# Patient Record
Sex: MALE | Race: BLACK OR AFRICAN AMERICAN | NOT HISPANIC OR LATINO | Employment: OTHER | URBAN - METROPOLITAN AREA
[De-identification: names, ages, dates, MRNs, and addresses within clinical notes are randomized per-mention and may not be internally consistent; named-entity substitution may affect disease eponyms.]

---

## 2017-06-14 ENCOUNTER — HOSPITAL ENCOUNTER (INPATIENT)
Facility: CLINIC | Age: 57
LOS: 4 days | Discharge: HOME | DRG: 192 | End: 2017-06-19
Attending: EMERGENCY MEDICINE | Admitting: INTERNAL MEDICINE
Payer: MEDICARE

## 2017-06-14 ENCOUNTER — APPOINTMENT (OUTPATIENT)
Dept: GENERAL RADIOLOGY | Facility: CLINIC | Age: 57
DRG: 192 | End: 2017-06-14
Attending: EMERGENCY MEDICINE
Payer: MEDICARE

## 2017-06-14 DIAGNOSIS — J44.1 COPD EXACERBATION (H): ICD-10-CM

## 2017-06-14 LAB
ALBUMIN SERPL-MCNC: 3.9 G/DL (ref 3.4–5)
ALP SERPL-CCNC: 122 U/L (ref 40–150)
ALT SERPL W P-5'-P-CCNC: 15 U/L (ref 0–70)
ANION GAP SERPL CALCULATED.3IONS-SCNC: 9 MMOL/L (ref 3–14)
AST SERPL W P-5'-P-CCNC: 15 U/L (ref 0–45)
BASOPHILS # BLD AUTO: 0 10E9/L (ref 0–0.2)
BASOPHILS NFR BLD AUTO: 0.2 %
BILIRUB SERPL-MCNC: 0.9 MG/DL (ref 0.2–1.3)
BUN SERPL-MCNC: 9 MG/DL (ref 7–30)
CALCIUM SERPL-MCNC: 9.1 MG/DL (ref 8.5–10.1)
CHLORIDE SERPL-SCNC: 105 MMOL/L (ref 94–109)
CO2 SERPL-SCNC: 25 MMOL/L (ref 20–32)
CREAT SERPL-MCNC: 0.81 MG/DL (ref 0.66–1.25)
DIFFERENTIAL METHOD BLD: ABNORMAL
EOSINOPHIL # BLD AUTO: 0.1 10E9/L (ref 0–0.7)
EOSINOPHIL NFR BLD AUTO: 0.5 %
ERYTHROCYTE [DISTWIDTH] IN BLOOD BY AUTOMATED COUNT: 12.9 % (ref 10–15)
GFR SERPL CREATININE-BSD FRML MDRD: NORMAL ML/MIN/1.7M2
GLUCOSE SERPL-MCNC: 93 MG/DL (ref 70–99)
HCT VFR BLD AUTO: 42.6 % (ref 40–53)
HGB BLD-MCNC: 14.7 G/DL (ref 13.3–17.7)
IMM GRANULOCYTES # BLD: 0 10E9/L (ref 0–0.4)
IMM GRANULOCYTES NFR BLD: 0.2 %
LYMPHOCYTES # BLD AUTO: 0.5 10E9/L (ref 0.8–5.3)
LYMPHOCYTES NFR BLD AUTO: 4 %
MCH RBC QN AUTO: 31.4 PG (ref 26.5–33)
MCHC RBC AUTO-ENTMCNC: 34.5 G/DL (ref 31.5–36.5)
MCV RBC AUTO: 91 FL (ref 78–100)
MONOCYTES # BLD AUTO: 0.7 10E9/L (ref 0–1.3)
MONOCYTES NFR BLD AUTO: 5.5 %
NEUTROPHILS # BLD AUTO: 11.5 10E9/L (ref 1.6–8.3)
NEUTROPHILS NFR BLD AUTO: 89.6 %
NRBC # BLD AUTO: 0 10*3/UL
NRBC BLD AUTO-RTO: 0 /100
NT-PROBNP SERPL-MCNC: 176 PG/ML (ref 0–900)
PLATELET # BLD AUTO: 177 10E9/L (ref 150–450)
POTASSIUM SERPL-SCNC: 3.5 MMOL/L (ref 3.4–5.3)
PROT SERPL-MCNC: 7.6 G/DL (ref 6.8–8.8)
RBC # BLD AUTO: 4.68 10E12/L (ref 4.4–5.9)
SODIUM SERPL-SCNC: 139 MMOL/L (ref 133–144)
TROPONIN I SERPL-MCNC: NORMAL UG/L (ref 0–0.04)
WBC # BLD AUTO: 12.8 10E9/L (ref 4–11)

## 2017-06-14 PROCEDURE — 96374 THER/PROPH/DIAG INJ IV PUSH: CPT

## 2017-06-14 PROCEDURE — 71020 XR CHEST 2 VW: CPT

## 2017-06-14 PROCEDURE — 85025 COMPLETE CBC W/AUTO DIFF WBC: CPT | Performed by: EMERGENCY MEDICINE

## 2017-06-14 PROCEDURE — 93005 ELECTROCARDIOGRAM TRACING: CPT

## 2017-06-14 PROCEDURE — 25000128 H RX IP 250 OP 636: Performed by: EMERGENCY MEDICINE

## 2017-06-14 PROCEDURE — 80053 COMPREHEN METABOLIC PANEL: CPT | Performed by: EMERGENCY MEDICINE

## 2017-06-14 PROCEDURE — 99285 EMERGENCY DEPT VISIT HI MDM: CPT | Mod: 25

## 2017-06-14 PROCEDURE — 25000125 ZZHC RX 250: Performed by: EMERGENCY MEDICINE

## 2017-06-14 PROCEDURE — 83880 ASSAY OF NATRIURETIC PEPTIDE: CPT | Performed by: EMERGENCY MEDICINE

## 2017-06-14 PROCEDURE — 94640 AIRWAY INHALATION TREATMENT: CPT

## 2017-06-14 PROCEDURE — 84484 ASSAY OF TROPONIN QUANT: CPT | Performed by: EMERGENCY MEDICINE

## 2017-06-14 RX ORDER — METHYLPREDNISOLONE SODIUM SUCCINATE 125 MG/2ML
125 INJECTION, POWDER, LYOPHILIZED, FOR SOLUTION INTRAMUSCULAR; INTRAVENOUS ONCE
Status: COMPLETED | OUTPATIENT
Start: 2017-06-14 | End: 2017-06-14

## 2017-06-14 RX ORDER — IPRATROPIUM BROMIDE AND ALBUTEROL SULFATE 2.5; .5 MG/3ML; MG/3ML
3 SOLUTION RESPIRATORY (INHALATION) ONCE
Status: COMPLETED | OUTPATIENT
Start: 2017-06-14 | End: 2017-06-14

## 2017-06-14 RX ORDER — IPRATROPIUM BROMIDE AND ALBUTEROL SULFATE 2.5; .5 MG/3ML; MG/3ML
3 SOLUTION RESPIRATORY (INHALATION) ONCE
Status: COMPLETED | OUTPATIENT
Start: 2017-06-14 | End: 2017-06-15

## 2017-06-14 RX ADMIN — IPRATROPIUM BROMIDE AND ALBUTEROL SULFATE 3 ML: .5; 3 SOLUTION RESPIRATORY (INHALATION) at 22:49

## 2017-06-14 RX ADMIN — IPRATROPIUM BROMIDE AND ALBUTEROL SULFATE 3 ML: .5; 3 SOLUTION RESPIRATORY (INHALATION) at 23:47

## 2017-06-14 RX ADMIN — METHYLPREDNISOLONE SODIUM SUCCINATE 125 MG: 125 INJECTION, POWDER, FOR SOLUTION INTRAMUSCULAR; INTRAVENOUS at 22:49

## 2017-06-14 ASSESSMENT — ENCOUNTER SYMPTOMS
CHILLS: 1
FEVER: 1
SHORTNESS OF BREATH: 1
HEMATURIA: 0
ABDOMINAL PAIN: 0
DIFFICULTY URINATING: 0
COUGH: 1

## 2017-06-14 NOTE — IP AVS SNAPSHOT
[facility] [address] [facility]    [address] [place] [address]    Phone:  [phone]                                       After Visit Summary   6/14/2017    Gerson Pearson    MRN: [id number]           After Visit Summary Signature Page     I have received my discharge instructions, and my questions have been answered. I have discussed any challenges I see with this plan with the nurse or doctor.    ..........................................................................................................................................  Patient/Patient Representative Signature      ..........................................................................................................................................  Patient Representative Print Name and Relationship to Patient    ..................................................               ................................................  Date                                            Time    ..........................................................................................................................................  Reviewed by Signature/Title    ...................................................              ..............................................  Date                                                            Time

## 2017-06-14 NOTE — IP AVS SNAPSHOT
MRN:[id number]                      After Visit Summary   6/14/2017    Gerson Pearson    MRN: [id number]           Thank you!     Thank you for choosing [facility] for your care. Our goal is always to provide you with excellent care. Hearing back from our patients is one way we can continue to improve our services. Please take a few minutes to complete the written survey that you may receive in the mail after you visit with us. Thank you!        Patient Information     Date Of Birth          1960        Designated Caregiver       Most Recent Value    Caregiver    Will someone help with your care after discharge? no      About your hospital stay     You were admitted on:  Iris 15, 2017 You last received care in the:  [facility] [address] [facility]    You were discharged on:  June 19, 2017        Reason for your hospital stay       COPD exacerbation, gastroenteritis                  Who to Call     For medical emergencies, please call 911.  For non-urgent questions about your medical care, please call your primary care provider or clinic, [phone]          Attending Provider     Provider Specialty    Hans Mckinney MD Emergency Medicine    [facility] [place] GOMES MD Internal Medicine    [facility]       Primary Care Provider Office Phone # Fax #    Alex Wynn PA-C [phone]      After Care Instructions     Activity       Your activity upon discharge: activity as tolerated            Diet       Follow this diet upon discharge: Orders Placed This Encounter      Regular Diet Adult                    Follow-up Appointments     Follow Up and recommended labs and tests       Follow up with primary care provider, Alex Wynn, within 7 days for hospital follow- up.            Follow Up and recommended labs and tests       Follow up with Alex Wynn PA-C  June 21 2pm  [address] [place] [address]  Bring your insurance card and  "arrive 15 minutes early                  Pending Results     No orders found from 2017 to 6/15/2017.            Statement of Approval     Ordered          17 0926  I have reviewed and agree with all the recommendations and orders detailed in this document.  EFFECTIVE NOW     Approved and electronically signed by:  Kali Garner MD             Admission Information     Date & Time Provider Department Dept. Phone    2017 Kali Garner MD [facility] [address] [facility] [phone]      Your Vitals Were     Blood Pressure Pulse Temperature Respirations Height Weight    128/81 (BP Location: Right arm) 77 97.9  F (36.6  C) (Oral) 16 1.753 m (5' 9\") 55.1 kg (121 lb 6.4 oz)    Pulse Oximetry BMI (Body Mass Index)                94% 17.93 kg/m2          [employer] Information     [employer] lets you send messages to your doctor, view your test results, renew your prescriptions, schedule appointments and more. To sign up, go to [email or web address] . Click on \"Log in\" on the left side of the screen, which will take you to the Welcome page. Then click on \"Sign up Now\" on the right side of the page.     You will be asked to enter the access code listed below, as well as some personal information. Please follow the directions to create your username and password.     Your access code is: [email or web address]  Expires: 2017 10:21 AM     Your access code will  in 90 days. If you need help or a new code, please call your [facility] clinic or [phone].        Care EveryWhere ID     This is your Care EveryWhere ID. This could be used by other organizations to access your [facility] medical records  [id number]           Review of your medicines      CONTINUE these medicines which have NOT CHANGED        Dose / Directions    albuterol 108 (90 BASE) MCG/ACT Inhaler   Commonly known as:  PROAIR HFA/PROVENTIL HFA/VENTOLIN HFA        Dose:  2 puff   Inhale 2 puffs into the lungs every 4 hours as needed for " shortness of breath / dyspnea or wheezing   Quantity:  1 Inhaler   Refills:  1       CITALOPRAM HYDROBROMIDE PO        Dose:  20 mg   Take 20 mg by mouth daily   Refills:  0       ipratropium - albuterol 0.5 mg/2.5 mg/3 mL 0.5-2.5 (3) MG/3ML neb solution   Commonly known as:  DUONEB        Dose:  1 vial   Take 1 vial (3 mLs) by nebulization every 6 hours as needed for shortness of breath / dyspnea or wheezing   Quantity:  1 Box   Refills:  1       mometasone-formoterol 200-5 MCG/ACT oral inhaler   Commonly known as:  DULERA        Dose:  2 puff   Inhale 2 puffs into the lungs 2 times daily   Quantity:  1 Inhaler   Refills:  1       multivitamin, therapeutic Tabs tablet        Dose:  1 tablet   Take 1 tablet by mouth daily   Refills:  0       tiotropium 18 MCG capsule   Commonly known as:  SPIRIVA        Dose:  18 mcg   Inhale 1 capsule (18 mcg) into the lungs daily   Quantity:  30 capsule   Refills:  1       traMADol 50 MG tablet   Commonly known as:  ULTRAM        Dose:  50 mg   Take 1 tablet (50 mg) by mouth every 6 hours as needed for moderate pain   Quantity:  12 tablet   Refills:  0            Where to get your medicines      These medications were sent to [facility] [address] [place] [address] & [address], [place] [address]     Phone:  [phone]     albuterol 108 (90 BASE) MCG/ACT Inhaler    ipratropium - albuterol 0.5 mg/2.5 mg/3 mL 0.5-2.5 (3) MG/3ML neb solution    mometasone-formoterol 200-5 MCG/ACT oral inhaler    tiotropium 18 MCG capsule                Protect others around you: Learn how to safely use, store and throw away your medicines at www.disposemymeds.org.             Medication List: This is a list of all your medications and when to take them. Check marks below indicate your daily home schedule. Keep this list as a reference.      Medications           Morning Afternoon Evening Bedtime As Needed    albuterol 108 (90 BASE) MCG/ACT  Inhaler   Commonly known as:  PROAIR HFA/PROVENTIL HFA/VENTOLIN HFA   Inhale 2 puffs into the lungs every 4 hours as needed for shortness of breath / dyspnea or wheezing                                   CITALOPRAM HYDROBROMIDE PO   Take 20 mg by mouth daily   Last time this was given:  20 mg on 6/16/2017  8:17 AM                                   ipratropium - albuterol 0.5 mg/2.5 mg/3 mL 0.5-2.5 (3) MG/3ML neb solution   Commonly known as:  DUONEB   Take 1 vial (3 mLs) by nebulization every 6 hours as needed for shortness of breath / dyspnea or wheezing   Last time this was given:  3 mLs on 6/15/2017 12:28 AM                                   mometasone-formoterol 200-5 MCG/ACT oral inhaler   Commonly known as:  DULERA   Inhale 2 puffs into the lungs 2 times daily                                      multivitamin, therapeutic Tabs tablet   Take 1 tablet by mouth daily                                   tiotropium 18 MCG capsule   Commonly known as:  SPIRIVA   Inhale 1 capsule (18 mcg) into the lungs daily   Last time this was given:  18 mcg on 6/19/2017  9:28 AM                                   traMADol 50 MG tablet   Commonly known as:  ULTRAM   Take 1 tablet (50 mg) by mouth every 6 hours as needed for moderate pain   Last time this was given:  50 mg on 6/19/2017  3:50 PM

## 2017-06-15 PROBLEM — J44.1 COPD EXACERBATION (H): Status: ACTIVE | Noted: 2017-06-15

## 2017-06-15 LAB
INTERPRETATION ECG - MUSE: NORMAL
LACTATE BLD-SCNC: 2.6 MMOL/L (ref 0.7–2.1)
LACTATE BLD-SCNC: 3.6 MMOL/L (ref 0.7–2.1)
PROCALCITONIN SERPL-MCNC: NORMAL NG/ML
TROPONIN I SERPL-MCNC: NORMAL UG/L (ref 0–0.04)

## 2017-06-15 PROCEDURE — 94640 AIRWAY INHALATION TREATMENT: CPT

## 2017-06-15 PROCEDURE — 84484 ASSAY OF TROPONIN QUANT: CPT | Performed by: PHYSICIAN ASSISTANT

## 2017-06-15 PROCEDURE — 25000307 HC RX OP HPI UCR WEL IP 250: Mod: GY | Performed by: PHYSICIAN ASSISTANT

## 2017-06-15 PROCEDURE — G0378 HOSPITAL OBSERVATION PER HR: HCPCS

## 2017-06-15 PROCEDURE — 99223 1ST HOSP IP/OBS HIGH 75: CPT | Mod: AI | Performed by: INTERNAL MEDICINE

## 2017-06-15 PROCEDURE — A9270 NON-COVERED ITEM OR SERVICE: HCPCS | Mod: GY | Performed by: PHYSICIAN ASSISTANT

## 2017-06-15 PROCEDURE — 36415 COLL VENOUS BLD VENIPUNCTURE: CPT | Performed by: PHYSICIAN ASSISTANT

## 2017-06-15 PROCEDURE — 25000128 H RX IP 250 OP 636: Performed by: INTERNAL MEDICINE

## 2017-06-15 PROCEDURE — 25000132 ZZH RX MED GY IP 250 OP 250 PS 637: Mod: GY | Performed by: PHYSICIAN ASSISTANT

## 2017-06-15 PROCEDURE — A9270 NON-COVERED ITEM OR SERVICE: HCPCS | Mod: GY | Performed by: INTERNAL MEDICINE

## 2017-06-15 PROCEDURE — 36415 COLL VENOUS BLD VENIPUNCTURE: CPT | Performed by: INTERNAL MEDICINE

## 2017-06-15 PROCEDURE — 94640 AIRWAY INHALATION TREATMENT: CPT | Mod: 76

## 2017-06-15 PROCEDURE — 25000125 ZZHC RX 250: Performed by: INTERNAL MEDICINE

## 2017-06-15 PROCEDURE — 25000132 ZZH RX MED GY IP 250 OP 250 PS 637: Mod: GY | Performed by: INTERNAL MEDICINE

## 2017-06-15 PROCEDURE — 83605 ASSAY OF LACTIC ACID: CPT | Performed by: PHYSICIAN ASSISTANT

## 2017-06-15 PROCEDURE — 12000000 ZZH R&B MED SURG/OB

## 2017-06-15 PROCEDURE — 99207 ZZC CDG-CODE CATEGORY CHANGED: CPT | Performed by: INTERNAL MEDICINE

## 2017-06-15 PROCEDURE — 40000275 ZZH STATISTIC RCP TIME EA 10 MIN

## 2017-06-15 PROCEDURE — 96361 HYDRATE IV INFUSION ADD-ON: CPT

## 2017-06-15 PROCEDURE — 25000125 ZZHC RX 250: Performed by: PHYSICIAN ASSISTANT

## 2017-06-15 PROCEDURE — 84145 PROCALCITONIN (PCT): CPT | Performed by: INTERNAL MEDICINE

## 2017-06-15 PROCEDURE — 25000128 H RX IP 250 OP 636: Performed by: PHYSICIAN ASSISTANT

## 2017-06-15 PROCEDURE — 25000125 ZZHC RX 250: Performed by: EMERGENCY MEDICINE

## 2017-06-15 PROCEDURE — 83605 ASSAY OF LACTIC ACID: CPT | Performed by: INTERNAL MEDICINE

## 2017-06-15 RX ORDER — IPRATROPIUM BROMIDE AND ALBUTEROL SULFATE 2.5; .5 MG/3ML; MG/3ML
3 SOLUTION RESPIRATORY (INHALATION)
Status: DISCONTINUED | OUTPATIENT
Start: 2017-06-15 | End: 2017-06-15

## 2017-06-15 RX ORDER — LEVALBUTEROL 1.25 MG/.5ML
1.25 SOLUTION, CONCENTRATE RESPIRATORY (INHALATION) EVERY 4 HOURS PRN
Status: DISCONTINUED | OUTPATIENT
Start: 2017-06-15 | End: 2017-06-19 | Stop reason: HOSPADM

## 2017-06-15 RX ORDER — IPRATROPIUM BROMIDE AND ALBUTEROL SULFATE 2.5; .5 MG/3ML; MG/3ML
1 SOLUTION RESPIRATORY (INHALATION) EVERY 6 HOURS PRN
Status: ON HOLD | COMMUNITY
End: 2017-06-19

## 2017-06-15 RX ORDER — NICOTINE 21 MG/24HR
1 PATCH, TRANSDERMAL 24 HOURS TRANSDERMAL DAILY PRN
Status: DISCONTINUED | OUTPATIENT
Start: 2017-06-15 | End: 2017-06-19 | Stop reason: HOSPADM

## 2017-06-15 RX ORDER — SODIUM CHLORIDE 9 MG/ML
INJECTION, SOLUTION INTRAVENOUS CONTINUOUS
Status: DISCONTINUED | OUTPATIENT
Start: 2017-06-15 | End: 2017-06-15

## 2017-06-15 RX ORDER — NALOXONE HYDROCHLORIDE 0.4 MG/ML
.1-.4 INJECTION, SOLUTION INTRAMUSCULAR; INTRAVENOUS; SUBCUTANEOUS
Status: DISCONTINUED | OUTPATIENT
Start: 2017-06-15 | End: 2017-06-19 | Stop reason: HOSPADM

## 2017-06-15 RX ORDER — ACETAMINOPHEN 650 MG/1
650 SUPPOSITORY RECTAL EVERY 4 HOURS PRN
Status: DISCONTINUED | OUTPATIENT
Start: 2017-06-15 | End: 2017-06-19 | Stop reason: HOSPADM

## 2017-06-15 RX ORDER — CITALOPRAM HYDROBROMIDE 20 MG/1
20 TABLET ORAL DAILY
Status: DISCONTINUED | OUTPATIENT
Start: 2017-06-15 | End: 2017-06-16

## 2017-06-15 RX ORDER — ACETAMINOPHEN 325 MG/1
650 TABLET ORAL EVERY 4 HOURS PRN
Status: DISCONTINUED | OUTPATIENT
Start: 2017-06-15 | End: 2017-06-19 | Stop reason: HOSPADM

## 2017-06-15 RX ORDER — TIOTROPIUM BROMIDE 18 UG/1
18 CAPSULE ORAL; RESPIRATORY (INHALATION) DAILY
Status: DISCONTINUED | OUTPATIENT
Start: 2017-06-15 | End: 2017-06-19 | Stop reason: HOSPADM

## 2017-06-15 RX ORDER — ALBUTEROL SULFATE 90 UG/1
2 AEROSOL, METERED RESPIRATORY (INHALATION) EVERY 4 HOURS PRN
Status: ON HOLD | COMMUNITY
End: 2017-06-19

## 2017-06-15 RX ORDER — GUAIFENESIN 600 MG/1
1200 TABLET, EXTENDED RELEASE ORAL 2 TIMES DAILY
Status: DISCONTINUED | OUTPATIENT
Start: 2017-06-15 | End: 2017-06-17

## 2017-06-15 RX ORDER — SODIUM CHLORIDE 9 MG/ML
INJECTION, SOLUTION INTRAVENOUS CONTINUOUS
Status: DISCONTINUED | OUTPATIENT
Start: 2017-06-15 | End: 2017-06-16

## 2017-06-15 RX ORDER — ALBUTEROL SULFATE 0.83 MG/ML
2.5 SOLUTION RESPIRATORY (INHALATION)
Status: DISCONTINUED | OUTPATIENT
Start: 2017-06-15 | End: 2017-06-15

## 2017-06-15 RX ORDER — MULTIVITAMIN,THERAPEUTIC
1 TABLET ORAL DAILY
Status: ON HOLD | COMMUNITY
End: 2021-10-11

## 2017-06-15 RX ORDER — TRAMADOL HYDROCHLORIDE 50 MG/1
50 TABLET ORAL EVERY 6 HOURS PRN
Status: DISCONTINUED | OUTPATIENT
Start: 2017-06-15 | End: 2017-06-19 | Stop reason: HOSPADM

## 2017-06-15 RX ORDER — METHYLPREDNISOLONE SODIUM SUCCINATE 125 MG/2ML
60 INJECTION, POWDER, LYOPHILIZED, FOR SOLUTION INTRAMUSCULAR; INTRAVENOUS EVERY 8 HOURS
Status: DISCONTINUED | OUTPATIENT
Start: 2017-06-15 | End: 2017-06-18

## 2017-06-15 RX ORDER — LEVALBUTEROL 1.25 MG/.5ML
1.25 SOLUTION, CONCENTRATE RESPIRATORY (INHALATION) 4 TIMES DAILY
Status: DISCONTINUED | OUTPATIENT
Start: 2017-06-15 | End: 2017-06-19 | Stop reason: HOSPADM

## 2017-06-15 RX ADMIN — IPRATROPIUM BROMIDE 0.5 MG: 0.5 SOLUTION RESPIRATORY (INHALATION) at 16:16

## 2017-06-15 RX ADMIN — LEVALBUTEROL HYDROCHLORIDE 1.25 MG: 1.25 SOLUTION, CONCENTRATE RESPIRATORY (INHALATION) at 10:55

## 2017-06-15 RX ADMIN — METHYLPREDNISOLONE SODIUM SUCCINATE 62.5 MG: 125 INJECTION, POWDER, FOR SOLUTION INTRAMUSCULAR; INTRAVENOUS at 13:41

## 2017-06-15 RX ADMIN — TRAMADOL HYDROCHLORIDE 50 MG: 50 TABLET, COATED ORAL at 15:55

## 2017-06-15 RX ADMIN — SODIUM CHLORIDE: 9 INJECTION, SOLUTION INTRAVENOUS at 12:49

## 2017-06-15 RX ADMIN — IPRATROPIUM BROMIDE 0.5 MG: 0.5 SOLUTION RESPIRATORY (INHALATION) at 10:55

## 2017-06-15 RX ADMIN — LEVALBUTEROL HYDROCHLORIDE 1.25 MG: 1.25 SOLUTION, CONCENTRATE RESPIRATORY (INHALATION) at 19:27

## 2017-06-15 RX ADMIN — ACETAMINOPHEN 650 MG: 325 TABLET, FILM COATED ORAL at 18:40

## 2017-06-15 RX ADMIN — CITALOPRAM HYDROBROMIDE 20 MG: 20 TABLET ORAL at 08:14

## 2017-06-15 RX ADMIN — SODIUM CHLORIDE: 9 INJECTION, SOLUTION INTRAVENOUS at 07:02

## 2017-06-15 RX ADMIN — LEVALBUTEROL HYDROCHLORIDE 1.25 MG: 1.25 SOLUTION, CONCENTRATE RESPIRATORY (INHALATION) at 16:15

## 2017-06-15 RX ADMIN — GUAIFENESIN 1200 MG: 600 TABLET, EXTENDED RELEASE ORAL at 18:41

## 2017-06-15 RX ADMIN — TRAMADOL HYDROCHLORIDE 50 MG: 50 TABLET, COATED ORAL at 01:46

## 2017-06-15 RX ADMIN — ALBUTEROL SULFATE 2.5 MG: 2.5 SOLUTION RESPIRATORY (INHALATION) at 02:08

## 2017-06-15 RX ADMIN — IPRATROPIUM BROMIDE AND ALBUTEROL SULFATE 3 ML: .5; 3 SOLUTION RESPIRATORY (INHALATION) at 00:28

## 2017-06-15 RX ADMIN — FLUTICASONE FUROATE AND VILANTEROL TRIFENATATE 1 PUFF: 100; 25 POWDER RESPIRATORY (INHALATION) at 08:14

## 2017-06-15 RX ADMIN — IPRATROPIUM BROMIDE 0.5 MG: 0.5 SOLUTION RESPIRATORY (INHALATION) at 19:27

## 2017-06-15 RX ADMIN — TRAMADOL HYDROCHLORIDE 50 MG: 50 TABLET, COATED ORAL at 08:37

## 2017-06-15 RX ADMIN — TRAMADOL HYDROCHLORIDE 50 MG: 50 TABLET, COATED ORAL at 22:04

## 2017-06-15 RX ADMIN — TIOTROPIUM BROMIDE 18 MCG: 18 CAPSULE ORAL; RESPIRATORY (INHALATION) at 08:14

## 2017-06-15 RX ADMIN — METHYLPREDNISOLONE SODIUM SUCCINATE 62.5 MG: 125 INJECTION, POWDER, FOR SOLUTION INTRAMUSCULAR; INTRAVENOUS at 22:03

## 2017-06-15 RX ADMIN — METHYLPREDNISOLONE SODIUM SUCCINATE 62.5 MG: 125 INJECTION, POWDER, FOR SOLUTION INTRAMUSCULAR; INTRAVENOUS at 06:56

## 2017-06-15 NOTE — PROGRESS NOTES
[facility]    Sepsis Evaluation Progress Note    Date of Service: 06/15/2017    I was called due to an elevated lactic acid. He is not known to have an infection.     Physical Exam    Vital Signs:  Temp: 99.6  F (37.6  C) Temp src: Oral BP: 146/82 Pulse: 91 Heart Rate: 117 Resp: 30 SpO2: 95 % O2 Device: Nasal cannula Oxygen Delivery: 2 LPM    Lab:  Lactic Acid   Date Value Ref Range Status   06/15/2017 3.6 (H) 0.7 - 2.1 mmol/L Final       The patient is at baseline mental status.      Assessment and Plan    The SIRS and exam findings are likely due to nebulizers    Disposition: The patient will remain on the current unit. We will continue to monitor this patient closely     CXR does not suggest pneumonia.     We will add a procalcitonin help risk stratify him.     Tristan Zavala MD, MD

## 2017-06-15 NOTE — PLAN OF CARE
Problem: Goal Outcome Summary  Goal: Goal Outcome Summary  Outcome: Improving  Observation goals to be met prior to DC:     -diagnostic tests and consults completed and resulted -not met  -vital signs normal or at patient baseline -not met  Nurse to notify provider when observation goals have been met and patient is ready for discharge.     VSS, 2lpm o2 sinus rhythm, pt slept after ultram given, lactic acid 3.6 MD aware, wife at bedside.Good appitite, iv infusing.

## 2017-06-15 NOTE — PROGRESS NOTES
The following criteria to be met before discharge:    1.  -diagnostic tests and consults completed and resulted - met  2.   -vital signs normal or at patient baseline -not met    Iris 15, 2017

## 2017-06-15 NOTE — ED NOTES
Bed: ED11  Expected date:   Expected time:   Means of arrival:   Comments:  418 57 male SOB COPD yellow in 10  min

## 2017-06-15 NOTE — ED NOTES
[facility]  ED Nurse Handoff Report    ED Chief complaint: Shortness of Breath (Patient reports hard to breathe off/on today.  Coughing up white phelgm.  Feeling hot then cold.  Hx of COPD since 2014.  Usually has pneumonia when he gets like this.  Used inhalers & nebulizers today at home.  )      ED Diagnosis:   Final diagnoses:   COPD exacerbation (H)       Code Status: Full Code    Allergies:   Allergies   Allergen Reactions     Ibuprofen Nausea and Vomiting       Activity level - Baseline/Home:  Independent    Activity Level - Current:   Stand with Assist     Needed?: No    Isolation: No  Infection: Not Applicable    Bariatric?: No    Vital Signs:   Vitals:    06/14/17 2230 06/14/17 2245 06/14/17 2300 06/14/17 2315   BP: 151/82 147/82 135/85 135/86   Pulse:       Resp: 24 22 17 26   Temp:       TempSrc:       SpO2: 97% 96% 100% 99%   Height:           Cardiac Rhythm: ,        Pain level: 0-10 Pain Scale: 8 (Tightness)    Is this patient confused?: No    Patient Report: Initial Complaint: SOB, started this morning.    Focused Assessment:  Patient complaints of SOB, started this morning.  Off/on all day.  Used inhalers & nebs at home.  Hx of COPD.  Labored breathing upon arrival to ED.  Received 2 duonebs from EMS.  Reports cough with white phelgm today.  Hot then chills today also.  Reports hx of pneumonia when he gets like this.   Tests Performed:  Labs, Chest XR  Abnormal Results: Refer to results.   Treatments provided: Solumedrol, Duoneb    Family Comments: Spouse with patient.     OBS brochure/video discussed/provided to patient: Yes    ED Medications:   Medications   ipratropium - albuterol 0.5 mg/2.5 mg/3 mL (DUONEB) neb solution 3 mL (not administered)   methylPREDNISolone sodium succinate (solu-MEDROL) injection 125 mg (125 mg Intravenous Given 6/14/17 5089)   ipratropium - albuterol 0.5 mg/2.5 mg/3 mL (DUONEB) neb solution 3 mL (3 mLs Nebulization Given 6/14/17 3056)    ipratropium - albuterol 0.5 mg/2.5 mg/3 mL (DUONEB) neb solution 3 mL (3 mLs Nebulization Given 6/14/17 6410)       Drips infusing?:  No      ED NURSE PHONE NUMBER: [phone]

## 2017-06-15 NOTE — PLAN OF CARE
Problem: Goal Outcome Summary  Goal: Goal Outcome Summary  Outcome: No Change  Care Plan Summary Note:PT is alert and oriented X4.  Pt is on 2 liters O2 by nasal cannula: saturation is in the 90's. Tele=Stach with PAC.  Ultram for pain.   Activity Level:up independent  Fall Risk: no  Mobility Tier (A or B):B  Anticipated DC date: TBD

## 2017-06-15 NOTE — PHARMACY-ADMISSION MEDICATION HISTORY
Admission medication history interview status for the 6/14/2017  admission is complete. See EPIC admission navigator for prior to admission medications     Medication history source reliability:Moderate    Actions taken by pharmacist (provider contacted, etc):interviewed patient and called Edie     Additional medication history information not noted on PTA med list :pt has not refilled his meds in months. I included some of them on his list that he currently isn't taking, but please reevaluate (citalopram, dulera, duoneb, ventolin, tramadol). The only meds he has taken recently is the multivitamin and a neb yesterday.    Medication reconciliation/reorder completed by provider prior to medication history? No    Time spent in this activity: 25 minutes    Prior to Admission medications    Medication Sig Last Dose Taking? Auth Provider   multivitamin, therapeutic (THERA-VIT) TABS tablet Take 1 tablet by mouth daily Past Month at Unknown time Yes Unknown, Entered By History   ipratropium - albuterol 0.5 mg/2.5 mg/3 mL (DUONEB) 0.5-2.5 (3) MG/3ML neb solution Take 1 vial by nebulization every 6 hours as needed for shortness of breath / dyspnea or wheezing 6/14/2017 at Unknown time Yes Unknown, Entered By History   traMADol (ULTRAM) 50 MG tablet Take 1 tablet (50 mg) by mouth every 6 hours as needed for moderate pain Past Month at Unknown time Yes Herberth Ngo MD   mometasone-formoterol (DULERA) 200-5 MCG/ACT oral inhaler Inhale 2 puffs into the lungs 2 times daily More than a month at Unknown time  Unknown, Entered By History   albuterol (PROAIR HFA/PROVENTIL HFA/VENTOLIN HFA) 108 (90 BASE) MCG/ACT Inhaler Inhale 2 puffs into the lungs every 4 hours as needed for shortness of breath / dyspnea or wheezing More than a month at Unknown time  Unknown, Entered By History   CITALOPRAM HYDROBROMIDE PO Take 20 mg by mouth daily  More than a month at Unknown time  Reported, Patient

## 2017-06-15 NOTE — PROGRESS NOTES
Patient 93% on RA.  Patient wheezy and SOB after bathroom use.  RT paged to give patient a neb treatment.  SHARIF Alvarez paged and provided with updated patient information.

## 2017-06-15 NOTE — PROGRESS NOTES
The following criteria to be met before discharge:     1.  -diagnostic tests and consults completed and resulted - met  2.   -vital signs normal or at patient baseline -not met

## 2017-06-15 NOTE — PROGRESS NOTES
[facility]    Hospitalist Progress Note    Assessment & Plan   Gerson Pearson is a 57 year old male who was admitted on 6/14/2017.     57-year-old man with medical history of COPD, MDD, tobacco dependence and chronic back pain and registered to observation due to a COPD exacerbation and inspirational chest pain.     Acute COPD exacerbation:  Not improving, previously O2 sats in the low 90's on RA but requesting supplemental O2.    Continued wheeze on physical examination, now with more frequent and productive cough.  Has been non-compliant with outpatient management due to cost.  Started on Solu-Medrol and nebulizers in the ED.  Chest Xray negative and blood cultures taken in the ED.   -Continue IV Solu-Medrol.  -Scheduled neb therapies QID; stop DuoNeb due to tachycardia and start Xopenex and ipratropium.  -Hold empiric antibiotics.    -Follow blood cultures.    -Resume PTA Spiriva and Dulera.    -RCAT requested.    -Mucinex 1200 mg BID.      Tachycardia with elevated lactic acid:  Improved with change in neb therapies.    Secondary to Nebulizing therapies.  Procalcitonin negative.    -Neb therapies adjusted as above.    -Repeat lactic acid.      Diarrhea:  Patient is complaining of diarrhea which is new since arrival.    -Monitor.      Tobacco Abuse/Dependence:  Noted pack per day habit.    -Nicoderm patch PRN.    -Counseling regarding cessation.      Chest pain, inspirational:    Likely due to COPD.  Cardiac risk factors include tobacco dependence.  Troponin negative x2.    -Monitor on telemetry.      MDD:  -Resume PTA citalopram.      Chronic low back pain:  -Resume PTA PRN Tramadol.      DVT Prophylaxis: Low Risk/Ambulatory with no VTE prophylaxis indicated  Code Status: Full Code    Disposition: Expected discharge unclear, patient feels as though he is not improving and if fact worsening with development of productive cough and diarrhea.  He has persistent wheeze (expiratory and inspiratory)  despite ongoing neb therapies and requiring IV solumedrol.      The patient has been discussed with Dr. Garner, who agrees with the assessment and plan at this time.  Dr. Garner will evaluate the patient independently.      Andrew Moulton PA-C   [phone]    Interval History   Patient lying in bed coughing frequently (productive) with sputum spit out in a basin.  He indicated he felt chilled and has CP, SOB, abdominal pain and new diarrhea.  His chest pain was described as tightness throughout his chest.  His cough has worsened by becoming more frequent and now productive with thick yellow sputum (was clear/white previously).  Abdominal pain in non-specific but he stated he is frequently having to run to the bathroom due to diarrhea.      He stated he felt as though he is not improving.      -Data reviewed today: I reviewed all new labs and imaging results over the last 24 hours. I personally reviewed no images or EKG's today.    Physical Exam   Temp: 99.6  F (37.6  C) Temp src: Oral BP: 146/82 Pulse: 91 Heart Rate: 117 Resp: 30 SpO2: 95 % O2 Device: Nasal cannula Oxygen Delivery: 2 LPM  Vitals:    06/15/17 0110   Weight: 55.1 kg (121 lb 6.4 oz)     Vital Signs with Ranges  Temp:  [99.1  F (37.3  C)-99.6  F (37.6  C)] 99.6  F (37.6  C)  Pulse:  [91] 91  Heart Rate:  [] 117  Resp:  [17-30] 30  BP: (135-152)/(82-88) 146/82  SpO2:  [95 %-100 %] 95 %         Constitutional: Awake, alert, cooperative, appears uncomfortable with frequent coughing fits with resulting gasping for breath.    ENT: Normocephalic, without obvious abnormality, atraumatic, oral pharynx with moist mucus membranes, tonsils without erythema or exudates.  Neck: Supple, symmetrical, trachea midline, no adenopathy.  Pulmonary: Increased work of breathing following coughing fit, poor air exchange, inspiratory and expiratory wheeze appreciated throughout the lung fields with out crackles.    Cardiovascular: Regular rate and rhythm, normal S1 and S2,  no S3 or S4, and no murmur noted.  GI: Normal bowel sounds, soft, non-distended, non-tender.  Skin/Integumen: Clear.  Neuro: CN II-XII grossly intact.  Psych:  Alert and oriented x 3. Normal affect.  Extremities: No lower extremity edema noted, and non-TTP bilaterally.       Medications     NaCl 100 mL/hr at 06/15/17 0702       citalopram (celeXA) tablet 20 mg  20 mg Oral Daily     fluticasone-vilanterol  1 puff Inhalation Daily     tiotropium  18 mcg Inhalation Daily     methylPREDNISolone  62.5 mg Intravenous Q8H     levalbuterol  1.25 mg Nebulization 4x Daily     ipratropium  0.5 mg Nebulization 4x daily     nicotine   Transdermal Q8H     [START ON 6/16/2017] nicotine   Transdermal Daily     guaiFENesin  1,200 mg Oral BID       Data     Recent Labs  Lab 06/14/17  2230   WBC 12.8*   HGB 14.7   MCV 91         POTASSIUM 3.5   CHLORIDE 105   CO2 25   BUN 9   CR 0.81   ANIONGAP 9   RL 9.1   GLC 93   ALBUMIN 3.9   PROTTOTAL 7.6   BILITOTAL 0.9   ALKPHOS 122   ALT 15   AST 15   TROPI <0.015The 99th percentile for upper reference range is 0.045 ug/L.  Troponin values in the range of 0.045 - 0.120 ug/L may be associated with risks of adverse clinical events.       Recent Results (from the past 24 hour(s))   XR Chest 2 Views    Narrative    XR CHEST 2 VW  6/14/2017 11:41 PM      HISTORY: Shortness of breath.    COMPARISON: 11/30/2011.    FINDINGS: The heart size is normal. The lungs are hyperinflated but  clear. No pneumothorax or pleural effusion.      Impression    IMPRESSION: The lungs are hyperinflated. No other acute abnormality.

## 2017-06-15 NOTE — H&P
CHIEF COMPLAINT:  Shortness of breath, chest pain.      HISTORY OF PRESENT ILLNESS:  Gerson Pearson is a pleasant 57-year-old -American gentleman with a known history of COPD and tobacco abuse, who normally gets his care through [facility], who presented today with increasing shortness of breath, cough and chest pain that occurs with deep breathing.  The patient tells me that he was in his routine state of health when he developed this yesterday morning.  Nobody has been sick at home.  He denies any fevers, but has been having some chills and coughing up of some phlegm, no blood.  He denies any bloody stool, but has had a little bit of diarrhea.  Denies any urinary changes.  He denies any skin changes or swelling in his feet.  He denies any seizure or stroke history.  He acknowledges continuing to smoke tobacco products.  I discussed this with him and his significant other at length, and the importance of quitting.  He has apparently tried nicotine patches before without success, but has not tried other agents.  He denies any diabetes history or endocrine complaints.  Denies any connective tissue problems, but has had some chronic osteoarthritis in his legs.      PAST MEDICAL HISTORY:   1.  Chronic obstructive pulmonary disease.   2.  Depression.   3.  Chronic back pain.      PAST SURGICAL HISTORY:  He reports having had a steroid injection in his spine in the past.      FAMILY HISTORY:  Significant for diabetes in his mother.      SOCIAL HISTORY:  The patient smokes about a pack a day.  He quit drinking three years ago.  He does occasionally use cocaine.      ALLERGIES:  Ibuprofen causes nausea and vomiting.       PRIOR TO ADMISSION MEDICATIONS:   1.  Tramadol 50 mg every 6 hours p.r.n. moderate pain.   2.  Spiriva 18 mcg once daily.   3.  Dulera 100/5 mcg, 2 puffs twice daily.   3.  Citalopram 20 mg p.o. daily.      REVIEW OF SYSTEMS:  A 10-point system review was performed and was negative with the  exception of those complaints noted in the HPI.      PHYSICAL EXAMINATION:   VITAL SIGNS:  Temperature 99.1, heart rate 91, respiratory rate 30, blood pressure 147/88, O2 saturation 97% on room air.   GENERAL:  This is a pleasant man who looks his stated age.   HEENT:  Head exam reveals no facial asymmetry.  Pupils are equally reactive to light bilaterally.  Extraocular movements are intact.  Sclerae are anicteric.  Oropharynx is clear.  Tongue is midline.   RESPIRATORY:  Occasional wheezes bilaterally with poor air movement throughout both lung fields.  No crackles.   CARDIAC:  Slight tachycardia is noted.  S1 and S2 are heard.  No murmurs.   ABDOMEN:  Soft, nontender, nondistended.  Bowel sounds are heard.   LOWER EXTREMITIES:  Negative for significant pedal edema.  Pedal pulses are 1+ bilaterally.  There is no calf tenderness on exam.   NEUROLOGIC:  Cranial nerves II through XII are grossly intact on cursory exam.   SKIN:  Negative for signs of jaundice.      LABORATORY FINDINGS:  Sodium 139, potassium 3.5, chloride 105, CO2 25, BUN 9, creatinine 0.81, glucose is 176, calcium 9.1, anion gap 9, total protein 7.6, albumin is 3.9, total bilirubin 0.9, alkaline phosphatase 122, ALT 15, AST 15 and N-terminal BNP is 176.  Glucose 93.  Troponin I less than 0.015.  White blood cell count 12.8, hemoglobin 14.7, platelets 177,000.      Chest x-ray, two views were performed, which I personally reviewed and agree with the radiologist's report of hyperinflated lungs.  There is no evidence of any pneumothorax, pleural effusion or opacities.      EKG was performed which I also personally reviewed.  My review of the EKG is as follows:  Sinus tachycardia with flattened T waves in his lateral leads, as well as in leads, I, II, III, aVF and aVL.      ASSESSMENT AND PLAN:  This is a 57-year-old man presenting with a COPD exacerbation and inspirational chest pain.   1.  Acute COPD exacerbation:  The patient was seen by Dr. Mckinney  in the emergency department, and started on Solu-Medrol and nebulizers.  We will continue him on IV Solu-Medrol, as well as DuoNeb and albuterol nebs for now.  At this time I do not feel that antibiotics are indicated.  I discussed the case with Dr. Mckinney who did obtain blood cultures, and we will await those cultures, but for now will hold off on antibiotics unless further evidence of infection is presented.   2.  Chest pain, inspirational:  This is likely due to COPD, but given his tobacco abuse history, we will obtain a second troponin with the a.m. labs, and keep him on telemetry overnight.   3.  Ongoing tobacco abuse:  I have counseled him and his significant of the need to quit.  We will also order a tobacco cessation consult.   4.  DVT prophylaxis:  Ambulation.   5.  CODE STATUS:  FULL.   6.  Disposition:  We will admit him to the observation unit, as it is anticipated that he will be here less than 48 hours.         GEETA MCCRARY MD             D: 06/15/2017 01:07   T: 06/15/2017 02:36   MT: MARK#[id number]      Name:     RANI DUARTE   MRN:      [id number]        Account:      [id number]   :      1960           Admitted:     [id number]      Document: [id number]       cc: Alex Wynn PA-C

## 2017-06-15 NOTE — ED PROVIDER NOTES
"  History     Chief Complaint:  Shortness of breath    HPI   Gerson Pearson is a 57 year old male with a history of COPD who presents with shortness of breath.The patient states his shortness of breath has been off and on today. He has also been having subjective fevers, chills, and a cough with white phlegm. Here, he complains of a straining chest pain that started 2 hours ago that worsens with coughing. He has not been using his inhaler or nebulizer, as he ran out of his medications a while ago and states his symptoms are similar to when he has had pneumonia. He denies headache or abdominal pain, or any other complaints.    Allergies:  Ibuprofen - nausea and vomiting        Medications:    The patient is currently on no regular medications.      Past Medical History:    Chronic back pain  COPD  Depressive disorder     Past Surgical History:    History reviewed. No pertinent surgical history.     Family History:    Diabetes    Social History:  Smoking status: Current smoker  Alcohol use: No   Marital Status:        Review of Systems   Constitutional: Positive for chills and fever.   Respiratory: Positive for cough and shortness of breath.    Cardiovascular: Positive for chest pain.   Gastrointestinal: Negative for abdominal pain.   Genitourinary: Negative for difficulty urinating and hematuria.   All other systems reviewed and are negative.      Physical Exam   First Vitals:  BP: 147/88  Pulse: 91  Temp: 99.1  F (37.3  C)  Resp: 30  Height: 175.3 cm (5' 9\")  SpO2: 97 %      Physical Exam  Nursing note and vitals reviewed.  Constitutional:  Oriented to person, place, and time. Cooperative.   HENT:   Nose:    Nose normal.   Mouth/Throat:   Mucous membranes are normal.   Eyes:    Conjunctivae normal and EOM are normal.      Pupils are equal, round, and reactive to light.   Neck:    Trachea normal.   Cardiovascular:  Tachycardic, normal heart sounds and normal pulses. No murmur heard. " Tachycardia  Pulmonary/Chest:  Diminished breath sounds throughout with faint expiratory wheezing and tachypnea.  Abdominal:   Soft. Normal appearance and bowel sounds are normal.      There is no tenderness.      There is no rebound and no CVA tenderness.   Musculoskeletal:  Extremities atraumatic x 4.   Lymphadenopathy:  No cervical adenopathy.   Neurological:   Alert and oriented to person, place, and time. Normal strength.      No cranial nerve deficit or sensory deficit. GCS eye subscore is 4. GCS verbal subscore is 5. GCS motor subscore is 6.   Skin:    Skin is intact. No rash noted.   Psychiatric:   Normal mood and affect.     Emergency Department Course   ECG (23:18:23):  Rate 111 bpm. NV interval 126. QRS duration 74. QT/QTc 310/421. P-R-T axes 81 79 79. Sinus tachycardia, nonspecific T wave abnormality. Abnormal ECG. Interpreted at 2330 by Hans Mckinney MD.     Imaging:  Radiographic findings were communicated with the patient who voiced understanding of the findings.    X-ray Chest, 2 views:  The lungs are hyperinflated. No other acute abnormality.  Result per radiology.      Laboratory:  2230 - Troponin: <0.015   2230 BNP: 176  CBC: WBC 12.8 (H), o/w WNL (HGB 14.7, )    CMP: WNL (Creatinine 0.81)       Interventions:  2249 - Solu-medrol 125 mg IV  2249 - DuoNeb, 2.5mg/3 mL, Inhalation solution, Nebulizer     Emergency Department Course:  Past medical records, nursing notes, and vitals reviewed.  2237: I performed an exam of the patient and obtained history, as documented above.   IV inserted and blood drawn. The patient was placed on continuous cardiac monitoring and pulse oximetry.   The patient was sent for a X-ray while in the emergency department, findings above.   Rechecked the patient, findings and plan explained to the patient, who consents to admission. Discussed the patient with Dr. Verma, who will admit the patient to a monitored bed for further observation, evaluation, and  treatment.     Impression & Plan    Medical Decision Making:  Gerson Pearson is a 57 year old male came in concerned that he may have pneumonia. He has COPD and continues to smoke. He has also been out of his medications for quite some time. I proceeded with the above workup to see if he may have pneumonia. His chest X-ray is negative, and his white count is slightly elevated. He was provided nebulizer therapies here as well as IV Solumedrol. He was slightly hypoxic upon arrival here on room air; however he was not below 90%. He does not feel comfortable going home. Therefore I think it is reasonable to bring him in for further evaluation and management. I spoke with Dr. Verma, will be taking care of the patient. In discussing the patient with him, we will not provide IV antibiotics at this time. He does not appear septic or in severe respiratory distress.       Diagnosis:    ICD-10-CM    1. COPD exacerbation (H) J44.1        Disposition:  Admitted to observation bed    Joshua Groves  6/14/2017    EMERGENCY DEPARTMENT  I, Joshua Groves, am serving as a scribe at 10:37 PM on 6/14/2017 to document services personally performed by Hans Mckinney MD based on my observations and the provider's statements to me.       Hans Mckinney MD  06/15/17 0251

## 2017-06-16 ENCOUNTER — APPOINTMENT (OUTPATIENT)
Dept: OCCUPATIONAL THERAPY | Facility: CLINIC | Age: 57
DRG: 192 | End: 2017-06-16
Attending: PHYSICIAN ASSISTANT
Payer: MEDICARE

## 2017-06-16 LAB
ANION GAP SERPL CALCULATED.3IONS-SCNC: 6 MMOL/L (ref 3–14)
BUN SERPL-MCNC: 13 MG/DL (ref 7–30)
C DIFF TOX B STL QL: NORMAL
CALCIUM SERPL-MCNC: 8.9 MG/DL (ref 8.5–10.1)
CHLORIDE SERPL-SCNC: 104 MMOL/L (ref 94–109)
CO2 SERPL-SCNC: 25 MMOL/L (ref 20–32)
CREAT SERPL-MCNC: 0.67 MG/DL (ref 0.66–1.25)
D DIMER PPP FEU-MCNC: NORMAL UG/ML FEU (ref 0–0.5)
ERYTHROCYTE [DISTWIDTH] IN BLOOD BY AUTOMATED COUNT: 13.1 % (ref 10–15)
GFR SERPL CREATININE-BSD FRML MDRD: ABNORMAL ML/MIN/1.7M2
GLUCOSE SERPL-MCNC: 170 MG/DL (ref 70–99)
HCT VFR BLD AUTO: 40 % (ref 40–53)
HGB BLD-MCNC: 13.9 G/DL (ref 13.3–17.7)
LACTATE BLD-SCNC: 1.6 MMOL/L (ref 0.7–2.1)
MCH RBC QN AUTO: 31.6 PG (ref 26.5–33)
MCHC RBC AUTO-ENTMCNC: 34.8 G/DL (ref 31.5–36.5)
MCV RBC AUTO: 91 FL (ref 78–100)
PLATELET # BLD AUTO: 203 10E9/L (ref 150–450)
POTASSIUM SERPL-SCNC: 4.2 MMOL/L (ref 3.4–5.3)
RBC # BLD AUTO: 4.4 10E12/L (ref 4.4–5.9)
SODIUM SERPL-SCNC: 135 MMOL/L (ref 133–144)
SPECIMEN SOURCE: NORMAL
WBC # BLD AUTO: 20.8 10E9/L (ref 4–11)

## 2017-06-16 PROCEDURE — 25000128 H RX IP 250 OP 636: Performed by: INTERNAL MEDICINE

## 2017-06-16 PROCEDURE — A9270 NON-COVERED ITEM OR SERVICE: HCPCS | Mod: GY | Performed by: INTERNAL MEDICINE

## 2017-06-16 PROCEDURE — 36415 COLL VENOUS BLD VENIPUNCTURE: CPT | Performed by: INTERNAL MEDICINE

## 2017-06-16 PROCEDURE — 25000132 ZZH RX MED GY IP 250 OP 250 PS 637: Mod: GY | Performed by: INTERNAL MEDICINE

## 2017-06-16 PROCEDURE — 25000128 H RX IP 250 OP 636: Performed by: PHYSICIAN ASSISTANT

## 2017-06-16 PROCEDURE — 25000125 ZZHC RX 250: Performed by: PHYSICIAN ASSISTANT

## 2017-06-16 PROCEDURE — 40000275 ZZH STATISTIC RCP TIME EA 10 MIN

## 2017-06-16 PROCEDURE — 87493 C DIFF AMPLIFIED PROBE: CPT | Performed by: INTERNAL MEDICINE

## 2017-06-16 PROCEDURE — 25000307 HC RX OP HPI UCR WEL IP 250: Mod: GY | Performed by: PHYSICIAN ASSISTANT

## 2017-06-16 PROCEDURE — 83605 ASSAY OF LACTIC ACID: CPT | Performed by: INTERNAL MEDICINE

## 2017-06-16 PROCEDURE — 85027 COMPLETE CBC AUTOMATED: CPT | Performed by: INTERNAL MEDICINE

## 2017-06-16 PROCEDURE — 94640 AIRWAY INHALATION TREATMENT: CPT

## 2017-06-16 PROCEDURE — 80048 BASIC METABOLIC PNL TOTAL CA: CPT | Performed by: INTERNAL MEDICINE

## 2017-06-16 PROCEDURE — 40000274 ZZH STATISTIC RCP CONSULT EA 30 MIN

## 2017-06-16 PROCEDURE — 40000133 ZZH STATISTIC OT WARD VISIT: Performed by: OCCUPATIONAL THERAPIST

## 2017-06-16 PROCEDURE — 12000000 ZZH R&B MED SURG/OB

## 2017-06-16 PROCEDURE — 25000125 ZZHC RX 250: Performed by: INTERNAL MEDICINE

## 2017-06-16 PROCEDURE — 94640 AIRWAY INHALATION TREATMENT: CPT | Mod: 76

## 2017-06-16 PROCEDURE — 99407 BEHAV CHNG SMOKING > 10 MIN: CPT | Performed by: OCCUPATIONAL THERAPIST

## 2017-06-16 PROCEDURE — 99233 SBSQ HOSP IP/OBS HIGH 50: CPT | Performed by: INTERNAL MEDICINE

## 2017-06-16 PROCEDURE — 85379 FIBRIN DEGRADATION QUANT: CPT | Performed by: INTERNAL MEDICINE

## 2017-06-16 PROCEDURE — 25000132 ZZH RX MED GY IP 250 OP 250 PS 637: Mod: GY | Performed by: PHYSICIAN ASSISTANT

## 2017-06-16 PROCEDURE — A9270 NON-COVERED ITEM OR SERVICE: HCPCS | Mod: GY | Performed by: PHYSICIAN ASSISTANT

## 2017-06-16 RX ORDER — AZITHROMYCIN 250 MG/1
250 TABLET, FILM COATED ORAL DAILY
Status: DISCONTINUED | OUTPATIENT
Start: 2017-06-17 | End: 2017-06-17

## 2017-06-16 RX ORDER — ONDANSETRON 4 MG/1
4 TABLET, ORALLY DISINTEGRATING ORAL EVERY 6 HOURS PRN
Status: DISCONTINUED | OUTPATIENT
Start: 2017-06-16 | End: 2017-06-19 | Stop reason: HOSPADM

## 2017-06-16 RX ORDER — AZITHROMYCIN 250 MG/1
500 TABLET, FILM COATED ORAL ONCE
Status: COMPLETED | OUTPATIENT
Start: 2017-06-16 | End: 2017-06-16

## 2017-06-16 RX ORDER — ONDANSETRON 2 MG/ML
4 INJECTION INTRAMUSCULAR; INTRAVENOUS EVERY 6 HOURS PRN
Status: DISCONTINUED | OUTPATIENT
Start: 2017-06-16 | End: 2017-06-19 | Stop reason: HOSPADM

## 2017-06-16 RX ADMIN — LEVALBUTEROL HYDROCHLORIDE 1.25 MG: 1.25 SOLUTION, CONCENTRATE RESPIRATORY (INHALATION) at 07:31

## 2017-06-16 RX ADMIN — GUAIFENESIN 1200 MG: 600 TABLET, EXTENDED RELEASE ORAL at 22:08

## 2017-06-16 RX ADMIN — TIOTROPIUM BROMIDE 18 MCG: 18 CAPSULE ORAL; RESPIRATORY (INHALATION) at 08:17

## 2017-06-16 RX ADMIN — SODIUM CHLORIDE: 9 INJECTION, SOLUTION INTRAVENOUS at 10:01

## 2017-06-16 RX ADMIN — LEVALBUTEROL HYDROCHLORIDE 1.25 MG: 1.25 SOLUTION, CONCENTRATE RESPIRATORY (INHALATION) at 19:35

## 2017-06-16 RX ADMIN — CITALOPRAM HYDROBROMIDE 20 MG: 20 TABLET ORAL at 08:17

## 2017-06-16 RX ADMIN — LEVALBUTEROL HYDROCHLORIDE 1.25 MG: 1.25 SOLUTION, CONCENTRATE RESPIRATORY (INHALATION) at 11:17

## 2017-06-16 RX ADMIN — PROCHLORPERAZINE EDISYLATE 10 MG: 5 INJECTION INTRAMUSCULAR; INTRAVENOUS at 18:13

## 2017-06-16 RX ADMIN — AZITHROMYCIN 500 MG: 250 TABLET, FILM COATED ORAL at 17:05

## 2017-06-16 RX ADMIN — IPRATROPIUM BROMIDE 0.5 MG: 0.5 SOLUTION RESPIRATORY (INHALATION) at 07:31

## 2017-06-16 RX ADMIN — METHYLPREDNISOLONE SODIUM SUCCINATE 62.5 MG: 125 INJECTION, POWDER, FOR SOLUTION INTRAMUSCULAR; INTRAVENOUS at 05:32

## 2017-06-16 RX ADMIN — TRAMADOL HYDROCHLORIDE 50 MG: 50 TABLET, COATED ORAL at 17:59

## 2017-06-16 RX ADMIN — METHYLPREDNISOLONE SODIUM SUCCINATE 62.5 MG: 125 INJECTION, POWDER, FOR SOLUTION INTRAMUSCULAR; INTRAVENOUS at 14:34

## 2017-06-16 RX ADMIN — METHYLPREDNISOLONE SODIUM SUCCINATE 62.5 MG: 125 INJECTION, POWDER, FOR SOLUTION INTRAMUSCULAR; INTRAVENOUS at 22:08

## 2017-06-16 RX ADMIN — ONDANSETRON 4 MG: 4 TABLET, ORALLY DISINTEGRATING ORAL at 14:32

## 2017-06-16 RX ADMIN — FLUTICASONE FUROATE AND VILANTEROL TRIFENATATE 1 PUFF: 100; 25 POWDER RESPIRATORY (INHALATION) at 08:17

## 2017-06-16 RX ADMIN — LEVALBUTEROL HYDROCHLORIDE 1.25 MG: 1.25 SOLUTION, CONCENTRATE RESPIRATORY (INHALATION) at 15:17

## 2017-06-16 RX ADMIN — GUAIFENESIN 1200 MG: 600 TABLET, EXTENDED RELEASE ORAL at 08:17

## 2017-06-16 NOTE — PLAN OF CARE
Problem: Goal Outcome Summary  Goal: Goal Outcome Summary  Outcome: No Change  Patient was transferred from OBS unit this pm.  Patient is A&Ox4; calls appropriately.  HR tachy at times; afebrile.  Lactic acid 2.6 (from 3.6).  LS diminished with expiratory wheezes; frequent productive cough; O2sats 92-94% RA.  Patient c/o CP/HA r/t coughing.  Tylenol given with relief.  Up independently/SBA at times; activity encouraged.

## 2017-06-16 NOTE — PROGRESS NOTES
Patient on room air.  Lung sounds diminished.  Occasional exp. Wheezes/coarse.  Getting scheduled nebs.  Will continue to follow.

## 2017-06-16 NOTE — PROGRESS NOTES
[facility] RCAT Note    Date: 6/16/17  Admission Diagnosis: COPD exacerbation  Pulmonary History: COPD  Home Nebulizer/ MDI Use: Spiriva, Dulera  Home Oxygen Use: None  Acuity Level (from RT Assessment flow sheet): 5    Aerosol Therapy Initiated:  Continue Xopenex/ Atrovent QID and prn    Pulmonary Hygiene Initiated:      Volume Expansion Therapy Initiated:      Current Oxygen Requirement: Room air  Current SpO2: 93%    Re-evaluation date: 6/19/17    See 'RT Assessments' flow sheet for patient assessment scoring and Acuity Level Details.

## 2017-06-16 NOTE — PROGRESS NOTES
[facility]    Hospitalist Progress Note    Assessment & Plan   Gerson Pearson is a 57-year-old man with medical history of COPD, MDD, tobacco dependence and chronic back pain, admitted on 6/14/17 due to a COPD exacerbation.    Acute COPD exacerbation  Due to poorly controlled COPD as he unable to afford inhalers and ongoing tobacco abuse  CXR negative  -Continue IV Solu-Medrol.  -Scheduled neb therapies QID; Xopenex (due to tachycardia)  -Resume PTA Spiriva and Dulera.    -RCAT requested.    -Mucinex 1200 mg BID  -D-dimer; if elevated, will order CT chest pulm angio  -Smoking cessation    Addendum:  D-dimer negative.  Increased WBC count, which may be attributable to steroids, but given productive cough, will also cover for bronchitis with azithromycin.    Tachycardia with elevated lactic acid   Improved with change in neb therapies, potentially from albuterol. Procalcitonin negative. Doubt sepsis.    Loose stools  Patient is complaining of diarrhea 3 x day   -R/o C diff    Tobacco Abuse/Dependence:  Noted pack per day habit.    -Nicoderm patch PRN.    -Counseling regarding cessation.      Chest pain:    Occurs with inspiration.  Likely due to COPD.  Cardiac risk factors include tobacco dependence.  Troponin negative x2.    -Monitor on telemetry.    -Rule out PE    MDD:  -Resume PTA citalopram.      Chronic low back pain:  -Resume PTA PRN Tramadol.      DVT Prophylaxis: Low Risk/Ambulatory with no VTE prophylaxis indicated  Code Status: Full Code    Disposition: If continues to improve, likely discharge to home tomorrow, 6/17    Kali Garner MD  Hospitalist  [phone] (P)  Text Page (7 am to 6 pm)     Interval History   Breathing steadily improving, taken off O2.  Still has some chest pain on inspiration, although this has improved too.    -Data reviewed today: I reviewed all new labs and imaging results over the last 24 hours. I personally reviewed no images or EKG's today.    Physical Exam    Temp: 98.4  F (36.9  C) Temp src: Oral BP: (!) 142/95 Pulse: 77 Heart Rate: 82 Resp: 22 SpO2: 93 % O2 Device: None (Room air) Oxygen Delivery: 2 LPM  Vitals:    06/15/17 0110   Weight: 55.1 kg (121 lb 6.4 oz)     Vital Signs with Ranges  Temp:  [97.4  F (36.3  C)-98.9  F (37.2  C)] 98.4  F (36.9  C)  Pulse:  [69-87] 77  Heart Rate:  [81-82] 82  Resp:  [20-30] 22  BP: (142-157)/(80-95) 142/95  SpO2:  [90 %-97 %] 93 %  I/O last 3 completed shifts:  In: 2516 [P.O.:960; I.V.:1556]  Out: -       Constitutional: NAD, awake  HEENT: EOMI   Cardiovascular: S1, S2, regular rhythm  Respiratory: CTAB, diminished diffusely, no current wheezing  Gastrointestinal: Soft, NT  Neurologic: No focal deficits     Medications     NaCl 75 mL/hr at 06/16/17 1001       citalopram (celeXA) tablet 20 mg  20 mg Oral Daily     fluticasone-vilanterol  1 puff Inhalation Daily     tiotropium  18 mcg Inhalation Daily     methylPREDNISolone  62.5 mg Intravenous Q8H     levalbuterol  1.25 mg Nebulization 4x Daily     ipratropium  0.5 mg Nebulization 4x daily     nicotine   Transdermal Q8H     nicotine   Transdermal Daily     guaiFENesin  1,200 mg Oral BID       Data     Recent Labs  Lab 06/15/17  0750 06/14/17  2230   WBC  --  12.8*   HGB  --  14.7   MCV  --  91   PLT  --  177   NA  --  139   POTASSIUM  --  3.5   CHLORIDE  --  105   CO2  --  25   BUN  --  9   CR  --  0.81   ANIONGAP  --  9   RL  --  9.1   GLC  --  93   ALBUMIN  --  3.9   PROTTOTAL  --  7.6   BILITOTAL  --  0.9   ALKPHOS  --  122   ALT  --  15   AST  --  15   TROPI <0.015The 99th percentile for upper reference range is 0.045 ug/L.  Troponin values in the range of 0.045 - 0.120 ug/L may be associated with risks of adverse clinical events. <0.015The 99th percentile for upper reference range is 0.045 ug/L.  Troponin values in the range of 0.045 - 0.120 ug/L may be associated with risks of adverse clinical events.       No results found for this or any previous visit (from the past 24  hour(s)).

## 2017-06-16 NOTE — PLAN OF CARE
Problem: Goal Outcome Summary  Goal: Goal Outcome Summary  Outcome: No Change   Up independently/SBA at times; activity encouraged.Patient is A&Ox4; calls appropriately.  HR tachy at times; afebrile. Tele SR. LS diminished/coarse; frequent productive cough; O2sats 92-94% RA.  Patient c/o CP r/t coughing. Plan to check D-dimer and then CT if positive. C/o loose BMs, will check for cdiff. C/o nausea, zofran PO given. Had large emesis at 1800, IV compazine given. D/c home pending progress.

## 2017-06-16 NOTE — PLAN OF CARE
Problem: Goal Outcome Summary  Goal: Goal Outcome Summary  Outcome: Improving  Pt is alert and oriented x4, tachycardic sometimes otherwise vss. On RA, pain managed with oral tramadol. Independent with transfers and Adl's. SOB noted with activities, on scheduled nebs, has been effective. NS running at 75mL/Hr, ambulating to bathroom,nursing will continue to monitor

## 2017-06-17 PROCEDURE — 25000132 ZZH RX MED GY IP 250 OP 250 PS 637: Mod: GY | Performed by: INTERNAL MEDICINE

## 2017-06-17 PROCEDURE — 25000125 ZZHC RX 250: Performed by: INTERNAL MEDICINE

## 2017-06-17 PROCEDURE — 12000000 ZZH R&B MED SURG/OB

## 2017-06-17 PROCEDURE — 25000128 H RX IP 250 OP 636: Performed by: INTERNAL MEDICINE

## 2017-06-17 PROCEDURE — 94640 AIRWAY INHALATION TREATMENT: CPT | Mod: 76

## 2017-06-17 PROCEDURE — A9270 NON-COVERED ITEM OR SERVICE: HCPCS | Mod: GY | Performed by: INTERNAL MEDICINE

## 2017-06-17 PROCEDURE — 94640 AIRWAY INHALATION TREATMENT: CPT

## 2017-06-17 PROCEDURE — 40000275 ZZH STATISTIC RCP TIME EA 10 MIN

## 2017-06-17 PROCEDURE — 99233 SBSQ HOSP IP/OBS HIGH 50: CPT | Performed by: INTERNAL MEDICINE

## 2017-06-17 PROCEDURE — 25000307 HC RX OP HPI UCR WEL IP 250: Mod: GY | Performed by: PHYSICIAN ASSISTANT

## 2017-06-17 RX ADMIN — LEVALBUTEROL HYDROCHLORIDE 1.25 MG: 1.25 SOLUTION, CONCENTRATE RESPIRATORY (INHALATION) at 19:21

## 2017-06-17 RX ADMIN — TRAMADOL HYDROCHLORIDE 50 MG: 50 TABLET, COATED ORAL at 20:34

## 2017-06-17 RX ADMIN — ACETAMINOPHEN 650 MG: 325 TABLET, FILM COATED ORAL at 19:30

## 2017-06-17 RX ADMIN — ONDANSETRON 4 MG: 4 TABLET, ORALLY DISINTEGRATING ORAL at 14:10

## 2017-06-17 RX ADMIN — METHYLPREDNISOLONE SODIUM SUCCINATE 62.5 MG: 125 INJECTION, POWDER, FOR SOLUTION INTRAMUSCULAR; INTRAVENOUS at 21:35

## 2017-06-17 RX ADMIN — PROCHLORPERAZINE EDISYLATE 10 MG: 5 INJECTION INTRAMUSCULAR; INTRAVENOUS at 03:45

## 2017-06-17 RX ADMIN — TIOTROPIUM BROMIDE 18 MCG: 18 CAPSULE ORAL; RESPIRATORY (INHALATION) at 07:57

## 2017-06-17 RX ADMIN — METHYLPREDNISOLONE SODIUM SUCCINATE 62.5 MG: 125 INJECTION, POWDER, FOR SOLUTION INTRAMUSCULAR; INTRAVENOUS at 14:21

## 2017-06-17 RX ADMIN — LEVALBUTEROL HYDROCHLORIDE 1.25 MG: 1.25 SOLUTION, CONCENTRATE RESPIRATORY (INHALATION) at 15:20

## 2017-06-17 RX ADMIN — METHYLPREDNISOLONE SODIUM SUCCINATE 62.5 MG: 125 INJECTION, POWDER, FOR SOLUTION INTRAMUSCULAR; INTRAVENOUS at 05:53

## 2017-06-17 RX ADMIN — FLUTICASONE FUROATE AND VILANTEROL TRIFENATATE 1 PUFF: 100; 25 POWDER RESPIRATORY (INHALATION) at 07:57

## 2017-06-17 RX ADMIN — LEVALBUTEROL HYDROCHLORIDE 1.25 MG: 1.25 SOLUTION, CONCENTRATE RESPIRATORY (INHALATION) at 11:07

## 2017-06-17 RX ADMIN — LEVALBUTEROL HYDROCHLORIDE 1.25 MG: 1.25 SOLUTION, CONCENTRATE RESPIRATORY (INHALATION) at 07:02

## 2017-06-17 RX ADMIN — ONDANSETRON 4 MG: 4 TABLET, ORALLY DISINTEGRATING ORAL at 07:54

## 2017-06-17 ASSESSMENT — PAIN DESCRIPTION - DESCRIPTORS: DESCRIPTORS: HEADACHE

## 2017-06-17 NOTE — PROGRESS NOTES
Patient on room air. LS clear and diminished. SpO2 in the mid 90's. Neb tx given as ordered. Will continue to follow.    Hiwot SEPULVEDA

## 2017-06-17 NOTE — PLAN OF CARE
Problem: Goal Outcome Summary  Goal: Goal Outcome Summary  Outcome: No Change  1900-0730 Patient is A&Ox4; calls appropriately.  BP slightly elevated other VSS on RA. Tele SR. LS diminished, frequent cough. Patient denies pain, SOB,numbness/tingling. Patient up independently, steady gait. Patient had 1 episode of emesis, unwitnessed compazine given x1. C-diff negative removed from precautions.  D/c home pending progress. Will continue to monitor

## 2017-06-17 NOTE — PROGRESS NOTES
[facility]    Hospitalist Progress Note    Assessment & Plan   Gerson Pearson is a 57-year-old man with medical history of COPD, MDD, tobacco dependence and chronic back pain, admitted on 6/14/17 due to a COPD exacerbation.    Acute COPD exacerbation with bronchitis  Due to poorly controlled COPD as he unable to afford inhalers and ongoing tobacco abuse and bronchitis  CXR negative  Improving as expected, off O2  -Continue IV Solu-Medrol as not tolerating PO well  -Scheduled neb therapies QID; Xopenex (due to tachycardia)  -Resume PTA Spiriva and Dulera.    -Stop Mucinex  -D-dimer neg  -Smoking cessation  -Received 1 dose of azithromycin PO, stopped due to N/V    Leukocytosis  Likely due to steroids.  Afebrile.  Recheck CBC in AM.    Nausea, vomiting, diarrhea  Suspect gastroenteritis.  C diff negative.  Diarrhea improving.  -Minimize PO meds which may be contributing to his nausea  -PRN anti-emetics  -Restart IVF if not drinking sufficient fluids    Tobacco Abuse/Dependence:  Smokes 1/2-1 ppd  -Nicoderm patch PRN.    -Counseling regarding cessation.      Chest pain:    Occurs with inspiration.  Improved, likely due to COPD.  Cardiac risk factors include tobacco dependence.  Troponin negative x2.  D dimer negative, low likelihood for PE.    MDD:  -Resume PTA citalopram.      Chronic low back pain:  -Resume PTA PRN Tramadol.      DVT Prophylaxis: Low Risk/Ambulatory with no VTE prophylaxis indicated  Code Status: Full Code    Disposition: If nausea and vomiting improves, suspect discharge to home tomorrow, 6/18    Kali Garner MD  Hospitalist  [phone] (P)  Text Page (7 am to 6 pm)     Interval History   Started on azithromycin yesterday.  In the evening was having severe nausea with projectile vomiting.  Diarrhea has subsided.  Mild abdominal soreness.  Able to drink liquids and keep it down so far, but still requiring nausea meds.    -Data reviewed today: I reviewed all new labs and imaging  results over the last 24 hours. I personally reviewed no images or EKG's today.    Physical Exam   Temp: 98.3  F (36.8  C) Temp src: Oral BP: (!) 160/92   Heart Rate: 71 Resp: 17 SpO2: 93 % O2 Device: None (Room air)    Vitals:    06/15/17 0110   Weight: 55.1 kg (121 lb 6.4 oz)     Vital Signs with Ranges  Temp:  [98  F (36.7  C)-98.3  F (36.8  C)] 98.3  F (36.8  C)  Heart Rate:  [68-71] 71  Resp:  [17-20] 17  BP: (144-161)/(84-94) 160/92  SpO2:  [92 %-95 %] 93 %  I/O last 3 completed shifts:  In: 240 [P.O.:240]  Out: -       Constitutional: NAD, awake  HEENT: EOMI   Cardiovascular: S1, S2, regular rhythm  Respiratory: CTAB, diminished diffusely, no current wheezing  Gastrointestinal: Soft, mildly tender over epigastrium, otherwise benign  Neurologic: No focal deficits     Medications        fluticasone-vilanterol  1 puff Inhalation Daily     tiotropium  18 mcg Inhalation Daily     methylPREDNISolone  62.5 mg Intravenous Q8H     levalbuterol  1.25 mg Nebulization 4x Daily     nicotine   Transdermal Q8H     nicotine   Transdermal Daily       Data     Recent Labs  Lab 06/16/17  1428 06/15/17  0750 06/14/17  2230   WBC 20.8*  --  12.8*   HGB 13.9  --  14.7   MCV 91  --  91     --  177     --  139   POTASSIUM 4.2  --  3.5   CHLORIDE 104  --  105   CO2 25  --  25   BUN 13  --  9   CR 0.67  --  0.81   ANIONGAP 6  --  9   RL 8.9  --  9.1   *  --  93   ALBUMIN  --   --  3.9   PROTTOTAL  --   --  7.6   BILITOTAL  --   --  0.9   ALKPHOS  --   --  122   ALT  --   --  15   AST  --   --  15   TROPI  --  <0.015The 99th percentile for upper reference range is 0.045 ug/L.  Troponin values in the range of 0.045 - 0.120 ug/L may be associated with risks of adverse clinical events. <0.015The 99th percentile for upper reference range is 0.045 ug/L.  Troponin values in the range of 0.045 - 0.120 ug/L may be associated with risks of adverse clinical events.       No results found for this or any previous visit (from  the past 24 hour(s)).

## 2017-06-17 NOTE — PLAN OF CARE
Problem: Goal Outcome Summary  Goal: Goal Outcome Summary  Outcome: No Change  7a-7p: Up independently/SBA at times; activity encouraged.Patient is A&Ox4; calls appropriately.  HR tachy at times; afebrile. Tele SR. LS diminished; frequent productive cough; O2sats 92-94% RA. No loose BMs, c diff negative. C/o nausea and having active vomitting, zofran PO givenX2. Per MD possible gastroenteritis. Feeling better at pm hours, ambulation in the halls tolerating PO. Continue to monitor. D/c home pending progress and poss tomorrow.

## 2017-06-18 ENCOUNTER — APPOINTMENT (OUTPATIENT)
Dept: ULTRASOUND IMAGING | Facility: CLINIC | Age: 57
DRG: 192 | End: 2017-06-18
Attending: INTERNAL MEDICINE
Payer: MEDICARE

## 2017-06-18 LAB
ALBUMIN SERPL-MCNC: 3.4 G/DL (ref 3.4–5)
ALP SERPL-CCNC: 98 U/L (ref 40–150)
ALT SERPL W P-5'-P-CCNC: 25 U/L (ref 0–70)
ANION GAP SERPL CALCULATED.3IONS-SCNC: 9 MMOL/L (ref 3–14)
AST SERPL W P-5'-P-CCNC: 15 U/L (ref 0–45)
BILIRUB DIRECT SERPL-MCNC: 0.1 MG/DL (ref 0–0.2)
BILIRUB SERPL-MCNC: 0.6 MG/DL (ref 0.2–1.3)
BUN SERPL-MCNC: 16 MG/DL (ref 7–30)
CALCIUM SERPL-MCNC: 8.5 MG/DL (ref 8.5–10.1)
CHLORIDE SERPL-SCNC: 98 MMOL/L (ref 94–109)
CO2 SERPL-SCNC: 23 MMOL/L (ref 20–32)
CREAT SERPL-MCNC: 0.72 MG/DL (ref 0.66–1.25)
ERYTHROCYTE [DISTWIDTH] IN BLOOD BY AUTOMATED COUNT: 12.9 % (ref 10–15)
GFR SERPL CREATININE-BSD FRML MDRD: ABNORMAL ML/MIN/1.7M2
GLUCOSE SERPL-MCNC: 193 MG/DL (ref 70–99)
HCT VFR BLD AUTO: 43.9 % (ref 40–53)
HGB BLD-MCNC: 15.6 G/DL (ref 13.3–17.7)
LIPASE SERPL-CCNC: 570 U/L (ref 73–393)
MCH RBC QN AUTO: 32 PG (ref 26.5–33)
MCHC RBC AUTO-ENTMCNC: 35.5 G/DL (ref 31.5–36.5)
MCV RBC AUTO: 90 FL (ref 78–100)
PLATELET # BLD AUTO: 210 10E9/L (ref 150–450)
POTASSIUM SERPL-SCNC: 4 MMOL/L (ref 3.4–5.3)
PROT SERPL-MCNC: 7.1 G/DL (ref 6.8–8.8)
RBC # BLD AUTO: 4.88 10E12/L (ref 4.4–5.9)
SODIUM SERPL-SCNC: 130 MMOL/L (ref 133–144)
WBC # BLD AUTO: 14.1 10E9/L (ref 4–11)

## 2017-06-18 PROCEDURE — A9270 NON-COVERED ITEM OR SERVICE: HCPCS | Mod: GY | Performed by: INTERNAL MEDICINE

## 2017-06-18 PROCEDURE — 94640 AIRWAY INHALATION TREATMENT: CPT | Mod: 76

## 2017-06-18 PROCEDURE — 80048 BASIC METABOLIC PNL TOTAL CA: CPT | Performed by: INTERNAL MEDICINE

## 2017-06-18 PROCEDURE — 99232 SBSQ HOSP IP/OBS MODERATE 35: CPT | Performed by: INTERNAL MEDICINE

## 2017-06-18 PROCEDURE — 12000000 ZZH R&B MED SURG/OB

## 2017-06-18 PROCEDURE — 25000307 HC RX OP HPI UCR WEL IP 250: Mod: GY | Performed by: PHYSICIAN ASSISTANT

## 2017-06-18 PROCEDURE — 36415 COLL VENOUS BLD VENIPUNCTURE: CPT | Performed by: INTERNAL MEDICINE

## 2017-06-18 PROCEDURE — 85027 COMPLETE CBC AUTOMATED: CPT | Performed by: INTERNAL MEDICINE

## 2017-06-18 PROCEDURE — 80076 HEPATIC FUNCTION PANEL: CPT | Performed by: INTERNAL MEDICINE

## 2017-06-18 PROCEDURE — 94640 AIRWAY INHALATION TREATMENT: CPT

## 2017-06-18 PROCEDURE — 40000275 ZZH STATISTIC RCP TIME EA 10 MIN

## 2017-06-18 PROCEDURE — 25000125 ZZHC RX 250: Performed by: INTERNAL MEDICINE

## 2017-06-18 PROCEDURE — 25000132 ZZH RX MED GY IP 250 OP 250 PS 637: Mod: GY | Performed by: INTERNAL MEDICINE

## 2017-06-18 PROCEDURE — 25000128 H RX IP 250 OP 636: Performed by: INTERNAL MEDICINE

## 2017-06-18 PROCEDURE — 83690 ASSAY OF LIPASE: CPT | Performed by: INTERNAL MEDICINE

## 2017-06-18 PROCEDURE — 76705 ECHO EXAM OF ABDOMEN: CPT

## 2017-06-18 RX ORDER — SODIUM CHLORIDE 9 MG/ML
INJECTION, SOLUTION INTRAVENOUS CONTINUOUS
Status: DISCONTINUED | OUTPATIENT
Start: 2017-06-18 | End: 2017-06-19

## 2017-06-18 RX ORDER — PREDNISONE 20 MG/1
20 TABLET ORAL DAILY
Status: DISCONTINUED | OUTPATIENT
Start: 2017-06-18 | End: 2017-06-19 | Stop reason: HOSPADM

## 2017-06-18 RX ADMIN — SODIUM CHLORIDE: 9 INJECTION, SOLUTION INTRAVENOUS at 11:40

## 2017-06-18 RX ADMIN — TRAMADOL HYDROCHLORIDE 50 MG: 50 TABLET, COATED ORAL at 18:20

## 2017-06-18 RX ADMIN — LEVALBUTEROL HYDROCHLORIDE 1.25 MG: 1.25 SOLUTION, CONCENTRATE RESPIRATORY (INHALATION) at 15:23

## 2017-06-18 RX ADMIN — LEVALBUTEROL HYDROCHLORIDE 1.25 MG: 1.25 SOLUTION, CONCENTRATE RESPIRATORY (INHALATION) at 07:10

## 2017-06-18 RX ADMIN — ONDANSETRON 4 MG: 4 TABLET, ORALLY DISINTEGRATING ORAL at 06:13

## 2017-06-18 RX ADMIN — FLUTICASONE FUROATE AND VILANTEROL TRIFENATATE 1 PUFF: 100; 25 POWDER RESPIRATORY (INHALATION) at 11:41

## 2017-06-18 RX ADMIN — METHYLPREDNISOLONE SODIUM SUCCINATE 62.5 MG: 125 INJECTION, POWDER, FOR SOLUTION INTRAMUSCULAR; INTRAVENOUS at 05:34

## 2017-06-18 RX ADMIN — TIOTROPIUM BROMIDE 18 MCG: 18 CAPSULE ORAL; RESPIRATORY (INHALATION) at 11:40

## 2017-06-18 RX ADMIN — PROCHLORPERAZINE EDISYLATE 10 MG: 5 INJECTION INTRAMUSCULAR; INTRAVENOUS at 07:41

## 2017-06-18 RX ADMIN — LEVALBUTEROL HYDROCHLORIDE 1.25 MG: 1.25 SOLUTION, CONCENTRATE RESPIRATORY (INHALATION) at 19:18

## 2017-06-18 RX ADMIN — SODIUM CHLORIDE: 9 INJECTION, SOLUTION INTRAVENOUS at 21:52

## 2017-06-18 RX ADMIN — PREDNISONE 20 MG: 20 TABLET ORAL at 11:40

## 2017-06-18 RX ADMIN — LEVALBUTEROL HYDROCHLORIDE 1.25 MG: 1.25 SOLUTION, CONCENTRATE RESPIRATORY (INHALATION) at 11:22

## 2017-06-18 NOTE — PROGRESS NOTES
[facility]    Hospitalist Progress Note    Assessment & Plan   Gerson Pearson is a 57-year-old man with medical history of COPD, MDD, tobacco dependence and chronic back pain, admitted on 6/14/17 due to a COPD exacerbation.    Acute COPD exacerbation with bronchitis  Due to poorly controlled COPD as he unable to afford inhalers and ongoing tobacco abuse and bronchitis  CXR negative  Improving as expected, off O2  -Change solumedrol to prednisone 20mg daily  -Scheduled neb therapies QID; Xopenex (due to tachycardia)  -Resume PTA Spiriva and Dulera.    -D-dimer neg  -Smoking cessation  -Received 1 dose of azithromycin PO, stopped due to N/V    Nausea, vomiting, diarrhea  Suspect gastroenteritis.  C diff negative.  Nausea remains persistent, diarrhea improving.  Minimal abdominal pain.  -Minimize PO meds which may be contributing to his nausea  -PRN anti-emetics  -Restart IVF  -Add on LFT, lipase  -RUQ US    Leukocytosis  Likely due to steroids.  Afebrile.  Improving.    Tobacco Abuse/Dependence:  Smokes 1/2-1 ppd  -Nicoderm patch PRN.    -Counseling regarding cessation.      Chest pain:    Occurs with inspiration.  Improved, likely due to COPD.  Cardiac risk factors include tobacco dependence.  Troponin negative x2.  D dimer negative, low likelihood for PE.    MDD:  -Resume PTA citalopram.      Chronic low back pain:  -Resume PTA PRN Tramadol.      DVT Prophylaxis: Low Risk/Ambulatory with no VTE prophylaxis indicated  Code Status: Full Code    Disposition: Unfortunately nausea has not improved and not tolerating oral intake well.  Suspect will discharge to home tomorrow if symptoms are improving, 6/19.    Kali Garner MD  Hospitalist  [phone] (P)  Text Page (7 am to 6 pm)     Interval History   Continues to be nauseated.  Having occasional loose stools.  Minimal oral intake.  Denies significant abdominal pain.    -Data reviewed today: I reviewed all new labs and imaging results over the last 24  hours. I personally reviewed no images or EKG's today.    Physical Exam   Temp: 97.8  F (36.6  C) Temp src: Oral BP: 132/74   Heart Rate: 84 Resp: 17 SpO2: 91 % O2 Device: None (Room air)    Vitals:    06/15/17 0110   Weight: 55.1 kg (121 lb 6.4 oz)     Vital Signs with Ranges  Temp:  [97.8  F (36.6  C)-98.3  F (36.8  C)] 97.8  F (36.6  C)  Heart Rate:  [65-84] 84  Resp:  [16-17] 17  BP: (132-153)/(74-94) 132/74  SpO2:  [91 %-98 %] 91 %  I/O last 3 completed shifts:  In: 600 [P.O.:600]  Out: -       Constitutional: NAD, awake  HEENT: EOMI   Cardiovascular: S1, S2, regular rhythm  Respiratory: CTAB, diminished diffusely, no current wheezing  Gastrointestinal: Soft, generalized soreness with voluntary guarding, no rebound or significant tenderness  Neurologic: No focal deficits     Medications     NaCl         predniSONE  20 mg Oral Daily     fluticasone-vilanterol  1 puff Inhalation Daily     tiotropium  18 mcg Inhalation Daily     levalbuterol  1.25 mg Nebulization 4x Daily     nicotine   Transdermal Q8H     nicotine   Transdermal Daily       Data     Recent Labs  Lab 06/18/17  0803 06/16/17  1428 06/15/17  0750 06/14/17  2230   WBC 14.1* 20.8*  --  12.8*   HGB 15.6 13.9  --  14.7   MCV 90 91  --  91    203  --  177   * 135  --  139   POTASSIUM 4.0 4.2  --  3.5   CHLORIDE 98 104  --  105   CO2 23 25  --  25   BUN 16 13  --  9   CR 0.72 0.67  --  0.81   ANIONGAP 9 6  --  9   RL 8.5 8.9  --  9.1   * 170*  --  93   ALBUMIN  --   --   --  3.9   PROTTOTAL  --   --   --  7.6   BILITOTAL  --   --   --  0.9   ALKPHOS  --   --   --  122   ALT  --   --   --  15   AST  --   --   --  15   TROPI  --   --  <0.015The 99th percentile for upper reference range is 0.045 ug/L.  Troponin values in the range of 0.045 - 0.120 ug/L may be associated with risks of adverse clinical events. <0.015The 99th percentile for upper reference range is 0.045 ug/L.  Troponin values in the range of 0.045 - 0.120 ug/L may be  associated with risks of adverse clinical events.       No results found for this or any previous visit (from the past 24 hour(s)).

## 2017-06-18 NOTE — PLAN OF CARE
Problem: Goal Outcome Summary  Goal: Goal Outcome Summary  Outcome: No Change  1900-0730 Patient A/O x4. Slight elevated BP other VSS on RA. Patient c/o bad headache, tylenol didn't improve, Tramadol given x1 with relief. LS diminish, frequent cough. Patient denies vomiting,/SOB, numbness/tingling on shift. This am patient reported nausea zofran given x1.Patient rested comfortably overnight. Will continue to monitor.

## 2017-06-18 NOTE — PLAN OF CARE
Problem: Goal Outcome Summary  Goal: Goal Outcome Summary  Outcome: No Change  Up independently Patient is A&Ox4; calls appropriately.  HR tachy at times; Tele SR. LS diminished; C/o nausea this morning, ate 1/4 of his food and vomited. PRN PO zofran given x1. Per MD possible gastroenteritis. On PO prednisone and IVF @ 100cc/hr. Had ultrasound today, pending results. No c/o of pain. Continue to monitor.

## 2017-06-19 VITALS
HEIGHT: 69 IN | BODY MASS INDEX: 17.98 KG/M2 | RESPIRATION RATE: 16 BRPM | TEMPERATURE: 97.9 F | WEIGHT: 121.4 LBS | OXYGEN SATURATION: 94 % | HEART RATE: 77 BPM | DIASTOLIC BLOOD PRESSURE: 81 MMHG | SYSTOLIC BLOOD PRESSURE: 128 MMHG

## 2017-06-19 LAB
ANION GAP SERPL CALCULATED.3IONS-SCNC: 6 MMOL/L (ref 3–14)
BUN SERPL-MCNC: 13 MG/DL (ref 7–30)
CALCIUM SERPL-MCNC: 8 MG/DL (ref 8.5–10.1)
CHLORIDE SERPL-SCNC: 100 MMOL/L (ref 94–109)
CO2 SERPL-SCNC: 28 MMOL/L (ref 20–32)
CREAT SERPL-MCNC: 0.86 MG/DL (ref 0.66–1.25)
GFR SERPL CREATININE-BSD FRML MDRD: ABNORMAL ML/MIN/1.7M2
GLUCOSE BLDC GLUCOMTR-MCNC: 150 MG/DL (ref 70–99)
GLUCOSE SERPL-MCNC: 69 MG/DL (ref 70–99)
POTASSIUM SERPL-SCNC: 3.9 MMOL/L (ref 3.4–5.3)
SODIUM SERPL-SCNC: 134 MMOL/L (ref 133–144)

## 2017-06-19 PROCEDURE — 99239 HOSP IP/OBS DSCHRG MGMT >30: CPT | Performed by: INTERNAL MEDICINE

## 2017-06-19 PROCEDURE — 25000125 ZZHC RX 250: Performed by: INTERNAL MEDICINE

## 2017-06-19 PROCEDURE — 40000275 ZZH STATISTIC RCP TIME EA 10 MIN

## 2017-06-19 PROCEDURE — 00000146 ZZHCL STATISTIC GLUCOSE BY METER IP

## 2017-06-19 PROCEDURE — 80048 BASIC METABOLIC PNL TOTAL CA: CPT | Performed by: INTERNAL MEDICINE

## 2017-06-19 PROCEDURE — 25000132 ZZH RX MED GY IP 250 OP 250 PS 637: Mod: GY | Performed by: INTERNAL MEDICINE

## 2017-06-19 PROCEDURE — 94640 AIRWAY INHALATION TREATMENT: CPT

## 2017-06-19 PROCEDURE — 94640 AIRWAY INHALATION TREATMENT: CPT | Mod: 76

## 2017-06-19 PROCEDURE — A9270 NON-COVERED ITEM OR SERVICE: HCPCS | Mod: GY | Performed by: INTERNAL MEDICINE

## 2017-06-19 PROCEDURE — 25000307 HC RX OP HPI UCR WEL IP 250: Mod: GY | Performed by: PHYSICIAN ASSISTANT

## 2017-06-19 PROCEDURE — 36415 COLL VENOUS BLD VENIPUNCTURE: CPT | Performed by: INTERNAL MEDICINE

## 2017-06-19 RX ORDER — TIOTROPIUM BROMIDE 18 UG/1
18 CAPSULE ORAL; RESPIRATORY (INHALATION) DAILY
Qty: 30 CAPSULE | Refills: 1 | Status: ON HOLD | OUTPATIENT
Start: 2017-06-19 | End: 2021-10-11

## 2017-06-19 RX ORDER — IPRATROPIUM BROMIDE AND ALBUTEROL SULFATE 2.5; .5 MG/3ML; MG/3ML
1 SOLUTION RESPIRATORY (INHALATION) EVERY 6 HOURS PRN
Qty: 1 BOX | Refills: 1 | Status: ON HOLD | OUTPATIENT
Start: 2017-06-19 | End: 2021-10-11

## 2017-06-19 RX ORDER — ALBUTEROL SULFATE 90 UG/1
2 AEROSOL, METERED RESPIRATORY (INHALATION) EVERY 4 HOURS PRN
Qty: 1 INHALER | Refills: 1 | Status: ON HOLD | OUTPATIENT
Start: 2017-06-19 | End: 2023-07-15

## 2017-06-19 RX ADMIN — PREDNISONE 20 MG: 20 TABLET ORAL at 09:27

## 2017-06-19 RX ADMIN — TRAMADOL HYDROCHLORIDE 50 MG: 50 TABLET, COATED ORAL at 15:50

## 2017-06-19 RX ADMIN — LEVALBUTEROL HYDROCHLORIDE 1.25 MG: 1.25 SOLUTION, CONCENTRATE RESPIRATORY (INHALATION) at 11:28

## 2017-06-19 RX ADMIN — LEVALBUTEROL HYDROCHLORIDE 1.25 MG: 1.25 SOLUTION, CONCENTRATE RESPIRATORY (INHALATION) at 08:15

## 2017-06-19 RX ADMIN — FLUTICASONE FUROATE AND VILANTEROL TRIFENATATE 1 PUFF: 100; 25 POWDER RESPIRATORY (INHALATION) at 09:27

## 2017-06-19 RX ADMIN — TIOTROPIUM BROMIDE 18 MCG: 18 CAPSULE ORAL; RESPIRATORY (INHALATION) at 09:28

## 2017-06-19 NOTE — PHARMACY
Patient is MA pending. Transferring medications from [facility] to fill here at [facility] will rebill when ins is active.    Thank you,  Jose Funes [facility] Discharge Pharmacy Liaison  [phone]

## 2017-06-19 NOTE — PLAN OF CARE
Problem: Goal Outcome Summary  Goal: Goal Outcome Summary  Outcome: Adequate for Discharge Date Met:  06/19/17  Lung sounds clear, 94% on RA. No new complaints. VSS. Awaiting dc this pm.

## 2017-06-19 NOTE — PLAN OF CARE
Problem: Goal Outcome Summary  Goal: Goal Outcome Summary  Outcome: Adequate for Discharge Date Met:  06/19/17  VSS; LSC; no SOLER/wheezes; O2sats 90's on RA.  Patient c/o HA relieved with Ultram; no N/V.  No new c/o's.  DC instructions and prescriptions were provided.  Patient verbalized understanding of all information received.  Patient is DC'd home via taxi cab at 1715.

## 2017-06-19 NOTE — PLAN OF CARE
Problem: Goal Outcome Summary  Goal: Goal Outcome Summary  Outcome: Improving  A/O x 4.  VSS on RA.  LS-diminished/clear x all lobes; infrequent, productive cough--scant amount of white, thick sputum.  Denies pain, nausea, SOB, or vomiting.  Regular diet tolerated; appetite improving.  Independent.  Possible D/C today.

## 2017-06-19 NOTE — DISCHARGE SUMMARY
[facility]    Discharge Summary  Hospitalist    Date of Admission:  6/14/2017  Date of Discharge:  6/19/2017  Discharging Provider: Kali Garner MD  Date of Service: 06/19/17    Discharge Diagnoses   Acute COPD exacerbation  Viral gastroenteritis    History of Present Illness   Gerson Pearson is a 57-year-old man with medical history of COPD, tobacco dependence and chronic back pain, admitted on 6/14/17 due to a COPD exacerbation.    Please refer to HPI for further details.    Hospital Course   The following problems were addressed during his hospitalization.     Acute COPD exacerbation with bronchitis  Due to poorly controlled COPD as he unable to afford inhalers and ongoing tobacco abuse and bronchitis  CXR negative  -Received 5 days of steroids including 4 days of IV SoluMedrol in the hospital.  No need for further steroids.  -The patient improved as expected with scheduled nebs and inhalers.  Strongly advised smoking cessation and making sure he takes his prescribed inhalers after discharge.  -Resume PTA Spiriva and Dulera.    -D-dimer neg  -Suspected bronchitis.  He received 1 dose of azithromycin PO, but he soon after developed severe nausea and vomiting, so the azithromycin was stopped in event he was having any medication side effects    Nausea, vomiting, diarrhea  Likely gastroenteritis with symptoms prolonging his hospitalization.  C diff negative.  LFT's and RUQ US without significant acute findings.  Symptoms have largely resolved.  He has been able to advance his diet and no longer requiring frequent antiemetics.    Leukocytosis  Likely due to steroids.  Afebrile.      Tobacco Abuse/Dependence:  Smokes 1/2-1 ppd  -Counseling regarding cessation.      Chest pain:    Occurs with inspiration.  Improved, likely due to COPD.  Cardiac risk factors include tobacco dependence.  Troponin negative x2.  D dimer negative, low likelihood for PE.    Depression:  -Resume PTA citalopram.      Chronic  "low back pain:  -Resume PTA PRN Tramadol.      Kali Garner MD  [facility] Hospitalist    Unresulted Labs Ordered in the Past 30 Days of this Admission     No orders found from 4/15/2017 to 6/15/2017.          Code Status   Full Code       Primary Care Physician   Alex Wynn    Physical Exam   /81 (BP Location: Right arm)  Pulse 77  Temp 97.9  F (36.6  C) (Oral)  Resp 16  Ht 1.753 m (5' 9\")  Wt 55.1 kg (121 lb 6.4 oz)  SpO2 94%  BMI 17.93 kg/m2  Constitutional: NAD, awake  HEENT: EOMI   Cardiovascular: S1, S2, regular rhythm  Respiratory: CTAB, no wheezing, no crackles  Gastrointestinal: Soft, NT  Neurologic: No focal deficits    Discharge Disposition   Discharged to home  Condition at discharge: Stable    Consultations This Hospital Stay   SMOKING CESSATION PROGRAM IP CONSULT    Time Spent on this Encounter   I, Kali Garner, personally saw the patient today and spent greater than 30 minutes discharging this patient.    Discharge Orders     Reason for your hospital stay   COPD exacerbation, gastroenteritis     Follow Up and recommended labs and tests   Follow up with primary care provider, Alex Wynn, within 7 days for hospital follow- up.     Activity   Your activity upon discharge: activity as tolerated     Full Code     Diet   Follow this diet upon discharge: Orders Placed This Encounter     Regular Diet Adult         Discharge Medications   Current Discharge Medication List      CONTINUE these medications which have CHANGED    Details   albuterol (PROAIR HFA/PROVENTIL HFA/VENTOLIN HFA) 108 (90 BASE) MCG/ACT Inhaler Inhale 2 puffs into the lungs every 4 hours as needed for shortness of breath / dyspnea or wheezing  Qty: 1 Inhaler, Refills: 1    Associated Diagnoses: COPD exacerbation (H)      ipratropium - albuterol 0.5 mg/2.5 mg/3 mL (DUONEB) 0.5-2.5 (3) MG/3ML neb solution Take 1 vial (3 mLs) by nebulization every 6 hours as needed for shortness of breath / dyspnea or wheezing  Qty: 1 Box, " Refills: 1    Associated Diagnoses: COPD exacerbation (H)      mometasone-formoterol (DULERA) 200-5 MCG/ACT oral inhaler Inhale 2 puffs into the lungs 2 times daily  Qty: 1 Inhaler, Refills: 1    Associated Diagnoses: COPD exacerbation (H)      tiotropium (SPIRIVA) 18 MCG capsule Inhale 1 capsule (18 mcg) into the lungs daily  Qty: 30 capsule, Refills: 1    Associated Diagnoses: COPD exacerbation (H)         CONTINUE these medications which have NOT CHANGED    Details   multivitamin, therapeutic (THERA-VIT) TABS tablet Take 1 tablet by mouth daily      traMADol (ULTRAM) 50 MG tablet Take 1 tablet (50 mg) by mouth every 6 hours as needed for moderate pain  Qty: 12 tablet, Refills: 0      CITALOPRAM HYDROBROMIDE PO Take 20 mg by mouth daily          STOP taking these medications       mometasone-formoterol (DULERA) 100-5 MCG/ACT oral inhaler Comments:   Reason for Stopping:             Allergies   Allergies   Allergen Reactions     Ibuprofen Nausea and Vomiting     Data   Most Recent 3 CBC's:  Recent Labs   Lab Test  06/18/17   0803  06/16/17   1428  06/14/17   2230   WBC  14.1*  20.8*  12.8*   HGB  15.6  13.9  14.7   MCV  90  91  91   PLT  210  203  177      Most Recent 3 BMP's:  Recent Labs   Lab Test  06/19/17   0810  06/18/17   0803  06/16/17   1428   NA  134  130*  135   POTASSIUM  3.9  4.0  4.2   CHLORIDE  100  98  104   CO2  28  23  25   BUN  13  16  13   CR  0.86  0.72  0.67   ANIONGAP  6  9  6   RL  8.0*  8.5  8.9   GLC  69*  193*  170*     Most Recent 2 LFT's:  Recent Labs   Lab Test  06/18/17   0803  06/14/17   2230   AST  15  15   ALT  25  15   ALKPHOS  98  122   BILITOTAL  0.6  0.9     Most Recent INR's and Anticoagulation Dosing History:  Anticoagulation Dose History     There is no flowsheet data to display.        Most Recent 3 Troponin's:  Recent Labs   Lab Test  06/15/17   0750  06/14/17   2230  11/30/11   1505   TROPI  <0.015  The 99th percentile for upper reference range is 0.045 ug/L.  Troponin  values in   the range of 0.045 - 0.120 ug/L may be associated with risks of adverse   clinical events.    <0.015  The 99th percentile for upper reference range is 0.045 ug/L.  Troponin values in   the range of 0.045 - 0.120 ug/L may be associated with risks of adverse   clinical events.    <0.012     Most Recent Cholesterol Panel:  Recent Labs   Lab Test  01/12/10   0810   CHOL  152   LDL  82   HDL  63   TRIG  37     Most Recent 6 Bacteria Isolates From Any Culture (See EPIC Reports for Culture Details):No lab results found.  Most Recent TSH, T4 and A1c Labs:No lab results found.  Results for orders placed or performed during the hospital encounter of 06/14/17   XR Chest 2 Views    Narrative    XR CHEST 2 VW  6/14/2017 11:41 PM      HISTORY: Shortness of breath.    COMPARISON: 11/30/2011.    FINDINGS: The heart size is normal. The lungs are hyperinflated but  clear. No pneumothorax or pleural effusion.      Impression    IMPRESSION: The lungs are hyperinflated. No other acute abnormality.    ELIZABETH DUNLAP MD   US Abdomen Limited    Narrative    ULTRASOUND ABDOMEN LIMITED June 18, 2017 1:19 PM    HISTORY: Nausea and vomiting.    FINDINGS: The gallbladder is contracted. Allowing for the contracted  state there is no definite wall thickening. No gallstones are seen.  The common duct is normal in caliber, measuring 4 mm. The liver and  right kidney are unremarkable. The pancreas is partially obscured, but  normal where seen.      Impression    IMPRESSION: Contracted gallbladder. No sonographic evidence of  cholelithiasis or acute cholecystitis.    MARA CASTILLO MD

## 2017-06-19 NOTE — PROGRESS NOTES
[facility]  D:  Patient is discharging today and cannot afford his medications.  Patient was on MA in the past when he lived in [facility].  He planned to re-apply.   Our financial counselor will meet with patient to determine if he is eligible to re-apply.  If patient appears eligible for MA, then the  D/C pharmacy can fill his medications.  Writer is working with [facility] in the d/c pharmacy.  Patient also is in need of transport. An account ride will be set up with [employer].

## 2017-06-19 NOTE — PLAN OF CARE
Problem: Goal Outcome Summary  Goal: Goal Outcome Summary  Outcome: No Change  Up independently/SBA at times; activity encouraged.Patient is A&Ox4; calls appropriately.  HR tachy at times; afebrile. Tele d/c'ed. LS diminished; frequent productive cough; O2sats 92-94% RA. loose BMX1. Denies nausea. C/o CORONA, takes tramadol for that. IVF. abd US done. Continue to monitor. D/c home pending progress and poss tomorrow.

## 2017-11-01 ENCOUNTER — HOSPITAL ENCOUNTER (EMERGENCY)
Facility: CLINIC | Age: 57
Discharge: HOME | End: 2017-11-01
Attending: EMERGENCY MEDICINE | Admitting: EMERGENCY MEDICINE
Payer: MEDICARE

## 2017-11-01 VITALS
TEMPERATURE: 98.4 F | RESPIRATION RATE: 14 BRPM | HEART RATE: 78 BPM | HEIGHT: 69 IN | DIASTOLIC BLOOD PRESSURE: 75 MMHG | BODY MASS INDEX: 18.51 KG/M2 | WEIGHT: 125 LBS | SYSTOLIC BLOOD PRESSURE: 140 MMHG | OXYGEN SATURATION: 98 %

## 2017-11-01 DIAGNOSIS — T40.1X1A ACCIDENTAL OVERDOSE OF HEROIN, INITIAL ENCOUNTER (H): ICD-10-CM

## 2017-11-01 LAB — INTERPRETATION ECG - MUSE: NORMAL

## 2017-11-01 PROCEDURE — 99283 EMERGENCY DEPT VISIT LOW MDM: CPT

## 2017-11-01 PROCEDURE — 93005 ELECTROCARDIOGRAM TRACING: CPT

## 2017-11-01 NOTE — ED AVS SNAPSHOT
Emergency Department    [address] [place] [id number]    Phone:  [phone]    Fax:  [phone]                                       Gerson Pearson   MRN: [id number]    Department:   Emergency Department   Date of Visit:  11/1/2017           After Visit Summary Signature Page     I have received my discharge instructions, and my questions have been answered. I have discussed any challenges I see with this plan with the nurse or doctor.    ..........................................................................................................................................  Patient/Patient Representative Signature      ..........................................................................................................................................  Patient Representative Print Name and Relationship to Patient    ..................................................               ................................................  Date                                            Time    ..........................................................................................................................................  Reviewed by Signature/Title    ...................................................              ..............................................  Date                                                            Time

## 2017-11-01 NOTE — ED AVS SNAPSHOT
Emergency Department    [address] [facility] [address]    Phone:  [phone]    Fax:  [phone]                                       Gerson Pearson   MRN: [id number]    Department:   Emergency Department   Date of Visit:  11/1/2017           Patient Information     Date Of Birth          1960        Your diagnoses for this visit were:     Accidental overdose of heroin, initial encounter        You were seen by Akua Church MD.      Follow-up Information     Follow up with Alex Wynn PA-C In 2 days.    Specialty:  Physician Assistant    Contact information:    [facility]  [address] [facility] [address]  [phone]          Discharge Instructions         Opiate Overdose    You have been treated for an overdose of opiates (such as a prescription pain medicine or heroin). Taking too many opiates is dangerous. They cause breathing to slow and possibly stop. If you stop breathing for more than 2 to 3 minutes, your heart can stop and you will die.  Signs and symptoms of overdose  Symptoms can depend on how much of the drug and which ones were used. They include:    Trouble breathing or slow irregular breathing; breathing may even stop, which can cause death    Drowsiness, difficulty arousing, coma    Small, pinpoint pupils    Cyanosis, when lips and nails appear blue (not enough oxygen in the blood)    Slow heart rate    Hypothermia (low body temperature)    Muscle spasm    Seizures    Death  If your overdose was severe, you may have received an antidote such as naloxone. The antidote effect lasts for about 1 to 2 hours. If the opiate has not left your system by the time the antidote medicine wears off your symptoms may return (such as drowsiness and slow breathing).   If you were addicted and physically dependent on opiates, then naloxone may cause withdrawal symptoms to appear immediately. These may consist of:    Body aches    Diarrhea    Abdominal  cramps    Nausea    Vomiting    Runny nose    Sneezing    Sweating    Yawning    Restlessness    Irritability    Trembling  These symptoms will go away as the naloxone wears off.  Home care  The following guidelines will help you care for yourself at home:  1. Rest for the next 12 hours.   2. Don't drive or operate any vehicle or dangerous equipment until all narcotic effects have worn off and you no longer feel sleepy or drowsy.  3. If you were previously prescribed narcotic medicines for pain, don't take any more of this medicine for the next 6 to 8 hours, unless your healthcare provider says it is safe to do so.  4. If narcotics or other drugs were swallowed, you may have been given liquid charcoal to neutralize those drugs. The charcoal may cause nausea and vomiting over the next few hours. It will also cause a black color to your stools for the next 1 to 2 days. Usually, a laxative is given with charcoal to speed the removal of any toxins from the intestinal tract. This may cause diarrhea for up to 24 hours. If no laxative was given, you may become constipated. If this happens, you may take an over-the-counter laxative or suppository.  Follow-up care  Follow up with your healthcare provider, or as advised if all symptoms do not resolve within 24 hours or if constipation is not relieved after 2 doses of laxatives. If your overdose was related to a drug addiction, seek drug counseling. Consider a drug treatment program to help break your habit.  Call 911  Call 911 if any of these occur:    Seizure    Trouble breathing or slow irregular breathing    Chest pain    Sudden weakness on one side of your body or sudden trouble speaking    Very drowsy or trouble awakening    Fainting or loss of consciousness    Rapid heart rate    Very slow heart rate  When to seek medical advice  Call your healthcare provider right away if any of these occur:    Cough with colored sputum    Fever over 100.4 F (38.0 C) oral    Redness,  swelling or tenderness at the heroin injection site (if using IV drugs)    Feeling that you might harm yourself or another  Date Last Reviewed: 6/1/2016 2000-2017 [employer]. [address] [place] [address], [place] [address]. All rights reserved. This information is not intended as a substitute for professional medical care. Always follow your healthcare professional's instructions.          24 Hour Appointment Hotline       To make an appointment at any [facility] clinic, call [phone] ([phone]). If you don't have a family doctor or clinic, we will help you find one. [facility] clinics are conveniently located to serve the needs of you and your family.          ED Discharge Orders     MENTAL HEALTH REFERRAL       Your provider has referred you to: [facility]: [facility] - Groups - substance abuse    All scheduling is subject to the client's specific insurance plan & benefits, provider/location availability, and provider clinical specialities.  Please arrive 15 minutes early for your first appointment and bring your completed paperwork.    Please be aware that coverage of these services is subject to the terms and limitations of your health insurance plan.  Call member services at your health plan with any benefit or coverage questions.                     Review of your medicines      Our records show that you are taking the medicines listed below. If these are incorrect, please call your family doctor or clinic.        Dose / Directions Last dose taken    albuterol 108 (90 BASE) MCG/ACT Inhaler   Commonly known as:  PROAIR HFA/PROVENTIL HFA/VENTOLIN HFA   Dose:  2 puff   Quantity:  1 Inhaler        Inhale 2 puffs into the lungs every 4 hours as needed for shortness of breath / dyspnea or wheezing   Refills:  1        CITALOPRAM HYDROBROMIDE PO   Dose:  20 mg        Take 20 mg by mouth daily   Refills:  0        ipratropium - albuterol 0.5 mg/2.5 mg/3 mL 0.5-2.5 (3) MG/3ML neb solution    Commonly known as:  DUONEB   Dose:  1 vial   Quantity:  1 Box        Take 1 vial (3 mLs) by nebulization every 6 hours as needed for shortness of breath / dyspnea or wheezing   Refills:  1        mometasone-formoterol 200-5 MCG/ACT oral inhaler   Commonly known as:  DULERA   Dose:  2 puff   Quantity:  1 Inhaler        Inhale 2 puffs into the lungs 2 times daily   Refills:  1        multivitamin, therapeutic Tabs tablet   Dose:  1 tablet        Take 1 tablet by mouth daily   Refills:  0        tiotropium 18 MCG capsule   Commonly known as:  SPIRIVA   Dose:  18 mcg   Quantity:  30 capsule        Inhale 1 capsule (18 mcg) into the lungs daily   Refills:  1        traMADol 50 MG tablet   Commonly known as:  ULTRAM   Dose:  50 mg   Quantity:  12 tablet        Take 1 tablet (50 mg) by mouth every 6 hours as needed for moderate pain   Refills:  0                Procedures and tests performed during your visit     EKG 12 lead      Orders Needing Specimen Collection     None      Pending Results     No orders found from 10/30/2017 to 11/2/2017.            Pending Culture Results     No orders found from 10/30/2017 to 11/2/2017.            Pending Results Instructions     If you had any lab results that were not finalized at the time of your Discharge, you can call the ED Lab Result RN at [phone]. You will be contacted by this team for any positive Lab results or changes in treatment. The nurses are available 7 days a week from 10A to 6:30P.  You can leave a message 24 hours per day and they will return your call.        Test Results From Your Hospital Stay               Clinical Quality Measure: Blood Pressure Screening     Your blood pressure was checked while you were in the emergency department today. The last reading we obtained was  BP: 145/81 . Please read the guidelines below about what these numbers mean and what you should do about them.  If your systolic blood pressure (the top number) is less than 120 and  "your diastolic blood pressure (the bottom number) is less than 80, then your blood pressure is normal. There is nothing more that you need to do about it.  If your systolic blood pressure (the top number) is 120-139 or your diastolic blood pressure (the bottom number) is 80-89, your blood pressure may be higher than it should be. You should have your blood pressure rechecked within a year by a primary care provider.  If your systolic blood pressure (the top number) is 140 or greater or your diastolic blood pressure (the bottom number) is 90 or greater, you may have high blood pressure. High blood pressure is treatable, but if left untreated over time it can put you at risk for heart attack, stroke, or kidney failure. You should have your blood pressure rechecked by a primary care provider within the next 4 weeks.  If your provider in the emergency department today gave you specific instructions to follow-up with your doctor or provider even sooner than that, you should follow that instruction and not wait for up to 4 weeks for your follow-up visit.        Thank you for choosing [facility]       Thank you for choosing [facility] for your care. Our goal is always to provide you with excellent care. Hearing back from our patients is one way we can continue to improve our services. Please take a few minutes to complete the written survey that you may receive in the mail after you visit with us. Thank you!        [employer] Information     [employer] lets you send messages to your doctor, view your test results, renew your prescriptions, schedule appointments and more. To sign up, go to [email or web address] . Click on \"Log in\" on the left side of the screen, which will take you to the Welcome page. Then click on \"Sign up Now\" on the right side of the page.     You will be asked to enter the access code listed below, as well as some personal information. Please follow the directions to create your username and password.   "   Your access code is: [email or web address]  Expires: 2018  8:46 PM     Your access code will  in 90 days. If you need help or a new code, please call your [facility] clinic or [phone].        Care EveryWhere ID     This is your Care EveryWhere ID. This could be used by other organizations to access your [facility] medical records  [id number]        Equal Access to Services     SHERIF DENNISON : Hadii lashae fletchero Sokarena, waaxda lulavelladaha, qaybta kaalmada ademalini, helene ovalle . So [facility] [phone].    ATENCIÓN: Si habla español, tiene a bains disposición servicios gratuitos de asistencia lingüística. Llame al [phone].    We comply with applicable federal civil rights laws and [place] laws. We do not discriminate on the basis of race, color, national origin, age, disability, sex, sexual orientation, or gender identity.            After Visit Summary       This is your record. Keep this with you and show to your community pharmacist(s) and doctor(s) at your next visit.

## 2017-11-01 NOTE — ED PROVIDER NOTES
History     Chief Complaint:  Drug Overdose    HPI   Gerson Pearson is a 57 year old male who presents to the emergency department via EMS for evaluation after an accidental drug overdose. The patient reports that he has been using heroin intermittently for the last 17 years, especially during periods of high stress, namely with multiple family member deaths. He says that he has had extended periods of sobriety, but is currently snorts about 0.5 gm of heroin twice a week. Today, he recalls taking this same dose but that the heroin came from a new supplier. He recalls snorting the 0.5 gm anywhere between 1430 to 1600 today and then going to [employer] to return something. [employer] staff note that he appeared to be stumbling around and called EMS. EMS reports that he had a decreased respiratory rate upon arrival, for which they gave him 0.5 mg of Narcan IV and he responded well. He currently has no symptoms and is here on  hold to be observed. He denies any other illicit drug or alcohol use. Of note, he is not suicidal, this was purely accidental.    Allergies:  Ibuprofen     Medications:    Albuterol inhaler  Duoneb  Dulera  Spiriva  Thera-Vit  Citalopram  Ultram    Past Medical History:    Chronic back pain   COPD (chronic obstructive pulmonary disease)   Depressive disorder     Past Surgical History:    Surgical history reviewed. No pertinent past surgical history.    Family History:    Diabetes Mother    Social History:  Smoking Status: Current Every Day Smoker (one pack/day)  Alcohol Use: No (Quit 3 years ago)  Marital Status:        Review of Systems   Constitutional:        Heroin overdose   All other systems reviewed and are negative.      Physical Exam     Patient Vitals for the past 24 hrs:   BP Temp Temp src Pulse Heart Rate Resp SpO2 Height Weight   11/01/17 2100 140/75 - - 78 - 14 98 % - -   11/01/17 1900 - - - - 73 10 96 % - -   11/01/17 1805 145/81 98.4  F (36.9  C) Oral 86 -  "16 95 % 1.753 m (5' 9\") 56.7 kg (125 lb)       Physical Exam    Constitutional:  Patient is oriented to person, place, and time. They appear well-developed and    well-nourished.   HENT:   Mouth/Throat:   Oropharynx is clear and moist.   Eyes:    Conjunctivae normal and EOM are normal. Pupils are equal, round, and reactive    to light.   Neck:    Normal range of motion.   Cardiovascular: Normal rate, regular rhythm and normal heart sounds.  Exam reveals no gallop    and no friction rub.  No murmur heard.  Pulmonary/Chest:  Effort normal and breath sounds normal. Patient has no wheezes. Patient has    no rales.   Abdominal:   Soft. Bowel sounds are normal. Patient exhibits no mass. There is no    tenderness. There is no rebound and no guarding.   Musculoskeletal:  Normal range of motion. Patient exhibits no edema.   Neurological:   Patient is alert and oriented to person, place, and time. Patient has normal    strength. No cranial nerve deficit or sensory deficit. GCS 15  Skin:   Skin is warm and dry. No rash noted. No erythema. No evidence of track marks.  Psychiatric:   Patient has a normal mood and affect. Patient's behavior is normal. Judgment    and thought content normal.     Emergency Department Course     ECG:  ECG taken at 1839, ECG read at 1902  Sinus rhythm with short MI  Otherwise normal ECG  Rate 74 bpm. MI interval 108 ms. QRS duration 78 ms. QT/QTc 362/401 ms. P-R-T axes 75 66 63.    Emergency Department Course:    Nursing notes and vitals reviewed. Patient had an IV in that EMS had placed, but he accidentally pulled it out when changing into his gown.    I performed an exam of the patient as documented above.     1841 I spoke on the phone with poison control regarding the patient's situation, and they recommend 2 hours of observation.    I personally reviewed the physical exam results with the patient and answered all related questions prior to discharge.      Impression & Plan      Medical Decision " Making:  Gerson Pearson is a 57 year old gentleman who presents via EMS for evaluation of a heroin overdose. He snorted about a half gram of heroin today starting around 1400, however around 1600 started having symptoms. He was brought in by EMS and had received some Narcan IV prior to arrival. By the time that he arrived to the emergency department he was mentating normally. He denied any pain, he did not sustain any injury today. Poison control was consulted regarding this patient. They recommended observing him in the emergency department for two hours for the ingestion. He has been in the emergency department now for three hours, been mentating normally, ate a couple of sandwiches, and at this time is asymptomatic. The patient is aware of the dangers of drug use. Counseling was performed. Chem Dep order put in for outpatient follow up which he was agreeable to. He also is aware of the risk and dangers of drug use. He is very thankful to in fact even be alive and that people helped him today. At this point he will follow up with primary care doctor.    Diagnosis:    ICD-10-CM    1. Accidental overdose of heroin, initial encounter T40.1X1A MENTAL HEALTH REFERRAL     Disposition:   The patient was discharged home.    Scribe Disclosure:  I, Nichole Dyer, am serving as a scribe at 6:38 PM on 11/1/2017 to document services personally performed by Akua Church MD, based on my observations and the provider's statements to me.         EMERGENCY DEPARTMENT       Akua Church MD  11/01/17 8552

## 2017-11-01 NOTE — ED NOTES
Bed: ED06  Expected date: 11/1/17  Expected time: 5:32 PM  Means of arrival: Ambulance  Comments:  412 57M OD Narcan

## 2017-11-02 NOTE — DISCHARGE INSTRUCTIONS
Opiate Overdose    You have been treated for an overdose of opiates (such as a prescription pain medicine or heroin). Taking too many opiates is dangerous. They cause breathing to slow and possibly stop. If you stop breathing for more than 2 to 3 minutes, your heart can stop and you will die.  Signs and symptoms of overdose  Symptoms can depend on how much of the drug and which ones were used. They include:    Trouble breathing or slow irregular breathing; breathing may even stop, which can cause death    Drowsiness, difficulty arousing, coma    Small, pinpoint pupils    Cyanosis, when lips and nails appear blue (not enough oxygen in the blood)    Slow heart rate    Hypothermia (low body temperature)    Muscle spasm    Seizures    Death  If your overdose was severe, you may have received an antidote such as naloxone. The antidote effect lasts for about 1 to 2 hours. If the opiate has not left your system by the time the antidote medicine wears off your symptoms may return (such as drowsiness and slow breathing).   If you were addicted and physically dependent on opiates, then naloxone may cause withdrawal symptoms to appear immediately. These may consist of:    Body aches    Diarrhea    Abdominal cramps    Nausea    Vomiting    Runny nose    Sneezing    Sweating    Yawning    Restlessness    Irritability    Trembling  These symptoms will go away as the naloxone wears off.  Home care  The following guidelines will help you care for yourself at home:  1. Rest for the next 12 hours.   2. Don't drive or operate any vehicle or dangerous equipment until all narcotic effects have worn off and you no longer feel sleepy or drowsy.  3. If you were previously prescribed narcotic medicines for pain, don't take any more of this medicine for the next 6 to 8 hours, unless your healthcare provider says it is safe to do so.  4. If narcotics or other drugs were swallowed, you may have been given liquid charcoal to neutralize those  drugs. The charcoal may cause nausea and vomiting over the next few hours. It will also cause a black color to your stools for the next 1 to 2 days. Usually, a laxative is given with charcoal to speed the removal of any toxins from the intestinal tract. This may cause diarrhea for up to 24 hours. If no laxative was given, you may become constipated. If this happens, you may take an over-the-counter laxative or suppository.  Follow-up care  Follow up with your healthcare provider, or as advised if all symptoms do not resolve within 24 hours or if constipation is not relieved after 2 doses of laxatives. If your overdose was related to a drug addiction, seek drug counseling. Consider a drug treatment program to help break your habit.  Call 911  Call 911 if any of these occur:    Seizure    Trouble breathing or slow irregular breathing    Chest pain    Sudden weakness on one side of your body or sudden trouble speaking    Very drowsy or trouble awakening    Fainting or loss of consciousness    Rapid heart rate    Very slow heart rate  When to seek medical advice  Call your healthcare provider right away if any of these occur:    Cough with colored sputum    Fever over 100.4 F (38.0 C) oral    Redness, swelling or tenderness at the heroin injection site (if using IV drugs)    Feeling that you might harm yourself or another  Date Last Reviewed: 6/1/2016 2000-2017 [employer]. [address] [place] [address], [place] [address]. All rights reserved. This information is not intended as a substitute for professional medical care. Always follow your healthcare professional's instructions.

## 2018-01-07 ENCOUNTER — APPOINTMENT (OUTPATIENT)
Dept: CT IMAGING | Facility: CLINIC | Age: 58
End: 2018-01-07
Attending: EMERGENCY MEDICINE
Payer: MEDICARE

## 2018-01-07 ENCOUNTER — HOSPITAL ENCOUNTER (EMERGENCY)
Facility: CLINIC | Age: 58
Discharge: HOME | End: 2018-01-07
Attending: EMERGENCY MEDICINE | Admitting: EMERGENCY MEDICINE
Payer: MEDICARE

## 2018-01-07 ENCOUNTER — APPOINTMENT (OUTPATIENT)
Dept: GENERAL RADIOLOGY | Facility: CLINIC | Age: 58
End: 2018-01-07
Attending: EMERGENCY MEDICINE
Payer: MEDICARE

## 2018-01-07 VITALS
OXYGEN SATURATION: 98 % | SYSTOLIC BLOOD PRESSURE: 141 MMHG | DIASTOLIC BLOOD PRESSURE: 92 MMHG | RESPIRATION RATE: 15 BRPM | TEMPERATURE: 99.1 F

## 2018-01-07 DIAGNOSIS — J18.9 PNEUMONIA OF RIGHT LUNG DUE TO INFECTIOUS ORGANISM, UNSPECIFIED PART OF LUNG: ICD-10-CM

## 2018-01-07 LAB
ALBUMIN SERPL-MCNC: 3.1 G/DL (ref 3.4–5)
ALBUMIN UR-MCNC: NEGATIVE MG/DL
ALP SERPL-CCNC: 129 U/L (ref 40–150)
ALT SERPL W P-5'-P-CCNC: 21 U/L (ref 0–70)
ANION GAP SERPL CALCULATED.3IONS-SCNC: 9 MMOL/L (ref 3–14)
APPEARANCE UR: CLEAR
AST SERPL W P-5'-P-CCNC: 24 U/L (ref 0–45)
BASOPHILS # BLD AUTO: 0 10E9/L (ref 0–0.2)
BASOPHILS NFR BLD AUTO: 0.5 %
BILIRUB SERPL-MCNC: 0.6 MG/DL (ref 0.2–1.3)
BILIRUB UR QL STRIP: NEGATIVE
BUN SERPL-MCNC: 9 MG/DL (ref 7–30)
CALCIUM SERPL-MCNC: 8.8 MG/DL (ref 8.5–10.1)
CHLORIDE SERPL-SCNC: 103 MMOL/L (ref 94–109)
CO2 SERPL-SCNC: 24 MMOL/L (ref 20–32)
COLOR UR AUTO: YELLOW
CREAT SERPL-MCNC: 0.82 MG/DL (ref 0.66–1.25)
DIFFERENTIAL METHOD BLD: NORMAL
EOSINOPHIL # BLD AUTO: 0.1 10E9/L (ref 0–0.7)
EOSINOPHIL NFR BLD AUTO: 1.5 %
ERYTHROCYTE [DISTWIDTH] IN BLOOD BY AUTOMATED COUNT: 12.9 % (ref 10–15)
FLUAV+FLUBV AG SPEC QL: NEGATIVE
FLUAV+FLUBV AG SPEC QL: NEGATIVE
GFR SERPL CREATININE-BSD FRML MDRD: >90 ML/MIN/1.7M2
GLUCOSE SERPL-MCNC: 125 MG/DL (ref 70–99)
GLUCOSE UR STRIP-MCNC: NEGATIVE MG/DL
HCT VFR BLD AUTO: 41.9 % (ref 40–53)
HGB BLD-MCNC: 14.2 G/DL (ref 13.3–17.7)
HGB UR QL STRIP: NEGATIVE
IMM GRANULOCYTES # BLD: 0 10E9/L (ref 0–0.4)
IMM GRANULOCYTES NFR BLD: 0.1 %
KETONES UR STRIP-MCNC: NEGATIVE MG/DL
LACTATE BLD-SCNC: 1.3 MMOL/L (ref 0.7–2)
LEUKOCYTE ESTERASE UR QL STRIP: NEGATIVE
LIPASE SERPL-CCNC: 152 U/L (ref 73–393)
LYMPHOCYTES # BLD AUTO: 1 10E9/L (ref 0.8–5.3)
LYMPHOCYTES NFR BLD AUTO: 13.3 %
MCH RBC QN AUTO: 30.3 PG (ref 26.5–33)
MCHC RBC AUTO-ENTMCNC: 33.9 G/DL (ref 31.5–36.5)
MCV RBC AUTO: 89 FL (ref 78–100)
MONOCYTES # BLD AUTO: 1 10E9/L (ref 0–1.3)
MONOCYTES NFR BLD AUTO: 13.2 %
NEUTROPHILS # BLD AUTO: 5.4 10E9/L (ref 1.6–8.3)
NEUTROPHILS NFR BLD AUTO: 71.4 %
NITRATE UR QL: NEGATIVE
PH UR STRIP: 7.5 PH (ref 5–7)
PLATELET # BLD AUTO: 253 10E9/L (ref 150–450)
POTASSIUM SERPL-SCNC: 4 MMOL/L (ref 3.4–5.3)
PROT SERPL-MCNC: 7.3 G/DL (ref 6.8–8.8)
RBC # BLD AUTO: 4.69 10E12/L (ref 4.4–5.9)
SODIUM SERPL-SCNC: 136 MMOL/L (ref 133–144)
SOURCE: ABNORMAL
SP GR UR STRIP: 1.01 (ref 1–1.03)
SPECIMEN SOURCE: NORMAL
UROBILINOGEN UR STRIP-ACNC: 1 EU/DL (ref 0.2–1)
WBC # BLD AUTO: 7.6 10E9/L (ref 4–11)

## 2018-01-07 PROCEDURE — 80053 COMPREHEN METABOLIC PANEL: CPT | Performed by: EMERGENCY MEDICINE

## 2018-01-07 PROCEDURE — 83605 ASSAY OF LACTIC ACID: CPT | Performed by: EMERGENCY MEDICINE

## 2018-01-07 PROCEDURE — 25000132 ZZH RX MED GY IP 250 OP 250 PS 637: Mod: GY | Performed by: EMERGENCY MEDICINE

## 2018-01-07 PROCEDURE — 25000125 ZZHC RX 250: Performed by: EMERGENCY MEDICINE

## 2018-01-07 PROCEDURE — 96374 THER/PROPH/DIAG INJ IV PUSH: CPT

## 2018-01-07 PROCEDURE — 85025 COMPLETE CBC W/AUTO DIFF WBC: CPT | Performed by: EMERGENCY MEDICINE

## 2018-01-07 PROCEDURE — 99285 EMERGENCY DEPT VISIT HI MDM: CPT | Mod: 25

## 2018-01-07 PROCEDURE — 96361 HYDRATE IV INFUSION ADD-ON: CPT

## 2018-01-07 PROCEDURE — 74177 CT ABD & PELVIS W/CONTRAST: CPT

## 2018-01-07 PROCEDURE — 71046 X-RAY EXAM CHEST 2 VIEWS: CPT

## 2018-01-07 PROCEDURE — A9270 NON-COVERED ITEM OR SERVICE: HCPCS | Mod: GY | Performed by: EMERGENCY MEDICINE

## 2018-01-07 PROCEDURE — 25000128 H RX IP 250 OP 636: Performed by: EMERGENCY MEDICINE

## 2018-01-07 PROCEDURE — 87804 INFLUENZA ASSAY W/OPTIC: CPT | Performed by: EMERGENCY MEDICINE

## 2018-01-07 PROCEDURE — 83690 ASSAY OF LIPASE: CPT | Performed by: EMERGENCY MEDICINE

## 2018-01-07 PROCEDURE — 81003 URINALYSIS AUTO W/O SCOPE: CPT | Performed by: EMERGENCY MEDICINE

## 2018-01-07 RX ORDER — MORPHINE SULFATE 4 MG/ML
4 INJECTION, SOLUTION INTRAMUSCULAR; INTRAVENOUS
Status: DISCONTINUED | OUTPATIENT
Start: 2018-01-07 | End: 2018-01-07 | Stop reason: HOSPADM

## 2018-01-07 RX ORDER — TRAMADOL HYDROCHLORIDE 50 MG/1
50-100 TABLET ORAL EVERY 6 HOURS PRN
Qty: 20 TABLET | Refills: 0 | Status: ON HOLD | OUTPATIENT
Start: 2018-01-07 | End: 2021-10-11

## 2018-01-07 RX ORDER — IPRATROPIUM BROMIDE AND ALBUTEROL SULFATE 2.5; .5 MG/3ML; MG/3ML
1 SOLUTION RESPIRATORY (INHALATION) EVERY 4 HOURS PRN
Qty: 1 BOX | Refills: 0 | Status: ON HOLD | OUTPATIENT
Start: 2018-01-07 | End: 2021-10-11

## 2018-01-07 RX ORDER — SODIUM CHLORIDE 9 MG/ML
1000 INJECTION, SOLUTION INTRAVENOUS CONTINUOUS
Status: DISCONTINUED | OUTPATIENT
Start: 2018-01-07 | End: 2018-01-07 | Stop reason: HOSPADM

## 2018-01-07 RX ORDER — DOXYCYCLINE 100 MG/1
100 CAPSULE ORAL 2 TIMES DAILY
Qty: 28 CAPSULE | Refills: 0 | Status: SHIPPED | OUTPATIENT
Start: 2018-01-07 | End: 2018-01-21

## 2018-01-07 RX ORDER — CEFPODOXIME PROXETIL 200 MG/1
200 TABLET, FILM COATED ORAL 2 TIMES DAILY
Qty: 28 TABLET | Refills: 0 | Status: SHIPPED | OUTPATIENT
Start: 2018-01-07 | End: 2018-01-21

## 2018-01-07 RX ORDER — CEFPODOXIME PROXETIL 200 MG/1
200 TABLET, FILM COATED ORAL 2 TIMES DAILY
Status: DISCONTINUED | OUTPATIENT
Start: 2018-01-07 | End: 2018-01-07 | Stop reason: HOSPADM

## 2018-01-07 RX ORDER — IOPAMIDOL 755 MG/ML
63 INJECTION, SOLUTION INTRAVASCULAR ONCE
Status: COMPLETED | OUTPATIENT
Start: 2018-01-07 | End: 2018-01-07

## 2018-01-07 RX ORDER — ALBUTEROL SULFATE 90 UG/1
2 AEROSOL, METERED RESPIRATORY (INHALATION) EVERY 6 HOURS PRN
Qty: 1 INHALER | Refills: 0 | Status: ON HOLD | OUTPATIENT
Start: 2018-01-07 | End: 2021-10-11

## 2018-01-07 RX ORDER — DOXYCYCLINE 100 MG/1
100 CAPSULE ORAL ONCE
Status: COMPLETED | OUTPATIENT
Start: 2018-01-07 | End: 2018-01-07

## 2018-01-07 RX ADMIN — CEFPODOXIME PROXETIL 200 MG: 200 TABLET, FILM COATED ORAL at 18:05

## 2018-01-07 RX ADMIN — IOPAMIDOL 63 ML: 755 INJECTION, SOLUTION INTRAVENOUS at 16:28

## 2018-01-07 RX ADMIN — DOXYCYCLINE HYCLATE 100 MG: 100 CAPSULE, GELATIN COATED ORAL at 18:05

## 2018-01-07 RX ADMIN — SODIUM CHLORIDE 1000 ML: 9 INJECTION, SOLUTION INTRAVENOUS at 15:47

## 2018-01-07 RX ADMIN — MORPHINE SULFATE 4 MG: 4 INJECTION, SOLUTION INTRAMUSCULAR; INTRAVENOUS at 15:47

## 2018-01-07 RX ADMIN — SODIUM CHLORIDE 60 ML: 9 INJECTION, SOLUTION INTRAVENOUS at 16:29

## 2018-01-07 ASSESSMENT — ENCOUNTER SYMPTOMS
COUGH: 1
FEVER: 0
ABDOMINAL PAIN: 1
DYSURIA: 0
SHORTNESS OF BREATH: 0
APPETITE CHANGE: 1

## 2018-01-07 NOTE — DISCHARGE INSTRUCTIONS
"Take the below medications as prescribed.  Please do not miss any doses of the antibiotics.    New Prescriptions    ALBUTEROL (PROAIR HFA/PROVENTIL HFA/VENTOLIN HFA) 108 (90 BASE) MCG/ACT INHALER    Inhale 2 puffs into the lungs every 6 hours as needed for shortness of breath / dyspnea or wheezing    CEFPODOXIME (VANTIN) 200 MG TABLET    Take 1 tablet (200 mg) by mouth 2 times daily for 14 days    DOXYCYCLINE (VIBRAMYCIN) 100 MG CAPSULE    Take 1 capsule (100 mg) by mouth 2 times daily for 14 days    IPRATROPIUM - ALBUTEROL 0.5 MG/2.5 MG/3 ML (DUONEB) 0.5-2.5 (3) MG/3ML NEB SOLUTION    Take 1 vial (3 mLs) by nebulization every 4 hours as needed for shortness of breath / dyspnea    MOMETASONE-FORMOTEROL (DULERA) 200-5 MCG/ACT ORAL INHALER    Inhale 2 puffs into the lungs 2 times daily    TRAMADOL (ULTRAM) 50 MG TABLET    Take 1-2 tablets ( mg) by mouth every 6 hours as needed for pain maximum 10 tablet(s) per day     1. -Take acetaminophen 500 to 1000 mg by mouth every 4 to 6 hours as needed for pain or fever.  Do not take more than 4000 mg in 24 hours.  Do not take within 6 hours of another acetaminophen containing medication such as norco (vicodin) or percocet.  - Take ibuprofen 600 to 800 mg by mouth every 6 to 8 hours as needed for pain or fever  2. Drink plenty of fluids.  3. Please stop smoking.    Please discuss the below CT results with your primary doctor.  I DO NOT THINK YOU HAVE CANCER, but further testing such as a repeat CT or colonoscopy should be discussed with Dr. Wynn.    \"IMPRESSION:   1. Mild bowel wall thickening in the rectum could be related to lack  of distention, although an area of proctitis could also have this  appearance. Rectal malignancy cannot entirely be excluded. Clinical  correlation and follow-up are recommended to ensure resolution.  2. Nonvisualization of the appendix.  3. Small amount of nonspecific free fluid in the pelvis.   4. Mild focal opacity in the right lower lobe " "and right middle lobe  could be related to atelectasis or mild pneumonia. Clinical  correlation is again requested.\"    Discharge Instructions  Bronchitis, Pneumonia, Bronchospasm    You were seen today for a chest infection or inflammation. If your provider decided this was due to a bacterial infection, you may need an antibiotic. Sometimes these are caused by a virus, and then an antibiotic will not help.     Generally, every Emergency Department visit should have a follow-up clinic visit with either a primary or a specialty clinic/provider. Please follow-up as instructed by your emergency provider today.    Return to the Emergency Department if:    Your breathing gets much worse.    You are very weak, or feel much more ill.    You develop new symptoms, such as chest pain.    You cough up blood.    You are vomiting (throwing up) enough that you cannot keep fluids or your medicine down.    What can I do to help myself?    Fill any prescriptions the provider gave you and take them right away--especially antibiotics. Be sure to finish the whole antibiotic prescription.    You may be given a prescription for an inhaler, which can help loosen tight air passages.  Use this as needed, but not more often than directed. Inhalers work much better when used with a spacer.     You may be given a prescription for a steroid to reduce inflammation. Used long-term, these can have side effects, but for short-term use they are safe. You may notice restlessness or increased appetite.        You may use non-prescription cough or cold medicines. Cough medicines may help, but don t make the cough go away completely.     Avoid smoke, because this can make your symptoms worse. If you smoke, this may be a good time to quit! Consider using nicotine lozenges, gum, or patches to reduce cravings.     If you have a fever, Tylenol  (acetaminophen), Motrin  (ibuprofen), or Advil  (ibuprofen) may help bring fever down and may help you feel more " comfortable. Be sure to read and follow the package directions, and ask your provider if you have questions.    Be sure to get your flu shot each year.  For certain ages, the pneumonia shot can help prevent pneumonia.  If you were given a prescription for medicine here today, be sure to read all of the information (including the package insert) that comes with your prescription.  This will include important information about the medicine, its side effects, and any warnings that you need to know about.  The pharmacist who fills the prescription can provide more information and answer questions you may have about the medicine.  If you have questions or concerns that the pharmacist cannot address, please call or return to the Emergency Department.     Remember that you can always come back to the Emergency Department if you are not able to see your regular provider in the amount of time listed above, if you get any new symptoms, or if there is anything that worries you.

## 2018-01-07 NOTE — ED PROVIDER NOTES
History     Chief Complaint:  Cough     HPI   Gerson Pearson is a 57 year old male with a history of COPD who presents to the emergency department today for evaluation of a cough. The patient reports he has had a cough for the past two weeks. Nonrad sharp right lower quadrant pain for the past three days which is worse when coughing. His abdominal pain is 5/10 when resting and a 10/10 when he coughs. He vomited once last night after an episode of coughing but has not vomited since then. He drank water today but has not eaten. He was able to keep the water down and has a headache, he thinks is likely because he has not eaten. The patient reports his pain is different from his COPD pain. Laying flat does not make him feel short of breath. He denies fever, chest pain, shortness of breath, testicular pain, dysuria or change in with his bowels.      Allergies:  Ibuprofen     Medications:    tiotropium (SPIRIVA) 18 MCG capsule  multivitamin, therapeutic (THERA-VIT) TABS tablet  CITALOPRAM HYDROBROMIDE PO  traMADol (ULTRAM) 50 MG tablet    Past Medical History:    Chronic back pain   COPD (chronic obstructive pulmonary disease)   Depressive disorder     Past Surgical History:    No pertinent past surgical history    Family History:    Diabetes    Social History:  The patient was alone in the emergency department.   Smoking Status: current everyday  Smokeless Tobacco: never  Alcohol Use: no  Marital Status:        Review of Systems   Constitutional: Positive for appetite change. Negative for fever.   Respiratory: Positive for cough. Negative for shortness of breath.    Cardiovascular: Negative for chest pain.   Gastrointestinal: Positive for abdominal pain.   Genitourinary: Negative for dysuria.   All other systems reviewed and are negative.    Physical Exam   First Vitals: BP (!) 125/92  Temp 99.1  F (37.3  C) (Oral)  Resp 18  SpO2 96%    Physical Exam  Constitutional: Well developed, nontox appearance  Head:  Atraumatic.   Mouth/Throat: Oropharynx is clear and moist.   Neck:  no stridor  Eyes: no scleral icterus  Cardiovascular: RRR, 2+ bilat radial pulses, PT  Pulmonary/Chest: nml resp effort, Clear BS bilat  Abdominal: ND, +BS, soft, Right lower quadrant tenderness, exacerbated with right straight leg raise with resistance.  no rebound or guarding   : no CVA tenderness bilat  Ext: Warm, well perfused, no edema  Neurological: A&O, symmetric facies, moves ext x4  Skin: Skin is warm and dry.   Psychiatric: Behavior is normal. Thought content normal.   Nursing note and vitals reviewed.    Emergency Department Course     Imaging:  Radiology findings were communicated with the patient who voiced understanding of the findings.    XR Chest 2 Views   IMPRESSION:   1. No active infiltrate. No significant change.  2. Lungs again appear to be hyperinflated suggesting emphysematous  change.   Report per radiology     CT Abdomen Pelvis w Contrast   IMPRESSION:   1. Mild bowel wall thickening in the rectum could be related to lack  of distention, although an area of proctitis could also have this  appearance. Rectal malignancy cannot be excluded. Clinical correlation  and follow-up are recommended to ensure resolution.  2. Nonvisualization of the appendix.  3. Small amount of nonspecific free fluid in the pelvis.   4. Mild focal opacity in the right lower lobe and right middle lobe  could be related to atelectasis or mild pneumonia. Clinical  correlation is again requested.  Report per radiology     Laboratory:  Laboratory findings were communicated with the patient who voiced understanding of the findings.  UA with Micro: clear, yellow, pH urine 7.5  Influenza: Negative for influenza A and B  CBC: AWNL (WBC 7.6, HGB 14.2, )  CMP: Creatinine 0.82, glucose 125 (H), albumin 3.1 (L)  Lipase: 152  Lactic Acid: 1.3    Interventions:  1547 NS Bolus 1,000mL IV  1547 Morphine 4 mg IV    Medications   0.9% sodium chloride BOLUS (0  mLs Intravenous Stopped 18 1730)     Followed by   0.9% sodium chloride infusion (not administered)   morphine (PF) injection 4 mg (4 mg Intravenous Given 18 1547)   cefpodoxime (VANTIN) tablet 200 mg (not administered)   doxycycline (VIBRAMYCIN) capsule 100 mg (not administered)   Saline Flush - CT (60 mLs Intravenous Given 18 1629)   iopamidol (ISOVUE-370) solution 63 mL (63 mLs Intravenous Given 18 1628)        Emergency Department Course:  Nursing notes and vitals reviewed.  I performed an exam of the patient as documented above.   The patient was sent for a XR Chest 2 Views and CT Abdomen Pelvis w Contrast while in the emergency department, results above.   IV was inserted and blood was drawn for laboratory testing, results above.  updated on the imaging and laboratory results.     Patient rechecked and updated.     I personally reviewed the laboratory and imaging results with the Patient and answered all related questions prior to discharge.  Findings and plan explained to the Patient. Patient discharged home with instructions regarding supportive care, medications, and reasons to return. The importance of close follow-up was reviewed. The patient was prescribed albuterol, vantin, vibramycin, ipratropium, dulera and tramadol.     Impression & Plan      Medical Decision Makin-year-old male presenting with cough, abdominal pain        Differential diagnosis includes abdominal wall muscle strain, intra-abdominal surgical etiology such as appendicitis, diverticulitis, constipation; hepatitis, pancreatitis, UTI, pneumonia, influenza, bronchitis, viral URI.  Labs ordered as noted above and significant for neg rapid influenza, nml CBC.  Given the patient's age and comorbidities a CT abdomen and pelvis was ordered for further evaluation of his abdominal pain although clinically appears this is most consistent with a muscle strain.  CT abdomen pelvis with results as noted above significant for  possible RLL pna, mild colonic wall thickening possibly secondary to distention, proctitis.  This does not seem consistent with the patient's current presentation.  He denies bloody or painful bowel movements.  Given the inability to rule out rectal carcinoma he is advised follow-up with his primary doctor for further evaluation and imaging options.  Patient has normal vital signs and appears clinically stable in the emergency department.  At this point feel to be reasonable to discharge him with a prescription for outpatient antibiotics for treatment of possible pneumonia.  Recommendations given regarding follow up with primary care doctor and return to the emergency department as needed for new or worsening symptoms.  Counseled on all results, disposition and diagnosis.  Pt understanding and agreeable to plan. Patient discharged in stable condition.      Diagnosis:      1. Pneumonia of right lung due to infectious organism, unspecified part of lung       Disposition:  Discharged to home     Discharge Medications:  New Prescriptions    ALBUTEROL (PROAIR HFA/PROVENTIL HFA/VENTOLIN HFA) 108 (90 BASE) MCG/ACT INHALER    Inhale 2 puffs into the lungs every 6 hours as needed for shortness of breath / dyspnea or wheezing    CEFPODOXIME (VANTIN) 200 MG TABLET    Take 1 tablet (200 mg) by mouth 2 times daily for 14 days    DOXYCYCLINE (VIBRAMYCIN) 100 MG CAPSULE    Take 1 capsule (100 mg) by mouth 2 times daily for 14 days    IPRATROPIUM - ALBUTEROL 0.5 MG/2.5 MG/3 ML (DUONEB) 0.5-2.5 (3) MG/3ML NEB SOLUTION    Take 1 vial (3 mLs) by nebulization every 4 hours as needed for shortness of breath / dyspnea    MOMETASONE-FORMOTEROL (DULERA) 200-5 MCG/ACT ORAL INHALER    Inhale 2 puffs into the lungs 2 times daily    TRAMADOL (ULTRAM) 50 MG TABLET    Take 1-2 tablets ( mg) by mouth every 6 hours as needed for pain maximum 10 tablet(s) per day       Scribe Disclosure:  Ron DE LA CRUZ, am serving as a scribe at 3:39  PM on 1/7/2018 to document services personally performed by Allen Weiss MD based on my observations and the provider's statements to me.     1/7/2018    EMERGENCY DEPARTMENT       Allen Weiss MD  01/08/18 1145       Allen Weiss MD  01/08/18 1147

## 2018-01-07 NOTE — ED AVS SNAPSHOT
Emergency Department    [address] [facility] [address]    Phone:  [phone]    Fax:  [phone]                                       Gerson Pearson   MRN: [id number]    Department:   Emergency Department   Date of Visit:  1/7/2018           Patient Information     Date Of Birth          1960        Your diagnoses for this visit were:     Pneumonia of right lung due to infectious organism, unspecified part of lung        You were seen by Allen Weiss MD.      Follow-up Information     Follow up with Alex Wynn PA-C In 2 days.    Specialty:  Physician Assistant    Contact information:    [facility]  [address] [facility] [address]  [phone]          Follow up with  Emergency Department.    Specialty:  EMERGENCY MEDICINE    Why:  As needed    Contact information:    [address] [facility] [address] [phone]        Discharge Instructions       Take the below medications as prescribed.  Please do not miss any doses of the antibiotics.    New Prescriptions    ALBUTEROL (PROAIR HFA/PROVENTIL HFA/VENTOLIN HFA) 108 (90 BASE) MCG/ACT INHALER    Inhale 2 puffs into the lungs every 6 hours as needed for shortness of breath / dyspnea or wheezing    CEFPODOXIME (VANTIN) 200 MG TABLET    Take 1 tablet (200 mg) by mouth 2 times daily for 14 days    DOXYCYCLINE (VIBRAMYCIN) 100 MG CAPSULE    Take 1 capsule (100 mg) by mouth 2 times daily for 14 days    IPRATROPIUM - ALBUTEROL 0.5 MG/2.5 MG/3 ML (DUONEB) 0.5-2.5 (3) MG/3ML NEB SOLUTION    Take 1 vial (3 mLs) by nebulization every 4 hours as needed for shortness of breath / dyspnea    MOMETASONE-FORMOTEROL (DULERA) 200-5 MCG/ACT ORAL INHALER    Inhale 2 puffs into the lungs 2 times daily    TRAMADOL (ULTRAM) 50 MG TABLET    Take 1-2 tablets ( mg) by mouth every 6 hours as needed for pain maximum 10 tablet(s) per day     1. -Take acetaminophen 500 to 1000 mg by mouth every 4 to 6 hours  "as needed for pain or fever.  Do not take more than 4000 mg in 24 hours.  Do not take within 6 hours of another acetaminophen containing medication such as norco (vicodin) or percocet.  - Take ibuprofen 600 to 800 mg by mouth every 6 to 8 hours as needed for pain or fever  2. Drink plenty of fluids.  3. Please stop smoking.    Please discuss the below CT results with your primary doctor.  I DO NOT THINK YOU HAVE CANCER, but further testing such as a repeat CT or colonoscopy should be discussed with Dr. Wynn.    \"IMPRESSION:   1. Mild bowel wall thickening in the rectum could be related to lack  of distention, although an area of proctitis could also have this  appearance. Rectal malignancy cannot entirely be excluded. Clinical  correlation and follow-up are recommended to ensure resolution.  2. Nonvisualization of the appendix.  3. Small amount of nonspecific free fluid in the pelvis.   4. Mild focal opacity in the right lower lobe and right middle lobe  could be related to atelectasis or mild pneumonia. Clinical  correlation is again requested.\"    Discharge Instructions  Bronchitis, Pneumonia, Bronchospasm    You were seen today for a chest infection or inflammation. If your provider decided this was due to a bacterial infection, you may need an antibiotic. Sometimes these are caused by a virus, and then an antibiotic will not help.     Generally, every Emergency Department visit should have a follow-up clinic visit with either a primary or a specialty clinic/provider. Please follow-up as instructed by your emergency provider today.    Return to the Emergency Department if:    Your breathing gets much worse.    You are very weak, or feel much more ill.    You develop new symptoms, such as chest pain.    You cough up blood.    You are vomiting (throwing up) enough that you cannot keep fluids or your medicine down.    What can I do to help myself?    Fill any prescriptions the provider gave you and take them right " away--especially antibiotics. Be sure to finish the whole antibiotic prescription.    You may be given a prescription for an inhaler, which can help loosen tight air passages.  Use this as needed, but not more often than directed. Inhalers work much better when used with a spacer.     You may be given a prescription for a steroid to reduce inflammation. Used long-term, these can have side effects, but for short-term use they are safe. You may notice restlessness or increased appetite.        You may use non-prescription cough or cold medicines. Cough medicines may help, but don t make the cough go away completely.     Avoid smoke, because this can make your symptoms worse. If you smoke, this may be a good time to quit! Consider using nicotine lozenges, gum, or patches to reduce cravings.     If you have a fever, Tylenol  (acetaminophen), Motrin  (ibuprofen), or Advil  (ibuprofen) may help bring fever down and may help you feel more comfortable. Be sure to read and follow the package directions, and ask your provider if you have questions.    Be sure to get your flu shot each year.  For certain ages, the pneumonia shot can help prevent pneumonia.  If you were given a prescription for medicine here today, be sure to read all of the information (including the package insert) that comes with your prescription.  This will include important information about the medicine, its side effects, and any warnings that you need to know about.  The pharmacist who fills the prescription can provide more information and answer questions you may have about the medicine.  If you have questions or concerns that the pharmacist cannot address, please call or return to the Emergency Department.     Remember that you can always come back to the Emergency Department if you are not able to see your regular provider in the amount of time listed above, if you get any new symptoms, or if there is anything that worries you.      Discharge  References/Attachments     ADULT, PNEUMONIA (ENGLISH)    SMOKING CESSATION (ENGLISH)      24 Hour Appointment Hotline       To make an appointment at any [facility], call [phone] ([phone]). If you don't have a family doctor or clinic, we will help you find one. [facility] are conveniently located to serve the needs of you and your family.             Review of your medicines      START taking        Dose / Directions Last dose taken    cefpodoxime 200 MG tablet   Commonly known as:  VANTIN   Dose:  200 mg   Quantity:  28 tablet        Take 1 tablet (200 mg) by mouth 2 times daily for 14 days   Refills:  0        doxycycline 100 MG capsule   Commonly known as:  VIBRAMYCIN   Dose:  100 mg   Quantity:  28 capsule        Take 1 capsule (100 mg) by mouth 2 times daily for 14 days   Refills:  0          CONTINUE these medicines which may have CHANGED, or have new prescriptions. If we are uncertain of the size of tablets/capsules you have at home, strength may be listed as something that might have changed.        Dose / Directions Last dose taken    * albuterol 108 (90 BASE) MCG/ACT Inhaler   Commonly known as:  PROAIR HFA/PROVENTIL HFA/VENTOLIN HFA   Dose:  2 puff   What changed:  Another medication with the same name was added. Make sure you understand how and when to take each.   Quantity:  1 Inhaler        Inhale 2 puffs into the lungs every 4 hours as needed for shortness of breath / dyspnea or wheezing   Refills:  1        * albuterol 108 (90 BASE) MCG/ACT Inhaler   Commonly known as:  PROAIR HFA/PROVENTIL HFA/VENTOLIN HFA   Dose:  2 puff   What changed:  You were already taking a medication with the same name, and this prescription was added. Make sure you understand how and when to take each.   Quantity:  1 Inhaler        Inhale 2 puffs into the lungs every 6 hours as needed for shortness of breath / dyspnea or wheezing   Refills:  0        * ipratropium - albuterol 0.5 mg/2.5 mg/3 mL  0.5-2.5 (3) MG/3ML neb solution   Commonly known as:  DUONEB   Dose:  1 vial   What changed:  Another medication with the same name was added. Make sure you understand how and when to take each.   Quantity:  1 Box        Take 1 vial (3 mLs) by nebulization every 6 hours as needed for shortness of breath / dyspnea or wheezing   Refills:  1        * ipratropium - albuterol 0.5 mg/2.5 mg/3 mL 0.5-2.5 (3) MG/3ML neb solution   Commonly known as:  DUONEB   Dose:  1 vial   What changed:  You were already taking a medication with the same name, and this prescription was added. Make sure you understand how and when to take each.   Quantity:  1 Box        Take 1 vial (3 mLs) by nebulization every 4 hours as needed for shortness of breath / dyspnea   Refills:  0        * mometasone-formoterol 200-5 MCG/ACT oral inhaler   Commonly known as:  DULERA   Dose:  2 puff   What changed:  Another medication with the same name was added. Make sure you understand how and when to take each.   Quantity:  1 Inhaler        Inhale 2 puffs into the lungs 2 times daily   Refills:  1        * mometasone-formoterol 200-5 MCG/ACT oral inhaler   Commonly known as:  DULERA   Dose:  2 puff   What changed:  You were already taking a medication with the same name, and this prescription was added. Make sure you understand how and when to take each.   Quantity:  13 g        Inhale 2 puffs into the lungs 2 times daily   Refills:  0        * traMADol 50 MG tablet   Commonly known as:  ULTRAM   Dose:  50 mg   What changed:  Another medication with the same name was added. Make sure you understand how and when to take each.   Quantity:  12 tablet        Take 1 tablet (50 mg) by mouth every 6 hours as needed for moderate pain   Refills:  0        * traMADol 50 MG tablet   Commonly known as:  ULTRAM   Dose:   mg   What changed:  You were already taking a medication with the same name, and this prescription was added. Make sure you understand how and  when to take each.   Quantity:  20 tablet        Take 1-2 tablets ( mg) by mouth every 6 hours as needed for pain maximum 10 tablet(s) per day   Refills:  0        * Notice:  This list has 8 medication(s) that are the same as other medications prescribed for you. Read the directions carefully, and ask your doctor or other care provider to review them with you.      Our records show that you are taking the medicines listed below. If these are incorrect, please call your family doctor or clinic.        Dose / Directions Last dose taken    CITALOPRAM HYDROBROMIDE PO   Dose:  20 mg        Take 20 mg by mouth daily   Refills:  0        multivitamin, therapeutic Tabs tablet   Dose:  1 tablet        Take 1 tablet by mouth daily   Refills:  0        tiotropium 18 MCG capsule   Commonly known as:  SPIRIVA   Dose:  18 mcg   Quantity:  30 capsule        Inhale 1 capsule (18 mcg) into the lungs daily   Refills:  1                Prescriptions were sent or printed at these locations (6 Prescriptions)                   Other Prescriptions                Printed at Department/Unit printer (6 of 6)         cefpodoxime (VANTIN) 200 MG tablet               doxycycline (VIBRAMYCIN) 100 MG capsule               traMADol (ULTRAM) 50 MG tablet               albuterol (PROAIR HFA/PROVENTIL HFA/VENTOLIN HFA) 108 (90 BASE) MCG/ACT Inhaler               mometasone-formoterol (DULERA) 200-5 MCG/ACT oral inhaler               ipratropium - albuterol 0.5 mg/2.5 mg/3 mL (DUONEB) 0.5-2.5 (3) MG/3ML neb solution                Procedures and tests performed during your visit     *UA reflex to Microscopic    CBC with platelets differential    CT Abdomen Pelvis w Contrast    Comprehensive metabolic panel    Give 20 ounces of water 15 minutes before CT of abdomen    Influenza A/B antigen    Lactic acid whole blood    Lipase    XR Chest 2 Views      Orders Needing Specimen Collection     None      Pending Results     No orders found from  1/5/2018 to 1/8/2018.            Pending Culture Results     No orders found from 1/5/2018 to 1/8/2018.            Pending Results Instructions     If you had any lab results that were not finalized at the time of your Discharge, you can call the ED Lab Result RN at [phone]. You will be contacted by this team for any positive Lab results or changes in treatment. The nurses are available 7 days a week from 10A to 6:30P.  You can leave a message 24 hours per day and they will return your call.        Test Results From Your Hospital Stay        1/7/2018  3:53 PM      Component Results     Component Value Ref Range & Units Status    WBC 7.6 4.0 - 11.0 10e9/L Final    RBC Count 4.69 4.4 - 5.9 10e12/L Final    Hemoglobin 14.2 13.3 - 17.7 g/dL Final    Hematocrit 41.9 40.0 - 53.0 % Final    MCV 89 78 - 100 fl Final    MCH 30.3 26.5 - 33.0 pg Final    MCHC 33.9 31.5 - 36.5 g/dL Final    RDW 12.9 10.0 - 15.0 % Final    Platelet Count 253 150 - 450 10e9/L Final    Diff Method Automated Method  Final    % Neutrophils 71.4 % Final    % Lymphocytes 13.3 % Final    % Monocytes 13.2 % Final    % Eosinophils 1.5 % Final    % Basophils 0.5 % Final    % Immature Granulocytes 0.1 % Final    Absolute Neutrophil 5.4 1.6 - 8.3 10e9/L Final    Absolute Lymphocytes 1.0 0.8 - 5.3 10e9/L Final    Absolute Monocytes 1.0 0.0 - 1.3 10e9/L Final    Absolute Eosinophils 0.1 0.0 - 0.7 10e9/L Final    Absolute Basophils 0.0 0.0 - 0.2 10e9/L Final    Abs Immature Granulocytes 0.0 0 - 0.4 10e9/L Final         1/7/2018  4:16 PM      Component Results     Component Value Ref Range & Units Status    Sodium 136 133 - 144 mmol/L Final    Potassium 4.0 3.4 - 5.3 mmol/L Final    Chloride 103 94 - 109 mmol/L Final    Carbon Dioxide 24 20 - 32 mmol/L Final    Anion Gap 9 3 - 14 mmol/L Final    Glucose 125 (H) 70 - 99 mg/dL Final    Urea Nitrogen 9 7 - 30 mg/dL Final    Creatinine 0.82 0.66 - 1.25 mg/dL Final    GFR Estimate >90 >60 mL/min/1.7m2 Final     Non  GFR Calc    GFR Estimate If Black >90 >60 mL/min/1.7m2 Final    African American GFR Calc    Calcium 8.8 8.5 - 10.1 mg/dL Final    Bilirubin Total 0.6 0.2 - 1.3 mg/dL Final    Albumin 3.1 (L) 3.4 - 5.0 g/dL Final    Protein Total 7.3 6.8 - 8.8 g/dL Final    Alkaline Phosphatase 129 40 - 150 U/L Final    ALT 21 0 - 70 U/L Final    AST 24 0 - 45 U/L Final         1/7/2018  4:14 PM      Component Results     Component Value Ref Range & Units Status    Lipase 152 73 - 393 U/L Final         1/7/2018  3:52 PM      Component Results     Component Value Ref Range & Units Status    Lactic Acid 1.3 0.7 - 2.0 mmol/L Final         1/7/2018  4:00 PM      Narrative     XR CHEST 2 VW   1/7/2018 3:57 PM     HISTORY: prod cough, eval pna;     COMPARISON: Film dated 6/14/2017    FINDINGS: The heart is negative.  The lungs again appear to be  hyperinflated. No focal infiltrate. The pulmonary vasculature is  normal.  The bones and soft tissues are unremarkable.        Impression     IMPRESSION:   1. No active infiltrate. No significant change.  2. Lungs again appear to be hyperinflated suggesting emphysematous  change.        JOYCE TAY MD         1/7/2018  5:59 PM      Narrative     CT ABDOMEN PELVIS WITH CONTRAST   1/7/2018 4:31 PM     HISTORY: Right lower quadrant pain.    TECHNIQUE: 63 mL Isovue-370 IV were administered. After contrast  administration, volumetric helical sections were acquired from the  lung bases to the ischial tuberosities. Coronal images were also  reconstructed. Radiation dose for this scan was reduced using  automated exposure control, adjustment of the mA and/or kV according  to patient size, or iterative reconstruction technique.    COMPARISON: Right upper quadrant ultrasound performed 6/18/2017.     FINDINGS: No bowel obstruction. There is focal bowel wall thickening  in the rectum (series 3 image 48; series 2 image 60) which could be  related to lack of distention, although a proctitis  could also have  this appearance. The appendix is not visualized. Visualization of the  intra-abdominal structures is limited related to paucity of  intra-abdominal fat. There is a small amount of nonspecific free fluid  in the pelvis.     Moderate atherosclerotic aortoiliac calcification. Coronary artery  calcification. The liver, gallbladder, spleen, adrenal glands,  pancreas, and kidneys are unremarkable. No hydronephrosis. No free  intraperitoneal air. Mild focal opacity at the right lung base  posteriorly and medially and within the right middle lobe anteriorly  and medially could represent atelectasis or mild pneumonia.  Degenerative changes are noted at the lumbosacral interspace.        Impression     IMPRESSION:   1. Mild bowel wall thickening in the rectum could be related to lack  of distention, although an area of proctitis could also have this  appearance. Rectal malignancy cannot be excluded. Clinical correlation  and follow-up are recommended to ensure resolution.  2. Nonvisualization of the appendix.  3. Small amount of nonspecific free fluid in the pelvis.   4. Mild focal opacity in the right lower lobe and right middle lobe  could be related to atelectasis or mild pneumonia. Clinical  correlation is again requested.    GERMAN RAMIREZ MD         1/7/2018  4:14 PM      Component Results     Component Value Ref Range & Units Status    Influenza A/B Agn Specimen Nasal  Final    Influenza A Negative NEG^Negative Final    Influenza B Negative NEG^Negative Final    Test results must be correlated with clinical data. If necessary, results   should be confirmed by a molecular assay or viral culture.           1/7/2018  4:52 PM      Component Results     Component Value Ref Range & Units Status    Color Urine Yellow  Final    Appearance Urine Clear  Final    Glucose Urine Negative NEG^Negative mg/dL Final    Bilirubin Urine Negative NEG^Negative Final    Ketones Urine Negative NEG^Negative mg/dL Final     Specific Gravity Urine 1.015 1.003 - 1.035 Final    Blood Urine Negative NEG^Negative Final    pH Urine 7.5 (H) 5.0 - 7.0 pH Final    Protein Albumin Urine Negative NEG^Negative mg/dL Final    Urobilinogen Urine 1.0 0.2 - 1.0 EU/dL Final    Nitrite Urine Negative NEG^Negative Final    Leukocyte Esterase Urine Negative NEG^Negative Final    Source Midstream Urine  Final                Clinical Quality Measure: Blood Pressure Screening     Your blood pressure was checked while you were in the emergency department today. The last reading we obtained was  BP: (!) 141/92 . Please read the guidelines below about what these numbers mean and what you should do about them.  If your systolic blood pressure (the top number) is less than 120 and your diastolic blood pressure (the bottom number) is less than 80, then your blood pressure is normal. There is nothing more that you need to do about it.  If your systolic blood pressure (the top number) is 120-139 or your diastolic blood pressure (the bottom number) is 80-89, your blood pressure may be higher than it should be. You should have your blood pressure rechecked within a year by a primary care provider.  If your systolic blood pressure (the top number) is 140 or greater or your diastolic blood pressure (the bottom number) is 90 or greater, you may have high blood pressure. High blood pressure is treatable, but if left untreated over time it can put you at risk for heart attack, stroke, or kidney failure. You should have your blood pressure rechecked by a primary care provider within the next 4 weeks.  If your provider in the emergency department today gave you specific instructions to follow-up with your doctor or provider even sooner than that, you should follow that instruction and not wait for up to 4 weeks for your follow-up visit.        Thank you for choosing [facility]       Thank you for choosing [facility] for your care. Our goal is always to provide you with excellent  "care. Hearing back from our patients is one way we can continue to improve our services. Please take a few minutes to complete the written survey that you may receive in the mail after you visit with us. Thank you!        [employer] Information     [employer] lets you send messages to your doctor, view your test results, renew your prescriptions, schedule appointments and more. To sign up, go to [email or web address] . Click on \"Log in\" on the left side of the screen, which will take you to the Welcome page. Then click on \"Sign up Now\" on the right side of the page.     You will be asked to enter the access code listed below, as well as some personal information. Please follow the directions to create your username and password.     Your access code is: [email or web address]  Expires: 2018  7:46 PM     Your access code will  in 90 days. If you need help or a new code, please call your [facility] clinic or [phone].        Care EveryWhere ID     This is your Care EveryWhere ID. This could be used by other organizations to access your [facility] medical records  [id number]        Equal Access to Services     [facility] JESI : Hadarnold Cannon, elodia mosher, ry ba, helene ovalle . So [facility] [phone].    ATENCIÓN: Si habla español, tiene a bains disposición servicios gratuitos de asistencia lingüística. Llame al [phone].    We comply with applicable federal civil rights laws and [place] laws. We do not discriminate on the basis of race, color, national origin, age, disability, sex, sexual orientation, or gender identity.            After Visit Summary       This is your record. Keep this with you and show to your community pharmacist(s) and doctor(s) at your next visit.                  "

## 2018-01-07 NOTE — ED AVS SNAPSHOT
Emergency Department    [address] [place] [address]    Phone:  [phone]    Fax:  [phone]                                       Gerson Pearson   MRN: [id number]    Department:   Emergency Department   Date of Visit:  1/7/2018           After Visit Summary Signature Page     I have received my discharge instructions, and my questions have been answered. I have discussed any challenges I see with this plan with the nurse or doctor.    ..........................................................................................................................................  Patient/Patient Representative Signature      ..........................................................................................................................................  Patient Representative Print Name and Relationship to Patient    ..................................................               ................................................  Date                                            Time    ..........................................................................................................................................  Reviewed by Signature/Title    ...................................................              ..............................................  Date                                                            Time

## 2018-08-13 ENCOUNTER — NURSE TRIAGE (OUTPATIENT)
Dept: NURSING | Facility: CLINIC | Age: 58
End: 2018-08-13

## 2018-08-13 NOTE — TELEPHONE ENCOUNTER
Reason for Call: requested and given [facility] in [place], [place]   Address: [address] , [place] [address]  Hours: Open ? Closes 8PM =Phone: [phone] which is his PCP clinic . No further help requested .  .Juliaen Brush RN [facility] nurse advisors.

## 2020-09-21 ENCOUNTER — HOSPITAL ENCOUNTER (EMERGENCY)
Facility: CLINIC | Age: 60
Discharge: HOME | End: 2020-09-21
Attending: EMERGENCY MEDICINE | Admitting: EMERGENCY MEDICINE
Payer: MEDICARE

## 2020-09-21 ENCOUNTER — APPOINTMENT (OUTPATIENT)
Dept: GENERAL RADIOLOGY | Facility: CLINIC | Age: 60
End: 2020-09-21
Attending: EMERGENCY MEDICINE
Payer: MEDICARE

## 2020-09-21 VITALS
HEIGHT: 69 IN | DIASTOLIC BLOOD PRESSURE: 85 MMHG | HEART RATE: 66 BPM | SYSTOLIC BLOOD PRESSURE: 139 MMHG | TEMPERATURE: 97.9 F | BODY MASS INDEX: 19.26 KG/M2 | WEIGHT: 130 LBS | RESPIRATION RATE: 16 BRPM | OXYGEN SATURATION: 99 %

## 2020-09-21 DIAGNOSIS — R06.02 SHORTNESS OF BREATH: ICD-10-CM

## 2020-09-21 LAB — INTERPRETATION ECG - MUSE: NORMAL

## 2020-09-21 PROCEDURE — 94640 AIRWAY INHALATION TREATMENT: CPT

## 2020-09-21 PROCEDURE — 25000132 ZZH RX MED GY IP 250 OP 250 PS 637: Mod: GY | Performed by: EMERGENCY MEDICINE

## 2020-09-21 PROCEDURE — 99284 EMERGENCY DEPT VISIT MOD MDM: CPT | Mod: 25

## 2020-09-21 PROCEDURE — 93005 ELECTROCARDIOGRAM TRACING: CPT

## 2020-09-21 PROCEDURE — 25000131 ZZH RX MED GY IP 250 OP 636 PS 637: Mod: GY | Performed by: EMERGENCY MEDICINE

## 2020-09-21 PROCEDURE — 71045 X-RAY EXAM CHEST 1 VIEW: CPT

## 2020-09-21 RX ORDER — ALBUTEROL SULFATE 90 UG/1
2 AEROSOL, METERED RESPIRATORY (INHALATION) ONCE
Status: COMPLETED | OUTPATIENT
Start: 2020-09-21 | End: 2020-09-21

## 2020-09-21 RX ORDER — LISINOPRIL 10 MG/1
TABLET ORAL
Status: ON HOLD | COMMUNITY
Start: 2020-07-27 | End: 2021-10-11

## 2020-09-21 RX ORDER — PREDNISONE 20 MG/1
40 TABLET ORAL ONCE
Status: COMPLETED | OUTPATIENT
Start: 2020-09-21 | End: 2020-09-21

## 2020-09-21 RX ORDER — PRASUGREL 10 MG/1
5 TABLET, FILM COATED ORAL
Status: ON HOLD | COMMUNITY
Start: 2020-07-21 | End: 2021-10-11

## 2020-09-21 RX ORDER — PREDNISONE 20 MG/1
40 TABLET ORAL DAILY
Qty: 8 TABLET | Refills: 0 | Status: SHIPPED | OUTPATIENT
Start: 2020-09-22 | End: 2020-09-26

## 2020-09-21 RX ADMIN — ALBUTEROL SULFATE 2 PUFF: 108 AEROSOL, METERED RESPIRATORY (INHALATION) at 14:36

## 2020-09-21 RX ADMIN — PREDNISONE 40 MG: 20 TABLET ORAL at 15:56

## 2020-09-21 ASSESSMENT — ENCOUNTER SYMPTOMS
COUGH: 0
SHORTNESS OF BREATH: 1
FEVER: 0
NAUSEA: 0
VOMITING: 0
BACK PAIN: 0
ABDOMINAL PAIN: 0

## 2020-09-21 ASSESSMENT — MIFFLIN-ST. JEOR: SCORE: 1395.06

## 2020-09-21 NOTE — ED TRIAGE NOTES
Pt BIBA w/ c/o SOB. Pt was seen two days ago for same. Pt w/ hx of COPD. Per EMS report VSS. Pt reports intermittent SOB and had one episode in EMS. Pt reports pain in rt back. Pt states woke up with the pain after sleeping on the floor

## 2020-09-21 NOTE — ED PROVIDER NOTES
History   Chief Complaint:  Shortness of Breath    HPI   Gerson Pearson is a 60 year old male,with a history of COPD, myocardial infarction, chronic back pain, who presents to the ED for evaluation of shortness of breath. The patient reports he has felt some intermittent shortness of breath beginning last night. He states he has a hard time catching his breath. Had slight pain on right side of back that is resolved and this was short lived. He denies any cough, fever, or leg swelling. He conveys this feels like his previous bout of COPD exacerbation. The patient says he was seen about a week and a half ago for an exacerbation at [facility]. The patient had inhalers to use, but has since run out of all his medications. He has not been around anyone exposed to COVID to his knowledge. He reports being tested for covid. He denies any sore throat, nausea, vomiting, abdominal pain, back pain, or any other acute symptoms.     Allergies:  Ibuprofen    Medications:    Albuterol  Lisinopril  Breo Ellipta  Effient  Citalopram  DuoNeb  Dulera  Spiria  Ultram    Past Medical History:    Chronic back pain  COPD  Depressive disorder  Myocardial infarction  Vitamin D deficiency    Past Surgical History:    History reviewed. No pertinent surgical history.    Family History:    Diabetes    Social History:  Smoking status: Yes  Alcohol use: No  Drug use: Yes-Opiates, Cocaine, Heroin  PCP: Alex Wynn  Marital Status:  Legally  [3]    Review of Systems   Constitutional: Negative for fever.   Respiratory: Positive for shortness of breath. Negative for cough.    Cardiovascular: Negative for chest pain and leg swelling.   Gastrointestinal: Negative for abdominal pain, nausea and vomiting.   Musculoskeletal: Negative for back pain.   All other systems reviewed and are negative.    Physical Exam     Patient Vitals for the past 24 hrs:   BP Temp Temp src Pulse Resp SpO2 Height Weight   09/21/20 1600 -- -- -- 66 -- 99 % --  "--   09/21/20 1555 139/85 -- -- -- -- -- -- --   09/21/20 1312 116/68 97.9  F (36.6  C) Oral 62 16 96 % 1.753 m (5' 9\") 59 kg (130 lb)       Physical Exam  General: Sitting up in bed  Eyes:  The pupils are equal and round    Conjunctivae and sclerae are normal  ENT:    Wearing a mask  Neck:  Normal range of motion  CV:  Regular rate, regular rhythm     Skin warm and well perfused   Resp:  Non labored breathing on room air    No tachypnea    No cough heard    Lungs clear bilaterally  GI:  Abdomen is soft, there is no rigidity    No distension    No rebound tenderness     No abdominal tenderness  MS:  No leg swelling  Skin:  No rash or acute skin lesions noted  Neuro:   Awake, alert.      Speech is normal and fluent.    Face is symmetric.     Moves all extremities equally  Psych: Normal affect.  Appropriate interactions.    Emergency Department Course   ECG (14:17:44):  Rate 65 bpm. UT interval 104. QRS duration 78. QT/QTc 396/411. P-R-T axes 83 83 73. Sinus rhythm with short UT . Otherwise normal ECG. No significant change compared to EKG on 11/1/17. Interpreted by Bessie Saleh MD.    Imaging:  Radiology findings were communicated with the patient who voiced understanding of the findings.    XR Chest, portable, 1 view:  Negative exam.     Imaging independently reviewed and agree with radiologist interpretation.      Interventions:  1436: Albuterol 108 2 puff Inhalation  1556: Prednisolone 40 mg PO     Emergency Department Course:  Past medical records, nursing notes, and vitals reviewed.    1336 I performed an exam of the patient as documented above.     EKG obtained in the ED, see results above.   IV was inserted and blood was drawn for laboratory testing, results above.  The patient was sent for a chest x-ray while in the emergency department, results above.     1530 I rechecked the patient and discussed the results of his workup thus far.     Findings and plan explained to the Patient. Patient " discharged home with instructions regarding supportive care, medications, and reasons to return. The importance of close follow-up was reviewed.    I personally reviewed the laboratory and imaging results with the Patient and answered all related questions prior to discharge.     Impression & Plan   Medical Decision Making:  Gerson Pearson is a 60 year old male with a history of COPD who presents for evaluation of shortness of breath.   He feels that symptoms are from COPD. A broad differential was considered including foreign body, PE, ACS, COVID, viral induced reactive airway disease, pneumothorax, cardiac equivalent, allergic phenomena, pneumonia, bronchitis, etc.  He is out of his medications.  Gave him albuterol inhaler in ED that he will take home. His breo and spiriva are $4 copays at [facility] that pharmacist called about and he has prescriptions already available at [facility] in [place]. Declines blood work.   No signs of pneumonia by CXR. Looks well in ED with no increase in work of breathing. Discussed that he needs to fill his prescriptions at [facility].     Diagnosis:    ICD-10-CM    1. Shortness of breath  R06.02        Disposition:  Discharged to home.    Discharge Medications:  Discharge Medication List as of 9/21/2020  4:05 PM      START taking these medications    Details   predniSONE (DELTASONE) 20 MG tablet Take 2 tablets (40 mg) by mouth daily for 4 days, Disp-8 tablet,R-0, E-Prescribe             Scribe Disclosure:  I, Kennedy Bo, am serving as a scribe at 1:36 PM on 9/21/2020 to document services personally performed by Bessie Saleh MD based on my observations and the provider's statements to me.        Bessie Saleh MD  09/21/20 4412

## 2020-09-21 NOTE — ED NOTES
Bed: ED03  Expected date: 9/21/20  Expected time: 1:05 PM  Means of arrival: Ambulance  Comments:  417 60m SOB  ETA 1314

## 2020-09-21 NOTE — ED NOTES
I was informed that this patient is stating he doesn't have enough money for his medications.  This patient has medicare and medicaid.  I asked registration to confirm his medicaid and is it active. I worked with the ER clinical pharmacist and this patient hasn't been filling his meds and has some ready for  at [facility].  This patient pays $3.90/inhaler--which is his medicaid co-pay.  This patient was informed of this.

## 2020-09-21 NOTE — ED AVS SNAPSHOT
Emergency Department  [address] [facility] [address]  Phone:  [phone]  Fax:  [phone]                                    Gerson Pearson   MRN: [id number]    Department:   Emergency Department   Date of Visit:  9/21/2020           After Visit Summary Signature Page    I have received my discharge instructions, and my questions have been answered. I have discussed any challenges I see with this plan with the nurse or doctor.    ..........................................................................................................................................  Patient/Patient Representative Signature      ..........................................................................................................................................  Patient Representative Print Name and Relationship to Patient    ..................................................               ................................................  Date                                   Time    ..........................................................................................................................................  Reviewed by Signature/Title    ...................................................              ..............................................  Date                                               Time          22EPIC Rev 08/18

## 2020-12-16 ENCOUNTER — HOSPITAL ENCOUNTER (EMERGENCY)
Facility: CLINIC | Age: 60
Discharge: HOME | End: 2020-12-16
Attending: NURSE PRACTITIONER | Admitting: NURSE PRACTITIONER
Payer: MEDICARE

## 2020-12-16 ENCOUNTER — APPOINTMENT (OUTPATIENT)
Dept: GENERAL RADIOLOGY | Facility: CLINIC | Age: 60
End: 2020-12-16
Attending: NURSE PRACTITIONER
Payer: MEDICARE

## 2020-12-16 VITALS
SYSTOLIC BLOOD PRESSURE: 144 MMHG | RESPIRATION RATE: 16 BRPM | HEIGHT: 69 IN | WEIGHT: 130 LBS | DIASTOLIC BLOOD PRESSURE: 84 MMHG | TEMPERATURE: 98.4 F | HEART RATE: 63 BPM | OXYGEN SATURATION: 99 % | BODY MASS INDEX: 19.26 KG/M2

## 2020-12-16 DIAGNOSIS — D64.9 ANEMIA: ICD-10-CM

## 2020-12-16 DIAGNOSIS — J44.1 COPD EXACERBATION (H): ICD-10-CM

## 2020-12-16 LAB
ANION GAP SERPL CALCULATED.3IONS-SCNC: 1 MMOL/L (ref 3–14)
BASOPHILS # BLD AUTO: 0.1 10E9/L (ref 0–0.2)
BASOPHILS NFR BLD AUTO: 0.7 %
BUN SERPL-MCNC: 12 MG/DL (ref 7–30)
CALCIUM SERPL-MCNC: 8.1 MG/DL (ref 8.5–10.1)
CHLORIDE SERPL-SCNC: 105 MMOL/L (ref 94–109)
CO2 SERPL-SCNC: 30 MMOL/L (ref 20–32)
CREAT SERPL-MCNC: 0.79 MG/DL (ref 0.66–1.25)
DIFFERENTIAL METHOD BLD: ABNORMAL
EOSINOPHIL # BLD AUTO: 0.2 10E9/L (ref 0–0.7)
EOSINOPHIL NFR BLD AUTO: 3.1 %
ERYTHROCYTE [DISTWIDTH] IN BLOOD BY AUTOMATED COUNT: 15.6 % (ref 10–15)
GFR SERPL CREATININE-BSD FRML MDRD: >90 ML/MIN/{1.73_M2}
GLUCOSE SERPL-MCNC: 79 MG/DL (ref 70–99)
HCT VFR BLD AUTO: 34.3 % (ref 40–53)
HGB BLD-MCNC: 10.8 G/DL (ref 13.3–17.7)
IMM GRANULOCYTES # BLD: 0 10E9/L (ref 0–0.4)
IMM GRANULOCYTES NFR BLD: 0.3 %
INTERPRETATION ECG - MUSE: NORMAL
LYMPHOCYTES # BLD AUTO: 1.2 10E9/L (ref 0.8–5.3)
LYMPHOCYTES NFR BLD AUTO: 16.7 %
MCH RBC QN AUTO: 29 PG (ref 26.5–33)
MCHC RBC AUTO-ENTMCNC: 31.5 G/DL (ref 31.5–36.5)
MCV RBC AUTO: 92 FL (ref 78–100)
MONOCYTES # BLD AUTO: 0.6 10E9/L (ref 0–1.3)
MONOCYTES NFR BLD AUTO: 8.7 %
NEUTROPHILS # BLD AUTO: 5 10E9/L (ref 1.6–8.3)
NEUTROPHILS NFR BLD AUTO: 70.5 %
NRBC # BLD AUTO: 0 10*3/UL
NRBC BLD AUTO-RTO: 0 /100
PLATELET # BLD AUTO: 182 10E9/L (ref 150–450)
POTASSIUM SERPL-SCNC: 4.4 MMOL/L (ref 3.4–5.3)
RBC # BLD AUTO: 3.72 10E12/L (ref 4.4–5.9)
SODIUM SERPL-SCNC: 136 MMOL/L (ref 133–144)
TROPONIN I SERPL-MCNC: <0.015 UG/L (ref 0–0.04)
WBC # BLD AUTO: 7.1 10E9/L (ref 4–11)

## 2020-12-16 PROCEDURE — 71045 X-RAY EXAM CHEST 1 VIEW: CPT

## 2020-12-16 PROCEDURE — 94640 AIRWAY INHALATION TREATMENT: CPT

## 2020-12-16 PROCEDURE — 99285 EMERGENCY DEPT VISIT HI MDM: CPT | Mod: 25

## 2020-12-16 PROCEDURE — 93005 ELECTROCARDIOGRAM TRACING: CPT

## 2020-12-16 PROCEDURE — 84484 ASSAY OF TROPONIN QUANT: CPT | Performed by: NURSE PRACTITIONER

## 2020-12-16 PROCEDURE — 250N000012 HC RX MED GY IP 250 OP 636 PS 637: Performed by: NURSE PRACTITIONER

## 2020-12-16 PROCEDURE — 85025 COMPLETE CBC W/AUTO DIFF WBC: CPT | Performed by: NURSE PRACTITIONER

## 2020-12-16 PROCEDURE — 80048 BASIC METABOLIC PNL TOTAL CA: CPT | Performed by: NURSE PRACTITIONER

## 2020-12-16 PROCEDURE — 250N000013 HC RX MED GY IP 250 OP 250 PS 637: Performed by: NURSE PRACTITIONER

## 2020-12-16 RX ORDER — ALBUTEROL SULFATE 90 UG/1
2 AEROSOL, METERED RESPIRATORY (INHALATION) ONCE
Status: COMPLETED | OUTPATIENT
Start: 2020-12-16 | End: 2020-12-16

## 2020-12-16 RX ORDER — PREDNISONE 20 MG/1
TABLET ORAL
Qty: 8 TABLET | Refills: 0 | Status: SHIPPED | OUTPATIENT
Start: 2020-12-16 | End: 2021-01-06

## 2020-12-16 RX ORDER — PREDNISONE 20 MG/1
40 TABLET ORAL ONCE
Status: COMPLETED | OUTPATIENT
Start: 2020-12-16 | End: 2020-12-16

## 2020-12-16 RX ADMIN — PREDNISONE 40 MG: 20 TABLET ORAL at 12:04

## 2020-12-16 RX ADMIN — ALBUTEROL SULFATE 2 PUFF: 90 AEROSOL, METERED RESPIRATORY (INHALATION) at 12:05

## 2020-12-16 ASSESSMENT — ENCOUNTER SYMPTOMS
COUGH: 0
SHORTNESS OF BREATH: 1
FEVER: 0

## 2020-12-16 ASSESSMENT — MIFFLIN-ST. JEOR: SCORE: 1395.06

## 2020-12-16 NOTE — ED TRIAGE NOTES
Pt presents to the ED from home after developing SOB while taking the garbage outside. Hx of COPD. Breathing improved after duoneb en route from EMS.

## 2020-12-16 NOTE — ED PROVIDER NOTES
"  History   Chief Complaint:  Shortness of Breath       The history is provided by the patient.      Gerson Pearson is a tobacco-using 60 year old male with history of COPD, MI, back pain, recently seen here on Sept 21 for similar symptoms who presents with shortness of breath. The patient reports that he began wheezing when he ran out of his ventolin last week. He was able to refill his ventolin, which improved his wheezing, but his symptoms have been worsening since he ran out of his other Breo Ellipta inhaler on 12/11. The patient does note that he has a refill waiting for him that he has not yet picked up. He denies any cough, fever, myalgias, leg swelling, or leg pain. He reports no known COVID exposures and negative Covid testing.    Review of Systems   Constitutional: Negative for fever.   Respiratory: Positive for shortness of breath. Negative for cough.    Cardiovascular: Negative for leg swelling (or leg pain).   All other systems reviewed and are negative.      Allergies:  Ibuprofen    Medications:  Albuterol inhaler  Citalopram hydrobromide  Breo ellipta inhaler  Lisinopril  Prasugrel  Tiotropium  Tramadol  Aspirin 81 mg  Atorvastatin    Past Medical History:    Chronic back pain  COPD  Depressive disorder  MI    Family History:    Mother - diabetes, cataracts, heart disease, hypertension, hyperlipidemia  Brother - lung cancer    Social History:  The patient was not accompanied to the ED.  Smoking Status: Positive - current every day use  Smokeless Tobacco: Negative  Alcohol Use: Positive    Physical Exam     Patient Vitals for the past 24 hrs:   BP Temp Temp src Pulse Resp SpO2 Height Weight   12/16/20 1129 (!) 144/84 98.4  F (36.9  C) Oral 63 16 99 % 1.753 m (5' 9\") 59 kg (130 lb)       Physical Exam  Nursing notes reviewed. Vitals reviewed.  General: Alert. Well kept.  Eyes:  Conjunctiva non-injected, non-icteric.  Neck/Throat: Moist mucous membranes.  Normal voice.  Cardiac: Regular rhythm. Normal " heart sounds with no murmur/rubs/click. No peripheral edema.  Pulmonary: bilateral expiratory wheezing.  Speaking in full clear sentences.  Abdomen: Soft. Non-distended. Non-tender to palpation. No masses. No guarding or rebound.  Musculoskeletal: Normal gross range of motion of all 4 extremities.    Neurological: Alert and oriented x4.   Skin: Warm and dry without rashes or petechiae. Normal appearance of visualized exposed skin.  Psych: Affect normal. Good eye contact.    Emergency Department Course     ECG:  Indication: Shortness of breath   Completed at 1237.  Read at 1243.   Sinus bradycardia with short OK   Rate 56 bpm. OK interval 80. QRS duration 80. QT/QTc 422/407. P-R-T axes 37 77 72.  No significant change when compared to EKG dated 9/21/2020.  Agree with computer interpretation.     Imaging:  XR Chest Port 1 View:  Heart size is normal. Pulmonary vasculature is normal. Lungs are clear. No pleural fluid. No acute disease.  Report per radiology.    Laboratory:  CBC: WBC 7.1, HGB 10.8 (L),   BMP: Anion gap 1 (L), Calcium 8.1 (L), o/w WNL (Creatinine 0.79)    (1128) Troponin I ES: <0.015     Emergency Department Course:    Reviewed:  1137 I reviewed the patient's nursing notes, labs, past medical records, Care Everywhere.     Assessments:  1141 I initially evaluated the patient in ED room 29.  1302 I rechecked the patient.    Interventions:  1204 Prednisone 40 mg PO  1205 Albuterol 2 puffs inhalation    Disposition:  The patient was discharged to home.     Impression & Plan     Medical Decision Making:  Gerson Pearson is a tobacco-using 60 year old male with history of COPD who presents with shortness of breath.  No hypoxia or tachycardia or history concerning for pulmonary embolus.  EKG nonischemic and troponin negative making acute coronary syndrome unlikely.  No fevers, myalgia, exposures and chest x-ray none consistent with Covid.  X-ray negative for pneumonia or pneumothorax.  The patient  developed symptoms after running out of his medications consistent with prior COPD exacerbations.  He does have a prescription for Breo at [facility] that he has not picked up.  He was given an albuterol inhaler in the emergency department which he can take home.  Blood work show no acute abnormalities.  He felt improved after treatment with albuterol and will be discharged home on prednisone which has worked well for him in the past.  He will follow-up with primary care for further evaluation and return to the ED with any fever, worsening of symptoms, new concerns.        Diagnosis:    ICD-10-CM    1. Anemia  D64.9    2. COPD exacerbation (H)  J44.1        Discharge Medications:  Discharge Medication List as of 12/16/2020  1:13 PM      START taking these medications    Details   predniSONE (DELTASONE) 20 MG tablet Take two tablets (= 40mg) each day for 4 (four) days, Disp-8 tablet, R-0, E-Prescribe             Scribe Disclosure:  I, Nichole Downs, am serving as a scribe at 11:37 AM on 12/16/2020 to document services personally performed by Heidy Castaneda DNP based on my observations and the provider's statements to me.     Nichole Downs  12/16/2020  [facility] EMERGENCY DEPARTMENT     Heidy Castaneda CNP  12/16/20 0833

## 2020-12-16 NOTE — ED AVS SNAPSHOT
[facility] Emergency Dept  [address] [facility] [address] [facility] [address]  Phone: [phone]  Fax: [phone]                                    Gerson Pearson   MRN: [id number]    Department: [facility] Emergency Dept   Date of Visit: 12/16/2020           After Visit Summary Signature Page    I have received my discharge instructions, and my questions have been answered. I have discussed any challenges I see with this plan with the nurse or doctor.    ..........................................................................................................................................  Patient/Patient Representative Signature      ..........................................................................................................................................  Patient Representative Print Name and Relationship to Patient    ..................................................               ................................................  Date                                   Time    ..........................................................................................................................................  Reviewed by Signature/Title    ...................................................              ..............................................  Date                                               Time          22EPIC Rev 08/18

## 2020-12-16 NOTE — ED NOTES
Bed: ED29  Expected date:   Expected time:   Means of arrival:   Comments:  [facility] [place] - 447 - 60 M SOB eta 1112

## 2021-01-06 ENCOUNTER — HOSPITAL ENCOUNTER (EMERGENCY)
Facility: CLINIC | Age: 61
Discharge: HOME | End: 2021-01-06
Attending: EMERGENCY MEDICINE | Admitting: EMERGENCY MEDICINE
Payer: MEDICARE

## 2021-01-06 ENCOUNTER — APPOINTMENT (OUTPATIENT)
Dept: GENERAL RADIOLOGY | Facility: CLINIC | Age: 61
End: 2021-01-06
Attending: EMERGENCY MEDICINE
Payer: MEDICARE

## 2021-01-06 VITALS
RESPIRATION RATE: 20 BRPM | OXYGEN SATURATION: 98 % | SYSTOLIC BLOOD PRESSURE: 137 MMHG | TEMPERATURE: 98.7 F | DIASTOLIC BLOOD PRESSURE: 99 MMHG

## 2021-01-06 DIAGNOSIS — J44.1 COPD EXACERBATION (H): ICD-10-CM

## 2021-01-06 LAB
ANION GAP SERPL CALCULATED.3IONS-SCNC: 3 MMOL/L (ref 3–14)
BASE EXCESS BLDV CALC-SCNC: 2.5 MMOL/L
BASOPHILS # BLD AUTO: 0.1 10E9/L (ref 0–0.2)
BASOPHILS NFR BLD AUTO: 0.7 %
BUN SERPL-MCNC: 13 MG/DL (ref 7–30)
CALCIUM SERPL-MCNC: 8.5 MG/DL (ref 8.5–10.1)
CHLORIDE SERPL-SCNC: 105 MMOL/L (ref 94–109)
CO2 SERPL-SCNC: 28 MMOL/L (ref 20–32)
CREAT SERPL-MCNC: 0.79 MG/DL (ref 0.66–1.25)
D DIMER PPP FEU-MCNC: 0.6 UG/ML FEU (ref 0–0.5)
DIFFERENTIAL METHOD BLD: ABNORMAL
EOSINOPHIL # BLD AUTO: 0.2 10E9/L (ref 0–0.7)
EOSINOPHIL NFR BLD AUTO: 2.9 %
ERYTHROCYTE [DISTWIDTH] IN BLOOD BY AUTOMATED COUNT: 14.6 % (ref 10–15)
FLUABV+SARS-COV-2+RSV PNL RESP NAA+PROBE: NEGATIVE
FLUABV+SARS-COV-2+RSV PNL RESP NAA+PROBE: NEGATIVE
GFR SERPL CREATININE-BSD FRML MDRD: >90 ML/MIN/{1.73_M2}
GLUCOSE SERPL-MCNC: 108 MG/DL (ref 70–99)
HCO3 BLDV-SCNC: 30 MMOL/L (ref 21–28)
HCT VFR BLD AUTO: 36.2 % (ref 40–53)
HGB BLD-MCNC: 11.3 G/DL (ref 13.3–17.7)
IMM GRANULOCYTES # BLD: 0 10E9/L (ref 0–0.4)
IMM GRANULOCYTES NFR BLD: 0.3 %
INTERPRETATION ECG - MUSE: NORMAL
LABORATORY COMMENT REPORT: NORMAL
LYMPHOCYTES # BLD AUTO: 0.9 10E9/L (ref 0.8–5.3)
LYMPHOCYTES NFR BLD AUTO: 11.9 %
MCH RBC QN AUTO: 29 PG (ref 26.5–33)
MCHC RBC AUTO-ENTMCNC: 31.2 G/DL (ref 31.5–36.5)
MCV RBC AUTO: 93 FL (ref 78–100)
MONOCYTES # BLD AUTO: 0.7 10E9/L (ref 0–1.3)
MONOCYTES NFR BLD AUTO: 9.1 %
NEUTROPHILS # BLD AUTO: 5.3 10E9/L (ref 1.6–8.3)
NEUTROPHILS NFR BLD AUTO: 75.1 %
NRBC # BLD AUTO: 0 10*3/UL
NRBC BLD AUTO-RTO: 0 /100
NT-PROBNP SERPL-MCNC: 324 PG/ML (ref 0–900)
O2/TOTAL GAS SETTING VFR VENT: 0.21 %
OXYHGB MFR BLDV: 61 %
PCO2 BLDV: 56 MM HG (ref 40–50)
PH BLDV: 7.33 PH (ref 7.32–7.43)
PLATELET # BLD AUTO: 202 10E9/L (ref 150–450)
PO2 BLDV: 33 MM HG (ref 25–47)
POTASSIUM SERPL-SCNC: 4.3 MMOL/L (ref 3.4–5.3)
RBC # BLD AUTO: 3.89 10E12/L (ref 4.4–5.9)
RSV RNA SPEC QL NAA+PROBE: NORMAL
SARS-COV-2 RNA SPEC QL NAA+PROBE: NEGATIVE
SODIUM SERPL-SCNC: 136 MMOL/L (ref 133–144)
SPECIMEN SOURCE: NORMAL
TROPONIN I SERPL-MCNC: 0.02 UG/L (ref 0–0.04)
WBC # BLD AUTO: 7.1 10E9/L (ref 4–11)

## 2021-01-06 PROCEDURE — 83880 ASSAY OF NATRIURETIC PEPTIDE: CPT | Performed by: EMERGENCY MEDICINE

## 2021-01-06 PROCEDURE — 94640 AIRWAY INHALATION TREATMENT: CPT

## 2021-01-06 PROCEDURE — 84484 ASSAY OF TROPONIN QUANT: CPT | Performed by: EMERGENCY MEDICINE

## 2021-01-06 PROCEDURE — 250N000011 HC RX IP 250 OP 636: Performed by: EMERGENCY MEDICINE

## 2021-01-06 PROCEDURE — 93005 ELECTROCARDIOGRAM TRACING: CPT

## 2021-01-06 PROCEDURE — 80048 BASIC METABOLIC PNL TOTAL CA: CPT | Performed by: EMERGENCY MEDICINE

## 2021-01-06 PROCEDURE — 85379 FIBRIN DEGRADATION QUANT: CPT | Performed by: EMERGENCY MEDICINE

## 2021-01-06 PROCEDURE — 99285 EMERGENCY DEPT VISIT HI MDM: CPT | Mod: 25

## 2021-01-06 PROCEDURE — 85025 COMPLETE CBC W/AUTO DIFF WBC: CPT | Performed by: EMERGENCY MEDICINE

## 2021-01-06 PROCEDURE — 82805 BLOOD GASES W/O2 SATURATION: CPT | Performed by: EMERGENCY MEDICINE

## 2021-01-06 PROCEDURE — 250N000013 HC RX MED GY IP 250 OP 250 PS 637: Mod: GY | Performed by: EMERGENCY MEDICINE

## 2021-01-06 PROCEDURE — C9803 HOPD COVID-19 SPEC COLLECT: HCPCS

## 2021-01-06 PROCEDURE — 96374 THER/PROPH/DIAG INJ IV PUSH: CPT | Mod: 59

## 2021-01-06 PROCEDURE — 87636 SARSCOV2 & INF A&B AMP PRB: CPT | Performed by: EMERGENCY MEDICINE

## 2021-01-06 PROCEDURE — 71045 X-RAY EXAM CHEST 1 VIEW: CPT

## 2021-01-06 PROCEDURE — 250N000009 HC RX 250: Performed by: EMERGENCY MEDICINE

## 2021-01-06 RX ORDER — ALBUTEROL SULFATE 90 UG/1
2 AEROSOL, METERED RESPIRATORY (INHALATION) ONCE
Status: COMPLETED | OUTPATIENT
Start: 2021-01-06 | End: 2021-01-06

## 2021-01-06 RX ORDER — PREDNISONE 20 MG/1
TABLET ORAL
Qty: 10 TABLET | Refills: 0 | Status: ON HOLD | OUTPATIENT
Start: 2021-01-07 | End: 2021-10-11

## 2021-01-06 RX ORDER — METHYLPREDNISOLONE SODIUM SUCCINATE 125 MG/2ML
125 INJECTION, POWDER, LYOPHILIZED, FOR SOLUTION INTRAMUSCULAR; INTRAVENOUS ONCE
Status: COMPLETED | OUTPATIENT
Start: 2021-01-06 | End: 2021-01-06

## 2021-01-06 RX ORDER — IOPAMIDOL 755 MG/ML
57 INJECTION, SOLUTION INTRAVASCULAR ONCE
Status: DISCONTINUED | OUTPATIENT
Start: 2021-01-06 | End: 2021-01-06

## 2021-01-06 RX ADMIN — METHYLPREDNISOLONE SODIUM SUCCINATE 125 MG: 125 INJECTION, POWDER, FOR SOLUTION INTRAMUSCULAR; INTRAVENOUS at 08:46

## 2021-01-06 RX ADMIN — ALBUTEROL SULFATE 2 PUFF: 90 AEROSOL, METERED RESPIRATORY (INHALATION) at 08:45

## 2021-01-06 ASSESSMENT — ENCOUNTER SYMPTOMS
SORE THROAT: 0
FEVER: 0
DIARRHEA: 0
SHORTNESS OF BREATH: 1
ABDOMINAL PAIN: 0
COUGH: 0
VOMITING: 0

## 2021-01-06 NOTE — ED PROVIDER NOTES
History   Chief Complaint:  Shortness of Breath     HPI   Gerson Pearson is a 60 year old male with history of COPD and myocardial infarction who presents with shortness of breath. The patient was seen here on 12/16 due to running out of medications for his COPD and he was discharged home on Prednisone. Per EMS report, the patient woke up an hour ago feeling shortness of breath. He attempted to use his nebulizer without relief. He also recently ran out of his Ventolin inhaler. The patient's 12 lead EKG was normal upon EMS arrival and his lungs were saturating in the 90's so no interventions were given in route. The patient also reports feeling mildly shortness of breath while walking. The patient denies fever, cough, sore throat or loss of taste and smell. He denies chest pain, abdominal pain, vomiting, or diarrhea. Denies orthopnea.  He states he still smokes about a half a pack of cigarettes a day.    Review of Systems   Constitutional: Negative for fever.   HENT: Negative for sore throat.    Respiratory: Positive for shortness of breath. Negative for cough.    Cardiovascular: Negative for chest pain.   Gastrointestinal: Negative for abdominal pain, diarrhea and vomiting.   All other systems reviewed and are negative.      Allergies:  Ibuprofen    Medications:  Lisinopril  Effient  Breo ellipta inhaler  Albuterol inhaler  Dulera  Citalopram  Aspirin 81 mg  Lipitor  Coreg  Protonix    Past Medical History:    COPD  Depression  Myocardial infarction  Coronary artery disease     Family History:    Mother- diabetes, hypertension, hyperlipidemia     Social History:  Presents alone  Patient smokes 1/2 pack a day    Physical Exam     Patient Vitals for the past 24 hrs:   BP Temp Resp SpO2   01/06/21 1030 -- -- -- 98 %   01/06/21 0852 -- 98.7  F (37.1  C) -- --   01/06/21 0815 (!) 137/99 -- 20 98 %       Physical Exam    Physical Exam   Constitutional:  Patient is oriented to person, place, and time. They appear  well-developed and well-nourished. Mild distress secondary to shortness of breath.    HENT:   Mouth/Throat:   Oropharynx is clear and moist.   Eyes:    Conjunctivae normal and EOM are normal. Pupils are equal, round, and reactive to light.   Neck:    Normal range of motion.   Cardiovascular: Normal rate, regular rhythm and normal heart sounds.  Exam reveals no gallop and no friction rub.  No murmur heard.  Pulmonary/Chest:  Effort normal and breath sounds mildly diminished bilaterally however speaks in full sentences, no respiratory distress. Patient has no wheezes. Patient has no rales.   Abdominal:   Soft. Bowel sounds are normal. Patient exhibits no mass. There is no tenderness. There is no rebound and no guarding.   Musculoskeletal:  Normal range of motion. Patient exhibits no edema.   Neurological:   Patient is alert and oriented to person, place, and time. Patient has normal strength. No cranial nerve deficit or sensory deficit. GCS 15  Skin:   Skin is warm and dry. No rash noted. No erythema.   Psychiatric:   Patient has a normal mood and affect. Patient's behavior is normal. Judgment and thought content normal.     Slightly diminished breath sounds bilaterally      Emergency Department Course     ECG (08:20:27):  Rate 67 bpm. WY interval 112. QRS duration 70. QT/QTc 374/395. P-R-T axes 65 71 47. Normal sinus rhythm. Normal ECG. Interpreted at 0825 by Akua Church MD.     Imaging:  XR Chest Port 1 View  Portable chest. Lungs are clear. Heart is normal in size.   No pneumothorax. No definite pleural effusions.  As read by Radiology.     CT Chest Pulmonary Embolism w Contrast  Patient was walked to CT but refused imaging    Laboratory:  CBC: WBC 7.1, HGB 11.3 (L),   BMP: glucose 108 (H) o/w WNL (Creatinine 0.79)    D-dimer: 0.6     Troponin I (Collected 0834): 0.023     Symptomatic Influenza A/B & SARS-CoV2 Virus PCR Multiplex: All negative     Blood gas venous and oxyhgb: PCO2 56 (H),  bicarbonate venous 30 (H), o/w WNL    Nt probnp inpatient: 324    Emergency Department Course:    Reviewed:  I reviewed nursing notes, vitals, past medical history and care everywhere    Assessments:  0813: I obtained history from the patient and EMS and performed a physical exam.   0917: I rechecked the patient and updated him on findings so far.     Interventions:  0845: Albuterol inhaler 2 puffs inhalation  0846: Prednisolone sodium succinate 125 mg IV    Disposition:  The patient was discharged to home.     Impression & Plan   Covid-19  Gerson Pearson was evaluated during a global COVID-19 pandemic, which necessitated consideration that the patient might be at risk for infection with the SARS-CoV-2 virus that causes COVID-19.   Applicable protocols for evaluation were followed during the patient's care.   COVID-19 was considered as part of the patient's evaluation. The plan for testing is:  a test was obtained during this visit.    Medical Decision Making:  Miguelito Pearson is a 60 year old male presenting to the emergency department with shortness of breath. He has run out of his Ventolin inhaler but did have his nebulizer and did this prior to ambulance arrival. He denies any Covid symptoms, chest pain, no other complaints. He does still smoke a 1/2 pack of cigarettes per day. Respiratory exam on arrival was actually fairly clear, slightly diminished but certainly no wheezing or respiratory distress. EKG and blood work was performed. EKG shows no evidence of ischemia. VBG shows mild CO2 retention which is not surprising given that he has COPD and is a smoker.  We did give him 2 puffs of Ventolin inhaler and will send him home with this.  WBC looks normal, d-dimer is slightly elevated and with age adjustment is a tenth of a point higher than normal. I did want to do a CAT scan however the patient adamantly refused this test. A chest x-ray was therefore performed which shows no active disease. He has no acute  metabolic derangement. His cardiac enzymes while detectable are still within normal limits. He is negative for Covid and Influenza. The patient has improved with above interventions. He ambulated in the emergency department with a pulse oximeter and saturated appropriately. At this point I do not see any acute needs that require hospitalization. I will put him on Prednisone and I gave him the Albuterol inhaler in the emergency department. He was strongly advised to follow up with his primary care provider to get refills of his medication.     Critical Care Time: was 0 minutes for this patient excluding procedures    Diagnosis:    ICD-10-CM    1. COPD exacerbation (H)  J44.1 Nt probnp inpatient       Discharge Medications:  New Prescriptions    PREDNISONE (DELTASONE) 20 MG TABLET    Take two tablets (= 40mg) each day for 5 (five) days       Scribe Disclosure:  DOROTHY, Janet Jerome, am serving as a scribe at 8:17 AM on 1/6/2021 to document services personally performed by Akua Church MD based on my observations and the provider's statements to me.           Akua Church MD  01/06/21 9037

## 2021-01-06 NOTE — ED AVS SNAPSHOT
[facility] Emergency Dept  [address] [address]  Phone: [phone]  Fax: [phone]                                    Gerson Pearson   MRN: [id number]    Department: [facility] Emergency Dept   Date of Visit: 1/6/2021           After Visit Summary Signature Page    I have received my discharge instructions, and my questions have been answered. I have discussed any challenges I see with this plan with the nurse or doctor.    ..........................................................................................................................................  Patient/Patient Representative Signature      ..........................................................................................................................................  Patient Representative Print Name and Relationship to Patient    ..................................................               ................................................  Date                                   Time    ..........................................................................................................................................  Reviewed by Signature/Title    ...................................................              ..............................................  Date                                               Time          22EPIC Rev 08/18

## 2021-01-06 NOTE — ED NOTES
Bed: ED01  Expected date:   Expected time:   Means of arrival:   Comments:  [facility] [place] - [facility] - 60M SOB eta 9519

## 2021-07-29 ENCOUNTER — APPOINTMENT (OUTPATIENT)
Dept: GENERAL RADIOLOGY | Facility: CLINIC | Age: 61
End: 2021-07-29
Attending: EMERGENCY MEDICINE
Payer: MEDICARE

## 2021-07-29 ENCOUNTER — HOSPITAL ENCOUNTER (EMERGENCY)
Facility: CLINIC | Age: 61
Discharge: HOME | End: 2021-07-30
Attending: EMERGENCY MEDICINE | Admitting: EMERGENCY MEDICINE
Payer: MEDICARE

## 2021-07-29 DIAGNOSIS — J44.1 COPD EXACERBATION (H): ICD-10-CM

## 2021-07-29 LAB
ALBUMIN SERPL-MCNC: 4 G/DL (ref 3.4–5)
ALP SERPL-CCNC: 131 U/L (ref 40–150)
ALT SERPL W P-5'-P-CCNC: 25 U/L (ref 0–70)
ANION GAP SERPL CALCULATED.3IONS-SCNC: 3 MMOL/L (ref 3–14)
AST SERPL W P-5'-P-CCNC: 17 U/L (ref 0–45)
ATRIAL RATE - MUSE: 61 BPM
BILIRUB SERPL-MCNC: 0.3 MG/DL (ref 0.2–1.3)
BUN SERPL-MCNC: 12 MG/DL (ref 7–30)
CALCIUM SERPL-MCNC: 9.1 MG/DL (ref 8.5–10.1)
CHLORIDE BLD-SCNC: 104 MMOL/L (ref 94–109)
CO2 SERPL-SCNC: 28 MMOL/L (ref 20–32)
CREAT SERPL-MCNC: 0.82 MG/DL (ref 0.66–1.25)
DIASTOLIC BLOOD PRESSURE - MUSE: NORMAL MMHG
ERYTHROCYTE [DISTWIDTH] IN BLOOD BY AUTOMATED COUNT: 13.2 % (ref 10–15)
GFR SERPL CREATININE-BSD FRML MDRD: >90 ML/MIN/1.73M2
GLUCOSE BLD-MCNC: 89 MG/DL (ref 70–99)
HCT VFR BLD AUTO: 42.1 % (ref 40–53)
HGB BLD-MCNC: 13.6 G/DL (ref 13.3–17.7)
INTERPRETATION ECG - MUSE: NORMAL
MCH RBC QN AUTO: 29.6 PG (ref 26.5–33)
MCHC RBC AUTO-ENTMCNC: 32.3 G/DL (ref 31.5–36.5)
MCV RBC AUTO: 92 FL (ref 78–100)
P AXIS - MUSE: 74 DEGREES
PLATELET # BLD AUTO: 163 10E3/UL (ref 150–450)
POTASSIUM BLD-SCNC: 3.9 MMOL/L (ref 3.4–5.3)
PR INTERVAL - MUSE: 116 MS
PROT SERPL-MCNC: 8.1 G/DL (ref 6.8–8.8)
QRS DURATION - MUSE: 82 MS
QT - MUSE: 390 MS
QTC - MUSE: 392 MS
R AXIS - MUSE: 74 DEGREES
RBC # BLD AUTO: 4.6 10E6/UL (ref 4.4–5.9)
SODIUM SERPL-SCNC: 135 MMOL/L (ref 133–144)
SYSTOLIC BLOOD PRESSURE - MUSE: NORMAL MMHG
T AXIS - MUSE: 72 DEGREES
TROPONIN I SERPL-MCNC: <0.015 UG/L (ref 0–0.04)
VENTRICULAR RATE- MUSE: 61 BPM
WBC # BLD AUTO: 6.1 10E3/UL (ref 4–11)

## 2021-07-29 PROCEDURE — 250N000009 HC RX 250: Performed by: EMERGENCY MEDICINE

## 2021-07-29 PROCEDURE — 84484 ASSAY OF TROPONIN QUANT: CPT | Performed by: EMERGENCY MEDICINE

## 2021-07-29 PROCEDURE — 82040 ASSAY OF SERUM ALBUMIN: CPT | Performed by: EMERGENCY MEDICINE

## 2021-07-29 PROCEDURE — 94640 AIRWAY INHALATION TREATMENT: CPT

## 2021-07-29 PROCEDURE — 36415 COLL VENOUS BLD VENIPUNCTURE: CPT | Performed by: EMERGENCY MEDICINE

## 2021-07-29 PROCEDURE — 93005 ELECTROCARDIOGRAM TRACING: CPT

## 2021-07-29 PROCEDURE — 82374 ASSAY BLOOD CARBON DIOXIDE: CPT | Performed by: EMERGENCY MEDICINE

## 2021-07-29 PROCEDURE — 71046 X-RAY EXAM CHEST 2 VIEWS: CPT

## 2021-07-29 PROCEDURE — 85027 COMPLETE CBC AUTOMATED: CPT | Performed by: EMERGENCY MEDICINE

## 2021-07-29 PROCEDURE — 99285 EMERGENCY DEPT VISIT HI MDM: CPT | Mod: 25

## 2021-07-29 RX ORDER — IPRATROPIUM BROMIDE AND ALBUTEROL SULFATE 2.5; .5 MG/3ML; MG/3ML
3 SOLUTION RESPIRATORY (INHALATION) ONCE
Status: COMPLETED | OUTPATIENT
Start: 2021-07-29 | End: 2021-07-29

## 2021-07-29 RX ORDER — METHYLPREDNISOLONE SODIUM SUCCINATE 125 MG/2ML
125 INJECTION, POWDER, LYOPHILIZED, FOR SOLUTION INTRAMUSCULAR; INTRAVENOUS ONCE
Status: DISCONTINUED | OUTPATIENT
Start: 2021-07-29 | End: 2021-07-30

## 2021-07-29 RX ADMIN — IPRATROPIUM BROMIDE AND ALBUTEROL SULFATE 3 ML: .5; 3 SOLUTION RESPIRATORY (INHALATION) at 23:23

## 2021-07-29 ASSESSMENT — MIFFLIN-ST. JEOR: SCORE: 1412.74

## 2021-07-30 VITALS
DIASTOLIC BLOOD PRESSURE: 92 MMHG | HEIGHT: 69 IN | WEIGHT: 135 LBS | BODY MASS INDEX: 19.99 KG/M2 | TEMPERATURE: 98.1 F | SYSTOLIC BLOOD PRESSURE: 131 MMHG | RESPIRATION RATE: 20 BRPM | HEART RATE: 69 BPM | OXYGEN SATURATION: 96 %

## 2021-07-30 LAB
BASOPHILS # BLD MANUAL: 0 10E3/UL (ref 0–0.2)
BASOPHILS NFR BLD MANUAL: 0 %
EOSINOPHIL # BLD MANUAL: 0.5 10E3/UL (ref 0–0.7)
EOSINOPHIL NFR BLD MANUAL: 9 %
HOLD SPECIMEN: NORMAL
HOLD SPECIMEN: NORMAL
LYMPHOCYTES # BLD MANUAL: 1.6 10E3/UL (ref 0.8–5.3)
LYMPHOCYTES NFR BLD MANUAL: 26 %
MONOCYTES # BLD MANUAL: 0.2 10E3/UL (ref 0–1.3)
MONOCYTES NFR BLD MANUAL: 4 %
NEUTROPHILS # BLD MANUAL: 3.7 10E3/UL (ref 1.6–8.3)
NEUTROPHILS NFR BLD MANUAL: 61 %
PLAT MORPH BLD: NORMAL
RBC MORPH BLD: NORMAL

## 2021-07-30 PROCEDURE — 250N000012 HC RX MED GY IP 250 OP 636 PS 637: Performed by: EMERGENCY MEDICINE

## 2021-07-30 RX ORDER — PREDNISONE 20 MG/1
TABLET ORAL
Qty: 10 TABLET | Refills: 0 | Status: ON HOLD | OUTPATIENT
Start: 2021-07-30 | End: 2021-10-11

## 2021-07-30 RX ORDER — PREDNISONE 20 MG/1
60 TABLET ORAL ONCE
Status: COMPLETED | OUTPATIENT
Start: 2021-07-30 | End: 2021-07-30

## 2021-07-30 RX ADMIN — PREDNISONE 60 MG: 20 TABLET ORAL at 00:16

## 2021-07-30 NOTE — ED TRIAGE NOTES
Presents from his group home via EMS. Hx of CPOD reports SOB, has been using his inhaler at home and spending time outside. VSS stable during transport aged as green acuity and sent to triage

## 2021-07-30 NOTE — DISCHARGE INSTRUCTIONS
*You may resume diet and activities as tolerated but recommend you stay inside until the smoke emergency resolves and outside air clears up.  *Take medications as prescribed.  Prednisone as directed. Continue your inhalers as directed. Continue your current medications  *Follow-up with your primary doctor in the next 1-2 days for a recheck.  *Return if you develop difficulty in breathing, require your inhaler more frequently than every four hours, faint or feel like you will faint or become worse in any way.

## 2021-07-30 NOTE — ED NOTES
Bed: ED25  Expected date:   Expected time:   Means of arrival:   Comments:  Hold for triage - [place]
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
## 2021-10-10 LAB
ALBUMIN SERPL-MCNC: 3.8 G/DL (ref 3.4–5)
ALP SERPL-CCNC: 145 U/L (ref 40–150)
ALT SERPL W P-5'-P-CCNC: 35 U/L (ref 0–70)
ANION GAP SERPL CALCULATED.3IONS-SCNC: 5 MMOL/L (ref 3–14)
AST SERPL W P-5'-P-CCNC: 28 U/L (ref 0–45)
ATRIAL RATE - MUSE: 75 BPM
BASOPHILS # BLD AUTO: 0.1 10E3/UL (ref 0–0.2)
BASOPHILS NFR BLD AUTO: 1 %
BILIRUB SERPL-MCNC: 0.6 MG/DL (ref 0.2–1.3)
BUN SERPL-MCNC: 6 MG/DL (ref 7–30)
CALCIUM SERPL-MCNC: 8.7 MG/DL (ref 8.5–10.1)
CHLORIDE BLD-SCNC: 97 MMOL/L (ref 94–109)
CO2 SERPL-SCNC: 30 MMOL/L (ref 20–32)
CREAT SERPL-MCNC: 0.9 MG/DL (ref 0.66–1.25)
DIASTOLIC BLOOD PRESSURE - MUSE: NORMAL MMHG
EOSINOPHIL # BLD AUTO: 0.2 10E3/UL (ref 0–0.7)
EOSINOPHIL NFR BLD AUTO: 4 %
ERYTHROCYTE [DISTWIDTH] IN BLOOD BY AUTOMATED COUNT: 14.1 % (ref 10–15)
GFR SERPL CREATININE-BSD FRML MDRD: >90 ML/MIN/1.73M2
GLUCOSE BLD-MCNC: 118 MG/DL (ref 70–99)
HCT VFR BLD AUTO: 40.7 % (ref 40–53)
HGB BLD-MCNC: 12.8 G/DL (ref 13.3–17.7)
IMM GRANULOCYTES # BLD: 0 10E3/UL
IMM GRANULOCYTES NFR BLD: 0 %
INTERPRETATION ECG - MUSE: NORMAL
LYMPHOCYTES # BLD AUTO: 0.6 10E3/UL (ref 0.8–5.3)
LYMPHOCYTES NFR BLD AUTO: 10 %
MCH RBC QN AUTO: 29.6 PG (ref 26.5–33)
MCHC RBC AUTO-ENTMCNC: 31.4 G/DL (ref 31.5–36.5)
MCV RBC AUTO: 94 FL (ref 78–100)
MONOCYTES # BLD AUTO: 0.8 10E3/UL (ref 0–1.3)
MONOCYTES NFR BLD AUTO: 14 %
NEUTROPHILS # BLD AUTO: 4.3 10E3/UL (ref 1.6–8.3)
NEUTROPHILS NFR BLD AUTO: 71 %
NRBC # BLD AUTO: 0 10E3/UL
NRBC BLD AUTO-RTO: 0 /100
P AXIS - MUSE: 83 DEGREES
PLATELET # BLD AUTO: 198 10E3/UL (ref 150–450)
POTASSIUM BLD-SCNC: 4.1 MMOL/L (ref 3.4–5.3)
PR INTERVAL - MUSE: 112 MS
PROT SERPL-MCNC: 8 G/DL (ref 6.8–8.8)
QRS DURATION - MUSE: 80 MS
QT - MUSE: 372 MS
QTC - MUSE: 415 MS
R AXIS - MUSE: 78 DEGREES
RBC # BLD AUTO: 4.32 10E6/UL (ref 4.4–5.9)
SODIUM SERPL-SCNC: 132 MMOL/L (ref 133–144)
SYSTOLIC BLOOD PRESSURE - MUSE: NORMAL MMHG
T AXIS - MUSE: 81 DEGREES
TROPONIN I SERPL-MCNC: <0.015 UG/L (ref 0–0.04)
VENTRICULAR RATE- MUSE: 75 BPM
WBC # BLD AUTO: 5.9 10E3/UL (ref 4–11)

## 2021-10-10 PROCEDURE — U0003 INFECTIOUS AGENT DETECTION BY NUCLEIC ACID (DNA OR RNA); SEVERE ACUTE RESPIRATORY SYNDROME CORONAVIRUS 2 (SARS-COV-2) (CORONAVIRUS DISEASE [COVID-19]), AMPLIFIED PROBE TECHNIQUE, MAKING USE OF HIGH THROUGHPUT TECHNOLOGIES AS DESCRIBED BY CMS-2020-01-R: HCPCS | Performed by: EMERGENCY MEDICINE

## 2021-10-10 PROCEDURE — 84484 ASSAY OF TROPONIN QUANT: CPT | Performed by: EMERGENCY MEDICINE

## 2021-10-10 PROCEDURE — C9803 HOPD COVID-19 SPEC COLLECT: HCPCS

## 2021-10-10 PROCEDURE — 36415 COLL VENOUS BLD VENIPUNCTURE: CPT | Performed by: EMERGENCY MEDICINE

## 2021-10-10 PROCEDURE — 99291 CRITICAL CARE FIRST HOUR: CPT | Mod: 25

## 2021-10-10 PROCEDURE — 80053 COMPREHEN METABOLIC PANEL: CPT | Performed by: EMERGENCY MEDICINE

## 2021-10-10 PROCEDURE — 93005 ELECTROCARDIOGRAM TRACING: CPT

## 2021-10-10 PROCEDURE — 85025 COMPLETE CBC W/AUTO DIFF WBC: CPT | Performed by: EMERGENCY MEDICINE

## 2021-10-10 PROCEDURE — 82805 BLOOD GASES W/O2 SATURATION: CPT | Performed by: EMERGENCY MEDICINE

## 2021-10-10 RX ORDER — IPRATROPIUM BROMIDE AND ALBUTEROL SULFATE 2.5; .5 MG/3ML; MG/3ML
3 SOLUTION RESPIRATORY (INHALATION) ONCE
Status: COMPLETED | OUTPATIENT
Start: 2021-10-10 | End: 2021-10-11

## 2021-10-10 RX ORDER — METHYLPREDNISOLONE SODIUM SUCCINATE 125 MG/2ML
125 INJECTION, POWDER, LYOPHILIZED, FOR SOLUTION INTRAMUSCULAR; INTRAVENOUS ONCE
Status: DISCONTINUED | OUTPATIENT
Start: 2021-10-10 | End: 2021-10-10

## 2021-10-10 RX ORDER — PREDNISONE 20 MG/1
60 TABLET ORAL ONCE
Status: COMPLETED | OUTPATIENT
Start: 2021-10-10 | End: 2021-10-11

## 2021-10-10 ASSESSMENT — MIFFLIN-ST. JEOR: SCORE: 1390.06

## 2021-10-11 ENCOUNTER — APPOINTMENT (OUTPATIENT)
Dept: GENERAL RADIOLOGY | Facility: CLINIC | Age: 61
DRG: 190 | End: 2021-10-11
Attending: EMERGENCY MEDICINE
Payer: MEDICARE

## 2021-10-11 ENCOUNTER — HOSPITAL ENCOUNTER (INPATIENT)
Facility: CLINIC | Age: 61
LOS: 2 days | Discharge: NURSING, REHAB OR LONG-TERM CARE | DRG: 190 | End: 2021-10-13
Attending: EMERGENCY MEDICINE | Admitting: INTERNAL MEDICINE
Payer: MEDICARE

## 2021-10-11 DIAGNOSIS — J44.1 COPD EXACERBATION (H): ICD-10-CM

## 2021-10-11 LAB
BASE EXCESS BLDV CALC-SCNC: 3.3 MMOL/L (ref -7.7–1.9)
BASE EXCESS BLDV CALC-SCNC: 3.6 MMOL/L (ref -7.7–1.9)
HCO3 BLDV-SCNC: 31 MMOL/L (ref 21–28)
HCO3 BLDV-SCNC: 32 MMOL/L (ref 21–28)
HOLD SPECIMEN: NORMAL
O2/TOTAL GAS SETTING VFR VENT: 0 %
O2/TOTAL GAS SETTING VFR VENT: 21 %
OXYHGB MFR BLDV: 29 % (ref 70–75)
OXYHGB MFR BLDV: 83 % (ref 70–75)
PCO2 BLDV: 56 MM HG (ref 40–50)
PCO2 BLDV: 64 MM HG (ref 40–50)
PH BLDV: 7.3 [PH] (ref 7.32–7.43)
PH BLDV: 7.35 [PH] (ref 7.32–7.43)
PO2 BLDV: 20 MM HG (ref 25–47)
PO2 BLDV: 52 MM HG (ref 25–47)
SARS-COV-2 RNA RESP QL NAA+PROBE: NEGATIVE

## 2021-10-11 PROCEDURE — 999N000157 HC STATISTIC RCP TIME EA 10 MIN

## 2021-10-11 PROCEDURE — 36415 COLL VENOUS BLD VENIPUNCTURE: CPT | Performed by: EMERGENCY MEDICINE

## 2021-10-11 PROCEDURE — 250N000013 HC RX MED GY IP 250 OP 250 PS 637: Performed by: INTERNAL MEDICINE

## 2021-10-11 PROCEDURE — 250N000011 HC RX IP 250 OP 636: Performed by: EMERGENCY MEDICINE

## 2021-10-11 PROCEDURE — 250N000009 HC RX 250: Performed by: INTERNAL MEDICINE

## 2021-10-11 PROCEDURE — 71045 X-RAY EXAM CHEST 1 VIEW: CPT

## 2021-10-11 PROCEDURE — 250N000011 HC RX IP 250 OP 636: Performed by: INTERNAL MEDICINE

## 2021-10-11 PROCEDURE — 94640 AIRWAY INHALATION TREATMENT: CPT

## 2021-10-11 PROCEDURE — 120N000001 HC R&B MED SURG/OB

## 2021-10-11 PROCEDURE — 250N000012 HC RX MED GY IP 250 OP 636 PS 637: Performed by: EMERGENCY MEDICINE

## 2021-10-11 PROCEDURE — 94640 AIRWAY INHALATION TREATMENT: CPT | Mod: 76

## 2021-10-11 PROCEDURE — 96365 THER/PROPH/DIAG IV INF INIT: CPT

## 2021-10-11 PROCEDURE — 99233 SBSQ HOSP IP/OBS HIGH 50: CPT | Performed by: PHYSICIAN ASSISTANT

## 2021-10-11 PROCEDURE — 82805 BLOOD GASES W/O2 SATURATION: CPT | Performed by: EMERGENCY MEDICINE

## 2021-10-11 PROCEDURE — 250N000009 HC RX 250: Performed by: EMERGENCY MEDICINE

## 2021-10-11 PROCEDURE — 250N000013 HC RX MED GY IP 250 OP 250 PS 637: Performed by: PHYSICIAN ASSISTANT

## 2021-10-11 PROCEDURE — 96366 THER/PROPH/DIAG IV INF ADDON: CPT

## 2021-10-11 RX ORDER — ACETAMINOPHEN 325 MG/1
650 TABLET ORAL EVERY 4 HOURS PRN
Status: ON HOLD | COMMUNITY
End: 2023-07-15

## 2021-10-11 RX ORDER — BUPRENORPHINE HYDROCHLORIDE AND NALOXONE HYDROCHLORIDE DIHYDRATE 8; 2 MG/1; MG/1
1 TABLET SUBLINGUAL 2 TIMES DAILY
Status: ON HOLD | COMMUNITY
End: 2023-07-15

## 2021-10-11 RX ORDER — IPRATROPIUM BROMIDE AND ALBUTEROL SULFATE 2.5; .5 MG/3ML; MG/3ML
3 SOLUTION RESPIRATORY (INHALATION) ONCE
Status: COMPLETED | OUTPATIENT
Start: 2021-10-11 | End: 2021-10-11

## 2021-10-11 RX ORDER — PANTOPRAZOLE SODIUM 40 MG/1
40 TABLET, DELAYED RELEASE ORAL DAILY
Status: DISCONTINUED | OUTPATIENT
Start: 2021-10-11 | End: 2021-10-13 | Stop reason: HOSPADM

## 2021-10-11 RX ORDER — ASPIRIN 81 MG/1
81 TABLET, CHEWABLE ORAL DAILY
COMMUNITY

## 2021-10-11 RX ORDER — PANTOPRAZOLE SODIUM 40 MG/1
40 TABLET, DELAYED RELEASE ORAL DAILY
COMMUNITY

## 2021-10-11 RX ORDER — ALBUTEROL SULFATE 0.83 MG/ML
2.5 SOLUTION RESPIRATORY (INHALATION)
Status: DISCONTINUED | OUTPATIENT
Start: 2021-10-11 | End: 2021-10-13 | Stop reason: HOSPADM

## 2021-10-11 RX ORDER — DOXYCYCLINE 100 MG/1
100 CAPSULE ORAL 2 TIMES DAILY
Status: DISCONTINUED | OUTPATIENT
Start: 2021-10-11 | End: 2021-10-13 | Stop reason: HOSPADM

## 2021-10-11 RX ORDER — ATORVASTATIN CALCIUM 40 MG/1
40 TABLET, FILM COATED ORAL DAILY
COMMUNITY

## 2021-10-11 RX ORDER — BUPRENORPHINE HYDROCHLORIDE AND NALOXONE HYDROCHLORIDE DIHYDRATE 8; 2 MG/1; MG/1
1 TABLET SUBLINGUAL 2 TIMES DAILY
Status: DISCONTINUED | OUTPATIENT
Start: 2021-10-11 | End: 2021-10-11 | Stop reason: CLARIF

## 2021-10-11 RX ORDER — ATORVASTATIN CALCIUM 40 MG/1
40 TABLET, FILM COATED ORAL AT BEDTIME
Status: DISCONTINUED | OUTPATIENT
Start: 2021-10-11 | End: 2021-10-13 | Stop reason: HOSPADM

## 2021-10-11 RX ORDER — MULTIPLE VITAMINS W/ MINERALS TAB 9MG-400MCG
1 TAB ORAL DAILY
COMMUNITY
End: 2021-12-08

## 2021-10-11 RX ORDER — LIDOCAINE 40 MG/G
CREAM TOPICAL
Status: DISCONTINUED | OUTPATIENT
Start: 2021-10-11 | End: 2021-10-13 | Stop reason: HOSPADM

## 2021-10-11 RX ORDER — ALBUTEROL SULFATE 0.83 MG/ML
2.5 SOLUTION RESPIRATORY (INHALATION) EVERY 4 HOURS PRN
Status: ON HOLD | COMMUNITY
End: 2021-11-06

## 2021-10-11 RX ORDER — BUPRENORPHINE AND NALOXONE 8; 2 MG/1; MG/1
1 FILM, SOLUBLE BUCCAL; SUBLINGUAL 2 TIMES DAILY
Status: DISCONTINUED | OUTPATIENT
Start: 2021-10-11 | End: 2021-10-13 | Stop reason: HOSPADM

## 2021-10-11 RX ORDER — NICOTINE 21 MG/24HR
1 PATCH, TRANSDERMAL 24 HOURS TRANSDERMAL EVERY 24 HOURS
COMMUNITY
End: 2021-12-08

## 2021-10-11 RX ORDER — MAGNESIUM SULFATE HEPTAHYDRATE 40 MG/ML
2 INJECTION, SOLUTION INTRAVENOUS ONCE
Status: COMPLETED | OUTPATIENT
Start: 2021-10-11 | End: 2021-10-11

## 2021-10-11 RX ORDER — CARVEDILOL 6.25 MG/1
3 TABLET ORAL 2 TIMES DAILY
Status: ON HOLD | COMMUNITY
End: 2023-07-15

## 2021-10-11 RX ORDER — MIRTAZAPINE 45 MG/1
45 TABLET, FILM COATED ORAL AT BEDTIME
COMMUNITY

## 2021-10-11 RX ORDER — POLYETHYLENE GLYCOL 3350 17 G
2 POWDER IN PACKET (EA) ORAL
COMMUNITY
End: 2021-12-08

## 2021-10-11 RX ORDER — NICOTINE 21 MG/24HR
1 PATCH, TRANSDERMAL 24 HOURS TRANSDERMAL DAILY
Status: DISCONTINUED | OUTPATIENT
Start: 2021-10-11 | End: 2021-10-13 | Stop reason: HOSPADM

## 2021-10-11 RX ORDER — MIRTAZAPINE 15 MG/1
30 TABLET, FILM COATED ORAL AT BEDTIME
Status: DISCONTINUED | OUTPATIENT
Start: 2021-10-11 | End: 2021-10-13 | Stop reason: HOSPADM

## 2021-10-11 RX ORDER — ONDANSETRON 2 MG/ML
4 INJECTION INTRAMUSCULAR; INTRAVENOUS EVERY 6 HOURS PRN
Status: DISCONTINUED | OUTPATIENT
Start: 2021-10-11 | End: 2021-10-13 | Stop reason: HOSPADM

## 2021-10-11 RX ORDER — LISINOPRIL 5 MG/1
5 TABLET ORAL DAILY
Status: DISCONTINUED | OUTPATIENT
Start: 2021-10-11 | End: 2021-10-13 | Stop reason: HOSPADM

## 2021-10-11 RX ORDER — IPRATROPIUM BROMIDE AND ALBUTEROL SULFATE 2.5; .5 MG/3ML; MG/3ML
3 SOLUTION RESPIRATORY (INHALATION)
Status: DISCONTINUED | OUTPATIENT
Start: 2021-10-11 | End: 2021-10-13 | Stop reason: HOSPADM

## 2021-10-11 RX ORDER — LISINOPRIL 40 MG/1
40 TABLET ORAL DAILY
COMMUNITY
End: 2023-07-28

## 2021-10-11 RX ORDER — ACETAMINOPHEN 325 MG/1
975 TABLET ORAL EVERY 8 HOURS
Status: DISCONTINUED | OUTPATIENT
Start: 2021-10-11 | End: 2021-10-13 | Stop reason: HOSPADM

## 2021-10-11 RX ORDER — CARVEDILOL 6.25 MG/1
6.25 TABLET ORAL 2 TIMES DAILY WITH MEALS
Status: DISCONTINUED | OUTPATIENT
Start: 2021-10-11 | End: 2021-10-13 | Stop reason: HOSPADM

## 2021-10-11 RX ORDER — METHYLPREDNISOLONE SODIUM SUCCINATE 125 MG/2ML
60 INJECTION, POWDER, LYOPHILIZED, FOR SOLUTION INTRAMUSCULAR; INTRAVENOUS EVERY 8 HOURS
Status: DISCONTINUED | OUTPATIENT
Start: 2021-10-11 | End: 2021-10-12

## 2021-10-11 RX ORDER — ONDANSETRON 4 MG/1
4 TABLET, ORALLY DISINTEGRATING ORAL EVERY 6 HOURS PRN
Status: DISCONTINUED | OUTPATIENT
Start: 2021-10-11 | End: 2021-10-13 | Stop reason: HOSPADM

## 2021-10-11 RX ORDER — ASPIRIN 81 MG/1
81 TABLET, CHEWABLE ORAL DAILY
Status: DISCONTINUED | OUTPATIENT
Start: 2021-10-11 | End: 2021-10-13 | Stop reason: HOSPADM

## 2021-10-11 RX ADMIN — IPRATROPIUM BROMIDE AND ALBUTEROL SULFATE 3 ML: .5; 3 SOLUTION RESPIRATORY (INHALATION) at 03:02

## 2021-10-11 RX ADMIN — METHYLPREDNISOLONE SODIUM SUCCINATE 62.5 MG: 125 INJECTION, POWDER, FOR SOLUTION INTRAMUSCULAR; INTRAVENOUS at 10:21

## 2021-10-11 RX ADMIN — MIRTAZAPINE 30 MG: 15 TABLET, FILM COATED ORAL at 21:23

## 2021-10-11 RX ADMIN — ATORVASTATIN CALCIUM 40 MG: 40 TABLET, FILM COATED ORAL at 21:23

## 2021-10-11 RX ADMIN — DOXYCYCLINE 100 MG: 100 CAPSULE ORAL at 21:23

## 2021-10-11 RX ADMIN — IPRATROPIUM BROMIDE AND ALBUTEROL SULFATE 3 ML: .5; 3 SOLUTION RESPIRATORY (INHALATION) at 00:10

## 2021-10-11 RX ADMIN — IPRATROPIUM BROMIDE AND ALBUTEROL SULFATE 3 ML: .5; 3 SOLUTION RESPIRATORY (INHALATION) at 16:06

## 2021-10-11 RX ADMIN — IPRATROPIUM BROMIDE AND ALBUTEROL SULFATE 3 ML: .5; 3 SOLUTION RESPIRATORY (INHALATION) at 11:53

## 2021-10-11 RX ADMIN — CARVEDILOL 6.25 MG: 6.25 TABLET, FILM COATED ORAL at 17:34

## 2021-10-11 RX ADMIN — IPRATROPIUM BROMIDE AND ALBUTEROL SULFATE 3 ML: .5; 3 SOLUTION RESPIRATORY (INHALATION) at 00:32

## 2021-10-11 RX ADMIN — METHYLPREDNISOLONE SODIUM SUCCINATE 62.5 MG: 125 INJECTION, POWDER, FOR SOLUTION INTRAMUSCULAR; INTRAVENOUS at 16:27

## 2021-10-11 RX ADMIN — MAGNESIUM SULFATE HEPTAHYDRATE 2 G: 40 INJECTION, SOLUTION INTRAVENOUS at 00:32

## 2021-10-11 RX ADMIN — NICOTINE 1 PATCH: 14 PATCH, EXTENDED RELEASE TRANSDERMAL at 10:18

## 2021-10-11 RX ADMIN — BUPRENORPHINE AND NALOXONE 1 FILM: 8; 2 FILM, SOLUBLE BUCCAL; SUBLINGUAL at 21:23

## 2021-10-11 RX ADMIN — PANTOPRAZOLE SODIUM 40 MG: 40 TABLET, DELAYED RELEASE ORAL at 15:24

## 2021-10-11 RX ADMIN — ASPIRIN 81 MG CHEWABLE TABLET 81 MG: 81 TABLET CHEWABLE at 15:24

## 2021-10-11 RX ADMIN — DOXYCYCLINE 100 MG: 100 CAPSULE ORAL at 10:18

## 2021-10-11 RX ADMIN — METHYLPREDNISOLONE SODIUM SUCCINATE 62.5 MG: 125 INJECTION, POWDER, FOR SOLUTION INTRAMUSCULAR; INTRAVENOUS at 23:36

## 2021-10-11 RX ADMIN — PREDNISONE 60 MG: 20 TABLET ORAL at 00:10

## 2021-10-11 RX ADMIN — FLUTICASONE FUROATE AND VILANTEROL TRIFENATATE 1 PUFF: 200; 25 POWDER RESPIRATORY (INHALATION) at 16:28

## 2021-10-11 RX ADMIN — ACETAMINOPHEN 975 MG: 325 TABLET, FILM COATED ORAL at 10:18

## 2021-10-11 RX ADMIN — IPRATROPIUM BROMIDE AND ALBUTEROL SULFATE 3 ML: .5; 3 SOLUTION RESPIRATORY (INHALATION) at 19:40

## 2021-10-11 RX ADMIN — LISINOPRIL 5 MG: 5 TABLET ORAL at 15:24

## 2021-10-11 ASSESSMENT — ACTIVITIES OF DAILY LIVING (ADL)
DRESSING/BATHING: DRESSING DIFFICULTY, ASSISTANCE 1 PERSON
CONCENTRATING,_REMEMBERING_OR_MAKING_DECISIONS_DIFFICULTY: NO
ADLS_ACUITY_SCORE: 5
WEAR_GLASSES_OR_BLIND: YES
VISION_MANAGEMENT: GLASSES
DOING_ERRANDS_INDEPENDENTLY_DIFFICULTY: NO
PATIENT_/_FAMILY_COMMUNICATION_STYLE: SPOKEN LANGUAGE (ENGLISH OR BILINGUAL)
DIFFICULTY_EATING/SWALLOWING: NO
HEARING_DIFFICULTY_OR_DEAF: NO
ADLS_ACUITY_SCORE: 5
ADLS_ACUITY_SCORE: 5
DRESSING/BATHING_DIFFICULTY: YES
DIFFICULTY_COMMUNICATING: NO
FALL_HISTORY_WITHIN_LAST_SIX_MONTHS: NO
WALKING_OR_CLIMBING_STAIRS_DIFFICULTY: NO
TOILETING_ISSUES: NO

## 2021-10-11 ASSESSMENT — ENCOUNTER SYMPTOMS
SORE THROAT: 0
SHORTNESS OF BREATH: 1
COUGH: 0
FEVER: 0
RHINORRHEA: 0

## 2021-10-11 NOTE — ED PROVIDER NOTES
History   Chief Complaint:  Shortness of Breath       HPI   Gerson Pearson is a 61 year old male with history of COPD who presents with shortness of breath and COPD exacerbation.  Patient reports the ED with COPD exacerbation and shortness of breath that he notes started this morning and has worsened throughout the day.  He has been treated here in the ED for similar symptoms back in 7/29/2021, and notes that the symptoms feel very similar to that episode.  No fever, cough, sore throat, or rhinorrhea.  Patient does not use oxygen at home, and notes that he still smokes tobacco regularly.  Also mentions that his regular nebulizer that he uses at home does not work as of today.  Also notes a lot of spitting.    Review of Systems   Constitutional: Negative for fever.   HENT: Negative for rhinorrhea and sore throat.    Respiratory: Positive for shortness of breath and wheezing. Negative for cough.    Cardiovascular: Negative for chest pain.   Gastrointestinal: Negative for abdominal pain, nausea and vomiting.   All other systems reviewed and are negative.        Allergies:  Ibuprofen     Medications:    Aspirin 81 mg  Suboxone  Remeron  Albuterol   Zestril  Dulera  Effient  Deltasone  Spiriva  Ultram     Past Medical History:    Chronic back pain  COPD  Depression  MI  Smoker  Cocaine abuse  Hypertension   Ischemic cardiomyopathy   Acute blood loss anemia   NSTEMI  Atherosclerosis of native coronary artery   Hyperlipidemia   Stroke   Substance abuse  Chorioretinal scar of left eye   Pneumonia  Depression  CAD     Past Surgical History:    HC PCI     Family History:    Diabetes  Cataracts  CABG     Social History:  Presents to ED alone.  PCP: Alex Wynn    Physical Exam     Patient Vitals for the past 24 hrs:   BP Temp Temp src Pulse Resp SpO2 Height Weight   10/11/21 0300 (!) 151/101 -- -- 86 -- 91 % -- --   10/11/21 0200 128/79 -- -- 86 -- 97 % -- --   10/11/21 0030 (!) 178/120 -- -- 82 -- 100 % -- --  "  10/11/21 0015 (!) 198/124 -- -- 76 -- 90 % -- --   10/10/21 2153 (!) 191/102 99.1  F (37.3  C) Oral 83 20 91 % 1.753 m (5' 9\") 59 kg (130 lb)       Physical Exam  General: Appears well-developed and well-nourished.   Head: No signs of trauma.   CV: Normal rate and regular rhythm.    Resp: Bilateral wheezing with increased work of breathing   GI: Soft. There is no tenderness.  No rebound or guarding.  Normal bowel sounds.    MSK: Normal range of motion. no edema. No Calf tenderness.  Neuro: The patient is alert and oriented. Speech normal.  Skin: Skin is warm and dry. No rash noted.   Psych: normal mood and affect. behavior is normal.       Emergency Department Course   ECG:  ECG taken at 2245, ECG read at 0017  Normal sinus rhythm  Normal ECG  Rate 75 bpm. MI interval 112 ms. QRS duration 80 ms. QT/QTc 372/415 ms. P-R-T axes 83 78 81.    Imaging:  XR Chest Port 1 View   Final Result   IMPRESSION: No acute abnormality.        Laboratory:  Labs Ordered and Resulted from Time of ED Arrival Up to the Time of Departure from the ED   COMPREHENSIVE METABOLIC PANEL - Abnormal; Notable for the following components:       Result Value    Sodium 132 (*)     Urea Nitrogen 6 (*)     Glucose 118 (*)     All other components within normal limits   CBC WITH PLATELETS AND DIFFERENTIAL - Abnormal; Notable for the following components:    RBC Count 4.32 (*)     Hemoglobin 12.8 (*)     MCHC 31.4 (*)     Absolute Lymphocytes 0.6 (*)     All other components within normal limits   BLOOD GAS VENOUS WITH OXYHEMOGLOBIN - Abnormal; Notable for the following components:    pH Venous 7.30 (*)     pCO2 Venous 64 (*)     pO2 Venous 20 (*)     Bicarbonate Venous 32 (*)     Oxyhemoglobin Venous 29 (*)     Base Excess/Deficit (+/-) 3.3 (*)     All other components within normal limits   BLOOD GAS VENOUS WITH OXYHEMOGLOBIN - Abnormal; Notable for the following components:    pCO2 Venous 56 (*)     pO2 Venous 52 (*)     Bicarbonate Venous 31 (*)  "    Oxyhemoglobin Venous 83 (*)     Base Excess/Deficit (+/-) 3.6 (*)     All other components within normal limits   TROPONIN I - Normal   EXTRA BLUE TOP TUBE   COVID-19 VIRUS (CORONAVIRUS) BY PCR   CBC WITH PLATELETS & DIFFERENTIAL    Narrative:     The following orders were created for panel order CBC with platelets + differential.  Procedure                               Abnormality         Status                     ---------                               -----------         ------                     CBC with platelets and d...[[id number]]  Abnormal            Final result                 Please view results for these tests on the individual orders.   EXTRA TUBE    Narrative:     The following orders were created for panel order Extra Tube.  Procedure                               Abnormality         Status                     ---------                               -----------         ------                     Extra Blue Top Tube[[id number]]                              Final result                 Please view results for these tests on the individual orders.       Procedures  None    Emergency Department Course:  Reviewed:  0015 I reviewed the patient's nursing notes, vitals, past medical records, Care Everywhere.     Assessments/Consults:  0021 I performed an exam as documented above.  ED Course as of Oct 11 0351   Mon Oct 11, 2021   0347 I spoke with Dr. Rendon of the hospitalist service at [facility] regarding the patient's symptoms and treatment options.            Interventions:  Medications   ipratropium - albuterol 0.5 mg/2.5 mg/3 mL (DUONEB) neb solution 3 mL (3 mLs Nebulization Given 10/11/21 0010)   predniSONE (DELTASONE) tablet 60 mg (60 mg Oral Given 10/11/21 0010)   magnesium sulfate 2 g in water intermittent infusion (0 g Intravenous Stopped 10/11/21 0221)   ipratropium - albuterol 0.5 mg/2.5 mg/3 mL (DUONEB) neb solution 3 mL (3 mLs Nebulization Given 10/11/21 0032)    ipratropium - albuterol 0.5 mg/2.5 mg/3 mL (DUONEB) neb solution 3 mL (3 mLs Nebulization Given 10/11/21 8863)     Disposition:  The patient was admitted to the hospital under the care of Dr. Rendon.     Impression & Plan     Medical Decision Making:  Gerson Pearson is a 61 year old male with history of shortness of breath.  Has a history of COPD and has been having worsening breathing and wheezing over the last couple of days.  States he has been using his breathing treatments with minimal short relief.  In my evaluation he did have wheezing bilaterally.  He was given steroids along with breathing treatments and magnesium.  This did greatly improve his breathing, but unfortunately he continued to have a degree of hypoxia on room air.  Patient was ultimately admitted for continued management of what seems to be COPD exacerbation.    Covid-19  Gersno Pearson was evaluated during a global COVID-19 pandemic, which necessitated consideration that the patient might be at risk for infection with the SARS-CoV-2 virus that causes COVID-19.   Applicable protocols for evaluation were followed during the patient's care.   COVID-19 was considered as part of the patient's evaluation. The plan for testing is:  a test was obtained during this visit.    Diagnosis:    ICD-10-CM    1. COPD exacerbation (H)  J44.1        Scribe Disclosure:  Win DE LA CRUZ, am serving as a scribe at 12:15 AM on 10/11/2021 to document services personally performed by Manjeet Kwon MD based on my observations and the provider's statements to me.      Manjeet Kwon MD  10/12/21 8944

## 2021-10-11 NOTE — PROGRESS NOTES
.RECEIVING UNIT ED HANDOFF REVIEW    ED Nurse Handoff Report was reviewed by: Radha Vela RN on October 11, 2021 at 7:55 AM

## 2021-10-11 NOTE — PLAN OF CARE
Summary: COPD exacerbation     DATE & TIME: 10/11/21 6721-8603  Cognitive Concerns/ Orientation : A&Ox4  BEHAVIOR & AGGRESSION TOOL COLOR: green  CIWA SCORE:N/A  ABNL VS/O2: VSS, 94% on 2L NC  MOBILITY: Standby assist, independent, steady on feet, minimal assist with O2 to get to bathroom  PAIN MANAGMENT: denies  DIET: regular, tolerating   BOWEL/BLADDER. SBA/Independent to bathroom  ABNL LAB/BG: Pc02 56, covid negative  DRAIN/DEVICES: PIV right arm  TELEMETRY RHYTHM: none  SKIN: WDL  TESTS/PROCEDURES:none  D/C DAY/GOALS/PLACE: pending improvement of shortness of breath, pt on Iv steroids, nebs.  OTHER IMPORTANT INFO: per Patient lives in assisted living

## 2021-10-11 NOTE — PROGRESS NOTES
[facility]    Medicine Progress Note - Hospitalist Service       Date of Admission:  10/11/2021    Assessment & Plan         Gerson Pearson is a 61 year old male with PMHx of COPD, tobacco dependence, and chronic back pain who was admitted 10/11/21 after presenting with SOB. Admitted for further evaluation and treatment of COPD exacerbation.     Acute hypoxic respiratory failure in the setting of COPD exacerbation: Hypoxic to 88% per ED documentation. Follows with Pulmonology through [facility]. PFTs 6/23/21. Does not use oxygen PTA. Reports new, productive cough, but no other URI sx.   * Recent ED visit 9/1/21 at [facility] for COPD exacerbation, prescribed prednisone 20 mg X5 days and Zpak, prior to that COPD exacerbation in 8/3/2021   * CMP with mild hyponatremia, CBC with mild anemia. Trop negative. VBG 7.30/64/20/32. CXR no acute pathology. Covid PCR negative.   * Received 2g mag sulfate X1, prednisone 60 mg X1, and neb X3 in the ED  - Continue Solu-medrol 62.5 mg q8 hrs   - Continue doxycycline 100 mg BID   - Duonebs q4 hrs while awake, PRN albuterol neb available   - RCAT consulted, encourage pulmonary toilet with IS and acapella   - Ween oxygen as tolerated   - Continue PTA Breo Ellipta     Hyponatremia: Sodium 132 on admission.   - Repeat in the AM     Normocytic anemia: Stable ~12 on admission.   - Monitor     Hx of CVA: Right parieto-occipital lobe in 2019.   - Continue statin and ASA as above.    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 Stable central macular scar left eye.  - Noted     Tobacco dependence: 40 pack year tobacco hx with ongoing tobacco use.   - Nicotine patch ordered on admission     Opioid use disorder, severe, on maintenance therapy   Chronic back pain: Continue PTA Suboxone    CAD with hx of NSTEMI s/p BMS to RCA (6/25/21020)  Ischemic cardiomyopathy (EF 50-55%, 5/2021)  HTN  Hyperlipidemia: Had been on Prasugrel which was stopped 6/25/21. Followed by   [facility].   * TTE, 5/27/2021: With normal left ventricular size and thickness, normal left ventricular systolic function, basal to mid inferior and inferolateral wall motion abnormality, normal right ventricular size and systolic function, no significant valvular abnormalities.  - Continue PTA ASA, statin, lisinopril and Coreg     MDD  Insomnia: Continue PTA Remeron    GERD with hx of ulcer: Continue PTA Protonix     Covid status: Negative PCR on admission. Pt is vaccinated with Moderna 3/1/21 and 3/29/21.      Diet: Combination Diet Regular Diet Adult    DVT Prophylaxis: Pneumatic Compression Devices  Post Catheter: Not present  Central Lines: None  Code Status: Full Code, confirmed with pt.     Disposition Plan   Expected discharge: 10/13/2021   recommended to prior living arrangement ([facility]) once O2 weened     The patient's care was discussed with the Attending Physician, Dr. Hill, Bedside Nurse and Patient.    Merna Thomas PA-C  Hospitalist Service  [facility]  Securely message with the Vocera Web Console (learn more here)  Text page via [employer] Paging/Directory  ___________________________________________________________________    Interval History   Resting comfortably in bed. Reports breathing is improving, but not back to baseline. Afebrile. Saturating at 93% on 2 LPM supplemental oxygen via NC. Reports mild, productive cough. No additional URI sx or fever. Continues to smoke.     Data reviewed today: I reviewed all medications, new labs and imaging results over the last 24 hours. I personally reviewed all labs and imaging to date.     Physical Exam   Vital Signs: Temp: 98.7  F (37.1  C) Temp src: Oral BP: (!) 147/106 Pulse: 84   Resp: 22 SpO2: 94 % O2 Device: Nasal cannula Oxygen Delivery: 2 LPM  Weight: 130 lbs 0 oz    CONSTITUTIONAL: Pt laying in bed, dressed in hospital garb. Appears comfortable. Cooperative with interview.    HEENT: Normocephalic, atraumatic. Baseline anisocoria  CARDIOVASCULAR: RRR, no murmurs, rubs, or extra heart sounds appreciated. Pulses +2/4 and regular in upper and lower extremities, bilaterally.   RESPIRATORY: No increased work of breathing.  Supplemental oxygenation via NC at 2 LPM. Course lung sounds throughout all fields. No appreciable wheezes.   GASTROINTESTINAL:  Abdomen soft, non-distended. BS auscultated in all four quadrants. Negative for tenderness to palpation.  No masses or organomegaly noted.  MUSCULOSKELETAL: No gross deformities noted. Normal muscle tone.   HEMATOLOGIC/LYMPHATIC/IMMUNOLOGIC: Negative for lower extremity edema, bilaterally.  NEUROLOGIC: Alert and oriented to person, place, and time.  strength intact. No focal neuro deficits.   SKIN: Warm, dry, intact. No jaundice noted. Negative for suspicious lesions, rashes, bruising, open sores or abrasions.     Data   Recent Labs   Lab 10/10/21  2238   WBC 5.9   HGB 12.8*   MCV 94      *   POTASSIUM 4.1   CHLORIDE 97   CO2 30   BUN 6*   CR 0.90   ANIONGAP 5   RL 8.7   *   ALBUMIN 3.8   PROTTOTAL 8.0   BILITOTAL 0.6   ALKPHOS 145   ALT 35   AST 28   TROPONIN <0.015     Recent Results (from the past 24 hour(s))   XR Chest Port 1 View    Narrative    EXAM: XR CHEST PORT 1 VIEW  LOCATION: [facility]  DATE/TIME: 10/11/2021 1:00 AM    INDICATION: Shortness of breath.  COMPARISON: 7/29/2021.    FINDINGS: The heart size is normal. Mild atelectasis or scar at the left lung base. Lungs are otherwise clear. No pneumothorax.      Impression    IMPRESSION: No acute abnormality.     Medications       acetaminophen  975 mg Oral Q8H     aspirin  81 mg Oral Daily     atorvastatin  40 mg Oral At Bedtime     buprenorphine-naloxone  1 tablet Sublingual BID     carvedilol  6.25 mg Oral BID w/meals     doxycycline hyclate  100 mg Oral BID     fluticasone-vilanterol  1 puff Inhalation Daily     ipratropium -  albuterol 0.5 mg/2.5 mg/3 mL  3 mL Nebulization 4x daily     lisinopril  5 mg Oral Daily     methylPREDNISolone  62.5 mg Intravenous Q8H     mirtazapine  30 mg Oral At Bedtime     nicotine  1 patch Transdermal Daily     nicotine   Transdermal Q8H     pantoprazole  40 mg Oral Daily     sodium chloride (PF)  3 mL Intracatheter Q8H

## 2021-10-11 NOTE — PHARMACY-ADMISSION MEDICATION HISTORY
Pharmacy Medication History  Admission medication history interview status for the 10/11/2021  admission is complete. See EPIC admission navigator for prior to admission medications     Location of Interview: Phone  Medication history sources: MAR ([facility])    Significant changes made to the medication list:  Added all meds to list, removed other list    In the past week, patient estimated taking medication this percent of the time: greater than 90%    Additional medication history information:   none    Medication reconciliation completed by provider prior to medication history? No    Time spent in this activity: 60 min    Prior to Admission medications    Medication Sig Last Dose Taking? Auth Provider   acetaminophen (TYLENOL) 325 MG tablet Take 650 mg by mouth every 4 hours as needed for mild pain prn at prn Yes Unknown, Entered By History   albuterol (PROAIR HFA/PROVENTIL HFA/VENTOLIN HFA) 108 (90 BASE) MCG/ACT Inhaler Inhale 2 puffs into the lungs every 4 hours as needed for shortness of breath / dyspnea or wheezing prn at prn Yes Kali Garner MD   albuterol (PROVENTIL) (2.5 MG/3ML) 0.083% neb solution Take 2.5 mg by nebulization every 4 hours as needed for wheezing prn at prn Yes Unknown, Entered By History   aspirin (ASA) 81 MG chewable tablet Take 81 mg by mouth daily 10/10/2021 at Unknown time Yes Unknown, Entered By History   atorvastatin (LIPITOR) 40 MG tablet Take 40 mg by mouth At Bedtime 10/10/2021 at Unknown time Yes Unknown, Entered By History   buprenorphine-naloxone (SUBOXONE) 8-2 MG SUBL sublingual tablet Place 1 tablet under the tongue 2 times daily X 28 days 10/10/2021 at Unknown time Yes Unknown, Entered By History   carvedilol (COREG) 6.25 MG tablet Take 6.25 mg by mouth 2 times daily (with meals) 10/10/2021 at Unknown time Yes Unknown, Entered By History   fluticasone-vilanterol (BREO ELLIPTA) 200-25 MCG/INH inhaler Inhale 1 puff into the lungs daily 10/10/2021 at  Unknown time Yes Unknown, Entered By History   lisinopril (ZESTRIL) 5 MG tablet Take 5 mg by mouth daily 10/10/2021 at Unknown time Yes Unknown, Entered By History   mirtazapine (REMERON) 30 MG tablet Take 30 mg by mouth At Bedtime 10/10/2021 at Unknown time Yes Unknown, Entered By History   multivitamin w/minerals (THERA-VIT-M) tablet Take 1 tablet by mouth daily 10/10/2021 at Unknown time Yes Unknown, Entered By History   naloxone (NARCAN) 4 MG/0.1ML nasal spray Spray 4 mg into one nostril alternating nostrils once as needed for opioid reversal every 2-3 minutes until assistance arrives prn at prn Yes Unknown, Entered By History   nicotine (COMMIT) 2 MG lozenge Place 2 mg inside cheek every hour as needed for smoking cessation prn at prn Yes Unknown, Entered By History   nicotine (NICODERM CQ) 14 MG/24HR 24 hr patch Place 1 patch onto the skin every 24 hours 10/6/2021 Yes Unknown, Entered By History   pantoprazole (PROTONIX) 40 MG EC tablet Take 40 mg by mouth daily 10/10/2021 at Unknown time Yes Unknown, Entered By History   tiotropium (SPIRIVA RESPIMAT) 2.5 MCG/ACT inhaler Inhale 2 puffs into the lungs daily 10/10/2021 at Unknown time Yes Unknown, Entered By History       The information provided in this note is only as accurate as the sources available at the time of update(s)

## 2021-10-11 NOTE — ED NOTES
[facility]  ED Nurse Handoff Report    ED Chief complaint: Shortness of Breath      ED Diagnosis:   Final diagnoses:   COPD exacerbation (H)       Code Status: TBD by admitting team    Allergies:   Allergies   Allergen Reactions     Ibuprofen Nausea and Vomiting       Patient Story: pt comes to the ED for COPD exacerbation, pt stated his neb machine had not been working, pt does not use O2 at home and does have a hx of exacerbation.      Focused Assessment:  On ED arrival pt was SOB and working hard to breath.    Treatments and/or interventions provided: IV places, nebs given x3. Mag given. XCR.  Patient's response to treatments and/or interventions: pt responded very well to nebs and 02, sats can easily get to 100% with nebs. But on RA pt would desat to 88. Multiple attempts were made with nebs and O2 but pt continues to desat of oxygen     To be done/followed up on inpatient unit:      Does this patient have any cognitive concerns?: AO4    Activity level - Baseline/Home:  Independent  Activity Level - Current:   Independent    Patient's Preferred language: English   Needed?: No    Isolation: None and COVID r/o and special precautions  Infection: Not Applicable  COVID r/o and special precautions  Patient tested for COVID 19 prior to admission: YES  Bariatric?: No    Vital Signs:   Vitals:    10/11/21 0300 10/11/21 0340 10/11/21 0400 10/11/21 0500   BP: (!) 151/101  (!) 156/101 (!) 139/92   Pulse: 86  82 86   Resp:       Temp:       TempSrc:       SpO2: 91% (!) 88% 94% 96%   Weight:       Height:           Cardiac Rhythm:     Was the PSS-3 completed:   Yes  What interventions are required if any?               Family Comments:   OBS brochure/video discussed/provided to patient/family: N/A              Name of person given brochure if not patient:               Relationship to patient:     For the majority of the shift this patient's behavior was Green.   Behavioral interventions performed  were none, very pleasant .    ED NURSE PHONE NUMBER: [id number]

## 2021-10-11 NOTE — ED TRIAGE NOTES
Pt reports hx of COPD. Woke up with SOB. Pt SOB during triage, unable to speak in full sentences. Pt reports wheezing/cough. Pt reports being dropped off by assisted living nurse. Pt reports home nebulizer not working today.

## 2021-10-12 LAB
ANION GAP SERPL CALCULATED.3IONS-SCNC: 7 MMOL/L (ref 3–14)
BUN SERPL-MCNC: 15 MG/DL (ref 7–30)
CALCIUM SERPL-MCNC: 9 MG/DL (ref 8.5–10.1)
CHLORIDE BLD-SCNC: 105 MMOL/L (ref 94–109)
CO2 SERPL-SCNC: 25 MMOL/L (ref 20–32)
CREAT SERPL-MCNC: 0.68 MG/DL (ref 0.66–1.25)
ERYTHROCYTE [DISTWIDTH] IN BLOOD BY AUTOMATED COUNT: 14.2 % (ref 10–15)
GFR SERPL CREATININE-BSD FRML MDRD: >90 ML/MIN/1.73M2
GLUCOSE BLD-MCNC: 129 MG/DL (ref 70–99)
HCT VFR BLD AUTO: 40.2 % (ref 40–53)
HGB BLD-MCNC: 13.3 G/DL (ref 13.3–17.7)
MCH RBC QN AUTO: 30 PG (ref 26.5–33)
MCHC RBC AUTO-ENTMCNC: 33.1 G/DL (ref 31.5–36.5)
MCV RBC AUTO: 91 FL (ref 78–100)
PLATELET # BLD AUTO: 221 10E3/UL (ref 150–450)
POTASSIUM BLD-SCNC: 4.1 MMOL/L (ref 3.4–5.3)
RBC # BLD AUTO: 4.43 10E6/UL (ref 4.4–5.9)
SODIUM SERPL-SCNC: 137 MMOL/L (ref 133–144)
WBC # BLD AUTO: 13.6 10E3/UL (ref 4–11)

## 2021-10-12 PROCEDURE — 250N000012 HC RX MED GY IP 250 OP 636 PS 637: Performed by: INTERNAL MEDICINE

## 2021-10-12 PROCEDURE — 85027 COMPLETE CBC AUTOMATED: CPT | Performed by: INTERNAL MEDICINE

## 2021-10-12 PROCEDURE — 250N000013 HC RX MED GY IP 250 OP 250 PS 637: Performed by: PHYSICIAN ASSISTANT

## 2021-10-12 PROCEDURE — 250N000011 HC RX IP 250 OP 636: Performed by: INTERNAL MEDICINE

## 2021-10-12 PROCEDURE — 94640 AIRWAY INHALATION TREATMENT: CPT | Mod: 76

## 2021-10-12 PROCEDURE — 99232 SBSQ HOSP IP/OBS MODERATE 35: CPT | Performed by: INTERNAL MEDICINE

## 2021-10-12 PROCEDURE — 120N000001 HC R&B MED SURG/OB

## 2021-10-12 PROCEDURE — 36415 COLL VENOUS BLD VENIPUNCTURE: CPT | Performed by: INTERNAL MEDICINE

## 2021-10-12 PROCEDURE — 999N000157 HC STATISTIC RCP TIME EA 10 MIN

## 2021-10-12 PROCEDURE — 94640 AIRWAY INHALATION TREATMENT: CPT

## 2021-10-12 PROCEDURE — 250N000013 HC RX MED GY IP 250 OP 250 PS 637: Performed by: INTERNAL MEDICINE

## 2021-10-12 PROCEDURE — 80048 BASIC METABOLIC PNL TOTAL CA: CPT | Performed by: INTERNAL MEDICINE

## 2021-10-12 PROCEDURE — 250N000009 HC RX 250: Performed by: INTERNAL MEDICINE

## 2021-10-12 RX ORDER — PREDNISONE 20 MG/1
40 TABLET ORAL DAILY
Status: DISCONTINUED | OUTPATIENT
Start: 2021-10-12 | End: 2021-10-13 | Stop reason: HOSPADM

## 2021-10-12 RX ORDER — PREDNISONE 10 MG/1
10 TABLET ORAL DAILY
Status: DISCONTINUED | OUTPATIENT
Start: 2021-10-21 | End: 2021-10-13 | Stop reason: HOSPADM

## 2021-10-12 RX ORDER — PREDNISONE 20 MG/1
20 TABLET ORAL DAILY
Status: DISCONTINUED | OUTPATIENT
Start: 2021-10-18 | End: 2021-10-13 | Stop reason: HOSPADM

## 2021-10-12 RX ADMIN — ACETAMINOPHEN 975 MG: 325 TABLET, FILM COATED ORAL at 23:34

## 2021-10-12 RX ADMIN — MIRTAZAPINE 30 MG: 15 TABLET, FILM COATED ORAL at 21:43

## 2021-10-12 RX ADMIN — IPRATROPIUM BROMIDE AND ALBUTEROL SULFATE 3 ML: .5; 3 SOLUTION RESPIRATORY (INHALATION) at 11:46

## 2021-10-12 RX ADMIN — METHYLPREDNISOLONE SODIUM SUCCINATE 62.5 MG: 125 INJECTION, POWDER, FOR SOLUTION INTRAMUSCULAR; INTRAVENOUS at 09:28

## 2021-10-12 RX ADMIN — ASPIRIN 81 MG CHEWABLE TABLET 81 MG: 81 TABLET CHEWABLE at 09:36

## 2021-10-12 RX ADMIN — IPRATROPIUM BROMIDE AND ALBUTEROL SULFATE 3 ML: .5; 3 SOLUTION RESPIRATORY (INHALATION) at 19:39

## 2021-10-12 RX ADMIN — DOXYCYCLINE 100 MG: 100 CAPSULE ORAL at 09:36

## 2021-10-12 RX ADMIN — CARVEDILOL 6.25 MG: 6.25 TABLET, FILM COATED ORAL at 18:05

## 2021-10-12 RX ADMIN — BUPRENORPHINE AND NALOXONE 1 FILM: 8; 2 FILM, SOLUBLE BUCCAL; SUBLINGUAL at 20:47

## 2021-10-12 RX ADMIN — FLUTICASONE FUROATE AND VILANTEROL TRIFENATATE 1 PUFF: 200; 25 POWDER RESPIRATORY (INHALATION) at 12:36

## 2021-10-12 RX ADMIN — CARVEDILOL 6.25 MG: 6.25 TABLET, FILM COATED ORAL at 09:36

## 2021-10-12 RX ADMIN — NICOTINE 1 PATCH: 14 PATCH, EXTENDED RELEASE TRANSDERMAL at 09:33

## 2021-10-12 RX ADMIN — IPRATROPIUM BROMIDE AND ALBUTEROL SULFATE 3 ML: .5; 3 SOLUTION RESPIRATORY (INHALATION) at 16:02

## 2021-10-12 RX ADMIN — DOXYCYCLINE 100 MG: 100 CAPSULE ORAL at 21:44

## 2021-10-12 RX ADMIN — PANTOPRAZOLE SODIUM 40 MG: 40 TABLET, DELAYED RELEASE ORAL at 09:36

## 2021-10-12 RX ADMIN — LISINOPRIL 5 MG: 5 TABLET ORAL at 09:36

## 2021-10-12 RX ADMIN — ACETAMINOPHEN 975 MG: 325 TABLET, FILM COATED ORAL at 15:44

## 2021-10-12 RX ADMIN — ACETAMINOPHEN 975 MG: 325 TABLET, FILM COATED ORAL at 09:37

## 2021-10-12 RX ADMIN — BUPRENORPHINE AND NALOXONE 1 FILM: 8; 2 FILM, SOLUBLE BUCCAL; SUBLINGUAL at 09:36

## 2021-10-12 RX ADMIN — PREDNISONE 40 MG: 20 TABLET ORAL at 15:44

## 2021-10-12 RX ADMIN — IPRATROPIUM BROMIDE AND ALBUTEROL SULFATE 3 ML: .5; 3 SOLUTION RESPIRATORY (INHALATION) at 07:20

## 2021-10-12 RX ADMIN — ATORVASTATIN CALCIUM 40 MG: 40 TABLET, FILM COATED ORAL at 21:44

## 2021-10-12 ASSESSMENT — ACTIVITIES OF DAILY LIVING (ADL)
ADLS_ACUITY_SCORE: 5
ADLS_ACUITY_SCORE: 7
ADLS_ACUITY_SCORE: 5
ADLS_ACUITY_SCORE: 5

## 2021-10-12 ASSESSMENT — ENCOUNTER SYMPTOMS
VOMITING: 0
ABDOMINAL PAIN: 0
WHEEZING: 1
NAUSEA: 0

## 2021-10-12 NOTE — PLAN OF CARE
Summary: COPD exacerbation     DATE & TIME: 10/12/21 1430-2486  Cognitive Concerns/ Orientation : A&O x4  BEHAVIOR & AGGRESSION TOOL COLOR: Green  ABNL VS/O2: VSS on RA (RA baseline). SOLER at times Walked in hallway on RA with sats 94-96%  MOBILITY: Independent, calls appropriately   PAIN MANAGMENT: Denies. Chronic pain, scheduled Suboxone. DIET: Regular  BOWEL/BLADDER. Continent, up to BR   ABNL LAB/BG: WBC 13.6  DRAIN/DEVICES: R arm PIV SL  SKIN: WDL  TESTS/PROCEDURES: None scheduled   D/C DAY/GOALS/PLACE: Pending improvement. Pt resides at [facility]  OTHER IMPORTANT INFO: q8h Solumedrol, scheduled nebs, BID Doxycycline. Frequent productive cough.

## 2021-10-12 NOTE — PROGRESS NOTES
CLINICAL NUTRITION SERVICES  -  ASSESSMENT NOTE      Recommendations Ordered by Registered Dietitian (RD):   Ensure Enlive Vanilla at 10 am and 2 pm daily   Malnutrition:   % Weight Loss:  None noted  % Intake:  </= 50% for >/= 5 days (severe malnutrition)  Subcutaneous Fat Loss:  No acute losses observed   Muscle Loss:  No acute losses observed though generally low muscle mass reserves   Fluid Retention:  None noted    Malnutrition Diagnosis: Patient does not meet two of the above criteria necessary for diagnosing malnutrition, but is at risk for malnutrition        REASON FOR ASSESSMENT  Gerson Pearson is a 61 year old male seen by Registered Dietitian for Admission Nutrition Risk Screen   Have you recently lost weight without trying? Yes (2-13#)  Have you been eating poorly because of a decreased appetite? Yes    Per chart review, PMH significant for COPD, tobacco dependence, and chronic back pain.  Admitted 2/2 COPD exacerbation.       NUTRITION HISTORY  - Information obtained from patient at bedside this afternoon.   - Pt is not previously known to clinical nutrition services.   - Pt follows a regular diet at home  - Typical food/fluid intake: Pt states his appetite fluctuates frequently so his PO intake is inconsistent. Some days he eats 3+ meals and other days is not able to take any PO. He does say he has Ensure supplements at home that he likes and occasionally drinks.   - Food allergies: None noted       CURRENT NUTRITION ORDERS  Diet Order:     Regular     Current Intake/Tolerance:  Per flowsheets, pt declined breakfast and lunch meals today. Ate 100% of one meal yesterday. Has some soup at bedside though states he is not feeling hungry right now. Pt was agreeable to having an Ensure supplement sent up this afternoon.       NUTRITION FOCUSED PHYSICAL ASSESSMENT FOR DIAGNOSING MALNUTRITION)  Yes         Observed:    No acute losses observed though generally low muscle reserves noted     Obtained from  "Chart/Interdisciplinary Team:  Last BM on 10/10 per pt report   No edema noted  Skin WDL    ANTHROPOMETRICS  Height: 5' 9\"  Weight: 130 lbs 0 oz  Body mass index is 19.2 kg/m .  Weight Status:  Normal BMI (though low end of range)  IBW: 73 kg   % IBW: 81%  Weight History: Wt fluctuates at baseline per pt report. No significant loss over the past year.   Wt Readings from Last 10 Encounters:   10/10/21 59 kg (130 lb)   07/29/21 61.2 kg (135 lb)   12/16/20 59 kg (130 lb)   09/21/20 59 kg (130 lb)   11/01/17 56.7 kg (125 lb)   06/15/17 55.1 kg (121 lb 6.4 oz)   11/30/11 63.5 kg (140 lb)   05/17/10 57.6 kg (127 lb)   05/14/10 56.7 kg (125 lb)   05/12/10 57.6 kg (127 lb)       LABS  Labs reviewed    MEDICATIONS  Medications reviewed  - solumedrol   - remeron   - protonix       ASSESSED NUTRITION NEEDS PER APPROVED PRACTICE GUIDELINES:    Dosing Weight 59 kg (actual wt on 10/10)  Estimated Energy Needs: 3350-3657 kcals (30-35 Kcal/Kg)  Justification: repletion  Estimated Protein Needs: 70-90 grams protein (1.2-1.5 g pro/Kg)  Justification: Repletion and preservation of lean body mass  Estimated Fluid Needs: 1 mL/Kcal or per MD pending fluid status       NUTRITION DIAGNOSIS:  Inadequate oral intake related to decreased appetite 2/2 COPD exacerbation, fatigue as evidenced by minimal PO intake x 5 days per pt report       NUTRITION INTERVENTIONS  Recommendations / Nutrition Prescription  Ensure Enlive Vanilla at 10 am and 2 pm daily  Continue Regular Diet   Consider need for MVI+M pending appetite and PO intake improvement       Implementation  Nutrition education: Introduced RD and role of care. Encouraged meal ordering as tolerated. Reviewed available ONS and indications for use. Encouraged intake of high calorie, high protein food items while appetite remains down.   Medical Food Supplement      Nutrition Goals  Pt will consume > 50% of meals TID + 2 supplements daily       MONITORING AND EVALUATION:  Progress towards " goals will be monitored and evaluated per protocol and Practice Guidelines      Nancy Vazquez RD, LD  Unit RD Pager: [phone]

## 2021-10-12 NOTE — PLAN OF CARE
DATE & TIME: 10/11/21 2785-2517  Cognitive Concerns/ Orientation : A&O x4  BEHAVIOR & AGGRESSION TOOL COLOR: Green  ABNL VS/O2: VSS, weaned down to 1L (RA baseline). SOLER at times   MOBILITY: Independent, calls appropriately   PAIN MANAGMENT: Denies. Chronic pain, scheduled Suboxone. Refused scheduled Tylenol  DIET: Regular  BOWEL/BLADDER. Continent, up to BR   ABNL LAB/BG: N/A  DRAIN/DEVICES: R arm PIV SL  SKIN: WDL  TESTS/PROCEDURES: None scheduled   D/C DAY/GOALS/PLACE: Pending improvement. Pt resides at [facility]  OTHER IMPORTANT INFO: q8h Solumedrol, scheduled nebs, BID Doxycycline. Frequent cough.

## 2021-10-12 NOTE — PROGRESS NOTES
[facility]  Hospitalist Progress Note  Jairo Rendon MD  10/12/2021    Assessment & Plan   Gerson Pearson is a 61 year old male with PMHx of COPD, tobacco dependence, and chronic back pain who was admitted 10/11/21 after presenting with SOB. Admitted for further evaluation and treatment of COPD exacerbation.      Acute hypoxic respiratory failure in the setting of COPD exacerbation: Hypoxic to 88% per ED documentation. Follows with Pulmonology through [facility]. PFTs 6/23/21. Does not use oxygen PTA. Reports new, productive cough, but no other URI sx.   * Recent ED visit 9/1/21 at [facility] for COPD exacerbation, prescribed prednisone 20 mg X5 days and Zpak, prior to that COPD exacerbation in 8/3/2021   * CMP with mild hyponatremia, CBC with mild anemia. Trop negative. VBG 7.30/64/20/32. CXR no acute pathology. Covid PCR negative.   * Received 2g mag sulfate X1, prednisone 60 mg X1, and neb X3 in the ED  - Change from Solu-medrol 62.5 mg q8 hrs to prednisone  - Continue doxycycline 100 mg BID for 7 days total  - Duonebs q4 hrs while awake, PRN albuterol neb available   - RCAT consulted, encourage pulmonary toilet with IS and acapella   - Weened off oxygen    - Continue PTA Breo Ellipta      Hyponatremia: Sodium 132 on admission.   - Repeat in the AM showed Na 137, SL IVF     Normocytic anemia: Stable ~12 on admission.   - Monitor      Hx of CVA: Right parieto-occipital lobe in 2019.   - Continue statin and ASA as above.     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 Stable central macular scar left eye.  - Noted      Tobacco dependence: 40 pack year tobacco hx with ongoing tobacco use.   - Nicotine patch ordered on admission      Opioid use disorder, severe, on maintenance therapy   Chronic back pain: Continue PTA Suboxone     CAD with hx of NSTEMI s/p BMS to RCA (6/25/21020)  Ischemic cardiomyopathy (EF 50-55%, 5/2021)  HTN  Hyperlipidemia: Had been on Prasugrel which was stopped  "6/25/21. Followed by Dr. Araujo of [facility].   * TTE, 5/27/2021: With normal left ventricular size and thickness, normal left ventricular systolic function, basal to mid inferior and inferolateral wall motion abnormality, normal right ventricular size and systolic function, no significant valvular abnormalities.  - Continue PTA ASA, statin, lisinopril and Coreg      MDD  Insomnia: Continue PTA Remeron     GERD with hx of ulcer: Continue PTA Protonix      Covid status: Negative PCR on admission. Pt is vaccinated with Moderna 3/1/21 and 3/29/21.   Diet: Combination Diet Regular Diet Adult    DVT Prophylaxis: Pneumatic Compression Devices  Post Catheter: Not present  Central Lines: None  Code Status: Full Code, confirmed with pt.      Disposition Plan   Expected discharge: 10/13/2021   recommended to prior living arrangement (detention) once O2 weened    Interval History   -- no acute issues  -- off oxygen    -Data reviewed today: I reviewed all new labs and imaging over the last 24 hours. I personally reviewed no images or EKG's today.    Physical Exam    , Blood pressure (!) 155/98, pulse 89, temperature 98.4  F (36.9  C), temperature source Oral, resp. rate 18, height 1.753 m (5' 9\"), weight 59 kg (130 lb), SpO2 94 %.  Vitals:    10/10/21 2153   Weight: 59 kg (130 lb)     Vital Signs with Ranges  Temp:  [98.3  F (36.8  C)-98.5  F (36.9  C)] 98.4  F (36.9  C)  Pulse:  [82-93] 89  Resp:  [18-22] 18  BP: (138-155)/(81-98) 155/98  SpO2:  [93 %-96 %] 94 %  I/O's Last 24 hours  I/O last 3 completed shifts:  In: 190 [P.O.:190]  Out: 700 [Urine:700]    Constitutional: Awake, alert, cooperative, no apparent distress  Respiratory: Mild ronchi bilateral  Cardiovascular: Regular rate and rhythm, normal S1 and S2, and no murmur noted  GI: Normal bowel sounds, soft, non-distended, non-tender  Skin/Integumen: No rashes, no cyanosis, no edema  Other:      Medications   All medications were reviewed.      " acetaminophen  975 mg Oral Q8H     aspirin  81 mg Oral Daily     atorvastatin  40 mg Oral At Bedtime     buprenorphine HCl-naloxone HCl  1 Film Sublingual BID     carvedilol  6.25 mg Oral BID w/meals     doxycycline hyclate  100 mg Oral BID     fluticasone-vilanterol  1 puff Inhalation Daily     ipratropium - albuterol 0.5 mg/2.5 mg/3 mL  3 mL Nebulization 4x daily     lisinopril  5 mg Oral Daily     methylPREDNISolone  62.5 mg Intravenous Q8H     mirtazapine  30 mg Oral At Bedtime     nicotine  1 patch Transdermal Daily     nicotine   Transdermal Q8H     pantoprazole  40 mg Oral Daily     sodium chloride (PF)  3 mL Intracatheter Q8H        Data   Recent Labs   Lab 10/12/21  0818 10/10/21  2238   WBC 13.6* 5.9   HGB 13.3 12.8*   MCV 91 94    198    132*   POTASSIUM 4.1 4.1   CHLORIDE 105 97   CO2 25 30   BUN 15 6*   CR 0.68 0.90   ANIONGAP 7 5   RL 9.0 8.7   * 118*   ALBUMIN  --  3.8   PROTTOTAL  --  8.0   BILITOTAL  --  0.6   ALKPHOS  --  145   ALT  --  35   AST  --  28   TROPONIN  --  <0.015       No results found for this or any previous visit (from the past 24 hour(s)).    Jairo Rendon MD  Text Page  (7am to 6pm)

## 2021-10-12 NOTE — PLAN OF CARE
Summary: COPD exacerbation     DATE & TIME: 10/12/21 8768-0766  Cognitive Concerns/ Orientation : A&O x4  BEHAVIOR & AGGRESSION TOOL COLOR: Green  ABNL VS/O2: VSS on RA (RA baseline). SOLER at times Walked in hallway on RA with sats 94-96%  MOBILITY: Independent, calls appropriately   PAIN MANAGMENT: Denies. Chronic pain, scheduled Suboxone.   DIET: Regular  BOWEL/BLADDER. Continent, up to BR   ABNL LAB/BG: WBC 13.6  DRAIN/DEVICES: R arm PIV SL  SKIN: WDL  TESTS/PROCEDURES: None scheduled   D/C DAY/GOALS/PLACE: Pending improvement. Pt resides at [facility], possible 10/13  OTHER IMPORTANT INFO: Steroids changed to oral today, scheduled nebs, BID Doxycycline. Frequent productive cough.

## 2021-10-13 VITALS
WEIGHT: 121.7 LBS | RESPIRATION RATE: 18 BRPM | HEART RATE: 78 BPM | BODY MASS INDEX: 18.02 KG/M2 | TEMPERATURE: 98.2 F | SYSTOLIC BLOOD PRESSURE: 151 MMHG | DIASTOLIC BLOOD PRESSURE: 95 MMHG | HEIGHT: 69 IN | OXYGEN SATURATION: 95 %

## 2021-10-13 PROCEDURE — 250N000013 HC RX MED GY IP 250 OP 250 PS 637: Performed by: INTERNAL MEDICINE

## 2021-10-13 PROCEDURE — 250N000013 HC RX MED GY IP 250 OP 250 PS 637: Performed by: PHYSICIAN ASSISTANT

## 2021-10-13 PROCEDURE — 999N000157 HC STATISTIC RCP TIME EA 10 MIN

## 2021-10-13 PROCEDURE — 94640 AIRWAY INHALATION TREATMENT: CPT | Mod: 76

## 2021-10-13 PROCEDURE — 250N000012 HC RX MED GY IP 250 OP 636 PS 637: Performed by: INTERNAL MEDICINE

## 2021-10-13 PROCEDURE — 250N000011 HC RX IP 250 OP 636: Performed by: INTERNAL MEDICINE

## 2021-10-13 PROCEDURE — 94640 AIRWAY INHALATION TREATMENT: CPT

## 2021-10-13 PROCEDURE — 250N000009 HC RX 250: Performed by: INTERNAL MEDICINE

## 2021-10-13 RX ORDER — PREDNISONE 10 MG/1
10 TABLET ORAL DAILY
Qty: 3 TABLET | Refills: 0 | Status: SHIPPED | OUTPATIENT
Start: 2021-10-21 | End: 2021-11-04

## 2021-10-13 RX ORDER — PREDNISONE 20 MG/1
40 TABLET ORAL DAILY
Qty: 6 TABLET | Refills: 0 | Status: SHIPPED | OUTPATIENT
Start: 2021-10-14 | End: 2021-10-13

## 2021-10-13 RX ORDER — PREDNISONE 20 MG/1
20 TABLET ORAL DAILY
Qty: 3 TABLET | Refills: 0 | Status: SHIPPED | OUTPATIENT
Start: 2021-10-18 | End: 2021-10-13

## 2021-10-13 RX ORDER — PREDNISONE 10 MG/1
30 TABLET ORAL DAILY
Qty: 9 TABLET | Refills: 0 | Status: SHIPPED | OUTPATIENT
Start: 2021-10-15 | End: 2021-11-04

## 2021-10-13 RX ORDER — PREDNISONE 10 MG/1
30 TABLET ORAL DAILY
Qty: 9 TABLET | Refills: 0 | Status: SHIPPED | OUTPATIENT
Start: 2021-10-15 | End: 2021-10-13

## 2021-10-13 RX ORDER — PREDNISONE 20 MG/1
20 TABLET ORAL DAILY
Qty: 3 TABLET | Refills: 0 | Status: SHIPPED | OUTPATIENT
Start: 2021-10-18 | End: 2021-11-04

## 2021-10-13 RX ORDER — PREDNISONE 10 MG/1
10 TABLET ORAL DAILY
Qty: 3 TABLET | Refills: 0 | Status: SHIPPED | OUTPATIENT
Start: 2021-10-21 | End: 2021-10-13

## 2021-10-13 RX ORDER — DOXYCYCLINE 100 MG/1
100 CAPSULE ORAL 2 TIMES DAILY
Qty: 8 CAPSULE | Refills: 0 | Status: SHIPPED | OUTPATIENT
Start: 2021-10-13 | End: 2021-10-17

## 2021-10-13 RX ORDER — PREDNISONE 20 MG/1
40 TABLET ORAL DAILY
Qty: 2 TABLET | Refills: 0 | Status: SHIPPED | OUTPATIENT
Start: 2021-10-14 | End: 2021-10-15

## 2021-10-13 RX ADMIN — ASPIRIN 81 MG CHEWABLE TABLET 81 MG: 81 TABLET CHEWABLE at 08:08

## 2021-10-13 RX ADMIN — NICOTINE 1 PATCH: 14 PATCH, EXTENDED RELEASE TRANSDERMAL at 08:10

## 2021-10-13 RX ADMIN — LISINOPRIL 5 MG: 5 TABLET ORAL at 08:08

## 2021-10-13 RX ADMIN — FLUTICASONE FUROATE AND VILANTEROL TRIFENATATE 1 PUFF: 200; 25 POWDER RESPIRATORY (INHALATION) at 08:10

## 2021-10-13 RX ADMIN — ACETAMINOPHEN 975 MG: 325 TABLET, FILM COATED ORAL at 08:08

## 2021-10-13 RX ADMIN — CARVEDILOL 6.25 MG: 6.25 TABLET, FILM COATED ORAL at 08:08

## 2021-10-13 RX ADMIN — IPRATROPIUM BROMIDE AND ALBUTEROL SULFATE 3 ML: .5; 3 SOLUTION RESPIRATORY (INHALATION) at 10:53

## 2021-10-13 RX ADMIN — PREDNISONE 40 MG: 20 TABLET ORAL at 08:08

## 2021-10-13 RX ADMIN — ONDANSETRON 4 MG: 2 INJECTION INTRAMUSCULAR; INTRAVENOUS at 02:52

## 2021-10-13 RX ADMIN — PANTOPRAZOLE SODIUM 40 MG: 40 TABLET, DELAYED RELEASE ORAL at 08:08

## 2021-10-13 RX ADMIN — ONDANSETRON 4 MG: 2 INJECTION INTRAMUSCULAR; INTRAVENOUS at 08:05

## 2021-10-13 RX ADMIN — IPRATROPIUM BROMIDE AND ALBUTEROL SULFATE 3 ML: .5; 3 SOLUTION RESPIRATORY (INHALATION) at 07:47

## 2021-10-13 RX ADMIN — DOXYCYCLINE 100 MG: 100 CAPSULE ORAL at 08:08

## 2021-10-13 ASSESSMENT — ACTIVITIES OF DAILY LIVING (ADL)
ADLS_ACUITY_SCORE: 7

## 2021-10-13 ASSESSMENT — MIFFLIN-ST. JEOR: SCORE: 1352.41

## 2021-10-13 NOTE — PLAN OF CARE
Summary: COPD exacerbation     DATE & TIME: 10/12/21 [id number]  Cognitive Concerns/ Orientation : A&O x4  BEHAVIOR & AGGRESSION TOOL COLOR: Green  ABNL VS/O2: VSS on RA (RA baseline). SOLER  MOBILITY: Independent, calls appropriately   PAIN MANAGMENT: Chronic pain, scheduled Suboxone and Tylenol. Pt. reported HA  DIET: Regular  BOWEL/BLADDER. Continent, up to BR   ABNL LAB/BG: WBC 13.6  DRAIN/DEVICES: R arm PIV SL  SKIN: WDL  TESTS/PROCEDURES: None scheduled   D/C DAY/GOALS/PLACE: 10/13 back to [facility], no time scheduled  OTHER IMPORTANT INFO: PO prednisone taper, scheduled nebs, BID Doxycycline. Infrequent productive cough. Emesis x1 IV Zofran given.

## 2021-10-13 NOTE — CONSULTS
Care Management Initial Consult    General Information  Assessment completed with: Patient, Care Team Member, Manager of group home [facility]  Type of CM/SW Visit: Initial Assessment    Primary Care Provider verified and updated as needed: Yes   Readmission within the last 30 days: no previous admission in last 30 days      Reason for Consult: discharge planning  Advance Care Planning: Advance Care Planning Reviewed: no concerns identified          Communication Assessment  Patient's communication style: spoken language (English or Bilingual)    Hearing Difficulty or Deaf: no   Wear Glasses or Blind: yes    Cognitive  Cognitive/Neuro/Behavioral: WDL                      Living Environment:   People in home: facility resident     Current living Arrangements: group home      Able to return to prior arrangements: yes  Living Arrangement Comments: 4 resident male group [facility], spouse lives independently    Family/Social Support:  Care provided by: self, other (see comments) ([facility] [place])  Provides care for: no one  Marital Status:   Facility resident(s)/Staff (Mickey)             Current Resources:   Patient receiving home care services: No     Community Resources: [facility], Group Home  Equipment currently used at home: none  Supplies currently used at home: Nebulizer tubing    Employment/Financial:  Employment Status: disabled        Financial Concerns: No concerns identified           Functional Status:  Prior to admission patient needed assistance: Pt resides in a group home that provides 24 hr supportive services.           Mental Health Status:  Mental Health Status: No Current Concerns       Chemical Dependency Status:  Chemical Dependency Status: No Current Concerns             Values/Beliefs:  Spiritual, Cultural Beliefs, Judaism Practices, Values that affect care:    No             Additional Information:  Planning potential discharge today.  Pt resides at a residential  [facility] in [place].  He is one of four male residents.  Pt is , with his spouse living independently and visits pt. periodically.  Conversed with Manager of [facility],Mickey ([phone]).  They dispense pt's medications and he receives nebulizer treatments.  Pt is on Suboxone [facility]  Mickey will take care of schedulking pt's appointment with the Suboxone clinic and with his [facility] PCP.  If there are any new medications they should be sent to [facility] [place].  Pt will need transportation set-up through [employer] ([employer]      Sue Riggs RN   Inpatient Care Management  [phone]

## 2021-10-13 NOTE — PLAN OF CARE
Discharge    Patient discharged to [facility] in [place] via taxicab.    Care plan note: LS coarse with infrequent nonproductive cough. O2sats 90's RA; no SOB/pain. Patient c/o N/V this morning - Zofran given with relief. Patient has no new c/o's. Discharge instructions were provided. Patient verbalized understanding of all information received.     Listed belongings gathered and given to patient (including from security/pharmacy). Yes  Care Plan and Patient education resolved: Yes  Prescriptions if needed, hard copies sent with patient  NA  Medication Bin checked and emptied on discharge Yes  SW/care coordinator/charge RN aware of discharge: Yes

## 2021-10-14 ENCOUNTER — PATIENT OUTREACH (OUTPATIENT)
Dept: CARE COORDINATION | Facility: CLINIC | Age: 61
End: 2021-10-14

## 2021-10-14 DIAGNOSIS — Z71.89 OTHER SPECIFIED COUNSELING: ICD-10-CM

## 2021-10-14 NOTE — PROGRESS NOTES
Clinic Care Coordination Contact    Background: Care Coordination referral placed from [facility] discharge report for reason of patient meeting criteria for a TCM outreach call by [facility] team.    Assessment: Upon chart review, [facility] Team member will cancel/close the referral for TCM outreach due to reason below:     Patient has discharged to another healthcare facility, skilled nursing facility or group home.     Plan: Care Coordination referral for TCM outreach canceled.    Jyotsna Mattson  Community Health Worker  [facility]  [phone]

## 2021-11-03 ENCOUNTER — HOSPITAL ENCOUNTER (EMERGENCY)
Facility: CLINIC | Age: 61
Discharge: HOME | End: 2021-11-04
Attending: EMERGENCY MEDICINE | Admitting: EMERGENCY MEDICINE
Payer: MEDICARE

## 2021-11-03 DIAGNOSIS — J44.1 COPD EXACERBATION (H): ICD-10-CM

## 2021-11-03 PROCEDURE — 80053 COMPREHEN METABOLIC PANEL: CPT | Performed by: EMERGENCY MEDICINE

## 2021-11-03 PROCEDURE — 85025 COMPLETE CBC W/AUTO DIFF WBC: CPT | Performed by: EMERGENCY MEDICINE

## 2021-11-03 PROCEDURE — 99284 EMERGENCY DEPT VISIT MOD MDM: CPT | Mod: 25

## 2021-11-03 PROCEDURE — 83735 ASSAY OF MAGNESIUM: CPT | Performed by: INTERNAL MEDICINE

## 2021-11-03 PROCEDURE — 36415 COLL VENOUS BLD VENIPUNCTURE: CPT | Performed by: EMERGENCY MEDICINE

## 2021-11-03 PROCEDURE — 99285 EMERGENCY DEPT VISIT HI MDM: CPT | Mod: 25

## 2021-11-04 ENCOUNTER — APPOINTMENT (OUTPATIENT)
Dept: GENERAL RADIOLOGY | Facility: CLINIC | Age: 61
End: 2021-11-04
Attending: EMERGENCY MEDICINE
Payer: MEDICARE

## 2021-11-04 VITALS
DIASTOLIC BLOOD PRESSURE: 81 MMHG | OXYGEN SATURATION: 98 % | SYSTOLIC BLOOD PRESSURE: 126 MMHG | RESPIRATION RATE: 16 BRPM | TEMPERATURE: 99 F | HEART RATE: 71 BPM

## 2021-11-04 LAB
ALBUMIN SERPL-MCNC: 3.9 G/DL (ref 3.4–5)
ALP SERPL-CCNC: 120 U/L (ref 40–150)
ALT SERPL W P-5'-P-CCNC: 25 U/L (ref 0–70)
ANION GAP SERPL CALCULATED.3IONS-SCNC: 3 MMOL/L (ref 3–14)
AST SERPL W P-5'-P-CCNC: 19 U/L (ref 0–45)
BASOPHILS # BLD AUTO: 0.1 10E3/UL (ref 0–0.2)
BASOPHILS NFR BLD AUTO: 1 %
BILIRUB SERPL-MCNC: 0.9 MG/DL (ref 0.2–1.3)
BUN SERPL-MCNC: 14 MG/DL (ref 7–30)
CALCIUM SERPL-MCNC: 8.5 MG/DL (ref 8.5–10.1)
CHLORIDE BLD-SCNC: 103 MMOL/L (ref 94–109)
CO2 SERPL-SCNC: 28 MMOL/L (ref 20–32)
CREAT SERPL-MCNC: 1 MG/DL (ref 0.66–1.25)
EOSINOPHIL # BLD AUTO: 0.3 10E3/UL (ref 0–0.7)
EOSINOPHIL NFR BLD AUTO: 4 %
ERYTHROCYTE [DISTWIDTH] IN BLOOD BY AUTOMATED COUNT: 14.1 % (ref 10–15)
FLUAV RNA SPEC QL NAA+PROBE: NEGATIVE
FLUBV RNA RESP QL NAA+PROBE: NEGATIVE
GFR SERPL CREATININE-BSD FRML MDRD: 81 ML/MIN/1.73M2
GLUCOSE BLD-MCNC: 110 MG/DL (ref 70–99)
HCO3 BLDV-SCNC: 29 MMOL/L (ref 21–28)
HCT VFR BLD AUTO: 39.3 % (ref 40–53)
HGB BLD-MCNC: 12.6 G/DL (ref 13.3–17.7)
HOLD SPECIMEN: NORMAL
IMM GRANULOCYTES # BLD: 0 10E3/UL
IMM GRANULOCYTES NFR BLD: 0 %
LACTATE BLD-SCNC: 0.3 MMOL/L
LYMPHOCYTES # BLD AUTO: 1.4 10E3/UL (ref 0.8–5.3)
LYMPHOCYTES NFR BLD AUTO: 19 %
MAGNESIUM SERPL-MCNC: 2.4 MG/DL (ref 1.6–2.3)
MCH RBC QN AUTO: 30.4 PG (ref 26.5–33)
MCHC RBC AUTO-ENTMCNC: 32.1 G/DL (ref 31.5–36.5)
MCV RBC AUTO: 95 FL (ref 78–100)
MONOCYTES # BLD AUTO: 0.9 10E3/UL (ref 0–1.3)
MONOCYTES NFR BLD AUTO: 12 %
NEUTROPHILS # BLD AUTO: 4.7 10E3/UL (ref 1.6–8.3)
NEUTROPHILS NFR BLD AUTO: 64 %
NRBC # BLD AUTO: 0 10E3/UL
NRBC BLD AUTO-RTO: 0 /100
PCO2 BLDV: 64 MM HG (ref 40–50)
PH BLDV: 7.27 [PH] (ref 7.32–7.43)
PLATELET # BLD AUTO: 182 10E3/UL (ref 150–450)
PO2 BLDV: 37 MM HG (ref 25–47)
POTASSIUM BLD-SCNC: 4.2 MMOL/L (ref 3.4–5.3)
PROT SERPL-MCNC: 7.8 G/DL (ref 6.8–8.8)
RBC # BLD AUTO: 4.15 10E6/UL (ref 4.4–5.9)
SAO2 % BLDV: 61 % (ref 94–100)
SARS-COV-2 RNA RESP QL NAA+PROBE: NEGATIVE
SODIUM SERPL-SCNC: 134 MMOL/L (ref 133–144)
WBC # BLD AUTO: 7.2 10E3/UL (ref 4–11)

## 2021-11-04 PROCEDURE — 96365 THER/PROPH/DIAG IV INF INIT: CPT

## 2021-11-04 PROCEDURE — 71045 X-RAY EXAM CHEST 1 VIEW: CPT

## 2021-11-04 PROCEDURE — C9803 HOPD COVID-19 SPEC COLLECT: HCPCS

## 2021-11-04 PROCEDURE — 96375 TX/PRO/DX INJ NEW DRUG ADDON: CPT

## 2021-11-04 PROCEDURE — 250N000009 HC RX 250

## 2021-11-04 PROCEDURE — 250N000012 HC RX MED GY IP 250 OP 636 PS 637: Mod: GY | Performed by: EMERGENCY MEDICINE

## 2021-11-04 PROCEDURE — 250N000011 HC RX IP 250 OP 636: Performed by: EMERGENCY MEDICINE

## 2021-11-04 PROCEDURE — 99223 1ST HOSP IP/OBS HIGH 75: CPT | Mod: AI | Performed by: INTERNAL MEDICINE

## 2021-11-04 PROCEDURE — 250N000009 HC RX 250: Performed by: EMERGENCY MEDICINE

## 2021-11-04 PROCEDURE — 94640 AIRWAY INHALATION TREATMENT: CPT

## 2021-11-04 PROCEDURE — 87636 SARSCOV2 & INF A&B AMP PRB: CPT | Performed by: EMERGENCY MEDICINE

## 2021-11-04 PROCEDURE — 82803 BLOOD GASES ANY COMBINATION: CPT

## 2021-11-04 RX ORDER — NICOTINE 21 MG/24HR
1 PATCH, TRANSDERMAL 24 HOURS TRANSDERMAL DAILY
Status: CANCELLED | OUTPATIENT
Start: 2021-11-04

## 2021-11-04 RX ORDER — ACETAMINOPHEN 650 MG/1
650 SUPPOSITORY RECTAL EVERY 6 HOURS PRN
Status: CANCELLED | OUTPATIENT
Start: 2021-11-04

## 2021-11-04 RX ORDER — PREDNISONE 20 MG/1
60 TABLET ORAL ONCE
Status: COMPLETED | OUTPATIENT
Start: 2021-11-04 | End: 2021-11-04

## 2021-11-04 RX ORDER — PROCHLORPERAZINE MALEATE 10 MG
10 TABLET ORAL EVERY 6 HOURS PRN
Status: CANCELLED | OUTPATIENT
Start: 2021-11-04

## 2021-11-04 RX ORDER — IPRATROPIUM BROMIDE AND ALBUTEROL SULFATE 2.5; .5 MG/3ML; MG/3ML
3 SOLUTION RESPIRATORY (INHALATION) ONCE
Status: COMPLETED | OUTPATIENT
Start: 2021-11-04 | End: 2021-11-04

## 2021-11-04 RX ORDER — ATORVASTATIN CALCIUM 40 MG/1
40 TABLET, FILM COATED ORAL AT BEDTIME
Status: CANCELLED | OUTPATIENT
Start: 2021-11-04

## 2021-11-04 RX ORDER — PANTOPRAZOLE SODIUM 40 MG/1
40 TABLET, DELAYED RELEASE ORAL DAILY
Status: CANCELLED | OUTPATIENT
Start: 2021-11-04

## 2021-11-04 RX ORDER — POLYETHYLENE GLYCOL 3350 17 G
2 POWDER IN PACKET (EA) ORAL
Status: CANCELLED | OUTPATIENT
Start: 2021-11-04

## 2021-11-04 RX ORDER — MAGNESIUM SULFATE HEPTAHYDRATE 40 MG/ML
2 INJECTION, SOLUTION INTRAVENOUS ONCE
Status: COMPLETED | OUTPATIENT
Start: 2021-11-04 | End: 2021-11-04

## 2021-11-04 RX ORDER — ONDANSETRON 4 MG/1
4 TABLET, ORALLY DISINTEGRATING ORAL EVERY 6 HOURS PRN
Status: CANCELLED | OUTPATIENT
Start: 2021-11-04

## 2021-11-04 RX ORDER — ALBUTEROL SULFATE 0.83 MG/ML
2.5 SOLUTION RESPIRATORY (INHALATION) EVERY 4 HOURS PRN
Status: CANCELLED | OUTPATIENT
Start: 2021-11-04

## 2021-11-04 RX ORDER — PREDNISONE 20 MG/1
40 TABLET ORAL DAILY
Status: CANCELLED | OUTPATIENT
Start: 2021-11-04

## 2021-11-04 RX ORDER — IPRATROPIUM BROMIDE AND ALBUTEROL SULFATE 2.5; .5 MG/3ML; MG/3ML
SOLUTION RESPIRATORY (INHALATION)
Status: COMPLETED
Start: 2021-11-04 | End: 2021-11-04

## 2021-11-04 RX ORDER — ASPIRIN 81 MG/1
81 TABLET ORAL DAILY
Status: CANCELLED | OUTPATIENT
Start: 2021-11-04

## 2021-11-04 RX ORDER — AMOXICILLIN 250 MG
1 CAPSULE ORAL 2 TIMES DAILY PRN
Status: CANCELLED | OUTPATIENT
Start: 2021-11-04

## 2021-11-04 RX ORDER — AMOXICILLIN 250 MG
2 CAPSULE ORAL 2 TIMES DAILY PRN
Status: CANCELLED | OUTPATIENT
Start: 2021-11-04

## 2021-11-04 RX ORDER — ONDANSETRON 2 MG/ML
4 INJECTION INTRAMUSCULAR; INTRAVENOUS ONCE
Status: COMPLETED | OUTPATIENT
Start: 2021-11-04 | End: 2021-11-04

## 2021-11-04 RX ORDER — ACETAMINOPHEN 325 MG/1
650 TABLET ORAL EVERY 6 HOURS PRN
Status: CANCELLED | OUTPATIENT
Start: 2021-11-04

## 2021-11-04 RX ORDER — PROCHLORPERAZINE 25 MG
25 SUPPOSITORY, RECTAL RECTAL EVERY 12 HOURS PRN
Status: CANCELLED | OUTPATIENT
Start: 2021-11-04

## 2021-11-04 RX ORDER — PREDNISONE 20 MG/1
60 TABLET ORAL DAILY
Qty: 12 TABLET | Refills: 0 | Status: ON HOLD | OUTPATIENT
Start: 2021-11-04 | End: 2021-11-06

## 2021-11-04 RX ORDER — ONDANSETRON 2 MG/ML
4 INJECTION INTRAMUSCULAR; INTRAVENOUS EVERY 6 HOURS PRN
Status: CANCELLED | OUTPATIENT
Start: 2021-11-04

## 2021-11-04 RX ORDER — LIDOCAINE 40 MG/G
CREAM TOPICAL
Status: CANCELLED | OUTPATIENT
Start: 2021-11-04

## 2021-11-04 RX ADMIN — MAGNESIUM SULFATE HEPTAHYDRATE 2 G: 40 INJECTION, SOLUTION INTRAVENOUS at 00:21

## 2021-11-04 RX ADMIN — IPRATROPIUM BROMIDE AND ALBUTEROL SULFATE 3 ML: .5; 3 SOLUTION RESPIRATORY (INHALATION) at 03:45

## 2021-11-04 RX ADMIN — IPRATROPIUM BROMIDE AND ALBUTEROL SULFATE 3 ML: .5; 3 SOLUTION RESPIRATORY (INHALATION) at 01:20

## 2021-11-04 RX ADMIN — IPRATROPIUM BROMIDE AND ALBUTEROL SULFATE 3 ML: 2.5; .5 SOLUTION RESPIRATORY (INHALATION) at 00:07

## 2021-11-04 RX ADMIN — PREDNISONE 60 MG: 20 TABLET ORAL at 00:12

## 2021-11-04 RX ADMIN — ONDANSETRON 4 MG: 2 INJECTION INTRAMUSCULAR; INTRAVENOUS at 02:15

## 2021-11-04 RX ADMIN — IPRATROPIUM BROMIDE AND ALBUTEROL SULFATE 3 ML: .5; 3 SOLUTION RESPIRATORY (INHALATION) at 00:07

## 2021-11-04 ASSESSMENT — ENCOUNTER SYMPTOMS
NAUSEA: 0
SHORTNESS OF BREATH: 1
ABDOMINAL PAIN: 0
SORE THROAT: 0
RHINORRHEA: 0
WHEEZING: 1
FEVER: 0
COUGH: 0
VOMITING: 0

## 2021-11-04 ASSESSMENT — ACTIVITIES OF DAILY LIVING (ADL)
ADLS_ACUITY_SCORE: 12

## 2021-11-04 NOTE — ED NOTES
Took the patient for a walk down the hallway and back without O2.  Patient walked fine; but slow.  His stats remained around 77 HR with O2 at 94% room air.

## 2021-11-04 NOTE — ED PROVIDER NOTES
History     Chief Complaint:  Shortness of breath      The history is provided by the patient.      Gerson Pearson is a 61 year old male with a history of COPD who presents with shortness of breath. Patient was first noted to be short of breath at around 2200. EMS was called and he his room air saturation was at 85%. He was administered a Duoneb en route to the ER and his saturation has since improved to the 90s. At bedside, he has auditory expiratory wheezing. He was treated this ED for similar symptoms back in 10/11/2021. No fever, cough, sore throat, or rhinorrhea.    Review of Systems   Constitutional: Negative for fever.   HENT: Negative for rhinorrhea and sore throat.    Respiratory: Positive for shortness of breath and wheezing. Negative for cough.    Cardiovascular: Negative for chest pain.   Gastrointestinal: Negative for abdominal pain, nausea and vomiting.   All other systems reviewed and are negative.      Allergies:  Ibuprofen    Medications:    Albuterol   Lipitor   Suboxone   Coreg   Breo Ellipta   Lisinopril   Remeron   Narcan   Protonix   Deltasone   Tiotropium     Past Medical History:    COPD  Depressive disorder   MI  Hypovitaminosis D  unknown etiology: Right parieto-occipital lobe  Polysubstance abuse   Opioid use disorder, severe, on maintenance therapy   Rectal abnormality   Insomnia   Tooth pain  Posttraumatic chorioretinal scar of left eye   Somnolence  Anisocoria   Acute blood loss anemia  Acute upper GI bleed  Esophageal candidiasis   Pneumonia due to organism      Past Surgical History:    Patient denies pertinent past surgical history.     Family History:    Mother - DM, Cataracts, heart disease, HTN  Brother - lung cancer     Social History:  Patient was unaccompanied to the ED.  Lives in a group home   Hx of alcohol use   Hx of tobacco use (current smoker)  Hx of polysubstance abuse     Physical Exam     Patient Vitals for the past 24 hrs:   BP Temp Temp src Pulse Resp SpO2    11/04/21 0630 126/81 -- -- 71 16 98 %   11/04/21 0500 128/79 -- -- 68 -- 99 %   11/04/21 0300 -- -- -- -- -- 93 %   11/04/21 0137 120/82 -- -- 76 16 96 %   11/04/21 0104 -- -- -- 76 20 (!) 87 %   11/04/21 0101 -- -- -- 77 20 (!) 88 %   11/04/21 0048 91/74 99  F (37.2  C) Oral 77 -- 100 %   11/03/21 2355 (!) 142/97 -- -- 85 22 93 %       Physical Exam  General: Appears well-developed and well-nourished.   Head: No signs of trauma.   CV: Normal rate and regular rhythm.    Resp: Wheezing and intermittent cough  GI: Soft. There is no tenderness.  No rebound or guarding.  Normal bowel sounds.  MSK: Normal range of motion. no edema. No Calf tenderness.  Neuro: The patient is alert and oriented. Speech normal.  Skin: Skin is warm and dry. No rash noted.   Psych: normal mood and affect. behavior is normal.       Emergency Department Course   Imaging:  XR Chest Port 1 View  Final Result  IMPRESSION: Negative chest.    Per Radiology     Laboratory:  Labs Ordered and Resulted from Time of ED Arrival to Time of ED Departure   COMPREHENSIVE METABOLIC PANEL - Abnormal       Result Value    Sodium 134      Potassium 4.2      Chloride 103      Carbon Dioxide (CO2) 28      Anion Gap 3      Urea Nitrogen 14      Creatinine 1.00      Calcium 8.5      Glucose 110 (*)     Alkaline Phosphatase 120      AST 19      ALT 25      Protein Total 7.8      Albumin 3.9      Bilirubin Total 0.9      GFR Estimate 81     CBC WITH PLATELETS AND DIFFERENTIAL - Abnormal    WBC Count 7.2      RBC Count 4.15 (*)     Hemoglobin 12.6 (*)     Hematocrit 39.3 (*)     MCV 95      MCH 30.4      MCHC 32.1      RDW 14.1      Platelet Count 182      % Neutrophils 64      % Lymphocytes 19      % Monocytes 12      % Eosinophils 4      % Basophils 1      % Immature Granulocytes 0      NRBCs per 100 WBC 0      Absolute Neutrophils 4.7      Absolute Lymphocytes 1.4      Absolute Monocytes 0.9      Absolute Eosinophils 0.3      Absolute Basophils 0.1      Absolute  Immature Granulocytes 0.0      Absolute NRBCs 0.0     ISTAT GASES LACTATE VENOUS POCT - Abnormal    Lactic Acid POCT 0.3      Bicarbonate Venous POCT 29 (*)     O2 Sat, Venous POCT 61 (*)     pCO2V Venous POCT 64 (*)     pH Venous POCT 7.27 (*)     pO2 Venous POCT 37     MAGNESIUM - Abnormal    Magnesium 2.4 (*)    INFLUENZA A/B & SARS-COV2 PCR MULTIPLEX - Normal    Influenza A target Negative      Influenza B target Negative      SARS CoV2 PCR Negative       Emergency Department Course:    Reviewed:  I reviewed nursing notes, vitals, past history and care everywhere    Assessments:  0003 I obtained history and examined the patient as noted above.    I rechecked the patient and explained findings. I discussed plan for admission to the hospital.     0630 I rechecked the patient and decided to discharge him base on his improvement. I discussed plan for discharge home.     Consults:    I spoke with Dr. Herring from [facility], who accepts the patient for admission.     Interventions:  0007 Duoneb 3 ml Neb   0012 Deltasone 60 mg PO  0021 Magnesium sulfate 2 g IV   0120 Duoneb 3 ml Neb   0215 Zofran 4 mg IV   0345 Duoneb 3 ml IV     Disposition:  The patient was discharged to home.    Impression & Plan    Medical Decision Making:  Gerson Pearson is a 61-year-old gentleman who presents due to shortness of breath.  He has a history of COPD and does continue to smoke.  He comes from a group home.  Initially he was having wheezing and hypoxic.  He was given a DuoNeb with EMS and was given additional DuoNeb's along with prednisone and magnesium here.  Patient did have some response but initially I had planned to admit the patient.  Over time he seemed to continue to improve he was able to sleep without becoming hypoxic and ambulate without becoming hypoxic.  He did prefer to go home and given his overall improvement, I felt he was ultimately appropriate for discharge.  I did recommend that he try to stop smoking and he  was given a prescription for additional prednisone and to continue with his inhalers.      Covid-19  Gerson Pearson was evaluated during a global COVID-19 pandemic, which necessitated consideration that the patient might be at risk for infection with the SARS-CoV-2 virus that causes COVID-19.   Applicable protocols for evaluation were followed during the patient's care.   COVID-19 was considered as part of the patient's evaluation. The plan for testing is:  a test was obtained during this visit.    Diagnosis:    ICD-10-CM    1. COPD exacerbation (H)  J44.1        Scribe Disclosure:  I, Clark Mo, am serving as a scribe at 12:03 AM on 11/4/2021 to document services personally performed by Manjeet Kwon MD based on my observations and the provider's statements to me.      Manjeet Kwon MD  11/04/21 0728

## 2021-11-04 NOTE — H&P
[facility]    History and Physical - Hospitalist Service       Date of Admission:  11/3/2021    Assessment & Plan      Gerson Pearson is a 61 year old male admitted on 11/3/2021. He presents to the emergency department with shortness of breath, cough, and hypoxia in the low 80% range in the setting of underlying COPD, ongoing tobacco use.    Acute respiratory distress with hypoxia  COPD exacerbation with possible underlying bronchitis: Presented with hypoxia to the low 80s, cough, shortness of breath.  Improved following nebulizer treatment, Solu-Medrol, IV magnesium with oxygen saturations at rest on room air of 95%, his baseline per report.  Ambulated in the emergency department and did have exertional hypoxia.  -Resume prior to admission inhaler therapies when reconciled by pharmacy  -Every 4 hour albuterol nebulizer treatments as needed  -Prednisone 40 mg daily with plan for a 5-day burst  -Tobacco cessation.  Discussed with patient on admission  -Cessation of any nonprescribed inhalants; history of exacerbations following snorting of heroin.  Discussed with patient on admission  -Plan is for inpatient admission if patient remains hypoxic with ambulation following ambulation again later this morning.  This said, he reports that he already feels improved, and was under the impression that he would be discharging back home.  Boarding in the emergency department overnight, so can attempt ambulation after several hours to ensure resolution of hypoxia with possible discharge versus inpatient admission for persistent exertional hypoxia in the setting of COPD exacerbation   -If persistent symptoms, could consider Z-Addison for bronchitis.  Patient does not feel that presentation is consistent with bronchitis as his cough is not particularly productive for sputum.  His exam with rhonchi, however, is relatively consistent with bronchitis and not significant airway obstruction of COPD.  Note,  however history of nausea and vomiting with azithromycin in the past with past treatment in 2017 by chart review.    Coronary artery disease: Status post RCA bare-metal stent placement through [facility] in June 2020  -continue aspirin 81 mg daily  -Continue atorvastatin  -Await pharmacy reconciliation of prior to admission carvedilol, resume    Opiate dependence: On Suboxone 8-2 sublingual tablets twice daily through addiction medicine at [facility].   Episodic heroin abuse: Patient snorts heroin.  Stops Suboxone for several days prior to use.  Last use approximately 2 days ago and suspect this might be cause of patient's current acute respiratory exacerbation.  He is afraid that he will be kicked off of Suboxone as it has helped him, also afraid might lose his housing if using.  Believes he is now going back to using intermittently secondary to situational depression with somewhat recent death of a family member and now his (Step)son facing present time for what he believes is false accusations of child abuse.  Does not want to be using, tells me that he is following with a chemical dependency counselor as an outpatient.  History of cocaine abuse: Patient reports he has not used in years  -Resume prior to admission Suboxone reconciled by pharmacy  -Continue to encourage cessation of illicits.  Discussed importance of this at length with patient on admission    Tobacco use disorder with nicotine dependence: Smokes approximately 1/2 pack/day, has cut back from 1 pack/day with continued efforts.  Has been on and off nicotine patches, though has concerns regarding smoking while on the patch.  -14 mg nicotine patch prescribed  -As needed nicotine lozenges available  -Continued encouragement for cessation.  Discussed importance with patient on admission  -Discussed general risk versus benefit of nicotine patch even if patient is intermittently smoking.  Generally, with his severe COPD and  history of exacerbations, increased nicotine associated with nicotine patch and intermittent inhaled tobacco use is very likely outweighed by benefit of minimizing potential pulmonary exacerbations in the setting of his underlying lung disease (would be more benefit and less risk if patient was on a 21 mg patch with 0 inhaled cigarettes versus no nicotine patch and ongoing 1/2 pack/day tobacco use).  Continue to work towards smoking cessation, utilize nicotine patches to assist with this.           Diet:  Regular adult diet as tolerated  DVT Prophylaxis: Pneumatic compression devices  Post Catheter: Not present  Central Lines: None  Code Status:    Full code.  Confirmed with patient on admission    Clinically Significant Risk Factors Present on Admission              # Platelet Defect: home medication list includes an antiplatelet medication      Disposition Plan   Expected discharge:  either 11/4/21 AM if acute respiratory exacerbation and hypoxia have resolved this morning versus 11/6/21 if he remains hypoxic.  If admitted, it is because he has persistent hypoxia and I anticipate a more prolonged hospitalization while his COPD exacerbation resolves.  Will likely discharge from the emergency department if his hypoxia continues to improve and resolves by this morning.  Discharge to prior living situation       The patient's care was discussed with the Patient and Dr. Kwon in the emergency department, ER nursing staff.    Rusaln Herring MD  [facility]  Securely message with the Vocera Web Console (learn more here)  Text page via [facility] Paging/Directory        ______________________________________________________________________    Chief Complaint   Shortness of breath    History is obtained from patient, chart review, review of outside records including PCP follow-up following hospitalization in October    History of Present Illness   Gerson Pearson is a 61 year old male who  presents to the emergency department after contacting EMS at his living facility where he was having significant shortness of breath in the setting of underlying COPD.  History of COPD exacerbations in the past as well as rhonchorous breath sounds thought to be secondary to bronchitis.    Patient initially with oxygen saturations of 85% on room air upon arrival of EMS.  A DuoNeb was administered, and patient's oxygen saturations improved to the upper 90% range.  Wheezing has improved since initial treatments.  Patient arrived at [facility] emergency department hypoxic to approximately 87%.  He received magnesium, steroids, additional nebulizer treatments, and oxygen saturations again improved.  Patient ambulated in the hallway, oxygen saturations dipped again to hypoxia.  With this, admission was requested for acute hypoxic respiratory distress in the setting of COPD exacerbation.    Patient has known underlying COPD on Breo Ellipta, Spiriva, albuterol.  He reports that he has all of his medications, albuterol inhaler was refilled approximately 1 week ago.  He does report that he dropped his inhaler and the medication vial popped out.  He is uncertain if he replaced this appropriately, though on testing of his inhaler at bedside, it appears to be functional.  He has not OptiChamber at home for an alternate inhaler as well, and has been able to see mist expelled when depressing/utilizing his inhaler.    Patient smokes approximately 1/2 pack/day.  This is down from 1 pack/day, and fairly stable.  He has intentions of quitting, though has yet to be successful.  Intermittently will use a nicotine patch, though tells me that he will on and off stop as he is afraid to smoke while he has a nicotine patch in place.  Encouraged him to continue using nicotine patch to cut down on craving and continuing to reduce his inhaled tobacco use.    Patient has a history of cocaine abuse.  Tells me he has been sober from this  for years.  Patient also has a history of narcotic dependence on Suboxone therapy.  Unfortunately, patient continues to intermittently snort heroin, as of late, several weeks to months between use.  He tells me that he will stop his Suboxone approximately 5 days prior to use.  Snorts, does not use IV or smoke heroin.  Last use was approximately 2 days ago, suspect this might actually have caused an exacerbation of his COPD.  When I asked him about this, he reports that he had a similar pattern of inhaled heroin use prior to hospitalization early October.    Patient appreciates being on Suboxone, does not want to be taken off of this medication as it has helped him stay clean generally.  Tells me that it takes several weeks to be in full effect, though we will keep him from using when he is taking his Suboxone regularly.  He is also afraid of losing his housing if he is using heroin.    Patient grew [place] in [place] the [place].  Has had a hard life.  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  His son was killed in 2000, and following this he began to use heroin.  In the years since, has been transitioned to Suboxone.  His stepson, who patient has cared for since he was 10 years old, is recently experiencing legal difficulties.  Patient reports that this is a significant stressor for him, and may be precipitating his use.  He tells me that his stepson has been accused of child abuse by darci's wife, and darci may be going to [employer].  Patient believes darci is wrongfully accused.  Patient appreciates the chances of he has been given in life including Suboxone, his current living situation, recovery from prior hospitalizations.  Describes himself as Druze with sophia in [place].    Review of Systems    The 10 point Review of Systems is negative other than noted in the HPI or here.  No fevers  No chills  Patient reports minimal  sputum production, though has a chronic intermittent cough which he reports is spit when he is able to clear anything up.    Past Medical History    I have reviewed this patient's medical history and updated it with pertinent information if needed.   Past Medical History:   Diagnosis Date     Chronic back pain      COPD (chronic obstructive pulmonary disease) (H)      Depressive disorder      Myocardial infarction (H)    History of gastric ulcer  History of esophageal candidiasis    Past Surgical History   I have reviewed this patient's surgical history and updated it with pertinent information if needed.  History of coronary angiogram with RCA bare-metal stent placement in 2020    Social History   I have reviewed this patient's social history and updated it with pertinent information if needed.  Social History     Tobacco Use     Smoking status: Current Every Day Smoker     Packs/day: 0.50     Types: Cigarettes     Smokeless tobacco: Never Used   Substance Use Topics     Alcohol use: No     Comment: QUIT 3 YEARS AGO     Drug use: Yes     Types: Opiates, Cocaine     Comment: occ cocain and heroin- used heroin 9/20/20, reports use approximately 11/2/2021.  Snorts.  No IVDU       Family History   I have reviewed this patient's family history and updated it with pertinent information if needed.  Family History   Problem Relation Age of Onset     Diabetes Mother    Father with lung cancer in his late 60s  Mother with coronary artery bypass grafting    Prior to Admission Medications   Prior to Admission Medications   Prescriptions Last Dose Informant Patient Reported? Taking?   acetaminophen (TYLENOL) 325 MG tablet  Nursing Home Yes No   Sig: Take 650 mg by mouth every 4 hours as needed for mild pain   albuterol (PROAIR HFA/PROVENTIL HFA/VENTOLIN HFA) 108 (90 BASE) MCG/ACT Inhaler  Nursing Home No No   Sig: Inhale 2 puffs into the lungs every 4 hours as needed for shortness of breath / dyspnea or wheezing   albuterol  (PROVENTIL) (2.5 MG/3ML) 0.083% neb solution  Nursing Home Yes No   Sig: Take 2.5 mg by nebulization every 4 hours as needed for wheezing   aspirin (ASA) 81 MG chewable tablet  Nursing Home Yes No   Sig: Take 81 mg by mouth daily   atorvastatin (LIPITOR) 40 MG tablet  Nursing Home Yes No   Sig: Take 40 mg by mouth At Bedtime   buprenorphine-naloxone (SUBOXONE) 8-2 MG SUBL sublingual tablet  Nursing Home Yes No   Sig: Place 1 tablet under the tongue 2 times daily X 28 days   carvedilol (COREG) 6.25 MG tablet  Nursing Home Yes No   Sig: Take 6.25 mg by mouth 2 times daily (with meals)   fluticasone-vilanterol (BREO ELLIPTA) 200-25 MCG/INH inhaler  Nursing Home Yes No   Sig: Inhale 1 puff into the lungs daily   lisinopril (ZESTRIL) 5 MG tablet  Nursing Home Yes No   Sig: Take 5 mg by mouth daily   mirtazapine (REMERON) 30 MG tablet  Nursing Home Yes No   Sig: Take 30 mg by mouth At Bedtime   multivitamin w/minerals (THERA-VIT-M) tablet  Nursing Home Yes No   Sig: Take 1 tablet by mouth daily   naloxone (NARCAN) 4 MG/0.1ML nasal spray  Nursing Home Yes No   Sig: Spray 4 mg into one nostril alternating nostrils once as needed for opioid reversal every 2-3 minutes until assistance arrives   nicotine (COMMIT) 2 MG lozenge  Nursing Home Yes No   Sig: Place 2 mg inside cheek every hour as needed for smoking cessation   nicotine (NICODERM CQ) 14 MG/24HR 24 hr patch  Nursing Home Yes No   Sig: Place 1 patch onto the skin every 24 hours   pantoprazole (PROTONIX) 40 MG EC tablet  Nursing Home Yes No   Sig: Take 40 mg by mouth daily   predniSONE (DELTASONE) 10 MG tablet   No No   Sig: Take 3 tablets (30 mg) by mouth daily   predniSONE (DELTASONE) 10 MG tablet   No No   Sig: Take 1 tablet (10 mg) by mouth daily   predniSONE (DELTASONE) 20 MG tablet   No No   Sig: Take 1 tablet (20 mg) by mouth daily   tiotropium (SPIRIVA RESPIMAT) 2.5 MCG/ACT inhaler  Nursing Home Yes No   Sig: Inhale 2 puffs into the lungs daily       Facility-Administered Medications: None     Allergies   Allergies   Allergen Reactions     Ibuprofen Nausea and Vomiting       Physical Exam   Vital Signs: Temp: 99  F (37.2  C) Temp src: Oral BP: 120/82 Pulse: 76   Resp: 16 SpO2: 96 % O2 Device: Nasal cannula Oxygen Delivery: 2 LPM    General Appearance: Thin -American male sitting comfortably on [facility].  He appears older than stated age of 61.  Eyes: No scleral icterus or injection  HEENT: Normocephalic, atraumatic.  Missing front tooth noted  Respiratory: No tachypnea or increased work of breathing currently.  Patient with rhonchi throughout lung fields, no wheezing appreciated.  Good air movement throughout lung fields.  Occasional rattling cough with raspy congested voice.  During our conversation, patient's voice actually clears and begins to normalize; patient notes this as well  Cardiovascular: Regular rate and rhythm.  Heart rate in the 70s.  No murmur, though may have been missed as patient with rhonchorous breath sounds anteriorly as well, actually more so than posteriorly  GI: Abdomen thin, soft, nontender palpation.  No palpable mass.  Lymph/Hematologic: No lower extremity edema  Genitourinary: Not examined  Skin: No rashes, no petechiae.  Patient does have some hypopigmentation and alopecia along frontal scalp line  Musculoskeletal: Thin.  Some subcutaneous fat loss of chest likely consistent with COPD history.  Muscular tone intact throat all extremities  Neurologic: Alert, conversant, appropriate conversation.  Mental status grossly intact  Psychiatric: Very pleasant, normal affect    Data   Data reviewed today: I reviewed all medications, new labs and imaging results over the last 24 hours. I personally reviewed the chest x-ray image(s) showing Clear lungs.    Recent Labs   Lab 11/03/21  2359   WBC 7.2   HGB 12.6*   MCV 95         POTASSIUM 4.2   CHLORIDE 103   CO2 28   BUN 14   CR 1.00   ANIONGAP 3   RL 8.5   GLC  110*   ALBUMIN 3.9   PROTTOTAL 7.8   BILITOTAL 0.9   ALKPHOS 120   ALT 25   AST 19

## 2021-11-04 NOTE — ED NOTES
Pt looking for his clothing. Informed that he only came with the clothes he was currently wearing and his jacket which was on the counter. Pt has his phone and his glasses.

## 2021-11-04 NOTE — ED NOTES
Provided patient with a warm blanket, and he wanted to know what number his neb in his pocket was at; I informed him that it shows 120.

## 2021-11-04 NOTE — ED NOTES
[facility]  ED Nurse Handoff Report    ED Chief complaint: Shortness of Breath (hx of copd-  biba from group home for sob since 10pm - room air sats 85% on room air upon ems arrival. After duoneb en route, sats improved to 90s. audible expiratory wheezing noted)      ED Diagnosis:   Final diagnoses:   None       Code Status: Full Code    Allergies:   Allergies   Allergen Reactions     Ibuprofen Nausea and Vomiting       Patient Story: Pt presents from Group Home where he was having SOB. EMS report O2 sats 83% on arrival. Gave duoneb with some improvement.   Focused Assessment:  Pt with audible expiratory wheezes and coughing. Given 2 additional duonebs in the ED which he reports some improvement. Lungs remain with lots of exp wheezing. Given 60mg prednisone, 2gm Mg. Attempted on room air and desatted to 85% while lying in bed. Pt back on 2L O2 via NC. Negative covid test. Chest xray unremarkable.    Treatments and/or interventions provided: see above  Patient's response to treatments and/or interventions: see above    To be done/followed up on inpatient unit:  none pending    Does this patient have any cognitive concerns?: none    Activity level - Baseline/Home:  Independent  Activity Level - Current:   Has not been out of bed in the ED    Patient's Preferred language: English   Needed?: No    Isolation: None  Infection: Not Applicable  Patient tested for COVID 19 prior to admission: YES  Bariatric?: No    Vital Signs:   Vitals:    11/03/21 2355 11/04/21 0048   BP: (!) 142/97 91/74   Pulse: 85 77   Resp: 22    Temp:  99  F (37.2  C)   TempSrc:  Oral   SpO2: 93% 100%       Cardiac Rhythm:     Was the PSS-3 completed:   Yes  What interventions are required if any?               Family Comments: None present.  OBS brochure/video discussed/provided to patient/family: N/A              Name of person given brochure if not patient: N/A              Relationship to patient: N/A    For the  majority of the shift this patient's behavior was cooperative.   Behavioral interventions performed were information.    ED NURSE PHONE NUMBER: [phone]

## 2021-11-04 NOTE — ED NOTES
Pt walked with SPO2 monitor on finger while walking up and down the hallway.  Patient's O2 levels stayed between 94-96%.

## 2021-11-04 NOTE — ED NOTES
Bed: ED05  Expected date:   Expected time:   Means of arrival:   Comments:  534 61M COPD exacerbation 0410

## 2021-11-04 NOTE — ED TRIAGE NOTES
hx of copd- biba from group home for sob since 10pm - room air sats 85% on room air upon ems arrival. After duoneb en route, sats improved to 90s. audible expiratory wheezing noted

## 2021-11-05 ENCOUNTER — HOSPITAL ENCOUNTER (OUTPATIENT)
Facility: CLINIC | Age: 61
Setting detail: OBSERVATION
Discharge: HOME | End: 2021-11-06
Attending: EMERGENCY MEDICINE | Admitting: EMERGENCY MEDICINE
Payer: MEDICARE

## 2021-11-05 ENCOUNTER — APPOINTMENT (OUTPATIENT)
Dept: GENERAL RADIOLOGY | Facility: CLINIC | Age: 61
End: 2021-11-05
Attending: EMERGENCY MEDICINE
Payer: MEDICARE

## 2021-11-05 DIAGNOSIS — J20.9 ACUTE BRONCHITIS, UNSPECIFIED ORGANISM: ICD-10-CM

## 2021-11-05 DIAGNOSIS — J44.1 COPD EXACERBATION (H): ICD-10-CM

## 2021-11-05 LAB
ALBUMIN SERPL-MCNC: 3.7 G/DL (ref 3.4–5)
ALP SERPL-CCNC: 108 U/L (ref 40–150)
ALT SERPL W P-5'-P-CCNC: 24 U/L (ref 0–70)
AMPHETAMINES UR QL SCN: ABNORMAL
ANION GAP SERPL CALCULATED.3IONS-SCNC: 1 MMOL/L (ref 3–14)
AST SERPL W P-5'-P-CCNC: 17 U/L (ref 0–45)
ATRIAL RATE - MUSE: 84 BPM
BARBITURATES UR QL: ABNORMAL
BASOPHILS # BLD AUTO: 0 10E3/UL (ref 0–0.2)
BASOPHILS NFR BLD AUTO: 0 %
BENZODIAZ UR QL: ABNORMAL
BILIRUB SERPL-MCNC: 0.7 MG/DL (ref 0.2–1.3)
BUN SERPL-MCNC: 15 MG/DL (ref 7–30)
CALCIUM SERPL-MCNC: 8.1 MG/DL (ref 8.5–10.1)
CANNABINOIDS UR QL SCN: ABNORMAL
CHLORIDE BLD-SCNC: 104 MMOL/L (ref 94–109)
CO2 SERPL-SCNC: 30 MMOL/L (ref 20–32)
COCAINE UR QL: ABNORMAL
CREAT SERPL-MCNC: 0.81 MG/DL (ref 0.66–1.25)
DIASTOLIC BLOOD PRESSURE - MUSE: NORMAL MMHG
EOSINOPHIL # BLD AUTO: 0.2 10E3/UL (ref 0–0.7)
EOSINOPHIL NFR BLD AUTO: 3 %
ERYTHROCYTE [DISTWIDTH] IN BLOOD BY AUTOMATED COUNT: 13.8 % (ref 10–15)
GFR SERPL CREATININE-BSD FRML MDRD: >90 ML/MIN/1.73M2
GLUCOSE BLD-MCNC: 109 MG/DL (ref 70–99)
HCT VFR BLD AUTO: 37.9 % (ref 40–53)
HGB BLD-MCNC: 11.8 G/DL (ref 13.3–17.7)
IMM GRANULOCYTES # BLD: 0 10E3/UL
IMM GRANULOCYTES NFR BLD: 0 %
INTERPRETATION ECG - MUSE: NORMAL
LACTATE SERPL-SCNC: 0.9 MMOL/L (ref 0.7–2)
LYMPHOCYTES # BLD AUTO: 1.4 10E3/UL (ref 0.8–5.3)
LYMPHOCYTES NFR BLD AUTO: 20 %
MCH RBC QN AUTO: 29.7 PG (ref 26.5–33)
MCHC RBC AUTO-ENTMCNC: 31.1 G/DL (ref 31.5–36.5)
MCV RBC AUTO: 96 FL (ref 78–100)
MONOCYTES # BLD AUTO: 0.8 10E3/UL (ref 0–1.3)
MONOCYTES NFR BLD AUTO: 12 %
NEUTROPHILS # BLD AUTO: 4.5 10E3/UL (ref 1.6–8.3)
NEUTROPHILS NFR BLD AUTO: 65 %
NRBC # BLD AUTO: 0 10E3/UL
NRBC BLD AUTO-RTO: 0 /100
OPIATES UR QL SCN: ABNORMAL
P AXIS - MUSE: 74 DEGREES
PCP UR QL SCN: ABNORMAL
PLATELET # BLD AUTO: 177 10E3/UL (ref 150–450)
POTASSIUM BLD-SCNC: 4.3 MMOL/L (ref 3.4–5.3)
PR INTERVAL - MUSE: 110 MS
PROT SERPL-MCNC: 7.3 G/DL (ref 6.8–8.8)
QRS DURATION - MUSE: 78 MS
QT - MUSE: 354 MS
QTC - MUSE: 418 MS
R AXIS - MUSE: 70 DEGREES
RBC # BLD AUTO: 3.97 10E6/UL (ref 4.4–5.9)
SARS-COV-2 RNA RESP QL NAA+PROBE: NEGATIVE
SODIUM SERPL-SCNC: 135 MMOL/L (ref 133–144)
SYSTOLIC BLOOD PRESSURE - MUSE: NORMAL MMHG
T AXIS - MUSE: 75 DEGREES
TROPONIN I SERPL-MCNC: <0.015 UG/L (ref 0–0.04)
VENTRICULAR RATE- MUSE: 84 BPM
WBC # BLD AUTO: 6.9 10E3/UL (ref 4–11)

## 2021-11-05 PROCEDURE — 250N000013 HC RX MED GY IP 250 OP 250 PS 637: Mod: GY | Performed by: EMERGENCY MEDICINE

## 2021-11-05 PROCEDURE — 99207 PR NO CHARGE LOS: CPT | Performed by: STUDENT IN AN ORGANIZED HEALTH CARE EDUCATION/TRAINING PROGRAM

## 2021-11-05 PROCEDURE — 99220 PR INITIAL OBSERVATION CARE,LEVEL III: CPT | Performed by: INTERNAL MEDICINE

## 2021-11-05 PROCEDURE — 80307 DRUG TEST PRSMV CHEM ANLYZR: CPT | Performed by: INTERNAL MEDICINE

## 2021-11-05 PROCEDURE — 85025 COMPLETE CBC W/AUTO DIFF WBC: CPT | Performed by: EMERGENCY MEDICINE

## 2021-11-05 PROCEDURE — 250N000011 HC RX IP 250 OP 636: Performed by: EMERGENCY MEDICINE

## 2021-11-05 PROCEDURE — 94640 AIRWAY INHALATION TREATMENT: CPT

## 2021-11-05 PROCEDURE — 84484 ASSAY OF TROPONIN QUANT: CPT | Performed by: EMERGENCY MEDICINE

## 2021-11-05 PROCEDURE — G0378 HOSPITAL OBSERVATION PER HR: HCPCS

## 2021-11-05 PROCEDURE — 96374 THER/PROPH/DIAG INJ IV PUSH: CPT

## 2021-11-05 PROCEDURE — 82247 BILIRUBIN TOTAL: CPT | Performed by: EMERGENCY MEDICINE

## 2021-11-05 PROCEDURE — C9803 HOPD COVID-19 SPEC COLLECT: HCPCS

## 2021-11-05 PROCEDURE — 250N000013 HC RX MED GY IP 250 OP 250 PS 637: Mod: GY | Performed by: INTERNAL MEDICINE

## 2021-11-05 PROCEDURE — 71045 X-RAY EXAM CHEST 1 VIEW: CPT

## 2021-11-05 PROCEDURE — 99285 EMERGENCY DEPT VISIT HI MDM: CPT | Mod: 25

## 2021-11-05 PROCEDURE — 93005 ELECTROCARDIOGRAM TRACING: CPT

## 2021-11-05 PROCEDURE — 36415 COLL VENOUS BLD VENIPUNCTURE: CPT | Performed by: EMERGENCY MEDICINE

## 2021-11-05 PROCEDURE — 83605 ASSAY OF LACTIC ACID: CPT | Performed by: EMERGENCY MEDICINE

## 2021-11-05 PROCEDURE — 87635 SARS-COV-2 COVID-19 AMP PRB: CPT | Performed by: EMERGENCY MEDICINE

## 2021-11-05 RX ORDER — PREDNISONE 20 MG/1
60 TABLET ORAL DAILY
Status: DISCONTINUED | OUTPATIENT
Start: 2021-11-06 | End: 2021-11-06 | Stop reason: HOSPADM

## 2021-11-05 RX ORDER — ONDANSETRON 4 MG/1
4 TABLET, ORALLY DISINTEGRATING ORAL EVERY 6 HOURS PRN
Status: DISCONTINUED | OUTPATIENT
Start: 2021-11-05 | End: 2021-11-06 | Stop reason: HOSPADM

## 2021-11-05 RX ORDER — PROCHLORPERAZINE MALEATE 10 MG
10 TABLET ORAL EVERY 6 HOURS PRN
Status: DISCONTINUED | OUTPATIENT
Start: 2021-11-05 | End: 2021-11-06 | Stop reason: HOSPADM

## 2021-11-05 RX ORDER — METHYLPREDNISOLONE SODIUM SUCCINATE 125 MG/2ML
125 INJECTION, POWDER, LYOPHILIZED, FOR SOLUTION INTRAMUSCULAR; INTRAVENOUS ONCE
Status: COMPLETED | OUTPATIENT
Start: 2021-11-05 | End: 2021-11-05

## 2021-11-05 RX ORDER — ACETAMINOPHEN 325 MG/1
650 TABLET ORAL EVERY 4 HOURS PRN
Status: DISCONTINUED | OUTPATIENT
Start: 2021-11-05 | End: 2021-11-06 | Stop reason: HOSPADM

## 2021-11-05 RX ORDER — ALBUTEROL SULFATE 90 UG/1
2 AEROSOL, METERED RESPIRATORY (INHALATION)
Status: DISCONTINUED | OUTPATIENT
Start: 2021-11-05 | End: 2021-11-05

## 2021-11-05 RX ORDER — PREDNISONE 20 MG/1
40 TABLET ORAL DAILY
Status: DISCONTINUED | OUTPATIENT
Start: 2021-11-06 | End: 2021-11-05

## 2021-11-05 RX ORDER — PANTOPRAZOLE SODIUM 40 MG/1
40 TABLET, DELAYED RELEASE ORAL DAILY
Status: DISCONTINUED | OUTPATIENT
Start: 2021-11-05 | End: 2021-11-06 | Stop reason: HOSPADM

## 2021-11-05 RX ORDER — PROCHLORPERAZINE 25 MG
25 SUPPOSITORY, RECTAL RECTAL EVERY 12 HOURS PRN
Status: DISCONTINUED | OUTPATIENT
Start: 2021-11-05 | End: 2021-11-06 | Stop reason: HOSPADM

## 2021-11-05 RX ORDER — NICOTINE 21 MG/24HR
1 PATCH, TRANSDERMAL 24 HOURS TRANSDERMAL DAILY
Status: DISCONTINUED | OUTPATIENT
Start: 2021-11-05 | End: 2021-11-06 | Stop reason: HOSPADM

## 2021-11-05 RX ORDER — ONDANSETRON 2 MG/ML
4 INJECTION INTRAMUSCULAR; INTRAVENOUS EVERY 6 HOURS PRN
Status: DISCONTINUED | OUTPATIENT
Start: 2021-11-05 | End: 2021-11-06 | Stop reason: HOSPADM

## 2021-11-05 RX ORDER — LIDOCAINE 40 MG/G
CREAM TOPICAL
Status: DISCONTINUED | OUTPATIENT
Start: 2021-11-05 | End: 2021-11-06 | Stop reason: HOSPADM

## 2021-11-05 RX ORDER — POLYETHYLENE GLYCOL 3350 17 G
2 POWDER IN PACKET (EA) ORAL
Status: DISCONTINUED | OUTPATIENT
Start: 2021-11-05 | End: 2021-11-06 | Stop reason: HOSPADM

## 2021-11-05 RX ORDER — BENZONATATE 100 MG/1
100 CAPSULE ORAL 3 TIMES DAILY PRN
Status: DISCONTINUED | OUTPATIENT
Start: 2021-11-05 | End: 2021-11-06 | Stop reason: HOSPADM

## 2021-11-05 RX ORDER — ATORVASTATIN CALCIUM 40 MG/1
40 TABLET, FILM COATED ORAL AT BEDTIME
Status: DISCONTINUED | OUTPATIENT
Start: 2021-11-05 | End: 2021-11-06 | Stop reason: HOSPADM

## 2021-11-05 RX ORDER — ASPIRIN 81 MG/1
81 TABLET, CHEWABLE ORAL DAILY
Status: DISCONTINUED | OUTPATIENT
Start: 2021-11-05 | End: 2021-11-06 | Stop reason: HOSPADM

## 2021-11-05 RX ORDER — LANOLIN ALCOHOL/MO/W.PET/CERES
3 CREAM (GRAM) TOPICAL
Status: DISCONTINUED | OUTPATIENT
Start: 2021-11-05 | End: 2021-11-06 | Stop reason: HOSPADM

## 2021-11-05 RX ORDER — ALBUTEROL SULFATE 0.83 MG/ML
2.5 SOLUTION RESPIRATORY (INHALATION) EVERY 4 HOURS PRN
Status: DISCONTINUED | OUTPATIENT
Start: 2021-11-05 | End: 2021-11-06 | Stop reason: HOSPADM

## 2021-11-05 RX ADMIN — ALBUTEROL SULFATE 2 PUFF: 90 AEROSOL, METERED RESPIRATORY (INHALATION) at 05:39

## 2021-11-05 RX ADMIN — ALBUTEROL SULFATE 2 PUFF: 90 AEROSOL, METERED RESPIRATORY (INHALATION) at 02:02

## 2021-11-05 RX ADMIN — NICOTINE 1 PATCH: 14 PATCH, EXTENDED RELEASE TRANSDERMAL at 08:49

## 2021-11-05 RX ADMIN — BENZONATATE 100 MG: 100 CAPSULE ORAL at 03:46

## 2021-11-05 RX ADMIN — METHYLPREDNISOLONE SODIUM SUCCINATE 125 MG: 125 INJECTION, POWDER, FOR SOLUTION INTRAMUSCULAR; INTRAVENOUS at 02:03

## 2021-11-05 RX ADMIN — ATORVASTATIN CALCIUM 40 MG: 40 TABLET, FILM COATED ORAL at 21:29

## 2021-11-05 ASSESSMENT — MIFFLIN-ST. JEOR: SCORE: 1390.06

## 2021-11-05 ASSESSMENT — ENCOUNTER SYMPTOMS
FEVER: 0
SHORTNESS OF BREATH: 1
COUGH: 1

## 2021-11-05 NOTE — PROGRESS NOTES
Brief Hospitalist Note     Pt was admitted earlier this morning by Dr. Herring with COPD exacerbation and possible bronchitis in the setting of intranasal heroin use and on-going tobacco use.     Pt is feeling better. He still notes some cough and increased dyspnea particularly with exertion. On exam he has a hoarse voice and coarse breath sounds bilaterally. He is still needing 2L of supplemental O2.     Will continue with Prednisone, albuterol and albuterol nebs.  Psychiatry consulted and recommended continuing outpatient suboxone therap.    Suspect he will be able to discharge tomorrow if feeling better and off oxygen.       Penny Jewell

## 2021-11-05 NOTE — CONSULTS
[facility], Consult & Liaison    Gerson Pearson  November 5, 2021      Psychiatry consult completed. Dictation to follow. Recommended to continue with suboxone management via outpatient and outpatient primary care for mental health related medications.     ADRI CARDENAS, [facility], [facility], Consult & Liaison Service  [phone]

## 2021-11-05 NOTE — PHARMACY-ADMISSION MEDICATION HISTORY
Pharmacy Medication History  Admission medication history interview status for the 11/5/2021  admission is complete. See EPIC admission navigator for prior to admission medications     Location of Interview: Outside patient room but on unit  Medication history sources: MAR (Group Home - [facility]);  is Mickey ([phone])    Significant changes made to the medication list:  - None    In the past week, patient estimated taking medication this percent of the time: greater than 90%    Additional medication history information:   - Prednisone was prescribed to start 11/4/21 for 4 days (looks like patient recently completed a prednisone taper).    Medication reconciliation completed by provider prior to medication history? Yes    Time spent in this activity: 35 minutes    Prior to Admission medications    Medication Sig Last Dose Taking? Auth Provider   acetaminophen (TYLENOL) 325 MG tablet Take 650 mg by mouth every 4 hours as needed for mild pain prn Yes Unknown, Entered By History   albuterol (PROAIR HFA/PROVENTIL HFA/VENTOLIN HFA) 108 (90 BASE) MCG/ACT Inhaler Inhale 2 puffs into the lungs every 4 hours as needed for shortness of breath / dyspnea or wheezing prn Yes Kali Garner MD   albuterol (PROVENTIL) (2.5 MG/3ML) 0.083% neb solution Take 2.5 mg by nebulization every 4 hours as needed for wheezing prn Yes Unknown, Entered By History   aspirin (ASA) 81 MG chewable tablet Take 81 mg by mouth daily 11/4/2021 at am Yes Unknown, Entered By History   atorvastatin (LIPITOR) 40 MG tablet Take 40 mg by mouth At Bedtime 11/4/2021 at hs Yes Unknown, Entered By History   buprenorphine-naloxone (SUBOXONE) 8-2 MG SUBL sublingual tablet Place 1 tablet under the tongue 2 times daily X 28 days 11/4/2021 at hs Yes Unknown, Entered By History   carvedilol (COREG) 6.25 MG tablet Take 6.25 mg by mouth 2 times daily (with meals) 11/4/2021 at hs Yes Unknown, Entered By History    fluticasone-vilanterol (BREO ELLIPTA) 200-25 MCG/INH inhaler Inhale 1 puff into the lungs daily 11/4/2021 at am Yes Unknown, Entered By History   lisinopril (ZESTRIL) 10 MG tablet Take 10 mg by mouth daily  11/4/2021 at am Yes Unknown, Entered By History   mirtazapine (REMERON) 45 MG tablet Take 45 mg by mouth At Bedtime  11/4/2021 at hs Yes Unknown, Entered By History   multivitamin w/minerals (THERA-VIT-M) tablet Take 1 tablet by mouth daily 11/4/2021 at am Yes Unknown, Entered By History   naloxone (NARCAN) 4 MG/0.1ML nasal spray Spray 4 mg into one nostril alternating nostrils once as needed for opioid reversal every 2-3 minutes until assistance arrives prn Yes Unknown, Entered By History   nicotine (COMMIT) 2 MG lozenge Place 2 mg inside cheek every hour as needed for smoking cessation prn Yes Unknown, Entered By History   nicotine (NICODERM CQ) 14 MG/24HR 24 hr patch Place 1 patch onto the skin every 24 hours 11/4/2021 at applied in am Yes Unknown, Entered By History   pantoprazole (PROTONIX) 40 MG EC tablet Take 40 mg by mouth daily 11/4/2021 at am Yes Unknown, Entered By History   predniSONE (DELTASONE) 20 MG tablet Take 3 tablets (60 mg) by mouth daily for 4 days 11/4/2021 at first dose Yes Manjeet Kwon MD   tiotropium (SPIRIVA RESPIMAT) 2.5 MCG/ACT inhaler Inhale 2 puffs into the lungs daily 11/4/2021 at am Yes Unknown, Entered By History       The information provided in this note is only as accurate as the sources available at the time of update(s)

## 2021-11-05 NOTE — H&P
[facility]    History and Physical - Hospitalist Service       Date of Admission:  11/5/2021    Assessment & Plan      Gerson Pearson is a 61 year old male admitted on 11/5/2021. He presents to the emergency department after intranasal heroin use and subsequent respiratory exacerbation in the setting of underlying COPD.  Similar presentation yesterday for which I met with patient where he had hypoxia, wheezing improved following nebulizer therapy, rhonchi concerning for bronchitis, and subsequent resolution of hypoxia after observation in the emergency department.    COPD exacerbation with possible underlying bronchitis: Exacerbation secondary to ongoing intranasal heroin use, ongoing tobacco use disorder.  Improved following nebulizer treatment, Solu-Medrol, IV magnesium with oxygen saturations at rest on room air of 95%, his baseline per report.  Still wheezy.  Similar exacerbation yesterday was suspected secondary to intranasal heroin use.  Used heroin again tonight which likely led to his presentation for abnormal respiratory pattern at group home.  It is possible patient is having anoxic lung injury related to heroin or even aspiration as he uses alone in his room, and does not actually recall when the nurse came into check on him and what his respirations were like at that time.  Was hypoxic to 85% by EMS arrival, improved to greater than 95% following DuoNeb treatment and hypoxia has remained resolved.  -Resume prior to admission inhaler therapies when reconciled by pharmacy  -Every 4 hour albuterol nebulizer treatments as needed  -Prednisone 40 mg daily with plan for a 5-day burst; did not start his prescription yesterday, though did receive steroids in the emergency department  -Tobacco cessation.  Discussed with patient on admission again today  -Cessation of any nonprescribed inhalants; history of exacerbations following snorting of heroin.  Discussed with patient on admission  again today.  He tells me he recognizes the importance of discontinuing heroin  -Antibiotics typically not indicated for bronchitis as often viral etiology.  Note history of nausea and vomiting with azithromycin in the past with past treatment in 2017 by chart review.  Would consider anti-infective treatment if patient were to develop fever given risk of aspiration with his ongoing heroin abuse  -Tessalon Perles for cough     Coronary artery disease: Status post RCA bare-metal stent placement through [facility] in June 2020  -continue aspirin 81 mg daily  -Continue atorvastatin  -Await pharmacy reconciliation of prior to admission carvedilol, resume     Opiate dependence: On Suboxone 8-2 sublingual tablets twice daily through addiction medicine at [facility].   Episodic heroin abuse: Patient snorts heroin.  Stops Suboxone for several days prior to use.  Has had a history of respiratory exacerbations associated with intermittent heroin use including hospitalization in September, it is thought that his presentation 11/3/2021 was also related to heroin use.  Heroin use tonight prior to group home staff finding him with an abnormal respiratory pattern.  He does not recall this and anticipates he was still high from heroin when this occurred..  He is afraid that he will be kicked off of Suboxone as it has helped him, also afraid might lose his housing if using.  Believes he is now going back to using intermittently secondary to situational depression with somewhat recent death of a family member and now his (Step)son facing present time for what he believes is false accusations of child abuse.  Does not want to be using, tells me that he is following with a chemical dependency counselor as an outpatient.  History of cocaine abuse: Patient reports he has not used in years  -Resume prior to admission Suboxone reconciled by pharmacy.  As patient has gone on and off of Suboxone to  intermittently use heroin, may need to consider switch to methadone in the future.  -Continue to encourage cessation of illicits.  Discussed importance of this at length with patient on admission  -Psychiatry consultation  -Urine drug screen to ensure no relapse with cocaine, which would also certainly result in acute respiratory exacerbation     Tobacco use disorder with nicotine dependence: Smokes approximately 1/2 pack/day, has cut back from 1 pack/day with continued efforts.  Has been on and off nicotine patches, though has concerns regarding smoking while on the patch.  -14 mg nicotine patch prescribed  -As needed nicotine lozenges available  -Continued encouragement for cessation.  Discussed importance with patient on admission  -Discussed general risk versus benefit of nicotine patch even if patient is intermittently smoking.  Generally, with his severe COPD and history of exacerbations, increased nicotine associated with nicotine patch and intermittent inhaled tobacco use is very likely outweighed by benefit of minimizing potential pulmonary exacerbations in the setting of his underlying lung disease (would be more benefit and less risk if patient was on a 21 mg patch with 0 inhaled cigarettes versus no nicotine patch and ongoing 1/2 pack/day tobacco use).  Continue to work towards smoking cessation, utilize nicotine patches to assist with this.       Diet:  Regular diet as tolerated  DVT Prophylaxis: Ambulate every shift  Post Catheter: Not present  Central Lines: None  Code Status:  Full code    Clinically Significant Risk Factors Present on Admission          # Hypocalcemia: Ca = 8.1 mg/dL (Ref range: 8.5 - 10.1 mg/dL) and/or iCa = N/A on admission, will replace as needed     # Platelet Defect: home medication list includes an antiplatelet medication      Disposition Plan   Expected discharge:     Anticipated discharge 11/6/2021 following improvement in cough, shortness of breath.  Patient is no longer  hypoxic    The patient's care was discussed with the Bedside Nurse, Patient and Dr Mckinney in the emergency department.    Ruslan Herring MD  [facility]  Securely message with the Vocera Web Console (learn more here)  Text page via [employer] Paging/Directory        ______________________________________________________________________    Chief Complaint   Shortness of breath, cough    History is obtained from patient, chart review, discussion with ER provider    History of Present Illness   Gerson Pearson is a 61 year old male who presents from his group home with shortness of breath and cough after snorting heroin tonight.    Patient has a history of underlying COPD.  He also has a history of intermittent substance abuse and opiate dependence typically on Suboxone.  Patient reports that he has been on Suboxone, it has helped him stay away from heroin, though every several weeks he will stop his Suboxone in anticipation of using heroin.  Following heroin use, patient has had several encounters to emergency departments or hospitalizations for respiratory exacerbations with or without hypoxia.  Patient reports he wants to remain clean, wants to remain on Suboxone.  He is afraid he will lose his housing or Suboxone if he is found to be continuing to use heroin.    Patient presented to the emergency department 11/3 night for very similar presentation of wheezing, hypoxia which improved rapidly following DuoNeb therapy.  I was contacted to admit him at that time.  He continued to improve in the emergency department, and generally felt well enough that he wanted to discharge back to his home.  Ambulatory oxygen saturations improved to the point where he was no longer hypoxic, and patient was discharged with a prednisone burst for COPD exacerbation.  Lengthy discussion at that time regarding concerned that patient's intranasal heroin use is exacerbating his COPD.  Patient was in agreement at  that time given the time course of when he typically uses heroin episodically and resultant visits to emergency departments or hospitalizations.  He also recognizes that he needs to quit smoking.  He makes mention of both of these things again today on visit.    Patient tells me that he used the last of his heroin tonight, no longer has any available.  When he uses heroin, he tells me he uses it alone in his room.  Tonight, apparently a staff member came into check on him and found him to have an abnormal respiratory pattern resulting in EMS contact.  Patient was alert and oriented when EMS arrived, hypoxic and improved to have normal oxygenation following a DuoNeb treatment.  Patient does not, however, recall the nurse/staff member coming in to check on him, believes he may still have been high on heroin at that time.  He reports his heroin use was approximately 6:30 PM, though is not exactly sure of the timing.  He does not recall any nausea or vomiting.    Patient is more wheezy today than he was 24 hours ago on assessment.  Still with scattered rhonchi.  More frequent cough present.  No sputum production.    No fevers or chills.    For further history, please see my note from yesterday night    Review of Systems    The 10 point Review of Systems is negative other than noted in the HPI or here.  No fevers or chills  Notes significant sputum production.    Past Medical History    I have reviewed this patient's medical history and updated it with pertinent information if needed.   Past Medical History:   Diagnosis Date     Chronic back pain      COPD (chronic obstructive pulmonary disease) (H)      Depressive disorder      Myocardial infarction (H)        Past Surgical History   I have reviewed this patient's surgical history and updated it with pertinent information if needed.  History of coronary artery stent placement    Social History   I have reviewed this patient's social history and updated it with pertinent  information if needed.  Social History     Tobacco Use     Smoking status: Current Every Day Smoker     Packs/day: 0.50     Types: Cigarettes     Smokeless tobacco: Never Used   Substance Use Topics     Alcohol use: No     Comment: QUIT 3 YEARS AGO     Drug use: Yes     Types: Opiates, Cocaine     Comment: occ cocain and heroin- used heroin in September, prior to 11/3 visit and again tonight 11/4 resulting in ER visit.  He tells me that he has been sober from cocaine for years       Family History   I have reviewed this patient's family history and updated it with pertinent information if needed.  Family History   Problem Relation Age of Onset     Diabetes Mother        Prior to Admission Medications   Prior to Admission Medications   Prescriptions Last Dose Informant Patient Reported? Taking?   acetaminophen (TYLENOL) 325 MG tablet  Nursing Home Yes No   Sig: Take 650 mg by mouth every 4 hours as needed for mild pain   albuterol (PROAIR HFA/PROVENTIL HFA/VENTOLIN HFA) 108 (90 BASE) MCG/ACT Inhaler  Nursing Home No No   Sig: Inhale 2 puffs into the lungs every 4 hours as needed for shortness of breath / dyspnea or wheezing   albuterol (PROVENTIL) (2.5 MG/3ML) 0.083% neb solution  Nursing Home Yes No   Sig: Take 2.5 mg by nebulization every 4 hours as needed for wheezing   aspirin (ASA) 81 MG chewable tablet  Nursing Home Yes No   Sig: Take 81 mg by mouth daily   atorvastatin (LIPITOR) 40 MG tablet  Nursing Home Yes No   Sig: Take 40 mg by mouth At Bedtime   buprenorphine-naloxone (SUBOXONE) 8-2 MG SUBL sublingual tablet  Nursing Home Yes No   Sig: Place 1 tablet under the tongue 2 times daily X 28 days   carvedilol (COREG) 6.25 MG tablet  Nursing Home Yes No   Sig: Take 6.25 mg by mouth 2 times daily (with meals)   fluticasone-vilanterol (BREO ELLIPTA) 200-25 MCG/INH inhaler  Nursing Home Yes No   Sig: Inhale 1 puff into the lungs daily   lisinopril (ZESTRIL) 5 MG tablet  Nursing Home Yes No   Sig: Take 5 mg by  mouth daily   mirtazapine (REMERON) 30 MG tablet  Nursing Home Yes No   Sig: Take 30 mg by mouth At Bedtime   multivitamin w/minerals (THERA-VIT-M) tablet  Nursing Home Yes No   Sig: Take 1 tablet by mouth daily   naloxone (NARCAN) 4 MG/0.1ML nasal spray  Nursing Home Yes No   Sig: Spray 4 mg into one nostril alternating nostrils once as needed for opioid reversal every 2-3 minutes until assistance arrives   nicotine (COMMIT) 2 MG lozenge  Nursing Home Yes No   Sig: Place 2 mg inside cheek every hour as needed for smoking cessation   nicotine (NICODERM CQ) 14 MG/24HR 24 hr patch  Nursing Home Yes No   Sig: Place 1 patch onto the skin every 24 hours   pantoprazole (PROTONIX) 40 MG EC tablet  Nursing Home Yes No   Sig: Take 40 mg by mouth daily   predniSONE (DELTASONE) 20 MG tablet   No No   Sig: Take 3 tablets (60 mg) by mouth daily for 4 days   tiotropium (SPIRIVA RESPIMAT) 2.5 MCG/ACT inhaler  Nursing Home Yes No   Sig: Inhale 2 puffs into the lungs daily      Facility-Administered Medications: None     Allergies   Allergies   Allergen Reactions     Ibuprofen Nausea and Vomiting       Physical Exam   Vital Signs: Temp: 99.6  F (37.6  C) Temp src: Oral BP: (!) 126/99 Pulse: 98   Resp: 22 SpO2: 94 %      Weight: 130 lbs 0 oz    General Appearance: Thin -American male, appears older than stated age.  He does not appear toxic, though frequent cough appears mildly distressing.  Eyes: Mild scleral injection bilaterally, no scleral icterus  HEENT: Missing front tooth, normocephalic and otherwise atraumatic.  Respiratory: Breath sounds with wheezes throughout lung fields, though good air movement.  Occasional rhonchi.  As compared to yesterday, wheezing is much more prominent as was absent at time of my evaluation 11/4.  Rhonchi is slightly improved.  Cardiovascular: Regular rate and rhythm with heart rate currently in the 80 range.  No murmur  Lymph/Hematologic: No lower extremity edema  Skin: No petechia, no overt  rash.  Hypopigmentation and alopecia along frontal scalp line.  Musculoskeletal: Thin with subcutaneous fat loss of the chest.  Muscular tone appears intact, though bulk is decreased as might be anticipated for age.  Neurologic: Alert, conversant, appropriate in conversation.  More sleepy than yesterday, and appears to doze off once during exam and history  Psychiatric: Very pleasant, normal affect; fidgety currently, which is a change from yesterday    Data   Data reviewed today: I reviewed all medications, new labs and imaging results over the last 24 hours. I personally reviewed no images or EKG's today.    Recent Labs   Lab 11/05/21  0153 11/03/21  2359   WBC 6.9 7.2   HGB 11.8* 12.6*   MCV 96 95    182    134   POTASSIUM 4.3 4.2   CHLORIDE 104 103   CO2 30 28   BUN 15 14   CR 0.81 1.00   ANIONGAP 1* 3   RL 8.1* 8.5   * 110*   ALBUMIN 3.7 3.9   PROTTOTAL 7.3 7.8   BILITOTAL 0.7 0.9   ALKPHOS 108 120   ALT 24 25   AST 17 19   TROPONIN <0.015  --

## 2021-11-05 NOTE — ED PROVIDER NOTES
History   Chief Complaint:  Shortness of Breath (COPD)         The history is provided by the patient. The history is limited by the condition of the patient.      Gerson Pearson is a 61 year old male with a history of COPD who presents via EMS with shortness of breath. The patient currently lives in a group home and was brought in by EMS who reported that he had an oxygen saturation of 90% noted on pulse oximeter. The patient has been coughing since yesterday with difficulty breathing, typical of past COPD exacerbations.  He was given 1 Duoneb by EMS without much improvement. He was seen in the [facility] ER yesterday for a COPD exacerbation and was discharged with prednisone and was advised to continue using his inhalers. He denies fever and chest pain. He continues to smoke tobacco. He is vaccinated for COVID.  Apparently, he also used heroin again today.    Review of Systems   Constitutional: Negative for fever.   Respiratory: Positive for cough and shortness of breath.    Cardiovascular: Negative for chest pain.   All other systems reviewed and are negative.      Allergies:  Ibuprofen    Medications:  Albuterol   Lipitor   Suboxone   Coreg   Breo Ellipta   Lisinopril   Remeron   Protonix   Deltasone   Tiotropium   Aspirin 81 MG  Prednisone    Past Medical History:     COPD  Depressive disorder   MI  Hypovitaminosis D  Polysubstance abuse    Opioid use disorder, severe, on maintenance therapy            Rectal abnormality        Insomnia           Tooth pain  Posttraumatic chorioretinal scar of left eye      Somnolence  Anisocoria         Acute blood loss anemia  Acute upper GI bleed  Esophageal candidiasis            Pneumonia due to organism        Past Surgical History:    The patient denies past surgical history.     Family History:    Mother - Diabetes, cataracts, heart disease, hypertension  Brother - lung cancer     Social History:  Patient was unaccompanied to the ED.  Lives in a group home   Alcohol  "use   Tobacco use (current smoker)  Polysubstance abuse     Physical Exam     Patient Vitals for the past 24 hrs:   BP Temp Temp src Pulse Resp SpO2 Height Weight   11/05/21 0330 (!) 158/79 -- -- 83 26 93 % -- --   11/05/21 0245 -- -- -- 92 14 93 % -- --   11/05/21 0230 104/62 -- -- 94 28 92 % -- --   11/05/21 0215 -- -- -- 89 9 92 % -- --   11/05/21 0206 -- -- -- -- -- -- 1.753 m (5' 9\") 59 kg (130 lb)   11/05/21 0119 -- -- -- -- 22 94 % -- --   11/05/21 0116 (!) 126/99 99.6  F (37.6  C) Oral 98 -- 92 % -- --       Physical Exam  Nursing note and vitals reviewed.  Constitutional:  Awake and alert and actively coughing.   HENT:   Nose:    Nose normal.   Mouth/Throat:   Mucous membranes are normal.   Eyes:    Conjunctivae normal and EOM are normal.      Pupils are equal, round, and reactive to light.   Neck:    Trachea normal.   Cardiovascular:  Normal rate, regular rhythm, normal heart sounds and normal pulses. No murmur heard.  Pulmonary/Chest:  Slightly tachypneic with coarse breath sounds throughout as well as diffuse expiratory wheezes present.  Abdominal:   Soft. Normal appearance and bowel sounds are normal.      There is no tenderness.      There is no rebound and no CVA tenderness.   Musculoskeletal:  Extremities atraumatic x 4.   Lymphadenopathy:  No cervical adenopathy.   Neurological:   Awake and alert but somewhat altered. Normal strength.      No cranial nerve deficit or sensory deficit. GCS eye subscore is 4. GCS verbal subscore is 5. GCS motor subscore is 6.   Skin:    Skin is intact. No rash noted.   Psychiatric:   Somewhat altered.    Emergency Department Course   ECG  ECG obtained at 0138, ECG read at 0143  Sinus rhythm with short MT   Otherwise normal ECG  No significant change as compared to prior, dated 10/10/21.  Rate 84 bpm. MT interval 110 ms. QRS duration 78 ms. QT/QTc 354/418 ms. P-R-T axes 74 70 75.     Imaging:  XR Chest Port 1 View   Final Result   IMPRESSION: Minimal linear atelectasis " left midlung laterally. No new pulmonary alveolar infiltrates. Normal heart size and pulmonary vascularity. No overt osseous abnormality.        Report per radiology    Laboratory:  Labs Ordered and Resulted from Time of ED Arrival to Time of ED Departure   COMPREHENSIVE METABOLIC PANEL - Abnormal       Result Value    Sodium 135      Potassium 4.3      Chloride 104      Carbon Dioxide (CO2) 30      Anion Gap 1 (*)     Urea Nitrogen 15      Creatinine 0.81      Calcium 8.1 (*)     Glucose 109 (*)     Alkaline Phosphatase 108      AST 17      ALT 24      Protein Total 7.3      Albumin 3.7      Bilirubin Total 0.7      GFR Estimate >90     CBC WITH PLATELETS AND DIFFERENTIAL - Abnormal    WBC Count 6.9      RBC Count 3.97 (*)     Hemoglobin 11.8 (*)     Hematocrit 37.9 (*)     MCV 96      MCH 29.7      MCHC 31.1 (*)     RDW 13.8      Platelet Count 177      % Neutrophils 65      % Lymphocytes 20      % Monocytes 12      % Eosinophils 3      % Basophils 0      % Immature Granulocytes 0      NRBCs per 100 WBC 0      Absolute Neutrophils 4.5      Absolute Lymphocytes 1.4      Absolute Monocytes 0.8      Absolute Eosinophils 0.2      Absolute Basophils 0.0      Absolute Immature Granulocytes 0.0      Absolute NRBCs 0.0     LACTIC ACID WHOLE BLOOD - Normal    Lactic Acid 0.9     TROPONIN I - Normal    Troponin I <0.015     COVID-19 VIRUS (CORONAVIRUS) BY PCR - Normal    SARS CoV2 PCR Negative          Emergency Department Course:  Reviewed:  I reviewed nursing notes, vitals, past medical history and Care Everywhere    Assessments:  0125 I obtained history and examined the patient as noted above.   0250 I rechecked the patient and explained findings.     Consults:  0300 I spoke with Dr. Herring of the Hospitalist service from [facility] regarding patient's presentation, findings, and plan of care.    Interventions:  0202 Albuterol 2 puff , Inhaled  0203 Methylprednisolone 125 mg, IV  0346 Benzonatate 100 mg,  oral    Disposition:  The patient was admitted to the hospital under the care of Dr. Herring.     Impression & Plan     Medical Decision Making:  This is a 61-year-old male who came in for further evaluation of shortness of breath and cough.  He was borderline hypoxic on room air, but he was not in significant respiratory distress.  He was provided an albuterol inhaler here as well as IV Solu-Medrol.  I proceeded with the above work-up as well including EKG, blood work, and chest x-ray.  He does not appear to have pneumonia, and I suspect this is a COPD exacerbation.  Of note, he was somewhat altered as well, which is likely secondary to his ongoing use of heroin.  I think it is reasonable to bring him into the hospital.  I subsequently spoke with Dr. Herring, who will be taking care of him.    Diagnosis:    ICD-10-CM    1. COPD exacerbation (H)  J44.1    2. Acute bronchitis, unspecified organism  J20.9        Scribe Disclosure:  I, Darline Peoples (trainee) and Jami Terry (), am serving as a scribe at 1:25 AM on 11/5/2021 to document services personally performed by Hans Mckinney MD based on my observations and the provider's statements to me.            Hans Mckinney MD  11/05/21 0600

## 2021-11-05 NOTE — PROGRESS NOTES
From previous admission    Manager of group home,Mickey ([phone]). They dispense   pt's medications and he receives nebulizer treatments.   Pt is on Suboxone managed through the [facility].   Mickey will take care of scheduling pt's appointment with the   Suboxone clinic and with his [facility] PCP.   If there are any new medications they should be sent to [facility] [place].   Pt will need transportation set-up through [employer] (taxi).

## 2021-11-05 NOTE — PLAN OF CARE
"A&Ox4 and VSS on RA ex htn. Up independently (refusing bed alarm, but steady gait) and tolerating regular diet. IV SL and denies pain. SOLER. Psych consult complete. Possible discharge tomorrow. Continue to monitor.    Pt walking around unit, and writer noticed pt coming from elevator direction. Writer asked patient \"where are you headed?\" Pt replied \"I went on a walk.\" Writer restated, \"You can go for walks, but we cannot have you leave the unit.\" Pt responded, \"Okay, I won't again.\"  Upon reentering pt room, pt asked, \"Can I take this off if I want to go outside again for some fresh air?\" Writer informed pt, \"Since you are a patient in the hospital, we cannot have you go outside, just incase something would happen when you are outside.\" Pt responded, \"Okay, I won't.\"  "

## 2021-11-05 NOTE — PROGRESS NOTES
Care Management Follow Up    Length of Stay (days): 0    Expected Discharge Date: 11/06/2021     Concerns to be Addressed:       Patient plan of care discussed at interdisciplinary rounds: Yes    Anticipated Discharge Disposition:       Anticipated Discharge Services:    Anticipated Discharge DME:      Patient/family educated on Medicare website which has current facility and service quality ratings:    Education Provided on the Discharge Plan:    Patient/Family in Agreement with the Plan:      Referrals Placed by CM/SW:    Private pay costs discussed: Not applicable    Additional Information:  Spoke with Mickey at [facility] home [phone], update provided.   will accept patient back and will make his follow up appointments.  Per Mickey patient can return at any time and will need taxi ride back.  Please call [facility] with discharge date and time so he can have a nurse there to assess him upon arrival, will need to fax discharge paperwork and orders to  fax [phone].      Heidy Garcia RN Care Coordinator

## 2021-11-05 NOTE — CONSULTS
[facility] Care, Consult & Liaison    Gerson Pearson  November 5, 2021    Session start: 1250  Session end: 1322  Session duration in minutes: 32 minutes  [facility] utilized: 19652 - Psychotherapy (with patient) - 30 (16-37*) min  Patient was seen virtually ([employer] cart or other teleconferencing device).    Reason for consult: Psychiatry consult was requested due to relapse of heroin use and history of depression and anxiety. Patient was seen by [facility] Consult & Liaison team.     Identifying information: Gerson is 61 year old Black or   male   followed related to relapse of heroin and history of depression and anxiety.     Presenting problem (including symptoms, strengths risks, resources used): Patient reports symptoms of shame and guilt regarding his relapse, anxiety regarding disposition as he continues to endorse SOB and feeling down and hopeless at times. In individual therapeutic contact with patient today, patient presents with broad and fidgety affect, euthymic mood.. Safety concerns are not present today regarding SI, SIB or HI. Denies a history of any suicidal or homicidal ideation. Current risk factors for suicide include history of or current substance use and chronic pain and/or chronic illness. Protective factors against suicide include identifies reason for living, strong bond to family/friends, community support, positive working relationship with existing medical/mental health providers, engaged and/or invested in treatment, identification of future goals and future oriented towards goals, hopes, dreams.    Mental Status Exam   Affect: Other: figety  Appearance: Appropriate   Attention Span/Concentration: Attentive    Eye Contact: Engaged  Fund of Knowledge: Appropriate   Language /Speech Content: Fluent  Language /Speech Volume: Normal   Language /Speech Rate/Productions: Pressured -slight   Recent Memory: Intact  Remote Memory: Variable  Mood: Anxious and Normal- reports the anxiety is  "around the SOB and the heroin   Orientation:   Person: Yes   Place: Yes  Time of Day: Yes   Date: Yes   Situation (Do they understand why they are here?): Yes   Psychomotor Behavior: Hyperactive   Thought Content: Clear  Thought Form: Intact    Current medications:   Current Facility-Administered Medications   Medication     acetaminophen (TYLENOL) tablet 650 mg     albuterol (PROVENTIL) neb solution 2.5 mg     aspirin (ASA) chewable tablet 81 mg     atorvastatin (LIPITOR) tablet 40 mg     benzonatate (TESSALON) capsule 100 mg     lidocaine (LMX4) cream     lidocaine 1 % 0.1-1 mL     melatonin tablet 3 mg     nicotine (COMMIT) lozenge 2 mg     nicotine (NICODERM CQ) 14 MG/24HR 24 hr patch 1 patch     nicotine Patch in Place     ondansetron (ZOFRAN-ODT) ODT tab 4 mg    Or     ondansetron (ZOFRAN) injection 4 mg     pantoprazole (PROTONIX) EC tablet 40 mg     [START ON 11/6/2021] predniSONE (DELTASONE) tablet 40 mg     prochlorperazine (COMPAZINE) injection 10 mg    Or     prochlorperazine (COMPAZINE) tablet 10 mg    Or     prochlorperazine (COMPAZINE) suppository 25 mg     sodium chloride (PF) 0.9% PF flush 3 mL     sodium chloride (PF) 0.9% PF flush 3 mL     Relevant history: Mr. Pearson is from [place], [place]. He moved here to [place] to live with his sister to start [employer] in [place] as he was dealing drugs in [place] as well as using drugs.  He was working two to three jobs at time and met his wife when they were both working at the [employer].  He reports they are still  and have been  for 25 years. Reports that she lives with mental health disorders as well, which is the reason he feels he started using drugs again due to the stress he was under and not being able to care for both his and her mental health at the same time.       He is currently unemployed and his primary income is SSDI due to COPD.  He is living in a group home and reports it has been going well, until now \"I fucked it up\".  He reports that he " "would like to return to the group home and that he feels that the manager genuinely likes him as a person which he reports is important to him.  He sees an outpatient therapist and is possibly in a transition to another therapist but is unsure at this time.  He believes he has a county  but is unable to recall there name. He denies  history. Reports he does not have a sophia that he follows. He has a family history of depression and an older brother that  from heroin drug use. Denies ever having been committed or hospitalized for mental health. Reports he has been to  treatment and does not wish to return to treatment \" I just need to take full control of my mind. I am going to do it this time.\"      Cultural Influences: Reports no cultural influences that will impact his care at this time.     Therapeutic intervention and progress:  Therapeutic intervention consisted of building therapeutic rapport, active listening, validation, thought reframing, stress relief practices and normalizing. Patient is making progress towards treatment goals as evidenced by participation in assessment today as well as verbal report regarding \"I messed up\".     Collateral information:   Reviewed chart.     Diagnosis:   296.30 (F33.9) Major Depressive Disorder, Recurrent Episode, Unspecified _  300.02 (F41.1) Generalized Anxiety Disorder  Substance-Related & Addictive Disorders 292.0 (F11.23) Opioid Related Withdrawal , present;        Plan:     Continue care coordination with care team and group home staff regarding disposition.     Maintain current transition plan. Next steps include: follow up with outpatient suboxone clinic and PCP for mental health related medications.    Patient will not continue to be followed by this service.    From a psychiatric standpoint this patient is clear for discharge. Can be discharged once medically clear. Mr. Pearson reports \"I would like one more full day of rest to make sure my " "head is clear\" before discharging back to his facility. Writer stated he could talk to the hospitalist about his wishes when they round in the future.     ADRI CARDENAS, [facility], [facility], Consult & Liaison Service  [phone]  "

## 2021-11-05 NOTE — PROGRESS NOTES
Observation goals PRIOR TO DISCHARGE       -diagnostic tests and consults completed and resulted: not met   -vital signs normal or at patient baseline: met   -dyspnea improved and O2 sats greater than 88% on room air or prior home oxygen levels: partially met   -returns to baseline functional status: not met   -safe disposition plan has been identified: not met     Nurse to notify provider when observation goals have been met and patient is ready for discharge.

## 2021-11-05 NOTE — PROGRESS NOTES
Observation goals PRIOR TO DISCHARGE       -diagnostic tests and consults completed and resulted: not met   -vital signs normal or at patient baseline: not met   -dyspnea improved and O2 sats greater than 88% on room air or prior home oxygen levels: partially met   -returns to baseline functional status: not met   -safe disposition plan has been identified: not met     Nurse to notify provider when observation goals have been met and patient is ready for discharge.

## 2021-11-05 NOTE — ED TRIAGE NOTES
[facility]  ED Arrival Note      Means of Arrival: EMS  Comes from: [facility]    Story:Pt was brought in by EMS from [facility] due to low pulse ox. Pt has been coughing, non productive cough for the last 3 days. Pt has history of COPD and is currently still smokes. Pt was seen in ED X 2 days ago for the same issue.         EMS/PD Interventions: N/A  EMS Medications: Duoneb X 1 en route.     Meets Stroke Criteria? No  Meets Trauma Criteria? No      Directed to: Main ED  Belongings: Remain with patient

## 2021-11-06 VITALS
OXYGEN SATURATION: 96 % | DIASTOLIC BLOOD PRESSURE: 80 MMHG | RESPIRATION RATE: 14 BRPM | HEIGHT: 69 IN | HEART RATE: 76 BPM | WEIGHT: 130 LBS | TEMPERATURE: 97.6 F | BODY MASS INDEX: 19.26 KG/M2 | SYSTOLIC BLOOD PRESSURE: 137 MMHG

## 2021-11-06 LAB
BASE EXCESS BLDV CALC-SCNC: 4.2 MMOL/L (ref -7.7–1.9)
HCO3 BLDV-SCNC: 32 MMOL/L (ref 21–28)
O2/TOTAL GAS SETTING VFR VENT: 2 %
PCO2 BLDV: 64 MM HG (ref 40–50)
PH BLDV: 7.31 [PH] (ref 7.32–7.43)
PO2 BLDV: 39 MM HG (ref 25–47)

## 2021-11-06 PROCEDURE — 36415 COLL VENOUS BLD VENIPUNCTURE: CPT | Performed by: NURSE PRACTITIONER

## 2021-11-06 PROCEDURE — 999N000157 HC STATISTIC RCP TIME EA 10 MIN

## 2021-11-06 PROCEDURE — 250N000009 HC RX 250: Performed by: INTERNAL MEDICINE

## 2021-11-06 PROCEDURE — 82803 BLOOD GASES ANY COMBINATION: CPT | Performed by: NURSE PRACTITIONER

## 2021-11-06 PROCEDURE — 99224 PR SUBSEQUENT OBSERVATION CARE,LEVEL I: CPT | Performed by: NURSE PRACTITIONER

## 2021-11-06 PROCEDURE — 250N000009 HC RX 250: Performed by: NURSE PRACTITIONER

## 2021-11-06 PROCEDURE — 99217 PR OBSERVATION CARE DISCHARGE: CPT | Performed by: INTERNAL MEDICINE

## 2021-11-06 PROCEDURE — G0378 HOSPITAL OBSERVATION PER HR: HCPCS

## 2021-11-06 PROCEDURE — 94640 AIRWAY INHALATION TREATMENT: CPT | Mod: 76

## 2021-11-06 PROCEDURE — 250N000013 HC RX MED GY IP 250 OP 250 PS 637: Performed by: INTERNAL MEDICINE

## 2021-11-06 PROCEDURE — 250N000012 HC RX MED GY IP 250 OP 636 PS 637: Mod: GY | Performed by: STUDENT IN AN ORGANIZED HEALTH CARE EDUCATION/TRAINING PROGRAM

## 2021-11-06 RX ORDER — IPRATROPIUM BROMIDE AND ALBUTEROL SULFATE 2.5; .5 MG/3ML; MG/3ML
3 SOLUTION RESPIRATORY (INHALATION) EVERY 4 HOURS PRN
Status: DISCONTINUED | OUTPATIENT
Start: 2021-11-06 | End: 2021-11-06 | Stop reason: HOSPADM

## 2021-11-06 RX ORDER — BENZONATATE 100 MG/1
100 CAPSULE ORAL 3 TIMES DAILY PRN
Qty: 30 CAPSULE | Refills: 0 | Status: SHIPPED | OUTPATIENT
Start: 2021-11-06 | End: 2021-12-08

## 2021-11-06 RX ORDER — PREDNISONE 20 MG/1
TABLET ORAL
Qty: 33 TABLET | Refills: 0 | Status: SHIPPED | OUTPATIENT
Start: 2021-11-06 | End: 2021-11-26

## 2021-11-06 RX ORDER — PREDNISONE 20 MG/1
TABLET ORAL
Qty: 12 TABLET | Refills: 0 | Status: SHIPPED | OUTPATIENT
Start: 2021-11-06 | End: 2021-11-06

## 2021-11-06 RX ORDER — ALBUTEROL SULFATE 0.83 MG/ML
2.5 SOLUTION RESPIRATORY (INHALATION) EVERY 4 HOURS PRN
Qty: 50 ML | Refills: 0 | Status: SHIPPED | OUTPATIENT
Start: 2021-11-06 | End: 2023-07-28

## 2021-11-06 RX ADMIN — PREDNISONE 60 MG: 20 TABLET ORAL at 08:22

## 2021-11-06 RX ADMIN — NICOTINE 1 PATCH: 14 PATCH, EXTENDED RELEASE TRANSDERMAL at 08:22

## 2021-11-06 RX ADMIN — ASPIRIN 81 MG CHEWABLE TABLET 81 MG: 81 TABLET CHEWABLE at 08:22

## 2021-11-06 RX ADMIN — IPRATROPIUM BROMIDE AND ALBUTEROL SULFATE 6 ML: .5; 3 SOLUTION RESPIRATORY (INHALATION) at 01:24

## 2021-11-06 RX ADMIN — PANTOPRAZOLE SODIUM 40 MG: 40 TABLET, DELAYED RELEASE ORAL at 08:22

## 2021-11-06 RX ADMIN — ALBUTEROL SULFATE 2.5 MG: 2.5 SOLUTION RESPIRATORY (INHALATION) at 00:31

## 2021-11-06 NOTE — PROGRESS NOTES
Care Management Discharge Note    Discharge Date: 11/06/2021       Discharge Disposition:      Discharge Services:      Discharge DME:      Discharge Transportation:      Private pay costs discussed: Not applicable    PAS Confirmation Code:    Patient/family educated on Medicare website which has current facility and service quality ratings:      Education Provided on the Discharge Plan:    Persons Notified of Discharge Plans: Patient, primary nurse and Mickey  manager  Patient/Family in Agreement with the Plan:      Handoff Referral Completed: No    Additional Information:  Spoke with patient who states he agrees with plan to discharge back to his Group home, AVS faxed to  [phone] and call made to confirm with Mickey at .  New med RX sent to [facility] per  instructions, Mickey aware.  He will also make all follow up apts.  Patient has stated he need a hosp arranged taxi, will arrange when primary nurse has completed Discharge teaching.          Heidy Garcia RN Care Coordinator

## 2021-11-06 NOTE — PROGRESS NOTES
A&Ox4. Indep. 2 L NC. Reg diet. Denies pain. Albuterol neb done, Duo nebs done during RRT. Pt had exertion breathing and wheezing on exhale. O2 sat stable. Continue to monitor.

## 2021-11-06 NOTE — CODE/RAPID RESPONSE
[facility]    House [facility] RRT Note  11/6/2021   Time Called: 0103    RRT called for: Respiratory distress    Assessment & Plan     Acute hypoxic respiratory failure 2/2 suspected COPD exacerbation in setting of recent heroin intranasal use.  - Upon arrival, pt sitting upright in bed, awake, alert, with noted audible harsh, prolonged expiratory wheezes; restless as well.  Nursing notes while resting in bed, pt acutely developed worsening expiratory wheezes with noted tachypneic and increased work of breathing.  At that time, RT was able to administer an albuterol neb prior to arrival which pt, nursing and RT note much improvement in pt's respiratory status.  Pt continues to have minimal air movement throughout; therefore, will trial duoneb treatment at this time.  Pt's HR 80s, SBP 150s, RR 10s-20s, O2 sats 98% on 2L O2.     INTERVENTIONS:  - Duoneb x2 now  - Stat VBG  - Continues on scheduled prednisone  - Discussed with pt, if feels worn out/tired from current respiratory status to inform nursing staff immediately and could trial Bipap at that time  - Remains on continuous pulse oximetry    At the end of the RRT pt states breathing improved, able to rest back against pillows and notes likely able to fall asleep at this time    Discussed with and defer further cares to nursing and hospitalist    Interval History     Gerson Pearson is a 61 year old male who was admitted on 11/5/2021 for respiratory distress.    Medical history significant for: COPD, heroin use disorder, opioid dependence, CAD, tobacco use disorder    Code Status: Full Code    Allergies   Allergies   Allergen Reactions     Ibuprofen Nausea and Vomiting       Physical Exam   Vital Signs with Ranges:  Temp:  [95.7  F (35.4  C)-98.7  F (37.1  C)] 95.7  F (35.4  C)  Pulse:  [73-94] 84  Resp:  [9-28] 14  BP: (104-158)/() 158/97  SpO2:  [92 %-98 %] 98 %  I/O last 3 completed shifts:  In: 200 [P.O.:200]  Out: -      Constitutional: Pt sitting upright in bed, awake, alert, restless  Neck: Audible harsh expiratory wheezes, no stridor  Pulmonary: Pt with prolonged expiratory phase, diminished air movement noted throughout with noted expiratory wheezes as well, no crackles noted  Cardiovascular: Regular rate and rhythm, normal S1S2, no murmur, rub or gallop noted  GI: Flat  Skin/Integumen: Warm, dry  Neuro: Awake, alert, no focal neuro deficits noted  Psych:  Restless  Extremities: No peripheral edema noted    Data       Recent Labs   Lab 11/06/21  0213 11/04/21  0002   PHV 7.31*  --    PO2V 39  --    PCO2V 64*  --    HCO3V 32* 29*       Time Spent on this Encounter   I spent 25 minutes on the unit/floor managing the care of Gerson Pearson. Over 50% of my time was spent counseling the patient and/or coordinating care regarding services listed in this note.    ANJALI Bravo [facility]

## 2021-11-06 NOTE — PROGRESS NOTES
Observation goals PRIOR TO DISCHARGE       -diagnostic tests and consults completed and resulted: met  -vital signs normal or at patient baseline: not met   -dyspnea improved and O2 sats greater than 88% on room air or prior home oxygen levels: partially met   -returns to baseline functional status: not met   -safe disposition plan has been identified: not met     Nurse to notify provider when observation goals have been met and patient is ready for discharge.

## 2021-11-06 NOTE — DISCHARGE SUMMARY
[facility]    Hospitalist Discharge Summary       Date of Admission:  11/5/2021  Date of Discharge:  11/6/2021  Discharging Provider: Jose Horne MD       Discharge Diagnoses   COPD exacerbation   Acute hypoxic respiratory failure, improved  Probable acute bronchitis  Intranasal heroin use, opiate dependence  Ongoing tobacco use disorder    Follow-ups Needed After Discharge   Follow-up Appointments     Follow-up and recommended labs and tests       Follow up with primary care provider, Alex Wynn, within 7 days for   hospital follow- up.  No follow up labs or test are needed.    - Follow up with [facility] within a week           Hospital Course   Gerson Pearson is a 61 year old male with PMH of opiate dependence, COPD, MDD, polysubstance abuse, who was admitted on 11/5/2021 with a COPD exacerbation. He had presented two consecutive days to the ED for respiratory distress after intranasal heroin use, and admitted on the second episode.   Patient presented with acute hypoxia, wheezing. Exacerbation secondary to ongoing intranasal heroin use, ongoing tobacco use disorder.  Improved following nebulizer treatment, Solu-Medrol, IV magnesium. Mental health was consulted and patient is interested in stopping heroin use.   On hospital day 2 patient off of oxygen and able to ambulate without any significant wheezing. Discussed both heroin use and tobacco use, patient is interested in stopping heroin and resuming his suboxone. He has not made definite plans to quit smoking yet. Patient is medically stable to complete a steroid course as outpatient. He has a nebulizer machine at home and so nebs were prescribed.   - Resume suboxone at discharge, clinical coordinator consult to help with rescheduling his suboxone appointment as he missed his appointment on 11/5 due to hospitalization    Coronary artery disease: Status post RCA bare-metal stent placement through  [facility] in June 2020      Tobacco use disorder with nicotine dependence: Smokes approximately 1/2 pack/day, has cut back from 1 pack/day with continued efforts.  Has been on and off nicotine patches, though has concerns regarding smoking while on the patch.  - Continued outpatient tobacco cessation with PCP    Consultations This Hospital Stay   PSYCHIATRY IP CONSULT  CARE MANAGEMENT / SOCIAL WORK IP CONSULT    Code Status   Full Code    Time Spent on this Encounter   I, Jose Horne, personally saw the patient today and spent approximately 35 minutes discharging this patient. This includes approximately 15 minutes discussing heroin and tobacco cessation.       Jose Horne MD  [facility]  ______________________________________________________________________    Physical Exam   Vital Signs: Temp: 97.6  F (36.4  C) Temp src: Oral BP: 137/80 Pulse: 76   Resp: 14 SpO2: 96 % O2 Device: None (Room air) Oxygen Delivery: 2 LPM  Weight: 130 lbs 0 oz    Constitutional: Male in NAD  Eyes: Nonicteric, normal ocular movements  HEENT: Normocephalic, atraumatic, oral mucosa moist  Respiratory: Very mild scattered wheezing  Cardiovascular: RRR, normal S1/2, no m/r/g  GI: Nontender, nondistended  Skin: No rashes  Musculoskeletal: Normal strength in UE and LE, moves all extremities  Neurologic: A&Ox3  Psychiatric: Appropriate affect and mood       Primary Care Physician   Alex Wynn    Discharge Disposition   Discharged to home  Condition at discharge: Stable    Significant Results and Procedures   Most Recent 3 CBC's:  Recent Labs   Lab Test 11/05/21  0153 11/03/21  2359 10/12/21  0818   WBC 6.9 7.2 13.6*   HGB 11.8* 12.6* 13.3   MCV 96 95 91    182 221     Most Recent 3 BMP's:  Recent Labs   Lab Test 11/05/21  0153 11/03/21  2359 10/12/21  0818    134 137   POTASSIUM 4.3 4.2 4.1   CHLORIDE 104 103 105   CO2 30 28 25   BUN 15 14 15   CR 0.81 1.00 0.68   ANIONGAP 1* 3  7   RL 8.1* 8.5 9.0   * 110* 129*     Most Recent 2 LFT's:  Recent Labs   Lab Test 11/05/21  0153 11/03/21  2359   AST 17 19   ALT 24 25   ALKPHOS 108 120   BILITOTAL 0.7 0.9   ,   Results for orders placed or performed during the hospital encounter of 11/05/21   XR Chest Port 1 View    Narrative    EXAM: XR CHEST PORT 1 VIEW  LOCATION: [facility]  DATE/TIME: 11/5/2021 1:35 AM    INDICATION: Shortness of breath  COMPARISON: 11/04/2021      Impression    IMPRESSION: Minimal linear atelectasis left midlung laterally. No new pulmonary alveolar infiltrates. Normal heart size and pulmonary vascularity. No overt osseous abnormality.       Discharge Orders      Reason for your hospital stay    You were hospitalized for a COPD exacerbation.     Follow-up and recommended labs and tests     Follow up with primary care provider, Alex Wynn, within 7 days for hospital follow- up.  No follow up labs or test are needed.    - Follow up with [facility] clinic within a week     Activity    Your activity upon discharge: activity as tolerated     Diet    Follow this diet upon discharge: Regular Diet Adult     Discharge Medications   Current Discharge Medication List      START taking these medications    Details   benzonatate (TESSALON) 100 MG capsule Take 1 capsule (100 mg) by mouth 3 times daily as needed for cough  Qty: 30 capsule, Refills: 0    Associated Diagnoses: Acute bronchitis, unspecified organism         CONTINUE these medications which have CHANGED    Details   albuterol (PROVENTIL) (2.5 MG/3ML) 0.083% neb solution Take 1 vial (2.5 mg) by nebulization every 4 hours as needed for wheezing  Qty: 50 mL, Refills: 0    Associated Diagnoses: Acute bronchitis, unspecified organism      predniSONE (DELTASONE) 20 MG tablet Take 3 tablets (60 mg) by mouth daily for 5 days, THEN 2 tablets (40 mg) daily for 5 days, THEN 1 tablet (20 mg) daily for 5 days, THEN 0.5 tablets (10  mg) daily for 5 days.  Qty: 33 tablet, Refills: 0    Associated Diagnoses: Acute bronchitis, unspecified organism         CONTINUE these medications which have NOT CHANGED    Details   acetaminophen (TYLENOL) 325 MG tablet Take 650 mg by mouth every 4 hours as needed for mild pain      albuterol (PROAIR HFA/PROVENTIL HFA/VENTOLIN HFA) 108 (90 BASE) MCG/ACT Inhaler Inhale 2 puffs into the lungs every 4 hours as needed for shortness of breath / dyspnea or wheezing  Qty: 1 Inhaler, Refills: 1    Associated Diagnoses: COPD exacerbation (H)      aspirin (ASA) 81 MG chewable tablet Take 81 mg by mouth daily      atorvastatin (LIPITOR) 40 MG tablet Take 40 mg by mouth At Bedtime      buprenorphine-naloxone (SUBOXONE) 8-2 MG SUBL sublingual tablet Place 1 tablet under the tongue 2 times daily X 28 days      carvedilol (COREG) 6.25 MG tablet Take 6.25 mg by mouth 2 times daily (with meals)      fluticasone-vilanterol (BREO ELLIPTA) 200-25 MCG/INH inhaler Inhale 1 puff into the lungs daily      lisinopril (ZESTRIL) 10 MG tablet Take 10 mg by mouth daily       mirtazapine (REMERON) 45 MG tablet Take 45 mg by mouth At Bedtime       multivitamin w/minerals (THERA-VIT-M) tablet Take 1 tablet by mouth daily      naloxone (NARCAN) 4 MG/0.1ML nasal spray Spray 4 mg into one nostril alternating nostrils once as needed for opioid reversal every 2-3 minutes until assistance arrives      nicotine (COMMIT) 2 MG lozenge Place 2 mg inside cheek every hour as needed for smoking cessation      nicotine (NICODERM CQ) 14 MG/24HR 24 hr patch Place 1 patch onto the skin every 24 hours      pantoprazole (PROTONIX) 40 MG EC tablet Take 40 mg by mouth daily      tiotropium (SPIRIVA RESPIMAT) 2.5 MCG/ACT inhaler Inhale 2 puffs into the lungs daily           Allergies   Allergies   Allergen Reactions     Ibuprofen Nausea and Vomiting

## 2021-11-19 ENCOUNTER — HOSPITAL ENCOUNTER (EMERGENCY)
Facility: CLINIC | Age: 61
Discharge: HOME | End: 2021-11-19
Attending: EMERGENCY MEDICINE | Admitting: EMERGENCY MEDICINE
Payer: MEDICARE

## 2021-11-19 ENCOUNTER — APPOINTMENT (OUTPATIENT)
Dept: GENERAL RADIOLOGY | Facility: CLINIC | Age: 61
End: 2021-11-19
Attending: EMERGENCY MEDICINE
Payer: MEDICARE

## 2021-11-19 ENCOUNTER — NURSE TRIAGE (OUTPATIENT)
Dept: NURSING | Facility: CLINIC | Age: 61
End: 2021-11-19

## 2021-11-19 VITALS
DIASTOLIC BLOOD PRESSURE: 62 MMHG | SYSTOLIC BLOOD PRESSURE: 102 MMHG | RESPIRATION RATE: 26 BRPM | TEMPERATURE: 98.2 F | HEART RATE: 100 BPM | WEIGHT: 130 LBS | OXYGEN SATURATION: 96 % | BODY MASS INDEX: 19.2 KG/M2

## 2021-11-19 DIAGNOSIS — J44.9 CHRONIC OBSTRUCTIVE PULMONARY DISEASE, UNSPECIFIED COPD TYPE (H): ICD-10-CM

## 2021-11-19 DIAGNOSIS — J18.9 WALKING PNEUMONIA: ICD-10-CM

## 2021-11-19 LAB
ALBUMIN SERPL-MCNC: 3.1 G/DL (ref 3.4–5)
ALP SERPL-CCNC: 121 U/L (ref 40–150)
ALT SERPL W P-5'-P-CCNC: 50 U/L (ref 0–70)
ANION GAP SERPL CALCULATED.3IONS-SCNC: 3 MMOL/L (ref 3–14)
AST SERPL W P-5'-P-CCNC: 59 U/L (ref 0–45)
ATRIAL RATE - MUSE: 91 BPM
BASOPHILS # BLD AUTO: 0 10E3/UL (ref 0–0.2)
BASOPHILS NFR BLD AUTO: 0 %
BILIRUB SERPL-MCNC: 0.8 MG/DL (ref 0.2–1.3)
BUN SERPL-MCNC: 10 MG/DL (ref 7–30)
CALCIUM SERPL-MCNC: 8.9 MG/DL (ref 8.5–10.1)
CHLORIDE BLD-SCNC: 103 MMOL/L (ref 94–109)
CO2 SERPL-SCNC: 30 MMOL/L (ref 20–32)
CREAT SERPL-MCNC: 0.78 MG/DL (ref 0.66–1.25)
DIASTOLIC BLOOD PRESSURE - MUSE: NORMAL MMHG
EOSINOPHIL # BLD AUTO: 0.1 10E3/UL (ref 0–0.7)
EOSINOPHIL NFR BLD AUTO: 1 %
ERYTHROCYTE [DISTWIDTH] IN BLOOD BY AUTOMATED COUNT: 13.8 % (ref 10–15)
FLUAV RNA SPEC QL NAA+PROBE: NEGATIVE
FLUBV RNA RESP QL NAA+PROBE: NEGATIVE
GFR SERPL CREATININE-BSD FRML MDRD: >90 ML/MIN/1.73M2
GLUCOSE BLD-MCNC: 164 MG/DL (ref 70–99)
HCT VFR BLD AUTO: 43.3 % (ref 40–53)
HGB BLD-MCNC: 13.8 G/DL (ref 13.3–17.7)
HOLD SPECIMEN: NORMAL
IMM GRANULOCYTES # BLD: 0.2 10E3/UL
IMM GRANULOCYTES NFR BLD: 1 %
INTERPRETATION ECG - MUSE: NORMAL
LYMPHOCYTES # BLD AUTO: 1.3 10E3/UL (ref 0.8–5.3)
LYMPHOCYTES NFR BLD AUTO: 6 %
MCH RBC QN AUTO: 30.4 PG (ref 26.5–33)
MCHC RBC AUTO-ENTMCNC: 31.9 G/DL (ref 31.5–36.5)
MCV RBC AUTO: 95 FL (ref 78–100)
MONOCYTES # BLD AUTO: 1.1 10E3/UL (ref 0–1.3)
MONOCYTES NFR BLD AUTO: 5 %
NEUTROPHILS # BLD AUTO: 19.5 10E3/UL (ref 1.6–8.3)
NEUTROPHILS NFR BLD AUTO: 87 %
NRBC # BLD AUTO: 0 10E3/UL
NRBC BLD AUTO-RTO: 0 /100
P AXIS - MUSE: 84 DEGREES
PLATELET # BLD AUTO: 188 10E3/UL (ref 150–450)
POTASSIUM BLD-SCNC: 4.2 MMOL/L (ref 3.4–5.3)
PR INTERVAL - MUSE: 114 MS
PROT SERPL-MCNC: 7.4 G/DL (ref 6.8–8.8)
QRS DURATION - MUSE: 80 MS
QT - MUSE: 332 MS
QTC - MUSE: 408 MS
R AXIS - MUSE: 81 DEGREES
RBC # BLD AUTO: 4.54 10E6/UL (ref 4.4–5.9)
SARS-COV-2 RNA RESP QL NAA+PROBE: NEGATIVE
SODIUM SERPL-SCNC: 136 MMOL/L (ref 133–144)
SYSTOLIC BLOOD PRESSURE - MUSE: NORMAL MMHG
T AXIS - MUSE: 87 DEGREES
TROPONIN I SERPL-MCNC: <0.015 UG/L (ref 0–0.04)
VENTRICULAR RATE- MUSE: 91 BPM
WBC # BLD AUTO: 22.2 10E3/UL (ref 4–11)

## 2021-11-19 PROCEDURE — 96375 TX/PRO/DX INJ NEW DRUG ADDON: CPT

## 2021-11-19 PROCEDURE — 99285 EMERGENCY DEPT VISIT HI MDM: CPT | Mod: 25

## 2021-11-19 PROCEDURE — 93005 ELECTROCARDIOGRAM TRACING: CPT

## 2021-11-19 PROCEDURE — 36415 COLL VENOUS BLD VENIPUNCTURE: CPT | Performed by: EMERGENCY MEDICINE

## 2021-11-19 PROCEDURE — 87636 SARSCOV2 & INF A&B AMP PRB: CPT | Performed by: EMERGENCY MEDICINE

## 2021-11-19 PROCEDURE — C9803 HOPD COVID-19 SPEC COLLECT: HCPCS

## 2021-11-19 PROCEDURE — 250N000011 HC RX IP 250 OP 636: Performed by: EMERGENCY MEDICINE

## 2021-11-19 PROCEDURE — 84484 ASSAY OF TROPONIN QUANT: CPT | Performed by: EMERGENCY MEDICINE

## 2021-11-19 PROCEDURE — 96365 THER/PROPH/DIAG IV INF INIT: CPT

## 2021-11-19 PROCEDURE — 82040 ASSAY OF SERUM ALBUMIN: CPT | Performed by: EMERGENCY MEDICINE

## 2021-11-19 PROCEDURE — 85004 AUTOMATED DIFF WBC COUNT: CPT | Performed by: EMERGENCY MEDICINE

## 2021-11-19 PROCEDURE — 250N000009 HC RX 250: Performed by: EMERGENCY MEDICINE

## 2021-11-19 PROCEDURE — 94640 AIRWAY INHALATION TREATMENT: CPT

## 2021-11-19 PROCEDURE — 71045 X-RAY EXAM CHEST 1 VIEW: CPT

## 2021-11-19 RX ORDER — DOXYCYCLINE HYCLATE 100 MG
100 TABLET ORAL 2 TIMES DAILY
Qty: 14 TABLET | Refills: 0 | Status: SHIPPED | OUTPATIENT
Start: 2021-11-19 | End: 2021-11-26

## 2021-11-19 RX ORDER — PREDNISONE 20 MG/1
TABLET ORAL
Qty: 10 TABLET | Refills: 0 | Status: SHIPPED | OUTPATIENT
Start: 2021-11-19 | End: 2021-12-08

## 2021-11-19 RX ORDER — IPRATROPIUM BROMIDE AND ALBUTEROL SULFATE 2.5; .5 MG/3ML; MG/3ML
3 SOLUTION RESPIRATORY (INHALATION)
Status: COMPLETED | OUTPATIENT
Start: 2021-11-19 | End: 2021-11-19

## 2021-11-19 RX ORDER — MAGNESIUM SULFATE HEPTAHYDRATE 40 MG/ML
2 INJECTION, SOLUTION INTRAVENOUS ONCE
Status: COMPLETED | OUTPATIENT
Start: 2021-11-19 | End: 2021-11-19

## 2021-11-19 RX ORDER — METHYLPREDNISOLONE SODIUM SUCCINATE 125 MG/2ML
125 INJECTION, POWDER, LYOPHILIZED, FOR SOLUTION INTRAMUSCULAR; INTRAVENOUS ONCE
Status: COMPLETED | OUTPATIENT
Start: 2021-11-19 | End: 2021-11-19

## 2021-11-19 RX ADMIN — IPRATROPIUM BROMIDE AND ALBUTEROL SULFATE 3 ML: .5; 3 SOLUTION RESPIRATORY (INHALATION) at 11:24

## 2021-11-19 RX ADMIN — MAGNESIUM SULFATE HEPTAHYDRATE 2 G: 40 INJECTION, SOLUTION INTRAVENOUS at 10:33

## 2021-11-19 RX ADMIN — METHYLPREDNISOLONE SODIUM SUCCINATE 125 MG: 125 INJECTION, POWDER, FOR SOLUTION INTRAMUSCULAR; INTRAVENOUS at 10:33

## 2021-11-19 RX ADMIN — IPRATROPIUM BROMIDE AND ALBUTEROL SULFATE 3 ML: .5; 3 SOLUTION RESPIRATORY (INHALATION) at 10:59

## 2021-11-19 RX ADMIN — IPRATROPIUM BROMIDE AND ALBUTEROL SULFATE 3 ML: .5; 3 SOLUTION RESPIRATORY (INHALATION) at 10:34

## 2021-11-19 ASSESSMENT — ENCOUNTER SYMPTOMS
VOMITING: 0
DIARRHEA: 0
SHORTNESS OF BREATH: 1

## 2021-11-19 NOTE — DISCHARGE INSTRUCTIONS
Please come back to the ER if there are any activities you can do that you can normally do, or with any worsening shortness of breath.  Please take antibiotics that we have given you, and follow with your regular doctor in 1 week.  This is very important.

## 2021-11-19 NOTE — ED NOTES
Pt presents to the ED via EMS for evaluation of SOB.   EMS report: hx of COPD and HTN. Lives at group home. Increasing SOB over the last day. GH administered neb and inhaler without improvement. Not normally on home O2. EMS attempted IV access without success. Pt also reporting insect bite on left thigh.

## 2021-11-19 NOTE — ED PROVIDER NOTES
History   Chief Complaint:  Shortness of Breath       HPI   Gerson Pearson is a 61 year old male with history of COPD and myocardial infarction who presents with shortness of breath. The patient states he woke up this morning feeling shortness of breath which is similar to many previous COPD exacerbations. He has done 2 possibly 3 nebulizers and feels that his breathing is improving. He is not regularly on oxygen at home. Denies chest pain, vomiting or diarrhea. He is vaccinated against Covid.     Review of Systems   Respiratory: Positive for shortness of breath.    Cardiovascular: Negative for chest pain.   Gastrointestinal: Negative for diarrhea and vomiting.   All other systems reviewed and are negative.      Allergies:  Ibuprofen    Medications:  Tessalon  Albuterol nebulizer  Aspirin 81 mg  Lipitor  Suboxone  Coreg  Ellipta inhaler  Zestril  Remeron  Protonix  Spiriva inhaler    Past Medical History:     COPD  Depression  Myocardial infarction     Family History:    Mother- diabetes mellitus     Social History:  Presents alone  Smoker    Physical Exam     Patient Vitals for the past 24 hrs:   BP Temp Temp src Pulse Resp SpO2 Weight   11/19/21 1150 -- -- -- -- -- 97 % --   11/19/21 1140 -- -- -- -- -- 100 % --   11/19/21 1130 113/75 -- -- 96 -- 100 % --   11/19/21 1100 107/76 -- -- 92 -- 100 % --   11/19/21 1058 -- -- -- 95 -- 100 % --   11/19/21 1032 113/79 -- -- 94 -- 100 % --   11/19/21 1030 113/79 -- -- 94 -- 100 % --   11/19/21 1018 -- -- -- -- -- 95 % --   11/19/21 1014 -- -- -- -- -- 91 % --   11/19/21 0945 135/81 98.2  F (36.8  C) Oral 94 26 97 % 59 kg (130 lb)       Physical Exam  Vitals: reviewed by me  General: Pt seen on [facility], pleasant, cooperative, and alert to conversation, watching television  Eyes: Tracking well, clear conjunctiva BL  ENT: MMM, midline trachea.   Lungs: Significant wheezing heard in bilateral lung fields.  Speaking in full sentences, coughing occasionally.  Minimal  accessory muscle use, prolonged expiratory phase noted.  Mild respiratory distress noted.  This is improved on assessment after nebulizer.  CV: Rate as above  Abd: Soft, non tender, no guarding, no rebound. Non distended  MSK: no joint effusion.  No evidence of trauma  Skin: No rash  Neuro: Clear speech and no facial droop.  Psych: Not RIS, no e/o AH/VH      Emergency Department Course   ECG  ECG obtained at 0953, ECG read at 1022  Shortness of breath    Rate 91 bpm. WY interval 114 ms. QRS duration 80 ms. QT/QTc 332/408 ms. P-R-T axes 84 81 87. Normal sinus rhythm. Possible left atrial enlargement.      Imaging:  XR Chest Port 1 View   Preliminary Result   IMPRESSION: No focal airspace disease. Hyperinflated lungs may be seen   with COPD. Normal cardiac silhouette.        Report per radiology    Laboratory:  Labs Ordered and Resulted from Time of ED Arrival to Time of ED Departure   COMPREHENSIVE METABOLIC PANEL - Abnormal       Result Value    Sodium 136      Potassium 4.2      Chloride 103      Carbon Dioxide (CO2) 30      Anion Gap 3      Urea Nitrogen 10      Creatinine 0.78      Calcium 8.9      Glucose 164 (*)     Alkaline Phosphatase 121      AST 59 (*)     ALT 50      Protein Total 7.4      Albumin 3.1 (*)     Bilirubin Total 0.8      GFR Estimate >90     CBC WITH PLATELETS AND DIFFERENTIAL - Abnormal    WBC Count 22.2 (*)     RBC Count 4.54      Hemoglobin 13.8      Hematocrit 43.3      MCV 95      MCH 30.4      MCHC 31.9      RDW 13.8      Platelet Count 188      % Neutrophils 87      % Lymphocytes 6      % Monocytes 5      % Eosinophils 1      % Basophils 0      % Immature Granulocytes 1      NRBCs per 100 WBC 0      Absolute Neutrophils 19.5 (*)     Absolute Lymphocytes 1.3      Absolute Monocytes 1.1      Absolute Eosinophils 0.1      Absolute Basophils 0.0      Absolute Immature Granulocytes 0.2 (*)     Absolute NRBCs 0.0     TROPONIN I - Normal    Troponin I <0.015     INFLUENZA A/B & SARS-COV2 PCR  MULTIPLEX - Normal    Influenza A target Negative      Influenza B target Negative      SARS CoV2 PCR Negative          Emergency Department Course:  Reviewed:  I reviewed nursing notes, vitals and past medical history    Assessments:  1015 I obtained history and examined the patient as noted above.   1156 I rechecked the patient and he feels much improved.     Interventions:  1033: Solu-Medrol 125 mg IV   1033: Magnesium sulfate 2 g in NS IV Infusion     1034: Duoneb 3 mL nebulization    1059: Duoneb 3 mL nebulization    1124: Duoneb 3 mL nebulization      Disposition:  The patient was discharged to home.     Impression & Plan     CMS Diagnoses: None    Medical Decision Making:  This is a pleasant 61-year-old male comes to the emergency room with appears to be shortness of breath.  He was not on oxygen at home, but did initially require the nasal cannula here to stay in the 90s.  He tells me that this feels identical to his previous COPD exacerbations, and he has no chest pain.  He has done very well, and while his initial lung exam had diffuse wheezing, he now has almost no wheezing on my reassessment.  His oxygenation is now been above 95% on room air for over 30 minutes.  He himself tells me he is ready to go back home, and I do think that is the next best step for him.  He does have a white count however, and with a normal x-ray, that raises the possibility of walking pneumonia.  This also may be elevated because of his frequent use of steroids in the setting of COPD, however I do favor treatment for pneumonia with doxycycline as well given his history.  We will plan for transfer back to the group home, red flags for when to come back to the ER were discussed in detail.    Diagnosis:    ICD-10-CM    1. Chronic obstructive pulmonary disease, unspecified COPD type (H)  J44.9    2. Walking pneumonia  J18.9        Discharge Medications:  New Prescriptions    DOXYCYCLINE HYCLATE (VIBRA-TABS) 100 MG TABLET    Take 1  tablet (100 mg) by mouth 2 times daily for 7 days    PREDNISONE (DELTASONE) 20 MG TABLET    Take two tablets (= 40mg) each day for 5 (five) days       Scribe Disclosure:  I, Janet Jerome, am serving as a scribe at 10:09 AM on 11/19/2021 to document services personally performed by Alex Cullen MD based on my observations and the provider's statements to me.             Alex Cullen MD  11/19/21 1400

## 2021-11-20 NOTE — TELEPHONE ENCOUNTER
Pt's care home that he is at is calling.    Pt was seen in the ED today.  Put on a tapering dose of Prednisone. 20 mg dose and then 10 mg dose x 5 days.  Pt was already given 40 mg and he is needing orders as to what to give him.  [place] [facility]    PCP advised him to call the hospital.  I transferred him back to the hospital. I advised him to let them know that he is not the patient, was just seen in the ED, and he is needing to speak to the ED physician for further orders.  Other option is to contact the PCP on call, and let them know, that once discharged, they need to follow.  He verbalized understanding.    Tita Rice RN  [facility] Nurse Advisor  11/19/2021 at 10:15 PM        Reason for Disposition    [1] Caller has URGENT medication question about med that PCP or specialist prescribed AND [2] triager unable to answer question    Additional Information    Negative: Drug overdose and triager unable to answer question    Negative: Caller requesting information unrelated to medicine    Negative: Caller requesting a prescription for Strep throat and has a positive culture result    Negative: Rash while taking a medication or within 3 days of stopping it    Negative: Immunization reaction suspected    Negative: [1] Asthma and [2] having symptoms of asthma (cough, wheezing, etc.)    Negative: [1] Influenza symptoms AND [2] anti-viral med prescription request, such as Tamiflu    Negative: [1] Symptom of illness (e.g., headache, abdominal pain, earache, vomiting) AND [2] more than mild    Negative: MORE THAN A DOUBLE DOSE of a prescription or over-the-counter (OTC) drug    Negative: [1] DOUBLE DOSE (an extra dose or lesser amount) of over-the-counter (OTC) drug AND [2] any symptoms (e.g., dizziness, nausea, pain, sleepiness)    Negative: [1] DOUBLE DOSE (an extra dose or lesser amount) of prescription drug AND [2] any symptoms (e.g., dizziness, nausea, pain, sleepiness)    Negative: Took another  "person's prescription drug    Negative: [1] DOUBLE DOSE (an extra dose or lesser amount) of prescription drug AND [2] NO symptoms (Exception: a double dose of antibiotics)    Negative: Diabetes drug error or overdose (e.g., took wrong type of insulin or took extra dose)    Negative: [1] Request for URGENT new prescription or refill of \"essential\" medication (i.e., likelihood of harm to patient if not taken) AND [2] triager unable to fill per unit policy    Negative: [1] Prescription not at pharmacy AND [2] was prescribed by PCP recently    Negative: [1] Pharmacy calling with prescription questions AND [2] triager unable to answer question    Protocols used: MEDICATION QUESTION CALL-CHRISTIANO-    Tita Rice RN  [facility] Nurse Advisor  11/19/2021 at 10:16 PM      "

## 2021-11-22 NOTE — DISCHARGE SUMMARY
[facility]  Discharge Summary        Gerson Pearson MRN# [id number]   YOB: 1960 Age: 61 year old     Date of Admission:  10/11/2021  Date of Discharge:  11/22/2021  Admitting Physician:  No admitting provider for patient encounter.  Discharge Physician: Jairo Rendon MD  Discharging Service: Hospitalist     Primary Provider:  Alex Wynn  Primary Care Physician Phone Number: [phone]         Discharge Diagnoses/Problem Oriented Hospital Course (Providers):    Gerson Pearson was admitted on 10/11/2021 by No admitting provider for patient encounter. and I would refer you to their history and physical.  The following problems were addressed during his hospitalization:     Gerson Pearson is a 61 year old male with PMHx of COPD, tobacco dependence, and chronic back pain who was admitted 10/11/21 after presenting with SOB. Admitted for further evaluation and treatment of COPD exacerbation.      Acute hypoxic respiratory failure in the setting of COPD exacerbation: Hypoxic to 88% per ED documentation. Follows with Pulmonology through [facility]. PFTs 6/23/21. Does not use oxygen PTA. Reports new, productive cough, but no other URI sx.   * Recent ED visit 9/1/21 at [facility], [place] for COPD exacerbation, prescribed prednisone 20 mg X5 days and Zpak, prior to that COPD exacerbation in 8/3/2021   * CMP with mild hyponatremia, CBC with mild anemia. Trop negative. VBG 7.30/64/20/32. CXR no acute pathology. Covid PCR negative.   * Received 2g mag sulfate X1, prednisone 60 mg X1, and neb X3 in the ED  - Change from Solu-medrol 62.5 mg q8 hrs to prednisone  - Continue doxycycline 100 mg BID for 7 days total  - Duonebs q4 hrs while awake, PRN albuterol neb available while inpatient.  - RCAT consulted, encourage pulmonary toilet with IS and acapella   - Weened off oxygen    - Continue PTA Breo Ellipta      Hyponatremia: Sodium 132 on admission.   - Repeat in the AM showed Na 137, SL IVF     Normocytic  anemia: Stable ~12 on admission.   - Monitor      Hx of CVA: Right parieto-occipital lobe in 2019.   - Continue statin and ASA as above.     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 Stable central macular scar left eye.  - Noted      Tobacco dependence: 40 pack year tobacco hx with ongoing tobacco use.   - Nicotine patch ordered on admission      Opioid use disorder, severe, on maintenance therapy   Chronic back pain: Continue PTA Suboxone     CAD with hx of NSTEMI s/p BMS to RCA (6/25/21020)  Ischemic cardiomyopathy (EF 50-55%, 5/2021)  HTN  Hyperlipidemia: Had been on Prasugrel which was stopped 6/25/21. Followed by Dr. Araujo of [facility]note [facility].   * TTE, 5/27/2021: With normal left ventricular size and thickness, normal left ventricular systolic function, basal to mid inferior and inferolateral wall motion abnormality, normal right ventricular size and systolic function, no significant valvular abnormalities.  - Continue PTA ASA, statin, lisinopril and Coreg      MDD  Insomnia: Continue PTA Remeron     GERD with hx of ulcer: Continue PTA Protonix      Covid status: Negative PCR on admission. Pt is vaccinated with Moderna 3/1/21 and 3/29/21.   Diet: Combination Diet Regular Diet Adult    DVT Prophylaxis: Pneumatic Compression Devices  Post Catheter: Not present  Central Lines: None  Code Status: Full Code, confirmed with pt.      Disposition Plan   Expected discharge: 10/13/2021   recommended to prior living arrangement (correction) once O2 weened         Code Status:      Full Code         Important Results:      See below         Pending Results:        Unresulted Labs Ordered in the Past 30 Days of this Admission     No orders found from 9/11/2021 to 10/12/2021.               Discharge Instructions and Follow-Up:      Follow-up Appointments     Follow-up and recommended labs and tests       Follow up with primary care provider, Alex Wynn, as needed                  Discharge  Disposition:      Discharged to home         Discharge Medications:        Discharge Medication List as of 10/13/2021  2:18 PM      START taking these medications    Details   doxycycline hyclate (VIBRAMYCIN) 100 MG capsule Take 1 capsule (100 mg) by mouth 2 times daily for 4 days, Disp-8 capsule, R-0, E-Prescribe         CONTINUE these medications which have CHANGED    Details   !! predniSONE (DELTASONE) 20 MG tablet Take 2 tablets (40 mg) by mouth daily for 1 day, Disp-2 tablet, R-0, E-Prescribe      !! predniSONE (DELTASONE) 10 MG tablet Take 3 tablets (30 mg) by mouth daily, Disp-9 tablet, R-0, E-Prescribe      !! predniSONE (DELTASONE) 10 MG tablet Take 1 tablet (10 mg) by mouth daily, Disp-3 tablet, R-0, E-Prescribe      !! predniSONE (DELTASONE) 20 MG tablet Take 1 tablet (20 mg) by mouth daily, Disp-3 tablet, R-0, E-Prescribe       !! - Potential duplicate medications found. Please discuss with provider.      CONTINUE these medications which have NOT CHANGED    Details   acetaminophen (TYLENOL) 325 MG tablet Take 650 mg by mouth every 4 hours as needed for mild pain, Historical      albuterol (PROAIR HFA/PROVENTIL HFA/VENTOLIN HFA) 108 (90 BASE) MCG/ACT Inhaler Inhale 2 puffs into the lungs every 4 hours as needed for shortness of breath / dyspnea or wheezing, Disp-1 Inhaler, R-1, E-Prescribe      aspirin (ASA) 81 MG chewable tablet Take 81 mg by mouth daily, Historical      atorvastatin (LIPITOR) 40 MG tablet Take 40 mg by mouth At Bedtime, Historical      buprenorphine-naloxone (SUBOXONE) 8-2 MG SUBL sublingual tablet Place 1 tablet under the tongue 2 times daily X 28 days, Historical      carvedilol (COREG) 6.25 MG tablet Take 6.25 mg by mouth 2 times daily (with meals), Historical      fluticasone-vilanterol (BREO ELLIPTA) 200-25 MCG/INH inhaler Inhale 1 puff into the lungs daily, Historical      lisinopril (ZESTRIL) 10 MG tablet Take 5 mg by mouth daily, Historical      mirtazapine (REMERON) 45 MG  tablet Take 30 mg by mouth At Bedtime, Historical      multivitamin w/minerals (THERA-VIT-M) tablet Take 1 tablet by mouth daily, Historical      naloxone (NARCAN) 4 MG/0.1ML nasal spray Spray 4 mg into one nostril alternating nostrils once as needed for opioid reversal every 2-3 minutes until assistance arrives, Historical      nicotine (COMMIT) 2 MG lozenge Place 2 mg inside cheek every hour as needed for smoking cessation, Historical      nicotine (NICODERM CQ) 14 MG/24HR 24 hr patch Place 1 patch onto the skin every 24 hours, Historical      pantoprazole (PROTONIX) 40 MG EC tablet Take 40 mg by mouth daily, Historical      tiotropium (SPIRIVA RESPIMAT) 2.5 MCG/ACT inhaler Inhale 2 puffs into the lungs daily, Historical      albuterol (PROVENTIL) (2.5 MG/3ML) 0.083% neb solution Take 2.5 mg by nebulization every 4 hours as needed for wheezing, Historical                  Allergies:         Allergies   Allergen Reactions     Ibuprofen Nausea and Vomiting            Consultations This Hospital Stay:      Consultation during this admission received from None          Discharge Orders      After Care Instructions     Activity      Your activity upon discharge: activity as tolerated         Diet      Follow this diet upon discharge: Orders Placed This Encounter      Snacks/Supplements Adult: Ensure Enlive; Between Meals      Combination Diet Regular Diet Adult                    Discharge Time:      Less than 30 minutes.        Image Results From This Hospital Stay (For Non-EPIC Providers):        Results for orders placed or performed during the hospital encounter of 10/11/21   XR Chest Port 1 View    Narrative    EXAM: XR CHEST PORT 1 VIEW  LOCATION: [facility]  DATE/TIME: 10/11/2021 1:00 AM    INDICATION: Shortness of breath.  COMPARISON: 7/29/2021.    FINDINGS: The heart size is normal. Mild atelectasis or scar at the left lung base. Lungs are otherwise clear. No pneumothorax.       Impression    IMPRESSION: No acute abnormality.           Most Recent Lab Results In EPIC (For Non-EPIC Providers):    Most Recent 3 CBC's:  Recent Labs   Lab Test 11/19/21  0952 11/05/21  0153 11/03/21  2359   WBC 22.2* 6.9 7.2   HGB 13.8 11.8* 12.6*   MCV 95 96 95    177 182      Most Recent 3 BMP's:  Recent Labs   Lab Test 11/19/21  0952 11/05/21  0153 11/03/21  2359    135 134   POTASSIUM 4.2 4.3 4.2   CHLORIDE 103 104 103   CO2 30 30 28   BUN 10 15 14   CR 0.78 0.81 1.00   ANIONGAP 3 1* 3   RL 8.9 8.1* 8.5   * 109* 110*     Most Recent 3 Troponin's:  Recent Labs   Lab Test 11/19/21  0952 11/05/21  0153 10/10/21  2238 07/29/21  2322 01/06/21  0834 12/16/20  1128 06/15/17  0750   TROPI  --   --   --   --  0.023 <0.015 <0.015  The 99th percentile for upper reference range is 0.045 ug/L.  Troponin values in   the range of 0.045 - 0.120 ug/L may be associated with risks of adverse   clinical events.     TROPONIN <0.015 <0.015 <0.015   < >  --   --   --     < > = values in this interval not displayed.     Most Recent 3 INR's:No lab results found.  Most Recent 2 LFT's:  Recent Labs   Lab Test 11/19/21 0952 11/05/21  0153   AST 59* 17   ALT 50 24   ALKPHOS 121 108   BILITOTAL 0.8 0.7     Most Recent Cholesterol Panel:No lab results found.  Most Recent 6 Bacteria Isolates From Any Culture (See EPIC Reports for Culture Details):No lab results found.  Most Recent TSH, T4 and HgbA1c:No lab results found.

## 2021-12-06 NOTE — H&P
[facility]    History and Physical - Hospitalist Service       Date of Admission:  10/11/2021    Assessment & Plan      Gerson Pearson is a 61 year old male admitted on 10/11/2021.bwith history of shortness of breath with history of COPD.   He has been having worsening breathing and wheezing over the last few days.  His breathing treatments were not helping.  He presented with wheezing and sob and was given steroids, nebs and magnesium.  This did greatly improve his breathing, but continued to have on going hypoxia on room air.  Patient was admitted for continued management of what seems to be COPD exacerbation.     Acute hypoxic respiratory failure in the setting of COPD exacerbation: Hypoxic to 88% per ED documentation. Follows with Pulmonology through [facility]. PFTs 6/23/21. Does not use oxygen PTA. Reports new, productive cough, but no other URI sx.   * Recent ED visit 9/1/21 at [facility] for COPD exacerbation, prescribed prednisone 20 mg X5 days and Zpak, prior to that COPD exacerbation in 8/3/2021   * CMP with mild hyponatremia, CBC with mild anemia. Trop negative. VBG 7.30/64/20/32. CXR no acute pathology. Covid PCR negative.   * Received 2g mag sulfate X1, prednisone 60 mg X1, and neb X3 in the ED  - Continue Solu-medrol 62.5 mg q8 hrs   - Continue doxycycline 100 mg BID   - Duonebs q4 hrs while awake, PRN albuterol neb available   - RCAT consulted, encourage pulmonary toilet with IS and acapella   - Ween oxygen as tolerated   - Continue PTA Breo Ellipta      Hyponatremia: Sodium 132 on admission.   - Repeat in the AM      Normocytic anemia: Stable ~12 on admission.   - Monitor      Hx of CVA: Right parieto-occipital lobe in 2019.   - Continue statin and ASA as above.     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 Stable central macular scar left eye.  - Noted      Tobacco dependence: 40 pack year tobacco hx with ongoing tobacco use.   - Nicotine patch ordered on admission       Opioid use disorder, severe, on maintenance therapy   Chronic back pain: Continue PTA Suboxone     CAD with hx of NSTEMI s/p BMS to RCA (6/25/21020)  Ischemic cardiomyopathy (EF 50-55%, 5/2021)  HTN  Hyperlipidemia: Had been on Prasugrel which was stopped 6/25/21. Followed by Dr. Araujo of [facility].   * TTE, 5/27/2021: With normal left ventricular size and thickness, normal left ventricular systolic function, basal to mid inferior and inferolateral wall motion abnormality, normal right ventricular size and systolic function, no significant valvular abnormalities.  - Continue PTA ASA, statin, lisinopril and Coreg      MDD  Insomnia: Continue PTA Remeron     GERD with hx of ulcer: Continue PTA Protonix      Covid status: Negative PCR on admission. Pt is vaccinated with Moderna 3/1/21 and 3/29/21.   Diet: Combination Diet Regular Diet Adult    DVT Prophylaxis: Pneumatic Compression Devices  Post Catheter: Not present  Central Lines: None  Code Status: Full Code, confirmed with pt.         Diet: Diet    DVT Prophylaxis: Pneumatic Compression Devices    Post Catheter: Not present  Central Lines: None  Code Status: Full   Disposition Plan     recommended to prior living arrangement once O2 use less than is back to baseline liters/minute.     The patient's care was discussed with the Patient.    Jairo Rendon MD  [facility]  Securely message with the Vocera Web Console (learn more here)  Text page via [employer] Paging/Directory        ______________________________________________________________________    Chief Complaint SOB    History is obtained from the patient    History of Present Illness   Gerson Pearson is a 61 year old male with history of COPD who presents with shortness of breath and COPD exacerbation.  Patient reports the ED with COPD exacerbation and shortness of breath that he notes started this morning and has worsened throughout the day.  He has  been treated here in the ED for similar symptoms back in 7/29/2021, and notes that the symptoms feel very similar to that episode.  No fever, cough, sore throat, or rhinorrhea.  Patient does not use oxygen at home, and notes that he still smokes tobacco regularly.  Also mentions that his regular nebulizer that he uses at home does not work as of today.  Also notes a lot of spitting.       Review of Systems    CONSTITUTIONAL: NEGATIVE for fever, chills, change in weight  ENT/MOUTH: NEGATIVE for ear, mouth and throat problems  RESP: POSTIVE for significant SOB and wheezing  CV: NEGATIVE for chest pain, palpitations or peripheral edema    Past Medical History    I have reviewed this patient's medical history and updated it with pertinent information if needed.   Past Medical History:   Diagnosis Date     Chronic back pain      COPD (chronic obstructive pulmonary disease) (H)      Depressive disorder      Myocardial infarction (H)        Past Surgical History   I have reviewed this patient's surgical history and updated it with pertinent information if needed.  No past surgical history on file.    Social History   I have reviewed this patient's social history and updated it with pertinent information if needed.  Social History     Tobacco Use     Smoking status: Current Every Day Smoker     Packs/day: 0.50     Types: Cigarettes     Smokeless tobacco: Never Used   Substance Use Topics     Alcohol use: No     Comment: QUIT 3 YEARS AGO     Drug use: Yes     Types: Opiates, Cocaine     Comment: occ cocain and heroin- used heroin yesterday 9/20/20       Family History   I have reviewed this patient's family history and updated it with pertinent information if needed.  Family History   Problem Relation Age of Onset     Diabetes Mother        Prior to Admission Medications   Prior to Admission Medications   Prescriptions Last Dose Informant Patient Reported? Taking?   acetaminophen (TYLENOL) 325 MG tablet prn at prn Nursing  Home Yes Yes   Sig: Take 650 mg by mouth every 4 hours as needed for mild pain   albuterol (PROAIR HFA/PROVENTIL HFA/VENTOLIN HFA) 108 (90 BASE) MCG/ACT Inhaler prn at prn Nursing Home No Yes   Sig: Inhale 2 puffs into the lungs every 4 hours as needed for shortness of breath / dyspnea or wheezing   aspirin (ASA) 81 MG chewable tablet 10/10/2021 at Unknown time Nursing Home Yes Yes   Sig: Take 81 mg by mouth daily   atorvastatin (LIPITOR) 40 MG tablet 10/10/2021 at Unknown time Nursing Home Yes Yes   Sig: Take 40 mg by mouth At Bedtime   buprenorphine-naloxone (SUBOXONE) 8-2 MG SUBL sublingual tablet 10/10/2021 at Unknown time Nursing Home Yes Yes   Sig: Place 1 tablet under the tongue 2 times daily X 28 days   carvedilol (COREG) 6.25 MG tablet 10/10/2021 at Unknown time Nursing Home Yes Yes   Sig: Take 6.25 mg by mouth 2 times daily (with meals)   fluticasone-vilanterol (BREO ELLIPTA) 200-25 MCG/INH inhaler 10/10/2021 at Unknown time Nursing Home Yes Yes   Sig: Inhale 1 puff into the lungs daily   lisinopril (ZESTRIL) 10 MG tablet 10/10/2021 at Unknown time Nursing Home Yes Yes   Sig: Take 10 mg by mouth daily    mirtazapine (REMERON) 45 MG tablet 10/10/2021 at Unknown time Nursing Home Yes Yes   Sig: Take 45 mg by mouth At Bedtime    multivitamin w/minerals (THERA-VIT-M) tablet 10/10/2021 at Unknown time Nursing Home Yes Yes   Sig: Take 1 tablet by mouth daily   naloxone (NARCAN) 4 MG/0.1ML nasal spray prn at prn Nursing Home Yes Yes   Sig: Spray 4 mg into one nostril alternating nostrils once as needed for opioid reversal every 2-3 minutes until assistance arrives   nicotine (COMMIT) 2 MG lozenge prn at prn Nursing Home Yes Yes   Sig: Place 2 mg inside cheek every hour as needed for smoking cessation   nicotine (NICODERM CQ) 14 MG/24HR 24 hr patch 10/6/2021 Nursing Home Yes Yes   Sig: Place 1 patch onto the skin every 24 hours   pantoprazole (PROTONIX) 40 MG EC tablet 10/10/2021 at Unknown time Nursing Home Yes  Yes   Sig: Take 40 mg by mouth daily   tiotropium (SPIRIVA RESPIMAT) 2.5 MCG/ACT inhaler 10/10/2021 at Unknown time Nursing Home Yes Yes   Sig: Inhale 2 puffs into the lungs daily      Facility-Administered Medications: None     Allergies   Allergies   Allergen Reactions     Ibuprofen Nausea and Vomiting       Physical Exam   Vital Signs:                   Weight: 121 lbs 11.2 oz    General Appearance: awake, on oxygen with mild work of breathing  Eyes: PERRLA  HEENT:no central cyanosis  Respiratory: bilateral wheezing  Cardiovascular: RRR  GI: soft +BS  Skin: warm and perfused  Musculoskeletal: no edema  Neurologic: non focal  Psychiatric: compensated    Data   Data reviewed today: I reviewed all medications, new labs and imaging results over the last 24 hours. I personally reviewed the chest x-ray image(s) showing no acute infiltrates.    No lab results found in last 7 days.

## 2021-12-08 ENCOUNTER — APPOINTMENT (OUTPATIENT)
Dept: GENERAL RADIOLOGY | Facility: CLINIC | Age: 61
DRG: 193 | End: 2021-12-08
Attending: EMERGENCY MEDICINE
Payer: MEDICARE

## 2021-12-08 ENCOUNTER — HOSPITAL ENCOUNTER (INPATIENT)
Facility: CLINIC | Age: 61
LOS: 2 days | Discharge: NURSING, REHAB OR LONG-TERM CARE | DRG: 193 | End: 2021-12-10
Attending: EMERGENCY MEDICINE | Admitting: INTERNAL MEDICINE
Payer: MEDICARE

## 2021-12-08 ENCOUNTER — HOSPITAL ENCOUNTER (EMERGENCY)
Facility: CLINIC | Age: 61
Discharge: HOME | DRG: 193 | End: 2021-12-08
Attending: EMERGENCY MEDICINE | Admitting: EMERGENCY MEDICINE
Payer: MEDICARE

## 2021-12-08 ENCOUNTER — HOSPITAL ENCOUNTER (EMERGENCY)
Facility: CLINIC | Age: 61
End: 2021-12-08
Payer: MEDICARE

## 2021-12-08 VITALS
RESPIRATION RATE: 20 BRPM | DIASTOLIC BLOOD PRESSURE: 91 MMHG | SYSTOLIC BLOOD PRESSURE: 135 MMHG | HEART RATE: 86 BPM | TEMPERATURE: 97.9 F | OXYGEN SATURATION: 97 %

## 2021-12-08 DIAGNOSIS — J44.9 CHRONIC OBSTRUCTIVE PULMONARY DISEASE, UNSPECIFIED COPD TYPE (H): Primary | ICD-10-CM

## 2021-12-08 DIAGNOSIS — J18.9 PNEUMONIA DUE TO INFECTIOUS ORGANISM, UNSPECIFIED LATERALITY, UNSPECIFIED PART OF LUNG: ICD-10-CM

## 2021-12-08 DIAGNOSIS — J44.1 COPD EXACERBATION (H): ICD-10-CM

## 2021-12-08 DIAGNOSIS — J44.9 CHRONIC OBSTRUCTIVE PULMONARY DISEASE, UNSPECIFIED COPD TYPE (H): ICD-10-CM

## 2021-12-08 LAB
ALBUMIN SERPL-MCNC: 3.6 G/DL (ref 3.4–5)
ALP SERPL-CCNC: 129 U/L (ref 40–150)
ALT SERPL W P-5'-P-CCNC: 31 U/L (ref 0–70)
ANION GAP SERPL CALCULATED.3IONS-SCNC: 5 MMOL/L (ref 3–14)
AST SERPL W P-5'-P-CCNC: 31 U/L (ref 0–45)
ATRIAL RATE - MUSE: 87 BPM
BASOPHILS # BLD AUTO: 0 10E3/UL (ref 0–0.2)
BASOPHILS NFR BLD AUTO: 1 %
BILIRUB SERPL-MCNC: 0.7 MG/DL (ref 0.2–1.3)
BUN SERPL-MCNC: 7 MG/DL (ref 7–30)
CALCIUM SERPL-MCNC: 9.5 MG/DL (ref 8.5–10.1)
CHLORIDE BLD-SCNC: 104 MMOL/L (ref 94–109)
CO2 SERPL-SCNC: 27 MMOL/L (ref 20–32)
CREAT SERPL-MCNC: 0.76 MG/DL (ref 0.66–1.25)
DIASTOLIC BLOOD PRESSURE - MUSE: NORMAL MMHG
EOSINOPHIL # BLD AUTO: 0.3 10E3/UL (ref 0–0.7)
EOSINOPHIL NFR BLD AUTO: 4 %
ERYTHROCYTE [DISTWIDTH] IN BLOOD BY AUTOMATED COUNT: 12.9 % (ref 10–15)
GFR SERPL CREATININE-BSD FRML MDRD: >90 ML/MIN/1.73M2
GLUCOSE BLD-MCNC: 119 MG/DL (ref 70–99)
HCT VFR BLD AUTO: 38.8 % (ref 40–53)
HGB BLD-MCNC: 12.3 G/DL (ref 13.3–17.7)
IMM GRANULOCYTES # BLD: 0 10E3/UL
IMM GRANULOCYTES NFR BLD: 0 %
INTERPRETATION ECG - MUSE: NORMAL
LYMPHOCYTES # BLD AUTO: 1.3 10E3/UL (ref 0.8–5.3)
LYMPHOCYTES NFR BLD AUTO: 19 %
MCH RBC QN AUTO: 29.9 PG (ref 26.5–33)
MCHC RBC AUTO-ENTMCNC: 31.7 G/DL (ref 31.5–36.5)
MCV RBC AUTO: 94 FL (ref 78–100)
MONOCYTES # BLD AUTO: 0.9 10E3/UL (ref 0–1.3)
MONOCYTES NFR BLD AUTO: 12 %
NEUTROPHILS # BLD AUTO: 4.6 10E3/UL (ref 1.6–8.3)
NEUTROPHILS NFR BLD AUTO: 64 %
NRBC # BLD AUTO: 0 10E3/UL
NRBC BLD AUTO-RTO: 0 /100
P AXIS - MUSE: 76 DEGREES
PLATELET # BLD AUTO: 199 10E3/UL (ref 150–450)
POTASSIUM BLD-SCNC: 4.9 MMOL/L (ref 3.4–5.3)
PR INTERVAL - MUSE: 116 MS
PROCALCITONIN SERPL-MCNC: <0.05 NG/ML
PROT SERPL-MCNC: 7.6 G/DL (ref 6.8–8.8)
QRS DURATION - MUSE: 74 MS
QT - MUSE: 364 MS
QTC - MUSE: 438 MS
R AXIS - MUSE: 71 DEGREES
RBC # BLD AUTO: 4.12 10E6/UL (ref 4.4–5.9)
SARS-COV-2 RNA RESP QL NAA+PROBE: NEGATIVE
SODIUM SERPL-SCNC: 136 MMOL/L (ref 133–144)
SYSTOLIC BLOOD PRESSURE - MUSE: NORMAL MMHG
T AXIS - MUSE: 77 DEGREES
TROPONIN I SERPL HS-MCNC: 7 NG/L
VENTRICULAR RATE- MUSE: 87 BPM
WBC # BLD AUTO: 7.1 10E3/UL (ref 4–11)

## 2021-12-08 PROCEDURE — 96375 TX/PRO/DX INJ NEW DRUG ADDON: CPT

## 2021-12-08 PROCEDURE — 99285 EMERGENCY DEPT VISIT HI MDM: CPT | Mod: 25

## 2021-12-08 PROCEDURE — 250N000011 HC RX IP 250 OP 636: Performed by: INTERNAL MEDICINE

## 2021-12-08 PROCEDURE — 85025 COMPLETE CBC W/AUTO DIFF WBC: CPT | Performed by: EMERGENCY MEDICINE

## 2021-12-08 PROCEDURE — C9803 HOPD COVID-19 SPEC COLLECT: HCPCS

## 2021-12-08 PROCEDURE — 71045 X-RAY EXAM CHEST 1 VIEW: CPT

## 2021-12-08 PROCEDURE — 250N000009 HC RX 250: Performed by: EMERGENCY MEDICINE

## 2021-12-08 PROCEDURE — 84145 PROCALCITONIN (PCT): CPT | Performed by: INTERNAL MEDICINE

## 2021-12-08 PROCEDURE — 96365 THER/PROPH/DIAG IV INF INIT: CPT

## 2021-12-08 PROCEDURE — 93005 ELECTROCARDIOGRAM TRACING: CPT

## 2021-12-08 PROCEDURE — 87635 SARS-COV-2 COVID-19 AMP PRB: CPT | Performed by: EMERGENCY MEDICINE

## 2021-12-08 PROCEDURE — 120N000001 HC R&B MED SURG/OB

## 2021-12-08 PROCEDURE — 250N000011 HC RX IP 250 OP 636

## 2021-12-08 PROCEDURE — 250N000011 HC RX IP 250 OP 636: Performed by: EMERGENCY MEDICINE

## 2021-12-08 PROCEDURE — 94640 AIRWAY INHALATION TREATMENT: CPT

## 2021-12-08 PROCEDURE — 36415 COLL VENOUS BLD VENIPUNCTURE: CPT | Performed by: INTERNAL MEDICINE

## 2021-12-08 PROCEDURE — 36415 COLL VENOUS BLD VENIPUNCTURE: CPT | Performed by: EMERGENCY MEDICINE

## 2021-12-08 PROCEDURE — 84484 ASSAY OF TROPONIN QUANT: CPT | Performed by: EMERGENCY MEDICINE

## 2021-12-08 PROCEDURE — 99223 1ST HOSP IP/OBS HIGH 75: CPT | Mod: AI | Performed by: INTERNAL MEDICINE

## 2021-12-08 PROCEDURE — 80053 COMPREHEN METABOLIC PANEL: CPT | Performed by: EMERGENCY MEDICINE

## 2021-12-08 PROCEDURE — 87040 BLOOD CULTURE FOR BACTERIA: CPT | Performed by: EMERGENCY MEDICINE

## 2021-12-08 RX ORDER — CEFTRIAXONE 2 G/1
2 INJECTION, POWDER, FOR SOLUTION INTRAMUSCULAR; INTRAVENOUS EVERY 24 HOURS
Status: DISCONTINUED | OUTPATIENT
Start: 2021-12-09 | End: 2021-12-10 | Stop reason: HOSPADM

## 2021-12-08 RX ORDER — CEFDINIR 300 MG/1
300 CAPSULE ORAL 2 TIMES DAILY
Qty: 20 CAPSULE | Refills: 0 | Status: SHIPPED | OUTPATIENT
Start: 2021-12-08 | End: 2021-12-08

## 2021-12-08 RX ORDER — AMOXICILLIN 250 MG
2 CAPSULE ORAL 2 TIMES DAILY PRN
Status: DISCONTINUED | OUTPATIENT
Start: 2021-12-08 | End: 2021-12-10 | Stop reason: HOSPADM

## 2021-12-08 RX ORDER — ACETAMINOPHEN 325 MG/1
650 TABLET ORAL EVERY 6 HOURS PRN
Status: DISCONTINUED | OUTPATIENT
Start: 2021-12-08 | End: 2021-12-10 | Stop reason: HOSPADM

## 2021-12-08 RX ORDER — ONDANSETRON 2 MG/ML
4 INJECTION INTRAMUSCULAR; INTRAVENOUS EVERY 6 HOURS PRN
Status: DISCONTINUED | OUTPATIENT
Start: 2021-12-08 | End: 2021-12-10 | Stop reason: HOSPADM

## 2021-12-08 RX ORDER — IPRATROPIUM BROMIDE AND ALBUTEROL SULFATE 2.5; .5 MG/3ML; MG/3ML
3 SOLUTION RESPIRATORY (INHALATION)
Status: COMPLETED | OUTPATIENT
Start: 2021-12-08 | End: 2021-12-08

## 2021-12-08 RX ORDER — CEFTRIAXONE 2 G/1
2 INJECTION, POWDER, FOR SOLUTION INTRAMUSCULAR; INTRAVENOUS ONCE
Status: COMPLETED | OUTPATIENT
Start: 2021-12-08 | End: 2021-12-08

## 2021-12-08 RX ORDER — SODIUM CHLORIDE 9 MG/ML
INJECTION, SOLUTION INTRAVENOUS CONTINUOUS
Status: DISCONTINUED | OUTPATIENT
Start: 2021-12-08 | End: 2021-12-08 | Stop reason: HOSPADM

## 2021-12-08 RX ORDER — PREDNISONE 20 MG/1
40 TABLET ORAL DAILY
Status: DISCONTINUED | OUTPATIENT
Start: 2021-12-09 | End: 2021-12-10 | Stop reason: HOSPADM

## 2021-12-08 RX ORDER — PREDNISONE 20 MG/1
TABLET ORAL
Qty: 10 TABLET | Refills: 0 | Status: SHIPPED | OUTPATIENT
Start: 2021-12-08 | End: 2021-12-08

## 2021-12-08 RX ORDER — AZITHROMYCIN 500 MG/1
500 INJECTION, POWDER, LYOPHILIZED, FOR SOLUTION INTRAVENOUS ONCE
Status: COMPLETED | OUTPATIENT
Start: 2021-12-08 | End: 2021-12-08

## 2021-12-08 RX ORDER — AZITHROMYCIN 250 MG/1
TABLET, FILM COATED ORAL
Qty: 6 TABLET | Refills: 0 | Status: SHIPPED | OUTPATIENT
Start: 2021-12-08 | End: 2021-12-08

## 2021-12-08 RX ORDER — MULTIPLE VITAMINS W/ MINERALS TAB 9MG-400MCG
1 TAB ORAL DAILY
COMMUNITY

## 2021-12-08 RX ORDER — AMOXICILLIN 250 MG
1 CAPSULE ORAL 2 TIMES DAILY PRN
Status: DISCONTINUED | OUTPATIENT
Start: 2021-12-08 | End: 2021-12-10 | Stop reason: HOSPADM

## 2021-12-08 RX ORDER — ONDANSETRON 2 MG/ML
4 INJECTION INTRAMUSCULAR; INTRAVENOUS ONCE
Status: CANCELLED | OUTPATIENT
Start: 2021-12-08 | End: 2021-12-08

## 2021-12-08 RX ORDER — MAGNESIUM SULFATE HEPTAHYDRATE 40 MG/ML
INJECTION, SOLUTION INTRAVENOUS
Status: DISCONTINUED
Start: 2021-12-08 | End: 2021-12-08 | Stop reason: HOSPADM

## 2021-12-08 RX ORDER — METHYLPREDNISOLONE SODIUM SUCCINATE 125 MG/2ML
125 INJECTION, POWDER, LYOPHILIZED, FOR SOLUTION INTRAMUSCULAR; INTRAVENOUS ONCE
Status: COMPLETED | OUTPATIENT
Start: 2021-12-08 | End: 2021-12-08

## 2021-12-08 RX ORDER — ONDANSETRON 2 MG/ML
INJECTION INTRAMUSCULAR; INTRAVENOUS
Status: COMPLETED
Start: 2021-12-08 | End: 2021-12-08

## 2021-12-08 RX ORDER — ACETAMINOPHEN 650 MG/1
650 SUPPOSITORY RECTAL EVERY 6 HOURS PRN
Status: DISCONTINUED | OUTPATIENT
Start: 2021-12-08 | End: 2021-12-10 | Stop reason: HOSPADM

## 2021-12-08 RX ORDER — LIDOCAINE 40 MG/G
CREAM TOPICAL
Status: DISCONTINUED | OUTPATIENT
Start: 2021-12-08 | End: 2021-12-10 | Stop reason: HOSPADM

## 2021-12-08 RX ORDER — MAGNESIUM SULFATE HEPTAHYDRATE 40 MG/ML
2 INJECTION, SOLUTION INTRAVENOUS ONCE
Status: COMPLETED | OUTPATIENT
Start: 2021-12-08 | End: 2021-12-08

## 2021-12-08 RX ORDER — ONDANSETRON 4 MG/1
4 TABLET, ORALLY DISINTEGRATING ORAL EVERY 6 HOURS PRN
Status: DISCONTINUED | OUTPATIENT
Start: 2021-12-08 | End: 2021-12-10 | Stop reason: HOSPADM

## 2021-12-08 RX ADMIN — CEFTRIAXONE SODIUM 2 G: 2 INJECTION, POWDER, FOR SOLUTION INTRAMUSCULAR; INTRAVENOUS at 17:08

## 2021-12-08 RX ADMIN — AZITHROMYCIN MONOHYDRATE 500 MG: 500 INJECTION, POWDER, LYOPHILIZED, FOR SOLUTION INTRAVENOUS at 18:01

## 2021-12-08 RX ADMIN — METHYLPREDNISOLONE SODIUM SUCCINATE 125 MG: 125 INJECTION, POWDER, FOR SOLUTION INTRAMUSCULAR; INTRAVENOUS at 10:46

## 2021-12-08 RX ADMIN — MAGNESIUM SULFATE HEPTAHYDRATE 2 G: 40 INJECTION, SOLUTION INTRAVENOUS at 10:38

## 2021-12-08 RX ADMIN — IPRATROPIUM BROMIDE AND ALBUTEROL SULFATE 3 ML: .5; 3 SOLUTION RESPIRATORY (INHALATION) at 11:06

## 2021-12-08 RX ADMIN — ONDANSETRON 4 MG: 2 INJECTION INTRAMUSCULAR; INTRAVENOUS at 19:08

## 2021-12-08 RX ADMIN — ENOXAPARIN SODIUM 40 MG: 40 INJECTION SUBCUTANEOUS at 22:31

## 2021-12-08 RX ADMIN — IPRATROPIUM BROMIDE AND ALBUTEROL SULFATE 3 ML: .5; 3 SOLUTION RESPIRATORY (INHALATION) at 12:08

## 2021-12-08 RX ADMIN — IPRATROPIUM BROMIDE AND ALBUTEROL SULFATE 3 ML: .5; 3 SOLUTION RESPIRATORY (INHALATION) at 10:56

## 2021-12-08 ASSESSMENT — ACTIVITIES OF DAILY LIVING (ADL)
ADLS_ACUITY_SCORE: 8
CONCENTRATING,_REMEMBERING_OR_MAKING_DECISIONS_DIFFICULTY: NO
ADLS_ACUITY_SCORE: 5
ADLS_ACUITY_SCORE: 5
VISION_MANAGEMENT: GLASSES
DOING_ERRANDS_INDEPENDENTLY_DIFFICULTY: NO
NUMBER_OF_TIMES_PATIENT_HAS_FALLEN_WITHIN_LAST_SIX_MONTHS: 0
DIFFICULTY_EATING/SWALLOWING: NO
FALL_HISTORY_WITHIN_LAST_SIX_MONTHS: NO
PATIENT_/_FAMILY_COMMUNICATION_STYLE: SPOKEN LANGUAGE (ENGLISH OR BILINGUAL)
DIFFICULTY_COMMUNICATING: NO
HEARING_DIFFICULTY_OR_DEAF: NO
WEAR_GLASSES_OR_BLIND: YES
EQUIPMENT_CURRENTLY_USED_AT_HOME: CANE, STRAIGHT
ADLS_ACUITY_SCORE: 5
WALKING_OR_CLIMBING_STAIRS_DIFFICULTY: NO
DRESSING/BATHING_DIFFICULTY: NO
ADLS_ACUITY_SCORE: 5
TOILETING_ISSUES: NO

## 2021-12-08 NOTE — ED NOTES
[facility]  ED Nurse Handoff Report    ED Chief complaint: Shortness of Breath (patient recently discharged, was trying to go home but comes back into ED after being outside and stating he cannot do it, got short of breath and needs to stay. )      ED Diagnosis:   Final diagnoses:   Chronic obstructive pulmonary disease, unspecified COPD type (H)   Pneumonia due to infectious organism, unspecified laterality, unspecified part of lung       Code Status: Full Code    Allergies:   Allergies   Allergen Reactions     Ibuprofen Nausea and Vomiting       Patient Story: SOB  Focused Assessment:  61 year old male who presents with shortness of breath x1 day.  He has a history of COPD, continues to smoke cigarettes, and also has a history of heroin use, and presents to the ER today with shortness of breath that began earlier this morning.  Medics state that the fire department had about 79% on room air, and that he improved to 97% on room air after DuoNeb and nonrebreather.    Patient arrives and denies all symptoms, tells me he essentially feels in his normal state of health, and has specifically no chest pain, no fainting, no recent fevers and no change in his cough.    Treatments and/or interventions provided: rocephijeovany zithromax  Patient's response to treatments and/or interventions:       To be done/followed up on inpatient unit:        Does this patient have any cognitive concerns?: none    Activity level - Baseline/Home:  Independent  Activity Level - Current:   Stand with Assist    Patient's Preferred language: English   Needed?: No    Isolation: None  Infection: Not Applicable  Patient tested for COVID 19 prior to admission: YES  Bariatric?: No    Vital Signs:   Vitals:    12/08/21 1542   BP: (!) 151/78   Pulse: 98   Resp: 28   Temp: 98.7  F (37.1  C)   TempSrc: Oral   SpO2: 92%       Cardiac Rhythm:     Was the PSS-3 completed:   Yes  What interventions are required if any?                Family Comments:     OBS brochure/video discussed/provided to patient/family: N/A              Name of person given brochure if not patient:                 Relationship to patient:       For the majority of the shift this patient's behavior was Green.   Behavioral interventions performed were   .    ED NURSE PHONE NUMBER: [id number]

## 2021-12-08 NOTE — ED PROVIDER NOTES
History   Chief Complaint:   Shortness of Breath     HPI   Gerson Pearson is a 61 year old male who presents with shortness of breath x1 day. He has a history of COPD, continues to smoke cigarettes, and also has a history of heroin use, and presents to the ER today with shortness of breath that began earlier this morning. Medics state that the fire department had about 79% on room air, and that he improved to 97% on room air after DuoNeb and nonrebreather.   Patient arrives and denies all symptoms, tells me he essentially feels in his normal state of health, and has specifically no chest pain, no fainting, no recent fevers and no change in his cough.   Allergies:   Ibuprofen   Medications:   acetaminophen (TYLENOL) 325 MG tablet   albuterol (PROAIR HFA/PROVENTIL HFA/VENTOLIN HFA) 108 (90 BASE) MCG/ACT Inhaler   albuterol (PROVENTIL) (2.5 MG/3ML) 0.083% neb solution   aspirin (ASA) 81 MG chewable tablet   atorvastatin (LIPITOR) 40 MG tablet   benzonatate (TESSALON) 100 MG capsule   buprenorphine-naloxone (SUBOXONE) 8-2 MG SUBL sublingual tablet   carvedilol (COREG) 6.25 MG tablet   fluticasone-vilanterol (BREO ELLIPTA) 200-25 MCG/INH inhaler   lisinopril (ZESTRIL) 10 MG tablet   mirtazapine (REMERON) 45 MG tablet   multivitamin w/minerals (THERA-VIT-M) tablet   naloxone (NARCAN) 4 MG/0.1ML nasal spray   nicotine (COMMIT) 2 MG lozenge   nicotine (NICODERM CQ) 14 MG/24HR 24 hr patch   pantoprazole (PROTONIX) 40 MG EC tablet   predniSONE (DELTASONE) 20 MG tablet   tiotropium (SPIRIVA RESPIMAT) 2.5 MCG/ACT inhaler     Past Medical History:        Past Medical History:   Diagnosis Date     Chronic back pain      COPD (chronic obstructive pulmonary disease) (H)      Depressive disorder      Myocardial infarction (H)            Patient Active Problem List    Diagnosis Date Noted     Acute bronchitis, unspecified organism 11/05/2021     Priority: Medium     COPD exacerbation (H) 06/15/2017     Priority: Medium      CARDIOVASCULAR SCREENING; LDL GOAL LESS THAN 160 10/31/2010     Priority: Medium     Vitamin D deficiency 01/18/2010     Priority: Medium     Problem list name updated by automated process. Provider to review      Smoker 01/12/2010     Priority: Medium     Past Surgical History:   Past Surgical History      Family History:   family history includes Diabetes in his mother.   Social History:   reports that he has been smoking cigarettes. He has been smoking about 0.50 packs per day. He has never used smokeless tobacco. He reports current drug use. Drugs: Opiates and Cocaine. He reports that he does not drink alcohol.   PCP: Alex Wynn   Review of Systems   All other systems reviewed and are negative.     Physical Exam   No data found.   Physical Exam   Vitals: reviewed by me   General: Pt seen on [facility], cooperative, and alert to conversation mild respiratory distress   Eyes: Tracking well, clear conjunctiva BL   ENT: MMM, midline trachea.   Lungs: Mild respiratory distress, tachypneic, clear wheezing and tight air sounds heard in both sides of his chest, prolonged expiratory phase.   CV: Rate as above   Abd: Soft, non tender, no guarding, no rebound. Non distended   MSK: no joint effusion. No evidence of trauma   Skin: No rash   Neuro: Clear speech and no facial droop.   Psych: Not RIS, no e/o AH/VH   Emergency Department Course   ECG   Normal sinus rhythm, rate of 87, , QRS 74, no ectopy, no ST changes.   Imaging:   XR Chest Port 1 View   Final Result   IMPRESSION: Single AP view of the chest was obtained.   Cardiomediastinal silhouette is within normal limits. Medial right   upper lobe/perihilar pulmonary opacity, indeterminate, could represent   infection. No significant pleural effusion or pneumothorax.      TOM DAMIAN MD          SYSTEM ID: [id number]        Laboratory:         Labs Ordered and Resulted from Time of ED Arrival to Time of ED Departure   COMPREHENSIVE METABOLIC  PANEL - Abnormal   Result Value     Sodium 136      Potassium 4.9      Chloride 104      Carbon Dioxide (CO2) 27      Anion Gap 5      Urea Nitrogen 7      Creatinine 0.76      Calcium 9.5      Glucose 119 (*)     Alkaline Phosphatase 129      AST 31      ALT 31      Protein Total 7.6      Albumin 3.6      Bilirubin Total 0.7      GFR Estimate >90     CBC WITH PLATELETS AND DIFFERENTIAL - Abnormal    WBC Count 7.1      RBC Count 4.12 (*)     Hemoglobin 12.3 (*)     Hematocrit 38.8 (*)     MCV 94      MCH 29.9      MCHC 31.7      RDW 12.9      Platelet Count 199      % Neutrophils 64      % Lymphocytes 19      % Monocytes 12      % Eosinophils 4      % Basophils 1      % Immature Granulocytes 0      NRBCs per 100 WBC 0      Absolute Neutrophils 4.6      Absolute Lymphocytes 1.3      Absolute Monocytes 0.9      Absolute Eosinophils 0.3      Absolute Basophils 0.0      Absolute Immature Granulocytes 0.0      Absolute NRBCs 0.0     TROPONIN I - Normal    Troponin I High Sensitivity 7     COVID-19 VIRUS (CORONAVIRUS) BY PCR - Normal    SARS CoV2 PCR Negative       Interventions:   Medications   ipratropium - albuterol 0.5 mg/2.5 mg/3 mL (DUONEB) neb solution 3 mL (3 mLs Nebulization Given 12/8/21 1208)   magnesium sulfate 2 g in water intermittent infusion (0 g Intravenous Stopped 12/8/21 1100)   methylPREDNISolone sodium succinate (solu-MEDROL) injection 125 mg (125 mg Intravenous Given 12/8/21 1046)     Emergency Department Course:   Past medical records, nursing notes, and vitals reviewed.   I performed an exam of the patient and obtained history, as documented above.   Impression & Plan    Medical Decision Making:   This is a pleasant 61-year-old male presents emergency room with appears to be significant COPD and also what appears to be a concurrent pneumonia. He is actually done remarkably well after 3 rounds of duo nebs, steroids, and magnesium here. On my reassessment, he is actually room air, and has tolerated  ambulation trial as well, and has not gotten hypoxic. He is resting comfortably, scratching of a lottery ticket, and asking to go home on my reassessment. However, while waiting in the waiting room for his group home to come pick him up, he tells me that he feels short of breath, and would like to come back to the hospital, which is of course very reasonable given how sick he was when he first came in. I do think is reasonable for him to be admitted, and with this in mind I did speak with Dr. Hill of the hospitalist team was kindly agreed accept care of the patient. We will plan for antibiotics to cover his likely community-acquired pneumonia on the x-ray as well. We will plan for careful monitoring and admission as above.  Critical Care time: was 30 minutes for this patient excluding procedures.   Diagnosis:     ICD-10-CM    1. Chronic obstructive pulmonary disease, unspecified COPD type (H)  J44.9    2. Pneumonia due to infectious organism, unspecified laterality, unspecified part of lung             Alex Cullen MD  12/08/21 1703

## 2021-12-08 NOTE — ED PROVIDER NOTES
History     Chief Complaint:  Shortness of Breath       HPI   Gerson Pearson is a 61 year old male who presents with shortness of breath x1 day.  He has a history of COPD, continues to smoke cigarettes, and also has a history of heroin use, and presents to the ER today with shortness of breath that began earlier this morning.  Medics state that the fire department had about 79% on room air, and that he improved to 97% on room air after DuoNeb and nonrebreather.     Patient arrives and denies all symptoms, tells me he essentially feels in his normal state of health, and has specifically no chest pain, no fainting, no recent fevers and no change in his cough.      Allergies:  Ibuprofen     Medications:    acetaminophen (TYLENOL) 325 MG tablet  albuterol (PROAIR HFA/PROVENTIL HFA/VENTOLIN HFA) 108 (90 BASE) MCG/ACT Inhaler  albuterol (PROVENTIL) (2.5 MG/3ML) 0.083% neb solution  aspirin (ASA) 81 MG chewable tablet  atorvastatin (LIPITOR) 40 MG tablet  benzonatate (TESSALON) 100 MG capsule  buprenorphine-naloxone (SUBOXONE) 8-2 MG SUBL sublingual tablet  carvedilol (COREG) 6.25 MG tablet  fluticasone-vilanterol (BREO ELLIPTA) 200-25 MCG/INH inhaler  lisinopril (ZESTRIL) 10 MG tablet  mirtazapine (REMERON) 45 MG tablet  multivitamin w/minerals (THERA-VIT-M) tablet  naloxone (NARCAN) 4 MG/0.1ML nasal spray  nicotine (COMMIT) 2 MG lozenge  nicotine (NICODERM CQ) 14 MG/24HR 24 hr patch  pantoprazole (PROTONIX) 40 MG EC tablet  predniSONE (DELTASONE) 20 MG tablet  tiotropium (SPIRIVA RESPIMAT) 2.5 MCG/ACT inhaler        Past Medical History:    Past Medical History:   Diagnosis Date     Chronic back pain      COPD (chronic obstructive pulmonary disease) (H)      Depressive disorder      Myocardial infarction (H)        Patient Active Problem List    Diagnosis Date Noted     Acute bronchitis, unspecified organism 11/05/2021     Priority: Medium     COPD exacerbation (H) 06/15/2017     Priority: Medium     CARDIOVASCULAR  SCREENING; LDL GOAL LESS THAN 160 10/31/2010     Priority: Medium     Vitamin D deficiency 01/18/2010     Priority: Medium     Problem list name updated by automated process. Provider to review       Smoker 01/12/2010     Priority: Medium        Past Surgical History:    No past surgical history on file.     Family History:    family history includes Diabetes in his mother.    Social History:   reports that he has been smoking cigarettes. He has been smoking about 0.50 packs per day. He has never used smokeless tobacco. He reports current drug use. Drugs: Opiates and Cocaine. He reports that he does not drink alcohol.    PCP: Alex Wynn     Review of Systems   All other systems reviewed and are negative.        Physical Exam   No data found.     Physical Exam  Vitals: reviewed by me  General: Pt seen on hospital [facility], pleasant, cooperative, and alert to conversation mild respiratory distress  Eyes: Tracking well, clear conjunctiva BL  ENT: MMM, midline trachea.   Lungs: Mild respiratory distress, tachypneic, clear wheezing and tight air sounds heard in both sides of his chest, prolonged expiratory phase.  CV: Rate as above  Abd: Soft, non tender, no guarding, no rebound. Non distended  MSK: no joint effusion.  No evidence of trauma  Skin: No rash  Neuro: Clear speech and no facial droop.  Psych: Not RIS, no e/o AH/VH    Emergency Department Course     ECG   Normal sinus rhythm, rate of 87, , QRS 74, no ectopy, no ST changes.    Imaging:  XR Chest Port 1 View   Final Result   IMPRESSION: Single AP view of the chest was obtained.   Cardiomediastinal silhouette is within normal limits. Medial right   upper lobe/perihilar pulmonary opacity, indeterminate, could represent   infection. No significant pleural effusion or pneumothorax.      TOM DAMIAN MD            SYSTEM ID:  [id number]           Laboratory:  Labs Ordered and Resulted from Time of ED Arrival to Time of ED Departure   COMPREHENSIVE  METABOLIC PANEL - Abnormal       Result Value    Sodium 136      Potassium 4.9      Chloride 104      Carbon Dioxide (CO2) 27      Anion Gap 5      Urea Nitrogen 7      Creatinine 0.76      Calcium 9.5      Glucose 119 (*)     Alkaline Phosphatase 129      AST 31      ALT 31      Protein Total 7.6      Albumin 3.6      Bilirubin Total 0.7      GFR Estimate >90     CBC WITH PLATELETS AND DIFFERENTIAL - Abnormal    WBC Count 7.1      RBC Count 4.12 (*)     Hemoglobin 12.3 (*)     Hematocrit 38.8 (*)     MCV 94      MCH 29.9      MCHC 31.7      RDW 12.9      Platelet Count 199      % Neutrophils 64      % Lymphocytes 19      % Monocytes 12      % Eosinophils 4      % Basophils 1      % Immature Granulocytes 0      NRBCs per 100 WBC 0      Absolute Neutrophils 4.6      Absolute Lymphocytes 1.3      Absolute Monocytes 0.9      Absolute Eosinophils 0.3      Absolute Basophils 0.0      Absolute Immature Granulocytes 0.0      Absolute NRBCs 0.0     TROPONIN I - Normal    Troponin I High Sensitivity 7     COVID-19 VIRUS (CORONAVIRUS) BY PCR - Normal    SARS CoV2 PCR Negative            Interventions:  Medications   ipratropium - albuterol 0.5 mg/2.5 mg/3 mL (DUONEB) neb solution 3 mL (3 mLs Nebulization Given 12/8/21 1208)   magnesium sulfate 2 g in water intermittent infusion (0 g Intravenous Stopped 12/8/21 1100)   methylPREDNISolone sodium succinate (solu-MEDROL) injection 125 mg (125 mg Intravenous Given 12/8/21 1046)        Emergency Department Course:  Past medical records, nursing notes, and vitals reviewed.  I performed an exam of the patient and obtained history, as documented above.    Impression & Plan        Medical Decision Making:  This is a pleasant 61-year-old male presents emergency room with appears to be significant COPD and also what appears to be a concurrent pneumonia.  He is actually done remarkably well after 3 rounds of duo nebs, steroids, and magnesium here.  On my reassessment, he is actually room  air, and has tolerated ambulation trial as well, and has not gotten hypoxic.  He is resting comfortably, scratching of a lottery ticket, and asking to go home on my reassessment.  However, while waiting in the waiting room for his group home to come pick him up, he tells me that he feels short of breath, and would like to come back to the hospital, which is of course very reasonable given how sick he was when he first came in.  I do think is reasonable for him to be admitted, and with this in mind I did speak with Dr. Hill of the hospitalist team was kindly agreed accept care of the patient.  We will plan for antibiotics to cover his likely community-acquired pneumonia on the x-ray as well.  We will plan for careful monitoring and admission as above.      Critical Care time:  was 30 minutes for this patient excluding procedures.    Diagnosis:    ICD-10-CM    1. Chronic obstructive pulmonary disease, unspecified COPD type (H)  J44.9    2. Pneumonia due to infectious organism, unspecified laterality, unspecified part of lung  J18.9         12/8/2021   Alex Cullen*        Alex Cullen MD  12/08/21 1700

## 2021-12-08 NOTE — DISCHARGE INSTRUCTIONS
Come back to the ER immediately if you have any worsening symptoms, or if you have any significant fatigue or shortness of breath, that does not get better when you take a break or rest.  Come back with any other concerns you have be absolute certain to see your regular doctor in the next 2 to 3 days as you may qualify for home oxygen.  Please also consider stopping smoking.

## 2021-12-08 NOTE — ED NOTES
Bed: ST03  Expected date:   Expected time:   Means of arrival:   Comments:  Neal - 512 - 61 M SOB eta 1024

## 2021-12-08 NOTE — H&P
[facility]    History and Physical - Hospitalist Service       Date of Admission:  12/8/2021    Assessment & Plan      Gerson Pearson is a 61 year old male with a history of COPD who presented to the ED on 12/8/2021 with shortness of breath and a cough    Acute hypoxic respiratory failure  Suspected COPD exacerbation   Possible CAP   Patient began to have shortness of breath and a cough this AM.  Called EMS and when they arrived the patient was 79% on RA prompting them to bring him in.  He was given Duonebs and steroids with improvement.  CXR in the ED showed a right upper lobe/perihilar opacity which could represent an early pneumonia and the possible cause of his exacerbation.  Because of this he was also given Ceftriaxone and Azithromycin in the ED.  Of note the patient is afebrile and his WBC is normal.  No signs of sepsis   - Admit to inpatient  - Titrate O2 as tolerated  - Switch to PO Prednisone tomorrow.  Received Solu-Medrol 125 mg in the ED   - Continue Ceftriaxone and Azithromycin for now  - Sputum culture ordered   - Procalcitonin ordered.  If negative can consider stopping antibiotics   - RCAT     H/o MI   No issues currently.  No reports of chest pain.  Troponin negative  - Will resume PTA medications once dosing confirmed by pharmacy    Tobacco use  Patient admits to smoking 8 cigarettes a day  - Declined nicotine patch as he already has one     H/o Heroin use  Lives in a group home  - PTA Suboxone once dosing confirmed          Diet:   Regular diet   DVT Prophylaxis: Enoxaparin (Lovenox) SQ  Post Catheter: Not present  Central Lines: None  Code Status:   Full code     Clinically Significant Risk Factors Present on Admission              # Platelet Defect: home medication list includes an antiplatelet medication      Disposition Plan   Expected Discharge: 2-3 days         The patient's care was discussed with the Patient and ED provider.    [facility]  [facility]  Securely message with the [employer] Web Console (learn more here)  Text page via [employer] Paging/Directory        ______________________________________________________________________    Chief Complaint   Short of breath     History is obtained from the patient    History of Present Illness   Gerson Pearson is a 61 year old male with a history of COPD who presented to the ED on 12/8/2021 with shortness of breath and a cough.  6666Patient began to have shortness of breath and a cough this AM.  Called EMS and when they arrived the patient was 79% on RA prompting them to bring him in.  He was given Duonebs and steroids with improvement.  Patient initially thought about going home rather than being admitted but became more short of breath again while waiting for his ride. At that time his O2 sats also dipped with exertion.  Patient denies any fevers, chills, chest pain, sputum production, abdominal pain, N/V/D or problems with urination.     Review of Systems    The 10 point Review of Systems is negative other than noted in the HPI    Past Medical History    I have reviewed this patient's medical history and updated it with pertinent information if needed.   Past Medical History:   Diagnosis Date     Chronic back pain      COPD (chronic obstructive pulmonary disease) (H)      Depressive disorder      Myocardial infarction (H)        Past Surgical History   I have reviewed this patient's surgical history and updated it with pertinent information if needed.  Patient denied any pertinent surgical history     Social History   I have reviewed this patient's social history and updated it with pertinent information if needed.  Social History     Tobacco Use     Smoking status: Current Every Day Smoker     Packs/day: 0.50     Types: Cigarettes     Smokeless tobacco: Never Used   Substance Use Topics     Alcohol use: No     Comment: QUIT 3 YEARS AGO     Drug use: Yes     Types: Opiates, Cocaine      Comment: occ cocain and heroin- used heroin yesterday 9/20/20   Still smokes 8 cigarettes a day though he is trying to quit     Family History   I have reviewed this patient's family history and updated it with pertinent information if needed.  Family History   Problem Relation Age of Onset     Diabetes Mother        Prior to Admission Medications   Prior to Admission Medications   Prescriptions Last Dose Informant Patient Reported? Taking?   acetaminophen (TYLENOL) 325 MG tablet  Nursing Home Yes No   Sig: Take 650 mg by mouth every 4 hours as needed for mild pain   albuterol (PROAIR HFA/PROVENTIL HFA/VENTOLIN HFA) 108 (90 BASE) MCG/ACT Inhaler  Nursing Home No No   Sig: Inhale 2 puffs into the lungs every 4 hours as needed for shortness of breath / dyspnea or wheezing   albuterol (PROVENTIL) (2.5 MG/3ML) 0.083% neb solution   No No   Sig: Take 1 vial (2.5 mg) by nebulization every 4 hours as needed for wheezing   aspirin (ASA) 81 MG chewable tablet  Nursing Home Yes No   Sig: Take 81 mg by mouth daily   atorvastatin (LIPITOR) 40 MG tablet  Nursing Home Yes No   Sig: Take 40 mg by mouth At Bedtime   azithromycin (ZITHROMAX Z-NEVIN) 250 MG tablet   No No   Sig: Two tablets on the first day, then one tablet daily for the next 4 days   benzonatate (TESSALON) 100 MG capsule   No No   Sig: Take 1 capsule (100 mg) by mouth 3 times daily as needed for cough   buprenorphine-naloxone (SUBOXONE) 8-2 MG SUBL sublingual tablet  Nursing Home Yes No   Sig: Place 1 tablet under the tongue 2 times daily X 28 days   carvedilol (COREG) 6.25 MG tablet  Nursing Home Yes No   Sig: Take 6.25 mg by mouth 2 times daily (with meals)   cefdinir (OMNICEF) 300 MG capsule   No No   Sig: Take 1 capsule (300 mg) by mouth 2 times daily for 10 days   fluticasone-vilanterol (BREO ELLIPTA) 200-25 MCG/INH inhaler  Nursing Home Yes No   Sig: Inhale 1 puff into the lungs daily   lisinopril (ZESTRIL) 10 MG tablet  Nursing Home Yes No   Sig: Take 10 mg by  mouth daily    mirtazapine (REMERON) 45 MG tablet  Nursing Home Yes No   Sig: Take 45 mg by mouth At Bedtime    multivitamin w/minerals (THERA-VIT-M) tablet  Nursing Home Yes No   Sig: Take 1 tablet by mouth daily   naloxone (NARCAN) 4 MG/0.1ML nasal spray  Nursing Home Yes No   Sig: Spray 4 mg into one nostril alternating nostrils once as needed for opioid reversal every 2-3 minutes until assistance arrives   nicotine (COMMIT) 2 MG lozenge  Nursing Home Yes No   Sig: Place 2 mg inside cheek every hour as needed for smoking cessation   nicotine (NICODERM CQ) 14 MG/24HR 24 hr patch  Nursing Home Yes No   Sig: Place 1 patch onto the skin every 24 hours   pantoprazole (PROTONIX) 40 MG EC tablet  Nursing Home Yes No   Sig: Take 40 mg by mouth daily   predniSONE (DELTASONE) 20 MG tablet   No No   Sig: Take two tablets (= 40mg) each day for 5 (five) days   tiotropium (SPIRIVA RESPIMAT) 2.5 MCG/ACT inhaler  Nursing Home Yes No   Sig: Inhale 2 puffs into the lungs daily      Facility-Administered Medications: None     Allergies   Allergies   Allergen Reactions     Ibuprofen Nausea and Vomiting       Physical Exam   Vital Signs: Temp: 98.7  F (37.1  C) Temp src: Oral BP: (!) 151/78 Pulse: 98   Resp: 28 SpO2: 92 % O2 Device: None (Room air)    Weight: 0 lbs 0 oz    General Appearance: Resting comfortably.  NAD   Eyes: EOMI.  Normal conjuctiva  HEENT: NC/AT.  Dry mucous membranes  Respiratory: Faint wheezing noted.  No respiratory distress  Cardiovascular: RRR.  No obvious murmrus  GI: Soft.  Non-distended  Skin: No obvious rashes or cyanosis  Musculoskeletal: No edema.  No calf tenderness  Neurologic: CN appear intact.  Moving all extremities grossly   Psychiatric: Alert.  Pleasant     Data   Data reviewed today: I reviewed all medications, new labs and imaging results over the last 24 hours. I personally reviewed   CXR:  Right upper lobe opacity noted.  No evidence of cardiomegaly    Recent Labs   Lab 12/08/21  1049   WBC  7.1   HGB 12.3*   MCV 94         POTASSIUM 4.9   CHLORIDE 104   CO2 27   BUN 7   CR 0.76   ANIONGAP 5   RL 9.5   *   ALBUMIN 3.6   PROTTOTAL 7.6   BILITOTAL 0.7   ALKPHOS 129   ALT 31   AST 31     Recent Results (from the past 24 hour(s))   XR Chest Port 1 View    Narrative    CHEST ONE VIEW PORTABLE  12/8/2021 11:24 AM     HISTORY: Shortness of breath.    COMPARISON: Chest x-ray on 11/19/2021.      Impression    IMPRESSION: Single AP view of the chest was obtained.  Cardiomediastinal silhouette is within normal limits. Medial right  upper lobe/perihilar pulmonary opacity, indeterminate, could represent  infection. No significant pleural effusion or pneumothorax.    TOM DAMIAN MD         SYSTEM ID:  [id number]

## 2021-12-09 ENCOUNTER — APPOINTMENT (OUTPATIENT)
Dept: PHYSICAL THERAPY | Facility: CLINIC | Age: 61
DRG: 193 | End: 2021-12-09
Attending: INTERNAL MEDICINE
Payer: MEDICARE

## 2021-12-09 LAB
C PNEUM DNA SPEC QL NAA+PROBE: NOT DETECTED
CREAT SERPL-MCNC: 0.61 MG/DL (ref 0.66–1.25)
FLUAV H1 2009 PAND RNA SPEC QL NAA+PROBE: NOT DETECTED
FLUAV H1 RNA SPEC QL NAA+PROBE: NOT DETECTED
FLUAV H3 RNA SPEC QL NAA+PROBE: NOT DETECTED
FLUAV RNA SPEC QL NAA+PROBE: NOT DETECTED
FLUBV RNA SPEC QL NAA+PROBE: NOT DETECTED
GFR SERPL CREATININE-BSD FRML MDRD: >90 ML/MIN/1.73M2
HADV DNA SPEC QL NAA+PROBE: NOT DETECTED
HCOV PNL SPEC NAA+PROBE: NOT DETECTED
HMPV RNA SPEC QL NAA+PROBE: NOT DETECTED
HPIV1 RNA SPEC QL NAA+PROBE: NOT DETECTED
HPIV2 RNA SPEC QL NAA+PROBE: NOT DETECTED
HPIV3 RNA SPEC QL NAA+PROBE: NOT DETECTED
HPIV4 RNA SPEC QL NAA+PROBE: NOT DETECTED
M PNEUMO DNA SPEC QL NAA+PROBE: NOT DETECTED
RSV RNA SPEC QL NAA+PROBE: NOT DETECTED
RSV RNA SPEC QL NAA+PROBE: NOT DETECTED
RV+EV RNA SPEC QL NAA+PROBE: NOT DETECTED

## 2021-12-09 PROCEDURE — 97110 THERAPEUTIC EXERCISES: CPT | Mod: GP | Performed by: PHYSICAL THERAPIST

## 2021-12-09 PROCEDURE — 258N000003 HC RX IP 258 OP 636: Performed by: INTERNAL MEDICINE

## 2021-12-09 PROCEDURE — 250N000011 HC RX IP 250 OP 636: Performed by: INTERNAL MEDICINE

## 2021-12-09 PROCEDURE — 97161 PT EVAL LOW COMPLEX 20 MIN: CPT | Mod: GP | Performed by: PHYSICAL THERAPIST

## 2021-12-09 PROCEDURE — 36415 COLL VENOUS BLD VENIPUNCTURE: CPT | Performed by: INTERNAL MEDICINE

## 2021-12-09 PROCEDURE — 94640 AIRWAY INHALATION TREATMENT: CPT | Mod: 76

## 2021-12-09 PROCEDURE — 250N000009 HC RX 250: Performed by: INTERNAL MEDICINE

## 2021-12-09 PROCEDURE — 82565 ASSAY OF CREATININE: CPT | Performed by: INTERNAL MEDICINE

## 2021-12-09 PROCEDURE — 87205 SMEAR GRAM STAIN: CPT | Performed by: INTERNAL MEDICINE

## 2021-12-09 PROCEDURE — 87633 RESP VIRUS 12-25 TARGETS: CPT | Performed by: INTERNAL MEDICINE

## 2021-12-09 PROCEDURE — 87581 M.PNEUMON DNA AMP PROBE: CPT | Performed by: INTERNAL MEDICINE

## 2021-12-09 PROCEDURE — 250N000013 HC RX MED GY IP 250 OP 250 PS 637: Performed by: INTERNAL MEDICINE

## 2021-12-09 PROCEDURE — 99233 SBSQ HOSP IP/OBS HIGH 50: CPT | Performed by: INTERNAL MEDICINE

## 2021-12-09 PROCEDURE — 120N000001 HC R&B MED SURG/OB

## 2021-12-09 PROCEDURE — 94640 AIRWAY INHALATION TREATMENT: CPT

## 2021-12-09 PROCEDURE — 250N000012 HC RX MED GY IP 250 OP 636 PS 637: Performed by: INTERNAL MEDICINE

## 2021-12-09 RX ORDER — IPRATROPIUM BROMIDE AND ALBUTEROL SULFATE 2.5; .5 MG/3ML; MG/3ML
3 SOLUTION RESPIRATORY (INHALATION)
Status: DISCONTINUED | OUTPATIENT
Start: 2021-12-09 | End: 2021-12-10 | Stop reason: HOSPADM

## 2021-12-09 RX ORDER — LISINOPRIL 10 MG/1
10 TABLET ORAL DAILY
Status: DISCONTINUED | OUTPATIENT
Start: 2021-12-09 | End: 2021-12-10 | Stop reason: HOSPADM

## 2021-12-09 RX ORDER — MIRTAZAPINE 15 MG/1
45 TABLET, FILM COATED ORAL AT BEDTIME
Status: DISCONTINUED | OUTPATIENT
Start: 2021-12-09 | End: 2021-12-10 | Stop reason: HOSPADM

## 2021-12-09 RX ORDER — PANTOPRAZOLE SODIUM 40 MG/1
40 TABLET, DELAYED RELEASE ORAL DAILY
Status: DISCONTINUED | OUTPATIENT
Start: 2021-12-09 | End: 2021-12-10 | Stop reason: HOSPADM

## 2021-12-09 RX ORDER — CARVEDILOL 6.25 MG/1
6.25 TABLET ORAL 2 TIMES DAILY WITH MEALS
Status: DISCONTINUED | OUTPATIENT
Start: 2021-12-09 | End: 2021-12-10 | Stop reason: HOSPADM

## 2021-12-09 RX ORDER — ATORVASTATIN CALCIUM 40 MG/1
40 TABLET, FILM COATED ORAL DAILY
Status: DISCONTINUED | OUTPATIENT
Start: 2021-12-09 | End: 2021-12-10 | Stop reason: HOSPADM

## 2021-12-09 RX ORDER — ASPIRIN 81 MG/1
81 TABLET, CHEWABLE ORAL DAILY
Status: DISCONTINUED | OUTPATIENT
Start: 2021-12-09 | End: 2021-12-10 | Stop reason: HOSPADM

## 2021-12-09 RX ORDER — BUPRENORPHINE HYDROCHLORIDE AND NALOXONE HYDROCHLORIDE DIHYDRATE 8; 2 MG/1; MG/1
1 TABLET SUBLINGUAL 2 TIMES DAILY
Status: DISCONTINUED | OUTPATIENT
Start: 2021-12-09 | End: 2021-12-09

## 2021-12-09 RX ORDER — BUPRENORPHINE AND NALOXONE 8; 2 MG/1; MG/1
1 FILM, SOLUBLE BUCCAL; SUBLINGUAL 2 TIMES DAILY
Status: DISCONTINUED | OUTPATIENT
Start: 2021-12-09 | End: 2021-12-10 | Stop reason: HOSPADM

## 2021-12-09 RX ORDER — MULTIPLE VITAMINS W/ MINERALS TAB 9MG-400MCG
1 TAB ORAL DAILY
Status: DISCONTINUED | OUTPATIENT
Start: 2021-12-09 | End: 2021-12-10 | Stop reason: HOSPADM

## 2021-12-09 RX ADMIN — MULTIPLE VITAMINS W/ MINERALS TAB 1 TABLET: TAB at 08:52

## 2021-12-09 RX ADMIN — LISINOPRIL 10 MG: 10 TABLET ORAL at 08:52

## 2021-12-09 RX ADMIN — FLUTICASONE FUROATE AND VILANTEROL TRIFENATATE 1 PUFF: 200; 25 POWDER RESPIRATORY (INHALATION) at 08:58

## 2021-12-09 RX ADMIN — IPRATROPIUM BROMIDE AND ALBUTEROL SULFATE 3 ML: .5; 3 SOLUTION RESPIRATORY (INHALATION) at 08:35

## 2021-12-09 RX ADMIN — BUPRENORPHINE AND NALOXONE 1 FILM: 8; 2 FILM, SOLUBLE BUCCAL; SUBLINGUAL at 08:51

## 2021-12-09 RX ADMIN — CARVEDILOL 6.25 MG: 6.25 TABLET, FILM COATED ORAL at 17:44

## 2021-12-09 RX ADMIN — ENOXAPARIN SODIUM 40 MG: 40 INJECTION SUBCUTANEOUS at 21:43

## 2021-12-09 RX ADMIN — ATORVASTATIN CALCIUM 40 MG: 40 TABLET, FILM COATED ORAL at 08:51

## 2021-12-09 RX ADMIN — ASPIRIN 81 MG CHEWABLE TABLET 81 MG: 81 TABLET CHEWABLE at 08:52

## 2021-12-09 RX ADMIN — CEFTRIAXONE SODIUM 2 G: 2 INJECTION, POWDER, FOR SOLUTION INTRAMUSCULAR; INTRAVENOUS at 17:03

## 2021-12-09 RX ADMIN — PANTOPRAZOLE SODIUM 40 MG: 40 TABLET, DELAYED RELEASE ORAL at 08:52

## 2021-12-09 RX ADMIN — IPRATROPIUM BROMIDE AND ALBUTEROL SULFATE 3 ML: .5; 3 SOLUTION RESPIRATORY (INHALATION) at 15:52

## 2021-12-09 RX ADMIN — MIRTAZAPINE 45 MG: 15 TABLET, FILM COATED ORAL at 21:42

## 2021-12-09 RX ADMIN — AZITHROMYCIN MONOHYDRATE 250 MG: 500 INJECTION, POWDER, LYOPHILIZED, FOR SOLUTION INTRAVENOUS at 17:38

## 2021-12-09 RX ADMIN — CARVEDILOL 6.25 MG: 6.25 TABLET, FILM COATED ORAL at 08:52

## 2021-12-09 RX ADMIN — BUPRENORPHINE AND NALOXONE 1 FILM: 8; 2 FILM, SOLUBLE BUCCAL; SUBLINGUAL at 21:42

## 2021-12-09 RX ADMIN — PREDNISONE 40 MG: 20 TABLET ORAL at 08:51

## 2021-12-09 ASSESSMENT — ACTIVITIES OF DAILY LIVING (ADL)
ADLS_ACUITY_SCORE: 6
ADLS_ACUITY_SCORE: 6
ADLS_ACUITY_SCORE: 8
ADLS_ACUITY_SCORE: 6
ADLS_ACUITY_SCORE: 8
ADLS_ACUITY_SCORE: 6
ADLS_ACUITY_SCORE: 7
ADLS_ACUITY_SCORE: 6
ADLS_ACUITY_SCORE: 8
ADLS_ACUITY_SCORE: 6
ADLS_ACUITY_SCORE: 7
ADLS_ACUITY_SCORE: 7
ADLS_ACUITY_SCORE: 6

## 2021-12-09 NOTE — UTILIZATION REVIEW
"  Admission Status; Secondary Review Determination         Under the authority of the Utilization Management Committee, the utilization review process indicated a secondary review on the above patient.  The review outcome is based on review of the medical records, discussions with staff, and applying clinical experience noted on the date of the review.        (X)      Inpatient Status Appropriate - This patient's medical care is consistent with medical management for inpatient care and reasonable inpatient medical practice.      () Observation Status Appropriate - This patient does not meet hospital inpatient criteria and is placed in observation status. If this patient's primary payer is Medicare and was admitted as an inpatient, Condition Code 44 should be used and patient status changed to \"observation\".   () Admission Status NOT Appropriate - This patient's medical care is not consistent with medical management for Inpatient or Observation Status.          RATIONALE FOR DETERMINATION     61 year old male with a history of COPD who presented to the ED on 12/8/2021 with shortness of breath and a cough.  Patient was found to have oxygen saturations of 79% on room air.  Chest x-ray shows right upper lobe opacity, and patient was placed on IV ceftriaxone and a azithromycin.  Patient is being treated with IV antibiotics, steroids, supplemental O2, and frequent nebulization treatments.  Patient is appropriate for inpatient status.    The severity of illness, intensity of service provided, expected LOS and risk for adverse outcome make the care complex, high risk and appropriate for hospital admission.        The information on this document is developed by the utilization review team in order for the business office to ensure compliance.  This only denotes the appropriateness of proper admission status and does not reflect the quality of care rendered.         The definitions of Inpatient Status and Observation Status " used in making the determination above are those provided in the CMS Coverage Manual, Chapter 1 and Chapter 6, section 70.4.      Sincerely,     Paul Dukes MD  Physician Advisor  Utilization Review/ Case Management  [facility].

## 2021-12-09 NOTE — PROGRESS NOTES
Admission    Patient arrives to room 635-02 via cart from ED.  Care plan note: A & O x 4, garbled speech. Denies pain. VSS on RA. O2 92-94% on RA-denies SOB, no signs of respiratory distress at this time. Scabbing/scars to UE bilaterally otherwise skin intact. Home inhaler sent to pharmacy per policy other belongings remain with patient. Admission questions completed.     Inpatient nursing criteria listed below were met:    Did you put disposition on whiteboard and in sticky note: Yes  Full skin assessment done (add LDA if skin issue present) :Yes  Isolation education started/completed NA  Patient allergies verified with patient: Yes  Fall Risk? (Care plan updated, Education given and documented) Yes  Primary Care Plan initiated: Yes  Home medications documented in belongings flowsheet: Yes  Patient belongings documented in belongings flowsheet: Yes  Reminder note (belongings/ medications) placed in discharge instructions:Yes  Admission profile/ required documentation complete: Yes  If patient is a 72 hour hold/Commitment are belongings removed from room and locked up? JIM Wagner RN on 12/8/2021 at 10:26 PM

## 2021-12-09 NOTE — PROGRESS NOTES
RECEIVING UNIT ED HANDOFF REVIEW    ED Nurse Handoff Report was reviewed by: Misty Wagner RN on December 8, 2021 at 9:33 PM

## 2021-12-09 NOTE — PROGRESS NOTES
[facility]    Hospitalist Progress Note    Assessment & Plan   Gerson Pearson is a 61 year old male with a history of COPD who presented to the ED on 12/8/2021 with shortness of breath and a cough     Acute hypoxic respiratory failure  Suspected COPD exacerbation   Possible CAP   Patient began to have shortness of breath and a cough this AM.  Called EMS and when they arrived the patient was 79% on RA prompting them to bring him in.  He was given Duonebs and steroids with improvement.  CXR in the ED showed a right upper lobe/perihilar opacity which could represent an early pneumonia and the possible cause of his exacerbation.  Because of this he was also given Ceftriaxone and Azithromycin in the ED.  Of note the patient is afebrile and his WBC is normal.  No signs of sepsis   -procal nl  - afebrile  - pt thinks he may have passed out at AL facility  - pt poor historian, provides variable hx but states felt unwell yesterday am, possibly new cough, worsening sob.   -still with c\ough, wheezing on exam. Appears tired  - bld cx ngtd,   - covid negative.     Plan;   -will check viral panel  -UOB  -duonebs scheduled  - repeat cxr  - tele as pt may have had syncope but not documented.   - Admit to inpatient  - Titrate O2 as tolerated  - prednisone taper  - Continue Ceftriaxone and Azithromycin for now  - Sputum culture ordered     - RCAT      H/o MI   No issues currently.  No reports of chest pain.  Troponin negative  - resumed PTA medications coreg, statin, ASA, lisinopril.      Tobacco use  Patient admits to smoking 8 cigarettes a day  - Declined nicotine patch as he already has one      H/o Heroin use  Lives in a group home  - PTA Suboxone once dosing confirmed               Diet:   Regular diet   DVT Prophylaxis: Enoxaparin (Lovenox) SQ  Post Catheter: Not present  Central Lines: None  Code Status:   Full code         Clinically Significant Risk Factors Present on Admission                 #  Platelet Defect: home medication list includes an antiplatelet medication            Disposition Plan     Expected Discharge: 2-3 days     PT, SW consult         Tristan Foster MD  Text Page  (7am to 6pm)  Interval History   Nebs help his breathing  Thinks he passed out at AL yesterday.   Thinks he wasn't feeling well yesterday am before low oxygen levels  No prior episodes of syncope  Breathing worse last few days.     -Data reviewed today: I reviewed all new labs and imaging results over the last 24 hours. I personally reviewed imaging and labs since admission.     Physical Exam   Temp: 98.1  F (36.7  C) Temp src: Oral BP: (!) 149/88 Pulse: 66   Resp: 18 SpO2: 95 % O2 Device: None (Room air)    Vitals:    12/09/21 0500   Weight: 58.5 kg (128 lb 15.5 oz)     Vital Signs with Ranges  Temp:  [97.2  F (36.2  C)-98.7  F (37.1  C)] 98.1  F (36.7  C)  Pulse:  [66-98] 66  Resp:  [16-28] 18  BP: (119-151)/(62-88) 149/88  SpO2:  [92 %-95 %] 95 %  I/O last 3 completed shifts:  In: 120 [P.O.:120]  Out: -     Constitutional: Fatigued appearing, nad,   Respiratory: Exp wheezing bilaterally, poor air movment. Dry cough  ? Crackles RUL  Cardiovascular:   GI: soft, nt, nd  Skin/Integumen: no rash or edema  Neuro: alert, oriented, grossly nonfocal. Hx variable and inconsistent.   No abnormal movements  Psych: flat affect, calm, cooperative    Medications       aspirin  81 mg Oral Daily     atorvastatin  40 mg Oral Daily     azithromycin  250 mg Intravenous Q24H     buprenorphine HCl-naloxone HCl  1 Film Sublingual BID     carvedilol  6.25 mg Oral BID w/meals     cefTRIAXone  2 g Intravenous Q24H     enoxaparin ANTICOAGULANT  40 mg Subcutaneous Q24H     fluticasone-vilanterol  1 puff Inhalation Daily     ipratropium - albuterol 0.5 mg/2.5 mg/3 mL  3 mL Nebulization 4x daily     lisinopril  10 mg Oral Daily     mirtazapine  45 mg Oral At Bedtime     multivitamin w/minerals  1 tablet Oral Daily     pantoprazole  40 mg Oral Daily      predniSONE  40 mg Oral Daily     sodium chloride (PF)  3 mL Intracatheter Q8H       Data   Recent Labs   Lab 12/09/21  1103 12/08/21  1049   WBC  --  7.1   HGB  --  12.3*   MCV  --  94   PLT  --  199   NA  --  136   POTASSIUM  --  4.9   CHLORIDE  --  104   CO2  --  27   BUN  --  7   CR 0.61* 0.76   ANIONGAP  --  5   RL  --  9.5   GLC  --  119*   ALBUMIN  --  3.6   PROTTOTAL  --  7.6   BILITOTAL  --  0.7   ALKPHOS  --  129   ALT  --  31   AST  --  31       Imaging:   No results found for this or any previous visit (from the past 24 hour(s)).

## 2021-12-09 NOTE — PHARMACY-ADMISSION MEDICATION HISTORY
Pharmacy Medication History  Admission medication history interview status for the 12/8/2021  admission is complete. See EPIC admission navigator for prior to admission medications     Medication history sources: Medication list ([facility]);  is Mickey ([phone])    Medication reconciliation completed by provider prior to medication history? Yes    Time spent in this activity: 30 minutes    Prior to Admission medications    Medication Sig Last Dose Taking? Auth Provider   acetaminophen (TYLENOL) 325 MG tablet Take 650 mg by mouth every 4 hours as needed for mild pain  Yes Unknown, Entered By History   albuterol (PROAIR HFA/PROVENTIL HFA/VENTOLIN HFA) 108 (90 BASE) MCG/ACT Inhaler Inhale 2 puffs into the lungs every 4 hours as needed for shortness of breath / dyspnea or wheezing  Yes Kali Garner MD   albuterol (PROVENTIL) (2.5 MG/3ML) 0.083% neb solution Take 1 vial (2.5 mg) by nebulization every 4 hours as needed for wheezing  Yes Jose Horne MD   aspirin (ASA) 81 MG chewable tablet Take 81 mg by mouth daily  Yes Unknown, Entered By History   atorvastatin (LIPITOR) 40 MG tablet Take 40 mg by mouth daily   Yes Unknown, Entered By History   buprenorphine-naloxone (SUBOXONE) 8-2 MG SUBL sublingual tablet Place 1 tablet under the tongue 2 times daily   Yes Unknown, Entered By History   carvedilol (COREG) 6.25 MG tablet Take 6.25 mg by mouth 2 times daily (with meals)  Yes Unknown, Entered By History   fluticasone-vilanterol (BREO ELLIPTA) 200-25 MCG/INH inhaler Inhale 1 puff into the lungs daily  Yes Unknown, Entered By History   lisinopril (ZESTRIL) 10 MG tablet Take 10 mg by mouth daily   Yes Unknown, Entered By History   mirtazapine (REMERON) 45 MG tablet Take 45 mg by mouth At Bedtime   Yes Unknown, Entered By History   multivitamin w/minerals (THERA-VIT-M) tablet Take 1 tablet by mouth daily  Yes Unknown, Entered By History   naloxone  (NARCAN) 4 MG/0.1ML nasal spray Spray 4 mg into one nostril alternating nostrils once as needed for opioid reversal every 2-3 minutes until assistance arrives  Yes Unknown, Entered By History   pantoprazole (PROTONIX) 40 MG EC tablet Take 40 mg by mouth daily  Yes Unknown, Entered By History   tiotropium (SPIRIVA RESPIMAT) 2.5 MCG/ACT inhaler Inhale 2 puffs into the lungs daily  Yes Unknown, Entered By History       The information provided in this note is only as accurate as the sources available at the time of update(s)     Soumya Go, PharmD, BCCCP

## 2021-12-09 NOTE — PROGRESS NOTES
"   12/09/21 1420   Quick Adds   Type of Visit Initial PT Evaluation   Living Environment   People in home other (see comments)  (3 roommates that also live in group home )   Current Living Arrangements group home   Home Accessibility stairs within home   Number of Stairs, Within Home, Primary greater than 10 stairs   Stair Railings, Within Home, Primary railings safe and in good condition   Living Environment Comments Pt lives in basement level and needs to be able to navigate a flight of stairs to access bedroom   Self-Care   Usual Activity Tolerance moderate   Current Activity Tolerance fair   Regular Exercise No   Equipment Currently Used at Home cane, straight   Activity/Exercise/Self-Care Comment Lives in group home with 3 other people, pt reports he gets assistance with cooking, cleaning, and medication set-up. Independent with all mobiltiy without device, sometimes uses straight cane as needed    Disability/Function   Fall history within last six months no   Number of times patient has fallen within last six months 0   General Information   Onset of Illness/Injury or Date of Surgery 12/08/21   Referring Physician Tristan Foster MD   Patient/Family Therapy Goals Statement (PT) return home, breathe better    Pertinent History of Current Problem (include personal factors and/or comorbidities that impact the POC) 61 year old male who presents with shortness of breath x1 day.  He has a history of COPD, continues to smoke cigarettes, and also has a history of heroin use, and presents to the ER today with shortness of breath that began earlier this morning.  Medics state that the fire department had about 79% on room air, and that he improved to 97% on room air after DuoNeb and nonrebreather.     Cognition   Orientation Status (Cognition) oriented x 4   Affect/Mental Status (Cognition) WFL   Follows Commands (Cognition) WFL   Cognitive Status Comments \"I know this is because of my smoking\"   Pain Assessment " Prior Authorization Retail Medication Request    Medication/Dose: fexofenadine (Allegra) 180 mg  ICD code (if different than what is on RX):    Previously Tried and Failed:    Rationale:      Insurance Name:    Insurance ID:        Pharmacy Information (if different than what is on RX)  Name:  [facility]  Phone: [phone]    Tessa See DAILY Vaughn       Patient Currently in Pain No   Bed Mobility   Comment (Bed Mobility) Independent with bed mobility    Transfers   Transfer Safety Comments SBA transfers    Gait/Stairs (Locomotion)   [place] (Gait) supervision   Assistive Device (Gait)   (none )   Distance in Feet (Required for LE Total Joints) 250'    Comment (Gait/Stairs) slow pace, poor endurance d/t shortness of breath especially after ambulation per pt report, VSS. Pre walking: O2 at 97% spO2, 80 bpm. Following walking: O2 sats 96% spO2 and HR at 85 bpm   Clinical Impression   Criteria for Skilled Therapeutic Intervention yes, treatment indicated   PT Diagnosis (PT) impaired mobility    Influenced by the following impairments poor endurance    Functional limitations due to impairments decreased activity tolerance    Clinical Presentation Stable/Uncomplicated   Clinical Presentation Rationale clinical judgement    Clinical Decision Making (Complexity) low complexity   Therapy Frequency (PT) Daily   Predicted Duration of Therapy Intervention (days/wks) 3 days    Planned Therapy Interventions (PT) home exercise program;transfer training;strengthening;patient/family education;gait training   Anticipated Equipment Needs at Discharge (PT) cane, straight   Risk & Benefits of therapy have been explained evaluation/treatment results reviewed;care plan/treatment goals reviewed;risks/benefits reviewed;current/potential barriers reviewed;participants voiced agreement with care plan;participants included;patient   PT Discharge Planning    PT Discharge Recommendation (DC Rec) home with assist   PT Rationale for DC Rec Pt likely will be able to return home pending further medial management. Pt is close to baseline but mostly limited by poor endurance and therefore decreased activity tolerance. Will continue to benefit from inpatient PT to progress activity tolerance - will continue to assess need for ongoing therapies after d/c pending progression.    PT Brief  overview of current status  SBA, short of breath, vitals stable on room air    Total Evaluation Time   Total Evaluation Time (Minutes) 15

## 2021-12-09 NOTE — PLAN OF CARE
Summary: COPD exacerbation; CAP  DATE & TIME:12/9/[id number]  Cognitive Concerns/ Orientation : A&Ox4, calm and cooperative, garbled speech,   BEHAVIOR & AGGRESSION TOOL COLOR: Green  CIWA SCORE: N/A    ABNL VS/O2: VSS on RA , / 88,denies CP or SOB  MOBILITY: SBA . Generalized weakness   PAIN MANAGMENT: Denies  DIET: Regular  BOWEL/BLADDER:  continent up to BR  ABNL LAB/BG: N/A  DRAIN/DEVICES: PIV/SL, IV antibiotics  TELEMETRY RHYTHM: N/A   SKIN: Scattered scabs and scarring to UE bilaterally otherwise WNL, nicotine patch to LUE   TESTS/PROCEDURES: CXR shows possible pneumonia in RUL.   D/C DAY/GOALS/PLACE: pending improvement   OTHER IMPORTANT INFO: LS  coarse,diminished. on scheduled nebs, Sputum  and nasal swabs sent, initiated Droplet  Isolation. Seen by PT.

## 2021-12-09 NOTE — PLAN OF CARE
Summary: COPD exacerbation; CAP  DATE & TIME:12/8/21-12/9/21 [id number]  Cognitive Concerns/ Orientation : A&Ox4, calm and cooperative, garbled speech    BEHAVIOR & AGGRESSION TOOL COLOR: Green  CIWA SCORE: N/A    ABNL VS/O2: VSS on RA, denies CP or SOB  MOBILITY: SBA with GB. Generalized weakness   PAIN MANAGMENT: Denies  DIET: Regular  BOWEL/BLADDER: BS active x 4, continent up to BR  ABNL LAB/BG: N/A  DRAIN/DEVICES: PIV/SL, IV antibiotics  TELEMETRY RHYTHM: N/A   SKIN: Scattered scabs and scarring to UE bilaterally otherwise WNL, nicotine patch to LUE   TESTS/PROCEDURES: CXR shows possible pneumonia in RUL.   D/C DAY/GOALS/PLACE: pending improvement   OTHER IMPORTANT INFO: LS diminished. Sputum culture ordered: needs to be collected but pt having infrequent dry coughs. Home inhaler in pharmacy.

## 2021-12-09 NOTE — PLAN OF CARE
Summary: COPD exacerbation (ED admit)    DATE & TIME:12/8/2021, [id number]  Cognitive Concerns/ Orientation : A & O x 4, calm and cooperative, garbled speech    BEHAVIOR & AGGRESSION TOOL COLOR: Green   CIWA SCORE: NA    ABNL VS/O2: VSS on RA (O2 93-94%, goal O2 > 92%), denies SOB  MOBILITY: SBA with GB. Generalized weakness   PAIN MANAGMENT: denies   DIET: Regular, tolerating   BOWEL/BLADDER: BS active x 4, continent   ABNL LAB/BG: hgb=12.3   DRAIN/DEVICES: PIV/SL, IV antibiotics  TELEMETRY RHYTHM: NA   SKIN: Scattered scabs and scarring to UE bilaterally otherwise WDL, nicotine patch to LUE   TESTS/PROCEDURES: NA   D/C DAY/GOALS/PLACE: pending improvement   OTHER IMPORTANT INFO: LS diminished with occasional inspiratory wheezing. Intermittent dry cough- need sputum culture. Intermittent nausea.  home inhaler sent to pharmacy per policy.

## 2021-12-09 NOTE — CONSULTS
Care Management Initial Consult    General Information  Assessment completed with: Care Team [facility]  Type of CM/SW Visit: Initial Assessment    Primary Care Provider verified and updated as needed: Yes   Readmission within the last 30 days: no previous admission in last 30 days      Reason for Consult: discharge planning  Advance Care Planning: Advance Care Planning Reviewed: no concerns identified     General Information Comments: High readmission risk    Communication Assessment  Patient's communication style: spoken language (English or Bilingual)    Hearing Difficulty or Deaf: no   Wear Glasses or Blind: yes    Cognitive  Cognitive/Neuro/Behavioral: WDL  Level of Consciousness: alert  Arousal Level: opens eyes spontaneously  Orientation: oriented x 4  Mood/Behavior: hyperactive (agitated, impulsive)  Best Language: 0 - No aphasia       Living Environment:   People in home: facility resident     Current living Arrangements: group home (4 resident)      Able to return to prior arrangements: yes  Living Arrangement Comments: Spouse lives independently    Family/Social Support:  Care provided by: self,other (see comments) ([facility] staff)  Provides care for: no one  Marital Status:              Description of Support System:           Current Resources:   Patient receiving home care services: No     Community Resources: [facility]  Equipment currently used at home: cane, straight  Supplies currently used at home: Nebulizer tubing    Employment/Financial:  Employment Status: disabled        Financial Concerns: No concerns identified           Lifestyle & Psychosocial Needs:  Social Determinants of Health     Tobacco Use: High Risk     Smoking Tobacco Use: Current Every Day Smoker     Smokeless Tobacco Use: Never Used   Alcohol Use: Not on file   Financial Resource Strain: Not on file   Food Insecurity: Not on file   Transportation Needs: Not on file   Physical  Activity: Not on file   Stress: Not on file   Social Connections: Not on file   Intimate Partner Violence: Not on file   Depression: Not on file   Housing Stability: Not on file       Functional Status:  Prior to admission patient needed assistance:         Assesssment of Functional Status:  (group home that provides  supportive care)    Mental Health Status:  Mental Health Status: Current Concern (Nephew  recently in [place])       Chemical Dependency Status:  Chemical Dependency Status: Current Concern (Pt has a history of heroine use, currently on Suboxone)             Values/Beliefs:  Spiritual, Cultural Beliefs, Scientology Practices, Values that affect care:                 Additional Information:  Patient resides at a residential group home ([facility]) in [place].  He is one of four male residents.  Pt is is own decision maker.  Pt is , with his spouse living independently and visits patient periodically.  Conversed with Manager of [facility], Mickey ([phone]).  Pt's nephew recently  in [place].  Pt had been planning to go to his .  Mickey reported pt has been feeling down since hearing about his nephew.    Pt is free to come and go from the group home, so Mickey is not sure if pt has been using street drugs.  Pt did not pass out at the group home.  (This was noted that patient had thought he had passed out.)    Group home staff dispense pt's medications and he receives nebulizer treatments.  Pt's O2 saturation was down to 79%, so Mickey questioned if he would need home O2.  Pt is on Suboxone managed through [facility].  They receive weekly supplies of Suboxone.    Mickey will take care of scheduling pt's appointment with the Suboxone clinic and with his [facility] PCP, Dr Wynn.  If there are any new medications they [facility] [place].  The group home staff will provide transportation home.          Sue Riggs RN    Inpatient Care Management  [phone]

## 2021-12-10 VITALS
DIASTOLIC BLOOD PRESSURE: 90 MMHG | TEMPERATURE: 98.2 F | BODY MASS INDEX: 19.21 KG/M2 | WEIGHT: 130.07 LBS | SYSTOLIC BLOOD PRESSURE: 150 MMHG | OXYGEN SATURATION: 94 % | RESPIRATION RATE: 18 BRPM | HEART RATE: 68 BPM

## 2021-12-10 PROCEDURE — 250N000012 HC RX MED GY IP 250 OP 636 PS 637: Performed by: INTERNAL MEDICINE

## 2021-12-10 PROCEDURE — 99238 HOSP IP/OBS DSCHRG MGMT 30/<: CPT | Performed by: INTERNAL MEDICINE

## 2021-12-10 PROCEDURE — 250N000013 HC RX MED GY IP 250 OP 250 PS 637: Performed by: INTERNAL MEDICINE

## 2021-12-10 RX ORDER — PREDNISONE 10 MG/1
TABLET ORAL
Qty: 22 TABLET | Refills: 0 | Status: SHIPPED | OUTPATIENT
Start: 2021-12-11 | End: 2021-12-10

## 2021-12-10 RX ORDER — PREDNISONE 10 MG/1
TABLET ORAL
Qty: 22 TABLET | Refills: 0 | Status: SHIPPED | OUTPATIENT
Start: 2021-12-11 | End: 2023-07-15

## 2021-12-10 RX ORDER — CEFUROXIME AXETIL 500 MG/1
500 TABLET ORAL 2 TIMES DAILY
Qty: 6 TABLET | Refills: 0 | Status: SHIPPED | OUTPATIENT
Start: 2021-12-10 | End: 2021-12-13

## 2021-12-10 RX ORDER — AZITHROMYCIN 250 MG/1
250 TABLET, FILM COATED ORAL DAILY
Qty: 4 TABLET | Refills: 0 | Status: SHIPPED | OUTPATIENT
Start: 2021-12-10 | End: 2021-12-10

## 2021-12-10 RX ORDER — CEFUROXIME AXETIL 500 MG/1
500 TABLET ORAL 2 TIMES DAILY
Qty: 6 TABLET | Refills: 0 | Status: SHIPPED | OUTPATIENT
Start: 2021-12-10 | End: 2021-12-10

## 2021-12-10 RX ORDER — AZITHROMYCIN 250 MG/1
250 TABLET, FILM COATED ORAL DAILY
Qty: 3 TABLET | Refills: 0 | Status: SHIPPED | OUTPATIENT
Start: 2021-12-10 | End: 2021-12-13

## 2021-12-10 RX ADMIN — FLUTICASONE FUROATE AND VILANTEROL TRIFENATATE 1 PUFF: 200; 25 POWDER RESPIRATORY (INHALATION) at 09:09

## 2021-12-10 RX ADMIN — BUPRENORPHINE AND NALOXONE 1 FILM: 8; 2 FILM, SOLUBLE BUCCAL; SUBLINGUAL at 09:03

## 2021-12-10 RX ADMIN — MULTIPLE VITAMINS W/ MINERALS TAB 1 TABLET: TAB at 09:03

## 2021-12-10 RX ADMIN — CARVEDILOL 6.25 MG: 6.25 TABLET, FILM COATED ORAL at 09:03

## 2021-12-10 RX ADMIN — ASPIRIN 81 MG CHEWABLE TABLET 81 MG: 81 TABLET CHEWABLE at 09:03

## 2021-12-10 RX ADMIN — PREDNISONE 40 MG: 20 TABLET ORAL at 09:03

## 2021-12-10 RX ADMIN — ATORVASTATIN CALCIUM 40 MG: 40 TABLET, FILM COATED ORAL at 09:03

## 2021-12-10 RX ADMIN — LISINOPRIL 10 MG: 10 TABLET ORAL at 09:02

## 2021-12-10 RX ADMIN — PANTOPRAZOLE SODIUM 40 MG: 40 TABLET, DELAYED RELEASE ORAL at 09:03

## 2021-12-10 ASSESSMENT — ACTIVITIES OF DAILY LIVING (ADL)
ADLS_ACUITY_SCORE: 6
ADLS_ACUITY_SCORE: 7
ADLS_ACUITY_SCORE: 6
ADLS_ACUITY_SCORE: 7
ADLS_ACUITY_SCORE: 7
ADLS_ACUITY_SCORE: 6

## 2021-12-10 NOTE — DISCHARGE SUMMARY
[facility]    Discharge Summary  Hospitalist    Date of Admission:  12/8/2021  Date of Discharge:  12/10/2021  Discharging Provider: Tristan Foster MD    Discharge Diagnoses   CAP  Copd exacerbation.     History of Present Illness     Gerson Pearson is a 61 year old male with a history of COPD who presented to the ED on 12/8/2021 with shortness of breath and a cough.  6666Patient began to have shortness of breath and a cough this AM.  Called EMS and when they arrived the patient was 79% on RA prompting them to bring him in.  He was given Duonebs and steroids with improvement.  Patient initially thought about going home rather than being admitted but became more short of breath again while waiting for his ride. At that time his O2 sats also dipped with exertion.  Patient denies any fevers, chills, chest pain, sputum production, abdominal pain, N/V/D or problems with urination.           Hospital Course   Gerson Pearson was admitted on 12/8/2021.  The following problems were addressed during his hospitalization:    Active Problems:    Chronic obstructive pulmonary disease, unspecified COPD type (H)    Pneumonia due to infectious organism, unspecified laterality, unspecified part of lung  Gerson Pearson is a 61 year old male with a history of COPD who presented to the ED on 12/8/2021 with shortness of breath and a cough     Acute hypoxic respiratory failure  Suspected COPD exacerbation   Possible CAP   Patient began to have shortness of breath and a cough this AM.  Called EMS and when they arrived the patient was 79% on RA prompting them to bring him in.  He was given Duonebs and steroids with improvement.  CXR in the ED showed a right upper lobe/perihilar opacity which could represent an early pneumonia and the possible cause of his exacerbation.  Because of this he was also given Ceftriaxone and Azithromycin in the ED.  Of note the patient is afebrile and his WBC is normal.  No signs  of sepsis   -procal nl  - afebrile  - pt poor historian, provides variable hx but states felt unwell yesterday am, possibly new cough, worsening sob.   -still with c\ough, wheezing on exam. Appears tired  - bld cx ngtd,   - covid negative.   -influenza and viral panel negative.   -tele normal.   - on day of discharge. Pt up independently, taking po. Feeling better. Lungs clear. Nl vitals. Afebrile. Not needing oxygen. No complaints  -discharge back to TCU with prednisone taper and complete course of abx for CAP         H/o MI   No issues currently.  No reports of chest pain.  Troponin negative  Tele nl  - resumed PTA medications coreg, statin, ASA, lisinopril.      Tobacco use  Patient admits to smoking 8 cigarettes a day  - Declined nicotine patch as he already has one      H/o Heroin use  Lives in a group home  - PTA Suboxone once dosing confirmed               Diet:   Regular diet   DVT Prophylaxis: Enoxaparin (Lovenox) SQ  Post Catheter: Not present  Central Lines: None  Code Status:   Full code      Dispo: discharge back to Group home    pcp follow up 1 week    Tristan Foster MD, MD    Significant Results and Procedures   See hospital course    Pending Results   These results will be followed up by hospitalist  Unresulted Labs Ordered in the Past 30 Days of this Admission     Date and Time Order Name Status Description    12/8/2021 10:04 PM Respiratory Aerobic Bacterial Culture Preliminary     12/8/2021  4:06 PM Blood Culture Peripheral Blood Preliminary     12/8/2021  4:06 PM Blood Culture Peripheral Blood Preliminary           Code Status   Full Code       Primary Care Physician   Alex Wynn    Physical Exam   Temp: 98.2  F (36.8  C) Temp src: Oral BP: (!) 150/90 Pulse: 68   Resp: 18 SpO2: 94 % O2 Device: None (Room air)    Vitals:    12/09/21 0500 12/10/21 0700   Weight: 58.5 kg (128 lb 15.5 oz) 59 kg (130 lb 1.1 oz)     Vital Signs with Ranges  Temp:  [97.9  F (36.6  C)-98.4  F (36.9  C)] 98.2  F  (36.8  C)  Pulse:  [60-68] 68  Resp:  [18] 18  BP: (126-150)/(78-90) 150/90  SpO2:  [94 %-96 %] 94 %  I/O last 3 completed shifts:  In: 500 [P.O.:500]  Out: -   Constitutional: looks better. nad  Respiratory:    CTAB, breathing easily, no wheezing. Good air movement, no coughing  Cardiovascular:   GI: soft, nt, nd  Skin/Integumen: no rash or edema  Neuro: alert, oriented, grossly nonfocal.   Psych: flat affect, calm, cooperative    Discharge Disposition   Discharged to Group home  Condition at discharge: Good    Consultations This Hospital Stay   PHYSICAL THERAPY ADULT IP CONSULT  CARE MANAGEMENT / SOCIAL WORK IP CONSULT    Time Spent on this Encounter   I, Tristan Foster MD, personally saw the patient today and spent less than or equal to 30 minutes discharging this patient.    Discharge Orders      Reason for your hospital stay    Copd exacerbation  Community aquired pneumonia     Follow-up and recommended labs and tests     Follow up with primary care doctor next week to follow up pneumonia and hospitalization     Activity    Your activity upon discharge: activity as tolerated     Discharge Instructions    1. Complete course of prednisone. Next dose 12/11/21.   2. Complete course of both antibiotics, next dose of both this evening on 12/10/21     Full Code     Diet    Follow this diet upon discharge: Orders Placed This Encounter      Combination Diet Regular Diet Adult     Discharge Medications   Current Discharge Medication List      START taking these medications    Details   azithromycin (ZITHROMAX) 250 MG tablet Take 1 tablet (250 mg) by mouth daily for 3 days  Qty: 4 tablet, Refills: 0    Associated Diagnoses: Pneumonia due to infectious organism, unspecified laterality, unspecified part of lung      cefuroxime (CEFTIN) 500 MG tablet Take 1 tablet (500 mg) by mouth 2 times daily for 3 days  Qty: 6 tablet, Refills: 0    Associated Diagnoses: Pneumonia due to infectious organism, unspecified laterality,  unspecified part of lung      predniSONE (DELTASONE) 10 MG tablet Take 4 tablets daily for one day then three tablets daily for 3 days then two tablets daily for 3 days then one tablet daily for 3 days then stop.  Qty: 22 tablet, Refills: 0    Associated Diagnoses: Chronic obstructive pulmonary disease, unspecified COPD type (H); COPD exacerbation (H)         CONTINUE these medications which have NOT CHANGED    Details   acetaminophen (TYLENOL) 325 MG tablet Take 650 mg by mouth every 4 hours as needed for mild pain      albuterol (PROAIR HFA/PROVENTIL HFA/VENTOLIN HFA) 108 (90 BASE) MCG/ACT Inhaler Inhale 2 puffs into the lungs every 4 hours as needed for shortness of breath / dyspnea or wheezing  Qty: 1 Inhaler, Refills: 1    Associated Diagnoses: COPD exacerbation (H)      albuterol (PROVENTIL) (2.5 MG/3ML) 0.083% neb solution Take 1 vial (2.5 mg) by nebulization every 4 hours as needed for wheezing  Qty: 50 mL, Refills: 0    Associated Diagnoses: Acute bronchitis, unspecified organism      aspirin (ASA) 81 MG chewable tablet Take 81 mg by mouth daily      atorvastatin (LIPITOR) 40 MG tablet Take 40 mg by mouth daily       buprenorphine-naloxone (SUBOXONE) 8-2 MG SUBL sublingual tablet Place 1 tablet under the tongue 2 times daily       carvedilol (COREG) 6.25 MG tablet Take 6.25 mg by mouth 2 times daily (with meals)      fluticasone-vilanterol (BREO ELLIPTA) 200-25 MCG/INH inhaler Inhale 1 puff into the lungs daily      lisinopril (ZESTRIL) 10 MG tablet Take 10 mg by mouth daily       mirtazapine (REMERON) 45 MG tablet Take 45 mg by mouth At Bedtime       multivitamin w/minerals (THERA-VIT-M) tablet Take 1 tablet by mouth daily      naloxone (NARCAN) 4 MG/0.1ML nasal spray Spray 4 mg into one nostril alternating nostrils once as needed for opioid reversal every 2-3 minutes until assistance arrives      pantoprazole (PROTONIX) 40 MG EC tablet Take 40 mg by mouth daily      tiotropium (SPIRIVA RESPIMAT) 2.5  MCG/ACT inhaler Inhale 2 puffs into the lungs daily           Allergies   Allergies   Allergen Reactions     Ibuprofen Nausea and Vomiting     Data   Most Recent 3 CBC's:Recent Labs   Lab Test 12/08/21  1049 11/19/21  0952 11/05/21  0153   WBC 7.1 22.2* 6.9   HGB 12.3* 13.8 11.8*   MCV 94 95 96    188 177      Most Recent 3 BMP's:  Recent Labs   Lab Test 12/09/21  1103 12/08/21  1049 11/19/21  0952 11/05/21  0153   NA  --  136 136 135   POTASSIUM  --  4.9 4.2 4.3   CHLORIDE  --  104 103 104   CO2  --  27 30 30   BUN  --  7 10 15   CR 0.61* 0.76 0.78 0.81   ANIONGAP  --  5 3 1*   RL  --  9.5 8.9 8.1*   GLC  --  119* 164* 109*     Most Recent 2 LFT's:  Recent Labs   Lab Test 12/08/21  1049 11/19/21  0952   AST 31 59*   ALT 31 50   ALKPHOS 129 121   BILITOTAL 0.7 0.8     Most Recent INR's and Anticoagulation Dosing History:  Anticoagulation Dose History    There is no flowsheet data to display.       Most Recent 3 Troponin's:  Recent Labs   Lab Test 11/19/21  0952 11/05/21  0153 10/10/21  2238 07/29/21  2322 01/06/21  0834 12/16/20  1128 06/15/17  0750   TROPI  --   --   --   --  0.023 <0.015 <0.015  The 99th percentile for upper reference range is 0.045 ug/L.  Troponin values in   the range of 0.045 - 0.120 ug/L may be associated with risks of adverse   clinical events.     TROPONIN <0.015 <0.015 <0.015   < >  --   --   --     < > = values in this interval not displayed.     Most Recent Cholesterol Panel:No lab results found.  Most Recent 6 Bacteria Isolates From Any Culture (See EPIC Reports for Culture Details):No lab results found.  Most Recent TSH, T4 and A1c Labs:No lab results found.  Results for orders placed or performed during the hospital encounter of 12/08/21   XR Chest Port 1 View    Narrative    CHEST ONE VIEW PORTABLE  12/8/2021 11:24 AM     HISTORY: Shortness of breath.    COMPARISON: Chest x-ray on 11/19/2021.      Impression    IMPRESSION: Single AP view of the chest was  obtained.  Cardiomediastinal silhouette is within normal limits. Medial right  upper lobe/perihilar pulmonary opacity, indeterminate, could represent  infection. No significant pleural effusion or pneumothorax.    TOM DAMIAN MD         SYSTEM ID:  [id number]

## 2021-12-10 NOTE — PROGRESS NOTES
Care Management Discharge Note    Discharge Date: 12/10/2021       Discharge Disposition: Group Home    Discharge Services:      Discharge DME: Oxygen (Possibly O2)    Discharge Transportation: other (see comments) (Group home staff will provide)    Private pay costs discussed: Not applicable     Patient/family educated on Medicare website which has current facility and service quality ratings: no    Education Provided on the Discharge Plan:    Persons Notified of Discharge Plans: , nursing  Patient/Family in Agreement with the Plan:  yes    Handoff Referral Completed: No    Additional Information:  Patient ready for discharge today.  Spoke with Lux at  ([phone]).  He requested orders and discharge summary be faxed to [phone].   staff will transport him home. He would like to be notified when Gerson ready to leave and they will come as it takes them about 10 minutes.    Orders faxed to .  RN will contact Lux when patient is ready for  to pick him up.    Akua Reddy RN

## 2021-12-10 NOTE — PLAN OF CARE
Physical Therapy Discharge Summary    Reason for therapy discharge:    Discharged to home with home therapy.    Progress towards therapy goal(s). See goals on Care Plan in [facility] electronic health record for goal details.  Goals partially met.  Barriers to achieving goals:   discharge from facility.    Therapy recommendation(s):    Continued therapy is recommended.  Rationale/Recommendations:  impaired functional mobility.        Carolee Marin, PT

## 2021-12-10 NOTE — PLAN OF CARE
Discharge    Patient discharged to group home with group home staff  Listed belongings gathered and given to patient (including from security/pharmacy). Yes  Care Plan and Patient education resolved: yes  Prescriptions if needed, hard copies sent with patient  NA  Medication Bin checked and emptied on discharge yes  SW/care coordinator/charge RN aware of discharge: yes   Given discharge  instructions, belongings and discharged to group home at 1300, patient seems comfortable at the time of discharge..

## 2021-12-10 NOTE — PLAN OF CARE
"Summary: COPD exacerbation; CAP  DATE & TIME:12/9/21 0961-8445  Cognitive Concerns/ Orientation : A&Ox4, calm and cooperative, garbled speech    BEHAVIOR & AGGRESSION TOOL COLOR: Green  ABNL VS/O2: VSS on RA  MOBILITY: IND in room   PAIN MANAGMENT: Denies  DIET: Regular- good appetite  BOWEL/BLADDER:  continent up to BR  ABNL LAB/BG: creat 0.61  DRAIN/DEVICES: PIV/SL, IV antibiotics. Pt states \"it stings a little at first, but then it's ok\"- may need a new IV tomorrow.  TELEMETRY RHYTHM: NSR  SKIN: Scattered scabs otherwise WNL, nicotine patch to LUE   TESTS/PROCEDURES: nothing this shift  D/C DAY/GOALS/PLACE: pending improvement   OTHER IMPORTANT INFO: Home inhaler in pharmacy. Influenza/RSV panel negative- out of precautions. Sputum sample resulted with growth- see results.  "

## 2021-12-10 NOTE — PLAN OF CARE
Summary: COPD exacerbation; CAP  DATE & TIME:12/9/21-12/10/21 1109-2712  Cognitive Concerns/ Orientation : A&Ox4, calm and cooperative, garbled speech    BEHAVIOR & AGGRESSION TOOL COLOR: Green  ABNL VS/O2: VSS on RA  MOBILITY: IND in room, steady  PAIN MANAGMENT: Denies  DIET: Regular  BOWEL/BLADDER:  continent up to BR  ABNL LAB/BG: N/A  DRAIN/DEVICES: PIV SL  TELEMETRY RHYTHM: NSR  SKIN: Scattered scabs otherwise WNL  TESTS/PROCEDURES: N/A  D/C DAY/GOALS/PLACE: pending improvement   OTHER IMPORTANT INFO: Home inhaler in pharmacy. Pt removed nicotine patch this shift, requesting a new one in AM.

## 2021-12-11 DIAGNOSIS — Z71.89 OTHER SPECIFIED COUNSELING: ICD-10-CM

## 2021-12-11 LAB
BACTERIA SPT CULT: ABNORMAL
BACTERIA SPT CULT: ABNORMAL
GRAM STAIN RESULT: ABNORMAL

## 2021-12-12 ENCOUNTER — PATIENT OUTREACH (OUTPATIENT)
Dept: CARE COORDINATION | Facility: CLINIC | Age: 61
End: 2021-12-12
Payer: MEDICARE

## 2021-12-12 NOTE — PROGRESS NOTES
Clinic Care Coordination Contact    Background: Care Coordination referral placed from [facility] discharge report for reason of patient meeting criteria for a TCM outreach call by [facility] team.    Assessment: Upon chart review, [facility] Team member will cancel/close the referral for TCM outreach due to reason below:    Patient has discharged to a Group home, Memory Care or Nursing Home    Plan: Care Coordination referral for TCM outreach canceled.    Heather Cannon MA  [facility], [facility]

## 2021-12-13 LAB
BACTERIA BLD CULT: NO GROWTH
BACTERIA BLD CULT: NO GROWTH

## 2022-03-05 ENCOUNTER — HEALTH MAINTENANCE LETTER (OUTPATIENT)
Age: 62
End: 2022-03-05

## 2022-07-27 ENCOUNTER — HOSPITAL ENCOUNTER (EMERGENCY)
Facility: CLINIC | Age: 62
Discharge: HOME | End: 2022-07-27
Attending: PHYSICIAN ASSISTANT | Admitting: PHYSICIAN ASSISTANT
Payer: MEDICARE

## 2022-07-27 ENCOUNTER — APPOINTMENT (OUTPATIENT)
Dept: ULTRASOUND IMAGING | Facility: CLINIC | Age: 62
End: 2022-07-27
Attending: PHYSICIAN ASSISTANT
Payer: MEDICARE

## 2022-07-27 VITALS
HEIGHT: 69 IN | OXYGEN SATURATION: 97 % | RESPIRATION RATE: 20 BRPM | HEART RATE: 86 BPM | WEIGHT: 130 LBS | BODY MASS INDEX: 19.26 KG/M2 | TEMPERATURE: 97.7 F

## 2022-07-27 DIAGNOSIS — M79.662 PAIN OF LEFT LOWER LEG: ICD-10-CM

## 2022-07-27 PROCEDURE — 93971 EXTREMITY STUDY: CPT | Mod: LT

## 2022-07-27 PROCEDURE — 99284 EMERGENCY DEPT VISIT MOD MDM: CPT | Mod: 25

## 2022-07-27 ASSESSMENT — ENCOUNTER SYMPTOMS
FEVER: 0
SHORTNESS OF BREATH: 0
CHILLS: 0

## 2022-07-27 NOTE — ED TRIAGE NOTES
Woke up with lt lower leg pain, states it feels swollen, no injury     Triage Assessment     Row Name 07/27/22 9970       Triage Assessment (Adult)    Airway WDL WDL       Cardiac WDL    Cardiac WDL WDL       Cognitive/Neuro/Behavioral WDL    Cognitive/Neuro/Behavioral WDL WDL

## 2022-07-27 NOTE — DISCHARGE INSTRUCTIONS
There is no evidence for a blood clot to your leg. I have lower suspicion for a fracture at this time based upon what you have told me. There is no evidence for infection to the leg. Follow up with your primary doctor with persistent symptoms.

## 2022-07-27 NOTE — ED PROVIDER NOTES
"  History   Chief Complaint:  Leg Pain     The history is provided by the patient.      Gerson Pearson is a 62 year old male on O2 with history of COPD, hyperlipidemia, hypertension, cardiomyopathy, NSTEMI, and stoke who presents with pain and swelling to the left calf since onset 6 days ago.  The pain and swelling increases when he wakes up. He denies swelling to the right leg, any recent falls or trauma, as well as fever, chills, chest pain, or worsening shortness of breath.    Review of Systems   Constitutional: Negative for chills and fever.   Respiratory: Negative for shortness of breath.    Cardiovascular: Positive for leg swelling (and pain to left calf). Negative for chest pain.   All other systems reviewed and are negative.      Allergies:  Ibuprofen    Medications:  Atorvastatin   Suboxone  Carvedilol  Lisinopril   Mirtazapine   Pantoprazole  Prednisone  Amlodipine   Bupropion   Nicotine   Hydroxyzine     Past Medical History:     Vitamin D deficiency  COPD   Hypertension  Cardiomyopathy  NSTEMI  Anemia  Esophageal candidiasis   Hyperlipidemia  Stoke  Polysubstance abuse  Depression  Insomnia  Eye inury  Anisocoria     Past Surgical History:    HC PCI     Family History:    Diabetes     Social History:  The patient presents alone.  The patient presents in a private vehicle.  PCP: Alex Wynn PA-C     Physical Exam     Patient Vitals for the past 24 hrs:   Temp Pulse Resp SpO2 Height Weight   07/27/22 1346 97.7  F (36.5  C) 86 20 97 % 1.753 m (5' 9\") 59 kg (130 lb)       Physical Exam  Constitutional: Pleasant. Cooperative.   Eyes: Pupils equally round and reactive  HENT: Head is normal in appearance. Oropharynx is normal with moist mucus membranes.  Cardiovascular: Regular rate and rhythm and without murmurs.  Respiratory: Normal respiratory effort, lungs are clear bilaterally.  Musculoskeletal: No asymmetry of the lower extremities. TTP to left gastrocnemius muscle. Full ROM of bilateral lower " extremities. 2+ DP pulses.  Skin: Normal, without rash. No erythema or warmth.  Neurologic: Cranial nerves grossly intact, normal cognition, no focal deficits. Alert and oriented x 3. Sensation intact to bilateral lower extremities.  Psychiatric: Normal affect.  Nursing notes and vital signs reviewed.     Emergency Department Course     Imaging:  US Lower Extremity Venous Duplex Left   Final Result   IMPRESSION:    The left lower extremity is negative for deep venous thrombosis.      ANNA RAUSCH MD            SYSTEM ID:  [id number]        Report per radiology    Emergency Department Course:    Reviewed:  I reviewed nursing notes, vitals, past medical history and Care Everywhere    Assessments:  1407 I obtained history and examined the patient as noted above.   1521 I rechecked the patient and explained findings.     Disposition:  The patient was discharged to home.     Impression & Plan     Medical Decision Making:  Gerson Pearson is a 62 year old male who presents to the emergency department for evaluation of left leg pain and swelling.  See HPI as above for additional details.  Vitals and physical exam as above.  No swelling present on my exam.  Differential is broad include DVT, Baker's cyst, muscle strain, fracture, cellulitis, among others.  Ultrasound obtained was negative for acute vascular abnormality.  No falls or trauma to suggest for fracture, and patient has no bony tenderness.  No skin changes to suggest for cellulitis.  I suspect that this is muscle strain.  Discussed conservative cares.  [facility] patient was safe for discharge to home. Discussed reasons to return. All questions answered. Patient discharged to home in stable condition.    Diagnosis:    ICD-10-CM    1. Pain of left lower leg  M79.662        Discharge Medications:  New Prescriptions    No medications on file       Scribe Disclosure:  Kaye DE LA CRUZ, am serving as a scribe at 2:07 PM on 7/27/2022 to document services personally performed  by Ketan Najera PA-C based on my observations and the provider's statements to me.     This record was created at least in part using electronic voice recognition software, so please excuse any typographical errors.         Ketan Najera PA-C  07/27/22 0197

## 2022-11-20 ENCOUNTER — HEALTH MAINTENANCE LETTER (OUTPATIENT)
Age: 62
End: 2022-11-20

## 2022-12-06 ENCOUNTER — OFFICE VISIT (OUTPATIENT)
Dept: URGENT CARE | Facility: URGENT CARE | Age: 62
End: 2022-12-06
Payer: MEDICARE

## 2022-12-06 VITALS
OXYGEN SATURATION: 95 % | DIASTOLIC BLOOD PRESSURE: 89 MMHG | SYSTOLIC BLOOD PRESSURE: 155 MMHG | HEART RATE: 66 BPM | TEMPERATURE: 98.4 F

## 2022-12-06 DIAGNOSIS — H11.31 SUBCONJUNCTIVAL HEMORRHAGE, NON-TRAUMATIC, RIGHT: Primary | ICD-10-CM

## 2022-12-06 DIAGNOSIS — I10 ESSENTIAL HYPERTENSION: ICD-10-CM

## 2022-12-06 DIAGNOSIS — H57.89 RED EYE: ICD-10-CM

## 2022-12-06 PROCEDURE — 99203 OFFICE O/P NEW LOW 30 MIN: CPT | Performed by: PHYSICIAN ASSISTANT

## 2022-12-06 NOTE — PATIENT INSTRUCTIONS
Left eye is dilated right is constricted.     Per ophthalmology note 7/11/22  Dilated left pupil due to trauma  left eye traumatic iris sphincter tear 1990

## 2022-12-06 NOTE — PROGRESS NOTES
Patient presents with:  Eye Problem: Eye is red, said he felt something pop and he thinks it was a vein, isnt affecting vision as of yet      Left eye is dilated right is constricted.     Per ophthalmology note 7/11/22  Dilated left pupil due to trauma  left eye traumatic iris sphincter tear 1990               SUBJECTIVE:   Gerson Pearson is a 62 year old male who presents today with a red eye.  Denies any pain or vision changes.  States that he could feel something in the eye happening when he awoke today.    Denies any trauma.      Left eye is dilated right is constricted, per patient this is chronic.      Per ophthalmology note 7/11/22:  Dilated left pupil due to trauma  left eye traumatic iris sphincter tear 1990     He does endorse htn, but states that he is only a few weeks into taking a new BP med.     Past Medical History:   Diagnosis Date     Chronic back pain      COPD (chronic obstructive pulmonary disease) (H)      Depressive disorder      Myocardial infarction (H)          Current Outpatient Medications   Medication Sig Dispense Refill     Multiple Vitamins-Iron (DAILY-CANDACE/IRON/BETA-CAROTENE) TABS TAKE 1 TABLET BY MOUTH DAILY. (Patient not taking: Reported on 10/19/2020) 30 tablet 7     Social History     Tobacco Use     Smoking status: Never Smoker     Smokeless tobacco: Never Used   Substance Use Topics     Alcohol use: Not on file     Family History   Problem Relation Age of Onset     Diabetes Mother      Diabetes Father          ROS:    10 point ROS of systems including Constitutional, Eyes, Respiratory, Cardiovascular, Gastroenterology, Genitourinary, Integumentary, Muscularskeletal, Psychiatric ,neurological were all negative except for pertinent positives noted in my HPI       OBJECTIVE:  BP (!) 155/89 (BP Location: Right arm, Patient Position: Sitting, Cuff Size: Adult Regular)   Pulse 66   Temp 98.4  F (36.9  C) (Oral)   SpO2 95%   Physical Exam:  GENERAL APPEARANCE: healthy, alert and no  distress  EYES: EOMI,  conjunctiva clear on left.  Blood in medial aspect of right conjunctiva.  Globe is nontender.  Right pupil reacts to light appropriately, left pupil is statically dilatated.    RESP: lungs clear to auscultation - no rales, rhonchi or wheezes  CV: regular rates and rhythm, normal S1 S2, no murmur noted  NEURO: Normal strength and tone, sensory exam grossly normal,  normal speech and mentation  SKIN: no suspicious lesions or rashes

## 2023-04-15 ENCOUNTER — HEALTH MAINTENANCE LETTER (OUTPATIENT)
Age: 63
End: 2023-04-15

## 2023-07-15 ENCOUNTER — HOSPITAL ENCOUNTER (OUTPATIENT)
Facility: CLINIC | Age: 63
Setting detail: OBSERVATION
Discharge: NURSING, REHAB OR LONG-TERM CARE | End: 2023-07-16
Attending: STUDENT IN AN ORGANIZED HEALTH CARE EDUCATION/TRAINING PROGRAM | Admitting: HOSPITALIST
Payer: MEDICARE

## 2023-07-15 ENCOUNTER — APPOINTMENT (OUTPATIENT)
Dept: GENERAL RADIOLOGY | Facility: CLINIC | Age: 63
End: 2023-07-15
Attending: STUDENT IN AN ORGANIZED HEALTH CARE EDUCATION/TRAINING PROGRAM
Payer: MEDICARE

## 2023-07-15 DIAGNOSIS — J20.9 ACUTE BRONCHITIS, UNSPECIFIED ORGANISM: ICD-10-CM

## 2023-07-15 DIAGNOSIS — J44.9 CHRONIC OBSTRUCTIVE PULMONARY DISEASE, UNSPECIFIED COPD TYPE (H): Primary | ICD-10-CM

## 2023-07-15 DIAGNOSIS — J44.1 COPD EXACERBATION (H): ICD-10-CM

## 2023-07-15 LAB
ANION GAP SERPL CALCULATED.3IONS-SCNC: 9 MMOL/L (ref 7–15)
ATRIAL RATE - MUSE: 95 BPM
BASOPHILS # BLD AUTO: 0.1 10E3/UL (ref 0–0.2)
BASOPHILS NFR BLD AUTO: 1 %
BUN SERPL-MCNC: 6.1 MG/DL (ref 8–23)
CALCIUM SERPL-MCNC: 8.8 MG/DL (ref 8.8–10.2)
CHLORIDE SERPL-SCNC: 105 MMOL/L (ref 98–107)
CREAT SERPL-MCNC: 0.82 MG/DL (ref 0.67–1.17)
DEPRECATED HCO3 PLAS-SCNC: 26 MMOL/L (ref 22–29)
DIASTOLIC BLOOD PRESSURE - MUSE: NORMAL MMHG
EOSINOPHIL # BLD AUTO: 0.3 10E3/UL (ref 0–0.7)
EOSINOPHIL NFR BLD AUTO: 4 %
ERYTHROCYTE [DISTWIDTH] IN BLOOD BY AUTOMATED COUNT: 13.5 % (ref 10–15)
GFR SERPL CREATININE-BSD FRML MDRD: >90 ML/MIN/1.73M2
GLUCOSE SERPL-MCNC: 173 MG/DL (ref 70–99)
HCO3 BLDV-SCNC: 31 MMOL/L (ref 21–28)
HCO3 BLDV-SCNC: 32 MMOL/L (ref 21–28)
HCT VFR BLD AUTO: 38.6 % (ref 40–53)
HGB BLD-MCNC: 11.8 G/DL (ref 13.3–17.7)
HOLD SPECIMEN: NORMAL
HOLD SPECIMEN: NORMAL
IMM GRANULOCYTES # BLD: 0 10E3/UL
IMM GRANULOCYTES NFR BLD: 0 %
INTERPRETATION ECG - MUSE: NORMAL
LACTATE BLD-SCNC: 1.4 MMOL/L
LACTATE BLD-SCNC: 1.8 MMOL/L
LYMPHOCYTES # BLD AUTO: 2.3 10E3/UL (ref 0.8–5.3)
LYMPHOCYTES NFR BLD AUTO: 30 %
MCH RBC QN AUTO: 28.6 PG (ref 26.5–33)
MCHC RBC AUTO-ENTMCNC: 30.6 G/DL (ref 31.5–36.5)
MCV RBC AUTO: 94 FL (ref 78–100)
MONOCYTES # BLD AUTO: 0.7 10E3/UL (ref 0–1.3)
MONOCYTES NFR BLD AUTO: 9 %
NEUTROPHILS # BLD AUTO: 4.4 10E3/UL (ref 1.6–8.3)
NEUTROPHILS NFR BLD AUTO: 56 %
NRBC # BLD AUTO: 0 10E3/UL
NRBC BLD AUTO-RTO: 0 /100
P AXIS - MUSE: 74 DEGREES
PCO2 BLDV: 79 MM HG (ref 40–50)
PCO2 BLDV: 94 MM HG (ref 40–50)
PH BLDV: 7.12 [PH] (ref 7.32–7.43)
PH BLDV: 7.22 [PH] (ref 7.32–7.43)
PLATELET # BLD AUTO: 185 10E3/UL (ref 150–450)
PO2 BLDV: 37 MM HG (ref 25–47)
PO2 BLDV: 60 MM HG (ref 25–47)
POTASSIUM SERPL-SCNC: 4.4 MMOL/L (ref 3.4–5.3)
PR INTERVAL - MUSE: 140 MS
QRS DURATION - MUSE: 84 MS
QT - MUSE: 350 MS
QTC - MUSE: 439 MS
R AXIS - MUSE: 54 DEGREES
RBC # BLD AUTO: 4.12 10E6/UL (ref 4.4–5.9)
SAO2 % BLDV: 56 % (ref 94–100)
SAO2 % BLDV: 79 % (ref 94–100)
SARS-COV-2 RNA RESP QL NAA+PROBE: NEGATIVE
SODIUM SERPL-SCNC: 140 MMOL/L (ref 136–145)
SYSTOLIC BLOOD PRESSURE - MUSE: NORMAL MMHG
T AXIS - MUSE: 76 DEGREES
VENTRICULAR RATE- MUSE: 95 BPM
WBC # BLD AUTO: 7.8 10E3/UL (ref 4–11)

## 2023-07-15 PROCEDURE — 99292 CRITICAL CARE ADDL 30 MIN: CPT

## 2023-07-15 PROCEDURE — 82803 BLOOD GASES ANY COMBINATION: CPT

## 2023-07-15 PROCEDURE — 250N000011 HC RX IP 250 OP 636: Performed by: STUDENT IN AN ORGANIZED HEALTH CARE EDUCATION/TRAINING PROGRAM

## 2023-07-15 PROCEDURE — 250N000009 HC RX 250

## 2023-07-15 PROCEDURE — 71045 X-RAY EXAM CHEST 1 VIEW: CPT

## 2023-07-15 PROCEDURE — 999N000157 HC STATISTIC RCP TIME EA 10 MIN

## 2023-07-15 PROCEDURE — G0378 HOSPITAL OBSERVATION PER HR: HCPCS

## 2023-07-15 PROCEDURE — 250N000013 HC RX MED GY IP 250 OP 250 PS 637: Performed by: HOSPITALIST

## 2023-07-15 PROCEDURE — 83605 ASSAY OF LACTIC ACID: CPT

## 2023-07-15 PROCEDURE — 94640 AIRWAY INHALATION TREATMENT: CPT

## 2023-07-15 PROCEDURE — 94660 CPAP INITIATION&MGMT: CPT

## 2023-07-15 PROCEDURE — 99222 1ST HOSP IP/OBS MODERATE 55: CPT | Mod: AI | Performed by: HOSPITALIST

## 2023-07-15 PROCEDURE — 99291 CRITICAL CARE FIRST HOUR: CPT | Mod: 25

## 2023-07-15 PROCEDURE — 36415 COLL VENOUS BLD VENIPUNCTURE: CPT | Performed by: STUDENT IN AN ORGANIZED HEALTH CARE EDUCATION/TRAINING PROGRAM

## 2023-07-15 PROCEDURE — 93005 ELECTROCARDIOGRAM TRACING: CPT

## 2023-07-15 PROCEDURE — 96365 THER/PROPH/DIAG IV INF INIT: CPT

## 2023-07-15 PROCEDURE — 85025 COMPLETE CBC W/AUTO DIFF WBC: CPT | Performed by: STUDENT IN AN ORGANIZED HEALTH CARE EDUCATION/TRAINING PROGRAM

## 2023-07-15 PROCEDURE — 250N000011 HC RX IP 250 OP 636: Mod: JZ

## 2023-07-15 PROCEDURE — 87635 SARS-COV-2 COVID-19 AMP PRB: CPT | Performed by: HOSPITALIST

## 2023-07-15 PROCEDURE — 250N000013 HC RX MED GY IP 250 OP 250 PS 637: Performed by: INTERNAL MEDICINE

## 2023-07-15 PROCEDURE — 250N000009 HC RX 250: Performed by: HOSPITALIST

## 2023-07-15 PROCEDURE — 80048 BASIC METABOLIC PNL TOTAL CA: CPT | Performed by: STUDENT IN AN ORGANIZED HEALTH CARE EDUCATION/TRAINING PROGRAM

## 2023-07-15 RX ORDER — ONDANSETRON 2 MG/ML
4 INJECTION INTRAMUSCULAR; INTRAVENOUS EVERY 6 HOURS PRN
Status: DISCONTINUED | OUTPATIENT
Start: 2023-07-15 | End: 2023-07-16 | Stop reason: HOSPADM

## 2023-07-15 RX ORDER — ACETAMINOPHEN 650 MG/1
650 SUPPOSITORY RECTAL EVERY 6 HOURS PRN
Status: DISCONTINUED | OUTPATIENT
Start: 2023-07-15 | End: 2023-07-16 | Stop reason: HOSPADM

## 2023-07-15 RX ORDER — QUETIAPINE FUMARATE 25 MG/1
25 TABLET, FILM COATED ORAL EVERY MORNING
COMMUNITY
End: 2023-09-11

## 2023-07-15 RX ORDER — DOCUSATE SODIUM 100 MG/1
100 CAPSULE, LIQUID FILLED ORAL DAILY
COMMUNITY
End: 2023-07-28

## 2023-07-15 RX ORDER — BUPROPION HYDROCHLORIDE 300 MG/1
300 TABLET ORAL EVERY MORNING
COMMUNITY
End: 2023-07-28

## 2023-07-15 RX ORDER — HYDROXYZINE HYDROCHLORIDE 25 MG/1
25 TABLET, FILM COATED ORAL EVERY 6 HOURS PRN
Status: DISCONTINUED | OUTPATIENT
Start: 2023-07-15 | End: 2023-07-16 | Stop reason: HOSPADM

## 2023-07-15 RX ORDER — AZITHROMYCIN 500 MG/1
500 INJECTION, POWDER, LYOPHILIZED, FOR SOLUTION INTRAVENOUS ONCE
Status: COMPLETED | OUTPATIENT
Start: 2023-07-15 | End: 2023-07-15

## 2023-07-15 RX ORDER — BENZONATATE 100 MG/1
100 CAPSULE ORAL 3 TIMES DAILY PRN
Status: ON HOLD | COMMUNITY
End: 2023-07-15

## 2023-07-15 RX ORDER — BISACODYL 10 MG
10 SUPPOSITORY, RECTAL RECTAL DAILY PRN
Status: DISCONTINUED | OUTPATIENT
Start: 2023-07-15 | End: 2023-07-16 | Stop reason: HOSPADM

## 2023-07-15 RX ORDER — HYDRALAZINE HYDROCHLORIDE 25 MG/1
25 TABLET, FILM COATED ORAL 3 TIMES DAILY
Status: ON HOLD | COMMUNITY
End: 2023-09-17

## 2023-07-15 RX ORDER — LIDOCAINE 40 MG/G
CREAM TOPICAL
Status: DISCONTINUED | OUTPATIENT
Start: 2023-07-15 | End: 2023-07-16 | Stop reason: HOSPADM

## 2023-07-15 RX ORDER — CARVEDILOL 25 MG/1
25 TABLET ORAL 2 TIMES DAILY
COMMUNITY

## 2023-07-15 RX ORDER — ACETAMINOPHEN 325 MG/1
650 TABLET ORAL EVERY 6 HOURS PRN
Status: DISCONTINUED | OUTPATIENT
Start: 2023-07-15 | End: 2023-07-16 | Stop reason: HOSPADM

## 2023-07-15 RX ORDER — GUAIFENESIN AND DEXTROMETHORPHAN HYDROBROMIDE 100; 10 MG/5ML; MG/5ML
10 SOLUTION ORAL EVERY 4 HOURS PRN
Status: ON HOLD | COMMUNITY
End: 2023-07-15

## 2023-07-15 RX ORDER — HYDROXYZINE HYDROCHLORIDE 25 MG/1
50 TABLET, FILM COATED ORAL EVERY 6 HOURS PRN
Status: DISCONTINUED | OUTPATIENT
Start: 2023-07-15 | End: 2023-07-16 | Stop reason: HOSPADM

## 2023-07-15 RX ORDER — POLYETHYLENE GLYCOL 3350 17 G/17G
1 POWDER, FOR SOLUTION ORAL DAILY PRN
COMMUNITY

## 2023-07-15 RX ORDER — MAGNESIUM SULFATE HEPTAHYDRATE 40 MG/ML
INJECTION, SOLUTION INTRAVENOUS
Status: COMPLETED
Start: 2023-07-15 | End: 2023-07-15

## 2023-07-15 RX ORDER — POLYETHYLENE GLYCOL 3350 17 G/17G
17 POWDER, FOR SOLUTION ORAL DAILY PRN
Status: DISCONTINUED | OUTPATIENT
Start: 2023-07-15 | End: 2023-07-16 | Stop reason: HOSPADM

## 2023-07-15 RX ORDER — GABAPENTIN 300 MG/1
CAPSULE ORAL
COMMUNITY
End: 2023-07-28

## 2023-07-15 RX ORDER — IPRATROPIUM BROMIDE AND ALBUTEROL SULFATE 2.5; .5 MG/3ML; MG/3ML
3 SOLUTION RESPIRATORY (INHALATION)
Status: DISCONTINUED | OUTPATIENT
Start: 2023-07-15 | End: 2023-07-16 | Stop reason: HOSPADM

## 2023-07-15 RX ORDER — ALBUTEROL SULFATE 0.83 MG/ML
2.5 SOLUTION RESPIRATORY (INHALATION)
Status: DISCONTINUED | OUTPATIENT
Start: 2023-07-15 | End: 2023-07-16 | Stop reason: HOSPADM

## 2023-07-15 RX ORDER — QUETIAPINE FUMARATE 25 MG/1
12.5 TABLET, FILM COATED ORAL
COMMUNITY
End: 2023-09-11

## 2023-07-15 RX ORDER — AZITHROMYCIN 250 MG/1
250 TABLET, FILM COATED ORAL DAILY
Status: DISCONTINUED | OUTPATIENT
Start: 2023-07-16 | End: 2023-07-16 | Stop reason: HOSPADM

## 2023-07-15 RX ORDER — BUPRENORPHINE AND NALOXONE 8; 2 MG/1; MG/1
1 FILM, SOLUBLE BUCCAL; SUBLINGUAL ONCE
Status: COMPLETED | OUTPATIENT
Start: 2023-07-15 | End: 2023-07-15

## 2023-07-15 RX ORDER — HYDROXYZINE PAMOATE 25 MG/1
1-2 CAPSULE ORAL 3 TIMES DAILY PRN
Status: ON HOLD | COMMUNITY
End: 2023-07-15

## 2023-07-15 RX ORDER — AMOXICILLIN 250 MG
1 CAPSULE ORAL 2 TIMES DAILY PRN
Status: DISCONTINUED | OUTPATIENT
Start: 2023-07-15 | End: 2023-07-16 | Stop reason: HOSPADM

## 2023-07-15 RX ORDER — IPRATROPIUM BROMIDE AND ALBUTEROL SULFATE 2.5; .5 MG/3ML; MG/3ML
SOLUTION RESPIRATORY (INHALATION)
Status: COMPLETED
Start: 2023-07-15 | End: 2023-07-15

## 2023-07-15 RX ORDER — METHYLPREDNISOLONE SODIUM SUCCINATE 125 MG/2ML
INJECTION, POWDER, LYOPHILIZED, FOR SOLUTION INTRAMUSCULAR; INTRAVENOUS
Status: COMPLETED
Start: 2023-07-15 | End: 2023-07-15

## 2023-07-15 RX ORDER — PREDNISONE 20 MG/1
40 TABLET ORAL DAILY
Status: DISCONTINUED | OUTPATIENT
Start: 2023-07-16 | End: 2023-07-16 | Stop reason: HOSPADM

## 2023-07-15 RX ORDER — ACETAMINOPHEN 325 MG/1
325 TABLET ORAL EVERY 6 HOURS PRN
COMMUNITY

## 2023-07-15 RX ORDER — ONDANSETRON 4 MG/1
4 TABLET, ORALLY DISINTEGRATING ORAL EVERY 6 HOURS PRN
Status: DISCONTINUED | OUTPATIENT
Start: 2023-07-15 | End: 2023-07-16 | Stop reason: HOSPADM

## 2023-07-15 RX ORDER — AMOXICILLIN 250 MG
2 CAPSULE ORAL 2 TIMES DAILY PRN
Status: DISCONTINUED | OUTPATIENT
Start: 2023-07-15 | End: 2023-07-16 | Stop reason: HOSPADM

## 2023-07-15 RX ORDER — BUPROPION HYDROCHLORIDE 150 MG/1
150 TABLET, EXTENDED RELEASE ORAL 2 TIMES DAILY
Status: ON HOLD | COMMUNITY
End: 2023-07-15

## 2023-07-15 RX ORDER — BUPRENORPHINE AND NALOXONE 8; 2 MG/1; MG/1
1 FILM, SOLUBLE BUCCAL; SUBLINGUAL 2 TIMES DAILY
COMMUNITY
End: 2023-07-28

## 2023-07-15 RX ORDER — NICOTINE 21 MG/24HR
1 PATCH, TRANSDERMAL 24 HOURS TRANSDERMAL EVERY 24 HOURS
Status: ON HOLD | COMMUNITY
End: 2023-07-15

## 2023-07-15 RX ADMIN — HYDROXYZINE HYDROCHLORIDE 25 MG: 25 TABLET, FILM COATED ORAL at 17:11

## 2023-07-15 RX ADMIN — METHYLPREDNISOLONE SODIUM SUCCINATE 125 MG: 125 INJECTION, POWDER, FOR SOLUTION INTRAMUSCULAR; INTRAVENOUS at 08:18

## 2023-07-15 RX ADMIN — MAGNESIUM SULFATE HEPTAHYDRATE 2 G: 40 INJECTION, SOLUTION INTRAVENOUS at 08:18

## 2023-07-15 RX ADMIN — IPRATROPIUM BROMIDE AND ALBUTEROL SULFATE 3 ML: .5; 3 SOLUTION RESPIRATORY (INHALATION) at 20:26

## 2023-07-15 RX ADMIN — AZITHROMYCIN MONOHYDRATE 500 MG: 500 INJECTION, POWDER, LYOPHILIZED, FOR SOLUTION INTRAVENOUS at 08:39

## 2023-07-15 RX ADMIN — IPRATROPIUM BROMIDE AND ALBUTEROL SULFATE 3 ML: .5; 3 SOLUTION RESPIRATORY (INHALATION) at 16:29

## 2023-07-15 RX ADMIN — BUPRENORPHINE AND NALOXONE 1 FILM: 8; 2 FILM, SOLUBLE BUCCAL; SUBLINGUAL at 22:12

## 2023-07-15 RX ADMIN — ACETAMINOPHEN 650 MG: 325 TABLET, FILM COATED ORAL at 22:04

## 2023-07-15 RX ADMIN — IPRATROPIUM BROMIDE AND ALBUTEROL SULFATE 6 ML: .5; 3 SOLUTION RESPIRATORY (INHALATION) at 08:18

## 2023-07-15 ASSESSMENT — ACTIVITIES OF DAILY LIVING (ADL)
ADLS_ACUITY_SCORE: 35
ADLS_ACUITY_SCORE: 37
ADLS_ACUITY_SCORE: 37
ADLS_ACUITY_SCORE: 35
ADLS_ACUITY_SCORE: 37
ADLS_ACUITY_SCORE: 35

## 2023-07-15 NOTE — ED NOTES
Pt ambulated with walker and oxygen, tolerated fine, sats 88% then increased to 94% on 3L once sitting back in bed.

## 2023-07-15 NOTE — PLAN OF CARE
Goal Outcome Evaluation:      Plan of Care Reviewed With: patient    Overall Patient Progress: improvingOverall Patient Progress: improving     Observation goals  PRIOR TO DISCHARGE        Comments:   -diagnostic tests and consults completed and resulted: Not met   -vital signs normal or at patient baseline: Not met  -dyspnea improved and O2 sats greater than 88% on room air or prior home oxygen levels: Met   Nurse to notify provider when observation goals have been met and patient is ready for discharge

## 2023-07-15 NOTE — ED NOTES
Bed: ST03  Expected date:   Expected time:   Means of arrival:   Comments:  523  63 M diff breathing/altered/BiPap/unable to get /unable to get BP  0807

## 2023-07-15 NOTE — ED PROVIDER NOTES
History     Chief Complaint:  Respiratory Distress    HPI   Gerson Pearson is a 63 year old male with history of COPD, hypertension, ischemic cardiomyopathy, hyperlipidemia, and MI, who presents via EMS from a group home with low oxygen saturations down to 61% on his baseline 3L of oxygen and a nebulizer. EMS state the patient was checked in with by a staff member 2 hours ago and was pronounced okay; EMS note the patient told them he did not feel well upon waking. When EMS arrived at the scene the patient was nearly unresponsive with tightness and wheezing noted. Blood glucose en route was 139 and they do not believe he is a diabetic. Oxygen saturations upon arrival to the ED with BiPAP used has gone up to 100%. Patient states his oxygen saturations are typically at 91-92% and goes up to 95% on nebulizers. Patient denies chest pain but notes chest tightness prior to EMS arrival. Endorses non-productive cough. Endorses recent abdominal pain but denies any at this time. No fever, recent sickness, vomiting, or diarrhea. Endorses an allergy to ibuprofen. Endorses history of heart attack with stent placement. Patient states he still smokes. He is not anticoagulated.    Independent Historian:   Patient and EMS report history as above.    Review of External Notes:   I reviewed a note from 12/10/2021 for COPD exacerbation.    Medications:    Albuterol  Aspirin  Atorvastatin  Suboxone  Carvedilol  Breo ellipta  Lisinopril  Mirtazapine  Multivitamin with minerals  Pantoprazole  Prednisone  Tiotropium  Gabapentin  Nicotine  Hydroxyzine pamoate  Bupropion   Docusate sodium  Polyethylene glycol  Hydralazine  Duoneb    Past Medical History:    Chronic back pain  COPD  Depressive disorder  MI  Hypertension  Ischemic cardiomyopathy  Atherosclerosis of native coronary artery  Stroke  Hyperlipidemia   Polysubstance abuse  Opoid use disorder, severe    Past Surgical History:    Stent placement    Physical Exam     Patient Vitals  "for the past 24 hrs:   BP Temp Temp src Pulse Resp SpO2 Height Weight   07/15/23 1230 (!) 150/91 -- -- 91 15 97 % -- --   07/15/23 1200 (!) 131/98 -- -- 81 12 98 % -- --   07/15/23 1130 (!) 147/106 -- -- 81 12 99 % -- --   07/15/23 1100 (!) 140/96 -- -- 84 12 99 % -- --   07/15/23 1026 -- -- -- -- 28 94 % -- --   07/15/23 1025 -- -- -- 98 24 (!) 88 % -- --   07/15/23 1015 -- -- -- 86 13 100 % -- --   07/15/23 1000 118/85 -- -- 86 11 100 % -- --   07/15/23 0930 125/89 -- -- 92 12 100 % -- --   07/15/23 0845 (!) 138/90 -- -- 80 12 100 % -- --   07/15/23 0830 (!) 142/97 -- -- 93 15 100 % -- --   07/15/23 0823 -- -- -- -- -- 100 % -- --   07/15/23 0820 (!) 146/107 -- -- 97 -- 100 % -- --   07/15/23 0817 -- -- -- 104 -- 100 % -- --   07/15/23 0815 (!) 163/124 97.1  F (36.2  C) Temporal 104 30 -- 1.753 m (5' 9\") 70.9 kg (156 lb 6.4 oz)        Physical Exam  GENERAL: Patient initially came in tachypnic and diaphoretic, sitting upright.  HEAD: Atraumatic.  Neck: No rigidity  CV: Tachycardic and regular, no murmurs rubs or gallops  PULM: Distant breath sounds bilaterally. Bilateral wheezing.  Accessory muscle use.  Speaking in a few word sentences.  ABD: Soft, nontender, nondistended, no guarding  DERM: No rash. Skin warm and dry  EXTREMITY: Moving all extremities without difficulty. No calf tenderness or peripheral edema  VASCULAR: Symmetric pulses bilaterally    Emergency Department Course   ECG  ECG taken at 0824, ECG read at 0825  Normal sinus rhythm  Nonspecific T wave abnormality  Abnormal ECG   Rate 95 bpm. MD interval 140 ms. QRS duration 84 ms. QT/QTc 350/439 ms. P-R-T axes 74 54 76.     Imaging:  XR Chest Port 1 View   Final Result   IMPRESSION: The lungs are hyperinflated. Patchy linear atelectasis and/or scarring in the left mid and lower lung. No focal consolidation, effusion or pneumothorax. Heart size is normal.         Report per radiology    Laboratory:  Labs Ordered and Resulted from Time of ED Arrival to " Time of ED Departure   BASIC METABOLIC PANEL - Abnormal       Result Value    Sodium 140      Potassium 4.4      Chloride 105      Carbon Dioxide (CO2) 26      Anion Gap 9      Urea Nitrogen 6.1 (*)     Creatinine 0.82      Calcium 8.8      Glucose 173 (*)     GFR Estimate >90     CBC WITH PLATELETS AND DIFFERENTIAL - Abnormal    WBC Count 7.8      RBC Count 4.12 (*)     Hemoglobin 11.8 (*)     Hematocrit 38.6 (*)     MCV 94      MCH 28.6      MCHC 30.6 (*)     RDW 13.5      Platelet Count 185      % Neutrophils 56      % Lymphocytes 30      % Monocytes 9      % Eosinophils 4      % Basophils 1      % Immature Granulocytes 0      NRBCs per 100 WBC 0      Absolute Neutrophils 4.4      Absolute Lymphocytes 2.3      Absolute Monocytes 0.7      Absolute Eosinophils 0.3      Absolute Basophils 0.1      Absolute Immature Granulocytes 0.0      Absolute NRBCs 0.0     ISTAT GASES LACTATE VENOUS POCT - Abnormal    Lactic Acid POCT 1.8      Bicarbonate Venous POCT 31 (*)     O2 Sat, Venous POCT 79 (*)     pCO2 Venous POCT 94 (*)     pH Venous POCT 7.12 (*)     pO2 Venous POCT 60 (*)    ISTAT GASES LACTATE VENOUS POCT - Abnormal    Lactic Acid POCT 1.4      Bicarbonate Venous POCT 32 (*)     O2 Sat, Venous POCT 56 (*)     pCO2 Venous POCT 79 (*)     pH Venous POCT 7.22 (*)     pO2 Venous POCT 37       Emergency Department Course & Assessments:       Interventions:  Medications   ipratropium - albuterol 0.5 mg/2.5 mg/3 mL (DUONEB) neb solution 3 mL (has no administration in time range)   predniSONE (DELTASONE) tablet 40 mg (has no administration in time range)   azithromycin (ZITHROMAX) tablet 250 mg (has no administration in time range)   albuterol (PROVENTIL) neb solution 2.5 mg (has no administration in time range)   melatonin tablet 1 mg (has no administration in time range)   ondansetron (ZOFRAN ODT) ODT tab 4 mg (has no administration in time range)     Or   ondansetron (ZOFRAN) injection 4 mg (has no administration in  time range)   lidocaine 1 % 0.1-1 mL (has no administration in time range)   lidocaine (LMX4) cream (has no administration in time range)   sodium chloride (PF) 0.9% PF flush 3 mL (has no administration in time range)   sodium chloride (PF) 0.9% PF flush 3 mL (has no administration in time range)   acetaminophen (TYLENOL) tablet 650 mg (has no administration in time range)     Or   acetaminophen (TYLENOL) Suppository 650 mg (has no administration in time range)   senna-docusate (SENOKOT-S/PERICOLACE) 8.6-50 MG per tablet 1 tablet (has no administration in time range)     Or   senna-docusate (SENOKOT-S/PERICOLACE) 8.6-50 MG per tablet 2 tablet (has no administration in time range)   polyethylene glycol (MIRALAX) Packet 17 g (has no administration in time range)   bisacodyl (DULCOLAX) suppository 10 mg (has no administration in time range)   methylPREDNISolone sodium succinate (solu-MEDROL) 125 mg/2 mL injection (125 mg  $Given 7/15/23 0818)   magnesium sulfate 2 GM/50ML in 50 mL sterile water intermittent infusion (0 g  Stopped 7/15/23 0838)   ipratropium - albuterol 0.5 mg/2.5 mg/3 mL (DUONEB) 0.5-2.5 (3) MG/3ML neb solution (6 mLs  $Given 7/15/23 0818)   azithromycin (ZITHROMAX) 500 mg vial to attach to  mL bag (0 mg Intravenous Stopped 7/15/23 0944)        Assessments:  0810 I entered the patient's room and obtained history.  0855 I rechecked the patient.  0933 I rechecked the patient and explained findings.    Independent Interpretation (X-rays, CTs, rhythm strip):  Reviewed patient's chest X-ray. No pneumothorax or pneumonia noted.    Consultations/Discussion of Management or Tests:  1033 I consulted with Dr. Galloway, hospitalist, regarding the patient's history and presentation here in the emergency department who accepted the patient for admission.        Social Determinants of Health affecting care:   None    Disposition:  The patient was admitted to the hospital under the care of Dr. Galloway.      Impression & Plan    CMS Diagnoses: None    Medical Decision Making:  Symptoms most consistent with acute COPD exacerbation.     Chronic conditions complicating - COPD    DDx considered pneumonia, PE, CHF, anaphylaxis, however evaluation not consistent with these etiologies.    Chest x-ray independently interpreted - negative for acute process.     Patient initially in respiratory distress.  He was transitioned to our BiPAP.  Empirically gave 2 DuoNeb's, 125 mg Solu-Medrol, 2 g of magnesium and 500 mg azithromycin as part of COPD exacerbation treatment.    Initial labs showing CO2 94, improved to 79 30 minutes later.    Basic labs unrevealing.    Patient was taken off BiPAP and he is feeling much improved.  He is able to speak in full sentences.  Does still have evidence of tachypnea and due to the severity of the initial onset, consulted hospitalist who kindly accepted patient for admission.        Diagnosis:    ICD-10-CM    1. COPD exacerbation (H)  J44.1         Scribe Disclosure:  Jerald DE LA CRUZed, am serving as a scribe at 8:21 AM on 7/15/2023 to document services personally performed by Braxton Funes MD based on my observations and the provider's statements to me.   7/15/2023   Braxton Funes MD Foss, Kevin, MD  07/15/23 1875

## 2023-07-15 NOTE — PLAN OF CARE
Mental Status: A&O x4.  Activity/dangle: Not OOB yet  Diet: Tolerating regular diet  Pain: Denies pain  Post/Voiding: Voiding in the urinal.  Tele/Restraints/Iso: Contact iso initiated/ maintained  02/LDA: 3L 02. IV saline locked.  D/C Date: Pending    Observation goals PRIOR TO DISCHARGE  Comments: -diagnostic tests and consults completed and resulted - Not met  -vital signs normal or at patient baseline - Met  -dyspnea improved and O2 sats greater than 88% on room air or prior home oxygen levels - Not met  Nurse to notify provider when observation goals have been met and patient is ready for discharge.

## 2023-07-15 NOTE — ED NOTES
[facility]  ED Nurse Handoff Report    ED Chief complaint: Respiratory Distress      ED Diagnosis:   Final diagnoses:   COPD exacerbation (H)       Code Status: Full Code    Allergies:   Allergies   Allergen Reactions     Ibuprofen Nausea and Vomiting       Patient Story: Gerson Pearson is a 63 year old male with history of COPD, hypertension, ischemic cardiomyopathy, hyperlipidemia, and MI, who presents via EMS from a group home with low oxygen saturations down to 61% on his baseline 3L of oxygen and a nebulizer. EMS state the patient was supposedly checked in with by a staff member 2 hours ago and was pronounced okay; EMS note the patient told them he did not feel well upon waking. When EMS arrived at the scene the patient was nearly unresponsive with tightness and wheezing noted. Blood glucose en route was 139 and they do not believe he is a diabetic. Oxygen saturations upon arrival to the ED with BiPAP used has gone up to 100%. Patient states his oxygen saturations are typically at 91-92% and goes up to 95% on nebulizers. Patient denies chest pain but notes chest tightness prior to EMS arrival. Endorses non-productive cough. Endorses recent abdominal pain but denies any at this time. No fever, recent sickness, vomiting, or diarrhea. Endorses an allergy to ibuprofen. Endorses history of heart attack with stent placement. Patient states he still smokes. He is not anticoagulated. He responded well with bipap on is now on nasal cannula at 3L with sats of 99%.       Treatments and/or interventions provided: antibiotics/steroids  Patient's response to treatments and/or interventions: tbd    To be done/followed up on inpatient unit:  cont to monitor    Does this patient have any cognitive concerns?: group home patient    Activity level - Baseline/Home:  Stand with Assist  Activity Level - Current:   Stand with Assist    Patient's Preferred language: English   Needed?: No    Isolation:  Contact   Infection: MRSA  Patient tested for COVID 19 prior to admission: NO  Bariatric?: No    Vital Signs:   Vitals:    07/15/23 1000 07/15/23 1015 07/15/23 1025 07/15/23 1026   BP: 118/85      BP Location:       Pulse: 86 86 98    Resp: 11 13 24 28   Temp:       TempSrc:       SpO2: 100% 100% (!) 88% 94%   Weight:       Height:           Cardiac Rhythm:     Was the PSS-3 completed:   Yes  What interventions are required if any?               Family Comments: none  OBS brochure/video discussed/provided to patient/family: No              Name of person given brochure if not patient: na              Relationship to patient: na    For the majority of the shift this patient's behavior was Green.   Behavioral interventions performed were na.    ED NURSE PHONE NUMBER: [phone]

## 2023-07-15 NOTE — H&P
[facility]    History and Physical - Hospitalist Service       Date of Admission:  7/15/2023    Assessment & Plan      Gerson Pearson is a 63 year old male admitted on 7/15/2023.  Past history of severe COPD on home oxygen, CAD, CHF, HTN, HLD, opiate use disorder, depression and anxiety who resides in group home who presents with dyspnea due to acute COPD exacerbation.      Acute hypoxic and hypercapnic respiratory failure due to acute COPD exacerbation  Severe COPD on home oxygen  * per EMS oxygen sats in 60's; on Bipap on arrival with diffuse wheezing per report, VBG showing respiratory acidosis - received solumedrol, nebs and magnesium and able to wean to 3L oxygen  * admission CXR negative for infiltrate; he is afebrile without leukocytosis  * continues to smoke and air quality on day prior to and day of admission quite poor  * followed by pulm, Dr. Deysi Vela at [facility]; noted to have severe obstruction with FEV1 22%, though note there is question as to whether requires oxygen at rest (may only need with activity)    - start prednisone 40 mg daily  - scheduled Duonebs  - continue PTA Breo-Ellipta once verified  - hold spiriva with scheduled nebs  - continue azithromycin  - COVID swab  - hold on sputum as cough non-productive, will order if changes  - wean oxygen as able (goal sat 88-92%)    Hx CAD s/p BMS to RCA 6/2020  HFimpEF  HTN  HLD  * TTE 12/2021 with EF 60-65% without WMA (EF 40-45% in 6/2020)  - continue PTA aspirin, carvedilol, lisinopril once verified  - hold statin as Obs    Depression and anxiety  - continue PTA Wellbutrin, hydroxyzine, mirtazapine, gabapentin once verified    Opiate use disorder  - continue PTA Suboxone once verified    Nicotine dependence  * smokes 4-5 cigarettes per day  - declines nicotine replacement    GERD  - hold PTA PPI as Obs    Chronic low back pain  - prn tylenol and hot/cold packs       Diet: Regular Diet Adult Regular   DVT  Prophylaxis: Low Risk/Ambulatory with no VTE prophylaxis indicated  Post Catheter: Not present  Lines: None     Cardiac Monitoring: None  Code Status: Full Code Full code per patient     Clinically Significant Risk Factors Present on Admission                # Drug Induced Platelet Defect: home medication list includes an antiplatelet medication   # Hypertension: Home medication list includes antihypertensive(s)   # Acute Respiratory Failure: based on blood gas results.  Continue supplemental oxygen as needed              Disposition Plan      Expected Discharge Date: 07/16/2023                  Jairo Galloway MD  Hospitalist Service  [facility]  Securely message with [employer] (more info)  Text page via [employer] Paging/Directory     ______________________________________________________________________    Chief Complaint   Shortness of breath    History is obtained from the patient, chart review and discussion with ED provider.     History of Present Illness   Gerson Pearson is a 63 year old male who presents with shortness of breath.  He reports feeling well yesterday with dyspnea at his baseline status.  He awoke this morning feeling short of breath.  Per ED report, his group home reported he appeared to be in his usual state of health when they first checked on him this morning, but upon reassessment about an hour later he was found to be appearing short of breath and confused.      He reports dyspnea onset only this morning with increased wheezing as well as significant cough, though was unproductive and he denies any subjective fever/chills.  Endorses chest tightness and indicates this is similar to prior COPD exacerbations.  Reports taking inhalers as prescribed.  Continues to smoke 4-5 cigarettes daily.  Did spend some time outside yesterday despite the worsening air quality.  Denies COVID or recent URI symptoms or sick contacts.  Denies recent travel; states he is overall sedentary but  does ambulate frequently and denies any lower extremity pain.  Has mild extremity edema which remains at baseline.          Past Medical History    Past Medical History:   Diagnosis Date     CAD (coronary artery disease)     s/p bare metal stent to RCA in 6/2020     Chronic back pain      Chronic systolic congestive heart failure (H)     EF 45% has recovered to 60-65% on echo 12/2021     COPD (chronic obstructive pulmonary disease) (H)      Depressive disorder      Myocardial infarction (H)      Nicotine dependence        Past Surgical History   History reviewed. No pertinent surgical history.    Prior to Admission Medications   Prior to Admission Medications   Prescriptions Last Dose Informant Patient Reported? Taking?   Dextromethorphan-guaiFENesin  MG/5ML syrup   Yes Yes   Sig: Take 10 mLs by mouth every 4 hours as needed for cough   acetaminophen (TYLENOL) 325 MG tablet  at prn Nursing Home Yes Yes   Sig: Take 650 mg by mouth every 4 hours as needed for mild pain   albuterol (PROAIR HFA/PROVENTIL HFA/VENTOLIN HFA) 108 (90 BASE) MCG/ACT Inhaler  at prn Nursing Home No Yes   Sig: Inhale 2 puffs into the lungs every 4 hours as needed for shortness of breath / dyspnea or wheezing   albuterol (PROVENTIL) (2.5 MG/3ML) 0.083% neb solution  at prn  No Yes   Sig: Take 1 vial (2.5 mg) by nebulization every 4 hours as needed for wheezing   aspirin (ASA) 81 MG chewable tablet 7/14/2023 at am Nursing Home Yes Yes   Sig: Take 81 mg by mouth daily   atorvastatin (LIPITOR) 40 MG tablet 7/14/2023 at am Nursing Home Yes Yes   Sig: Take 40 mg by mouth daily    benzonatate (TESSALON) 100 MG capsule   Yes Yes   Sig: Take 100 mg by mouth 3 times daily as needed for cough   buPROPion (WELLBUTRIN SR) 150 MG 12 hr tablet   Yes Yes   Sig: Take 150 mg by mouth 2 times daily   buprenorphine-naloxone (SUBOXONE) 8-2 MG SUBL sublingual tablet 7/14/2023 at pm Nursing Home Yes Yes   Sig: Place 1 tablet under the tongue 2 times daily     carvedilol (COREG) 6.25 MG tablet 7/14/2023 at pm Nursing Home Yes Yes   Sig: Take 3 tablets by mouth 2 times daily   fluticasone-vilanterol (BREO ELLIPTA) 200-25 MCG/INH inhaler 7/14/2023 at am Nursing Home Yes Yes   Sig: Inhale 1 puff into the lungs daily   gabapentin (NEURONTIN) 300 MG capsule   Yes Yes   Sig: Take 1 capsule by mouth every morning and 2 capsules at bedtime   hydrOXYzine (VISTARIL) 25 MG capsule   Yes Yes   Sig: Take 1-2 capsules by mouth 3 times daily as needed for itching or anxiety   lisinopril (ZESTRIL) 40 MG tablet 7/14/2023 at am Nursing Home Yes Yes   Sig: Take 40 mg by mouth daily   melatonin 5 MG tablet   Yes Yes   Sig: Take 5 mg by mouth nightly as needed for sleep   mirtazapine (REMERON) 45 MG tablet 7/14/2023 at pm Nursing Home Yes Yes   Sig: Take 45 mg by mouth At Bedtime    multivitamin w/minerals (THERA-VIT-M) tablet 7/14/2023 at am  Yes Yes   Sig: Take 1 tablet by mouth daily   naloxone (NARCAN) 4 MG/0.1ML nasal spray  at prn Nursing Home Yes Yes   Sig: Spray 4 mg into one nostril alternating nostrils once as needed for opioid reversal every 2-3 minutes until assistance arrives   nicotine (NICODERM CQ) 14 MG/24HR 24 hr patch   Yes Yes   Sig: Place 1 patch onto the skin every 24 hours   pantoprazole (PROTONIX) 40 MG EC tablet 7/14/2023 at am Nursing Home Yes Yes   Sig: Take 40 mg by mouth daily   tiotropium (SPIRIVA RESPIMAT) 2.5 MCG/ACT inhaler 7/14/2023 Nursing Home Yes Yes   Sig: Inhale 2 puffs into the lungs daily      Facility-Administered Medications: None           Physical Exam   Vital Signs: Temp: 98.3  F (36.8  C) Temp src: Oral BP: (!) 150/95 Pulse: 77   Resp: 16 SpO2: 97 % O2 Device: Nasal cannula Oxygen Delivery: 3 LPM  Weight: 156 lbs 6.4 oz    General Appearance: well nourished male in NAD  Respiratory: mildly diminished lung sounds throughout, mild inspiratory and expiratory wheezing, no crackles, no tachypnea   Cardiovascular: RRR, normal s1/s2 without murmur  GI:  abdomen soft, normal bowel sounds  Lymph/Hematologic: no edema   Musculoskeletal: extremities warm and well perfused   Neurologic: alert and appropriate, cranial nerves grossly intact   Psychiatric: normal affect     Medical Decision Making       60 MINUTES SPENT BY ME on the date of service doing chart review, history, exam, documentation & further activities per the note.      Data     I have personally reviewed the following data over the past 24 hrs:    7.8  \   11.8 (L)   / 185     140 105 6.1 (L) /  173 (H)   4.4 26 0.82 \       Procal: N/A CRP: N/A Lactic Acid: 1.4         Imaging results reviewed over the past 24 hrs:   Recent Results (from the past 24 hour(s))   XR Chest Port 1 View    Narrative    EXAM: XR CHEST PORT 1 VIEW  LOCATION: [facility]  DATE: 7/15/2023    INDICATION: Shortness of breath, wheezing  COMPARISON: None.      Impression    IMPRESSION: The lungs are hyperinflated. Patchy linear atelectasis and/or scarring in the left mid and lower lung. No focal consolidation, effusion or pneumothorax. Heart size is normal.

## 2023-07-15 NOTE — ED TRIAGE NOTES
EMS report: lives in group home; history of COPD - baseline 3 LPM o2 all the time. At 0600 staff checked on him and reported him ok. Later checked on him again and was nearly unresponsive.  staff began administering a neb and with that running with O2 patient's Spo2 was 61%. EMS noted very tight and wheezy lungs. . Bipap 9/5 100% en route.     Triage Assessment     Row Name 07/15/23 0864       Triage Assessment (Adult)    Airway WDL WDL       Respiratory WDL    Respiratory WDL rhythm/pattern    Rhythm/Pattern, Respiratory tachypneic;shortness of breath       Cardiac WDL    Cardiac WDL WDL       Cognitive/Neuro/Behavioral WDL    Cognitive/Neuro/Behavioral WDL X

## 2023-07-15 NOTE — ED NOTES
[facility]  ED Nurse Handoff Report    ED Chief complaint: Respiratory Distress      ED Diagnosis:   Final diagnoses:   None       Code Status: Full Code per chart review; admitting to confirm    Allergies:   Allergies   Allergen Reactions     Ibuprofen Nausea and Vomiting       Patient Story: lives in group home; history of COPD - baseline 3 LPM o2 all the time. At 0600 staff checked on him and reported him ok. Later checked on him again and was nearly unresponsive.  staff began administering a neb and with that running with O2 patient's Spo2 was 61%. EMS noted very tight and wheezy lungs. . Bipap 9/5 100% en route.  Focused Assessment:  Wheezes, tightness, alert upon arival, tachypnea/dyspnea  Labs Ordered and Resulted from Time of ED Arrival to Time of ED Departure   BASIC METABOLIC PANEL - Abnormal       Result Value    Sodium 140      Potassium 4.4      Chloride 105      Carbon Dioxide (CO2) 26      Anion Gap 9      Urea Nitrogen 6.1 (*)     Creatinine 0.82      Calcium 8.8      Glucose 173 (*)     GFR Estimate >90     CBC WITH PLATELETS AND DIFFERENTIAL - Abnormal    WBC Count 7.8      RBC Count 4.12 (*)     Hemoglobin 11.8 (*)     Hematocrit 38.6 (*)     MCV 94      MCH 28.6      MCHC 30.6 (*)     RDW 13.5      Platelet Count 185      % Neutrophils 56      % Lymphocytes 30      % Monocytes 9      % Eosinophils 4      % Basophils 1      % Immature Granulocytes 0      NRBCs per 100 WBC 0      Absolute Neutrophils 4.4      Absolute Lymphocytes 2.3      Absolute Monocytes 0.7      Absolute Eosinophils 0.3      Absolute Basophils 0.1      Absolute Immature Granulocytes 0.0      Absolute NRBCs 0.0     ISTAT GASES LACTATE VENOUS POCT - Abnormal    Lactic Acid POCT 1.8      Bicarbonate Venous POCT 31 (*)     O2 Sat, Venous POCT 79 (*)     pCO2 Venous POCT 94 (*)     pH Venous POCT 7.12 (*)     pO2 Venous POCT 60 (*)    ISTAT GASES LACTATE VENOUS POCT - Abnormal    Lactic Acid POCT 1.4       Bicarbonate Venous POCT 32 (*)     O2 Sat, Venous POCT 56 (*)     pCO2 Venous POCT 79 (*)     pH Venous POCT 7.22 (*)     pO2 Venous POCT 37       XR Chest Port 1 View   Final Result   IMPRESSION: The lungs are hyperinflated. Patchy linear atelectasis and/or scarring in the left mid and lower lung. No focal consolidation, effusion or pneumothorax. Heart size is normal.            Treatments and/or interventions provided: bipap, solu-medrol, magnesium, duonebs, azithromycin  Patient's response to treatments and/or interventions: improving    To be done/followed up on inpatient unit:  per admitting    Does this patient have any cognitive concerns?: n/a    Activity level - Baseline/Home:  Independent  Activity Level - Current:   Stand with Assist    Patient's Preferred language: English   Needed?: No    Isolation: Contact   Infection: MRSA  Patient tested for COVID 19 prior to admission: NO  Bariatric?: No    Vital Signs:   Vitals:    07/15/23 0820 07/15/23 0823 07/15/23 0830 07/15/23 0845   BP: (!) 146/107  (!) 142/97 (!) 138/90   BP Location:       Pulse: 97  93 80   Resp:   15 12   Temp:       TempSrc:       SpO2: 100% 100% 100% 100%   Weight:       Height:           Cardiac Rhythm: SR    Was the PSS-3 completed:   Yes  What interventions are required if any?               Family Comments: none present in ED  OBS brochure/video discussed/provided to patient/family: N/A    For the majority of the shift this patient's behavior was Green.   Behavioral interventions performed were care and rounding.    ED NURSE PHONE NUMBER: *[phone]

## 2023-07-16 VITALS
SYSTOLIC BLOOD PRESSURE: 143 MMHG | HEIGHT: 69 IN | OXYGEN SATURATION: 97 % | BODY MASS INDEX: 23.16 KG/M2 | DIASTOLIC BLOOD PRESSURE: 79 MMHG | WEIGHT: 156.4 LBS | RESPIRATION RATE: 16 BRPM | HEART RATE: 85 BPM | TEMPERATURE: 97.8 F

## 2023-07-16 PROCEDURE — G0378 HOSPITAL OBSERVATION PER HR: HCPCS

## 2023-07-16 PROCEDURE — 250N000012 HC RX MED GY IP 250 OP 636 PS 637: Performed by: HOSPITALIST

## 2023-07-16 PROCEDURE — 250N000009 HC RX 250: Performed by: HOSPITALIST

## 2023-07-16 PROCEDURE — 999N000157 HC STATISTIC RCP TIME EA 10 MIN

## 2023-07-16 PROCEDURE — 99238 HOSP IP/OBS DSCHRG MGMT 30/<: CPT | Performed by: HOSPITALIST

## 2023-07-16 PROCEDURE — 94640 AIRWAY INHALATION TREATMENT: CPT

## 2023-07-16 PROCEDURE — 250N000013 HC RX MED GY IP 250 OP 250 PS 637: Performed by: HOSPITALIST

## 2023-07-16 RX ORDER — BUPROPION HYDROCHLORIDE 150 MG/1
300 TABLET ORAL EVERY MORNING
Status: DISCONTINUED | OUTPATIENT
Start: 2023-07-16 | End: 2023-07-16 | Stop reason: HOSPADM

## 2023-07-16 RX ORDER — QUETIAPINE FUMARATE 25 MG/1
25 TABLET, FILM COATED ORAL EVERY MORNING
Status: DISCONTINUED | OUTPATIENT
Start: 2023-07-16 | End: 2023-07-16 | Stop reason: HOSPADM

## 2023-07-16 RX ORDER — HYDRALAZINE HYDROCHLORIDE 25 MG/1
25 TABLET, FILM COATED ORAL 3 TIMES DAILY
Status: DISCONTINUED | OUTPATIENT
Start: 2023-07-16 | End: 2023-07-16 | Stop reason: HOSPADM

## 2023-07-16 RX ORDER — MIRTAZAPINE 15 MG/1
45 TABLET, FILM COATED ORAL AT BEDTIME
Status: DISCONTINUED | OUTPATIENT
Start: 2023-07-16 | End: 2023-07-16 | Stop reason: HOSPADM

## 2023-07-16 RX ORDER — BUPRENORPHINE AND NALOXONE 8; 2 MG/1; MG/1
1 FILM, SOLUBLE BUCCAL; SUBLINGUAL 2 TIMES DAILY
Status: DISCONTINUED | OUTPATIENT
Start: 2023-07-16 | End: 2023-07-16 | Stop reason: HOSPADM

## 2023-07-16 RX ORDER — AZITHROMYCIN 250 MG/1
250 TABLET, FILM COATED ORAL DAILY
Qty: 3 TABLET | Refills: 0 | Status: SHIPPED | OUTPATIENT
Start: 2023-07-17 | End: 2023-07-20

## 2023-07-16 RX ORDER — LISINOPRIL 40 MG/1
40 TABLET ORAL DAILY
Status: DISCONTINUED | OUTPATIENT
Start: 2023-07-16 | End: 2023-07-16 | Stop reason: HOSPADM

## 2023-07-16 RX ORDER — PREDNISONE 10 MG/1
TABLET ORAL
Qty: 28 TABLET | Refills: 0 | Status: ON HOLD | OUTPATIENT
Start: 2023-07-16 | End: 2023-08-01

## 2023-07-16 RX ORDER — CARVEDILOL 25 MG/1
25 TABLET ORAL 2 TIMES DAILY WITH MEALS
Status: DISCONTINUED | OUTPATIENT
Start: 2023-07-16 | End: 2023-07-16 | Stop reason: HOSPADM

## 2023-07-16 RX ORDER — GABAPENTIN 300 MG/1
300 CAPSULE ORAL EVERY MORNING
Status: DISCONTINUED | OUTPATIENT
Start: 2023-07-16 | End: 2023-07-16 | Stop reason: HOSPADM

## 2023-07-16 RX ORDER — GABAPENTIN 300 MG/1
600 CAPSULE ORAL AT BEDTIME
Status: DISCONTINUED | OUTPATIENT
Start: 2023-07-16 | End: 2023-07-16 | Stop reason: HOSPADM

## 2023-07-16 RX ADMIN — HYDRALAZINE HYDROCHLORIDE 25 MG: 25 TABLET ORAL at 09:23

## 2023-07-16 RX ADMIN — IPRATROPIUM BROMIDE AND ALBUTEROL SULFATE 3 ML: .5; 3 SOLUTION RESPIRATORY (INHALATION) at 01:01

## 2023-07-16 RX ADMIN — BUPROPION HYDROCHLORIDE 300 MG: 150 TABLET, FILM COATED, EXTENDED RELEASE ORAL at 09:23

## 2023-07-16 RX ADMIN — QUETIAPINE FUMARATE 25 MG: 25 TABLET ORAL at 09:24

## 2023-07-16 RX ADMIN — AZITHROMYCIN MONOHYDRATE 250 MG: 250 TABLET ORAL at 09:25

## 2023-07-16 RX ADMIN — PREDNISONE 40 MG: 20 TABLET ORAL at 09:25

## 2023-07-16 RX ADMIN — BUPRENORPHINE AND NALOXONE 1 FILM: 8; 2 FILM, SOLUBLE BUCCAL; SUBLINGUAL at 11:22

## 2023-07-16 RX ADMIN — IPRATROPIUM BROMIDE AND ALBUTEROL SULFATE 3 ML: .5; 3 SOLUTION RESPIRATORY (INHALATION) at 07:22

## 2023-07-16 RX ADMIN — CARVEDILOL 25 MG: 25 TABLET, FILM COATED ORAL at 09:24

## 2023-07-16 RX ADMIN — LISINOPRIL 40 MG: 40 TABLET ORAL at 09:24

## 2023-07-16 RX ADMIN — Medication 1 MG: at 00:59

## 2023-07-16 RX ADMIN — GABAPENTIN 300 MG: 300 CAPSULE ORAL at 09:23

## 2023-07-16 RX ADMIN — HYDROXYZINE HYDROCHLORIDE 25 MG: 25 TABLET, FILM COATED ORAL at 00:59

## 2023-07-16 ASSESSMENT — ACTIVITIES OF DAILY LIVING (ADL)
ADLS_ACUITY_SCORE: 37
DEPENDENT_IADLS:: CLEANING;COOKING;LAUNDRY;SHOPPING;MEAL PREPARATION;MEDICATION MANAGEMENT;MONEY MANAGEMENT;TRANSPORTATION
ADLS_ACUITY_SCORE: 37

## 2023-07-16 NOTE — PROGRESS NOTES
"Pt is Alert and orientated x4. VSS Oxygen weaned from 3 liters to 2 liters. 02 sat 97%. Pt stated \"I feel like I am not getting any air.\" Oxygen increased to 3 liters. Lung sounds are clear and diminished.  Pt has shortess of breath with exertion. Denies pain. Up with standby assistance. Saline Lock patent. Voiding per urinal. Hydroxyzine given x1. Regular diet.   "

## 2023-07-16 NOTE — PLAN OF CARE
Goal Outcome Evaluation:    Summary: SOB. COPD exac.  Care Plan Summary Note:  Orientation: A&Ox4  Observation Goals (met & not met): Not met  Activity Level: SBA dt/ tripping hazard of oxygen cord. Steady on feet.   Fall Risk: Yes  Behavior & Aggression Tool Color: Green;   Pain Management: 7/10 Pain in head - Tylenol given x1.  ABNL VS/O2: VSS on 3L O2 - baseline  ABNL Lab/BG: pH 7.22. pCO2 79  Diet: Reg  Bowel/Bladder: cont.  Drains/Devices: PIV SL  Tests/Procedures for next shift: Resp following  Anticipated DC date: 7/16 pending improvement in breathing  Other Important Info: compression socks in place. From group home.     OBSERVATION STATUS:  -diagnostic tests and consults completed and resulted MET  -vital signs normal or at patient baseline NOT MET  -dyspnea improved and O2 sats greater than 88% on room air or prior home oxygen levels MET  Nurse to notify provider when observation goals have been met and patient is ready for discharge.

## 2023-07-16 NOTE — PROGRESS NOTES
Care Management Follow Up    Length of Stay (days): 0    Expected Discharge Date: 07/17/2023     Concerns to be Addressed:       Patient plan of care discussed at interdisciplinary rounds: Yes    Anticipated Discharge Disposition:       Anticipated Discharge Services:    Anticipated Discharge DME:      Patient/family educated on Medicare website which has current facility and service quality ratings:    Education Provided on the Discharge Plan:    Patient/Family in Agreement with the Plan:      Referrals Placed by CM/SW:  none  Private pay costs discussed: Not applicable    Additional Information:  Writer spoke to Mickey at the  letting him know patient is going to be discharged.Writer set up wheelchair ride with  oxygen with time of 2-2:45 om. All medications should be sent to [facility] pharmacy.      Asia Castillo RN

## 2023-07-16 NOTE — PHARMACY-ADMISSION MEDICATION HISTORY
Pharmacist Admission Medication History    Admission medication history is complete. The information provided in this note is only as accurate as the sources available at the time of the update.    Medication reconciliation/reorder completed by provider prior to medication history? No    Information Source(s): Caregiver via phone. Called [facility] ([phone]) for updated medication list -  attempted to fax over medications multiple times without success, went over medications via phone with caregiver.    Pertinent Information:   - Suboxone: List shows tablets,  shows film. Changed to film.    Changes made to PTA medication list:    Added:   o Docusate  o Hydralazine  o Diclofenac  o Miralax  o Quetiapine    Deleted:   o Albuterol inh  o Benzonotate  o Dextromethorphan-Guaifenesin  o Hydroxyzine    Changed:   o Tylenol 650mg q4h prn --> 325mg q4h prn  o Albuterol q4h prn --> BID scheduled + q6h prn  o Bupropion SR 150mg BID --> XL 300mg daily  o Carvedilol 18.75mg BID --> 25mg BID  o Nicotine 14mg/24hr --> 7mg/24h    Medication Affordability:       Allergies reviewed with patient and updates made in EHR: unable to assess    Medication History Completed By: Syl Roche [facility] 7/15/2023 10:24 PM    Prior to Admission medications    Medication Sig Last Dose Taking? Auth Provider Long Term End Date   acetaminophen (TYLENOL) 325 MG tablet Take 325 mg by mouth every 4 hours as needed for mild pain prn at prn Yes Unknown, Entered By History     albuterol (PROVENTIL) (2.5 MG/3ML) 0.083% neb solution Take 1 vial (2.5 mg) by nebulization every 4 hours as needed for wheezing  Patient taking differently: Take 2.5 mg by nebulization 2 times daily and take 2.5mg by nebulization every 4 hours as needed. 7/14/2023 Yes Jose Horne MD Yes    aspirin (ASA) 81 MG chewable tablet Take 81 mg by mouth daily 7/14/2023 at am Yes Unknown, Entered By History     atorvastatin (LIPITOR) 40 MG tablet Take 40 mg by mouth daily   7/14/2023 at am Yes Unknown, Entered By History Yes    buprenorphine HCl-naloxone HCl (SUBOXONE) 8-2 MG per film Place 1 Film under the tongue 2 times daily 7/14/2023 at pm Yes Unknown, Entered By History     buPROPion (WELLBUTRIN SR) 150 MG 12 hr tablet Take 150 mg by mouth 2 times daily  Yes Unknown, Entered By History Yes    carvedilol (COREG) 25 MG tablet Take 25 mg by mouth 2 times daily (with meals) 7/14/2023 Yes Unknown, Entered By History No    diclofenac (VOLTAREN) 1 % topical gel Apply 2-4 g topically 4 times daily as needed for moderate pain prn at prn Yes Unknown, Entered By History     docusate sodium (COLACE) 100 MG capsule Take 100 mg by mouth daily 7/14/2023 Yes Unknown, Entered By History     fluticasone-vilanterol (BREO ELLIPTA) 200-25 MCG/INH inhaler Inhale 1 puff into the lungs daily 7/14/2023 at am Yes Unknown, Entered By History     gabapentin (NEURONTIN) 300 MG capsule Take 1 capsule (300mg) by mouth every morning and 2 capsules (600mg) at bedtime  Yes Unknown, Entered By History Yes    hydrALAZINE (APRESOLINE) 25 MG tablet Take 25 mg by mouth 3 times daily 7/14/2023 Yes Unknown, Entered By History Yes    lisinopril (ZESTRIL) 40 MG tablet Take 40 mg by mouth daily 7/14/2023 at am Yes Unknown, Entered By History No    melatonin 5 MG tablet Take 5 mg by mouth nightly as needed for sleep prn at prn Yes Unknown, Entered By History     mirtazapine (REMERON) 45 MG tablet Take 45 mg by mouth At Bedtime  7/14/2023 at pm Yes Unknown, Entered By History Yes    multivitamin w/minerals (THERA-VIT-M) tablet Take 1 tablet by mouth daily 7/14/2023 at am Yes Unknown, Entered By History     naloxone (NARCAN) 4 MG/0.1ML nasal spray Spray 4 mg into one nostril alternating nostrils once as needed for opioid reversal every 2-3 minutes until assistance arrives prn at prn Yes Unknown, Entered By History     nicotine (NICODERM CQ) 7 MG/24HR 24 hr patch Place 1 patch onto the skin every 24 hours 7/14/2023 Yes  Unknown, Entered By History     pantoprazole (PROTONIX) 40 MG EC tablet Take 40 mg by mouth daily 7/14/2023 at am Yes Unknown, Entered By History     polyethylene glycol (MIRALAX) 17 g packet Take 1 packet by mouth daily as needed for constipation prn at prn Yes Unknown, Entered By History     QUEtiapine (SEROQUEL) 25 MG tablet Take 25 mg by mouth every morning 7/14/2023 Yes Unknown, Entered By History Yes    QUEtiapine (SEROQUEL) 25 MG tablet Take 12.5 mg by mouth nightly as needed prn at prn Yes Unknown, Entered By History Yes    tiotropium (SPIRIVA RESPIMAT) 2.5 MCG/ACT inhaler Inhale 2 puffs into the lungs daily 7/14/2023 Yes Unknown, Entered By History No
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
## 2023-07-16 NOTE — CONSULTS
Care Management Initial Consult    General Information  Assessment completed with: Patient, Care Team Member, Mickey  Type of CM/SW Visit: Initial Assessment    Primary Care Provider verified and updated as needed: Yes, Dr. Wynn   Readmission within the last 30 days: no previous admission in last 30 days      Reason for Consult: discharge planning  Advance Care Planning: Advance Care Planning Reviewed: no concerns identified          Communication Assessment  Patient's communication style: spoken language (English or Bilingual)             Cognitive  Cognitive/Neuro/Behavioral: WDL                      Living Environment:   People in home: facility resident     Current living Arrangements: group home  Called [facility]  Able to return to prior arrangements: yes       Family/Social Support:  Care provided by: self, other (see comments)  Provides care for: no one, unable/limited ability to care for self  Marital Status:              Description of Support System:  supportive         Current Resources:   Patient receiving home care services: No     Community Resources: [facility], [facility] [place]  Equipment currently used at home: none   Supplies currently used at home: Oxygen Tubing/Supplies, Nebulizer tubing (patient did not know where it comes from, [employer])    Employment/Financial:  Employment Status: disabled        Financial Concerns: No concerns identified           Does the patient's insurance plan have a 3 day qualifying hospital stay waiver?  No    Lifestyle & Psychosocial Needs:  Social Determinants of Health     Tobacco Use: High Risk (7/15/2023)    Patient History      Smoking Tobacco Use: Every Day      Smokeless Tobacco Use: Never      Passive Exposure: Not on file   Alcohol Use: Not on file   Financial Resource Strain: Not on file   Food Insecurity: Not on file   Transportation Needs: Not on file   Physical Activity: Not on file   Stress:  Not on file   Social Connections: Not on file   Intimate Partner Violence: Not on file   Depression: Not on file   Housing Stability: Not on file       Functional Status:  Prior to admission patient needed assistance:   Dependent ADLs:: Independent  Dependent IADLs:: Cleaning, Cooking, Laundry, Shopping, Meal Preparation, Medication Management, Money Management, Transportation       Mental Health Status:  Mental Health Status: No Current Concerns       Chemical Dependency Status:      unknown          Values/Beliefs:  Spiritual, Cultural Beliefs, Taoism Practices, Values that affect care: no               Additional Information:  Met with patient went over Medicare Outpatient Observation Notice. He would like to be discharged. Writer called and spoke to patient's , Mickey at [phone]. He tells writer that [place] [facility] and we should set up a ride for him to come back with Oxygen. Mickey is off today and will relay the time of patient's ride to Group home staff. Writer called and got a wheelchair ride of 2-2:45pm.   Writer faxed over orders to [phone].  From previous stay, noted by CC:   -Group home staff dispense pt's medications and he receives nebulizer treatments.    -Pt is on Suboxone managed through the [facility].  They receive weekly supplies of Suboxone.    Mickey will take care of scheduling pt's appointment with the Suboxone clinic and with his [facility] PCP, Dr Wynn.    Asia Castillo RN

## 2023-07-16 NOTE — PLAN OF CARE
Goal Outcome Evaluation:      Plan of Care Reviewed With: patient    Overall Patient Progress: improvingOverall Patient Progress: improving    Summary:    Care Plan Summary Note:  Orientation: A&O  Observation Goals (met & not met): Not met  Activity Level: SBA  Fall Risk: Yes  Behavior & Aggression Tool Color: Green; anxious at times. Prn hydroxyzine given x1  Pain Management: denies pain  ABNL VS/O2: BP elevated, on 3L O2 baseline. All other VSS.   ABNL Lab/BG: pH 7.22. pCO2 79  Diet: Reg  Bowel/Bladder: voiding via urinal  Drains/Devices: n/a  Tests/Procedures for next shift: Resp following  Anticipated DC date: 7/16 pending improvement in breathing  Other Important Info: compression socks in place. From group home.

## 2023-07-16 NOTE — PROGRESS NOTES
Observation goals PRIOR TO DISCHARGE  Comments: -diagnostic tests and consults completed and resulted - met  -vital signs normal or at patient baseline - Met  -dyspnea improved and O2 sats greater than 88% on room air or prior home oxygen levels - met  Nurse to notify provider when observation goals have been met and patient is ready for discharge.- discharging today

## 2023-07-28 ENCOUNTER — HOSPITAL ENCOUNTER (INPATIENT)
Facility: CLINIC | Age: 63
LOS: 4 days | Discharge: NURSING, REHAB OR LONG-TERM CARE | DRG: 190 | End: 2023-08-01
Attending: EMERGENCY MEDICINE | Admitting: INTERNAL MEDICINE
Payer: MEDICARE

## 2023-07-28 ENCOUNTER — APPOINTMENT (OUTPATIENT)
Dept: GENERAL RADIOLOGY | Facility: CLINIC | Age: 63
DRG: 190 | End: 2023-07-28
Attending: EMERGENCY MEDICINE
Payer: MEDICARE

## 2023-07-28 DIAGNOSIS — J44.1 COPD EXACERBATION (H): ICD-10-CM

## 2023-07-28 DIAGNOSIS — J96.01 ACUTE RESPIRATORY FAILURE WITH HYPOXIA AND HYPERCAPNIA (H): ICD-10-CM

## 2023-07-28 DIAGNOSIS — J96.02 ACUTE RESPIRATORY FAILURE WITH HYPOXIA AND HYPERCAPNIA (H): ICD-10-CM

## 2023-07-28 LAB
ALBUMIN SERPL BCG-MCNC: 4.3 G/DL (ref 3.5–5.2)
ALP SERPL-CCNC: 115 U/L (ref 40–129)
ALT SERPL W P-5'-P-CCNC: 26 U/L (ref 0–70)
ANION GAP SERPL CALCULATED.3IONS-SCNC: 9 MMOL/L (ref 7–15)
AST SERPL W P-5'-P-CCNC: 22 U/L (ref 0–45)
ATRIAL RATE - MUSE: 93 BPM
BASOPHILS # BLD AUTO: 0 10E3/UL (ref 0–0.2)
BASOPHILS NFR BLD AUTO: 0 %
BILIRUB SERPL-MCNC: 0.4 MG/DL
BUN SERPL-MCNC: 11.7 MG/DL (ref 8–23)
CALCIUM SERPL-MCNC: 8.4 MG/DL (ref 8.8–10.2)
CHLORIDE SERPL-SCNC: 101 MMOL/L (ref 98–107)
CREAT SERPL-MCNC: 1.01 MG/DL (ref 0.67–1.17)
DEPRECATED HCO3 PLAS-SCNC: 31 MMOL/L (ref 22–29)
DIASTOLIC BLOOD PRESSURE - MUSE: NORMAL MMHG
EOSINOPHIL # BLD AUTO: 0.2 10E3/UL (ref 0–0.7)
EOSINOPHIL NFR BLD AUTO: 3 %
ERYTHROCYTE [DISTWIDTH] IN BLOOD BY AUTOMATED COUNT: 15.1 % (ref 10–15)
FLUAV RNA SPEC QL NAA+PROBE: NEGATIVE
FLUBV RNA RESP QL NAA+PROBE: NEGATIVE
GFR SERPL CREATININE-BSD FRML MDRD: 84 ML/MIN/1.73M2
GLUCOSE SERPL-MCNC: 156 MG/DL (ref 70–99)
HCO3 BLDV-SCNC: 28 MMOL/L (ref 21–28)
HCO3 BLDV-SCNC: 34 MMOL/L (ref 21–28)
HCT VFR BLD AUTO: 36.8 % (ref 40–53)
HGB BLD-MCNC: 11.2 G/DL (ref 13.3–17.7)
IMM GRANULOCYTES # BLD: 0.1 10E3/UL
IMM GRANULOCYTES NFR BLD: 1 %
INTERPRETATION ECG - MUSE: NORMAL
LACTATE BLD-SCNC: 0.9 MMOL/L
LACTATE BLD-SCNC: 2 MMOL/L
LYMPHOCYTES # BLD AUTO: 2.4 10E3/UL (ref 0.8–5.3)
LYMPHOCYTES NFR BLD AUTO: 27 %
MCH RBC QN AUTO: 28.9 PG (ref 26.5–33)
MCHC RBC AUTO-ENTMCNC: 30.4 G/DL (ref 31.5–36.5)
MCV RBC AUTO: 95 FL (ref 78–100)
MONOCYTES # BLD AUTO: 0.7 10E3/UL (ref 0–1.3)
MONOCYTES NFR BLD AUTO: 8 %
NEUTROPHILS # BLD AUTO: 5.3 10E3/UL (ref 1.6–8.3)
NEUTROPHILS NFR BLD AUTO: 61 %
NRBC # BLD AUTO: 0 10E3/UL
NRBC BLD AUTO-RTO: 0 /100
NT-PROBNP SERPL-MCNC: 282 PG/ML (ref 0–900)
P AXIS - MUSE: 75 DEGREES
PCO2 BLDV: 64 MM HG (ref 40–50)
PCO2 BLDV: 94 MM HG (ref 40–50)
PH BLDV: 7.16 [PH] (ref 7.32–7.43)
PH BLDV: 7.25 [PH] (ref 7.32–7.43)
PLATELET # BLD AUTO: 164 10E3/UL (ref 150–450)
PO2 BLDV: 48 MM HG (ref 25–47)
PO2 BLDV: 51 MM HG (ref 25–47)
POTASSIUM SERPL-SCNC: 4.4 MMOL/L (ref 3.4–5.3)
PR INTERVAL - MUSE: 126 MS
PROT SERPL-MCNC: 7 G/DL (ref 6.4–8.3)
QRS DURATION - MUSE: 86 MS
QT - MUSE: 352 MS
QTC - MUSE: 437 MS
R AXIS - MUSE: 67 DEGREES
RBC # BLD AUTO: 3.88 10E6/UL (ref 4.4–5.9)
RSV RNA SPEC NAA+PROBE: NEGATIVE
SAO2 % BLDV: 69 % (ref 94–100)
SAO2 % BLDV: 78 % (ref 94–100)
SARS-COV-2 RNA RESP QL NAA+PROBE: NEGATIVE
SODIUM SERPL-SCNC: 141 MMOL/L (ref 136–145)
SYSTOLIC BLOOD PRESSURE - MUSE: NORMAL MMHG
T AXIS - MUSE: 82 DEGREES
TROPONIN T SERPL HS-MCNC: 9 NG/L
VENTRICULAR RATE- MUSE: 93 BPM
WBC # BLD AUTO: 8.7 10E3/UL (ref 4–11)

## 2023-07-28 PROCEDURE — 93005 ELECTROCARDIOGRAM TRACING: CPT

## 2023-07-28 PROCEDURE — 210N000001 HC R&B IMCU HEART CARE

## 2023-07-28 PROCEDURE — 87149 DNA/RNA DIRECT PROBE: CPT | Performed by: EMERGENCY MEDICINE

## 2023-07-28 PROCEDURE — 83880 ASSAY OF NATRIURETIC PEPTIDE: CPT | Performed by: EMERGENCY MEDICINE

## 2023-07-28 PROCEDURE — 258N000003 HC RX IP 258 OP 636: Performed by: EMERGENCY MEDICINE

## 2023-07-28 PROCEDURE — 87077 CULTURE AEROBIC IDENTIFY: CPT | Performed by: EMERGENCY MEDICINE

## 2023-07-28 PROCEDURE — 94640 AIRWAY INHALATION TREATMENT: CPT

## 2023-07-28 PROCEDURE — 84484 ASSAY OF TROPONIN QUANT: CPT | Performed by: EMERGENCY MEDICINE

## 2023-07-28 PROCEDURE — 99223 1ST HOSP IP/OBS HIGH 75: CPT | Mod: AI | Performed by: INTERNAL MEDICINE

## 2023-07-28 PROCEDURE — 250N000011 HC RX IP 250 OP 636: Mod: JZ | Performed by: EMERGENCY MEDICINE

## 2023-07-28 PROCEDURE — 96365 THER/PROPH/DIAG IV INF INIT: CPT

## 2023-07-28 PROCEDURE — 82803 BLOOD GASES ANY COMBINATION: CPT

## 2023-07-28 PROCEDURE — 250N000009 HC RX 250: Performed by: EMERGENCY MEDICINE

## 2023-07-28 PROCEDURE — 94660 CPAP INITIATION&MGMT: CPT

## 2023-07-28 PROCEDURE — 85025 COMPLETE CBC W/AUTO DIFF WBC: CPT | Performed by: EMERGENCY MEDICINE

## 2023-07-28 PROCEDURE — 71045 X-RAY EXAM CHEST 1 VIEW: CPT

## 2023-07-28 PROCEDURE — 5A09357 ASSISTANCE WITH RESPIRATORY VENTILATION, LESS THAN 24 CONSECUTIVE HOURS, CONTINUOUS POSITIVE AIRWAY PRESSURE: ICD-10-PCS | Performed by: HOSPITALIST

## 2023-07-28 PROCEDURE — 99285 EMERGENCY DEPT VISIT HI MDM: CPT | Mod: 25

## 2023-07-28 PROCEDURE — 96375 TX/PRO/DX INJ NEW DRUG ADDON: CPT

## 2023-07-28 PROCEDURE — 80053 COMPREHEN METABOLIC PANEL: CPT | Performed by: EMERGENCY MEDICINE

## 2023-07-28 PROCEDURE — 36415 COLL VENOUS BLD VENIPUNCTURE: CPT | Performed by: EMERGENCY MEDICINE

## 2023-07-28 PROCEDURE — 87637 SARSCOV2&INF A&B&RSV AMP PRB: CPT | Performed by: EMERGENCY MEDICINE

## 2023-07-28 PROCEDURE — 83605 ASSAY OF LACTIC ACID: CPT

## 2023-07-28 PROCEDURE — 999N000157 HC STATISTIC RCP TIME EA 10 MIN

## 2023-07-28 RX ORDER — METHYLPREDNISOLONE SODIUM SUCCINATE 125 MG/2ML
125 INJECTION, POWDER, LYOPHILIZED, FOR SOLUTION INTRAMUSCULAR; INTRAVENOUS ONCE
Status: COMPLETED | OUTPATIENT
Start: 2023-07-28 | End: 2023-07-28

## 2023-07-28 RX ORDER — IPRATROPIUM BROMIDE AND ALBUTEROL SULFATE 2.5; .5 MG/3ML; MG/3ML
1 SOLUTION RESPIRATORY (INHALATION) EVERY 4 HOURS PRN
COMMUNITY
End: 2023-08-01

## 2023-07-28 RX ORDER — ALBUTEROL SULFATE 90 UG/1
2 AEROSOL, METERED RESPIRATORY (INHALATION) EVERY 4 HOURS PRN
COMMUNITY

## 2023-07-28 RX ORDER — BUPRENORPHINE HYDROCHLORIDE AND NALOXONE HYDROCHLORIDE DIHYDRATE 8; 2 MG/1; MG/1
1 TABLET SUBLINGUAL 2 TIMES DAILY
Status: ON HOLD | COMMUNITY
End: 2023-07-29

## 2023-07-28 RX ORDER — AZITHROMYCIN 500 MG/1
500 INJECTION, POWDER, LYOPHILIZED, FOR SOLUTION INTRAVENOUS ONCE
Status: COMPLETED | OUTPATIENT
Start: 2023-07-28 | End: 2023-07-28

## 2023-07-28 RX ORDER — LISINOPRIL 30 MG/1
30 TABLET ORAL DAILY
COMMUNITY
End: 2023-08-01

## 2023-07-28 RX ORDER — BUPROPION HYDROCHLORIDE 150 MG/1
150 TABLET, EXTENDED RELEASE ORAL 2 TIMES DAILY
COMMUNITY
End: 2023-08-01

## 2023-07-28 RX ORDER — GABAPENTIN 300 MG/1
600 CAPSULE ORAL AT BEDTIME
COMMUNITY

## 2023-07-28 RX ORDER — GABAPENTIN 300 MG/1
300 CAPSULE ORAL EVERY MORNING
COMMUNITY

## 2023-07-28 RX ORDER — IPRATROPIUM BROMIDE AND ALBUTEROL SULFATE 2.5; .5 MG/3ML; MG/3ML
3 SOLUTION RESPIRATORY (INHALATION) ONCE
Status: COMPLETED | OUTPATIENT
Start: 2023-07-28 | End: 2023-07-28

## 2023-07-28 RX ADMIN — AZITHROMYCIN MONOHYDRATE 500 MG: 500 INJECTION, POWDER, LYOPHILIZED, FOR SOLUTION INTRAVENOUS at 20:27

## 2023-07-28 RX ADMIN — IPRATROPIUM BROMIDE AND ALBUTEROL SULFATE 3 ML: .5; 3 SOLUTION RESPIRATORY (INHALATION) at 20:25

## 2023-07-28 RX ADMIN — SODIUM CHLORIDE 1000 ML: 9 INJECTION, SOLUTION INTRAVENOUS at 20:27

## 2023-07-28 RX ADMIN — METHYLPREDNISOLONE SODIUM SUCCINATE 125 MG: 125 INJECTION, POWDER, FOR SOLUTION INTRAMUSCULAR; INTRAVENOUS at 20:25

## 2023-07-28 ASSESSMENT — ACTIVITIES OF DAILY LIVING (ADL)
ADLS_ACUITY_SCORE: 35
ADLS_ACUITY_SCORE: 35

## 2023-07-28 NOTE — LETTER
Transition Communication Hand-off for Care Transitions to Next Level of Care Provider    Name: Gerson Pearson  : 1960  MRN #: [id number]  Primary Care Provider: Alex Wynn     Primary Clinic: [address] [place] [id number]     Reason for Hospitalization:  COPD exacerbation (H) [J44.1]  Acute respiratory failure with hypoxia and hypercapnia (H) [J96.01, J96.02]  Admit Date/Time: 2023  8:19 PM  Discharge Date: 2023  Payor Source: Payor: MEDICARE / Plan: MEDICARE / Product Type: Medicare /           Reason for Communication Hand-off Referral: Admission diagnoses: COPD  Avoidable readmission within 30 days    Discharge Plan: discharge back to [facility] home.  Labs in 1 week and PCP follow up on aug. 18th.       Concern for non-adherence with plan of care:   Y/N no  Discharge Needs Assessment:  Needs      Flowsheet Row Most Recent Value   Equipment Currently Used at Home cane, straight            Follow-up specialty is recommended: Yes    Follow-up plan:  PCP on , labs in 1 week.   GI follow up    Any outstanding tests or procedures:  See attached discharge orders.              Anna Ellis RN    AVS/Discharge Summary is the source of truth; this is a helpful guide for improved communication of patient story

## 2023-07-29 ENCOUNTER — APPOINTMENT (OUTPATIENT)
Dept: SPEECH THERAPY | Facility: CLINIC | Age: 63
DRG: 190 | End: 2023-07-29
Attending: HOSPITALIST
Payer: MEDICARE

## 2023-07-29 PROBLEM — I50.22 CHRONIC SYSTOLIC CONGESTIVE HEART FAILURE (H): Status: ACTIVE | Noted: 2023-07-29

## 2023-07-29 PROBLEM — J44.9 COPD (CHRONIC OBSTRUCTIVE PULMONARY DISEASE) (H): Status: ACTIVE | Noted: 2021-12-08

## 2023-07-29 LAB
BASE EXCESS BLDV CALC-SCNC: 3 MMOL/L (ref -7.7–1.9)
ENTEROCOCCUS FAECALIS: NOT DETECTED
ENTEROCOCCUS FAECIUM: NOT DETECTED
GLUCOSE BLDC GLUCOMTR-MCNC: 137 MG/DL (ref 70–99)
HCO3 BLDV-SCNC: 30 MMOL/L (ref 21–28)
LISTERIA SPECIES (DETECTED/NOT DETECTED): NOT DETECTED
O2/TOTAL GAS SETTING VFR VENT: 30 %
PCO2 BLDV: 56 MM HG (ref 40–50)
PH BLDV: 7.33 [PH] (ref 7.32–7.43)
PO2 BLDV: 50 MM HG (ref 25–47)
STAPHYLOCOCCUS AUREUS: NOT DETECTED
STAPHYLOCOCCUS EPIDERMIDIS: DETECTED
STAPHYLOCOCCUS LUGDUNENSIS: NOT DETECTED
STREPTOCOCCUS AGALACTIAE: NOT DETECTED
STREPTOCOCCUS ANGINOSUS GROUP: NOT DETECTED
STREPTOCOCCUS PNEUMONIAE: NOT DETECTED
STREPTOCOCCUS PYOGENES: NOT DETECTED
STREPTOCOCCUS SPECIES: NOT DETECTED

## 2023-07-29 PROCEDURE — 250N000011 HC RX IP 250 OP 636: Mod: JZ | Performed by: HOSPITALIST

## 2023-07-29 PROCEDURE — 250N000009 HC RX 250: Performed by: HOSPITALIST

## 2023-07-29 PROCEDURE — 210N000001 HC R&B IMCU HEART CARE

## 2023-07-29 PROCEDURE — 250N000013 HC RX MED GY IP 250 OP 250 PS 637: Performed by: EMERGENCY MEDICINE

## 2023-07-29 PROCEDURE — 94660 CPAP INITIATION&MGMT: CPT

## 2023-07-29 PROCEDURE — 92610 EVALUATE SWALLOWING FUNCTION: CPT | Mod: GN

## 2023-07-29 PROCEDURE — 99232 SBSQ HOSP IP/OBS MODERATE 35: CPT | Performed by: HOSPITALIST

## 2023-07-29 PROCEDURE — 94640 AIRWAY INHALATION TREATMENT: CPT | Mod: 76

## 2023-07-29 PROCEDURE — 36415 COLL VENOUS BLD VENIPUNCTURE: CPT | Performed by: INTERNAL MEDICINE

## 2023-07-29 PROCEDURE — 999N000157 HC STATISTIC RCP TIME EA 10 MIN

## 2023-07-29 PROCEDURE — 250N000013 HC RX MED GY IP 250 OP 250 PS 637: Performed by: INTERNAL MEDICINE

## 2023-07-29 PROCEDURE — 82803 BLOOD GASES ANY COMBINATION: CPT | Performed by: INTERNAL MEDICINE

## 2023-07-29 PROCEDURE — 250N000013 HC RX MED GY IP 250 OP 250 PS 637: Performed by: HOSPITALIST

## 2023-07-29 PROCEDURE — 250N000009 HC RX 250: Performed by: INTERNAL MEDICINE

## 2023-07-29 PROCEDURE — 87040 BLOOD CULTURE FOR BACTERIA: CPT | Performed by: INTERNAL MEDICINE

## 2023-07-29 PROCEDURE — 94640 AIRWAY INHALATION TREATMENT: CPT

## 2023-07-29 PROCEDURE — 250N000012 HC RX MED GY IP 250 OP 636 PS 637: Performed by: INTERNAL MEDICINE

## 2023-07-29 RX ORDER — CARVEDILOL 25 MG/1
25 TABLET ORAL 2 TIMES DAILY
Status: DISCONTINUED | OUTPATIENT
Start: 2023-07-29 | End: 2023-08-01 | Stop reason: HOSPADM

## 2023-07-29 RX ORDER — ACETAMINOPHEN 650 MG/1
650 SUPPOSITORY RECTAL EVERY 6 HOURS PRN
Status: DISCONTINUED | OUTPATIENT
Start: 2023-07-29 | End: 2023-08-01 | Stop reason: HOSPADM

## 2023-07-29 RX ORDER — AZITHROMYCIN 250 MG/1
250 TABLET, FILM COATED ORAL EVERY EVENING
Status: DISCONTINUED | OUTPATIENT
Start: 2023-07-29 | End: 2023-08-01 | Stop reason: HOSPADM

## 2023-07-29 RX ORDER — PROCHLORPERAZINE MALEATE 10 MG
10 TABLET ORAL EVERY 6 HOURS PRN
Status: DISCONTINUED | OUTPATIENT
Start: 2023-07-29 | End: 2023-08-01 | Stop reason: HOSPADM

## 2023-07-29 RX ORDER — ALBUTEROL SULFATE 90 UG/1
2 AEROSOL, METERED RESPIRATORY (INHALATION) EVERY 6 HOURS PRN
Status: DISCONTINUED | OUTPATIENT
Start: 2023-07-29 | End: 2023-08-01 | Stop reason: HOSPADM

## 2023-07-29 RX ORDER — BISACODYL 10 MG
10 SUPPOSITORY, RECTAL RECTAL DAILY PRN
Status: DISCONTINUED | OUTPATIENT
Start: 2023-07-29 | End: 2023-08-01 | Stop reason: HOSPADM

## 2023-07-29 RX ORDER — MULTIPLE VITAMINS W/ MINERALS TAB 9MG-400MCG
1 TAB ORAL DAILY
Status: DISCONTINUED | OUTPATIENT
Start: 2023-07-29 | End: 2023-08-01 | Stop reason: HOSPADM

## 2023-07-29 RX ORDER — IPRATROPIUM BROMIDE AND ALBUTEROL SULFATE 2.5; .5 MG/3ML; MG/3ML
1 SOLUTION RESPIRATORY (INHALATION)
Status: DISCONTINUED | OUTPATIENT
Start: 2023-07-29 | End: 2023-08-01 | Stop reason: HOSPADM

## 2023-07-29 RX ORDER — IPRATROPIUM BROMIDE AND ALBUTEROL SULFATE 2.5; .5 MG/3ML; MG/3ML
1 SOLUTION RESPIRATORY (INHALATION) EVERY 4 HOURS PRN
Status: DISCONTINUED | OUTPATIENT
Start: 2023-07-29 | End: 2023-07-29

## 2023-07-29 RX ORDER — HYDRALAZINE HYDROCHLORIDE 20 MG/ML
10 INJECTION INTRAMUSCULAR; INTRAVENOUS EVERY 6 HOURS PRN
Status: DISCONTINUED | OUTPATIENT
Start: 2023-07-29 | End: 2023-08-01 | Stop reason: HOSPADM

## 2023-07-29 RX ORDER — QUETIAPINE FUMARATE 25 MG/1
25 TABLET, FILM COATED ORAL EVERY MORNING
Status: DISCONTINUED | OUTPATIENT
Start: 2023-07-29 | End: 2023-08-01 | Stop reason: HOSPADM

## 2023-07-29 RX ORDER — MIRTAZAPINE 15 MG/1
45 TABLET, FILM COATED ORAL AT BEDTIME
Status: DISCONTINUED | OUTPATIENT
Start: 2023-07-29 | End: 2023-08-01 | Stop reason: HOSPADM

## 2023-07-29 RX ORDER — ONDANSETRON 4 MG/1
4 TABLET, ORALLY DISINTEGRATING ORAL EVERY 6 HOURS PRN
Status: DISCONTINUED | OUTPATIENT
Start: 2023-07-29 | End: 2023-08-01 | Stop reason: HOSPADM

## 2023-07-29 RX ORDER — PROCHLORPERAZINE 25 MG
25 SUPPOSITORY, RECTAL RECTAL EVERY 12 HOURS PRN
Status: DISCONTINUED | OUTPATIENT
Start: 2023-07-29 | End: 2023-08-01 | Stop reason: HOSPADM

## 2023-07-29 RX ORDER — PANTOPRAZOLE SODIUM 40 MG/1
40 TABLET, DELAYED RELEASE ORAL DAILY
Status: DISCONTINUED | OUTPATIENT
Start: 2023-07-29 | End: 2023-08-01 | Stop reason: HOSPADM

## 2023-07-29 RX ORDER — GABAPENTIN 300 MG/1
300 CAPSULE ORAL EVERY MORNING
Status: DISCONTINUED | OUTPATIENT
Start: 2023-07-29 | End: 2023-08-01 | Stop reason: HOSPADM

## 2023-07-29 RX ORDER — CARBOXYMETHYLCELLULOSE SODIUM 5 MG/ML
1 SOLUTION/ DROPS OPHTHALMIC
Status: DISCONTINUED | OUTPATIENT
Start: 2023-07-29 | End: 2023-08-01 | Stop reason: HOSPADM

## 2023-07-29 RX ORDER — ONDANSETRON 2 MG/ML
4 INJECTION INTRAMUSCULAR; INTRAVENOUS EVERY 6 HOURS PRN
Status: DISCONTINUED | OUTPATIENT
Start: 2023-07-29 | End: 2023-08-01 | Stop reason: HOSPADM

## 2023-07-29 RX ORDER — LIDOCAINE 40 MG/G
CREAM TOPICAL
Status: DISCONTINUED | OUTPATIENT
Start: 2023-07-29 | End: 2023-07-29

## 2023-07-29 RX ORDER — GABAPENTIN 300 MG/1
600 CAPSULE ORAL AT BEDTIME
Status: DISCONTINUED | OUTPATIENT
Start: 2023-07-29 | End: 2023-08-01 | Stop reason: HOSPADM

## 2023-07-29 RX ORDER — BUPROPION HYDROCHLORIDE 150 MG/1
150 TABLET, EXTENDED RELEASE ORAL 2 TIMES DAILY
Status: DISCONTINUED | OUTPATIENT
Start: 2023-07-29 | End: 2023-08-01 | Stop reason: HOSPADM

## 2023-07-29 RX ORDER — AMOXICILLIN 250 MG
1 CAPSULE ORAL 2 TIMES DAILY
Status: DISCONTINUED | OUTPATIENT
Start: 2023-07-29 | End: 2023-08-01 | Stop reason: HOSPADM

## 2023-07-29 RX ORDER — HYDRALAZINE HYDROCHLORIDE 25 MG/1
25 TABLET, FILM COATED ORAL 3 TIMES DAILY
Status: DISCONTINUED | OUTPATIENT
Start: 2023-07-29 | End: 2023-08-01 | Stop reason: HOSPADM

## 2023-07-29 RX ORDER — BUPRENORPHINE AND NALOXONE 8; 2 MG/1; MG/1
1 FILM, SOLUBLE BUCCAL; SUBLINGUAL 2 TIMES DAILY
Status: DISCONTINUED | OUTPATIENT
Start: 2023-07-29 | End: 2023-08-01 | Stop reason: HOSPADM

## 2023-07-29 RX ORDER — GABAPENTIN 600 MG/1
600 TABLET ORAL EVERY EVENING
Status: DISCONTINUED | OUTPATIENT
Start: 2023-07-29 | End: 2023-08-01

## 2023-07-29 RX ORDER — ASPIRIN 81 MG/1
81 TABLET, CHEWABLE ORAL DAILY
Status: DISCONTINUED | OUTPATIENT
Start: 2023-07-29 | End: 2023-08-01 | Stop reason: HOSPADM

## 2023-07-29 RX ORDER — ACETAMINOPHEN 325 MG/1
650 TABLET ORAL EVERY 6 HOURS PRN
Status: DISCONTINUED | OUTPATIENT
Start: 2023-07-29 | End: 2023-08-01 | Stop reason: HOSPADM

## 2023-07-29 RX ORDER — ALBUTEROL SULFATE 0.83 MG/ML
2.5 SOLUTION RESPIRATORY (INHALATION)
Status: DISCONTINUED | OUTPATIENT
Start: 2023-07-29 | End: 2023-08-01 | Stop reason: HOSPADM

## 2023-07-29 RX ORDER — PREDNISONE 20 MG/1
40 TABLET ORAL DAILY
Status: DISCONTINUED | OUTPATIENT
Start: 2023-07-29 | End: 2023-07-30

## 2023-07-29 RX ORDER — BUPRENORPHINE AND NALOXONE 8; 2 MG/1; MG/1
1 FILM, SOLUBLE BUCCAL; SUBLINGUAL 2 TIMES DAILY
COMMUNITY

## 2023-07-29 RX ORDER — POLYETHYLENE GLYCOL 3350 17 G/17G
17 POWDER, FOR SOLUTION ORAL DAILY
Status: DISCONTINUED | OUTPATIENT
Start: 2023-07-29 | End: 2023-08-01 | Stop reason: HOSPADM

## 2023-07-29 RX ORDER — FLUTICASONE FUROATE AND VILANTEROL 200; 25 UG/1; UG/1
1 POWDER RESPIRATORY (INHALATION) DAILY
Status: DISCONTINUED | OUTPATIENT
Start: 2023-07-29 | End: 2023-08-01 | Stop reason: HOSPADM

## 2023-07-29 RX ORDER — LIDOCAINE 40 MG/G
CREAM TOPICAL
Status: DISCONTINUED | OUTPATIENT
Start: 2023-07-29 | End: 2023-08-01 | Stop reason: HOSPADM

## 2023-07-29 RX ORDER — ATORVASTATIN CALCIUM 40 MG/1
40 TABLET, FILM COATED ORAL DAILY
Status: DISCONTINUED | OUTPATIENT
Start: 2023-07-29 | End: 2023-08-01 | Stop reason: HOSPADM

## 2023-07-29 RX ORDER — BUPRENORPHINE HYDROCHLORIDE AND NALOXONE HYDROCHLORIDE DIHYDRATE 8; 2 MG/1; MG/1
1 TABLET SUBLINGUAL 2 TIMES DAILY
Status: DISCONTINUED | OUTPATIENT
Start: 2023-07-29 | End: 2023-07-29

## 2023-07-29 RX ORDER — AMOXICILLIN 250 MG
2 CAPSULE ORAL 2 TIMES DAILY
Status: DISCONTINUED | OUTPATIENT
Start: 2023-07-29 | End: 2023-08-01 | Stop reason: HOSPADM

## 2023-07-29 RX ADMIN — CARVEDILOL 25 MG: 25 TABLET, FILM COATED ORAL at 09:31

## 2023-07-29 RX ADMIN — GABAPENTIN 600 MG: 300 CAPSULE ORAL at 22:39

## 2023-07-29 RX ADMIN — SENNOSIDES AND DOCUSATE SODIUM 1 TABLET: 50; 8.6 TABLET ORAL at 09:32

## 2023-07-29 RX ADMIN — LISINOPRIL 30 MG: 20 TABLET ORAL at 09:31

## 2023-07-29 RX ADMIN — MIRTAZAPINE 45 MG: 15 TABLET, FILM COATED ORAL at 22:39

## 2023-07-29 RX ADMIN — PREDNISONE 40 MG: 20 TABLET ORAL at 09:31

## 2023-07-29 RX ADMIN — CARVEDILOL 25 MG: 25 TABLET, FILM COATED ORAL at 20:59

## 2023-07-29 RX ADMIN — Medication 1 MG: at 02:23

## 2023-07-29 RX ADMIN — GABAPENTIN 600 MG: 300 CAPSULE ORAL at 02:23

## 2023-07-29 RX ADMIN — ASPIRIN 81 MG 81 MG: 81 TABLET ORAL at 09:31

## 2023-07-29 RX ADMIN — HYDRALAZINE HYDROCHLORIDE 10 MG: 20 INJECTION INTRAMUSCULAR; INTRAVENOUS at 21:02

## 2023-07-29 RX ADMIN — GABAPENTIN 300 MG: 300 CAPSULE ORAL at 09:32

## 2023-07-29 RX ADMIN — IPRATROPIUM BROMIDE AND ALBUTEROL SULFATE 3 ML: .5; 3 SOLUTION RESPIRATORY (INHALATION) at 23:20

## 2023-07-29 RX ADMIN — POLYETHYLENE GLYCOL 3350 17 G: 17 POWDER, FOR SOLUTION ORAL at 09:31

## 2023-07-29 RX ADMIN — QUETIAPINE FUMARATE 25 MG: 25 TABLET ORAL at 09:31

## 2023-07-29 RX ADMIN — QUETIAPINE 12.5 MG: 25 TABLET, FILM COATED ORAL at 01:16

## 2023-07-29 RX ADMIN — IPRATROPIUM BROMIDE AND ALBUTEROL SULFATE 3 ML: .5; 3 SOLUTION RESPIRATORY (INHALATION) at 16:55

## 2023-07-29 RX ADMIN — MULTIPLE VITAMINS W/ MINERALS TAB 1 TABLET: TAB at 09:32

## 2023-07-29 RX ADMIN — MIRTAZAPINE 45 MG: 15 TABLET, FILM COATED ORAL at 02:23

## 2023-07-29 RX ADMIN — BUPRENORPHINE AND NALOXONE 1 FILM: 8; 2 FILM, SOLUBLE BUCCAL; SUBLINGUAL at 09:49

## 2023-07-29 RX ADMIN — GABAPENTIN 600 MG: 600 TABLET, FILM COATED ORAL at 20:59

## 2023-07-29 RX ADMIN — HYDRALAZINE HYDROCHLORIDE 25 MG: 25 TABLET ORAL at 22:39

## 2023-07-29 RX ADMIN — PANTOPRAZOLE SODIUM 40 MG: 40 TABLET, DELAYED RELEASE ORAL at 09:32

## 2023-07-29 RX ADMIN — FLUTICASONE FUROATE AND VILANTEROL TRIFENATATE 1 PUFF: 200; 25 POWDER RESPIRATORY (INHALATION) at 09:32

## 2023-07-29 RX ADMIN — ACETAMINOPHEN 650 MG: 325 TABLET, FILM COATED ORAL at 02:23

## 2023-07-29 RX ADMIN — BUPRENORPHINE AND NALOXONE 1 FILM: 8; 2 FILM, SOLUBLE BUCCAL; SUBLINGUAL at 16:21

## 2023-07-29 RX ADMIN — UMECLIDINIUM 1 PUFF: 62.5 AEROSOL, POWDER ORAL at 09:32

## 2023-07-29 RX ADMIN — AZITHROMYCIN MONOHYDRATE 250 MG: 250 TABLET ORAL at 20:59

## 2023-07-29 RX ADMIN — HYDRALAZINE HYDROCHLORIDE 25 MG: 25 TABLET ORAL at 09:31

## 2023-07-29 RX ADMIN — IPRATROPIUM BROMIDE AND ALBUTEROL SULFATE 3 ML: .5; 3 SOLUTION RESPIRATORY (INHALATION) at 19:35

## 2023-07-29 RX ADMIN — ATORVASTATIN CALCIUM 40 MG: 40 TABLET, FILM COATED ORAL at 09:32

## 2023-07-29 RX ADMIN — BUPROPION HYDROCHLORIDE 150 MG: 150 TABLET, EXTENDED RELEASE ORAL at 20:59

## 2023-07-29 RX ADMIN — BUPROPION HYDROCHLORIDE 150 MG: 150 TABLET, EXTENDED RELEASE ORAL at 09:31

## 2023-07-29 RX ADMIN — HYDRALAZINE HYDROCHLORIDE 25 MG: 25 TABLET ORAL at 16:21

## 2023-07-29 ASSESSMENT — ACTIVITIES OF DAILY LIVING (ADL)
ADLS_ACUITY_SCORE: 35
ADLS_ACUITY_SCORE: 18
CHANGE_IN_FUNCTIONAL_STATUS_SINCE_ONSET_OF_CURRENT_ILLNESS/INJURY: NO
ADLS_ACUITY_SCORE: 18
DRESSING/BATHING_DIFFICULTY: NO
ADLS_ACUITY_SCORE: 18
WALKING_OR_CLIMBING_STAIRS_DIFFICULTY: NO
FALL_HISTORY_WITHIN_LAST_SIX_MONTHS: NO
DOING_ERRANDS_INDEPENDENTLY_DIFFICULTY: NO
ADLS_ACUITY_SCORE: 18
ADLS_ACUITY_SCORE: 18
WEAR_GLASSES_OR_BLIND: NO
ADLS_ACUITY_SCORE: 18
CONCENTRATING,_REMEMBERING_OR_MAKING_DECISIONS_DIFFICULTY: NO
TOILETING_ISSUES: NO
DIFFICULTY_COMMUNICATING: NO
ADLS_ACUITY_SCORE: 18
DIFFICULTY_EATING/SWALLOWING: NO

## 2023-07-29 NOTE — ED TRIAGE NOTES
Patient arrived via EMS from home after EMS was called by family members because he was having trouble breathing. He was recently discharged from the hospital with a COPD exacerbation. He was admitted here at [facility]. When first responders arrived they support his breathing with blow-by bag valve oxygen. His initial oxygen level was in the 40% range. EMS arrived and placed him on BiPAP. Oxygenation improved from the 60s up to the 90s in route. Mental status improved as well. He was given a nebulizer in route as well.

## 2023-07-29 NOTE — PLAN OF CARE
Goal Outcome Evaluation:  Pt alert, steady with ambulation. Stands at bedside to void. Able to come off bipap this am after waking up. LS dim occ wheezes. Back on PTA 3Ln/c. Pt c/o feeling like food getting stuck and then wheezing. Rec'd order for ST. They found no acute issues, rec OP follow up. Prn nebs for c/o wheezing with improvement. Possible discharge tomorrow per Dr Bhatti,      Plan of Care Reviewed With: patient

## 2023-07-29 NOTE — PHARMACY-ADMISSION MEDICATION HISTORY
"Pharmacist Admission Medication History    7/29: Addm: we do not carry SL suboxone tabs.  Checked  and patient is actually getting the SL films, which we happen to carry. Spoke with hospitalist about the situation and got order corrected.  Updated med rec. Mamie Caraballo, PharmD      Admission medication history is complete. The information provided in this note is only as accurate as the sources available at the time of the update.    Medication reconciliation/reorder completed by provider prior to medication history? No    Information Source(s): Facility ([facility] medication list/MAR and [facility] via N/A    Pertinent Information: Patient reports taking AM meds. He was unsure if he received midday hydralazine.  Patient reports he takes \"about 6\" bedtime medication3    Changes made to PTA medication list: extensive changes    Allergies reviewed with patient and updates made in EHR: no    Medication History Completed By: Paul Biggs [facility] 7/28/2023 11:24 PM    Prior to Admission medications    Medication Sig Last Dose Taking? Auth Provider Long Term End Date   acetaminophen (TYLENOL) 325 MG tablet Take 325 mg by mouth every 4 hours as needed for mild pain  at PRN Yes Unknown, Entered By History     albuterol (PROAIR HFA/PROVENTIL HFA/VENTOLIN HFA) 108 (90 Base) MCG/ACT inhaler Inhale 2 puffs into the lungs every 6 hours as needed for shortness of breath, wheezing or cough  at PRN Yes Unknown, Entered By History Yes    aspirin (ASA) 81 MG chewable tablet Take 81 mg by mouth daily 7/28/2023 at AM Yes Unknown, Entered By History     atorvastatin (LIPITOR) 40 MG tablet Take 40 mg by mouth daily  7/28/2023 at AM Yes Unknown, Entered By History Yes    buprenorphine HCl-naloxone HCl (SUBOXONE) 8-2 MG per film Place 1 Film under the tongue 2 times daily AM and early evening  (around 5-6 pm) 7/28/2023 Yes Unknown, Entered By History     buPROPion (WELLBUTRIN SR) 150 MG 12 hr tablet Take 150 mg by " mouth 2 times daily 7/28/2023 at AM Yes Unknown, Entered By History No    carvedilol (COREG) 25 MG tablet Take 25 mg by mouth 2 times daily 7/28/2023 at AM Yes Unknown, Entered By History No    diclofenac (VOLTAREN) 1 % topical gel Apply 2-4 g topically 4 times daily as needed for moderate pain  at PRN Yes Unknown, Entered By History     fluticasone-vilanterol (BREO ELLIPTA) 200-25 MCG/INH inhaler Inhale 1 puff into the lungs daily 7/28/2023 at AM Yes Unknown, Entered By History     gabapentin (NEURONTIN) 300 MG capsule Take 300 mg by mouth every morning 7/28/2023 at AM Yes Unknown, Entered By History Yes    gabapentin (NEURONTIN) 300 MG capsule Take 600 mg by mouth At Bedtime 7/27/2023 at HS Yes Unknown, Entered By History Yes    hydrALAZINE (APRESOLINE) 25 MG tablet Take 25 mg by mouth 3 times daily 7/28/2023 Yes Unknown, Entered By History Yes    ipratropium - albuterol 0.5 mg/2.5 mg/3 mL (DUONEB) 0.5-2.5 (3) MG/3ML neb solution Take 1 vial by nebulization every 4 hours as needed for shortness of breath, wheezing or cough  at PRN Yes Unknown, Entered By History No    lisinopril (ZESTRIL) 30 MG tablet Take 30 mg by mouth daily 7/28/2023 at AM Yes Unknown, Entered By History No    melatonin 5 MG tablet Take 5 mg by mouth nightly as needed for sleep  at PRN Yes Unknown, Entered By History     mirtazapine (REMERON) 45 MG tablet Take 45 mg by mouth At Bedtime  7/27/2023 at HS Yes Unknown, Entered By History Yes    multivitamin w/minerals (THERA-VIT-M) tablet Take 1 tablet by mouth daily 7/28/2023 at AM Yes Unknown, Entered By History     naloxone (NARCAN) 4 MG/0.1ML nasal spray Spray 4 mg into one nostril alternating nostrils once as needed for opioid reversal every 2-3 minutes until assistance arrives  at PRN Yes Unknown, Entered By History     nicotine (NICODERM CQ) 7 MG/24HR 24 hr patch Place 1 patch onto the skin every 24 hours 7/28/2023 at AM Yes Unknown, Entered By History     pantoprazole (PROTONIX) 40 MG EC  tablet Take 40 mg by mouth daily 7/28/2023 at AM Yes Unknown, Entered By History     polyethylene glycol (MIRALAX) 17 g packet Take 1 packet by mouth daily as needed for constipation  at PRN Yes Unknown, Entered By History     QUEtiapine (SEROQUEL) 25 MG tablet Take 25 mg by mouth every morning 7/28/2023 at AM Yes Unknown, Entered By History Yes    tiotropium (SPIRIVA RESPIMAT) 2.5 MCG/ACT inhaler Inhale 2 puffs into the lungs daily 7/28/2023 at AM Yes Unknown, Entered By History No    predniSONE (DELTASONE) 10 MG tablet 4 tabs daily for 4 days, then 3 tabs daily for 2 days, then 2 tabs daily for 2 days, then 1 tab daily for 2 days, then stop  at See note  Jairo Galloway MD     QUEtiapine (SEROQUEL) 25 MG tablet Take 12.5 mg by mouth nightly as needed  at PRN  Unknown, Entered By History Yes

## 2023-07-29 NOTE — PROGRESS NOTES
[facility]    Hospitalist Progress Note    Interval History   Patient is awake and alert.  Back to his regular 2 to 3 L nasal cannula oxygen.  Feeling significantly improved since yesterday.  Did report that his episode started after he had a choking episode while eating.  He does admit to intermittent choking episodes with food a couple of times.    -Data reviewed today: I reviewed all new labs and imaging results over the last 24 hours. I personally reviewed the chest x-ray image(s) showing mild interstitial edema.  No  pneumonic consolidation or pleural effusion. .    Physical Exam   Temp: 97.7  F (36.5  C) Temp src: Oral BP: 108/76 Pulse: 87   Resp: 13 SpO2: 96 % O2 Device: Nasal cannula Oxygen Delivery: 3 LPM  Vitals:    07/29/23 0230   Weight: 71.9 kg (158 lb 8.2 oz)     Vital Signs with Ranges  Temp:  [97.7  F (36.5  C)-98.4  F (36.9  C)] 97.7  F (36.5  C)  Pulse:  [70-96] 87  Resp:  [] 13  BP: (108-192)/() 108/76  FiO2 (%):  [25 %-30 %] 25 %  SpO2:  [94 %-100 %] 96 %  I/O last 3 completed shifts:  In: 490 [P.O.:240; IV Piggyback:250]  Out: 700 [Urine:700]    Physical Exam  Constitutional:       Appearance: He is ill-appearing.   HENT:      Head: Normocephalic.   Cardiovascular:      Rate and Rhythm: Normal rate and regular rhythm.      Pulses: Normal pulses.      Heart sounds: Normal heart sounds.   Pulmonary:      Effort: No respiratory distress.      Breath sounds: Normal breath sounds.      Comments: Continues to be tachypneic with mild bilateral wheezing.  Abdominal:      General: Abdomen is flat. Bowel sounds are normal. There is no distension.      Tenderness: There is no abdominal tenderness. There is no guarding.   Skin:     General: Skin is warm and dry.   Neurological:      General: No focal deficit present.   Psychiatric:         Mood and Affect: Mood normal.           Medications    - MEDICATION INSTRUCTIONS -        aspirin  81 mg Oral Daily    atorvastatin   40 mg Oral Daily    azithromycin  250 mg Oral QPM    buprenorphine HCl-naloxone HCl  1 Film Sublingual BID    buPROPion  150 mg Oral BID    carvedilol  25 mg Oral BID    fluticasone-vilanterol  1 puff Inhalation Daily    gabapentin  300 mg Oral QAM    gabapentin  600 mg Oral At Bedtime    gabapentin  600 mg Oral QPM    hydrALAZINE  25 mg Oral TID    lisinopril  30 mg Oral Daily    mirtazapine  45 mg Oral At Bedtime    multivitamin w/minerals  1 tablet Oral Daily    nicotine  1 patch Transdermal Daily    nicotine   Transdermal Q8H    pantoprazole  40 mg Oral Daily    polyethylene glycol  17 g Oral Daily    predniSONE  40 mg Oral Daily    QUEtiapine  25 mg Oral QAM    senna-docusate  1 tablet Oral BID    Or    senna-docusate  2 tablet Oral BID    sodium chloride (PF)  3 mL Intracatheter Q8H    sodium chloride (PF)  3 mL Intracatheter Q8H    umeclidinium  1 puff Inhalation Daily       Data   Recent Labs   Lab 07/29/23  0159 07/28/23 2022   WBC  --  8.7   HGB  --  11.2*   MCV  --  95   PLT  --  164   NA  --  141   POTASSIUM  --  4.4   CHLORIDE  --  101   CO2  --  31*   BUN  --  11.7   CR  --  1.01   ANIONGAP  --  9   RL  --  8.4*   * 156*   ALBUMIN  --  4.3   PROTTOTAL  --  7.0   BILITOTAL  --  0.4   ALKPHOS  --  115   ALT  --  26   AST  --  22       Recent Results (from the past 24 hour(s))   XR Chest Port 1 View    Narrative    EXAM: XR CHEST PORT 1 VIEW  LOCATION: [facility]  DATE: 7/28/2023    INDICATION: sob, copd  COMPARISON: Chest x-ray 07/15/2023      Impression    IMPRESSION: Mild interstitial edema. No pneumonic consolidation or pleural effusion. Normal heart size. Stable central vascular prominence.         Assessment & Plan      Gerson Pearson is a 63 year old male admitted on 7/28/2023. He presents to the emergency department via EMS after becoming obtunded with respiratory difficulty.  Found to be hypoxic in the 40% range with first responders, placed on CPAP by EMS and  oxygenation improved and patient became more responsive.  PCO2 in the 90s range initially on arrival, and has improved on BiPAP.     Acute hypoxic hypercarbic respiratory failure:  Severe chronic obstructive pulmonary disease: FEV1 of 22% without significant bronchodilator response by prior PFTs.  Utilizes 2 L nasal cannula oxygen with exertion, goal oxygen saturation of 88 to 92%.  COVID-19 negative.  Known history of severe COPD exacerbations with rapid resolution after being placed on BiPAP.  Recently hospitalized 7/15 - 7/16 with a 10-day prednisone taper.  -Was started on BiPAP on admission for severe hypercapnia.  -Been weaned off of BiPAP this morning and is back to his baseline 2 to 3 L nasal cannula.  -Repeat VBG this morning showed pH of 7.33 with PCO2 of 56 and PO2 of 50.  -Encourage complete tobacco cessation  -Note pulmonary rehab appointments pending through [facility] August 1, August 3, August 8, August 10.  -[place] pulmonology consulted; his primary pulmonologist, Dr. Vela at [facility], noted that he could potentially be a candidate for advanced COPD therapies at the  [facility].  -Mr. Pearson was admitted twice in approximately 24 hours in November 2021 with presumed acute COPD exacerbations which abruptly improved despite minimal bronchodilator response on PFTs.  At that time, he was actually discontinuing his Suboxone to snort heroin resulting in both presentations.  He currently reports that he has not used heroin since that time, and has been adherent to his Suboxone therapy through his pain team.  -Prednisone 40 mg daily starting in a.m.  Received Solu-Medrol in the emergency department.    -completing 5 d course of azithromycin.  -Every 2 hours as needed albuterol nebulizer treatments; again, typically minimally responsive to bronchodilators  -Resume prior to admission Incruse Ellipta, Breo Ellipta,     Depression with anxiety:  -Continue as needed  hydroxyzine  -Resume prior to admission Remeron  -Resume prior to admission Seroquel  -Resume prior to admission Wellbutrin     Hypertension:  Coronary artery disease with history of bare-metal stent placement:  -Resume prior to admission aspirin 81 mg daily  -Resume prior to admission atorvastatin 40 mg daily  -Resume prior to admission carvedilol, hydralazine     Coronary artery disease: History of bare-metal stenting to right coronary artery in 2020     Opiate dependence: History of heroin and cocaine abuse, though reports sobriety for the past year and a half.   -Continue prior to admission Suboxone  -Continue with follow-up in pain clinic  -I am not ordering opiates at this time given history of snorting heroin and being followed by pain clinic tox screens through [facility].  -Resume prior to admission gabapentin     Tobacco use disorder with nicotine dependence: Tells me he is now only smoking 1 to 2 cigarettes/day, only 3 puffs of cigarette today prior to admission.  -Continue nicotine patch 7 mg with panel ordered.  -Discussed importance of continued cessation attempts.  He has cut back from 1/2 pack/day as of a year and a half ago.     GERD:  -Resume prior to admission pantoprazole       Diet:   Regular diet  DVT Prophylaxis: Pneumatic Compression Devices  Post Catheter: Not present  Lines: None     Cardiac Monitoring: None  Code Status:  Full code.  Confirmed with patient on admission  Disposition Plan   anticipated discharge 7/30/2023 pending resolution of hypoxia and hypercarbia    Clinically Significant Risk Factors Present on Admission          # Hypocalcemia: Lowest Ca = 8.4 mg/dL in last 2 days, will monitor and replace as appropriate       # Drug Induced Platelet Defect: home medication list includes an antiplatelet medication   # Hypertension: Home medication list includes antihypertensive(s)   # Acute Respiratory Failure: based on blood gas results.  Continue supplemental oxygen as needed                 Iwona Bhatti MD, MD  [phone](p)

## 2023-07-29 NOTE — PROGRESS NOTES
Bedside Swallow Evaluation      07/29/23 1400   Appointment Info   Signing Clinician's Name / Credentials (SLP) Geneva Singh MA, CCC-SLP   General Information   Onset of Illness/Injury or Date of Surgery 07/28/23   Referring Physician Iwona Bhatti MD   Patient/Family Therapy Goal Statement (SLP) Complaints of 'something' stuck in throat   Pertinent History of Current Problem Per MD note: Gerson Pearson is a 63 year old male admitted on 7/28/2023. He presents to the emergency department via EMS after becoming obtunded with respiratory difficulty.  Found to be hypoxic in the 40% range with first responders, placed on CPAP by EMS and oxygenation improved and patient became more responsive.  PCO2 in the 90s range initially on arrival, and has improved on BiPAP.   General Observations Pleasant and engaged, getting his lunch ready.   Type of Evaluation   Type of Evaluation Swallow Evaluation   Oral Motor   Oral Musculature generally intact   Dentition (Oral Motor)   Dentition (Oral Motor) some missing teeth   Facial Symmetry (Oral Motor)   Facial Symmetry (Oral Motor) WNL   Lip Function (Oral Motor)   Lip Range of Motion (Oral Motor) WNL   Tongue Function (Oral Motor)   Tongue ROM (Oral Motor) WNL   Jaw Function (Oral Motor)   Jaw Function (Oral Motor) WNL   Cough/Swallow/Gag Reflex (Oral Motor)   Soft Palate/Velum (Oral Motor) WNL   Volitional Throat Clear/Cough (Oral Motor) impaired;reduced strength   Volitional Swallow (Oral Motor) WNL   Vocal Quality/Secretion Management (Oral Motor)   Vocal Quality (Oral Motor) breathy   Secretion Management (Oral Motor) WNL   General Swallowing Observations   Past History of Dysphagia NO documented speech therapy intervention. Per patient, for the last couple of weeks he has the feeling of something stuck in his throat. He points to the laryngeal prominence of the thyroid cartilage when asked where. He said he can feel food move by it, and occasionally is scared that he is  "going to choke. He denies frequent coughing, pain when swallowing, or feeling that something is \"going down the wrong pipe\".   Respiratory Support (General Swallowing Observations) nasal cannula   Current Diet/Method of Nutritional Intake (General Swallowing Observations, NIS) regular diet;thin liquids (level 0)   Swallowing Evaluation Clinical swallow evaluation   Clinical Swallow Evaluation   Feeding Assistance no assistance needed   Additional evaluation(s) completed today No;Recommended   Rationale for completing additional evaluation Recommend outpatient video swallow study and esophageal work up   Clinical Swallow Evaluation Textures Trialed thin liquids;solid foods   Clinical Swallow Eval: Thin Liquid Texture Trial   Mode of Presentation, Thin Liquids straw;self-fed   Volume of Liquid or Food Presented >2 oz   Oral Phase of Swallow premature pharyngeal entry   Pharyngeal Phase of Swallow intact   Diagnostic Statement mild delay in swallow initiation, no overt s/sx of aspiration   Clinical Swallow Evaluation: Solid Food Texture Trial   Mode of Presentation self-fed   Volume Presented hamburger, potatoes   Oral Phase WFL   Pharyngeal Phase intact   Diagnostic Statement slightly prolonged mastication resulting in pt becoming short of breath due to COPD. No complaint of pain or observed choking, but endorses feeling \"that thing\" in his throat   Esophageal Phase of Swallow   Patient reports or presents with symptoms of esophageal dysphagia Yes   Esophageal comments Hx of GERD on medication   Swallowing Recommendations   Diet Consistency Recommendations regular diet;thin liquids (level 0)   Supervision Level for Intake patient independent   Mode of Delivery Recommendations bolus size, small;slow rate of intake   Postural Recommendations none   Swallowing Maneuver Recommendations alternate food and liquid intake;effortful (hard) swallow   Recommended Feeding/Eating Techniques (Swallow Eval) patient is independent, " no specific recommendations   Medication Administration Recommendations, Swallowing (SLP) Whole as tolerated   Instrumental Assessment Recommendations VFSS (videofluoroscopic swallowing study)   Comment, Swallowing Recommendations Patient consumed thin liquids and solid foods without coughing, choking, or change in vocal quality. With mastication, pt observed to have SOB which he reports can be normal and he gets tired easy. During all PO trials, ongoing report of feeling this sensation in his throat   Clinical Impression   Criteria for Skilled Therapeutic Interventions Met (SLP Eval) Evaluation only   SLP Diagnosis mild oral pharyngeal dysphagia   Risks & Benefits of therapy have been explained evaluation/treatment results reviewed;care plan/treatment goals reviewed;risks/benefits reviewed;current/potential barriers reviewed;participants voiced agreement with care plan;participants included;patient   Clinical Impression Comments Patient presents with mild oral pharyngeal dysphagia secondary to COPD admitted for hypoxia. Pt endorses about 1 month of globus pharyngeus. It is not painful but is always present and makes pt worried about choking. No concerns of obvious aspiration or choking during this observed meal. Education provided on safe swallowing precautions: sitting upright, mositen foods, small bites, and alternating with liquid rinse. Pt interested in further testing to determine etiology. Discussd with MD and recommend outpatient video swallow study with further esophageal work up as indicated to assess for potential  cricopharyngeal bar or esophageal involvement. Recommend regular diet with thin liquids and use of safe swallowing precautions. No further IP SLP needs- will complete the order.   SLP Total Evaluation Time   Eval: oral/pharyngeal swallow function, clinical swallow Minutes ([id number]) 30   SLP Discharge Planning   SLP Plan Further OP work up, OP VFSS   SLP Discharge Recommendation Long term care  facility   SLP Rationale for DC Rec Return to living facility, at baseline regular diet. Recommend OP VFSS   SLP Brief overview of current status  Clinical swallow evaluation completed. Recommend regular diet with thin liquids and use of safe swallowing precautions (sitting upright, mositen foods, small bites, and alternating with liquid rinse). recommend outpatient video swallow study with further esophageal work up as indicated to assess for potential cricopharyngeal bar or esophageal involvement.   Total Session Time   Total Session Time (sum of timed and untimed services) 30

## 2023-07-29 NOTE — PROGRESS NOTES
Pt placed on BIPAP per MD order    CPAP/BiPAP Settings  IPAP: 14  EPAP: 7  Rate: 16  FiO2: 30  Timed Inspiration (sec): .7    CPAP/BiPAP/AVAPS/AVAPS AE Alarms  High Pressure: 25  Low Pressure: 5  Low Pressure Delay: 20  High Rate (breaths/min): 45  Low Rate (breaths/min): 5  Alarm Volume Level: 10    CPAP/BiPAP/AVAPS/AVAPS AE Patient Assessment  Skin Assessment: intact  Barriers Applied: liquicell applied  Lung Sounds: diminished    Plan:continue to monitor and assess effect of BIPAP

## 2023-07-29 NOTE — ED NOTES
Bed: ST02  Expected date: 7/28/23  Expected time: 8:15 PM  Means of arrival: Ambulance  Comments:  Neal 544 61M COPD exac .

## 2023-07-29 NOTE — ED PROVIDER NOTES
History     Chief Complaint:  Shortness of Breath       HPI   Gerson Pearson is a 63 year old male with history of severe severe COPD who presents the emergency department difficulty breathing.  EMS was called by family members because he was having trouble breathing.  He was recently discharged from the hospital with a COPD exacerbation.  He was admitted here at [facility].  When first responders arrived they support his breathing with blow-by bag valve oxygen.  His initial oxygen level was in the 40% range.  EMS arrived and placed him on BiPAP.  Oxygenation improved from the 60s up to the 90s in route.  Mental status improved as well.  He was given a nebulizer in route as well.  Patient denies any chest pain or trouble breathing.  He has not had any fevers.  Denies any abdominal pain vomiting or diarrhea.  He denies any pain or swelling in his legs.  He does note a history of a stent previously.      Medications:    acetaminophen (TYLENOL) 325 MG tablet  albuterol (PROVENTIL) (2.5 MG/3ML) 0.083% neb solution  aspirin (ASA) 81 MG chewable tablet  atorvastatin (LIPITOR) 40 MG tablet  buprenorphine HCl-naloxone HCl (SUBOXONE) 8-2 MG per film  buPROPion (WELLBUTRIN XL) 300 MG 24 hr tablet  carvedilol (COREG) 25 MG tablet  diclofenac (VOLTAREN) 1 % topical gel  docusate sodium (COLACE) 100 MG capsule  fluticasone-vilanterol (BREO ELLIPTA) 200-25 MCG/INH inhaler  gabapentin (NEURONTIN) 300 MG capsule  hydrALAZINE (APRESOLINE) 25 MG tablet  lisinopril (ZESTRIL) 40 MG tablet  melatonin 5 MG tablet  mirtazapine (REMERON) 45 MG tablet  multivitamin w/minerals (THERA-VIT-M) tablet  naloxone (NARCAN) 4 MG/0.1ML nasal spray  nicotine (NICODERM CQ) 7 MG/24HR 24 hr patch  pantoprazole (PROTONIX) 40 MG EC tablet  polyethylene glycol (MIRALAX) 17 g packet  predniSONE (DELTASONE) 10 MG tablet  QUEtiapine (SEROQUEL) 25 MG tablet  QUEtiapine (SEROQUEL) 25 MG tablet  tiotropium (SPIRIVA RESPIMAT) 2.5 MCG/ACT inhaler        Past  Medical History:    Past Medical History:   Diagnosis Date    CAD (coronary artery disease)     Chronic back pain     Chronic systolic congestive heart failure (H)     COPD (chronic obstructive pulmonary disease) (H)     Depressive disorder     Myocardial infarction (H)     Nicotine dependence        Past Surgical History:    No past surgical history on file.       Physical Exam   Patient Vitals for the past 24 hrs:   BP Temp Temp src Pulse Resp SpO2   07/29/23 0118 (!) 174/106 -- -- 84 19 98 %   07/29/23 0000 135/83 -- -- 75 16 95 %   07/28/23 2301 126/80 -- -- 81 16 95 %   07/28/23 2230 126/79 -- -- 80 16 96 %   07/28/23 2200 (!) 142/93 -- -- 87 17 99 %   07/28/23 2131 -- -- -- 92 21 99 %   07/28/23 2130 -- -- -- 92 28 99 %   07/28/23 2115 (!) 129/110 -- -- 88 21 98 %   07/28/23 2100 115/83 -- -- 91 20 99 %   07/28/23 2045 (!) 139/108 -- -- 89 (!) 45 99 %   07/28/23 2042 (!) 139/108 98.4  F (36.9  C) Temporal 91 18 99 %   07/28/23 2040 (!) 139/108 -- -- 91 19 99 %   07/28/23 2038 -- -- -- 93 24 100 %   07/28/23 2031 -- -- -- 93 20 98 %   07/28/23 2030 (!) 141/94 -- -- 86 14 --   07/28/23 2029 -- -- -- 89 14 --   07/28/23 2028 -- -- -- 91 16 100 %   07/28/23 2027 -- -- -- 93 (!) 39 100 %   07/28/23 2026 -- -- -- 90 14 100 %   07/28/23 2025 -- -- -- 91 14 100 %   07/28/23 2024 (!) 162/114 -- -- 91 14 100 %   07/28/23 2023 -- -- -- 91 16 100 %   07/28/23 2022 -- -- -- 92 18 100 %   07/28/23 2021 -- -- -- 96 (!) 107 99 %   07/28/23 2020 (!) 162/114 -- -- 96 (!) 32 100 %        Physical Exam  Eyes:  The pupils are equal and round    Conjunctivae and sclerae are normal  ENT:    The nose is normal    Pinnae are normal    The oropharynx is dry  CV:  Regular rate and rhythm     No edema  Resp:  BIPAP On    Expiratory wheezing    Poor air movement  GI:  Abdomen is soft, there is no rigidity    No distension    No rebound tenderness   MS:  Normal muscular tone    No asymmetric leg swelling  Skin:  No rash or acute skin  lesions noted  Neuro:   Awake, alert.      Speech is normal and fluent.    Face is symmetric.     Moves all extremities    Emergency Department Course   ECG  ECG results from 07/28/23   EKG 12 lead     Value    Systolic Blood Pressure     Diastolic Blood Pressure     Ventricular Rate 93    Atrial Rate 93    KS Interval 126    QRS Duration 86        QTc 437    P Axis 75    R AXIS 67    T Axis 82    Interpretation ECG      Sinus rhythm  Normal ECG  When compared with ECG of 15-JUL-2023 08:24,  Nonspecific T wave abnormality no longer evident in Lateral leads  Confirmed by GENERATED REPORT, COMPUTER ([id number]),  Shen Lassiter ([id number]) on 7/28/2023 9:42:44 PM          Imaging:  XR Chest Port 1 View   Final Result   IMPRESSION: Mild interstitial edema. No pneumonic consolidation or pleural effusion. Normal heart size. Stable central vascular prominence.        Report per radiology    Laboratory:  Labs Ordered and Resulted from Time of ED Arrival to Time of ED Departure   COMPREHENSIVE METABOLIC PANEL - Abnormal       Result Value    Sodium 141      Potassium 4.4      Chloride 101      Carbon Dioxide (CO2) 31 (*)     Anion Gap 9      Urea Nitrogen 11.7      Creatinine 1.01      Calcium 8.4 (*)     Glucose 156 (*)     Alkaline Phosphatase 115      AST 22      ALT 26      Protein Total 7.0      Albumin 4.3      Bilirubin Total 0.4      GFR Estimate 84     CBC WITH PLATELETS AND DIFFERENTIAL - Abnormal    WBC Count 8.7      RBC Count 3.88 (*)     Hemoglobin 11.2 (*)     Hematocrit 36.8 (*)     MCV 95      MCH 28.9      MCHC 30.4 (*)     RDW 15.1 (*)     Platelet Count 164      % Neutrophils 61      % Lymphocytes 27      % Monocytes 8      % Eosinophils 3      % Basophils 0      % Immature Granulocytes 1      NRBCs per 100 WBC 0      Absolute Neutrophils 5.3      Absolute Lymphocytes 2.4      Absolute Monocytes 0.7      Absolute Eosinophils 0.2      Absolute Basophils 0.0      Absolute Immature Granulocytes 0.1       Absolute NRBCs 0.0     ISTAT GASES LACTATE VENOUS POCT - Abnormal    Lactic Acid POCT 2.0      Bicarbonate Venous POCT 34 (*)     O2 Sat, Venous POCT 69 (*)     pCO2 Venous POCT 94 (*)     pH Venous POCT 7.16 (*)     pO2 Venous POCT 48 (*)    ISTAT GASES LACTATE VENOUS POCT - Abnormal    Lactic Acid POCT 0.9      Bicarbonate Venous POCT 28      O2 Sat, Venous POCT 78 (*)     pCO2 Venous POCT 64 (*)     pH Venous POCT 7.25 (*)     pO2 Venous POCT 51 (*)    TROPONIN T, HIGH SENSITIVITY - Normal    Troponin T, High Sensitivity 9     NT PROBNP INPATIENT - Normal    N terminal Pro BNP Inpatient 282     INFLUENZA A/B, RSV, & SARS-COV2 PCR - Normal    Influenza A PCR Negative      Influenza B PCR Negative      RSV PCR Negative      SARS CoV2 PCR Negative     BLOOD CULTURE   BLOOD CULTURE        Emergency Department Course & Assessments:             Interventions:  Medications   0.9% sodium chloride BOLUS (1,000 mLs Intravenous $New Bag 7/28/23 2027)   azithromycin (ZITHROMAX) 500 mg vial to attach to  mL bag (500 mg Intravenous $New Bag 7/28/23 2027)   methylPREDNISolone sodium succinate (solu-MEDROL) injection 125 mg (125 mg Intravenous $Given 7/28/23 2025)   ipratropium - albuterol 0.5 mg/2.5 mg/3 mL (DUONEB) neb solution 3 mL (3 mLs Nebulization $Given 7/28/23 2025)           Disposition:  The patient was admitted to the hospital under the care of Dr. Herring.     Impression & Plan      Medical Decision Making:  Gerson Pearson is a 63 year old male who presents to the emergency department with difficulty breathing.  He is found with very low oxygen saturations on arrival of first responders.  His O2 sat was noted to be in the 40s.  He was placed on BiPAP eventually by EMS and oxygenation normalized.  He became more alert and interactive.  Here his PCO2 is 94 on arrival.  This was with BiPAP in place by EMS.  Laboratory work-up showed a normal troponin.  White blood cell count is normal.  Mental status and breathing  continued to improve on BiPAP.  He is given DuoNeb and methylprednisolone and azithromycin.  Chest x-ray did not reveal any infiltrates.  There is suspicion for some possible mild pulmonary edema.  He does not appear volume overloaded at this time.  COVID testing is negative.  Patient continued to improve here.  Admission to [facility] was ordered.  Discussed with the hospitalist agreed except the patient as an admission.    Critical Care time:  was 35 minutes for this patient excluding procedures.    Diagnosis:    ICD-10-CM    1. COPD exacerbation (H)  J44.1       2. Acute respiratory failure with hypoxia and hypercapnia (H)  J96.01     J96.02            Discharge Medications:  Current Discharge Medication List             Herberth Ngo MD  7/28/2023   Herberth Ngo MD [place], Aaron Joseph, MD  07/29/23 0207

## 2023-07-29 NOTE — PLAN OF CARE
[id number]    Pt arrived from ED on Bipap. HR NSR, 's, afebrile, L/S fine crackles with exp wheezing, Bipap settings 14/7 at 30%Fi02. Regular diet, Urinal voiding, pt c/o HA, Tylenol given to good effect. Pt has high anxiety, exacerbated by nebs.

## 2023-07-29 NOTE — ED NOTES
[facility]  ED Nurse Handoff Report    ED Chief complaint: Shortness of Breath      ED Diagnosis:   Final diagnoses:   COPD exacerbation (H)   Acute respiratory failure with hypoxia and hypercapnia (H)       Code Status: for hospitalist to address    Allergies:   Allergies   Allergen Reactions    Ibuprofen Nausea and Vomiting       Patient Story: arrived via EMS from a group home for difficulty breathing. He was recently discharged from the hospital with a COPD exacerbation. When first responders arrived they support his breathing with blow-by bag valve oxygen. His initial oxygen level was in the 40% range. EMS arrived and placed him on BiPAP and gave a nebulizer, oxygenation improved from the 60s up to the 90s in route.   Hx of COPD    Focused Assessment:  A&Ox4 but forgetful. Skin dry, warm, color wnl. Respirations fast and labored. Is able to stand up and use the urinal independently.   Results for orders placed or performed during the hospital encounter of 07/28/23   XR Chest Port 1 View     Status: None    Narrative    EXAM: XR CHEST PORT 1 VIEW  LOCATION: [facility]  DATE: 7/28/2023    INDICATION: sob, copd  COMPARISON: Chest x-ray 07/15/2023      Impression    IMPRESSION: Mild interstitial edema. No pneumonic consolidation or pleural effusion. Normal heart size. Stable central vascular prominence.   Comprehensive metabolic panel     Status: Abnormal   Result Value Ref Range    Sodium 141 136 - 145 mmol/L    Potassium 4.4 3.4 - 5.3 mmol/L    Chloride 101 98 - 107 mmol/L    Carbon Dioxide (CO2) 31 (H) 22 - 29 mmol/L    Anion Gap 9 7 - 15 mmol/L    Urea Nitrogen 11.7 8.0 - 23.0 mg/dL    Creatinine 1.01 0.67 - 1.17 mg/dL    Calcium 8.4 (L) 8.8 - 10.2 mg/dL    Glucose 156 (H) 70 - 99 mg/dL    Alkaline Phosphatase 115 40 - 129 U/L    AST 22 0 - 45 U/L    ALT 26 0 - 70 U/L    Protein Total 7.0 6.4 - 8.3 g/dL    Albumin 4.3 3.5 - 5.2 g/dL    Bilirubin Total 0.4 <=1.2 mg/dL     GFR Estimate 84 >60 mL/min/1.73m2   Troponin T, High Sensitivity     Status: Normal   Result Value Ref Range    Troponin T, High Sensitivity 9 <=22 ng/L   Nt probnp inpatient (BNP)     Status: Normal   Result Value Ref Range    N terminal Pro BNP Inpatient 282 0 - 900 pg/mL   CBC with platelets and differential     Status: Abnormal   Result Value Ref Range    WBC Count 8.7 4.0 - 11.0 10e3/uL    RBC Count 3.88 (L) 4.40 - 5.90 10e6/uL    Hemoglobin 11.2 (L) 13.3 - 17.7 g/dL    Hematocrit 36.8 (L) 40.0 - 53.0 %    MCV 95 78 - 100 fL    MCH 28.9 26.5 - 33.0 pg    MCHC 30.4 (L) 31.5 - 36.5 g/dL    RDW 15.1 (H) 10.0 - 15.0 %    Platelet Count 164 150 - 450 10e3/uL    % Neutrophils 61 %    % Lymphocytes 27 %    % Monocytes 8 %    % Eosinophils 3 %    % Basophils 0 %    % Immature Granulocytes 1 %    NRBCs per 100 WBC 0 <1 /100    Absolute Neutrophils 5.3 1.6 - 8.3 10e3/uL    Absolute Lymphocytes 2.4 0.8 - 5.3 10e3/uL    Absolute Monocytes 0.7 0.0 - 1.3 10e3/uL    Absolute Eosinophils 0.2 0.0 - 0.7 10e3/uL    Absolute Basophils 0.0 0.0 - 0.2 10e3/uL    Absolute Immature Granulocytes 0.1 <=0.4 10e3/uL    Absolute NRBCs 0.0 10e3/uL   Symptomatic Influenza A/B, RSV, & SARS-CoV2 PCR (COVID-19) Nasopharyngeal     Status: Normal    Specimen: Nasopharyngeal; Swab   Result Value Ref Range    Influenza A PCR Negative Negative    Influenza B PCR Negative Negative    RSV PCR Negative Negative    SARS CoV2 PCR Negative Negative    Narrative    Testing was performed using the Xpert Xpress CoV2/Flu/RSV Assay on the Cepheid GeneXpert Instrument. This test should be ordered for the detection of SARS-CoV-2, influenza, and RSV viruses in individuals who meet clinical and/or epidemiological criteria. Test performance is unknown in asymptomatic patients. This test is for in vitro diagnostic use under the FDA EUA for laboratories certified under CLIA to perform high or moderate complexity testing. This test has not been FDA cleared or  approved. A negative result does not rule out the presence of PCR inhibitors in the specimen or target RNA in concentration below the limit of detection for the assay. If only one viral target is positive but coinfection with multiple targets is suspected, the sample should be re-tested with another FDA cleared, approved, or authorized test, if coinfection would change clinical management. This test was validated by the [facility]. These laboratories are certified under the Clinical Laboratory Improvement Amendments of 1988 (CLIA-88) as qualified to perform high complexity laboratory testing.   iStat Gases (lactate) venous, POCT     Status: Abnormal   Result Value Ref Range    Lactic Acid POCT 2.0 <=2.0 mmol/L    Bicarbonate Venous POCT 34 (H) 21 - 28 mmol/L    O2 Sat, Venous POCT 69 (L) 94 - 100 %    pCO2 Venous POCT 94 (HH) 40 - 50 mm Hg    pH Venous POCT 7.16 (LL) 7.32 - 7.43    pO2 Venous POCT 48 (H) 25 - 47 mm Hg   iStat Gases (lactate) venous, POCT     Status: Abnormal   Result Value Ref Range    Lactic Acid POCT 0.9 <=2.0 mmol/L    Bicarbonate Venous POCT 28 21 - 28 mmol/L    O2 Sat, Venous POCT 78 (L) 94 - 100 %    pCO2 Venous POCT 64 (H) 40 - 50 mm Hg    pH Venous POCT 7.25 (L) 7.32 - 7.43    pO2 Venous POCT 51 (H) 25 - 47 mm Hg   EKG 12 lead     Status: None   Result Value Ref Range    Systolic Blood Pressure  mmHg    Diastolic Blood Pressure  mmHg    Ventricular Rate 93 BPM    Atrial Rate 93 BPM    MI Interval 126 ms    QRS Duration 86 ms     ms    QTc 437 ms    P Axis 75 degrees    R AXIS 67 degrees    T Axis 82 degrees    Interpretation ECG       Sinus rhythm  Normal ECG  When compared with ECG of 15-JUL-2023 08:24,  Nonspecific T wave abnormality no longer evident in Lateral leads  Confirmed by GENERATED REPORT, COMPUTER ([id number]),  Shen Lassiter ([id number]) on 7/28/2023 9:42:44 PM     CBC with platelets differential     Status: Abnormal    Narrative    The following orders were  created for panel order CBC with platelets differential.  Procedure                               Abnormality         Status                     ---------                               -----------         ------                     CBC with platelets and d...[[id number]]  Abnormal            Final result                 Please view results for these tests on the individual orders.       Treatments and/or interventions provided: on BiPap   Medications   0.9% sodium chloride BOLUS (0 mLs Intravenous Stopped 7/28/23 2128)   methylPREDNISolone sodium succinate (solu-MEDROL) injection 125 mg (125 mg Intravenous $Given 7/28/23 2025)   ipratropium - albuterol 0.5 mg/2.5 mg/3 mL (DUONEB) neb solution 3 mL (3 mLs Nebulization $Given 7/28/23 2025)   azithromycin (ZITHROMAX) 500 mg vial to attach to  mL bag (0 mg Intravenous Stopped 7/28/23 2132)     Patient's response to treatments and/or interventions: VSS, respiratory effort improving, comfortably resting in stretcher.    To be done/followed up on inpatient unit:  continue with POC    Does this patient have any cognitive concerns?: Forgetful    Activity level - Baseline/Home:  Independent  Activity Level - Current:   Stand with Assist    Patient's Preferred language: English   Needed?: No    Isolation: None  Infection: Not Applicable  Patient tested for COVID 19 prior to admission: YES  Bariatric?: No    Vital Signs:   Vitals:    07/28/23 2115 07/28/23 2130 07/28/23 2131 07/28/23 2200   BP: (!) 129/110   (!) 142/93   Pulse: 88 92 92 87   Resp: 21 28 21 17   Temp:       TempSrc:       SpO2: 98% 99% 99% 99%       Cardiac Rhythm:     Was the PSS-3 completed:   Yes  What interventions are required if any?    Family Comments: pt by himself in ED  OBS brochure/video discussed/provided to patient/family: N/A  For the majority of the shift this patient's behavior was Green.   Behavioral interventions performed were n/a.    ED NURSE PHONE NUMBER: [phone]

## 2023-07-30 PROCEDURE — 250N000013 HC RX MED GY IP 250 OP 250 PS 637: Performed by: HOSPITALIST

## 2023-07-30 PROCEDURE — 210N000001 HC R&B IMCU HEART CARE

## 2023-07-30 PROCEDURE — 250N000013 HC RX MED GY IP 250 OP 250 PS 637: Performed by: INTERNAL MEDICINE

## 2023-07-30 PROCEDURE — 250N000009 HC RX 250: Performed by: HOSPITALIST

## 2023-07-30 PROCEDURE — 250N000011 HC RX IP 250 OP 636: Mod: JZ | Performed by: HOSPITALIST

## 2023-07-30 PROCEDURE — 999N000157 HC STATISTIC RCP TIME EA 10 MIN

## 2023-07-30 PROCEDURE — 99232 SBSQ HOSP IP/OBS MODERATE 35: CPT | Performed by: HOSPITALIST

## 2023-07-30 PROCEDURE — 250N000012 HC RX MED GY IP 250 OP 636 PS 637: Performed by: INTERNAL MEDICINE

## 2023-07-30 PROCEDURE — 94640 AIRWAY INHALATION TREATMENT: CPT

## 2023-07-30 PROCEDURE — 94640 AIRWAY INHALATION TREATMENT: CPT | Mod: 76

## 2023-07-30 RX ORDER — METHYLPREDNISOLONE SODIUM SUCCINATE 40 MG/ML
40 INJECTION, POWDER, LYOPHILIZED, FOR SOLUTION INTRAMUSCULAR; INTRAVENOUS EVERY 12 HOURS
Status: DISCONTINUED | OUTPATIENT
Start: 2023-07-30 | End: 2023-07-31

## 2023-07-30 RX ADMIN — MIRTAZAPINE 45 MG: 15 TABLET, FILM COATED ORAL at 22:11

## 2023-07-30 RX ADMIN — SENNOSIDES AND DOCUSATE SODIUM 1 TABLET: 50; 8.6 TABLET ORAL at 08:11

## 2023-07-30 RX ADMIN — ALBUTEROL SULFATE 2 PUFF: 90 AEROSOL, METERED RESPIRATORY (INHALATION) at 08:23

## 2023-07-30 RX ADMIN — IPRATROPIUM BROMIDE AND ALBUTEROL SULFATE 3 ML: .5; 3 SOLUTION RESPIRATORY (INHALATION) at 20:18

## 2023-07-30 RX ADMIN — IPRATROPIUM BROMIDE AND ALBUTEROL SULFATE 3 ML: .5; 3 SOLUTION RESPIRATORY (INHALATION) at 07:17

## 2023-07-30 RX ADMIN — ACETAMINOPHEN 650 MG: 325 TABLET, FILM COATED ORAL at 17:57

## 2023-07-30 RX ADMIN — IPRATROPIUM BROMIDE AND ALBUTEROL SULFATE 3 ML: .5; 3 SOLUTION RESPIRATORY (INHALATION) at 14:57

## 2023-07-30 RX ADMIN — FLUTICASONE FUROATE AND VILANTEROL TRIFENATATE 1 PUFF: 200; 25 POWDER RESPIRATORY (INHALATION) at 08:16

## 2023-07-30 RX ADMIN — IPRATROPIUM BROMIDE AND ALBUTEROL SULFATE 3 ML: .5; 3 SOLUTION RESPIRATORY (INHALATION) at 23:36

## 2023-07-30 RX ADMIN — QUETIAPINE 12.5 MG: 25 TABLET, FILM COATED ORAL at 22:13

## 2023-07-30 RX ADMIN — HYDRALAZINE HYDROCHLORIDE 25 MG: 25 TABLET ORAL at 17:24

## 2023-07-30 RX ADMIN — BUPRENORPHINE AND NALOXONE 1 FILM: 8; 2 FILM, SOLUBLE BUCCAL; SUBLINGUAL at 08:12

## 2023-07-30 RX ADMIN — METHYLPREDNISOLONE SODIUM SUCCINATE 40 MG: 40 INJECTION, POWDER, FOR SOLUTION INTRAMUSCULAR; INTRAVENOUS at 17:24

## 2023-07-30 RX ADMIN — GABAPENTIN 600 MG: 300 CAPSULE ORAL at 22:10

## 2023-07-30 RX ADMIN — CARVEDILOL 25 MG: 25 TABLET, FILM COATED ORAL at 08:12

## 2023-07-30 RX ADMIN — IPRATROPIUM BROMIDE AND ALBUTEROL SULFATE 3 ML: .5; 3 SOLUTION RESPIRATORY (INHALATION) at 10:45

## 2023-07-30 RX ADMIN — PANTOPRAZOLE SODIUM 40 MG: 40 TABLET, DELAYED RELEASE ORAL at 08:12

## 2023-07-30 RX ADMIN — GABAPENTIN 600 MG: 600 TABLET, FILM COATED ORAL at 20:34

## 2023-07-30 RX ADMIN — GABAPENTIN 300 MG: 300 CAPSULE ORAL at 08:11

## 2023-07-30 RX ADMIN — BUPROPION HYDROCHLORIDE 150 MG: 150 TABLET, EXTENDED RELEASE ORAL at 08:12

## 2023-07-30 RX ADMIN — HYDRALAZINE HYDROCHLORIDE 10 MG: 20 INJECTION INTRAMUSCULAR; INTRAVENOUS at 22:09

## 2023-07-30 RX ADMIN — QUETIAPINE FUMARATE 25 MG: 25 TABLET ORAL at 08:12

## 2023-07-30 RX ADMIN — AZITHROMYCIN MONOHYDRATE 250 MG: 250 TABLET ORAL at 20:34

## 2023-07-30 RX ADMIN — UMECLIDINIUM 1 PUFF: 62.5 AEROSOL, POWDER ORAL at 08:16

## 2023-07-30 RX ADMIN — BUPROPION HYDROCHLORIDE 150 MG: 150 TABLET, EXTENDED RELEASE ORAL at 20:34

## 2023-07-30 RX ADMIN — MULTIPLE VITAMINS W/ MINERALS TAB 1 TABLET: TAB at 08:11

## 2023-07-30 RX ADMIN — CARVEDILOL 25 MG: 25 TABLET, FILM COATED ORAL at 20:34

## 2023-07-30 RX ADMIN — PREDNISONE 40 MG: 20 TABLET ORAL at 08:11

## 2023-07-30 RX ADMIN — ATORVASTATIN CALCIUM 40 MG: 40 TABLET, FILM COATED ORAL at 08:11

## 2023-07-30 RX ADMIN — LISINOPRIL 30 MG: 20 TABLET ORAL at 08:11

## 2023-07-30 RX ADMIN — ASPIRIN 81 MG 81 MG: 81 TABLET ORAL at 08:11

## 2023-07-30 RX ADMIN — BUPRENORPHINE AND NALOXONE 1 FILM: 8; 2 FILM, SOLUBLE BUCCAL; SUBLINGUAL at 17:24

## 2023-07-30 RX ADMIN — HYDRALAZINE HYDROCHLORIDE 25 MG: 25 TABLET ORAL at 23:22

## 2023-07-30 RX ADMIN — HYDRALAZINE HYDROCHLORIDE 25 MG: 25 TABLET ORAL at 08:12

## 2023-07-30 RX ADMIN — ALBUTEROL SULFATE 2 PUFF: 90 AEROSOL, METERED RESPIRATORY (INHALATION) at 14:13

## 2023-07-30 RX ADMIN — IPRATROPIUM BROMIDE AND ALBUTEROL SULFATE 3 ML: .5; 3 SOLUTION RESPIRATORY (INHALATION) at 03:33

## 2023-07-30 ASSESSMENT — ACTIVITIES OF DAILY LIVING (ADL)
ADLS_ACUITY_SCORE: 18

## 2023-07-30 NOTE — PROGRESS NOTES
[facility]    Hospitalist Progress Note    Interval History   Patient is awake and alert.  Back to baseline with respiratory activity at 2 L nasal cannula.  Continues to be tachypneic and appears quite fatigued.    -Data reviewed today: I reviewed all new labs and imaging results over the last 24 hours. I personally reviewed the chest x-ray image(s) showing mild interstitial edema.  No  pneumonic consolidation or pleural effusion. .    Physical Exam   Temp: 98  F (36.7  C) Temp src: Oral BP: (!) 149/98 Pulse: 93   Resp: 12 SpO2: 99 % O2 Device: Nasal cannula Oxygen Delivery: 3 LPM  Vitals:    07/29/23 0230 07/30/23 0645   Weight: 71.9 kg (158 lb 8.2 oz) 69.9 kg (154 lb 1.6 oz)     Vital Signs with Ranges  Temp:  [98  F (36.7  C)-98.4  F (36.9  C)] 98  F (36.7  C)  Pulse:  [74-98] 93  Resp:  [12-24] 12  BP: (134-178)/() 149/98  SpO2:  [97 %-99 %] 99 %  I/O last 3 completed shifts:  In: 780 [P.O.:780]  Out: 2450 [Urine:2450]    Physical Exam  Constitutional:       Appearance: He is ill-appearing.   HENT:      Head: Normocephalic.   Cardiovascular:      Rate and Rhythm: Normal rate and regular rhythm.      Pulses: Normal pulses.      Heart sounds: Normal heart sounds.   Pulmonary:      Effort: No respiratory distress.      Breath sounds: Normal breath sounds.      Comments: Continues to be tachypneic with mild bilateral wheezing.  Abdominal:      General: Abdomen is flat. Bowel sounds are normal. There is no distension.      Tenderness: There is no abdominal tenderness. There is no guarding.   Skin:     General: Skin is warm and dry.   Neurological:      General: No focal deficit present.   Psychiatric:         Mood and Affect: Mood normal.           Medications    - MEDICATION INSTRUCTIONS -        aspirin  81 mg Oral Daily    atorvastatin  40 mg Oral Daily    azithromycin  250 mg Oral QPM    buprenorphine HCl-naloxone HCl  1 Film Sublingual BID    buPROPion  150 mg Oral BID     carvedilol  25 mg Oral BID    fluticasone-vilanterol  1 puff Inhalation Daily    gabapentin  300 mg Oral QAM    gabapentin  600 mg Oral At Bedtime    gabapentin  600 mg Oral QPM    hydrALAZINE  25 mg Oral TID    ipratropium - albuterol 0.5 mg/2.5 mg/3 mL  1 vial Nebulization Q4H    lisinopril  30 mg Oral Daily    methylPREDNISolone  40 mg Intravenous Q12H    mirtazapine  45 mg Oral At Bedtime    multivitamin w/minerals  1 tablet Oral Daily    nicotine  1 patch Transdermal Daily    nicotine   Transdermal Q8H    pantoprazole  40 mg Oral Daily    polyethylene glycol  17 g Oral Daily    QUEtiapine  25 mg Oral QAM    senna-docusate  1 tablet Oral BID    Or    senna-docusate  2 tablet Oral BID    sodium chloride (PF)  3 mL Intracatheter Q8H    sodium chloride (PF)  3 mL Intracatheter Q8H    umeclidinium  1 puff Inhalation Daily       Data   Recent Labs   Lab 07/29/23  0159 07/28/23 2022   WBC  --  8.7   HGB  --  11.2*   MCV  --  95   PLT  --  164   NA  --  141   POTASSIUM  --  4.4   CHLORIDE  --  101   CO2  --  31*   BUN  --  11.7   CR  --  1.01   ANIONGAP  --  9   RL  --  8.4*   * 156*   ALBUMIN  --  4.3   PROTTOTAL  --  7.0   BILITOTAL  --  0.4   ALKPHOS  --  115   ALT  --  26   AST  --  22         No results found for this or any previous visit (from the past 24 hour(s)).        Assessment & Plan      Gerson Pearson is a 63 year old male admitted on 7/28/2023. He presents to the emergency department via EMS after becoming obtunded with respiratory difficulty.  Found to be hypoxic in the 40% range with first responders, placed on CPAP by EMS and oxygenation improved and patient became more responsive.  PCO2 in the 90s range initially on arrival, and has improved on BiPAP.     Acute hypoxic hypercarbic respiratory failure:  Severe chronic obstructive pulmonary disease: FEV1 of 22% without significant bronchodilator response by prior PFTs.  Utilizes 2 L nasal cannula oxygen with exertion, goal oxygen saturation  of 88 to 92%.  COVID-19 negative.  Known history of severe COPD exacerbations with rapid resolution after being placed on BiPAP.  Recently hospitalized 7/15 - 7/16 with a 10-day prednisone taper.  -Was started on BiPAP on admission for severe hypercapnia.  -Been weaned off of BiPAP this morning and is back to his baseline 2 to 3 L nasal cannula.  -Repeat VBG this morning showed pH of 7.33 with PCO2 of 56 and PO2 of 50.  -Encourage complete tobacco cessation  -Note pulmonary rehab appointments pending through [facility] August 1, August 3, August 8, August 10.  -[place] pulmonology consulted; his primary pulmonologist, Dr. Vela at [facility], noted that he could potentially be a candidate for advanced COPD therapies at the  of .  -Mr. Pearson was admitted twice in approximately 24 hours in November 2021 with presumed acute COPD exacerbations which abruptly improved despite minimal bronchodilator response on PFTs.  At that time, he was actually discontinuing his Suboxone to snort heroin resulting in both presentations.  He currently reports that he has not used heroin since that time, and has been adherent to his Suboxone therapy through his pain team.  -Was started on prednisone 40 mg daily after receiving Solu-Medrol in the ER.  Will transition to IV Solu-Medrol 40 mg twice daily due to reduced air movement and tachypnea.  -completing 5 d course of azithromycin.  -Every 2 hours as needed albuterol nebulizer treatments; again, typically minimally responsive to bronchodilators  -Resume prior to admission Incruse Ellipta, Breo Ellipta,  -Pulmonary consult placed.  Not done yet.     Depression with anxiety:  -Continue as needed hydroxyzine  -Resume prior to admission Remeron  -Resume prior to admission Seroquel  -Resume prior to admission Wellbutrin     Hypertension:  Coronary artery disease with history of bare-metal stent placement:  -Resume prior to admission aspirin 81 mg  daily  -Resume prior to admission atorvastatin 40 mg daily  -Resume prior to admission carvedilol, hydralazine     Coronary artery disease: History of bare-metal stenting to right coronary artery in 2020     Opiate dependence: History of heroin and cocaine abuse, though reports sobriety for the past year and a half.   -Continue prior to admission Suboxone  -Continue with follow-up in pain clinic  -I am not ordering opiates at this time given history of snorting heroin and being followed by pain clinic tox screens through [facility].  -Resume prior to admission gabapentin     Tobacco use disorder with nicotine dependence: Tells me he is now only smoking 1 to 2 cigarettes/day, only 3 puffs of cigarette today prior to admission.  -Continue nicotine patch 7 mg with panel ordered.  -Discussed importance of continued cessation attempts.  He has cut back from 1/2 pack/day as of a year and a half ago.     GERD:  -Resume prior to admission pantoprazole    Dysphagia-patient has recurrent choking and coughing episodes with eating and had another episode today.  Initial plan was to obtain swallow evaluation with video study as an outpatient since he was eating well here but he had another episode of fit of coughing while having lunch.  Decided to proceed with videofluoroscopic swallow study while inpatient.       Diet:   Regular diet  DVT Prophylaxis: Pneumatic Compression Devices  Post Catheter: Not present  Lines: None     Cardiac Monitoring: None  Code Status:  Full code.  Confirmed with patient on admission  Disposition Plan  Possible discharge tomorrow after pulmonary evaluation and swallow study  Clinically Significant Risk Factors          # Hypocalcemia: Lowest Ca = 8.4 mg/dL in last 2 days, will monitor and replace as appropriate                               Iwona Bhatti MD, MD  [phone](p)

## 2023-07-30 NOTE — PROVIDER NOTIFICATION
Pj called with a report on peripheral blood culture collected on 7/28/23 showed gram positive cocci in clusters. Notified CHRISTIANO Sanders  via [employer] text message.

## 2023-07-30 NOTE — PROVIDER NOTIFICATION
Pt wants albuterol inhaler, no order for inhaler, has order for albuterol neb solution prn q2hrs. Reported inhaler works better for him than neb solution. Notified CHRISTIANO Solomon via [employer] text message.

## 2023-07-30 NOTE — SIGNIFICANT EVENT
Significant Event Note    Time of event: 8:04 PM July 29, 2023    Description of event:  Blood culture (1/2) growing: Gram positive cocci in clusters     Plan:  --Probably contaminant. Will await for identification.  --Repeat Blood cultures ordered.      Mamta Scott MD

## 2023-07-30 NOTE — PLAN OF CARE
Goal Outcome Evaluation  Patient alert, steady with amb to bathroom and up at bedside to void. Intermittent wheezing episodes. Prn albuterol inhaler. Changed prednisone to IV solumedrol. O2 sat 98% on 2L n/cPt cont to have c/o food getting stuck in throat, reconsult ST and will have video swallow tomorrow. Eating only soft foods this evening. BP remains elevated at times. Review plan with patient.      Plan of Care Reviewed With: patient

## 2023-07-30 NOTE — PLAN OF CARE
Goal Outcome Evaluation:  A&O x4, VSS ex elevated SBP 170s, down to 130s as the shift progresses with prn & scheduled hydralazine. Tele SR. Denies SOB/pain. Pt received order for albuterol inhaler, wants to take later in am. Up with 1A/GB. Plans for pulmonology consult and possible discharge today pending clinical improvements.

## 2023-07-31 ENCOUNTER — APPOINTMENT (OUTPATIENT)
Dept: SPEECH THERAPY | Facility: CLINIC | Age: 63
DRG: 190 | End: 2023-07-31
Attending: HOSPITALIST
Payer: MEDICARE

## 2023-07-31 ENCOUNTER — APPOINTMENT (OUTPATIENT)
Dept: GENERAL RADIOLOGY | Facility: CLINIC | Age: 63
DRG: 190 | End: 2023-07-31
Attending: HOSPITALIST
Payer: MEDICARE

## 2023-07-31 LAB
BACTERIA BLD CULT: ABNORMAL
BACTERIA BLD CULT: ABNORMAL
POTASSIUM SERPL-SCNC: 4.4 MMOL/L (ref 3.4–5.3)

## 2023-07-31 PROCEDURE — 92526 ORAL FUNCTION THERAPY: CPT | Mod: GN

## 2023-07-31 PROCEDURE — 92611 MOTION FLUOROSCOPY/SWALLOW: CPT | Mod: GN

## 2023-07-31 PROCEDURE — 999N000157 HC STATISTIC RCP TIME EA 10 MIN

## 2023-07-31 PROCEDURE — 250N000013 HC RX MED GY IP 250 OP 250 PS 637: Performed by: INTERNAL MEDICINE

## 2023-07-31 PROCEDURE — 250N000012 HC RX MED GY IP 250 OP 636 PS 637: Performed by: HOSPITALIST

## 2023-07-31 PROCEDURE — 36415 COLL VENOUS BLD VENIPUNCTURE: CPT | Performed by: HOSPITALIST

## 2023-07-31 PROCEDURE — 84132 ASSAY OF SERUM POTASSIUM: CPT | Performed by: HOSPITALIST

## 2023-07-31 PROCEDURE — 74230 X-RAY XM SWLNG FUNCJ C+: CPT

## 2023-07-31 PROCEDURE — 94640 AIRWAY INHALATION TREATMENT: CPT | Mod: 76

## 2023-07-31 PROCEDURE — 94640 AIRWAY INHALATION TREATMENT: CPT

## 2023-07-31 PROCEDURE — 250N000011 HC RX IP 250 OP 636: Mod: JZ | Performed by: HOSPITALIST

## 2023-07-31 PROCEDURE — 210N000001 HC R&B IMCU HEART CARE

## 2023-07-31 PROCEDURE — 99232 SBSQ HOSP IP/OBS MODERATE 35: CPT | Performed by: HOSPITALIST

## 2023-07-31 PROCEDURE — 250N000009 HC RX 250: Performed by: HOSPITALIST

## 2023-07-31 PROCEDURE — 99222 1ST HOSP IP/OBS MODERATE 55: CPT | Performed by: INTERNAL MEDICINE

## 2023-07-31 PROCEDURE — 250N000013 HC RX MED GY IP 250 OP 250 PS 637: Performed by: HOSPITALIST

## 2023-07-31 RX ORDER — PREDNISONE 20 MG/1
60 TABLET ORAL DAILY
Status: DISCONTINUED | OUTPATIENT
Start: 2023-07-31 | End: 2023-08-01 | Stop reason: HOSPADM

## 2023-07-31 RX ORDER — PREDNISONE 20 MG/1
60 TABLET ORAL DAILY
Status: DISCONTINUED | OUTPATIENT
Start: 2023-07-31 | End: 2023-07-31

## 2023-07-31 RX ORDER — GUAIFENESIN 200 MG/10ML
200 LIQUID ORAL EVERY 4 HOURS PRN
Status: DISCONTINUED | OUTPATIENT
Start: 2023-07-31 | End: 2023-08-01 | Stop reason: HOSPADM

## 2023-07-31 RX ADMIN — LISINOPRIL 30 MG: 20 TABLET ORAL at 08:20

## 2023-07-31 RX ADMIN — IPRATROPIUM BROMIDE AND ALBUTEROL SULFATE 3 ML: .5; 3 SOLUTION RESPIRATORY (INHALATION) at 05:26

## 2023-07-31 RX ADMIN — SENNOSIDES AND DOCUSATE SODIUM 1 TABLET: 50; 8.6 TABLET ORAL at 08:19

## 2023-07-31 RX ADMIN — HYDRALAZINE HYDROCHLORIDE 25 MG: 25 TABLET ORAL at 08:21

## 2023-07-31 RX ADMIN — MIRTAZAPINE 45 MG: 15 TABLET, FILM COATED ORAL at 21:07

## 2023-07-31 RX ADMIN — IPRATROPIUM BROMIDE AND ALBUTEROL SULFATE 3 ML: .5; 3 SOLUTION RESPIRATORY (INHALATION) at 07:24

## 2023-07-31 RX ADMIN — BUPRENORPHINE AND NALOXONE 1 FILM: 8; 2 FILM, SOLUBLE BUCCAL; SUBLINGUAL at 16:49

## 2023-07-31 RX ADMIN — ATORVASTATIN CALCIUM 40 MG: 40 TABLET, FILM COATED ORAL at 08:20

## 2023-07-31 RX ADMIN — CARVEDILOL 25 MG: 25 TABLET, FILM COATED ORAL at 08:20

## 2023-07-31 RX ADMIN — BUPRENORPHINE AND NALOXONE 1 FILM: 8; 2 FILM, SOLUBLE BUCCAL; SUBLINGUAL at 08:19

## 2023-07-31 RX ADMIN — BUPROPION HYDROCHLORIDE 150 MG: 150 TABLET, EXTENDED RELEASE ORAL at 08:19

## 2023-07-31 RX ADMIN — GABAPENTIN 300 MG: 300 CAPSULE ORAL at 08:20

## 2023-07-31 RX ADMIN — MULTIPLE VITAMINS W/ MINERALS TAB 1 TABLET: TAB at 08:20

## 2023-07-31 RX ADMIN — ASPIRIN 81 MG 81 MG: 81 TABLET ORAL at 08:20

## 2023-07-31 RX ADMIN — HYDRALAZINE HYDROCHLORIDE 25 MG: 25 TABLET ORAL at 21:07

## 2023-07-31 RX ADMIN — PANTOPRAZOLE SODIUM 40 MG: 40 TABLET, DELAYED RELEASE ORAL at 08:20

## 2023-07-31 RX ADMIN — IPRATROPIUM BROMIDE AND ALBUTEROL SULFATE 3 ML: .5; 3 SOLUTION RESPIRATORY (INHALATION) at 11:35

## 2023-07-31 RX ADMIN — IPRATROPIUM BROMIDE AND ALBUTEROL SULFATE 3 ML: .5; 3 SOLUTION RESPIRATORY (INHALATION) at 15:03

## 2023-07-31 RX ADMIN — CARVEDILOL 25 MG: 25 TABLET, FILM COATED ORAL at 20:03

## 2023-07-31 RX ADMIN — GABAPENTIN 600 MG: 300 CAPSULE ORAL at 21:07

## 2023-07-31 RX ADMIN — QUETIAPINE 12.5 MG: 25 TABLET, FILM COATED ORAL at 20:35

## 2023-07-31 RX ADMIN — GUAIFENESIN 200 MG: 200 SOLUTION ORAL at 10:40

## 2023-07-31 RX ADMIN — QUETIAPINE FUMARATE 25 MG: 25 TABLET ORAL at 08:20

## 2023-07-31 RX ADMIN — AZITHROMYCIN MONOHYDRATE 250 MG: 250 TABLET ORAL at 20:03

## 2023-07-31 RX ADMIN — METHYLPREDNISOLONE SODIUM SUCCINATE 40 MG: 40 INJECTION, POWDER, FOR SOLUTION INTRAMUSCULAR; INTRAVENOUS at 05:24

## 2023-07-31 RX ADMIN — FLUTICASONE FUROATE AND VILANTEROL TRIFENATATE 1 PUFF: 200; 25 POWDER RESPIRATORY (INHALATION) at 08:29

## 2023-07-31 RX ADMIN — UMECLIDINIUM 1 PUFF: 62.5 AEROSOL, POWDER ORAL at 08:24

## 2023-07-31 RX ADMIN — HYDRALAZINE HYDROCHLORIDE 25 MG: 25 TABLET ORAL at 16:49

## 2023-07-31 RX ADMIN — GABAPENTIN 600 MG: 600 TABLET, FILM COATED ORAL at 20:03

## 2023-07-31 RX ADMIN — HYDRALAZINE HYDROCHLORIDE 10 MG: 20 INJECTION INTRAMUSCULAR; INTRAVENOUS at 18:42

## 2023-07-31 RX ADMIN — BUPROPION HYDROCHLORIDE 150 MG: 150 TABLET, EXTENDED RELEASE ORAL at 20:03

## 2023-07-31 RX ADMIN — GUAIFENESIN 200 MG: 200 SOLUTION ORAL at 21:07

## 2023-07-31 RX ADMIN — HYDRALAZINE HYDROCHLORIDE 10 MG: 20 INJECTION INTRAMUSCULAR; INTRAVENOUS at 06:18

## 2023-07-31 RX ADMIN — IPRATROPIUM BROMIDE AND ALBUTEROL SULFATE 3 ML: .5; 3 SOLUTION RESPIRATORY (INHALATION) at 20:26

## 2023-07-31 RX ADMIN — ACETAMINOPHEN 650 MG: 325 TABLET, FILM COATED ORAL at 10:40

## 2023-07-31 RX ADMIN — ALBUTEROL SULFATE 2 PUFF: 90 AEROSOL, METERED RESPIRATORY (INHALATION) at 04:14

## 2023-07-31 RX ADMIN — PREDNISONE 60 MG: 20 TABLET ORAL at 16:49

## 2023-07-31 ASSESSMENT — ACTIVITIES OF DAILY LIVING (ADL)
ADLS_ACUITY_SCORE: 20
DEPENDENT_IADLS:: CLEANING;COOKING;LAUNDRY;SHOPPING;MEAL PREPARATION;MEDICATION MANAGEMENT;MONEY MANAGEMENT;TRANSPORTATION
ADLS_ACUITY_SCORE: 18
ADLS_ACUITY_SCORE: 20
ADLS_ACUITY_SCORE: 18
ADLS_ACUITY_SCORE: 20
ADLS_ACUITY_SCORE: 18
ADLS_ACUITY_SCORE: 20
ADLS_ACUITY_SCORE: 20
ADLS_ACUITY_SCORE: 18
ADLS_ACUITY_SCORE: 20

## 2023-07-31 ASSESSMENT — ENCOUNTER SYMPTOMS
FEVER: 0
SPUTUM PRODUCTION: 0
COUGH: 1
HEMOPTYSIS: 0
SHORTNESS OF BREATH: 1
CHILLS: 0
WHEEZING: 1
WEIGHT LOSS: 0

## 2023-07-31 NOTE — PROGRESS NOTES
Video Fluoroscopic Swallow Study (VFSS)     07/31/23 4029   Appointment Info   Signing Clinician's Name / Credentials (SLP) Lauren Eldridge MS CCC-SLP   General Information   Onset of Illness/Injury or Date of Surgery 07/28/23   Referring Physician Iwona Bhatti MD   General Observations Alert, upright in chair in fluoroscopy suite, 2L NC, hoarse/gravely/breathy vocal quality witih ?occassional pitch breaks, reduced respiratory support for communication - frequent inhalations mid-word and sentences.   Type of Evaluation   Type of Evaluation Swallow Evaluation   General Swallowing Observations   Swallowing Evaluation Videofluoroscopic swallow study (VFSS)   VFSS Evaluation   Radiologist Dr. Vela   Views Taken left lateral   Physical Location of Procedure [facility], radiology dept, fluoroscopy suite   VFSS Textures Trialed thin liquids;pureed;soft & bite-sized;solid foods   VFSS Eval: Thin Liquid Texture Trial   Mode of Presentation, Thin Liquid cup;straw   Order of Presentation 1, 2, 6   Preparatory Phase prolonged bolus preparation;holding   Oral Phase, Thin Liquid impaired AP movement;premature pharyngeal entry   Bolus Location When Swallow Triggered pyriforms   Pharyngeal Phase, Thin Liquid impaired pharyngoesophageal segment opening   Rosenbek's Penetration Aspiration Scale: Thin Liquid Trial Results 2 - contrast enters airway, remains above the vocal cords, no residue remains (penetration)   Diagnostic Statement x1 instance of flash before/during laryngeal penetration 2/2 reduced oral control, premature bolus spillage and delay in laryngeal closure -- flash laryngeal penetration is WFL. No further instances of penetration or aspiration. Frequent throat clearing during trials not related to aspiration.   VFSS Evaluation: Puree Solid Texture Trial   Mode of Presentation, Puree spoon   Order of Presentation 4   Preparatory Phase prolonged bolus preparation   Oral Phase, Puree  impaired AP movement   Bolus Location When Swallow Triggered valleculae   Pharyngeal Phase, Puree impaired pharyngoesophageal segment opening;residue in pyriform sinus  (trace pyriform sinus residuals)   Rosenbek's Penetration Aspiration Scale: Puree Food Trial Results 1 - no aspiration, contrast does not enter airway   VFSS Eval: Soft & Bite Sized   Mode of Presentation self-fed   Order of presentation 5   Preparatory Phase prolonged bolus preparation   Oral Phase impaired AP movement   Bolus Location When Swallow Triggered valleculae   Pharyngeal Phase impaired pharyngoesophageal segment opening;residue in pyriform sinus  (trace pyriform sinus residuals)   Rosenbek's Penetration Aspiration Scale 1 - no aspiration, contrast does not enter airway   VFSS Evaluation: Solid Food Texture Trial   Mode of Presentation, Solid self-fed   Order of Presentation 5   Preparatory Phase prolonged bolus preparation   Oral Phase, Solid impaired AP movement   Bolus Location When Swallow Triggered valleculae   Pharyngeal Phase, Solid impaired pharyngoesophageal segment opening;residue in pyriform sinus  (trace pyriform sinus residuals)   Rosenbek's Penetration Aspiration Scale: Solid Food Trial Results 1 - no aspiration, contrast does not enter airway   Esophageal Phase of Swallow   Patient reports or presents with symptoms of esophageal dysphagia Yes   Esophageal sweep performed during today s vidofluoroscopic exam  Yes;Please refer to radiologist's report for details   Esophageal comments Hx of GERD and on medication per EMR. Incomplete esophageal clearance with both solids + liquid wash. See radiologist's report for further details.   Swallowing Recommendations   Diet Consistency Recommendations regular diet;thin liquids (level 0)   Supervision Level for Intake patient independent   Mode of Delivery Recommendations bolus size, small;slow rate of intake   Postural Recommendations none   Swallowing Maneuver Recommendations alternate  food and liquid intake;effortful (hard) swallow  (*for esophageal clearance)   Recommended Feeding/Eating Techniques (Swallow Eval) maintain upright sitting position for eating;maintain upright posture during/after eating for 30 minutes;moisten oral mucosa prior to intake   Medication Administration Recommendations, Swallowing (SLP) whole as tolerated   Clinical Impression   Criteria for Skilled Therapeutic Interventions Met (SLP Eval) Yes, treatment indicated   SLP Diagnosis minimal oropharyngeal dysphaiga   Risks & Benefits of therapy have been explained evaluation/treatment results reviewed;care plan/treatment goals reviewed;risks/benefits reviewed;current/potential barriers reviewed;participants voiced agreement with care plan;participants included;patient   Clinical Impression Comments   Video fluoroscopic swallow study completed with thin liquids, puree solids, soft/bite sized solids, regular solids in lateral positioning, including x2 esophageal sweeps in lateral positioning (x1 with solid, x1 with liquid). Pt currently presents with minimal oropharyngeal dysphagia, ? potential higher risk given reduced swallow/respiratory coordination, suspected esophageal dysfunction. Oral phase notable for mod prolonged mastication and oral propulsion with solids, reduced oral control and premature bolus spillage with thin liquids (to the pyriform sinuses). Base of tongue retraction, hyolaryngeal elevation/excursion, epiglottic inversion and pharyngeal constriction appear WFL. Mildly reduced pharyngoesophageal segment opening during the swallow with notable prominence of cricopharyngeus --- this resulted in trace, insignificant, residue in the pyriform sinuses. No other oropharyngeal retention observed.     x1 instance of flash before/during laryngeal penetration 2/2 reduced oral control, premature bolus spillage and delay in laryngeal closure -- flash laryngeal penetration is WFL. No further instances of penetration or  aspiration across study. Frequent throat clearing during trials not related to aspiration.     Esophageal sweep revealed incomplete esophageal clearance with both solids + liquid wash -- recommend further esophageal work up, GI consult.     SLP Total Evaluation Time   Evaluation, videofluoroscopic eval of swallow function Minutes (14824) 13   SLP Goals   Therapy Frequency (SLP Eval) 3 times/wk   SLP Predicted Duration/Target Date for Goal Attainment 08/07/23   SLP Goals Swallow   SLP: Safely tolerate diet without signs/symptoms of aspiration Regular diet;Thin liquids;With use of swallow precautions;Independently  (swallow/respiratory coordination strategies; esophageal clearance strategies)   Interventions   Interventions Quick Adds Swallowing Dysfunction   Swallowing Dysfunction &/or Oral Function for Feeding   Treatment of Swallowing Dysfunction &/or Oral Function for Feeding Minutes (09636) 20   Symptoms Noted During/After Treatment None   Treatment Detail/Skilled Intervention   Discussed results of VFSS in pt's room following assessment. Video review of cine loops and verbal education provided to patient, his brother and his wife (separate occurrances via telephone per pt's request) re: results and recommendations. Trained in swallow strategies for swallow/respiratory coordination and esophageal clearance, provided written handouts for reinforcement. Educated on recommendations for GI follow up and paged hospitalist for IP vs OP orders.     SLP Discharge Planning   SLP Plan swallow/respiratory coordination strategies; esophageal clearance strategies   SLP Discharge Recommendation Long term care facility   SLP Rationale for DC Rec Return to living facility, at baseline regular diet and IND with self-feeding. Recommend IP vs OP consult given c/f esophageal dysmotility   SLP Brief overview of current status    Video swallow study completed. Minimal oropharyngeal dysphagia wtih reduced swallow/respiratory  coordination; suspected esophageal dysfunction with notable esophageal retention during sweep. Recommend: regular solids, thin liquids when fully upright, remain upright for 60 minutes following PO, slow rate, single bites/sips at a time with breathing breaks in between, alternate between solids/liquids, add extra moisture to solids/liquids     Total Session Time   Total Session Time (sum of timed and untimed services) 33

## 2023-07-31 NOTE — CONSULTS
Pulmonary Consult  Gerson Pearson MRN: [id number]  1960  Date of Admission:7/28/2023  Primary care provider: Alex Wynn  ___________________________________    Gerson Pearson MRN# [id number]   YOB: 1960 Age: 63 year old   Date of Admission: 7/28/2023              Assessment and Recommendations:     ## Acute on chronic hypoxic and hypercarbic respiratory failure  ## Tobacco use  ## COPD  Per [facility] pulmonary notes he has an FEV1 of 22%.  He is on home oxygen at 2 L with exertion, and triple inhaler therapy with Breo and Spiriva as well as scheduled DuoNebs twice daily and as needed.  The patient reports that at baseline he is using his DuoNebs about every 4 hours.  He continues to smoke about 2 cigarettes/day but is working on quitting.  He thinks he will be able to      -Chronic pulmonary disease specialist consult placed  -Given his poor pulmonary function, recommend changing maintenance inhalers from by Spiriva and Breo to Breztri which is an aerosol inhaler rather than a dry powder inhaler and will likely have better pulmonary penetration  -Consider reducing prednisone to 60 mg/day    Jose Ward M.D.  Pulmonary & Critical Care  Pager: Click Here to page    I spent 60 minutes dedicated to his care so far today excluding procedures, including review of medical records, review of imaging (results & images), time with patient and time in documentation.             HPI:     Gerson Pearson is a 63 year old male with HO coronary artery disease, COPD, chronic hypoxic respiratory failure on 2 L supplemental oxygen, heroin and cocaine abuse (reportedly sober for 1 year), and tobacco use (smoking 1 to 2 cigarettes/day) admitted 7/28/2023 after presenting to the emergency department by EMS for obtundation and respiratory difficulty found to be hypoxic in the 40s being seen for respiratory distress.    Recently hospitalized 7/15-7/16  for COPD exacerbation treated with 10 days of prednisone and BiPAP.    No preceding events to this exacerbation.  He denies increased cough or cold-like symptoms.  He does have a mild chronic cough which is nonproductive.    He reports that he feels nearly back to his baseline.           Past Medical History:     Past Medical History:   Diagnosis Date    CAD (coronary artery disease)     s/p bare metal stent to RCA in 6/2020    Chronic back pain     Chronic systolic congestive heart failure (H)     EF 45% has recovered to 60-65% on echo 12/2021    COPD (chronic obstructive pulmonary disease) (H)     Depressive disorder     Myocardial infarction (H)     Nicotine dependence               Past Surgical History:    No past surgical history on file.           Social History:     Social History     Socioeconomic History    Marital status: Legally      Spouse name: Not on file    Number of children: Not on file    Years of education: Not on file    Highest education level: Not on file   Occupational History    Not on file   Tobacco Use    Smoking status: Every Day     Packs/day: 0.25     Types: Cigarettes    Smokeless tobacco: Never   Substance and Sexual Activity    Alcohol use: No     Comment: QUIT 3 YEARS AGO    Drug use: Yes     Types: Opiates, Cocaine     Comment: denies recent heroin or cocaine use    Sexual activity: Yes     Partners: Female   Other Topics Concern    Parent/sibling w/ CABG, MI or angioplasty before 65F 55M? No   Social History Narrative    Not on file     Social Determinants of Health     Financial Resource Strain: Not on file   Food Insecurity: Not on file   Transportation Needs: Not on file   Physical Activity: Not on file   Stress: Not on file   Social Connections: Not on file   Intimate Partner Violence: Not on file   Housing Stability: Not on file               Family History:     Family History   Problem Relation Age of Onset    Diabetes Mother     Coronary Artery Disease Mother                Immunizations:     Immunization History   Administered Date(s) Administered    COVID-19 Bivalent 12+ (Pfizer) 09/28/2022    COVID-19 Monovalent 12+ (Pfizer 2022) 06/08/2022    COVID-19 Monovalent 18+ (Moderna) 03/01/2021, 03/29/2021    Pneumo Conj 13-V (2010&after) 12/04/2014              Allergies:     Allergies   Allergen Reactions    Ibuprofen Nausea and Vomiting                Medications:     Current Facility-Administered Medications   Medication    acetaminophen (TYLENOL) tablet 650 mg    Or    acetaminophen (TYLENOL) Suppository 650 mg    albuterol (PROVENTIL HFA/VENTOLIN HFA) inhaler    albuterol (PROVENTIL) neb solution 2.5 mg    aspirin (ASA) chewable tablet 81 mg    atorvastatin (LIPITOR) tablet 40 mg    azithromycin (ZITHROMAX) tablet 250 mg    bisacodyl (DULCOLAX) suppository 10 mg    buprenorphine HCl-naloxone HCl (SUBOXONE) 8-2 MG per film 1 Film    buPROPion (WELLBUTRIN SR) 12 hr tablet 150 mg    carboxymethylcellulose PF (REFRESH PLUS) 0.5 % ophthalmic solution 1 drop    carvedilol (COREG) tablet 25 mg    fluticasone-vilanterol (BREO ELLIPTA) 200-25 MCG/ACT inhaler 1 puff    gabapentin (NEURONTIN) capsule 300 mg    gabapentin (NEURONTIN) capsule 600 mg    gabapentin (NEURONTIN) tablet 600 mg    HOLD: All Oral Medications    hydrALAZINE (APRESOLINE) injection 10 mg    hydrALAZINE (APRESOLINE) tablet 25 mg    ipratropium - albuterol 0.5 mg/2.5 mg/3 mL (DUONEB) neb solution 3 mL    lidocaine (LMX4) cream    lidocaine 1 % 0.1-1 mL    lisinopril (ZESTRIL) tablet 30 mg    magnesium hydroxide (MILK OF MAGNESIA) suspension 30 mL    melatonin tablet 1 mg    methylPREDNISolone sodium succinate (solu-MEDROL) injection 40 mg    mirtazapine (REMERON) tablet 45 mg    multivitamin w/minerals (THERA-VIT-M) tablet 1 tablet    nicotine (NICODERM CQ) 7 MG/24HR 24 hr patch 1 patch    nicotine Patch in Place    No lozenges or gum should be given while patient on BIPAP/AVAPS/AVAPS AE    ondansetron (ZOFRAN ODT)  "ODT tab 4 mg    Or    ondansetron (ZOFRAN) injection 4 mg    pantoprazole (PROTONIX) EC tablet 40 mg    polyethylene glycol (MIRALAX) Packet 17 g    prochlorperazine (COMPAZINE) injection 10 mg    Or    prochlorperazine (COMPAZINE) tablet 10 mg    Or    prochlorperazine (COMPAZINE) suppository 25 mg    QUEtiapine (SEROquel) half-tab 12.5 mg    QUEtiapine (SEROquel) tablet 25 mg    senna-docusate (SENOKOT-S/PERICOLACE) 8.6-50 MG per tablet 1 tablet    Or    senna-docusate (SENOKOT-S/PERICOLACE) 8.6-50 MG per tablet 2 tablet    sodium chloride (PF) 0.9% PF flush 3 mL    sodium chloride (PF) 0.9% PF flush 3 mL    sodium chloride (PF) 0.9% PF flush 3 mL    sodium chloride (PF) 0.9% PF flush 3 mL    umeclidinium (INCRUSE ELLIPTA) 62.5 MCG/ACT inhaler 1 puff               Review of Systems:     Review of Systems   Constitutional:  Positive for malaise/fatigue. Negative for chills, fever and weight loss.   Respiratory:  Positive for cough, shortness of breath and wheezing. Negative for hemoptysis and sputum production.    Cardiovascular:  Negative for chest pain.              Exam:   BP (!) 167/91   Pulse 105   Temp 98.1  F (36.7  C) (Oral)   Resp 15   Ht 1.753 m (5' 9\")   Wt 67.9 kg (149 lb 9.6 oz)   SpO2 98%   BMI 22.09 kg/m      Vitals:    07/29/23 0230 07/30/23 0645 07/31/23 0414   Weight: 71.9 kg (158 lb 8.2 oz) 69.9 kg (154 lb 1.6 oz) 67.9 kg (149 lb 9.6 oz)         Physical Exam  Vitals and nursing note reviewed.   Constitutional:       Appearance: Normal appearance.   Cardiovascular:      Rate and Rhythm: Normal rate and regular rhythm.      Heart sounds: No murmur heard.  Pulmonary:      Effort: Pulmonary effort is normal.      Comments: Reduced lung volumes, expiratory wheezes throughout  Musculoskeletal:      Right lower leg: No edema.      Left lower leg: No edema.   Neurological:      Mental Status: He is alert.                Data:   ROUTINE ICU LABS (Last four results)  CMP  Recent Labs   Lab " 07/29/23  0159 07/28/23 2022   NA  --  141   POTASSIUM  --  4.4   CHLORIDE  --  101   CO2  --  31*   ANIONGAP  --  9   * 156*   BUN  --  11.7   CR  --  1.01   GFRESTIMATED  --  84   RL  --  8.4*   PROTTOTAL  --  7.0   ALBUMIN  --  4.3   BILITOTAL  --  0.4   ALKPHOS  --  115   AST  --  22   ALT  --  26     CBC  Recent Labs   Lab 07/28/23 2022   WBC 8.7   RBC 3.88*   HGB 11.2*   HCT 36.8*   MCV 95   MCH 28.9   MCHC 30.4*   RDW 15.1*        INFLAMMATIONNo lab results found in last 7 days.    Invalid input(s):  ESR    INRNo lab results found in last 7 days.  Arterial Blood Gas  Recent Labs   Lab 07/29/23  0539   O2PER 30        Latest Reference Range & Units 07/15/23 08:23 07/15/23 08:57 07/28/23 20:24 07/28/23 21:04 07/29/23 05:39   pH Venous POCT 7.32 - 7.43  7.12 (LL) 7.22 (L) 7.16 (LL) 7.25 (L)    FIO2      30   Ph Venous 7.32 - 7.43      7.33   PCO2 Venous 40 - 50 mm Hg     56 (H)   pCO2 Venous POCT 40 - 50 mm Hg 94 (HH) 79 (HH) 94 (HH) 64 (H)    PO2 Venous 25 - 47 mm Hg     50 (H)   pO2 Venous POCT 25 - 47 mm Hg 60 (H) 37 48 (H) 51 (H)    O2 Sat, Venous POCT 94 - 100 % 79 (L) 56 (L) 69 (L) 78 (L)    Bicarbonate Venous 21 - 28 mmol/L     30 (H)   Base Excess Venous -7.7 - 1.9 mmol/L     3.0 (H)   Bicarbonate Venous POCT 21 - 28 mmol/L 31 (H) 32 (H) 34 (H) 28    (LL): Data is critically low  (HH): Data is critically high  (L): Data is abnormally low  (H): Data is abnormally high           Imaging:     Chest x-ray 7/28/2023  IMPRESSION: Mild interstitial edema. No pneumonic consolidation or pleural effusion. Normal heart size. Stable central vascular prominence.           The above note was dictated using voice recognition software and may include typographical errors. Please contact the author for any clarifications.

## 2023-07-31 NOTE — PLAN OF CARE
Goal Outcome Evaluation:   SOLER/Expiratory wheezing subsided with prn with neb treatment. Tolerated 02 needs decrease from 3L to 2L. VSS ex elevated SBP 170s, gave scheduled & prn hydralazine. Tele SR. Up independently to void in urinal. Denies chest discomfort or any pain. Plans to have XR video swallow with SLP or OT & possible discharge home pending clinical improvements.

## 2023-07-31 NOTE — PROGRESS NOTES
[facility]    Hospitalist Progress Note    Interval History   Patient is awake and alert.  No acute events overnight.  Continues to be tachypneic which is his baseline.  Having good oral intake.  Denies any significant cough.  Currently on soft diet.    -Data reviewed today: I reviewed all new labs and imaging results over the last 24 hours. I personally reviewed the chest x-ray image(s) showing mild interstitial edema.  No  pneumonic consolidation or pleural effusion. .    Physical Exam   Temp: 98.1  F (36.7  C) Temp src: Oral BP: (!) 167/91 Pulse: 105   Resp: 15 SpO2: 98 % O2 Device: Nasal cannula Oxygen Delivery: 2 LPM  Vitals:    07/29/23 0230 07/30/23 0645 07/31/23 0414   Weight: 71.9 kg (158 lb 8.2 oz) 69.9 kg (154 lb 1.6 oz) 67.9 kg (149 lb 9.6 oz)     Vital Signs with Ranges  Temp:  [98  F (36.7  C)-98.6  F (37  C)] 98.1  F (36.7  C)  Pulse:  [] 105  Resp:  [12-27] 15  BP: (115-178)/() 167/91  SpO2:  [96 %-100 %] 98 %  I/O last 3 completed shifts:  In: 1620 [P.O.:1620]  Out: 3475 [Urine:3475]    Physical Exam  Constitutional:       Appearance: He is ill-appearing.   HENT:      Head: Normocephalic.   Cardiovascular:      Rate and Rhythm: Normal rate and regular rhythm.      Pulses: Normal pulses.      Heart sounds: Normal heart sounds.   Pulmonary:      Effort: No respiratory distress.      Breath sounds: Normal breath sounds.      Comments: Continues to be tachypneic with mild bilateral wheezing.  Abdominal:      General: Abdomen is flat. Bowel sounds are normal. There is no distension.      Tenderness: There is no abdominal tenderness. There is no guarding.   Skin:     General: Skin is warm and dry.   Neurological:      General: No focal deficit present.   Psychiatric:         Mood and Affect: Mood normal.           Medications    - MEDICATION INSTRUCTIONS -        aspirin  81 mg Oral Daily    atorvastatin  40 mg Oral Daily    azithromycin  250 mg Oral QPM     buprenorphine HCl-naloxone HCl  1 Film Sublingual BID    buPROPion  150 mg Oral BID    carvedilol  25 mg Oral BID    fluticasone-vilanterol  1 puff Inhalation Daily    gabapentin  300 mg Oral QAM    gabapentin  600 mg Oral At Bedtime    gabapentin  600 mg Oral QPM    hydrALAZINE  25 mg Oral TID    ipratropium - albuterol 0.5 mg/2.5 mg/3 mL  1 vial Nebulization Q4H    lisinopril  30 mg Oral Daily    mirtazapine  45 mg Oral At Bedtime    multivitamin w/minerals  1 tablet Oral Daily    nicotine  1 patch Transdermal Daily    nicotine   Transdermal Q8H    pantoprazole  40 mg Oral Daily    polyethylene glycol  17 g Oral Daily    predniSONE  60 mg Oral Daily    QUEtiapine  25 mg Oral QAM    senna-docusate  1 tablet Oral BID    Or    senna-docusate  2 tablet Oral BID    sodium chloride (PF)  3 mL Intracatheter Q8H    sodium chloride (PF)  3 mL Intracatheter Q8H    umeclidinium  1 puff Inhalation Daily       Data   Recent Labs   Lab 07/29/23  0159 07/28/23 2022   WBC  --  8.7   HGB  --  11.2*   MCV  --  95   PLT  --  164   NA  --  141   POTASSIUM  --  4.4   CHLORIDE  --  101   CO2  --  31*   BUN  --  11.7   CR  --  1.01   ANIONGAP  --  9   RL  --  8.4*   * 156*   ALBUMIN  --  4.3   PROTTOTAL  --  7.0   BILITOTAL  --  0.4   ALKPHOS  --  115   ALT  --  26   AST  --  22         No results found for this or any previous visit (from the past 24 hour(s)).        Assessment & Plan      Gerson Pearson is a 63 year old male admitted on 7/28/2023. He presents to the emergency department via EMS after becoming obtunded with respiratory difficulty.  Found to be hypoxic in the 40% range with first responders, placed on CPAP by EMS and oxygenation improved and patient became more responsive.  PCO2 in the 90s range initially on arrival, and has improved on BiPAP.     Acute hypoxic hypercarbic respiratory failure:  Severe chronic obstructive pulmonary disease: FEV1 of 22% without significant bronchodilator response by prior PFTs.   Utilizes 2 L nasal cannula oxygen with exertion, goal oxygen saturation of 88 to 92%.  COVID-19 negative.  Known history of severe COPD exacerbations with rapid resolution after being placed on BiPAP.  Recently hospitalized 7/15 - 7/16 with a 10-day prednisone taper.  -Was started on BiPAP on admission for severe hypercapnia.  -Been weaned off of BiPAP this morning and is back to his baseline 2 to 3 L nasal cannula.  -Repeat VBG  showed pH of 7.33 with PCO2 of 56 and PO2 of 50.  -Encourage complete tobacco cessation  -Note pulmonary rehab appointments pending through [facility] August 1, August 3, August 8, August 10.  -[place] pulmonology consulted; his primary pulmonologist, Dr. Vela at [facility], noted that he could potentially be a candidate for advanced COPD therapies at the  [facility]  -Mr. Pearson was admitted twice in approximately 24 hours in November 2021 with presumed acute COPD exacerbations which abruptly improved despite minimal bronchodilator response on PFTs.  At that time, he was actually discontinuing his Suboxone to snort heroin resulting in both presentations.  He currently reports that he has not used heroin since that time, and has been adherent to his Suboxone therapy through his pain team.  -Was started on prednisone 40 mg daily after receiving Solu-Medrol in the ER.  Will transition to IV Solu-Medrol 40 mg twice daily due to reduced air movement and tachypnea.  -Transition back to oral prednisone 60 mg daily today after being evaluated by pulmonary team.  -COPD specialist team consulted.  -Pulmonary team did recommend transitioning from Spiriva and Breo to registry which is an aerosol inhaler rather than dry powder inhaler.  We will plan on this upon discharge.  -completing 5 d course of azithromycin.  -Every 2 hours as needed albuterol nebulizer treatments; again, typically minimally responsive to bronchodilators  -Resume prior to admission Incruse Ellipta,  Radha Koroma,  -Pulmonary consult placed.  Appreciate recommendations.     Depression with anxiety:  -Continue as needed hydroxyzine  -Resume prior to admission Remeron  -Resume prior to admission Seroquel  -Resume prior to admission Wellbutrin     Hypertension:  Coronary artery disease with history of bare-metal stent placement:  -Resume prior to admission aspirin 81 mg daily  -Resume prior to admission atorvastatin 40 mg daily  -Resume prior to admission carvedilol, hydralazine     Coronary artery disease: History of bare-metal stenting to right coronary artery in 2020     Opiate dependence: History of heroin and cocaine abuse, though reports sobriety for the past year and a half.   -Continue prior to admission Suboxone  -Continue with follow-up in pain clinic  -I am not ordering opiates at this time given history of snorting heroin and being followed by pain clinic tox screens through [facility].  -Resume prior to admission gabapentin     Tobacco use disorder with nicotine dependence: Tells me he is now only smoking 1 to 2 cigarettes/day, only 3 puffs of cigarette today prior to admission.  -Continue nicotine patch 7 mg with panel ordered.  -Discussed importance of continued cessation attempts.  He has cut back from 1/2 pack/day as of a year and a half ago.     GERD:  -Resume prior to admission pantoprazole    Dysphagia-patient has recurrent choking and coughing episodes with eating and had another episode today.  Initial plan was to obtain swallow evaluation with video study as an outpatient since he was eating well here but he had another episode of fit of coughing while having lunch.  Decided to proceed with videofluoroscopic swallow study while inpatient.       Diet:   Regular diet  DVT Prophylaxis: Pneumatic Compression Devices  Post Catheter: Not present  Lines: None     Cardiac Monitoring: None  Code Status:  Full code.  Confirmed with patient on admission  Disposition Plan  Possible discharge tomorrow        Iwona Bhatti MD, MD  [phone](p)

## 2023-07-31 NOTE — PLAN OF CARE
Goal Outcome Evaluation:      Plan of Care Reviewed With: patient          A&O x4. BP elevated, received prn hydralazine given x 1, tachy intermittently. O2 stable on 2L NC. Up independent in room. Denies chest pain. Reports dyspnea on exertion. No issues noted with meals/pill swallowing. Swallow study this afternoon, recommending regular solids with thin liquids. Voiding spontaneously without difficulty. Discharge pending tomorrow, tentative wheelchair transport set up for tomorrow afternoon.

## 2023-07-31 NOTE — CONSULTS
Care Management Initial Consult    General Information  Assessment completed with: Patient, Care Team Member, Teodora IVY  Type of CM/SW Visit: Initial Assessment    Primary Care Provider verified and updated as needed: Yes   Readmission within the last 30 days: previous discharge plan unsuccessful   Return Category: Exacerbation of disease  Reason for Consult: discharge planning  Advance Care Planning: Advance Care Planning Reviewed: no concerns identified          Communication Assessment  Patient's communication style: spoken language (English or Bilingual)    Hearing Difficulty or Deaf: no   Wear Glasses or Blind: no    Cognitive  Cognitive/Neuro/Behavioral: WDL                      Living Environment:   People in home: facility resident (group home)     Current living Arrangements: group home      Able to return to prior arrangements: yes       Family/Social Support:  Care provided by: self, other (see comments)  Provides care for: no one  Marital Status:   Significant Other       Chris  Description of Support System: Supportive, Involved         Current Resources:   Patient receiving home care services: No     Community Resources: [facility]  Equipment currently used at home: cane, straight  Supplies currently used at home: Nebulizer tubing, Oxygen Tubing/Supplies    Employment/Financial:  Employment Status: disabled        Financial Concerns: No concerns identified   Referral to Financial Worker: No       Does the patient's insurance plan have a 3 day qualifying hospital stay waiver?  No    Lifestyle & Psychosocial Needs:  Social Determinants of Health     Tobacco Use: High Risk (7/15/2023)    Patient History     Smoking Tobacco Use: Every Day     Smokeless Tobacco Use: Never     Passive Exposure: Not on file   Alcohol Use: Not on file   Financial Resource Strain: Not on file   Food Insecurity: Not on file   Transportation Needs: Not on file   Physical Activity: Not on file   Stress: Not  on file   Social Connections: Not on file   Intimate Partner Violence: Not on file   Depression: Not on file   Housing Stability: Not on file       Functional Status:  Prior to admission patient needed assistance:   Dependent ADLs:: Ambulation-cane (uses can occassionally)  Dependent IADLs:: Cleaning, Cooking, Laundry, Shopping, Meal Preparation, Medication Management, Money Management, Transportation            Values/Beliefs:  Spiritual, Cultural Beliefs, Oriental orthodox Practices, Values that affect care: no               Additional Information:  Writer spoke with patient, Pratima at [facility] and Kayleigh IVY with [facility].  Patient is primarily independent with mobility, but does use a cane ocassionally.   staff manage his meds, meals, and household chores.  Patient is able to bath and dress himself independently.  Plan is to return to [facility] likely on 8/1/2023     Anna Ellis RN

## 2023-07-31 NOTE — PROGRESS NOTES
Writer arranged wheelchair with oxygen transport for patient back to his group home with [place] [facility] for window between 4:10pm and 4:55 pm.  Writer spoke with Pratima house supervisor and Kayleigh RN ([phone]) regarding discharge time.  Writer updated discharge pharmacy in [facility] and will fax orders to: [phone] once received.    Addendum; 12:32 PM    Writer was notified by Hospitalist that pt will likely discharge tomorrow.  Wheelchair transport changed to tomorrow between 1:08 pm and 1:53 pm.  Writer updated bedside RN, Kayleigh RN and Kayleigh to notify Pratima.    Anna Ellis RN

## 2023-08-01 VITALS
SYSTOLIC BLOOD PRESSURE: 143 MMHG | WEIGHT: 149.4 LBS | BODY MASS INDEX: 22.13 KG/M2 | HEIGHT: 69 IN | RESPIRATION RATE: 11 BRPM | TEMPERATURE: 97.9 F | OXYGEN SATURATION: 100 % | HEART RATE: 72 BPM | DIASTOLIC BLOOD PRESSURE: 95 MMHG

## 2023-08-01 LAB — POTASSIUM SERPL-SCNC: 5.1 MMOL/L (ref 3.4–5.3)

## 2023-08-01 PROCEDURE — 94640 AIRWAY INHALATION TREATMENT: CPT

## 2023-08-01 PROCEDURE — 99239 HOSP IP/OBS DSCHRG MGMT >30: CPT | Performed by: HOSPITALIST

## 2023-08-01 PROCEDURE — 999N000033 HC STATISTIC CHRONIC PULMONARY DISEASE SPECIALIST

## 2023-08-01 PROCEDURE — 36415 COLL VENOUS BLD VENIPUNCTURE: CPT | Performed by: HOSPITALIST

## 2023-08-01 PROCEDURE — 250N000012 HC RX MED GY IP 250 OP 636 PS 637: Performed by: HOSPITALIST

## 2023-08-01 PROCEDURE — 250N000013 HC RX MED GY IP 250 OP 250 PS 637: Performed by: INTERNAL MEDICINE

## 2023-08-01 PROCEDURE — 94799 UNLISTED PULMONARY SVC/PX: CPT

## 2023-08-01 PROCEDURE — 250N000013 HC RX MED GY IP 250 OP 250 PS 637: Performed by: HOSPITALIST

## 2023-08-01 PROCEDURE — 94640 AIRWAY INHALATION TREATMENT: CPT | Mod: 76

## 2023-08-01 PROCEDURE — 250N000009 HC RX 250: Performed by: INTERNAL MEDICINE

## 2023-08-01 PROCEDURE — 272N000202 HC AEROBIKA WITH MANOMETER

## 2023-08-01 PROCEDURE — 999N000032 HC STATISTIC CHRONIC DISEASE SPECIALIST RT CONSULT

## 2023-08-01 PROCEDURE — 250N000009 HC RX 250: Performed by: HOSPITALIST

## 2023-08-01 PROCEDURE — 99232 SBSQ HOSP IP/OBS MODERATE 35: CPT | Performed by: INTERNAL MEDICINE

## 2023-08-01 PROCEDURE — 84132 ASSAY OF SERUM POTASSIUM: CPT | Performed by: HOSPITALIST

## 2023-08-01 PROCEDURE — 999N000157 HC STATISTIC RCP TIME EA 10 MIN

## 2023-08-01 PROCEDURE — G0463 HOSPITAL OUTPT CLINIC VISIT: HCPCS

## 2023-08-01 RX ORDER — LISINOPRIL 40 MG/1
40 TABLET ORAL DAILY
Qty: 30 TABLET | Refills: 3 | Status: SHIPPED | OUTPATIENT
Start: 2023-08-01

## 2023-08-01 RX ORDER — IPRATROPIUM BROMIDE AND ALBUTEROL SULFATE 2.5; .5 MG/3ML; MG/3ML
1 SOLUTION RESPIRATORY (INHALATION) EVERY 6 HOURS PRN
Qty: 90 ML | Refills: 3 | Status: SHIPPED | OUTPATIENT
Start: 2023-08-01

## 2023-08-01 RX ORDER — DOCUSATE SODIUM 100 MG/1
100 CAPSULE, LIQUID FILLED ORAL 2 TIMES DAILY
Qty: 30 CAPSULE | Refills: 3 | Status: SHIPPED | OUTPATIENT
Start: 2023-08-01

## 2023-08-01 RX ORDER — PREDNISONE 20 MG/1
TABLET ORAL
Qty: 26 TABLET | Refills: 0 | Status: SHIPPED | OUTPATIENT
Start: 2023-08-02 | End: 2023-08-18

## 2023-08-01 RX ORDER — IPRATROPIUM BROMIDE AND ALBUTEROL SULFATE 2.5; .5 MG/3ML; MG/3ML
1 SOLUTION RESPIRATORY (INHALATION) 2 TIMES DAILY
Qty: 90 ML | Refills: 1 | Status: SHIPPED | OUTPATIENT
Start: 2023-08-01

## 2023-08-01 RX ORDER — BUPROPION HYDROCHLORIDE 150 MG/1
300 TABLET, EXTENDED RELEASE ORAL DAILY
Qty: 30 TABLET | Refills: 3 | Status: SHIPPED | OUTPATIENT
Start: 2023-08-01 | End: 2023-09-11

## 2023-08-01 RX ADMIN — ALBUTEROL SULFATE 2 PUFF: 90 AEROSOL, METERED RESPIRATORY (INHALATION) at 08:08

## 2023-08-01 RX ADMIN — QUETIAPINE FUMARATE 25 MG: 25 TABLET ORAL at 08:02

## 2023-08-01 RX ADMIN — GABAPENTIN 300 MG: 300 CAPSULE ORAL at 08:02

## 2023-08-01 RX ADMIN — SENNOSIDES AND DOCUSATE SODIUM 1 TABLET: 50; 8.6 TABLET ORAL at 08:02

## 2023-08-01 RX ADMIN — GUAIFENESIN 200 MG: 200 SOLUTION ORAL at 07:00

## 2023-08-01 RX ADMIN — BUPRENORPHINE AND NALOXONE 1 FILM: 8; 2 FILM, SOLUBLE BUCCAL; SUBLINGUAL at 08:03

## 2023-08-01 RX ADMIN — UMECLIDINIUM 1 PUFF: 62.5 AEROSOL, POWDER ORAL at 08:06

## 2023-08-01 RX ADMIN — MULTIPLE VITAMINS W/ MINERALS TAB 1 TABLET: TAB at 08:02

## 2023-08-01 RX ADMIN — ATORVASTATIN CALCIUM 40 MG: 40 TABLET, FILM COATED ORAL at 08:01

## 2023-08-01 RX ADMIN — IPRATROPIUM BROMIDE AND ALBUTEROL SULFATE 3 ML: .5; 3 SOLUTION RESPIRATORY (INHALATION) at 11:18

## 2023-08-01 RX ADMIN — HYDRALAZINE HYDROCHLORIDE 25 MG: 25 TABLET ORAL at 08:03

## 2023-08-01 RX ADMIN — ASPIRIN 81 MG 81 MG: 81 TABLET ORAL at 08:02

## 2023-08-01 RX ADMIN — ACETAMINOPHEN 650 MG: 325 TABLET, FILM COATED ORAL at 08:01

## 2023-08-01 RX ADMIN — CARVEDILOL 25 MG: 25 TABLET, FILM COATED ORAL at 08:02

## 2023-08-01 RX ADMIN — FLUTICASONE FUROATE AND VILANTEROL TRIFENATATE 1 PUFF: 200; 25 POWDER RESPIRATORY (INHALATION) at 08:06

## 2023-08-01 RX ADMIN — LISINOPRIL 30 MG: 20 TABLET ORAL at 08:01

## 2023-08-01 RX ADMIN — PREDNISONE 60 MG: 20 TABLET ORAL at 08:02

## 2023-08-01 RX ADMIN — ALBUTEROL SULFATE 2.5 MG: 2.5 SOLUTION RESPIRATORY (INHALATION) at 05:07

## 2023-08-01 RX ADMIN — PANTOPRAZOLE SODIUM 40 MG: 40 TABLET, DELAYED RELEASE ORAL at 08:03

## 2023-08-01 RX ADMIN — IPRATROPIUM BROMIDE AND ALBUTEROL SULFATE 3 ML: .5; 3 SOLUTION RESPIRATORY (INHALATION) at 07:20

## 2023-08-01 RX ADMIN — BUPROPION HYDROCHLORIDE 150 MG: 150 TABLET, EXTENDED RELEASE ORAL at 08:03

## 2023-08-01 RX ADMIN — IPRATROPIUM BROMIDE AND ALBUTEROL SULFATE 3 ML: .5; 3 SOLUTION RESPIRATORY (INHALATION) at 00:16

## 2023-08-01 ASSESSMENT — ACTIVITIES OF DAILY LIVING (ADL)
ADLS_ACUITY_SCORE: 20

## 2023-08-01 NOTE — PLAN OF CARE
"Speech Language Therapy Discharge Summary    Reason for therapy discharge:    Discharged to home with home therapy.    Progress towards therapy goal(s). See goals on Care Plan in [facility] electronic health record for goal details.  Goals not met.  Barriers to achieving goals:   discharge from facility.    Therapy recommendation(s):    Continued therapy is recommended.  Rationale/Recommendations:  \"Video swallow study completed. Minimal oropharyngeal dysphagia wtih reduced swallow/respiratory coordination; suspected esophageal dysfunction with notable esophageal retention during sweep. Recommend: regular solids, thin liquids when fully upright, remain upright for 60 minutes following PO, slow rate, single bites/sips at a time with breathing breaks in between, alternate between solids/liquids, add extra moisture to solids/liquids\".    Consider OP GI consult.         "

## 2023-08-01 NOTE — PLAN OF CARE
Goal Outcome Evaluation:      Plan of Care Reviewed With: patient        Up independent in room. A&O x 4. BP elevated, AM meds administered. Denies pain. Reports baseline dyspnea on exertion. O2 stable on 2L NC, prn inhaler utilized with good effect. No issues noted with meals/pills today, encouraged to eat slowly and chew fully. Voiding spontaneously without difficulty.     Pt to discharge to group home today via wheelchair transport set up for between [id number]. All orders sent to group home via care coordinator, meds to be filled by group home preferred pharmacy. AVS reviewed with patient. Pt verbalized understanding of discharge instructions, medication changes and need for follow up appointments. All personal belongings sent home with patient.

## 2023-08-01 NOTE — CONSULTS
Chronic Pulmonary Disease Specialist Consult   COPD Initial Interview    2023    Patient: Gerson Pearson      :  1960                    MRN:[id number]      Reason for Consult:  Consulted to provide COPD education and optimize treatment regimen. Patient with very severe COPD being followed by COPD Readmission Reduction Program    History of Present Illness:   Gerson Pearson is a 63 year old male admitted  on 2023. He presents to the emergency department via EMS after becoming obtunded with respiratory difficulty.  Found to be hypoxic in the 40% range with first responders, placed on CPAP by EMS and oxygenation improved and patient became more responsive.  PCO2 in the 90s range initially on arrival, and has improved on BiPAP.     Smoking Hx:   Patient is a current everyday smoker. Patient smokes 1-2 cigarettes/day, and has cut down from 1 1/2 PPD since last year.                   Pulmonologist/Last office visit   Patient is followed by [facility] Pulmonologist, Deysi Vela. His last visit with Dr. Vela was on 2023. At that appointment Dr. Vela told patient that she wanted to see him again in three months. Not sure if that appt is scheduled and patient could not remember either.    Most recent  PFT/interpretation on:2021               FVC        1.68 LPM   44%   FEV1        0.72 LPM   24%   FEV1/FVC   (Ratio)                                               43%                                                      DLCO           Not done   Interpretation Very severe obstruction     Assessment:   Gerson was just coming back from the bathroom upon writer's entrance to the room. He was slightly winded but recovered quickly once he was sitting at the edge of his bed. Patient was on 3 L oxygen via NC with oxygen saturation of 100%. Caution should be used with excessive oxygen flow due to his documented chronic retention of CO2.     Recommendations:  Continue with current inpatient respiratory  medication schedule.   Inpatient pulmonary consult for further recommendations and coordination of care (DONE)  Will need follow up appointment with his current [facility] Pulmonologist.  Use Aerobika Oscillating PEP Device for 3 sets of 10 breaths two times daily as directed above  7 mg Nicotine Patch to help reduce symptoms of withdrawal and cravings -Continue at discharge as well  Smoking Cessation Counseling and Relapse Prevention Consult   2mg/4mg Nicotine Lozenges/Gum for cravings and urges  Referral for OP sleep consult to assess for sleep disordered breathing, REYNA, nocturnal hypoxia, and hypoventilation.  Need to assess patient for NIV for home use to reduce WOB, support respiratory mechanics, and to manage hypoventilation as outpatient.  Patient may be a candidate for BIPAP at home due to his repeated admissions. This admission and previous admission of 7/15/2023 patient had some altered mental status or unresponsive episodes at admit and VBG's with CO2 of 94. Both admissions the CO2 was reduced significantly with the use of BIPAP. Patient's COPD is very severe and the use of BIPAP at home could help to keep his CO2 in a manageable range and would provide some rest for his lungs, possibly decreasing some of patient's SOB during the day. Patient was discharged immediately after writer saw patient so was unable to make the above recommendation. If possible, would suggest that hospitalist reach out to patient's pulmonologist with this recommendation. Patient would require an ABG that showed PCO2 >52 and would require an overnight oximetry study completed on RA or patients usual oxygen showing that patient dropped his saturation </= 88% for > 5 minutes throughout the night.  Patient is a good candidate for COPD Action Plan due to high readmission risk  At discharge begin new respiratory medication home regimen.    Sleep Studies:  None found in chart. [facility] pulmonologist talks about his sporadic use of a CPAP; however,  cannot locate any notes in care everywhere or within [facility] system.    Home Oxygen Use:  2 L with activity        Pulmonary Rehab History:  Patient is currently participating in Pulmonary Rehab at [facility].      Home respiratory medications include:      Albuterol MDI Q 6 prn  Breo Ellipta Daily  Spiriva Respimat Daily  Duoneb Q 4 prn    Patient is discharging with Breztri Aerosphere which has the same resistance as his Albuterol and Spiriva. Breztri, Albuterol, and Spiriva Respimat have the least resistance of any inhalers, meaning that these are the easiest devices to use for patients with very severe COPD. While patient's inspiratory flows are adequate at this time for the use of his Breo Ellipta, the use of Breztri that combines all three medications (LAMA/LABA/ICS) will make it easier for the patient to use and possibly decrease his cost. Patient will be able to continue to use as his COPD progresses.    Action:         Evaluated patients inspiratory flow using In-Check device:     --Low resistance setting: Patient able to generate 110 LPM,   which is excessive inspiratory flow for an Albuterol rescue inhaler and his Spiriva Respimat.  Albuterol and Spiriva Respimat inhalers require a slow inhalation of 20-60 LPM for optimal drug deposition with 5-10 second breath hold.   The use of his newly ordered Breztri Aerosphere MDI will require a slow inspiration like he was taught with his previous medications during this education session. Writer will inform patient regarding use of his new medication during writer's follow-up phone calls with patient.     --Medium resistance setting: Patient able to generate 50 LPM, which is sufficient inspiratory flow for his Breo Ellipta DPI. Medium resistance inhalers require a fast and deep inhalation of   30-80LPM with 5-10 second breath hold.     --Evaluated patients' coordination and technique with inhaler: Patient demonstrated good technique with all of his inhalers. Educated  patient on proper inspiratory flows needed for all his inhalers. Provided and instructed patient on proper use of Aerochamber spacer with inhaler; reiterated that spacer should always be used with his rescue inhaler.       -Patient able to generate a pressure of 15 cm H2O on Aerobika OPEP device for 2 seconds generating good strong non-productive cough. The goal for each breath, for a total of 3 sets of 10 breaths, is to exhale at a of pressure 10-20 for 3-4 seconds without fatigue. Educated patient to perform 2 to 3 'montiel coughs'  to clear airway after each set. Shared that consistent use of this device will help open smaller airways, improve mucus clearance, decrease cough frequency, and improve exercise tolerance.     Will continue to follow and support patient as needed. Will follow up with phone calls after discharge.     I spent 40 minutes with the patient.  Malathi Biggs, MILAD, RRT, CTTS  Chronic Pulmonary Disease Specialist  Office: [phone]   Pager: [phone]

## 2023-08-01 NOTE — PROGRESS NOTES
[facility]    Pulmonary, Allergy, Critical Care and Sleep Medicine    Pulmonary Consult Progress Note    Gerson Pearson MRN# [id number]   Age: 63 year old YOB: 1960     Date of Admission: 7/28/2023  Date of Service: 08/01/2023     ==================================================  INTERVAL EVENTS:  -X-ray video swallow with no laryngeal penetration or aspiration though some evidence of dysmotility and impaired clearance past the GE junction.  -Speech-language interpretation with reduced swallow/respiratory coordination and suspected esophageal dysfunction.  -He is saturating well on his home oxygen setting of 2-3 L  -He reports feeling 75 to 80% back to baseline, and feels that he would recuperate better at home in his own bed      CHANGES FOR TODAY:  -I agree with discharge as discussed with the hospitalist  -I agree with the steroid taper as ordered in the discharge planning  -Agree with starting Breztri on discharge and discontinuing both the Spiriva and the Breo      ==================================================    ASSESSMENT AND RECOMMENDATIONS:      ## Acute on chronic hypoxic and hypercarbic respiratory failure  ## Tobacco use  ## COPD  Per [facility] pulmonary notes he has an FEV1 of 22% without bronchodilator response.  He is on home oxygen at 2 L with exertion, and triple inhaler therapy with Breo and Spiriva as well as scheduled DuoNebs twice daily and as needed.  The patient reports that at baseline he is using his DuoNebs about every 4 hours.  He has a history of severe COPD exacerbations that resolved rapidly with BiPAP therapy suggesting they are more related to hypercarbia than true fluctuations in his COPD.  Therefore, at this time I do not think there will be significant benefit to adding antiexacerbation medications such as Roflumilast.  He continues to smoke about 2 cigarettes/day but is working on quitting.  He thinks he will be able to do this.  He  "reports recurrent choking and coughing episodes which may be contributing to his COPD issues, though his imaging does not reveal classic right lower lobe infections that I would expect from true aspiration pneumonia.       -Given his poor pulmonary function, recommend changing maintenance inhalers from by Spiriva and Breo to Breztri which is 3 in 1 aerosol inhaler rather than a dry powder inhaler and will likely have better pulmonary penetration.  Another option would be to continue the Spiriva and change the Breo to Symbicort or Dulera which contain the same medication classes as the Breo but in an aerosol form.  If the discharging team encounters any difficulty with this, feel free to contact the pulmonary consult team via [employer] or page.          Jose Ward M.D.  Pulmonary & Critical Care  Pager: Click Here to page    I spent 35 minutes dedicated to his care so far today excluding procedures, including review of medical records, review of imaging (results & images), time with patient and time in documentation.    ==================================================      PHYSICAL EXAM  BP (!) 143/95 (BP Location: Left arm)   Pulse 72   Temp 97.9  F (36.6  C) (Axillary)   Resp 11   Ht 1.753 m (5' 9\")   Wt 67.8 kg (149 lb 6.4 oz)   SpO2 100%   BMI 22.06 kg/m        Intake/Output Summary (Last 24 hours) at 8/1/2023 0947  Last data filed at 8/1/2023 0504  Gross per 24 hour   Intake 1840 ml   Output 2225 ml   Net -385 ml       Vitals:    07/30/23 0645 07/31/23 0414 08/01/23 0700   Weight: 69.9 kg (154 lb 1.6 oz) 67.9 kg (149 lb 9.6 oz) 67.8 kg (149 lb 6.4 oz)         General: Alert, interactive, NAD  HEENT: AT/NC, sclera anicteric, PERRL, EOMI, OP clear with MMM  Neck: Supple, no JVD or cervical LAD  Resp: Diminished air movement throughout, mild wheezes throughout unchanged or possibly slightly improved from yesterday  Cardiac: RRR, NS1,S2, No m/r/g  Extremities: No LE edema or obvious joint " abnormalities  Skin: Warm and dry, no jaundice or rash  Neuro: Alert & oriented x 3, Cns 2-12 intact, moves all extremities equally      Recent Labs   Lab Test 07/28/23  2022 07/15/23  0826 12/08/21  1049   WBC 8.7 7.8 7.1   RBC 3.88* 4.12* 4.12*   HGB 11.2* 11.8* 12.3*    185 199       Recent Labs   Lab Test 08/01/23  0556 07/31/23  1603 07/29/23  0159 07/28/23  2022 07/15/23  0826 07/15/23  0826 12/09/21  1103 12/08/21  1049 11/05/21  0153 11/03/21  2359   NA  --   --   --  141  --  140  --  136   < > 134   POTASSIUM 5.1 4.4  --  4.4   < > 4.4  --  4.9   < > 4.2   CHLORIDE  --   --   --  101  --  105  --  104   < > 103   CO2  --   --   --  31*  --  26  --  27   < > 28   BUN  --   --   --  11.7  --  6.1*  --  7   < > 14   CR  --   --   --  1.01  --  0.82 0.61* 0.76   < > 1.00   GLC  --   --  137* 156*  --  173*  --  119*   < > 110*   RL  --   --   --  8.4*  --  8.8  --  9.5   < > 8.5   MAG  --   --   --   --   --   --   --   --   --  2.4*    < > = values in this interval not displayed.           No results for input(s): CULT in the last 168 hours.      Recent Results (from the past 48 hour(s))   XR Video Swallow with SLP or OT    Narrative    VIDEO SWALLOWING EVALUATION   7/31/2023 1:56 PM     HISTORY: dysohagia . cough with eating    COMPARISON: None.    FLUOROSCOPY TIME: 1.5 minutes.  SPOT IMAGES OR CINE RUNS: 6    FINDINGS:    No laryngeal penetration or aspiration with thin liquid, puree,  semisolid, or solid consistency barium preparations.    Limited evaluation of the distal esophagus demonstrates some evidence  of dysmotility and impaired clearance past the gastroesophageal  junction with solid and thin liquid preparations.      Impression    IMPRESSION:      1.  No laryngeal penetration or aspiration.  2.  Limited evaluation of the distal esophagus demonstrates some  evidence of dysmotility and impaired clearance past the  gastroesophageal junction with solid and thin liquid preparations.  Dedicated  esophagram could further evaluate if clinically indicated.  3.  Please refer to speech pathology notes for additional details and  recommendations.    SD CLARKE MD         SYSTEM ID:  [id number]

## 2023-08-01 NOTE — DISCHARGE SUMMARY
[facility]    Discharge Summary  Hospitalist    Date of Admission:  7/28/2023  Date of Discharge:  8/1/2023    Discharge Diagnoses      COPD exacerbation (H)  Acute respiratory failure with hypoxia and hypercapnia (H)    History of Present Illness   Gerson Pearson is an 63 year old male who presented with copd exacerbation    Hospital Course   Gerson Pearson was admitted on 7/28/2023.  The following problems were addressed during his hospitalization:    Gerson Pearson is a 63 year old male admitted on 7/28/2023. He presents to the emergency department via EMS after becoming obtunded with respiratory difficulty.  Found to be hypoxic in the 40% range with first responders, placed on CPAP by EMS and oxygenation improved and patient became more responsive.  PCO2 in the 90s range initially on arrival, and has improved on BiPAP.     Acute hypoxic hypercarbic respiratory failure:  Severe chronic obstructive pulmonary disease: FEV1 of 22% without significant bronchodilator response by prior PFTs.  Utilizes 2 L nasal cannula oxygen with exertion, goal oxygen saturation of 88 to 92%.  COVID-19 negative.  Known history of severe COPD exacerbations with rapid resolution after being placed on BiPAP.  Recently hospitalized 7/15 - 7/16 with a 10-day prednisone taper.  -Was started on BiPAP on admission for severe hypercapnia.  -Been weaned off of BiPAP this morning and is back to his baseline 2 to 3 L nasal cannula.  -Repeat VBG  showed pH of 7.33 with PCO2 of 56 and PO2 of 50.  -Encourage complete tobacco cessation  -Note pulmonary rehab appointments pending through [facility] August 1, August 3, August 8, August 10.  -[place] pulmonology consulted; his primary pulmonologist, Dr. Vela at [facility], noted that he could potentially be a candidate for advanced [facility]  -Mr. Pearson was admitted twice in approximately 24 hours in November 2021 with  presumed acute COPD exacerbations which abruptly improved despite minimal bronchodilator response on PFTs.  At that time, he was actually discontinuing his Suboxone to snort heroin resulting in both presentations.  He currently reports that he has not used heroin since that time, and has been adherent to his Suboxone therapy through his pain team.  -Was started on prednisone 40 mg daily after receiving Solu-Medrol in the ER.  Will transition to IV Solu-Medrol 40 mg twice daily due to reduced air movement and tachypnea.  -Transition back to oral prednisone 60 mg daily after being evaluated by pulmonary team.  -He was discharged home on a slow taper of oral prednisone.  -Pulmonary team did recommend transitioning from Spiriva and Breo to breztri which is an aerosol inhaler rather than dry powder inhaler.  Continue Spiriva and Breo upon discharge.  Prescribed Breztri.  -completing 5 d course of azithromycin.  -Every 2 hours as needed albuterol nebulizer treatments; again, typically minimally responsive to bronchodilators  -Pulmonary consult placed.  Appreciate recommendations.     Depression with anxiety:  -Continue as needed hydroxyzine  -Resume prior to admission Remeron  -Resume prior to admission Seroquel  -Resume prior to admission Wellbutrin     Hypertension:  Coronary artery disease with history of bare-metal stent placement:  -Resume prior to admission aspirin 81 mg daily  -Resume prior to admission atorvastatin 40 mg daily  -Resume prior to admission carvedilol, hydralazine     Coronary artery disease: History of bare-metal stenting to right coronary artery in 2020     Opiate dependence: History of heroin and cocaine abuse, though reports sobriety for the past year and a half.   -Continue prior to admission Suboxone  -Continue with follow-up in pain clinic  -I am not ordering opiates at this time given history of snorting heroin and being followed by pain clinic tox screens through [facility].  -Resume prior to  admission gabapentin     Tobacco use disorder with nicotine dependence: Tells me he is now only smoking 1 to 2 cigarettes/day, only 3 puffs of cigarette today prior to admission.  -Continue nicotine patch 7 mg with panel ordered.  -Discussed importance of continued cessation attempts.  He has cut back from 1/2 pack/day as of a year and a half ago.     GERD:  -Resume prior to admission pantoprazole    Dysphagia-patient has recurrent choking and coughing episodes with eating and had another episode today.  Initial plan was to obtain swallow evaluation with video study as an outpatient since he was eating well here but he had another episode of fit of coughing while having lunch.  Decided to proceed with videofluoroscopic swallow study while inpatient.  -VFSS study showed no laryngeal penetration but limited evaluation of distal esophageal's demonstrates some evidence of dysmotility and impaired clearance of her gastroesophageal junction.  -Recommended outpatient follow-up with GI to pursue esophagram.       Diet:   Regular diet  DVT Prophylaxis: Pneumatic Compression Devices  Post Catheter: Not present  Lines: None     Cardiac Monitoring: None  Code Status:  Full code.  Confirmed with patient on admission  Disposition Plan  Discharge back to group home today.      Iwona Bhatti MD, MD  [phone](p)         Iwona Bhatti MD, MD    Pending Results   These results will be followed up by pcp  Unresulted Labs Ordered in the Past 30 Days of this Admission       Date and Time Order Name Status Description    7/29/2023  8:06 PM Blood Culture Hand, Left Preliminary     7/29/2023  8:06 PM Blood Culture Hand, Right Preliminary     7/28/2023  8:22 PM Blood Culture Peripheral Blood Preliminary           Code Status   Full Code       Primary Care Physician   Alex Wynn    Physical Exam   Temp: 97.9  F (36.6  C) Temp src: Axillary BP: (!) 143/95 Pulse: 72   Resp: 11 SpO2: 100 % O2 Device: Nasal cannula Oxygen Delivery:  3 LPM  Vitals:    07/30/23 0645 07/31/23 0414 08/01/23 0700   Weight: 69.9 kg (154 lb 1.6 oz) 67.9 kg (149 lb 9.6 oz) 67.8 kg (149 lb 6.4 oz)     Vital Signs with Ranges  Temp:  [96.8  F (36  C)-97.9  F (36.6  C)] 97.9  F (36.6  C)  Pulse:  [] 72  Resp:  [7-70] 11  BP: (122-180)/() 143/95  SpO2:  [75 %-100 %] 100 %  I/O last 3 completed shifts:  In: 1600 [P.O.:1600]  Out: 2025 [Urine:2025]    Physical Exam  Constitutional:       Appearance: Normal appearance.   HENT:      Head: Normocephalic.   Eyes:      Pupils: Pupils are equal, round, and reactive to light.   Cardiovascular:      Rate and Rhythm: Normal rate and regular rhythm.      Pulses: Normal pulses.      Heart sounds: Normal heart sounds.   Pulmonary:      Effort: Pulmonary effort is normal. No respiratory distress.      Breath sounds: Normal breath sounds.   Abdominal:      General: Abdomen is flat. Bowel sounds are normal. There is no distension.      Tenderness: There is no abdominal tenderness. There is no guarding.   Musculoskeletal:         General: Normal range of motion.      Cervical back: Normal range of motion.   Skin:     General: Skin is warm and dry.   Neurological:      General: No focal deficit present.   Psychiatric:         Mood and Affect: Mood normal.           Discharge Disposition   Discharged to home  Condition at discharge: Stable    Consultations This Hospital Stay   PULMONARY IP CONSULT  SPEECH LANGUAGE PATH ADULT IP CONSULT  SPEECH LANGUAGE PATH ADULT IP CONSULT  IP RESPIRATORY CARE CHRONIC PULMONARY DISEASE SPECIALIST  CARE MANAGEMENT / SOCIAL WORK IP CONSULT    Time Spent on this Encounter   Iwona DE LA CRUZ MD, personally saw the patient today and spent greater than 30 minutes discharging this patient.    Discharge Orders      CBC with platelets    Primary MD at [facility] to follow results     Basic metabolic panel    Primary md at [facility] to follow labs     Reason for your hospital stay    Copd exacerbation     Activity     Your activity upon discharge: activity as tolerated     Follow-up and recommended labs and tests     Follow up with primary care provider, Alex Wynn, within 7 days for hospital follow- up.   The following labs/tests are recommended: cbc, bmp in 1 week.      **[facility] clinic will contact you to arrange Lab appointment.    Follow up arranged on Friday, Aug. 18th (soonest available) with Dr. Ambrose at 1:15 PM (Dr Wynn is not available) at:    [facility]   [address] [place] [address] [phone]    Follow up with gi team for esophogram and evaluation of esophageal dysmotility (below is a GI clinic near your home)    [facility]  [address]  [address]  [place] [address] [phone]     Diet    Follow this diet upon discharge: Orders Placed This Encounter      Advance Diet as Tolerated: Regular Diet Adult; Thin Liquids (level 0)     Discharge Medications   Discharge Medication List as of 8/1/2023  1:12 PM        START taking these medications    Details   budeson-glycopyrrol-formoterol (BREZTRI AEROSPHERE) 160-9-4.8 MCG/ACT AERO inhaler Inhale 2 puffs into the lungs 2 times daily, Disp-10.7 g, R-1, E-Prescribe      docusate sodium (COLACE) 100 MG capsule Take 1 capsule (100 mg) by mouth 2 times daily, Disp-30 capsule, R-3, E-Prescribe           CONTINUE these medications which have CHANGED    Details   buPROPion (WELLBUTRIN SR) 150 MG 12 hr tablet Take 2 tablets (300 mg) by mouth daily, Disp-30 tablet, R-3, E-Prescribe      !! ipratropium - albuterol 0.5 mg/2.5 mg/3 mL (DUONEB) 0.5-2.5 (3) MG/3ML neb solution Take 1 vial (3 mLs) by nebulization 2 times daily, Disp-90 mL, R-1, E-Prescribe      !! ipratropium - albuterol 0.5 mg/2.5 mg/3 mL (DUONEB) 0.5-2.5 (3) MG/3ML neb solution Take 1 vial (3 mLs) by nebulization every 6 hours as needed for shortness of breath, wheezing or cough, Disp-90 mL, R-3, E-Prescribe      lisinopril (ZESTRIL) 40 MG  tablet Take 1 tablet (40 mg) by mouth daily, Disp-30 tablet, R-3, E-Prescribe      predniSONE (DELTASONE) 20 MG tablet Take 3 tablets (60 mg) by mouth daily for 4 days, THEN 2 tablets (40 mg) daily for 4 days, THEN 1 tablet (20 mg) daily for 4 days, THEN 0.5 tablets (10 mg) daily for 4 days., Disp-26 tablet, R-0, E-Prescribe       !! - Potential duplicate medications found. Please discuss with provider.        CONTINUE these medications which have NOT CHANGED    Details   acetaminophen (TYLENOL) 325 MG tablet Take 325 mg by mouth every 4 hours as needed for mild pain, Historical      albuterol (PROAIR HFA/PROVENTIL HFA/VENTOLIN HFA) 108 (90 Base) MCG/ACT inhaler Inhale 2 puffs into the lungs every 6 hours as needed for shortness of breath, wheezing or cough, Historical      aspirin (ASA) 81 MG chewable tablet Take 81 mg by mouth daily, Historical      atorvastatin (LIPITOR) 40 MG tablet Take 40 mg by mouth daily , Historical      buprenorphine HCl-naloxone HCl (SUBOXONE) 8-2 MG per film Place 1 Film under the tongue 2 times daily AM and early evening  (around 5-6 pm), Historical      carvedilol (COREG) 25 MG tablet Take 25 mg by mouth 2 times daily, Historical      diclofenac (VOLTAREN) 1 % topical gel Apply 2-4 g topically 4 times daily as needed for moderate pain, Historical      !! gabapentin (NEURONTIN) 300 MG capsule Take 300 mg by mouth every morning, Historical      !! gabapentin (NEURONTIN) 300 MG capsule Take 600 mg by mouth At Bedtime, Historical      hydrALAZINE (APRESOLINE) 25 MG tablet Take 25 mg by mouth 3 times daily, Historical      melatonin 5 MG tablet Take 5 mg by mouth nightly as needed for sleep, Historical      mirtazapine (REMERON) 45 MG tablet Take 45 mg by mouth At Bedtime , Historical      multivitamin w/minerals (THERA-VIT-M) tablet Take 1 tablet by mouth daily, Historical      naloxone (NARCAN) 4 MG/0.1ML nasal spray Spray 4 mg into one nostril alternating nostrils once as needed for  opioid reversal every 2-3 minutes until assistance arrives, Historical      nicotine (NICODERM CQ) 7 MG/24HR 24 hr patch Place 1 patch onto the skin every 24 hours, Historical      pantoprazole (PROTONIX) 40 MG EC tablet Take 40 mg by mouth daily, Historical      polyethylene glycol (MIRALAX) 17 g packet Take 1 packet by mouth daily as needed for constipation, Historical      !! QUEtiapine (SEROQUEL) 25 MG tablet Take 25 mg by mouth every morning, Historical      !! QUEtiapine (SEROQUEL) 25 MG tablet Take 12.5 mg by mouth nightly as needed, Historical       !! - Potential duplicate medications found. Please discuss with provider.        STOP taking these medications       fluticasone-vilanterol (BREO ELLIPTA) 200-25 MCG/INH inhaler Comments:   Reason for Stopping:         tiotropium (SPIRIVA RESPIMAT) 2.5 MCG/ACT inhaler Comments:   Reason for Stopping:             Allergies   Allergies   Allergen Reactions    Ibuprofen Nausea and Vomiting     Data   Recent Labs   Lab Test 07/28/23  2022 07/15/23  0826 12/08/21  1049   WBC 8.7 7.8 7.1   HGB 11.2* 11.8* 12.3*   MCV 95 94 94    185 199      Recent Labs   Lab Test 08/01/23  0556 07/31/23  1603 07/29/23  0159 07/28/23  2022 07/15/23  0826 07/15/23  0826 12/09/21  1103 12/08/21  1049   NA  --   --   --  141  --  140  --  136   POTASSIUM 5.1 4.4  --  4.4   < > 4.4  --  4.9   CHLORIDE  --   --   --  101  --  105  --  104   CO2  --   --   --  31*  --  26  --  27   BUN  --   --   --  11.7  --  6.1*  --  7   CR  --   --   --  1.01  --  0.82 0.61* 0.76   ANIONGAP  --   --   --  9  --  9  --  5   RL  --   --   --  8.4*  --  8.8  --  9.5   GLC  --   --  137* 156*  --  173*  --  119*    < > = values in this interval not displayed.         Results for orders placed or performed during the hospital encounter of 07/28/23   XR Chest Port 1 View    Narrative    EXAM: XR CHEST PORT 1 VIEW  LOCATION: [facility]  DATE: 7/28/2023    INDICATION: sob,  copd  COMPARISON: Chest x-ray 07/15/2023      Impression    IMPRESSION: Mild interstitial edema. No pneumonic consolidation or pleural effusion. Normal heart size. Stable central vascular prominence.   XR Video Swallow with SLP or OT    Narrative    VIDEO SWALLOWING EVALUATION   7/31/2023 1:56 PM     HISTORY: dysohagia . cough with eating    COMPARISON: None.    FLUOROSCOPY TIME: 1.5 minutes.  SPOT IMAGES OR CINE RUNS: 6    FINDINGS:    No laryngeal penetration or aspiration with thin liquid, puree,  semisolid, or solid consistency barium preparations.    Limited evaluation of the distal esophagus demonstrates some evidence  of dysmotility and impaired clearance past the gastroesophageal  junction with solid and thin liquid preparations.      Impression    IMPRESSION:      1.  No laryngeal penetration or aspiration.  2.  Limited evaluation of the distal esophagus demonstrates some  evidence of dysmotility and impaired clearance past the  gastroesophageal junction with solid and thin liquid preparations.  Dedicated esophagram could further evaluate if clinically indicated.  3.  Please refer to speech pathology notes for additional details and  recommendations.    SD CLARKE MD         SYSTEM ID:  [id number]

## 2023-08-01 NOTE — PLAN OF CARE
"Neuro- x4  Most Recent Vitals- BP (!) 138/93   Pulse 77   Temp 96.8  F (36  C) (Oral)   Resp 19   Ht 1.753 m (5' 9\")   Wt 67.9 kg (149 lb 9.6 oz)   SpO2 98%   BMI 22.09 kg/m    Tele/Cardiac- SR/ST  Resp- 2L NC   Activity- Independent   Pain- denies  GI/- WDL  Diet: Advance Diet as Tolerated: Regular Diet Adult; Thin Liquids (level 0), episode of choking/ feeling of food getting stuck w/ dinner.   Plan- discharge today,  1-2 PM   Misc- chronic pulmonary disease specialist consult  "

## 2023-08-01 NOTE — PROGRESS NOTES
Care Management Discharge Note    Discharge Date: 08/01/2023       Discharge Disposition: Group Home    Discharge Services: None    Discharge DME: None    Discharge Transportation: agency    Private pay costs discussed: Not applicable    Does the patient's insurance plan have a 3 day qualifying hospital stay waiver?  No    PAS Confirmation Code:    Patient/family educated on Medicare website which has current facility and service quality ratings:      Education Provided on the Discharge Plan:    Persons Notified of Discharge Plans: Kayleigh RN at group home and Mohamed house supervisor  Patient/Family in Agreement with the Plan: yes    Handoff Referral Completed: Yes    Additional Information:  Pt to discharge back to group home via w/c transport with O2 thru [facility].  Writer faxed pertinent chart notes and discharge orders/instructions to group home and also faxed lab orders to PCP clinic.   PCP clinic to contact patient regarding lab appointment.  Soonest available follow up with PCP was Aug. 18th.     Anna Bond RN Care Coordinator  [facility]  [phone]

## 2023-08-02 LAB — BACTERIA BLD CULT: NO GROWTH

## 2023-08-03 LAB
BACTERIA BLD CULT: NO GROWTH
BACTERIA BLD CULT: NO GROWTH

## 2023-08-31 ENCOUNTER — HOSPITAL ENCOUNTER (INPATIENT)
Facility: CLINIC | Age: 63
LOS: 2 days | Discharge: NURSING, REHAB OR LONG-TERM CARE | DRG: 189 | End: 2023-09-02
Attending: EMERGENCY MEDICINE | Admitting: STUDENT IN AN ORGANIZED HEALTH CARE EDUCATION/TRAINING PROGRAM
Payer: MEDICARE

## 2023-08-31 ENCOUNTER — APPOINTMENT (OUTPATIENT)
Dept: GENERAL RADIOLOGY | Facility: CLINIC | Age: 63
DRG: 189 | End: 2023-08-31
Attending: EMERGENCY MEDICINE
Payer: MEDICARE

## 2023-08-31 DIAGNOSIS — R06.81 APNEA: Primary | ICD-10-CM

## 2023-08-31 DIAGNOSIS — R13.19 OTHER DYSPHAGIA: ICD-10-CM

## 2023-08-31 DIAGNOSIS — R06.03 ACUTE RESPIRATORY DISTRESS: ICD-10-CM

## 2023-08-31 DIAGNOSIS — J44.1 COPD EXACERBATION (H): ICD-10-CM

## 2023-08-31 DIAGNOSIS — J43.8 OTHER EMPHYSEMA (H): ICD-10-CM

## 2023-08-31 LAB
ALBUMIN SERPL BCG-MCNC: 4.1 G/DL (ref 3.5–5.2)
ALP SERPL-CCNC: 119 U/L (ref 40–129)
ALT SERPL W P-5'-P-CCNC: 24 U/L (ref 0–70)
ANION GAP SERPL CALCULATED.3IONS-SCNC: 11 MMOL/L (ref 7–15)
AST SERPL W P-5'-P-CCNC: 24 U/L (ref 0–45)
ATRIAL RATE - MUSE: 78 BPM
BASE EXCESS BLDV CALC-SCNC: 3.8 MMOL/L (ref -7.7–1.9)
BASE EXCESS BLDV CALC-SCNC: 4.6 MMOL/L (ref -7.7–1.9)
BASOPHILS # BLD AUTO: 0 10E3/UL (ref 0–0.2)
BASOPHILS NFR BLD AUTO: 0 %
BILIRUB DIRECT SERPL-MCNC: <0.2 MG/DL (ref 0–0.3)
BILIRUB SERPL-MCNC: 0.4 MG/DL
BUN SERPL-MCNC: 7.3 MG/DL (ref 8–23)
CALCIUM SERPL-MCNC: 8.8 MG/DL (ref 8.8–10.2)
CHLORIDE SERPL-SCNC: 100 MMOL/L (ref 98–107)
CREAT SERPL-MCNC: 0.77 MG/DL (ref 0.67–1.17)
DEPRECATED HCO3 PLAS-SCNC: 26 MMOL/L (ref 22–29)
DIASTOLIC BLOOD PRESSURE - MUSE: NORMAL MMHG
EOSINOPHIL # BLD AUTO: 0.1 10E3/UL (ref 0–0.7)
EOSINOPHIL NFR BLD AUTO: 1 %
ERYTHROCYTE [DISTWIDTH] IN BLOOD BY AUTOMATED COUNT: 14.4 % (ref 10–15)
FLUAV RNA SPEC QL NAA+PROBE: NEGATIVE
FLUBV RNA RESP QL NAA+PROBE: NEGATIVE
GFR SERPL CREATININE-BSD FRML MDRD: >90 ML/MIN/1.73M2
GLUCOSE BLDC GLUCOMTR-MCNC: 114 MG/DL (ref 70–99)
GLUCOSE SERPL-MCNC: 110 MG/DL (ref 70–99)
HCO3 BLDV-SCNC: 31 MMOL/L (ref 21–28)
HCO3 BLDV-SCNC: 31 MMOL/L (ref 21–28)
HCT VFR BLD AUTO: 35.5 % (ref 40–53)
HGB BLD-MCNC: 11.2 G/DL (ref 13.3–17.7)
IMM GRANULOCYTES # BLD: 0 10E3/UL
IMM GRANULOCYTES NFR BLD: 0 %
INTERPRETATION ECG - MUSE: NORMAL
LIPASE SERPL-CCNC: 18 U/L (ref 13–60)
LYMPHOCYTES # BLD AUTO: 0.9 10E3/UL (ref 0.8–5.3)
LYMPHOCYTES NFR BLD AUTO: 14 %
MCH RBC QN AUTO: 29.5 PG (ref 26.5–33)
MCHC RBC AUTO-ENTMCNC: 31.5 G/DL (ref 31.5–36.5)
MCV RBC AUTO: 93 FL (ref 78–100)
MONOCYTES # BLD AUTO: 0.9 10E3/UL (ref 0–1.3)
MONOCYTES NFR BLD AUTO: 14 %
NEUTROPHILS # BLD AUTO: 4.6 10E3/UL (ref 1.6–8.3)
NEUTROPHILS NFR BLD AUTO: 71 %
NRBC # BLD AUTO: 0 10E3/UL
NRBC BLD AUTO-RTO: 0 /100
O2/TOTAL GAS SETTING VFR VENT: 35 %
O2/TOTAL GAS SETTING VFR VENT: 4 %
P AXIS - MUSE: 78 DEGREES
PCO2 BLDV: 53 MM HG (ref 40–50)
PCO2 BLDV: 60 MM HG (ref 40–50)
PH BLDV: 7.32 [PH] (ref 7.32–7.43)
PH BLDV: 7.37 [PH] (ref 7.32–7.43)
PLATELET # BLD AUTO: 160 10E3/UL (ref 150–450)
PO2 BLDV: 65 MM HG (ref 25–47)
PO2 BLDV: 71 MM HG (ref 25–47)
POTASSIUM SERPL-SCNC: 4.4 MMOL/L (ref 3.4–5.3)
PR INTERVAL - MUSE: 122 MS
PROT SERPL-MCNC: 6.9 G/DL (ref 6.4–8.3)
QRS DURATION - MUSE: 84 MS
QT - MUSE: 380 MS
QTC - MUSE: 433 MS
R AXIS - MUSE: 73 DEGREES
RBC # BLD AUTO: 3.8 10E6/UL (ref 4.4–5.9)
RSV RNA SPEC NAA+PROBE: NEGATIVE
SARS-COV-2 RNA RESP QL NAA+PROBE: NEGATIVE
SODIUM SERPL-SCNC: 137 MMOL/L (ref 136–145)
SYSTOLIC BLOOD PRESSURE - MUSE: NORMAL MMHG
T AXIS - MUSE: 80 DEGREES
TROPONIN T SERPL HS-MCNC: 11 NG/L
TROPONIN T SERPL HS-MCNC: 9 NG/L
VENTRICULAR RATE- MUSE: 78 BPM
WBC # BLD AUTO: 6.6 10E3/UL (ref 4–11)

## 2023-08-31 PROCEDURE — 99291 CRITICAL CARE FIRST HOUR: CPT | Mod: 25

## 2023-08-31 PROCEDURE — 85025 COMPLETE CBC W/AUTO DIFF WBC: CPT | Performed by: EMERGENCY MEDICINE

## 2023-08-31 PROCEDURE — 999N000157 HC STATISTIC RCP TIME EA 10 MIN

## 2023-08-31 PROCEDURE — 250N000009 HC RX 250: Performed by: EMERGENCY MEDICINE

## 2023-08-31 PROCEDURE — 94640 AIRWAY INHALATION TREATMENT: CPT

## 2023-08-31 PROCEDURE — 96365 THER/PROPH/DIAG IV INF INIT: CPT | Mod: 59

## 2023-08-31 PROCEDURE — 250N000009 HC RX 250

## 2023-08-31 PROCEDURE — 250N000013 HC RX MED GY IP 250 OP 250 PS 637: Performed by: NURSE PRACTITIONER

## 2023-08-31 PROCEDURE — 250N000009 HC RX 250: Performed by: NURSE PRACTITIONER

## 2023-08-31 PROCEDURE — 71046 X-RAY EXAM CHEST 2 VIEWS: CPT

## 2023-08-31 PROCEDURE — 87637 SARSCOV2&INF A&B&RSV AMP PRB: CPT | Performed by: EMERGENCY MEDICINE

## 2023-08-31 PROCEDURE — 83690 ASSAY OF LIPASE: CPT | Performed by: EMERGENCY MEDICINE

## 2023-08-31 PROCEDURE — 36415 COLL VENOUS BLD VENIPUNCTURE: CPT | Performed by: NURSE PRACTITIONER

## 2023-08-31 PROCEDURE — 120N000013 HC R&B IMCU

## 2023-08-31 PROCEDURE — 82803 BLOOD GASES ANY COMBINATION: CPT | Performed by: NURSE PRACTITIONER

## 2023-08-31 PROCEDURE — 82310 ASSAY OF CALCIUM: CPT | Performed by: EMERGENCY MEDICINE

## 2023-08-31 PROCEDURE — 84484 ASSAY OF TROPONIN QUANT: CPT | Performed by: NURSE PRACTITIONER

## 2023-08-31 PROCEDURE — 96375 TX/PRO/DX INJ NEW DRUG ADDON: CPT

## 2023-08-31 PROCEDURE — 99223 1ST HOSP IP/OBS HIGH 75: CPT | Mod: AI | Performed by: NURSE PRACTITIONER

## 2023-08-31 PROCEDURE — 93005 ELECTROCARDIOGRAM TRACING: CPT

## 2023-08-31 PROCEDURE — 250N000011 HC RX IP 250 OP 636: Mod: JZ | Performed by: EMERGENCY MEDICINE

## 2023-08-31 PROCEDURE — 36415 COLL VENOUS BLD VENIPUNCTURE: CPT | Performed by: EMERGENCY MEDICINE

## 2023-08-31 PROCEDURE — 82803 BLOOD GASES ANY COMBINATION: CPT | Performed by: EMERGENCY MEDICINE

## 2023-08-31 PROCEDURE — 5A09357 ASSISTANCE WITH RESPIRATORY VENTILATION, LESS THAN 24 CONSECUTIVE HOURS, CONTINUOUS POSITIVE AIRWAY PRESSURE: ICD-10-PCS | Performed by: STUDENT IN AN ORGANIZED HEALTH CARE EDUCATION/TRAINING PROGRAM

## 2023-08-31 PROCEDURE — 82248 BILIRUBIN DIRECT: CPT | Performed by: EMERGENCY MEDICINE

## 2023-08-31 PROCEDURE — 94660 CPAP INITIATION&MGMT: CPT

## 2023-08-31 PROCEDURE — 94640 AIRWAY INHALATION TREATMENT: CPT | Mod: 76

## 2023-08-31 PROCEDURE — 84484 ASSAY OF TROPONIN QUANT: CPT | Performed by: EMERGENCY MEDICINE

## 2023-08-31 RX ORDER — ASPIRIN 81 MG/1
81 TABLET, CHEWABLE ORAL DAILY
Status: DISCONTINUED | OUTPATIENT
Start: 2023-09-01 | End: 2023-09-02 | Stop reason: HOSPADM

## 2023-08-31 RX ORDER — BUPRENORPHINE AND NALOXONE 8; 2 MG/1; MG/1
1 FILM, SOLUBLE BUCCAL; SUBLINGUAL 2 TIMES DAILY
Status: DISCONTINUED | OUTPATIENT
Start: 2023-08-31 | End: 2023-09-02 | Stop reason: HOSPADM

## 2023-08-31 RX ORDER — IPRATROPIUM BROMIDE AND ALBUTEROL SULFATE 2.5; .5 MG/3ML; MG/3ML
SOLUTION RESPIRATORY (INHALATION)
Status: COMPLETED
Start: 2023-08-31 | End: 2023-08-31

## 2023-08-31 RX ORDER — DOXYCYCLINE 100 MG/1
100 CAPSULE ORAL EVERY 12 HOURS SCHEDULED
Status: DISCONTINUED | OUTPATIENT
Start: 2023-08-31 | End: 2023-09-02 | Stop reason: HOSPADM

## 2023-08-31 RX ORDER — IPRATROPIUM BROMIDE AND ALBUTEROL SULFATE 2.5; .5 MG/3ML; MG/3ML
1 SOLUTION RESPIRATORY (INHALATION) 2 TIMES DAILY
Status: DISCONTINUED | OUTPATIENT
Start: 2023-08-31 | End: 2023-08-31 | Stop reason: ALTCHOICE

## 2023-08-31 RX ORDER — ALBUTEROL SULFATE 0.83 MG/ML
2.5 SOLUTION RESPIRATORY (INHALATION)
Status: DISCONTINUED | OUTPATIENT
Start: 2023-08-31 | End: 2023-09-02 | Stop reason: HOSPADM

## 2023-08-31 RX ORDER — QUETIAPINE FUMARATE 25 MG/1
25 TABLET, FILM COATED ORAL EVERY MORNING
Status: DISCONTINUED | OUTPATIENT
Start: 2023-09-01 | End: 2023-09-02 | Stop reason: HOSPADM

## 2023-08-31 RX ORDER — CARVEDILOL 25 MG/1
25 TABLET ORAL 2 TIMES DAILY
Status: DISCONTINUED | OUTPATIENT
Start: 2023-08-31 | End: 2023-09-02 | Stop reason: HOSPADM

## 2023-08-31 RX ORDER — ONDANSETRON 4 MG/1
4 TABLET, ORALLY DISINTEGRATING ORAL EVERY 6 HOURS PRN
Status: DISCONTINUED | OUTPATIENT
Start: 2023-08-31 | End: 2023-09-02 | Stop reason: HOSPADM

## 2023-08-31 RX ORDER — ACETAMINOPHEN 325 MG/1
650 TABLET ORAL EVERY 6 HOURS PRN
Status: DISCONTINUED | OUTPATIENT
Start: 2023-08-31 | End: 2023-09-02 | Stop reason: HOSPADM

## 2023-08-31 RX ORDER — AMOXICILLIN 250 MG
1 CAPSULE ORAL 2 TIMES DAILY PRN
Status: DISCONTINUED | OUTPATIENT
Start: 2023-08-31 | End: 2023-09-02 | Stop reason: HOSPADM

## 2023-08-31 RX ORDER — GABAPENTIN 300 MG/1
300 CAPSULE ORAL EVERY MORNING
Status: DISCONTINUED | OUTPATIENT
Start: 2023-09-01 | End: 2023-09-02 | Stop reason: HOSPADM

## 2023-08-31 RX ORDER — MIRTAZAPINE 15 MG/1
45 TABLET, FILM COATED ORAL AT BEDTIME
Status: DISCONTINUED | OUTPATIENT
Start: 2023-08-31 | End: 2023-09-02 | Stop reason: HOSPADM

## 2023-08-31 RX ORDER — BUPROPION HYDROCHLORIDE 150 MG/1
300 TABLET, EXTENDED RELEASE ORAL DAILY
Status: DISCONTINUED | OUTPATIENT
Start: 2023-09-01 | End: 2023-09-02 | Stop reason: HOSPADM

## 2023-08-31 RX ORDER — ACETAMINOPHEN 650 MG/1
650 SUPPOSITORY RECTAL EVERY 6 HOURS PRN
Status: DISCONTINUED | OUTPATIENT
Start: 2023-08-31 | End: 2023-09-02 | Stop reason: HOSPADM

## 2023-08-31 RX ORDER — PANTOPRAZOLE SODIUM 40 MG/1
40 TABLET, DELAYED RELEASE ORAL DAILY
Status: DISCONTINUED | OUTPATIENT
Start: 2023-09-01 | End: 2023-09-02 | Stop reason: HOSPADM

## 2023-08-31 RX ORDER — FLUTICASONE FUROATE AND VILANTEROL 200; 25 UG/1; UG/1
1 POWDER RESPIRATORY (INHALATION) DAILY
Status: DISCONTINUED | OUTPATIENT
Start: 2023-09-01 | End: 2023-09-02 | Stop reason: HOSPADM

## 2023-08-31 RX ORDER — AMOXICILLIN 250 MG
2 CAPSULE ORAL 2 TIMES DAILY PRN
Status: DISCONTINUED | OUTPATIENT
Start: 2023-08-31 | End: 2023-09-02 | Stop reason: HOSPADM

## 2023-08-31 RX ORDER — METHYLPREDNISOLONE SODIUM SUCCINATE 40 MG/ML
40 INJECTION, POWDER, LYOPHILIZED, FOR SOLUTION INTRAMUSCULAR; INTRAVENOUS DAILY
Status: DISCONTINUED | OUTPATIENT
Start: 2023-09-01 | End: 2023-09-02 | Stop reason: HOSPADM

## 2023-08-31 RX ORDER — IPRATROPIUM BROMIDE AND ALBUTEROL SULFATE 2.5; .5 MG/3ML; MG/3ML
3 SOLUTION RESPIRATORY (INHALATION)
Status: DISCONTINUED | OUTPATIENT
Start: 2023-08-31 | End: 2023-08-31

## 2023-08-31 RX ORDER — ATORVASTATIN CALCIUM 40 MG/1
40 TABLET, FILM COATED ORAL DAILY
Status: DISCONTINUED | OUTPATIENT
Start: 2023-09-01 | End: 2023-09-02 | Stop reason: HOSPADM

## 2023-08-31 RX ORDER — METHYLPREDNISOLONE SODIUM SUCCINATE 125 MG/2ML
125 INJECTION, POWDER, LYOPHILIZED, FOR SOLUTION INTRAMUSCULAR; INTRAVENOUS ONCE
Status: COMPLETED | OUTPATIENT
Start: 2023-08-31 | End: 2023-08-31

## 2023-08-31 RX ORDER — HYDRALAZINE HYDROCHLORIDE 25 MG/1
25 TABLET, FILM COATED ORAL 3 TIMES DAILY
Status: DISCONTINUED | OUTPATIENT
Start: 2023-08-31 | End: 2023-09-02 | Stop reason: HOSPADM

## 2023-08-31 RX ORDER — CARBOXYMETHYLCELLULOSE SODIUM 5 MG/ML
1 SOLUTION/ DROPS OPHTHALMIC
Status: DISCONTINUED | OUTPATIENT
Start: 2023-08-31 | End: 2023-09-02 | Stop reason: HOSPADM

## 2023-08-31 RX ORDER — IPRATROPIUM BROMIDE AND ALBUTEROL SULFATE 2.5; .5 MG/3ML; MG/3ML
3 SOLUTION RESPIRATORY (INHALATION)
Status: DISCONTINUED | OUTPATIENT
Start: 2023-08-31 | End: 2023-09-01

## 2023-08-31 RX ORDER — ALBUTEROL SULFATE 0.83 MG/ML
SOLUTION RESPIRATORY (INHALATION)
Status: DISCONTINUED
Start: 2023-08-31 | End: 2023-08-31 | Stop reason: HOSPADM

## 2023-08-31 RX ORDER — ONDANSETRON 2 MG/ML
4 INJECTION INTRAMUSCULAR; INTRAVENOUS EVERY 6 HOURS PRN
Status: DISCONTINUED | OUTPATIENT
Start: 2023-08-31 | End: 2023-09-02 | Stop reason: HOSPADM

## 2023-08-31 RX ORDER — GABAPENTIN 300 MG/1
600 CAPSULE ORAL AT BEDTIME
Status: DISCONTINUED | OUTPATIENT
Start: 2023-08-31 | End: 2023-09-02 | Stop reason: HOSPADM

## 2023-08-31 RX ORDER — CEFEPIME HYDROCHLORIDE 1 G/1
1 INJECTION, POWDER, FOR SOLUTION INTRAMUSCULAR; INTRAVENOUS EVERY 8 HOURS
Status: CANCELLED | OUTPATIENT
Start: 2023-08-31

## 2023-08-31 RX ORDER — MAGNESIUM SULFATE HEPTAHYDRATE 40 MG/ML
2 INJECTION, SOLUTION INTRAVENOUS ONCE
Status: COMPLETED | OUTPATIENT
Start: 2023-08-31 | End: 2023-08-31

## 2023-08-31 RX ORDER — LISINOPRIL 40 MG/1
40 TABLET ORAL DAILY
Status: DISCONTINUED | OUTPATIENT
Start: 2023-09-01 | End: 2023-09-02 | Stop reason: HOSPADM

## 2023-08-31 RX ADMIN — BUPRENORPHINE AND NALOXONE 1 FILM: 8; 2 FILM, SOLUBLE BUCCAL; SUBLINGUAL at 20:19

## 2023-08-31 RX ADMIN — METHYLPREDNISOLONE SODIUM SUCCINATE 125 MG: 125 INJECTION, POWDER, FOR SOLUTION INTRAMUSCULAR; INTRAVENOUS at 10:33

## 2023-08-31 RX ADMIN — MAGNESIUM SULFATE HEPTAHYDRATE 2 G: 40 INJECTION, SOLUTION INTRAVENOUS at 10:33

## 2023-08-31 RX ADMIN — CARVEDILOL 25 MG: 25 TABLET, FILM COATED ORAL at 20:19

## 2023-08-31 RX ADMIN — GABAPENTIN 600 MG: 300 CAPSULE ORAL at 21:10

## 2023-08-31 RX ADMIN — IPRATROPIUM BROMIDE AND ALBUTEROL SULFATE 3 ML: .5; 3 SOLUTION RESPIRATORY (INHALATION) at 10:32

## 2023-08-31 RX ADMIN — IPRATROPIUM BROMIDE AND ALBUTEROL SULFATE 3 ML: .5; 3 SOLUTION RESPIRATORY (INHALATION) at 18:55

## 2023-08-31 RX ADMIN — HYDRALAZINE HYDROCHLORIDE 25 MG: 25 TABLET ORAL at 21:10

## 2023-08-31 RX ADMIN — IPRATROPIUM BROMIDE AND ALBUTEROL SULFATE 3 ML: .5; 3 SOLUTION RESPIRATORY (INHALATION) at 10:05

## 2023-08-31 RX ADMIN — IPRATROPIUM BROMIDE AND ALBUTEROL SULFATE 3 ML: .5; 3 SOLUTION RESPIRATORY (INHALATION) at 10:16

## 2023-08-31 RX ADMIN — MIRTAZAPINE 45 MG: 15 TABLET, FILM COATED ORAL at 21:10

## 2023-08-31 RX ADMIN — DOXYCYCLINE HYCLATE 100 MG: 100 CAPSULE ORAL at 20:19

## 2023-08-31 ASSESSMENT — ACTIVITIES OF DAILY LIVING (ADL)
ADLS_ACUITY_SCORE: 35

## 2023-08-31 NOTE — ED TRIAGE NOTES
Patient BIBA from group home with sob, COPD exacerbation. Patient is on 3L NC o2 at home but up to 4 today. Wheezing and tight lung sounds.  PT scheduled for [facility] today at 1100 and anxiety has been causing a lot of problems for him and has to reschedule multiple times.    Triage Assessment       Row Name 08/31/23 1003       Respiratory WDL    Respiratory WDL X       Cardiac WDL    Cardiac WDL WDL       Cognitive/Neuro/Behavioral WDL    Cognitive/Neuro/Behavioral WDL WDL

## 2023-08-31 NOTE — ED NOTES
Bed: ED29  Expected date:   Expected time:   Means of arrival:   Comments:  Neal - 523 - 63 M FRANK eta [id number]

## 2023-08-31 NOTE — PROGRESS NOTES
Pt pCPAP/BiPAP Settings  IPAP: 14  EPAP: 6  Rate: 14  FiO2: 30  Timed Inspiration (sec): 0.9    CPAP/BiPAP/AVAPS/AVAPS AE Alarms  High Pressure: 25  Low Pressure: 5  Low Pressure Delay: 20  High Rate (breaths/min): 45  Low Rate (breaths/min): 5  Alarm Volume Level: 10    CPAP/BiPAP/AVAPS/AVAPS AE Patient Assessment  Skin Assessment: intact  Lung Sounds: audible exp wheezes    Current VBG  results after Bipap Placement   Latest Reference Range & Units 08/31/23 15:44   FIO2  35   Ph Venous 7.32 - 7.43  7.37   PCO2 Venous 40 - 50 mm Hg 53 (H)   PO2 Venous 25 - 47 mm Hg 71 (H)   Bicarbonate Venous 21 - 28 mmol/L 31 (H)   Base Excess Venous -7.7 - 1.9 mmol/L 4.6 (H)   (H): Data is abnormally high      Plan:  RT to continue to monitor.  Transfer to [facility] for further management.    MIKAELA CHENEY, RT on 8/31/2023 at 5:38 PM

## 2023-08-31 NOTE — PLAN OF CARE
[id number]    Orientations: A&Ox4   Vitals/Pain: VSS. 3L NC (baseline) when off BiPAP (35%). LS wheezy and diminished. Denies pain  Tele: SR  Lines/Drains: PIV R SL  Skin/Wounds: WDL (scattered bruising and pale)   GI/: WDL x No BM today (abdom discomfort - firm - per pt been this way for about a month and very uncomfortable) Ate prior to BiPAP (reg diet), BiPAP on around 2000/2030)  Labs: Abnormal/Trends: BUN (7.3), Hgb (11.2), hematocrit (35.5), RBC (3.80), VBG: (PCO2: 53, PO2: 71, Bicarb: 31)  Electrolyte Replacement- N/A  Ambulation/Assist: SB assist; NOOB w/ BiPAP on   Plan: Wean O2 back to baseline (3L), BiPAP at night and repeat VBG. Monitor O2 sat. Encourage smoking cessation and education (nicotine patch)

## 2023-08-31 NOTE — PHARMACY-ADMISSION MEDICATION HISTORY
Pharmacist Admission Medication History    Admission medication history is complete. The information provided in this note is only as accurate as the sources available at the time of the update.    Medication reconciliation/reorder completed by provider prior to medication history? Yes    Information Source(s): Facility (U/NH/) medication list/MAR via N/A    Pertinent Information: List obtained from [facility] ([phone]).    Changes made to PTA medication list:  Added: None  Deleted: None  Changed: None    Medication Affordability:  Not including over the counter (OTC) medications, was there a time in the past 3 months when you did not take your medications as prescribed because of cost?: Unable to Assess    Allergies reviewed with patient and updates made in EHR: unable to assess    Medication History Completed By: Krista Medina RPH 8/31/2023 4:39 PM    Prior to Admission medications    Medication Sig Last Dose Taking? Auth Provider Long Term End Date   acetaminophen (TYLENOL) 325 MG tablet Take 325 mg by mouth every 4 hours as needed for mild pain  at prn Yes Unknown, Entered By History     albuterol (PROAIR HFA/PROVENTIL HFA/VENTOLIN HFA) 108 (90 Base) MCG/ACT inhaler Inhale 2 puffs into the lungs every 6 hours as needed for shortness of breath, wheezing or cough  at prn Yes Unknown, Entered By History Yes    aspirin (ASA) 81 MG chewable tablet Take 81 mg by mouth daily 8/31/2023 Yes Unknown, Entered By History     atorvastatin (LIPITOR) 40 MG tablet Take 40 mg by mouth daily  8/31/2023 Yes Unknown, Entered By History Yes    budeson-glycopyrrol-formoterol (BREZTRI AEROSPHERE) 160-9-4.8 MCG/ACT AERO inhaler Inhale 2 puffs into the lungs 2 times daily 8/31/2023 Yes Iwona Bhatti MD     buprenorphine HCl-naloxone HCl (SUBOXONE) 8-2 MG per film Place 1 Film under the tongue 2 times daily AM and early evening  (around 5-6 pm) 8/31/2023 Yes Unknown, Entered By History      buPROPion (WELLBUTRIN SR) 150 MG 12 hr tablet Take 2 tablets (300 mg) by mouth daily 8/31/2023 Yes Iwona Bhatti MD Yes    carvedilol (COREG) 25 MG tablet Take 25 mg by mouth 2 times daily 8/31/2023 Yes Unknown, Entered By History No    diclofenac (VOLTAREN) 1 % topical gel Apply 2-4 g topically 4 times daily as needed for moderate pain  at prn Yes Unknown, Entered By History     docusate sodium (COLACE) 100 MG capsule Take 1 capsule (100 mg) by mouth 2 times daily 8/31/2023 Yes Iwona Bhatti MD     gabapentin (NEURONTIN) 300 MG capsule Take 300 mg by mouth every morning 8/31/2023 Yes Unknown, Entered By History Yes    gabapentin (NEURONTIN) 300 MG capsule Take 600 mg by mouth At Bedtime 8/30/2023 Yes Unknown, Entered By History Yes    hydrALAZINE (APRESOLINE) 25 MG tablet Take 25 mg by mouth 3 times daily 8/31/2023 Yes Unknown, Entered By History Yes    ipratropium - albuterol 0.5 mg/2.5 mg/3 mL (DUONEB) 0.5-2.5 (3) MG/3ML neb solution Take 1 vial (3 mLs) by nebulization 2 times daily  Yes Iwona Bhatti MD Yes    ipratropium - albuterol 0.5 mg/2.5 mg/3 mL (DUONEB) 0.5-2.5 (3) MG/3ML neb solution Take 1 vial (3 mLs) by nebulization every 6 hours as needed for shortness of breath, wheezing or cough 8/31/2023 Yes Iwona Bhatti MD Yes    lisinopril (ZESTRIL) 40 MG tablet Take 1 tablet (40 mg) by mouth daily 8/31/2023 Yes Iwona Bhatti MD Yes    melatonin 5 MG tablet Take 5 mg by mouth nightly as needed for sleep  at prn Yes Unknown, Entered By History     mirtazapine (REMERON) 45 MG tablet Take 45 mg by mouth At Bedtime  8/30/2023 Yes Unknown, Entered By History Yes    multivitamin w/minerals (THERA-VIT-M) tablet Take 1 tablet by mouth daily 8/31/2023 Yes Unknown, Entered By History     naloxone (NARCAN) 4 MG/0.1ML nasal spray Spray 4 mg into one nostril alternating nostrils once as needed for opioid reversal every 2-3 minutes until assistance arrives  at prn Yes Unknown, Entered By History      nicotine (NICODERM CQ) 7 MG/24HR 24 hr patch Place 1 patch onto the skin every 24 hours 8/31/2023 Yes Unknown, Entered By History     pantoprazole (PROTONIX) 40 MG EC tablet Take 40 mg by mouth daily 8/31/2023 Yes Unknown, Entered By History     polyethylene glycol (MIRALAX) 17 g packet Take 1 packet by mouth daily as needed for constipation  at prn Yes Unknown, Entered By History     QUEtiapine (SEROQUEL) 25 MG tablet Take 25 mg by mouth every morning 8/31/2023 Yes Unknown, Entered By History Yes    QUEtiapine (SEROQUEL) 25 MG tablet Take 12.5 mg by mouth nightly as needed  at prn Yes Unknown, Entered By History Yes

## 2023-08-31 NOTE — H&P
[facility]    History and Physical - Hospitalist Service       Date of Admission:  8/31/2023    Assessment & Plan      Gerson Pearson is a 63 year old male admitted on 8/31/2023. He has a PMHx including severe COPD with frequent exacerbations requiring BiPAP administration, ongoing tobacco abuse, depression with anxiety, hypertension, CAD with history of bare-metal stent placement, opiate dependence, tobacco abuse disorder with nicotine dependence, GERD who presents to the emergency department in respiratory distress.    Acute hypercarbic respiratory failure 2/2 COPD Exacerbation  Severe chronic obstructive pulmonary disease w/ home O2 use, 2L baseline  [PTA Breztri, Albuterol, and Spiriva Respimat ]  * FVC 44%, FEV1 of 24% - very severe obstruction, without significant bronchodilator response by prior PFTs.    *SARS-CoV-2 PCR, influenza A/B, RSV negative; 2V CXR shows no acute infiltrates, pulmonary vasculature unremarkable  *Recent admissions include  7/15 - 7/16, and 7/28/2023 - 8/1/2023, discharged with a 10-day prednisone taper and 5-day course of azithromycin  *Has followed w/ Pulmonary rehab, and [place] pul[facility]ology consulted; his primary pulmonologist, Dr. Vela at [facility], noted that he could potentially be a candidate for advanced COPD therapies at the [facility].  *Seen by Pulmonology on previous admission, recommend transitioning from Spiriva and Breo to breztri which is an aerosol inhaler rather than dry powder inhaler.  Pt states he has been adherent w/ Spiriva and Breo since discharge.  Prescribed Breztri also   -Referral for OP sleep consult on previous Aug 2023 admission to assess for sleep disordered breathing, REYNA, nocturnal hypoxia, and hypoventilation (not yet completed)  *Initial VBG shows 7.32, pC02 60, Hc03 31  -Was started on BiPAP on admission for severe hypercapnia in ED, recheck for improvement in 2 hours   *Received Solu-Medrol in the ED,  transition to IV Solu-Medrol 40 mg twice daily due to reduced air movement and tachypnea.   - Every 2 hours as needed albuterol nebulizer treatments; again, typically minimally responsive to bronchodilators  - Goal oxygen saturation 88 to 90% with his history of hypercarbia.   - Care coordinator consult, involved during previous admission for disposition to group home and oxygen  *Evaluated by RT Álvaro on 8/1 through COPD Specialist Consult-->  - re consult COPD specialist to help facilitate BiPAP at home (previous notes indicate need of VBG w/ pC02 > 52, which was achieved on admission)  - start empiric antibiotics given risk factors  (pt at risk for pseudomonas d/t very severe COPD w/ FEV1<30, broad spectrum antibiotic use on multiple previous admissions in past 3 months, chronic systemic glucocorticoid use), pt hx of MRSA positive -->will start doxycycline 100 mg PO BID x 5 days    Depression with anxiety  -Continue PTA as needed hydroxyzine  -Resume prior to admission Remeron, Seroquel and Wellbutrin     Hypertension  CAD s/p PCI w/ bare metal stent to RCA, 2020    *Initial troponin 11, EKG non ischemic, NSR w/o any acute ST/TW changes consistent w/ ischemia   -Resume prior to admission aspirin 81 mg daily, atorvastatin 40 mg daily, and carvedilol and hydralazine with parameters     Opiate dependence, in remission  History of heroin and cocaine abuse, though reports sobriety for the past year and a half.   *Admitted twice in approximately 24 hours in November 2021 with presumed acute COPD exacerbations which abruptly improved despite minimal bronchodilator response on PFTs.  At that time, he was actually discontinuing his Suboxone to snort heroin resulting in both presentations.  He currently reports that he has not used heroin since that time, and has been adherent to his Suboxone therapy through his pain team.  -Continue with follow-up in pain clinic  -Resume prior to admission gabapentin and  Suboxone     Tobacco use disorder with nicotine dependence  Smoking 1 to 2 cigarettes/day, only 3 puffs of cigarette today prior to admission.  -Continue nicotine patch 7 mg with panel ordered.  -Discussed importance of continued cessation attempts.  He has cut back from 1/2 pack/day as of a year and a half ago.    Chronic Normocytic Anemia  Hgb 11-12. Hgb 11.2 on admission. Denies any abnormal bleeding.   - monitor periodically     GERD:  -Resume prior to admission pantoprazole     Dysphagia  VFSS in Aug 2023 study showed no laryngeal penetration but limited evaluation of distal esophageal's demonstrates some evidence of dysmotility and impaired clearance of her gastroesophageal junction.  -Recommended outpatient follow-up with GI to pursue esophagram, not yet completed         Diet:  NPO while requiring BiPAP  DVT Prophylaxis: Pneumatic Compression Devices  Post Catheter: Not present  Lines: None     Cardiac Monitoring: None  Code Status:  Full Code     Clinically Significant Risk Factors Present on Admission                # Drug Induced Platelet Defect: home medication list includes an antiplatelet medication   # Hypertension: Home medication list includes antihypertensive(s)   # Non-Invasive mechanical ventilation: current O2 Device: BiPAP/CPAP  # Acute hypoxic respiratory failure: continue supplemental O2 as needed                Disposition Plan      Expected Discharge Date: 09/02/2023                The patient's care was discussed with the Attending Physician, Dr. Weinberg, Bedside Nurse, and Patient.    ANJALI Hoffman [facility]  Hospitalist Service  [facility]  Securely message with [email or web address] (more info)  Text page via [facility] Paging/Directory     ______________________________________________________________________    Chief Complaint   Respiratory distress     History is obtained from the patient and [facility]    History of Present Illness   Gerson Pearson is a 63 year old male who  has  a PMHx including severe COPD with frequent exacerbations requiring BiPAP administration, ongoing tobacco abuse, depression with anxiety, hypertension, CAD with history of bare-metal stent placement, opiate dependence, tobacco abuse disorder with nicotine dependence, GERD who presents to the emergency department in respiratory distress.    Of note, the patient had a November 2021 admission with acute exacerbation of COPD following use of nasal heroin.  At that time he had discontinued his Suboxone so he could use heroin, however he is apparently not been using in quite some time.  He has been now adherent to Suboxone therapy.  Following this, the patient had recurrent admissions  7/15 - 7/16, and 7/28/2023 - 8/1/2023, discharged with a 10-day prednisone taper and 5-day course of azithromycin.      ED work-up shows BMP grossly unremarkable, normal electrolytes and renal function.  Initial troponin 11.  CBC with hemoglobin 11.2, platelets 160, normal WBC 6.6.  VBG shows pH 7.32 PCO2 60, bicarb 31.  Viral panel including SARS-CoV-2 PCR, influenza A/B, RSV negative.  2 view chest x-ray shows no acute infiltrates, normal pulmonary vasculature.  EKG shows sinus rhythm without any acute ST/T wave changes consistent with ischemia.    I evaluated the patient the emergency department, while on NIPPV, he is resting comfortably semiupright on the [facility] emergency department.  History is limited due to his BiPAP applied.  Patient states his breathing is much improved, denies any current chest pain, palpitations or dizziness.  He denies any recent fever/chills or sick contacts.  He notes ongoing abdominal bloating after any meal, but denies any nausea/vomiting, GI symptoms otherwise.  Denies any urinary symptoms.    Past Medical History    Past Medical History:   Diagnosis Date    CAD (coronary artery disease)     s/p bare metal stent to RCA in 6/2020    Chronic back pain     Chronic systolic congestive heart failure (H)     EF  45% has recovered to 60-65% on echo 12/2021    COPD (chronic obstructive pulmonary disease) (H)     Depressive disorder     Myocardial infarction (H)     Nicotine dependence        Past Surgical History   No past surgical history on file.    Prior to Admission Medications   Prior to Admission Medications   Prescriptions Last Dose Informant Patient Reported? Taking?   QUEtiapine (SEROQUEL) 25 MG tablet  Nursing Home Yes No   Sig: Take 25 mg by mouth every morning   QUEtiapine (SEROQUEL) 25 MG tablet  Nursing Home Yes No   Sig: Take 12.5 mg by mouth nightly as needed   acetaminophen (TYLENOL) 325 MG tablet  Nursing Home Yes No   Sig: Take 325 mg by mouth every 4 hours as needed for mild pain   albuterol (PROAIR HFA/PROVENTIL HFA/VENTOLIN HFA) 108 (90 Base) MCG/ACT inhaler  Nursing Home Yes No   Sig: Inhale 2 puffs into the lungs every 6 hours as needed for shortness of breath, wheezing or cough   aspirin (ASA) 81 MG chewable tablet  Nursing Home Yes No   Sig: Take 81 mg by mouth daily   atorvastatin (LIPITOR) 40 MG tablet  Nursing Home Yes No   Sig: Take 40 mg by mouth daily    buPROPion (WELLBUTRIN SR) 150 MG 12 hr tablet   No No   Sig: Take 2 tablets (300 mg) by mouth daily   budeson-glycopyrrol-formoterol (BREZTRI AEROSPHERE) 160-9-4.8 MCG/ACT AERO inhaler   No No   Sig: Inhale 2 puffs into the lungs 2 times daily   buprenorphine HCl-naloxone HCl (SUBOXONE) 8-2 MG per film   Yes No   Sig: Place 1 Film under the tongue 2 times daily AM and early evening  (around 5-6 pm)   carvedilol (COREG) 25 MG tablet  Nursing Home Yes No   Sig: Take 25 mg by mouth 2 times daily   diclofenac (VOLTAREN) 1 % topical gel  Nursing Home Yes No   Sig: Apply 2-4 g topically 4 times daily as needed for moderate pain   docusate sodium (COLACE) 100 MG capsule   No No   Sig: Take 1 capsule (100 mg) by mouth 2 times daily   gabapentin (NEURONTIN) 300 MG capsule  Nursing Home Yes No   Sig: Take 300 mg by mouth every morning   gabapentin  (NEURONTIN) 300 MG capsule  Nursing Home Yes No   Sig: Take 600 mg by mouth At Bedtime   hydrALAZINE (APRESOLINE) 25 MG tablet  Nursing Home Yes No   Sig: Take 25 mg by mouth 3 times daily   ipratropium - albuterol 0.5 mg/2.5 mg/3 mL (DUONEB) 0.5-2.5 (3) MG/3ML neb solution   No No   Sig: Take 1 vial (3 mLs) by nebulization 2 times daily   ipratropium - albuterol 0.5 mg/2.5 mg/3 mL (DUONEB) 0.5-2.5 (3) MG/3ML neb solution   No No   Sig: Take 1 vial (3 mLs) by nebulization every 6 hours as needed for shortness of breath, wheezing or cough   lisinopril (ZESTRIL) 40 MG tablet   No No   Sig: Take 1 tablet (40 mg) by mouth daily   melatonin 5 MG tablet  Nursing Home Yes No   Sig: Take 5 mg by mouth nightly as needed for sleep   mirtazapine (REMERON) 45 MG tablet  Nursing Home Yes No   Sig: Take 45 mg by mouth At Bedtime    multivitamin w/minerals (THERA-VIT-M) tablet  Nursing Home Yes No   Sig: Take 1 tablet by mouth daily   naloxone (NARCAN) 4 MG/0.1ML nasal spray  Nursing Home Yes No   Sig: Spray 4 mg into one nostril alternating nostrils once as needed for opioid reversal every 2-3 minutes until assistance arrives   nicotine (NICODERM CQ) 7 MG/24HR 24 hr patch  Nursing Home Yes No   Sig: Place 1 patch onto the skin every 24 hours   pantoprazole (PROTONIX) 40 MG EC tablet  Nursing Home Yes No   Sig: Take 40 mg by mouth daily   polyethylene glycol (MIRALAX) 17 g packet  skilled nursing Yes No   Sig: Take 1 packet by mouth daily as needed for constipation      Facility-Administered Medications: None        Social History   I have reviewed this patient's social history and updated it with pertinent information if needed.  Social History     Tobacco Use    Smoking status: Every Day     Packs/day: 0.25     Types: Cigarettes    Smokeless tobacco: Never   Substance Use Topics    Alcohol use: No     Comment: QUIT 3 YEARS AGO    Drug use: Yes     Types: Opiates, Cocaine     Comment: denies recent heroin or cocaine use         Physical Exam   Vital Signs: Temp: 98.3  F (36.8  C) Temp src: Oral BP: 113/83 Pulse: 77   Resp: 12 SpO2: 100 % O2 Device: BiPAP/CPAP    Weight: 147 lbs 0 oz    Physical Exam  Vitals and nursing note reviewed.   Constitutional:       General: He is not in acute distress.     Appearance: He is not ill-appearing.   HENT:      Mouth/Throat:      Mouth: Mucous membranes are dry.   Cardiovascular:      Rate and Rhythm: Normal rate and regular rhythm.      Heart sounds: No murmur heard.  Pulmonary:      Effort: No tachypnea.      Breath sounds: Examination of the right-upper field reveals rhonchi. Examination of the left-upper field reveals rhonchi. Examination of the right-lower field reveals rhonchi. Examination of the left-lower field reveals rhonchi. Rhonchi present. No wheezing.   Abdominal:      General: There is distension.      Tenderness: There is no abdominal tenderness.   Skin:     General: Skin is warm and dry.   Neurological:      Mental Status: He is alert. Mental status is at baseline.      Comments: Conversant    Psychiatric:         Behavior: Behavior normal.       Medical Decision Making       81 MINUTES SPENT BY ME on the date of service doing chart review, history, exam, documentation & further activities per the note.      Data     I have personally reviewed the following data over the past 24 hrs:    6.6  \   11.2 (L)   / 160     137 100 7.3 (L) /  110 (H)   4.4 26 0.77 \     ALT: 24 AST: 24 AP: 119 TBILI: 0.4   ALB: 4.1 TOT PROTEIN: 6.9 LIPASE: 18     Trop: 11 BNP: N/A       Imaging results reviewed over the past 24 hrs:   Recent Results (from the past 24 hour(s))   XR Chest 2 Views    Narrative    CHEST TWO VIEWS 8/31/2023 11:32 AM     HISTORY: Shortness of breath.     COMPARISON: July 28, 2023       Impression    IMPRESSION: There are no acute infiltrates. The cardiac silhouette is  not enlarged. Pulmonary vasculature is unremarkable.    MERE KEBEDE MD         SYSTEM ID:  [id number]

## 2023-08-31 NOTE — ED PROVIDER NOTES
History     Chief Complaint:  Shortness of Breath       The history is provided by the patient.      Gerson Pearson is a 63 year old male with a history of COPD, hypertension, ischemic cardiomyopathy, hyperlipidemia, and Myocardial infarction who presents with a shortness of breath. He is normally on 3 L of oxygen nasal cannula at home, but it had to be increased up to 4 L this morning due to shortness of breath and wheezing. His group home eventually called EMS and he was brought to the ED. He was suppose to attend a physical therapy appointment today, but his anxiety has been giving him a lot problems lately and has caused him to reschedule several recent appointments.     Additionally, he reports having ongoing abdominal distention for the past 2 months. He denies difficulty urinating or constipation.     Independent Historian:   None - Patient Only    Review of External Notes:   None     Medications:    Albuterol   Aspirin   Atorvastatin   Breztri Aerosphere   Suboxone   Bupropion    Carvedilol   Gabapentin   Hydralazine    Duoneb   Lisinopril   Mirtazapine   Naloxone   Pantoprazole   Quetiapine      Past Medical History:    Chronic back pain  COPD  Depressive disorder  MI  Hypertension  Ischemic cardiomyopathy  Atherosclerosis of native coronary artery  Stroke  Hyperlipidemia   Polysubstance abuse  Opoid use disorder, severe     Past Surgical History:    Stent placement    Physical Exam   Patient Vitals for the past 24 hrs:   BP Temp Temp src Pulse Resp SpO2 Height Weight   08/31/23 1353 -- -- -- -- -- 100 % -- --   08/31/23 1352 -- -- -- -- -- 100 % -- --   08/31/23 1338 -- -- -- -- -- 98 % -- --   08/31/23 1323 -- -- -- -- -- 98 % -- --   08/31/23 1308 -- -- -- -- -- 100 % -- --   08/31/23 1232 -- -- -- -- -- 99 % -- --   08/31/23 1217 -- -- -- -- -- 99 % -- --   08/31/23 1202 -- -- -- -- -- 98 % -- --   08/31/23 1147 -- -- -- -- -- 98 % -- --   08/31/23 1117 113/83 -- -- 77 12 98 % -- --   08/31/23 111  "-- -- -- 78 16 100 % -- --   08/31/23 1111 -- -- -- 79 13 100 % -- --   08/31/23 1110 -- -- -- 80 18 97 % -- --   08/31/23 1109 136/82 -- -- 82 15 100 % -- --   08/31/23 1042 -- -- -- 78 14 100 % -- --   08/31/23 1027 -- -- -- 74 13 100 % -- --   08/31/23 1023 -- -- -- 76 18 100 % -- --   08/31/23 1022 -- -- -- 76 13 100 % -- --   08/31/23 1021 -- -- -- 76 12 100 % -- --   08/31/23 1020 -- -- -- 75 12 100 % -- --   08/31/23 1019 -- -- -- 75 12 97 % -- --   08/31/23 1017 (!) 161/105 -- -- 73 13 100 % -- --   08/31/23 0958 (!) 179/107 98.3  F (36.8  C) Oral 76 28 98 % 1.753 m (5' 9\") 66.7 kg (147 lb)        Physical Exam  Constitutional: Middle aged black male wearing venturi mask. Tachypneic with loud expiratory wheezing heard across the room.   HENT: No signs of trauma.   Eyes: EOM are normal. Pupils are equal, round, and reactive to light.   Neck: Normal range of motion. No JVD present. No cervical adenopathy.  Cardiovascular: Regular rhythm.  Exam reveals no gallop and no friction rub.    No murmur heard.  Pulmonary/Chest: Bilateral breath sounds normal. No rhonchi or rales. Markedly diminished breath sounds with wheezing. Heart sounds obscured by breathing sounds.   Abdominal: Soft. No tenderness. No rebound or guarding. Distended, but nontender.   Musculoskeletal: No edema. No tenderness.   Lymphadenopathy: No lymphadenopathy.   Neurological: Alert and oriented to person, place, and time. Normal strength. Coordination normal.   Skin: Skin is warm and dry. No rash noted. No erythema.     Emergency Department Course   ECG  ECG taken at 1103, ECG read at 1103  Normal sinus rhythm   Nonspecific T wave abnormality    Rate 78 bpm. WY interval 122 ms. QRS duration 84 ms. QT/QTc 380/433 ms. P-R-T axes 78 73 80.     Imaging:  XR Chest 2 Views   Preliminary Result   IMPRESSION: There are no acute infiltrates. The cardiac silhouette is   not enlarged. Pulmonary vasculature is unremarkable.         Report per " radiology    Laboratory:  Labs Ordered and Resulted from Time of ED Arrival to Time of ED Departure   BASIC METABOLIC PANEL - Abnormal       Result Value    Sodium 137      Potassium 4.4      Chloride 100      Carbon Dioxide (CO2) 26      Anion Gap 11      Urea Nitrogen 7.3 (*)     Creatinine 0.77      Calcium 8.8      Glucose 110 (*)     GFR Estimate >90     CBC WITH PLATELETS AND DIFFERENTIAL - Abnormal    WBC Count 6.6      RBC Count 3.80 (*)     Hemoglobin 11.2 (*)     Hematocrit 35.5 (*)     MCV 93      MCH 29.5      MCHC 31.5      RDW 14.4      Platelet Count 160      % Neutrophils 71      % Lymphocytes 14      % Monocytes 14      % Eosinophils 1      % Basophils 0      % Immature Granulocytes 0      NRBCs per 100 WBC 0      Absolute Neutrophils 4.6      Absolute Lymphocytes 0.9      Absolute Monocytes 0.9      Absolute Eosinophils 0.1      Absolute Basophils 0.0      Absolute Immature Granulocytes 0.0      Absolute NRBCs 0.0     BLOOD GAS VENOUS - Abnormal    pH Venous 7.32      pCO2 Venous 60 (*)     pO2 Venous 65 (*)     Bicarbonate Venous 31 (*)     Base Excess/Deficit 3.8 (*)     FIO2 4     INFLUENZA A/B, RSV, & SARS-COV2 PCR - Normal    Influenza A PCR Negative      Influenza B PCR Negative      RSV PCR Negative      SARS CoV2 PCR Negative     HEPATIC FUNCTION PANEL - Normal    Protein Total 6.9      Albumin 4.1      Bilirubin Total 0.4      Alkaline Phosphatase 119      AST 24      ALT 24      Bilirubin Direct <0.20     LIPASE - Normal    Lipase 18     TROPONIN T, HIGH SENSITIVITY - Normal    Troponin T, High Sensitivity 11        Emergency Department Course & Assessments:    Interventions:  Medications   albuterol (PROVENTIL) (2.5 MG/3ML) 0.083% neb solution (  Canceled Entry 8/31/23 1016)   ipratropium - albuterol 0.5 mg/2.5 mg/3 mL (DUONEB) neb solution 3 mL (3 mLs Nebulization $Given 8/31/23 1032)   ipratropium - albuterol 0.5 mg/2.5 mg/3 mL (DUONEB) 0.5-2.5 (3) MG/3ML neb solution (3 mLs  $Given  8/31/23 1005)   ipratropium - albuterol 0.5 mg/2.5 mg/3 mL (DUONEB) 0.5-2.5 (3) MG/3ML neb solution (3 mLs  $Given 8/31/23 1016)   methylPREDNISolone sodium succinate (solu-MEDROL) injection 125 mg (125 mg Intravenous $Given 8/31/23 1033)   magnesium sulfate 2 g in 50 mL sterile water intermittent infusion (0 g Intravenous Stopped 8/31/23 1137)      Assessments:  1021 I obtained history and examined the patient as noted above.   I rechecked the patient and explained findings. I discussed plan for admission to the hospital.    Independent Interpretation (X-rays, CTs, rhythm strip):  None    Consultations/Discussion of Management or Tests:  1419 I spoke with Latha Cox PA-C from Hospitalist Services, who accepts the patient for admission.      Social Determinants of Health affecting care:   None    Disposition:  The patient was admitted to the hospital under the care of Latha Cox PA-C.     Impression & Plan    Medical Decision Making:  Gerson Pearson is a 63 year old male with a long history of respiratory distress and COPD who continues to smoke. Over the last couple of days, he has had more breathing problems and was sent here for an evaluation from his group home. On my initial examination he is wheezing and he has diminished breath sounds and seems to be working very hard. He was given nebs, steroids, magnesium which helped a little, but it was only that when BiPAP was started that he seemed to improve. His CO2 is elevated showing some retention. Patient has a chest x-ray without pneumonia. He has no fever here. BiPAP has made him feel better. I discussed the patient with the hospital team and we will bring him for further evaluation and treatment.     Diagnosis:    ICD-10-CM    1. Acute respiratory distress  R06.03            Scribe Disclosure:  I, Clark Mo, am serving as a scribe at 10:47 AM on 8/31/2023 to document services personally performed by Crow Link MD based on my observations and  the provider's statements to me.   8/31/2023   Crow Link MD Steinman, Randall Ira, MD  08/31/23 [id number]

## 2023-08-31 NOTE — ED NOTES
[facility]  ED Nurse Handoff Report    ED Chief complaint: Shortness of Breath      ED Diagnosis:   Final diagnoses:   Acute respiratory distress       Code Status: Full Code    Allergies:   Allergies   Allergen Reactions    Ibuprofen Nausea and Vomiting       Patient Story: Patient BIBA from group home with sob, COPD exacerbation. Patient is on 3L NC o2 at home but up to 4 today. Wheezing and tight lung sounds.  PT scheduled for [facility] today at 1100 and anxiety has been causing a lot of problems for him and has to reschedule multiple times.    Focused Assessment:  pt has audible wheezes throughout lung fields. Hx of COPD, still smoking daily.     Treatments and/or interventions provided: labs, xray, bipap, o2, nebs   Patient's response to treatments and/or interventions: tolerated well. Still wheezy after medications. Now on bipap and tolerating.     To be done/followed up on inpatient unit:  respiratory monitoring.     Does this patient have any cognitive concerns?:  none    Activity level - Baseline/Home:  Independent  Activity Level - Current:   Independent    Patient's Preferred language: English   Needed?: No    Isolation: Contact   Infection: MRSA  Patient tested for COVID 19 prior to admission: YES  Bariatric?: No    Vital Signs:   Vitals:    08/31/23 1323 08/31/23 1338 08/31/23 1352 08/31/23 1353   BP:       Pulse:       Resp:       Temp:       TempSrc:       SpO2: 98% 98% 100% 100%   Weight:       Height:           Cardiac Rhythm:     Was the PSS-3 completed:   Yes  What interventions are required if any?               Family Comments: none present  OBS brochure/video discussed/provided to patient/family: N/A              Name of person given brochure if not patient:               Relationship to patient:     For the majority of the shift this patient's behavior was .   Behavioral interventions performed were .    ED NURSE PHONE NUMBER: [phone]

## 2023-09-01 ENCOUNTER — MEDICAL CORRESPONDENCE (OUTPATIENT)
Dept: HEALTH INFORMATION MANAGEMENT | Facility: CLINIC | Age: 63
End: 2023-09-01
Payer: MEDICARE

## 2023-09-01 LAB
ANION GAP SERPL CALCULATED.3IONS-SCNC: 7 MMOL/L (ref 7–15)
BASE EXCESS BLDV CALC-SCNC: 6.1 MMOL/L (ref -7.7–1.9)
BUN SERPL-MCNC: 13.2 MG/DL (ref 8–23)
CALCIUM SERPL-MCNC: 8.9 MG/DL (ref 8.8–10.2)
CHLORIDE SERPL-SCNC: 101 MMOL/L (ref 98–107)
CREAT SERPL-MCNC: 0.78 MG/DL (ref 0.67–1.17)
DEPRECATED HCO3 PLAS-SCNC: 29 MMOL/L (ref 22–29)
GFR SERPL CREATININE-BSD FRML MDRD: >90 ML/MIN/1.73M2
GLUCOSE BLDC GLUCOMTR-MCNC: 106 MG/DL (ref 70–99)
GLUCOSE SERPL-MCNC: 153 MG/DL (ref 70–99)
HCO3 BLDV-SCNC: 33 MMOL/L (ref 21–28)
O2/TOTAL GAS SETTING VFR VENT: 1 %
PCO2 BLDV: 56 MM HG (ref 40–50)
PH BLDV: 7.38 [PH] (ref 7.32–7.43)
PO2 BLDV: 41 MM HG (ref 25–47)
POTASSIUM SERPL-SCNC: 5.1 MMOL/L (ref 3.4–5.3)
SODIUM SERPL-SCNC: 137 MMOL/L (ref 136–145)

## 2023-09-01 PROCEDURE — 80048 BASIC METABOLIC PNL TOTAL CA: CPT | Performed by: INTERNAL MEDICINE

## 2023-09-01 PROCEDURE — 94640 AIRWAY INHALATION TREATMENT: CPT | Mod: 76

## 2023-09-01 PROCEDURE — 94640 AIRWAY INHALATION TREATMENT: CPT

## 2023-09-01 PROCEDURE — 999N000157 HC STATISTIC RCP TIME EA 10 MIN

## 2023-09-01 PROCEDURE — 250N000011 HC RX IP 250 OP 636: Performed by: NURSE PRACTITIONER

## 2023-09-01 PROCEDURE — 250N000009 HC RX 250: Performed by: NURSE PRACTITIONER

## 2023-09-01 PROCEDURE — 250N000013 HC RX MED GY IP 250 OP 250 PS 637: Performed by: NURSE PRACTITIONER

## 2023-09-01 PROCEDURE — 82803 BLOOD GASES ANY COMBINATION: CPT | Performed by: INTERNAL MEDICINE

## 2023-09-01 PROCEDURE — 99231 SBSQ HOSP IP/OBS SF/LOW 25: CPT | Performed by: NURSE PRACTITIONER

## 2023-09-01 PROCEDURE — 36415 COLL VENOUS BLD VENIPUNCTURE: CPT | Performed by: INTERNAL MEDICINE

## 2023-09-01 PROCEDURE — 250N000009 HC RX 250: Performed by: INTERNAL MEDICINE

## 2023-09-01 PROCEDURE — 99232 SBSQ HOSP IP/OBS MODERATE 35: CPT | Performed by: INTERNAL MEDICINE

## 2023-09-01 PROCEDURE — 120N000013 HC R&B IMCU

## 2023-09-01 RX ORDER — IPRATROPIUM BROMIDE AND ALBUTEROL SULFATE 2.5; .5 MG/3ML; MG/3ML
3 SOLUTION RESPIRATORY (INHALATION)
Status: DISCONTINUED | OUTPATIENT
Start: 2023-09-01 | End: 2023-09-02 | Stop reason: HOSPADM

## 2023-09-01 RX ADMIN — METHYLPREDNISOLONE SODIUM SUCCINATE 40 MG: 40 INJECTION, POWDER, FOR SOLUTION INTRAMUSCULAR; INTRAVENOUS at 08:58

## 2023-09-01 RX ADMIN — ASPIRIN 81 MG 81 MG: 81 TABLET ORAL at 08:59

## 2023-09-01 RX ADMIN — HYDRALAZINE HYDROCHLORIDE 25 MG: 25 TABLET ORAL at 08:59

## 2023-09-01 RX ADMIN — PANTOPRAZOLE SODIUM 40 MG: 40 TABLET, DELAYED RELEASE ORAL at 06:40

## 2023-09-01 RX ADMIN — IPRATROPIUM BROMIDE AND ALBUTEROL SULFATE 3 ML: .5; 3 SOLUTION RESPIRATORY (INHALATION) at 03:20

## 2023-09-01 RX ADMIN — HYDRALAZINE HYDROCHLORIDE 25 MG: 25 TABLET ORAL at 15:55

## 2023-09-01 RX ADMIN — MIRTAZAPINE 45 MG: 15 TABLET, FILM COATED ORAL at 21:01

## 2023-09-01 RX ADMIN — IPRATROPIUM BROMIDE AND ALBUTEROL SULFATE 3 ML: .5; 3 SOLUTION RESPIRATORY (INHALATION) at 11:32

## 2023-09-01 RX ADMIN — DOXYCYCLINE HYCLATE 100 MG: 100 CAPSULE ORAL at 21:01

## 2023-09-01 RX ADMIN — BUPRENORPHINE AND NALOXONE 1 FILM: 8; 2 FILM, SOLUBLE BUCCAL; SUBLINGUAL at 21:01

## 2023-09-01 RX ADMIN — QUETIAPINE FUMARATE 25 MG: 25 TABLET ORAL at 08:59

## 2023-09-01 RX ADMIN — CARVEDILOL 25 MG: 25 TABLET, FILM COATED ORAL at 08:59

## 2023-09-01 RX ADMIN — LISINOPRIL 40 MG: 40 TABLET ORAL at 08:58

## 2023-09-01 RX ADMIN — HYDRALAZINE HYDROCHLORIDE 25 MG: 25 TABLET ORAL at 21:01

## 2023-09-01 RX ADMIN — IPRATROPIUM BROMIDE AND ALBUTEROL SULFATE 3 ML: .5; 3 SOLUTION RESPIRATORY (INHALATION) at 19:22

## 2023-09-01 RX ADMIN — BUPRENORPHINE AND NALOXONE 1 FILM: 8; 2 FILM, SOLUBLE BUCCAL; SUBLINGUAL at 08:58

## 2023-09-01 RX ADMIN — BUPROPION HYDROCHLORIDE 300 MG: 150 TABLET, EXTENDED RELEASE ORAL at 08:59

## 2023-09-01 RX ADMIN — FLUTICASONE FUROATE AND VILANTEROL TRIFENATATE 1 PUFF: 200; 25 POWDER RESPIRATORY (INHALATION) at 09:15

## 2023-09-01 RX ADMIN — CARVEDILOL 25 MG: 25 TABLET, FILM COATED ORAL at 21:02

## 2023-09-01 RX ADMIN — GABAPENTIN 300 MG: 300 CAPSULE ORAL at 08:58

## 2023-09-01 RX ADMIN — IPRATROPIUM BROMIDE AND ALBUTEROL SULFATE 3 ML: .5; 3 SOLUTION RESPIRATORY (INHALATION) at 08:39

## 2023-09-01 RX ADMIN — DOXYCYCLINE HYCLATE 100 MG: 100 CAPSULE ORAL at 08:59

## 2023-09-01 RX ADMIN — IPRATROPIUM BROMIDE AND ALBUTEROL SULFATE 3 ML: .5; 3 SOLUTION RESPIRATORY (INHALATION) at 15:38

## 2023-09-01 RX ADMIN — GABAPENTIN 600 MG: 300 CAPSULE ORAL at 21:01

## 2023-09-01 RX ADMIN — ATORVASTATIN CALCIUM 40 MG: 40 TABLET, FILM COATED ORAL at 08:59

## 2023-09-01 ASSESSMENT — ACTIVITIES OF DAILY LIVING (ADL)
ADLS_ACUITY_SCORE: 37
ADLS_ACUITY_SCORE: 35
ADLS_ACUITY_SCORE: 37
ADLS_ACUITY_SCORE: 37
ADLS_ACUITY_SCORE: 35
ADLS_ACUITY_SCORE: 39
ADLS_ACUITY_SCORE: 35
ADLS_ACUITY_SCORE: 37
DEPENDENT_IADLS:: CLEANING;COOKING;LAUNDRY;SHOPPING;MEAL PREPARATION;MEDICATION MANAGEMENT;MONEY MANAGEMENT;TRANSPORTATION
ADLS_ACUITY_SCORE: 35
ADLS_ACUITY_SCORE: 35

## 2023-09-01 NOTE — CONSULTS
Care Management Initial Consult    General Information  Assessment completed with: Care Team MemberLux  Type of CM/SW Visit: Initial Assessment  Primary Care Provider verified and updated as needed: Yes   Readmission within the last 30 days: previous discharge plan unsuccessful   Return Category: Exacerbation of disease  Reason for Consult: discharge planning  Advance Care Planning:    no ACP documents on file in [facility], pt is his own decision maker     Communication Assessment  Patient's communication style: spoken language (English or Bilingual)       Cognitive  Cognitive/Neuro/Behavioral: WDL  Level of Consciousness: alert  Arousal Level: opens eyes spontaneously  Orientation: oriented x 4  Mood/Behavior: calm, cooperative  Best Language: 0 - No aphasia  Speech: spontaneous, clear, logical    Living Environment:   People in home: facility resident ([facility], phone [phone])  n/a  Current living Arrangements: group home (address [id number] [place] [address])      Able to return to prior arrangements: yes  Living Arrangement Comments: no steps to enter    Family/Social Support:  Care provided by: self, other (see comments) ([facility] staff)  Provides care for: no one  Marital Status:   Support system: Facility resident(s)/Staff          Description of Support System: Supportive, Involved    Support Assessment: Adequate family and caregiver support, Lacks adequate emotional support    Current Resources:   Patient receiving home care services: No  Community Resources: [facility], Group Home  Equipment currently used at home:    Supplies currently used at home: Nebulizer tubing, Oxygen Tubing/Supplies (Group Home reports O2 is via [facility] (I do not see this in EPIC))    Employment/Financial:  Employment Status: disabled     Employment/ Comments: worked at [employer] previously  Financial Concerns: No concerns identified   Finance Comments: active  MEDIC[place]  Does the patient's insurance plan have a 3 day qualifying hospital stay waiver?  No    Lifestyle & Psychosocial Needs:  Social Determinants of Health     Tobacco Use: High Risk (7/15/2023)    Patient History     Smoking Tobacco Use: Every Day     Smokeless Tobacco Use: Never     Functional Status:  Prior to admission patient needed assistance:   Dependent ADLs:: Independent (occasionally uses cane, used to have a walker)  Dependent IADLs:: Cleaning, Cooking, Laundry, Shopping, Meal Preparation, Medication Management, Money Management, Transportation    Mental Health Status:  Mental Health Status: Current Concern    Mental Health Management: Other (see comment) (agreed to start seeing a psychiatrist but has been cancelling appointments due to anxiety per group home staff)    Chemical Dependency Status:  Chemical Dependency Status: Past Concern  Chemical Dependency Management: Other (see comment) (Suboxone is via [facility] [phone] weekly supplies sent to group home - 1 week dispensed at a time)        Values/Beliefs:  Spiritual, Cultural Beliefs, Gnosticism Practices, Values that affect care: no             Additional Information:  Reviewed chart, noted RT / COPD specialist recommended Outpatient Sleep Clinic referral which was not ordered; notified MD, order obtained, and next available appointment scheduled in the [facility] system & added to AVS along with previously scheduled PCP appointments from the [facility] system:     OUTPATIENT MENTAL HEALTH   Please attend your appointment 9/13 as scheduled.     PULMONOLOGY   See Deysi Vela MD   Phone [phone]   Appointment:  October 2, 2023 at 3:00 PM  Office Visit   [facility]   Address: [address] [place] [address]     SLEEP MEDICINE   [facility] - Virtual Visit scheduled   Appointment October 10, 2023 at 2:00 PM   Please use link sent to  "your phone [phone] or email [email or web address]   The clinic will talk with you via your [employer] Portal on the [employer] riki     CM team called (facility name) [facility], phone [phone] and requested to speak with staff who normally care for pt, to request background information on pt's baseline and place of living.  Spoke with Lux who provided the following information:     In what kind of facility does pt reside (SNF, intermediate, group home)? Group Home     Address: [address] [place] [address]    What do you know about pt's baseline cognition and mobility? Independently, sometimes with help; had a walker in the past but not currently; pt makes own decisions    What level of cognition/mobility is required for safe return? Can provide physical assist if needed, he sometimes needs it if very panicky    Is resident enrolled in any services?  med management, ADL, cooking, cleaning; transportation only for shopping; take to appointments but he has been cancelling every appointment and says \"I don't want to go\" some are preventative - we give his meds for anxiety but he won't go; virtual is better  Describe any built in grab bars in unit: yes   Any raised toilet seat or shower chair? both   Does pt have any personal DME? [facility]  Best number to reach facility nursing? Phone [phone] Fax:  [phone]  Evening/weekend number? Nurse is [phone]   What does the facility need faxed to them from us at discharge?  Orders & MAR   Does facility manage medications?   yes   If yes, any contracted pharmacy? Name:  [facility]    If new Rx meds at discharge, fill at hospital pharmacy or contracted pharmacy?   send to [facility]    Note, Suboxone is via [facility] [phone] weekly supplies sent  What is the preferred return time for the resident? Any time, prefer before 5pm     Reviewed prior 8/1 discharge AVS " "appointments & in Care Everywhere including 8/15/23 appointment at PCP clinic with Dr. Ambrose (as Dr. Wynn was not available) at [facility] and 8/8/23 appointment with Pulmonologist Dr. Vela at  [facility].  Lux explains the reason pt missed/was a no-show for his 8/8 pulmonology appointment and 8/16 PCP appointment is \"he panics the day before the appointment, he is scared to leave the home, he is his own decision maker and cancels the appointment.\"  Caregiver states \"he finally agreed to see a psychiatrist for his significant anxiety, first appointment is scheduled 9/13.\"  CM-RN notes PCP also wrote in May \"Continue working with psychiatry and addiction medicine on managing your anxiety. \"      CM-RN sent message to hospitalist reviewing the above with request, \"It looks like anxiety is a significant factor here in the hospital too leading to the noncompliance overnight with the BIPAP.  Could you add psychiatry inpatient?  Thank you!\"     Other specialty appointment needed was [facility] [phone] for esophogram and evaluation of esophageal dysmotility.      CM team will need to follow up with pt as hospitalization progresses for discharge planning and likely assist with discharge transportaiton setup.        Shyann Solis RN, BSN, PHN  [facility]  Inpatient Care Management - [facility] CM RN Mobile: [phone] daily 7:30-4:00      "

## 2023-09-01 NOTE — PROVIDER NOTIFICATION
"Left sticky note for MD after noting H&P indicates   \"-Referral for OP sleep consult on previous Aug 2023 admission to assess for sleep disordered breathing, REYNA, nocturnal hypoxia, and hypoventilation (not yet completed) \"     No OP sleep consult order found in EPIC; an order was recommended by the RT (respiratory therapist) who completed the COPD specialist consult.     Note left:     MD: please note, it looks like on 8/1 RT recommended \"Referral for OP sleep consult to assess for sleep disordered breathing, REYNA, nocturnal hypoxia, and hypoventilation.\"  + however this was not ordered at discharge  + please order referral and CM team will schedule immediately  + first openings are often a couple months out  Pt's next appointment with his [facility] Pulmonologist Dr. Deysi Vela is on 10/2/2023  + this was already added to AVS today by CM-RN     "

## 2023-09-01 NOTE — PLAN OF CARE
Contact precautions maintained. A&O x 4. VSS on 1L via NC. LS are wheezy/diminished. Productive cough. Tele: SR. SBA. Ambulated hallway x1, up in chair during the afternoon. Regular diet, tolerating well. PIV x1 SL. Denies pain/nausea. Pt experiences SOLER. Adequate UOP. No BM during shift, firm abd. Pt refusing nicotine patch, smoking cessation education provided to pt. Continue plan of care.

## 2023-09-01 NOTE — PLAN OF CARE
"AxOx4. Contact precautions. Tele = SR w/ PVC's.    VSS on 1LNC / BIPAP 35%. SBA. Regular diet; NPO while on BIPAP. Urinal at bedside with adequate output. No BM this shift.    PIV x1 SL.    No skin issues noted.    Denies pain / nausea; endorses dyspnea on exertion.    Pt wore BIPAP from 2215 - 2300, then stated that he would not be able to sleep while wearing it. Pt verbalized that wearing the BIPAP is \"good for him\" + writer reinforced that BIPAP therapy will help him to breathe better; pt still declined to wear BIPAP mask.    Pt called ~0315 and was found to be very dyspneic, reporting severe SOB at rest, and having a hacking productive cough with whitish/vaguely green sputum. Noted to be tachypneic + hypertensive; lung sounds more diminished than previous assessment. RT notified + administered nebulizer treatment with improvement in work of breathing + SOB.    Continue plan of care.  "

## 2023-09-01 NOTE — CONSULTS
[facility]  Initial Psychiatric Consult   Consult date: September 1, 2023         Reason for Consult, requesting source:    Anxiety  Requesting source: Tristan Foster        HPI:   Gerson Pearson is a 63 year old male who was admitted to [facility] on 8/31/23 with respiratory distress due to COPD exacerbation. PMH significant for COPD with frequent exacerbations, HTN, CAD, tobacco use, opioid use disorder, depression, anxiety. Psychiatry is consulted for evaluation of anxiety leading to difficulty going to medical appointments and participating in outpatient care.     Pt is currently prescribed bupropion  mg daily, mirtazapine 45 mg at bedtime, and quetiapine 25 mg daily.     On interview with patient today, he is observed to be seated in bed. He is calm and cooperative, agreeable to interview. He discusses that his anxiety has been worsening over the last 4 months. He denies any recent acute stressors or triggers for increased anxiety, aside from his medical issues. He reports when he is laying in bed, watching TV, he does not feel anxious. He is sleeping well at night but does tend to stay up late watching TV. Reports feeling increasingly anxious when he has to leave the home. He is unable to identify a specific thought which could be leading to the worsening anxiety or fear about leaving the house. Reports he is okay when he gets into the car but sometimes his anxiety will intensify when he gets to his appointment. He reports he is worried about dying due to his COPD. States he knows it is coming one day, he is just not sure when. Along with the anxiety, he experiences muscle tightness, feels tense. He denies persistent feelings of depression or hopelessness but does become frustrated when his anxiety prevents him from doing the things he needs to do. He does not feel as though his current medications are effective. Reports quetiapine was added about 1 month  ago. He feels this works okay but his anxiety is still there. Some days the quetiapine makes him feel more sleepy than others and he would prefer not to change the dose. We discuss the option of replacing bupropion with an SSRI but he does not want to make another medication change right now and does not want to wait the 4-6 weeks required for efficacy from the SSRI, despite encouragement. He is not interested in outpatient psychotherapy. He would prefer to follow-up with his outpatient team for further medication changes as he does not want to keep changing his medication.         Past Psychiatric History:   Pt diagnosed with depression and anxiety. No known hx of psychiatric hospitalizations or suicide attempts. No known hx of MH commitment or ECT. Meds managed by primary care. Is prescribed mirtazapine 45 mg at bedtime, Wellbutrin  mg daily, and quetiapine 25 mg qam (started 1 month ago). He cannot recall other medication trials.         Substance Use and History:   Pt with hx of opioid dependence, hx of using heroin. He has been maintained on Suboxone 8-2 mg bid. Last used heroin in 2021. He does not currently use alcohol, quit drinking several years ago. Hx of tobacco use still smoking 1-2 cigarettes per day. No known hx of CD treatment or commitment.         Past Medical History:   PAST MEDICAL HISTORY:   Past Medical History:   Diagnosis Date    CAD (coronary artery disease)     s/p bare metal stent to RCA in 6/2020    Chronic back pain     Chronic systolic congestive heart failure (H)     EF 45% has recovered to 60-65% on echo 12/2021    COPD (chronic obstructive pulmonary disease) (H)     Depressive disorder     Myocardial infarction (H)     Nicotine dependence        PAST SURGICAL HISTORY: No past surgical history on file.          Family History:   FAMILY HISTORY:   Family History   Problem Relation Age of Onset    Diabetes Mother     Coronary Artery Disease Mother            Social History:   Pt  resides in a group home. He is apparently  from his wife.         Physical ROS:   The patient endorsed shortness of breath, anxiety. The remainder of 10-point review of systems was negative except as noted in HPI.         PTA Medications:     Medications Prior to Admission   Medication Sig Dispense Refill Last Dose    acetaminophen (TYLENOL) 325 MG tablet Take 325 mg by mouth every 4 hours as needed for mild pain    at prn    albuterol (PROAIR HFA/PROVENTIL HFA/VENTOLIN HFA) 108 (90 Base) MCG/ACT inhaler Inhale 2 puffs into the lungs every 6 hours as needed for shortness of breath, wheezing or cough    at prn    aspirin (ASA) 81 MG chewable tablet Take 81 mg by mouth daily   8/31/2023    atorvastatin (LIPITOR) 40 MG tablet Take 40 mg by mouth daily    8/31/2023    budeson-glycopyrrol-formoterol (BREZTRI AEROSPHERE) 160-9-4.8 MCG/ACT AERO inhaler Inhale 2 puffs into the lungs 2 times daily 10.7 g 1 8/31/2023    buprenorphine HCl-naloxone HCl (SUBOXONE) 8-2 MG per film Place 1 Film under the tongue 2 times daily AM and early evening  (around 5-6 pm)   8/31/2023    buPROPion (WELLBUTRIN SR) 150 MG 12 hr tablet Take 2 tablets (300 mg) by mouth daily 30 tablet 3 8/31/2023    carvedilol (COREG) 25 MG tablet Take 25 mg by mouth 2 times daily   8/31/2023    diclofenac (VOLTAREN) 1 % topical gel Apply 2-4 g topically 4 times daily as needed for moderate pain    at prn    docusate sodium (COLACE) 100 MG capsule Take 1 capsule (100 mg) by mouth 2 times daily 30 capsule 3 8/31/2023    gabapentin (NEURONTIN) 300 MG capsule Take 300 mg by mouth every morning   8/31/2023    gabapentin (NEURONTIN) 300 MG capsule Take 600 mg by mouth At Bedtime   8/30/2023    hydrALAZINE (APRESOLINE) 25 MG tablet Take 25 mg by mouth 3 times daily   8/31/2023    ipratropium - albuterol 0.5 mg/2.5 mg/3 mL (DUONEB) 0.5-2.5 (3) MG/3ML neb solution Take 1 vial (3 mLs) by nebulization 2 times daily 90 mL 1     ipratropium - albuterol 0.5 mg/2.5  mg/3 mL (DUONEB) 0.5-2.5 (3) MG/3ML neb solution Take 1 vial (3 mLs) by nebulization every 6 hours as needed for shortness of breath, wheezing or cough 90 mL 3 8/31/2023    lisinopril (ZESTRIL) 40 MG tablet Take 1 tablet (40 mg) by mouth daily 30 tablet 3 8/31/2023    melatonin 5 MG tablet Take 5 mg by mouth nightly as needed for sleep    at prn    mirtazapine (REMERON) 45 MG tablet Take 45 mg by mouth At Bedtime    8/30/2023    multivitamin w/minerals (THERA-VIT-M) tablet Take 1 tablet by mouth daily   8/31/2023    naloxone (NARCAN) 4 MG/0.1ML nasal spray Spray 4 mg into one nostril alternating nostrils once as needed for opioid reversal every 2-3 minutes until assistance arrives    at prn    nicotine (NICODERM CQ) 7 MG/24HR 24 hr patch Place 1 patch onto the skin every 24 hours   8/31/2023    pantoprazole (PROTONIX) 40 MG EC tablet Take 40 mg by mouth daily   8/31/2023    polyethylene glycol (MIRALAX) 17 g packet Take 1 packet by mouth daily as needed for constipation    at prn    QUEtiapine (SEROQUEL) 25 MG tablet Take 25 mg by mouth every morning   8/31/2023    QUEtiapine (SEROQUEL) 25 MG tablet Take 12.5 mg by mouth nightly as needed    at prn          Allergies:     Allergies   Allergen Reactions    Ibuprofen Nausea and Vomiting          Labs:     Recent Results (from the past 48 hour(s))   Basic metabolic panel    Collection Time: 08/31/23 10:12 AM   Result Value Ref Range    Sodium 137 136 - 145 mmol/L    Potassium 4.4 3.4 - 5.3 mmol/L    Chloride 100 98 - 107 mmol/L    Carbon Dioxide (CO2) 26 22 - 29 mmol/L    Anion Gap 11 7 - 15 mmol/L    Urea Nitrogen 7.3 (L) 8.0 - 23.0 mg/dL    Creatinine 0.77 0.67 - 1.17 mg/dL    Calcium 8.8 8.8 - 10.2 mg/dL    Glucose 110 (H) 70 - 99 mg/dL    GFR Estimate >90 >60 mL/min/1.73m2   Symptomatic Influenza A/B, RSV, & SARS-CoV2 PCR (COVID-19) Nasopharyngeal    Collection Time: 08/31/23 10:12 AM    Specimen: Nasopharyngeal; Swab   Result Value Ref Range    Influenza A PCR  Negative Negative    Influenza B PCR Negative Negative    RSV PCR Negative Negative    SARS CoV2 PCR Negative Negative   CBC with platelets and differential    Collection Time: 08/31/23 10:12 AM   Result Value Ref Range    WBC Count 6.6 4.0 - 11.0 10e3/uL    RBC Count 3.80 (L) 4.40 - 5.90 10e6/uL    Hemoglobin 11.2 (L) 13.3 - 17.7 g/dL    Hematocrit 35.5 (L) 40.0 - 53.0 %    MCV 93 78 - 100 fL    MCH 29.5 26.5 - 33.0 pg    MCHC 31.5 31.5 - 36.5 g/dL    RDW 14.4 10.0 - 15.0 %    Platelet Count 160 150 - 450 10e3/uL    % Neutrophils 71 %    % Lymphocytes 14 %    % Monocytes 14 %    % Eosinophils 1 %    % Basophils 0 %    % Immature Granulocytes 0 %    NRBCs per 100 WBC 0 <1 /100    Absolute Neutrophils 4.6 1.6 - 8.3 10e3/uL    Absolute Lymphocytes 0.9 0.8 - 5.3 10e3/uL    Absolute Monocytes 0.9 0.0 - 1.3 10e3/uL    Absolute Eosinophils 0.1 0.0 - 0.7 10e3/uL    Absolute Basophils 0.0 0.0 - 0.2 10e3/uL    Absolute Immature Granulocytes 0.0 <=0.4 10e3/uL    Absolute NRBCs 0.0 10e3/uL   Hepatic panel    Collection Time: 08/31/23 10:12 AM   Result Value Ref Range    Protein Total 6.9 6.4 - 8.3 g/dL    Albumin 4.1 3.5 - 5.2 g/dL    Bilirubin Total 0.4 <=1.2 mg/dL    Alkaline Phosphatase 119 40 - 129 U/L    AST 24 0 - 45 U/L    ALT 24 0 - 70 U/L    Bilirubin Direct <0.20 0.00 - 0.30 mg/dL   Lipase    Collection Time: 08/31/23 10:12 AM   Result Value Ref Range    Lipase 18 13 - 60 U/L   Troponin T, High Sensitivity    Collection Time: 08/31/23 10:12 AM   Result Value Ref Range    Troponin T, High Sensitivity 11 <=22 ng/L   Blood gas venous    Collection Time: 08/31/23 10:40 AM   Result Value Ref Range    pH Venous 7.32 7.32 - 7.43    pCO2 Venous 60 (H) 40 - 50 mm Hg    pO2 Venous 65 (H) 25 - 47 mm Hg    Bicarbonate Venous 31 (H) 21 - 28 mmol/L    Base Excess/Deficit 3.8 (H) -7.7 - 1.9 mmol/L    FIO2 4    EKG 12-lead, tracing only    Collection Time: 08/31/23 11:03 AM   Result Value Ref Range    Systolic Blood Pressure  mmHg  "   Diastolic Blood Pressure  mmHg    Ventricular Rate 78 BPM    Atrial Rate 78 BPM    AR Interval 122 ms    QRS Duration 84 ms     ms    QTc 433 ms    P Axis 78 degrees    R AXIS 73 degrees    T Axis 80 degrees    Interpretation ECG       Sinus rhythm  Nonspecific T wave abnormality  Abnormal ECG  When compared with ECG of 28-JUL-2023 20:21,  Nonspecific T wave abnormality now evident in Lateral leads  Confirmed by GENERATED REPORT, COMPUTER ([id number]),  Quinn Tolbert ([id number]) on 8/31/2023 11:35:23 AM     Blood gas venous    Collection Time: 08/31/23  3:44 PM   Result Value Ref Range    pH Venous 7.37 7.32 - 7.43    pCO2 Venous 53 (H) 40 - 50 mm Hg    pO2 Venous 71 (H) 25 - 47 mm Hg    Bicarbonate Venous 31 (H) 21 - 28 mmol/L    Base Excess/Deficit 4.6 (H) -7.7 - 1.9 mmol/L    FIO2 35    Troponin T, High Sensitivity    Collection Time: 08/31/23  3:44 PM   Result Value Ref Range    Troponin T, High Sensitivity 9 <=22 ng/L   Glucose by meter    Collection Time: 08/31/23  4:49 PM   Result Value Ref Range    GLUCOSE BY METER POCT 114 (H) 70 - 99 mg/dL   Glucose by meter    Collection Time: 09/01/23  7:33 AM   Result Value Ref Range    GLUCOSE BY METER POCT 106 (H) 70 - 99 mg/dL          Physical and Psychiatric Examination:     /83   Pulse 80   Temp 97.7  F (36.5  C) (Oral)   Resp 12   Ht 1.753 m (5' 9\")   Wt 67 kg (147 lb 12.8 oz)   SpO2 97%   BMI 21.83 kg/m    Weight is 147 lbs 12.8 oz  Body mass index is 21.83 kg/m .    Physical Exam:  I have reviewed the physical exam as documented by by the medical team and agree with findings and assessment and have no additional findings to add at this time.    Mental Status Exam:  Appearance: awake, alert, adequately groomed, appeared as age stated, and dressed in hospital gown  Attitude:  cooperative  Eye Contact:  fair  Mood:  anxious  Affect:  mood congruent and intensity is blunted  Speech:  clear, coherent  Psychomotor Behavior:  no evidence of tardive " dyskinesia, dystonia, or tics  Thought Process:  linear and goal oriented  Associations:  no loose associations  Thought Content:  no evidence of suicidal ideation or homicidal ideation and no evidence of psychotic thought  Insight:  fair  Judgement:  fair  Oriented to:  time, person, and place  Attention Span and Concentration:  fair  Recent and Remote Memory:  fair  Language: able to name/identify objects without impairment  Fund of Knowledge: intact with awareness of current and past events           DSM-5 Diagnosis:   Unspecified anxiety disorder  Unspecified depressive disorder  Opioid use disorder, on maintenance therapy  Tobacco use disorder    Acute hypercarbic respiratory failure 2/2 COPD Exacerbation           Assessment:   Gerson Pearson is a 63 year old male who was admitted to [facility] on 8/31/23 with respiratory distress due to COPD exacerbation. PMH significant for COPD with frequent exacerbations, HTN, CAD, tobacco use, opioid use disorder, depression, anxiety.     Pt seen for evaluation of his anxiety today. It sounds as though it worsens when he has to leave the house or attend appointments. He then avoids going to the appointments due to feeling anxious. There is likely some avoidance occurring as he may not want to hear what the healthcare providers are telling him, may want to avoid facing the challenges of his chronic health conditions. Avoidance is common with anxiety. Psychotherapy would likely be more helpful in this respect as avoidance is more of a learned behavior to avoid triggering more anxiety. The best thing he can do is to put himself in the situations that cause anxiety to eventually desensitize the feeling. This would best be done with the support of a psychotherapist, which he is not interested in unfortunately. Certainly, his COPD and hypercarbia are likely leading to more feelings of anxiety. Anxiety is a common symptom of hypercarbia. SOB can lead to  worsened anxiety, so his anxiety is fairly multifactorial. Discussed recommendation to perhaps discontinue bupropion and trial an selective serotonin reuptake inhibitor, such as Lexapro but he is hesitant to make any changes at this time as he does not want to continue to start and stop new medications. He is agreeable to followup at his psychiatry appt scheduled 9/13. No safety concerns that would prevent discharge.           Summary of Recommendations:   1) Pt hesitant to make any changes to his psychotropic medication regimen currently and would like to follow-up with his prescribing provider. He is also scheduled to see psychiatry 9/13.     2) Recommend outpatient psychotherapy as there seems to be an element of avoidance secondary to anxiety, which a therapist would help work on. Patient is not currently interested in psychotherapy.     3) Continue PTA medications without change    4) I do think patient would benefit from the addition of escitalopram (and perhaps discontinuing bupropion) but he did not want to start anything new today and did not want to wait the 4-6 weeks required for efficacy. Would start 5 mg daily for 1 week, then increase to 10 mg daily if he changes his mind.     5) Reconsult psychiatry as needed. Pt is clear for discharge from a psychiatric perspective.         Jorge Csineros, GEO-BC, APRN, CNP  Consult/Liaison Psychiatry  [facility]  Provider can be paged via [facility] Paging/Directory  If I am unavailable, please contact [facility] at [phone] to reach the on-call provider.

## 2023-09-01 NOTE — PROGRESS NOTES
[facility]    Hospitalist Progress Note    Assessment & Plan   Date of Admission:  8/31/2023        Assessment & Plan  Gerson Pearson is a 63 year old male admitted on 8/31/2023. He has a PMHx including severe COPD with frequent exacerbations requiring BiPAP administration, ongoing tobacco abuse, depression with anxiety, hypertension, CAD with history of bare-metal stent placement, opiate dependence, tobacco abuse disorder with nicotine dependence, GERD who presents to the emergency department in respiratory distress.     Acute hypercarbic respiratory failure 2/2 COPD Exacerbation  Severe chronic obstructive pulmonary disease w/ home O2 use, 2L baseline  [PTA Breztri, Albuterol, and Spiriva Respimat ]  * FVC 44%, FEV1 of 24% - very severe obstruction, without significant bronchodilator response by prior PFTs.    *SARS-CoV-2 PCR, influenza A/B, RSV negative; 2V CXR shows no acute infiltrates, pulmonary vasculature unremarkable  *Recent admissions include  7/15 - 7/16, and 7/28/2023 - 8/1/2023, discharged with a 10-day prednisone taper and 5-day course of azithromycin  *Has followed w/ Pulmonary rehab, and [place] pulmonology consulted; his primary pulmonologist, Dr. Vela at [facility], noted that he could potentially be a candidate for [facility]  *Seen by Pulmonology on previous admission, recommend transitioning from Spiriva and Breo to breztri which is an aerosol inhaler rather than dry powder inhaler.  Pt states he has been adherent w/ Spiriva and Breo since discharge.  Prescribed Breztri also     -Referral for OP sleep consult on previous Aug 2023 admission to assess for sleep disordered breathing, REYNA, nocturnal hypoxia, and hypoventilation (not yet completed)---> referral placed, discussed with care coordinator who will arrange outpatient visit.   *Initial VBG shows 7.32, pC02 60, Hc03 31  -Was started on BiPAP on admission for  severe hypercapnia in ED, pt wore bipap for only 45 minutes, 0300 on 9/1 severe dyspnea much improved with neb.   *Received Solu-Medrol in the ED, transition to IV Solu-Medrol 40 mg twice daily due to reduced air movement and tachypnea.     Today. Down to 1LNC off bipap, cxr without infultrate. Afebrile. Nl pH on vbg, nl wbc.   On steroids. Started on doxycycline  Feels better. Less sob, reduced cough. On 2-3L NC cont at baseline.           Plan;   - Every 4 hours as needed albuterol nebulizer treatments; again, typically minimally responsive to bronchodilators  - Goal oxygen saturation 88 to 90% with his history of hypercarbia.   - Care coordinator consult, involved during previous admission for disposition to group home and oxygen  *Evaluated by RT Álvaro on 8/1 through COPD Specialist Consult-->  - re consult COPD specialist to help facilitate BiPAP at home (previous notes indicate need of VBG w/ pC02 > 52, which was achieved on admission)  - start empiric antibiotics given risk factors  (pt at risk for pseudomonas d/t very severe COPD w/ FEV1<30, broad spectrum antibiotic use on multiple previous admissions in past 3 months, chronic systemic glucocorticoid use), pt hx of MRSA positive -->will start doxycycline 100 mg PO BID x 5 days    -on oxygen 2-3L NC at baseline.  Check oxygen sats with walking today.  Discussed with pt and rn.   - discharge in 1-2 days.   -=cont above cares tdoay with abx, nebs, oxygen, UOB     Depression with anxiety  -Continue PTA as needed hydroxyzine  -Resume prior to admission Remeron, Seroquel and Wellbutrin     Hypertension  CAD s/p PCI w/ bare metal stent to RCA, 2020    *Initial troponin 11 follow up level 9.  EKG non ischemic, NSR w/o any acute ST/TW changes consistent w/ ischemia   -Resume prior to admission aspirin 81 mg daily, atorvastatin 40 mg daily, and carvedilol and hydralazine with parameters     Opiate dependence, in remission  History of heroin and cocaine  abuse, though reports sobriety for the past year and a half.   *Admitted twice in approximately 24 hours in November 2021 with presumed acute COPD exacerbations which abruptly improved despite minimal bronchodilator response on PFTs.  At that time, he was actually discontinuing his Suboxone to snort heroin resulting in both presentations.  He currently reports that he has not used heroin since that time, and has been adherent to his Suboxone therapy through his pain team.  -Continue with follow-up in pain clinic  -Resume prior to admission gabapentin and Suboxone     Tobacco use disorder with nicotine dependence  Smoking 1 to 2 cigarettes/day, only 3 puffs of cigarette today prior to admission.  -Continue nicotine patch 7 mg with panel ordered.  -Discussed importance of continued cessation attempts.  He has cut back from 1/2 pack/day as of a year and a half ago.     Chronic Normocytic Anemia  Hgb 11-12. Hgb 11.2 on admission. Denies any abnormal bleeding.   - monitor periodically     GERD:  -Resume prior to admission pantoprazole     Dysphagia  VFSS in Aug 2023 study showed no laryngeal penetration but limited evaluation of distal esophageal's demonstrates some evidence of dysmotility and impaired clearance of her gastroesophageal junction.  -Recommended outpatient follow-up with GI to pursue esophagram, not yet completed   -denies dsyphagia or problems swallowing           Diet:  regular diet  DVT Prophylaxis: Pneumatic Compression Devices  Post Catheter: Not present  Lines: None     Cardiac Monitoring: None  Code Status:  Full Code         Clinically Significant Risk Factors Present on Admission []Expand by Default                # Drug Induced Platelet Defect: home medication list includes an antiplatelet medication   # Hypertension: Home medication list includes antihypertensive(s)   # Non-Invasive mechanical ventilation: current O2 Device: BiPAP/CPAP  # Acute hypoxic respiratory failure: continue supplemental  O2 as needed                    Disposition Plan     Expected Discharge Date: 09/02/2023               Tristan Foster MD, MD  Text Page  (7am to 6pm)  Interval History   Down to 1LNC today, no labs yet today  Wore bipap for part of night, just 45 min.   0300, very dyspniec, severe sob. Improved with neb    Feels much less sob today. Cough much improved.     Notes abd feels more distended in last month. No abd pain. No n/v/d or constipation    -Data reviewed today: I reviewed all new labs and imaging results over the last 24 hours. I personally reviewed lab and imaging since admission.     Physical Exam   Temp: 97.2  F (36.2  C) Temp src: Oral BP: 122/71 Pulse: 73   Resp: 13 SpO2: 95 % O2 Device: Nasal cannula Oxygen Delivery: 1 LPM  Vitals:    08/31/23 0958 09/01/23 0700   Weight: 66.7 kg (147 lb) 67 kg (147 lb 12.8 oz)     Vital Signs with Ranges  Temp:  [97.2  F (36.2  C)-98.2  F (36.8  C)] 97.2  F (36.2  C)  Pulse:  [73-98] 73  Resp:  [12-30] 13  BP: (113-196)/() 122/71  FiO2 (%):  [30 %-35 %] 35 %  SpO2:  [91 %-100 %] 95 %  I/O last 3 completed shifts:  In: 240 [P.O.:240]  Out: 775 [Urine:775]    Constitutional: Nad, up in bed    Respiratory: Breathing easily, very feint/distant BS bilaterally, slight exp wheeze. Prolonged expiration, reduced air movement  Cardiovascular: RRR no r/g/m  GI: soft, nd, nd  Skin/Integumen: no rash or edema  Neuro: alert, coherent, fully oriented  Psych: calm, cooperative      Medications    - MEDICATION INSTRUCTIONS -      - MEDICATION INSTRUCTIONS -        aspirin  81 mg Oral Daily    atorvastatin  40 mg Oral Daily    buprenorphine HCl-naloxone HCl  1 Film Sublingual BID    buPROPion  300 mg Oral Daily    carvedilol  25 mg Oral BID    doxycycline hyclate  100 mg Oral Q12H [facility] (08/20)    fluticasone-vilanterol  1 puff Inhalation Daily    gabapentin  300 mg Oral QAM    gabapentin  600 mg Oral At Bedtime    hydrALAZINE  25 mg Oral TID    ipratropium - albuterol 0.5 mg/2.5  mg/3 mL  3 mL Nebulization Q4H    lisinopril  40 mg Oral Daily    methylPREDNISolone  40 mg Intravenous Daily    mirtazapine  45 mg Oral At Bedtime    nicotine  1 patch Transdermal Daily    nicotine   Transdermal Q8H    pantoprazole  40 mg Oral Daily    QUEtiapine  25 mg Oral QAM    sodium chloride (PF)  3 mL Intracatheter Q8H    sodium chloride (PF)  3 mL Intracatheter Q8H       Data   Recent Labs   Lab 09/01/23  0733 08/31/23  1649 08/31/23  1012   WBC  --   --  6.6   HGB  --   --  11.2*   MCV  --   --  93   PLT  --   --  160   NA  --   --  137   POTASSIUM  --   --  4.4   CHLORIDE  --   --  100   CO2  --   --  26   BUN  --   --  7.3*   CR  --   --  0.77   ANIONGAP  --   --  11   RL  --   --  8.8   * 114* 110*   ALBUMIN  --   --  4.1   PROTTOTAL  --   --  6.9   BILITOTAL  --   --  0.4   ALKPHOS  --   --  119   ALT  --   --  24   AST  --   --  24   LIPASE  --   --  18       Imaging:   Recent Results (from the past 24 hour(s))   XR Chest 2 Views    Narrative    CHEST TWO VIEWS 8/31/2023 11:32 AM     HISTORY: Shortness of breath.     COMPARISON: July 28, 2023       Impression    IMPRESSION: There are no acute infiltrates. The cardiac silhouette is  not enlarged. Pulmonary vasculature is unremarkable.    MERE KEBEDE MD         SYSTEM ID:  [id number]

## 2023-09-02 VITALS
HEIGHT: 69 IN | TEMPERATURE: 97.5 F | OXYGEN SATURATION: 90 % | HEART RATE: 92 BPM | DIASTOLIC BLOOD PRESSURE: 83 MMHG | SYSTOLIC BLOOD PRESSURE: 126 MMHG | RESPIRATION RATE: 9 BRPM | WEIGHT: 148.9 LBS | BODY MASS INDEX: 22.05 KG/M2

## 2023-09-02 LAB
ANION GAP SERPL CALCULATED.3IONS-SCNC: 8 MMOL/L (ref 7–15)
BUN SERPL-MCNC: 12.7 MG/DL (ref 8–23)
CALCIUM SERPL-MCNC: 9.1 MG/DL (ref 8.8–10.2)
CHLORIDE SERPL-SCNC: 98 MMOL/L (ref 98–107)
CREAT SERPL-MCNC: 0.74 MG/DL (ref 0.67–1.17)
DEPRECATED HCO3 PLAS-SCNC: 31 MMOL/L (ref 22–29)
GFR SERPL CREATININE-BSD FRML MDRD: >90 ML/MIN/1.73M2
GLUCOSE SERPL-MCNC: 99 MG/DL (ref 70–99)
POTASSIUM SERPL-SCNC: 4.3 MMOL/L (ref 3.4–5.3)
SODIUM SERPL-SCNC: 137 MMOL/L (ref 136–145)

## 2023-09-02 PROCEDURE — 250N000009 HC RX 250: Performed by: INTERNAL MEDICINE

## 2023-09-02 PROCEDURE — 250N000013 HC RX MED GY IP 250 OP 250 PS 637: Performed by: NURSE PRACTITIONER

## 2023-09-02 PROCEDURE — 99239 HOSP IP/OBS DSCHRG MGMT >30: CPT | Performed by: INTERNAL MEDICINE

## 2023-09-02 PROCEDURE — 94640 AIRWAY INHALATION TREATMENT: CPT

## 2023-09-02 PROCEDURE — 94640 AIRWAY INHALATION TREATMENT: CPT | Mod: 76

## 2023-09-02 PROCEDURE — 82310 ASSAY OF CALCIUM: CPT | Performed by: INTERNAL MEDICINE

## 2023-09-02 PROCEDURE — 250N000011 HC RX IP 250 OP 636: Performed by: NURSE PRACTITIONER

## 2023-09-02 PROCEDURE — 999N000157 HC STATISTIC RCP TIME EA 10 MIN

## 2023-09-02 PROCEDURE — 36415 COLL VENOUS BLD VENIPUNCTURE: CPT | Performed by: INTERNAL MEDICINE

## 2023-09-02 RX ORDER — PREDNISONE 10 MG/1
TABLET ORAL
Qty: 32 TABLET | Refills: 0 | Status: SHIPPED | OUTPATIENT
Start: 2023-09-02 | End: 2023-09-02

## 2023-09-02 RX ORDER — PREDNISONE 10 MG/1
TABLET ORAL
Qty: 32 TABLET | Refills: 0 | Status: ON HOLD | OUTPATIENT
Start: 2023-09-03 | End: 2023-09-17

## 2023-09-02 RX ORDER — DOXYCYCLINE 100 MG/1
100 CAPSULE ORAL EVERY 12 HOURS
Qty: 6 CAPSULE | Refills: 0 | Status: SHIPPED | OUTPATIENT
Start: 2023-09-02 | End: 2023-09-05

## 2023-09-02 RX ADMIN — CARVEDILOL 25 MG: 25 TABLET, FILM COATED ORAL at 08:23

## 2023-09-02 RX ADMIN — IPRATROPIUM BROMIDE AND ALBUTEROL SULFATE 3 ML: .5; 3 SOLUTION RESPIRATORY (INHALATION) at 15:22

## 2023-09-02 RX ADMIN — BUPRENORPHINE AND NALOXONE 1 FILM: 8; 2 FILM, SOLUBLE BUCCAL; SUBLINGUAL at 08:21

## 2023-09-02 RX ADMIN — GABAPENTIN 300 MG: 300 CAPSULE ORAL at 08:23

## 2023-09-02 RX ADMIN — DOXYCYCLINE HYCLATE 100 MG: 100 CAPSULE ORAL at 08:23

## 2023-09-02 RX ADMIN — HYDRALAZINE HYDROCHLORIDE 25 MG: 25 TABLET ORAL at 08:23

## 2023-09-02 RX ADMIN — ASPIRIN 81 MG 81 MG: 81 TABLET ORAL at 08:23

## 2023-09-02 RX ADMIN — ATORVASTATIN CALCIUM 40 MG: 40 TABLET, FILM COATED ORAL at 08:23

## 2023-09-02 RX ADMIN — QUETIAPINE FUMARATE 25 MG: 25 TABLET ORAL at 08:23

## 2023-09-02 RX ADMIN — METHYLPREDNISOLONE SODIUM SUCCINATE 40 MG: 40 INJECTION, POWDER, FOR SOLUTION INTRAMUSCULAR; INTRAVENOUS at 08:22

## 2023-09-02 RX ADMIN — HYDRALAZINE HYDROCHLORIDE 25 MG: 25 TABLET ORAL at 15:14

## 2023-09-02 RX ADMIN — BUPROPION HYDROCHLORIDE 300 MG: 150 TABLET, EXTENDED RELEASE ORAL at 08:22

## 2023-09-02 RX ADMIN — IPRATROPIUM BROMIDE AND ALBUTEROL SULFATE 3 ML: .5; 3 SOLUTION RESPIRATORY (INHALATION) at 11:00

## 2023-09-02 RX ADMIN — IPRATROPIUM BROMIDE AND ALBUTEROL SULFATE 3 ML: .5; 3 SOLUTION RESPIRATORY (INHALATION) at 08:04

## 2023-09-02 RX ADMIN — FLUTICASONE FUROATE AND VILANTEROL TRIFENATATE 1 PUFF: 200; 25 POWDER RESPIRATORY (INHALATION) at 08:25

## 2023-09-02 RX ADMIN — PANTOPRAZOLE SODIUM 40 MG: 40 TABLET, DELAYED RELEASE ORAL at 06:45

## 2023-09-02 ASSESSMENT — ACTIVITIES OF DAILY LIVING (ADL)
ADLS_ACUITY_SCORE: 39

## 2023-09-02 NOTE — PLAN OF CARE
Goal Outcome Evaluation:    Orientations: A/O x 4. Anxious  Vitals/Pain: VSS on 1-3L NC overnight ex; hypertensive in the 150s systolic. Scheduled hydralazine given. Denies pain/nausea  -tolerating regular diet  Respiratory: LS expiratory wheezes/diminished. Productive cough, endorses SOLER.   Tele: SR  Lines/Drains: PIV x 1 SL  Skin/Wounds: intact  GI/: AUOP using bedside urinal. X1 small BM overnight  Labs: Abnormal/Trends, Electrolyte Replacement- K recheck in AM  Ambulation/Assist: SBA. Refuses bed alarm, calls appropriately  Sleep Quality: good  Plan: possible discharge in 1-2 days, CC and pulmonology following. Psych consulted.

## 2023-09-02 NOTE — PROGRESS NOTES
Care Management Discharge Note    Discharge Date: 09/04/2023       Discharge Disposition: Group Home    Discharge Services: Transportation Services    Discharge DME: Oxygen    Discharge Transportation:      Private pay costs discussed: Not applicable    Does the patient's insurance plan have a 3 day qualifying hospital stay waiver?  Yes   Will the waiver be used for post-acute placement? No    PAS Confirmation Code: [id number]  Patient/family educated on Medicare website which has current facility and service quality ratings: no    Education Provided on the Discharge Plan:    Persons Notified of Discharge Plans:   Patient/Family in Agreement with the Plan:      Handoff Referral Completed: Yes    Additional Information:  Writer called [facility] for a PCP follow up appointment. Scheduling is only open M-F. Writer spoke to the . She advised to call back on Tuesday.  -Writer called and spoke to the  Mickey. He ask that orders are to be faxed to [phone].   He will call writer back with any questions.  -Writer instructed them to make a PCP appointment on Tuesday.  - Writer spoke to patient, he would like a transport ride set up.  [facility] Transport wheelchair with Oxygen set up for time of 4:05-4;50pm with patient going back to his group home.    Asia Castillo RN

## 2023-09-02 NOTE — DISCHARGE SUMMARY
[facility] [place]    Discharge Summary  Hospitalist    Date of Admission:  8/31/2023  Date of Discharge:  9/2/2023  Discharging Provider: Tristan Foster MD, MD    Discharge Diagnoses   Copd exacerbation with acute on chronic hypoxemic, hypercapneic resp failure    History of Present Illness     Gerson Pearson is a 63 year old male who  has a PMHx including severe COPD with frequent exacerbations requiring BiPAP administration, ongoing tobacco abuse, depression with anxiety, hypertension, CAD with history of bare-metal stent placement, opiate dependence, tobacco abuse disorder with nicotine dependence, GERD who presents to the emergency department in respiratory distress.     Of note, the patient had a November 2021 admission with acute exacerbation of COPD following use of nasal heroin.  At that time he had discontinued his Suboxone so he could use heroin, however he is apparently not been using in quite some time.  He has been now adherent to Suboxone therapy.  Following this, the patient had recurrent admissions  7/15 - 7/16, and 7/28/2023 - 8/1/2023, discharged with a 10-day prednisone taper and 5-day course of azithromycin.      ED work-up shows BMP grossly unremarkable, normal electrolytes and renal function.  Initial troponin 11.  CBC with hemoglobin 11.2, platelets 160, normal WBC 6.6.  VBG shows pH 7.32 PCO2 60, bicarb 31.  Viral panel including SARS-CoV-2 PCR, influenza A/B, RSV negative.  2 view chest x-ray shows no acute infiltrates, normal pulmonary vasculature.  EKG shows sinus rhythm without any acute ST/T wave changes consistent with ischemia.     I evaluated the patient the emergency department, while on NIPPV, he is resting comfortably semiupright on the [facility] emergency department.  History is limited due to his BiPAP applied.  Patient states his breathing is much improved, denies any current chest pain, palpitations or dizziness.  He denies any recent fever/chills or sick  contacts.  He notes ongoing abdominal bloating after any meal, but denies any nausea/vomiting, GI symptoms otherwise.  Denies any urinary symptoms.        Hospital Course   Gerson Pearson was admitted on 8/31/2023.  The following problems were addressed during his hospitalization:    Date of Admission:  8/31/2023        Assessment & Plan  Gerson Pearson is a 63 year old male admitted on 8/31/2023. He has a PMHx including severe COPD with frequent exacerbations requiring BiPAP administration, ongoing tobacco abuse, depression with anxiety, hypertension, CAD with history of bare-metal stent placement, opiate dependence, tobacco abuse disorder with nicotine dependence, GERD who presents to the emergency department in respiratory distress.     Acute hypercarbic respiratory failure 2/2 COPD Exacerbation  Severe chronic obstructive pulmonary disease w/ home O2 use, 2L baseline  [PTA Breztri, Albuterol, and Spiriva Respimat ]  * FVC 44%, FEV1 of 24% - very severe obstruction, without significant bronchodilator response by prior PFTs.    *SARS-CoV-2 PCR, influenza A/B, RSV negative; 2V CXR shows no acute infiltrates, pulmonary vasculature unremarkable  *Recent admissions include  7/15 - 7/16, and 7/28/2023 - 8/1/2023, discharged with a 10-day prednisone taper and 5-day course of azithromycin  *Has followed w/ Pulmonary rehab, and [place] pulmonology consulted; his primary pulmonologist, Dr. Vela at [facility] [place], noted that he could potentially be a candidate for advanced [facility]  *Seen by Pulmonology on previous admission, recommend transitioning from Spiriva and Breo to breztri which is an aerosol inhaler rather than dry powder inhaler.  Pt states he has been adherent w/ Spiriva and Breo since discharge.  Prescribed Breztri also      -Referral for OP sleep consult on previous Aug 2023 admission to assess for sleep disordered breathing, REYNA, nocturnal hypoxia, and  hypoventilation (not yet completed)---> referral placed, discussed with care coordinator who will arrange outpatient visit.   *Initial VBG shows 7.32, pC02 60, Hc03 31  -Was started on BiPAP on admission for severe hypercapnia in ED, pt wore bipap for only 45 minutes, 0300 on 9/1 severe dyspnea much improved with neb.   *Received Solu-Medrol in the ED, transition to IV Solu-Medrol 40 mg twice daily due to reduced air movement and tachypnea.    9/1  Down to 1LNC off bipap, cxr without infultrate. Afebrile. Nl pH on vbg, nl wbc.   On steroids. Started on doxycycline  Feels better. Less sob, reduced cough. On 2-3L NC cont at baseline.   -9/2. Not needing bipap, satting 90-93% RA at rest but needs 1-2 LNC with walking to keep sats at ~92%  -pt treated with scheduled nebs, doxycycline abx, PtA controller inhaler,and high dose prednisone  - pt states his PTA pulmonary sxs have resolved.   - Exam, distant BS, breathing easily, no coughing. Lungs clear.       - pt will discharge on PTA controller inh and bid nebs, finish course of doxycycline, and prednisone taper  - referral to sleep clinic [facility] [place] [facility] scheduled as per pulmonary recs in past  -follow up with pcp 1 week  - follow up with pulmonary.   - see care coordinator note 9/1.   - oxygen 2 L NC with activity, 0-2 L at rest. Not needing at rest currently but may need in future.               Depression with anxiety  -Continue PTA as needed hydroxyzine  -Resume prior to admission Remeron, Seroquel and Wellbutrin  -having anxiety associated with going to clinic visits. Denies anxiety related to seeing providers, crowds. Relatively new for last 3 months  - seen by psychiatry in hospital. May be multifactorial 2/2 to JAD, chronic dyspnea  - keep appt with psychiatrist (new appt) scheduled 9/13.   - asked that group home have staff attend appt with patient to help mitigate anxiety  - per in patient psych, consider addition of escitalopram (and perhaps discontinuing  bupropion). Would start 5 mg daily for 1 week, then increase to 10 mg daily if he changes his mind. Pt not want to start at this time. Discussed at discharge.      Hypertension  CAD s/p PCI w/ bare metal stent to RCA, 2020    *Initial troponin 11 follow up level 9.  EKG non ischemic, NSR w/o any acute ST/TW changes consistent w/ ischemia   -Resume prior to admission aspirin 81 mg daily, atorvastatin 40 mg daily, and carvedilol and hydralazine with parameters     Opiate dependence, in remission  History of heroin and cocaine abuse, though reports sobriety for the past year and a half.   *Admitted twice in approximately 24 hours in November 2021 with presumed acute COPD exacerbations which abruptly improved despite minimal bronchodilator response on PFTs.  At that time, he was actually discontinuing his Suboxone to snort heroin resulting in both presentations.  He currently reports that he has not used heroin since that time, and has been adherent to his Suboxone therapy through his pain team.  -Continue with follow-up in pain clinic  -Resume prior to admission gabapentin and Suboxone     Tobacco use disorder with nicotine dependence  Smoking 1 to 2 cigarettes/day, only 3 puffs of cigarette today prior to admission.  -Continue nicotine patch 7 mg with panel ordered.  -Discussed importance of continued cessation attempts.  He has cut back from 1/2 pack/day as of a year and a half ago.  -pcp follow up      Chronic Normocytic Anemia  Hgb 11-12. Hgb 11.2 on admission. Denies any abnormal bleeding.        GERD:  -Resume prior to admission pantoprazole     Dysphagia  VFSS in Aug 2023 study showed no laryngeal penetration but limited evaluation of distal esophageal's demonstrates some evidence of dysmotility and impaired clearance of her gastroesophageal junction.  -Recommended outpatient follow-up with GI to pursue esophagram, not yet completed   -denies dsyphagia or problems swallowing in hospital   -referral [facility] to [facility]            Diet:  regular diet  DVT Prophylaxis: Pneumatic Compression Devices  Post Catheter: Not present  Lines: None     Cardiac Monitoring: None  Code Status:  Full Code         Clinically Significant Risk Factors Present on Admission []Expand by Default                # Drug Induced Platelet Defect: home medication list includes an antiplatelet medication   # Hypertension: Home medication list includes antihypertensive(s)   # Non-Invasive mechanical ventilation: current O2 Device: BiPAP/CPAP  # Acute hypoxic respiratory failure: continue supplemental O2 as needed                    Disposition Plan- discharge back to his group home  [facility] pcp, psychiatry[facility]       Oxygen Documentation  I certify that this patient, Gerson Pearson has been under my care (or a nurse practitioner or physican's assistant working with me). This is the face-to-face encounter for oxygen medical necessity.      At the time of this encounter supplemental oxygen is reasonable and necessary and is expected to improve the patient's condition in a home setting.       Patient has continued oxygen desaturation due to COPD J44.9.    If portability is ordered, is the patient mobile within the home? yes  Principal Problem:    Acute respiratory distress      Tristan Foster MD, MD    Significant Results and Procedures see hospital course    Pending Results   none  Unresulted Labs Ordered in the Past 30 Days of this Admission       No orders found from 8/1/2023 to 9/1/2023.            Code Status   Full Code       Primary Care Physician   Alex Wynn    Physical Exam   Temp: 97.5  F (36.4  C) Temp src: Oral BP: 126/83 Pulse: 92   Resp: (!) 9 SpO2: 90 % O2 Device: None (Room air) Oxygen Delivery: 1/2 LPM  Vitals:    08/31/23 0958 09/01/23 0700 09/02/23 0400   Weight: 66.7 kg (147 lb) 67 kg (147 lb 12.8 oz) 67.5 kg (148 lb 14.4 oz)     Vital Signs with Ranges  Temp:  [97.4  F (36.3   C)-98  F (36.7  C)] 97.5  F (36.4  C)  Pulse:  [70-92] 92  Resp:  [9-20] 9  BP: (126-174)/() 126/83  SpO2:  [84 %-100 %] 90 %  I/O last 3 completed shifts:  In: 1280 [P.O.:1280]  Out: 1600 [Urine:1600]    Constitutional: in bed, nad  Respiratory: breathing easily, no clear wheezing, very distant BS, air movement seems improved.   Cardiovascular: RRR no r/g/m  GI: soft, nt, nd  Skin: no rash or cyanosis. No edeam  Musculoskeletal: no focal jt swelling or redness  Neurologic: nl speech and mentation, grossly nonfocal  Neuropsychiatric: nl affect. Pleasant and cooperative    Discharge Disposition   Discharged back to his group home    Condition at discharge: Good    Consultations This Hospital Stay   RESPIRATORY CARE IP CONSULT  IP RESPIRATORY CARE CHRONIC PULMONARY DISEASE SPECIALIST  CARE MANAGEMENT / SOCIAL WORK IP CONSULT  PSYCHIATRY IP CONSULT  SMOKING CESSATION PROGRAM IP CONSULT    Time Spent on this Encounter   I, Tristan Foster MD, personally saw the patient today and spent greater than 30 minutes discharging this patient.    Discharge Orders      Adult Sleep Eval & Management Referral      Adult GI  Referral - Consult Only      Follow-up and recommended labs and tests     OUTPATIENT MENTAL HEALTH  Please attend your appointment 9/13 as scheduled.    PULMONOLOGY  See Deysi Vela MD   Phone [phone]  Appointment:  October 2, 2023 at 3:00 PM  Office Visit  At: [facility]   Address: [address] [place] [address]    SLEEP MEDICINE   [facility] - Virtual Visit scheduled  Appointment October 10, 2023 at 2:00 PM   Please use link sent to your phone [phone] or email [email or web address]  The clinic will talk with you via your [employer] Portal on the [employer]ki     Reason for your hospital stay    Copd exacerbation.     Activity    Your activity upon discharge: activity as tolerated     Discharge Instructions    1. Given  patient's anxiety around clinic appointments, please, if possible have a staff person from [facility] accompany patient to appointments. This may reduce his anxiety and make him more likely to attend appointments     Follow-up and recommended labs and tests     1. Follow up with primary pulmonologist as scheduled  on 10/2/23  2. Follow up with psychiatrist as scheduled on 9/13/23  3. Follow up with primary care doctor in 1 week. Please call to make this appointment for patient.   4. Follow up with sleep clinic as scheduled.   5. Referral placed to [facility] gastroenterology to follow up dysphagia and possible need for esophagram.     Oxygen Adult/Peds    Oxygen Documentation  I certify that this patient, Gerson Pearson has been under my care (or a nurse practitioner or physican's assistant working with me). This is the face-to-face encounter for oxygen medical necessity.      At the time of this encounter supplemental oxygen is reasonable and necessary and is expected to improve the patient's condition in a home setting.       Patient has continued oxygen desaturation due to COPD J44.9.    If portability is ordered, is the patient mobile within the home? yes     Diet    Follow this diet upon discharge: Orders Placed This Encounter      Regular Diet Adult     Discharge Medications   Current Discharge Medication List        START taking these medications    Details   doxycycline hyclate (VIBRAMYCIN) 100 MG capsule Take 1 capsule (100 mg) by mouth every 12 hours for 6 doses  Qty: 6 capsule, Refills: 0    Associated Diagnoses: COPD exacerbation (H)      predniSONE (DELTASONE) 10 MG tablet Take 4 tablets (40mg) for two days, then 3 tablets (30mg) for four days then 2 tablets (20mg) for four days then one tablet (10mg) for four days then stop  Qty: 32 tablet, Refills: 0    Associated Diagnoses: COPD exacerbation (H)           CONTINUE these medications which have NOT CHANGED    Details   acetaminophen (TYLENOL) 325 MG  tablet Take 325 mg by mouth every 4 hours as needed for mild pain      albuterol (PROAIR HFA/PROVENTIL HFA/VENTOLIN HFA) 108 (90 Base) MCG/ACT inhaler Inhale 2 puffs into the lungs every 6 hours as needed for shortness of breath, wheezing or cough      aspirin (ASA) 81 MG chewable tablet Take 81 mg by mouth daily      atorvastatin (LIPITOR) 40 MG tablet Take 40 mg by mouth daily       budeson-glycopyrrol-formoterol (BREZTRI AEROSPHERE) 160-9-4.8 MCG/ACT AERO inhaler Inhale 2 puffs into the lungs 2 times daily  Qty: 10.7 g, Refills: 1    Associated Diagnoses: COPD exacerbation (H)      buprenorphine HCl-naloxone HCl (SUBOXONE) 8-2 MG per film Place 1 Film under the tongue 2 times daily AM and early evening  (around 5-6 pm)      buPROPion (WELLBUTRIN SR) 150 MG 12 hr tablet Take 2 tablets (300 mg) by mouth daily  Qty: 30 tablet, Refills: 3    Associated Diagnoses: COPD exacerbation (H)      carvedilol (COREG) 25 MG tablet Take 25 mg by mouth 2 times daily      diclofenac (VOLTAREN) 1 % topical gel Apply 2-4 g topically 4 times daily as needed for moderate pain      docusate sodium (COLACE) 100 MG capsule Take 1 capsule (100 mg) by mouth 2 times daily  Qty: 30 capsule, Refills: 3    Associated Diagnoses: COPD exacerbation (H)      !! gabapentin (NEURONTIN) 300 MG capsule Take 300 mg by mouth every morning      !! gabapentin (NEURONTIN) 300 MG capsule Take 600 mg by mouth At Bedtime      hydrALAZINE (APRESOLINE) 25 MG tablet Take 25 mg by mouth 3 times daily      !! ipratropium - albuterol 0.5 mg/2.5 mg/3 mL (DUONEB) 0.5-2.5 (3) MG/3ML neb solution Take 1 vial (3 mLs) by nebulization 2 times daily  Qty: 90 mL, Refills: 1    Associated Diagnoses: COPD exacerbation (H)      !! ipratropium - albuterol 0.5 mg/2.5 mg/3 mL (DUONEB) 0.5-2.5 (3) MG/3ML neb solution Take 1 vial (3 mLs) by nebulization every 6 hours as needed for shortness of breath, wheezing or cough  Qty: 90 mL, Refills: 3    Associated Diagnoses: COPD  exacerbation (H)      lisinopril (ZESTRIL) 40 MG tablet Take 1 tablet (40 mg) by mouth daily  Qty: 30 tablet, Refills: 3    Associated Diagnoses: COPD exacerbation (H)      melatonin 5 MG tablet Take 5 mg by mouth nightly as needed for sleep      mirtazapine (REMERON) 45 MG tablet Take 45 mg by mouth At Bedtime       multivitamin w/minerals (THERA-VIT-M) tablet Take 1 tablet by mouth daily      naloxone (NARCAN) 4 MG/0.1ML nasal spray Spray 4 mg into one nostril alternating nostrils once as needed for opioid reversal every 2-3 minutes until assistance arrives      nicotine (NICODERM CQ) 7 MG/24HR 24 hr patch Place 1 patch onto the skin every 24 hours      pantoprazole (PROTONIX) 40 MG EC tablet Take 40 mg by mouth daily      polyethylene glycol (MIRALAX) 17 g packet Take 1 packet by mouth daily as needed for constipation      !! QUEtiapine (SEROQUEL) 25 MG tablet Take 25 mg by mouth every morning      !! QUEtiapine (SEROQUEL) 25 MG tablet Take 12.5 mg by mouth nightly as needed       !! - Potential duplicate medications found. Please discuss with provider.        Allergies   Allergies   Allergen Reactions    Ibuprofen Nausea and Vomiting     Data   Most Recent 3 CBC's:  Recent Labs   Lab Test 08/31/23  1012 07/28/23  2022 07/15/23  0826   WBC 6.6 8.7 7.8   HGB 11.2* 11.2* 11.8*   MCV 93 95 94    164 185      Most Recent 3 BMP's:  Recent Labs   Lab Test 09/02/23  0556 09/01/23  1522 09/01/23  0733 08/31/23  1649 08/31/23  1012    137  --   --  137   POTASSIUM 4.3 5.1  --   --  4.4   CHLORIDE 98 101  --   --  100   CO2 31* 29  --   --  26   BUN 12.7 13.2  --   --  7.3*   CR 0.74 0.78  --   --  0.77   ANIONGAP 8 7  --   --  11   RL 9.1 8.9  --   --  8.8   GLC 99 153* 106*   < > 110*    < > = values in this interval not displayed.     Most Recent 2 LFT's:  Recent Labs   Lab Test 08/31/23  1012 07/28/23 2022   AST 24 22   ALT 24 26   ALKPHOS 119 115   BILITOTAL 0.4 0.4     Most Recent INR's and  Anticoagulation Dosing History:  Anticoagulation Dose History           No data to display              Most Recent 3 Troponin's:  Recent Labs   Lab Test 11/19/21  0952 11/05/21  0153 10/10/21  2238 07/29/21  2322 01/06/21  0834 12/16/20  1128 06/15/17  0750   TROPI  --   --   --   --  0.023 <0.015 <0.015  The 99th percentile for upper reference range is 0.045 ug/L.  Troponin values in   the range of 0.045 - 0.120 ug/L may be associated with risks of adverse   clinical events.     TROPONIN <0.015 <0.015 <0.015   < >  --   --   --     < > = values in this interval not displayed.     Most Recent Cholesterol Panel:No lab results found.  Most Recent 6 Bacteria Isolates From Any Culture (See EPIC Reports for Culture Details):No lab results found.  Most Recent TSH, T4 and A1c Labs:No lab results found.  Results for orders placed or performed during the hospital encounter of 08/31/23   XR Chest 2 Views    Narrative    CHEST TWO VIEWS 8/31/2023 11:32 AM     HISTORY: Shortness of breath.     COMPARISON: July 28, 2023       Impression    IMPRESSION: There are no acute infiltrates. The cardiac silhouette is  not enlarged. Pulmonary vasculature is unremarkable.    MERE KEBEDE MD         SYSTEM ID:  [id number]

## 2023-09-02 NOTE — PLAN OF CARE
A&O x 4. Slightly hypertensive, scheduled BP meds given, otherwise VSS on RA. LS are slightly wheezy w/ infrequent cough. Tele: SR. Independent in room. Adequate UOP. Bm x1.    Pt discharged back to group home. All discharge education completed w/ all questions answered/encourage. Pt left floor w/ home belongings at 1755 via wheel chair transportation.

## 2023-09-02 NOTE — PROGRESS NOTES
Patient refused to be placed on BIPAP overnight and remained on 2 L NC with SpO2 >96%.        Elvia Choudhury, RT

## 2023-09-09 ENCOUNTER — HOSPITAL ENCOUNTER (EMERGENCY)
Facility: CLINIC | Age: 63
Discharge: NURSING, REHAB OR LONG-TERM CARE | DRG: 190 | End: 2023-09-09
Attending: EMERGENCY MEDICINE | Admitting: EMERGENCY MEDICINE
Payer: MEDICARE

## 2023-09-09 VITALS
DIASTOLIC BLOOD PRESSURE: 97 MMHG | SYSTOLIC BLOOD PRESSURE: 172 MMHG | TEMPERATURE: 97.6 F | RESPIRATION RATE: 20 BRPM | OXYGEN SATURATION: 96 % | HEART RATE: 73 BPM | BODY MASS INDEX: 22 KG/M2 | WEIGHT: 149 LBS

## 2023-09-09 DIAGNOSIS — Z87.09 HISTORY OF COPD: ICD-10-CM

## 2023-09-09 DIAGNOSIS — R09.81 NASAL CONGESTION: ICD-10-CM

## 2023-09-09 LAB
ALBUMIN SERPL BCG-MCNC: 4.1 G/DL (ref 3.5–5.2)
ALP SERPL-CCNC: 96 U/L (ref 40–129)
ALT SERPL W P-5'-P-CCNC: 20 U/L (ref 0–70)
ANION GAP SERPL CALCULATED.3IONS-SCNC: 11 MMOL/L (ref 7–15)
AST SERPL W P-5'-P-CCNC: 16 U/L (ref 0–45)
BASOPHILS # BLD AUTO: 0 10E3/UL (ref 0–0.2)
BASOPHILS NFR BLD AUTO: 0 %
BILIRUB SERPL-MCNC: 0.2 MG/DL
BUN SERPL-MCNC: 8.4 MG/DL (ref 8–23)
CALCIUM SERPL-MCNC: 8.7 MG/DL (ref 8.8–10.2)
CHLORIDE SERPL-SCNC: 97 MMOL/L (ref 98–107)
CREAT SERPL-MCNC: 0.72 MG/DL (ref 0.67–1.17)
DEPRECATED HCO3 PLAS-SCNC: 31 MMOL/L (ref 22–29)
EGFRCR SERPLBLD CKD-EPI 2021: >90 ML/MIN/1.73M2
EOSINOPHIL # BLD AUTO: 0.1 10E3/UL (ref 0–0.7)
EOSINOPHIL NFR BLD AUTO: 1 %
ERYTHROCYTE [DISTWIDTH] IN BLOOD BY AUTOMATED COUNT: 14.8 % (ref 10–15)
GLUCOSE SERPL-MCNC: 140 MG/DL (ref 70–99)
HCO3 BLDV-SCNC: 34 MMOL/L (ref 21–28)
HCT VFR BLD AUTO: 37.5 % (ref 40–53)
HGB BLD-MCNC: 11.8 G/DL (ref 13.3–17.7)
HOLD SPECIMEN: NORMAL
HOLD SPECIMEN: NORMAL
IMM GRANULOCYTES # BLD: 0.4 10E3/UL
IMM GRANULOCYTES NFR BLD: 3 %
LACTATE BLD-SCNC: 1.3 MMOL/L
LYMPHOCYTES # BLD AUTO: 2.9 10E3/UL (ref 0.8–5.3)
LYMPHOCYTES NFR BLD AUTO: 22 %
MCH RBC QN AUTO: 30.2 PG (ref 26.5–33)
MCHC RBC AUTO-ENTMCNC: 31.5 G/DL (ref 31.5–36.5)
MCV RBC AUTO: 96 FL (ref 78–100)
MONOCYTES # BLD AUTO: 1.1 10E3/UL (ref 0–1.3)
MONOCYTES NFR BLD AUTO: 8 %
NEUTROPHILS # BLD AUTO: 8.6 10E3/UL (ref 1.6–8.3)
NEUTROPHILS NFR BLD AUTO: 66 %
NRBC # BLD AUTO: 0 10E3/UL
NRBC BLD AUTO-RTO: 0 /100
NT-PROBNP SERPL-MCNC: 425 PG/ML (ref 0–900)
PCO2 BLDV: 65 MM HG (ref 40–50)
PH BLDV: 7.33 [PH] (ref 7.32–7.43)
PLATELET # BLD AUTO: 185 10E3/UL (ref 150–450)
PO2 BLDV: 34 MM HG (ref 25–47)
POTASSIUM SERPL-SCNC: 3.2 MMOL/L (ref 3.4–5.3)
PROT SERPL-MCNC: 6.6 G/DL (ref 6.4–8.3)
RBC # BLD AUTO: 3.91 10E6/UL (ref 4.4–5.9)
SAO2 % BLDV: 58 % (ref 94–100)
SODIUM SERPL-SCNC: 139 MMOL/L (ref 136–145)
WBC # BLD AUTO: 13.1 10E3/UL (ref 4–11)

## 2023-09-09 PROCEDURE — 83605 ASSAY OF LACTIC ACID: CPT

## 2023-09-09 PROCEDURE — 250N000013 HC RX MED GY IP 250 OP 250 PS 637: Performed by: EMERGENCY MEDICINE

## 2023-09-09 PROCEDURE — 99283 EMERGENCY DEPT VISIT LOW MDM: CPT

## 2023-09-09 PROCEDURE — 82803 BLOOD GASES ANY COMBINATION: CPT

## 2023-09-09 PROCEDURE — 85025 COMPLETE CBC W/AUTO DIFF WBC: CPT | Performed by: EMERGENCY MEDICINE

## 2023-09-09 PROCEDURE — 80053 COMPREHEN METABOLIC PANEL: CPT | Performed by: EMERGENCY MEDICINE

## 2023-09-09 PROCEDURE — 83880 ASSAY OF NATRIURETIC PEPTIDE: CPT | Performed by: EMERGENCY MEDICINE

## 2023-09-09 PROCEDURE — 36415 COLL VENOUS BLD VENIPUNCTURE: CPT | Performed by: EMERGENCY MEDICINE

## 2023-09-09 RX ORDER — FLUTICASONE PROPIONATE 50 MCG
2 SPRAY, SUSPENSION (ML) NASAL DAILY
Status: DISCONTINUED | OUTPATIENT
Start: 2023-09-09 | End: 2023-09-09 | Stop reason: HOSPADM

## 2023-09-09 RX ORDER — FLUTICASONE PROPIONATE 50 MCG
1 SPRAY, SUSPENSION (ML) NASAL DAILY
Qty: 16 G | Refills: 0 | Status: SHIPPED | OUTPATIENT
Start: 2023-09-09

## 2023-09-09 RX ADMIN — FLUTICASONE PROPIONATE 2 SPRAY: 50 SPRAY, METERED NASAL at 16:40

## 2023-09-09 RX ADMIN — Medication 1 SPRAY: at 17:10

## 2023-09-09 ASSESSMENT — ACTIVITIES OF DAILY LIVING (ADL): ADLS_ACUITY_SCORE: 35

## 2023-09-09 NOTE — DISCHARGE INSTRUCTIONS
Use the prescribed flonase daily and the ocean spray saline when needed.  Return to the ER for any other emergent concerns.

## 2023-09-09 NOTE — ED PROVIDER NOTES
History     Chief Complaint:  Shortness of Breath    HPI   Gerson Pearson is a 63 year old male with history of COPD, MI, coronary artery disease, congestive heart failure, hypertension, and hyperlipidemia, who presents with difficulty breathing through his nose due to congestion beginning earlier today. Denies any symptoms in his throat. No cough or fever. Patient reports he feels better at this time and believes his nose may be stuffy. Patient was discharged from the hospital one week ago and prescribed doxycycline and a prednisone taper.     Independent Historian:   None - Patient Only    Review of External Notes:   I reviewed notes of 3 admissions in the past 6 weeks including 7/15, 7/28, and 8/31.    Medications:    Albuterol   Aspirin  Atorvastatin  Suboxone  Carvedilol  Docusate sodium  Gabapentin  Hydralazine  Duoneb  Lisinopril  Melatonin   Mirtazapine  Multivitamin with minerals  Naloxone  Nicotine  Pantoprazole  Miralax  Prednisone  Quetiapine     Past Medical History:    CAD  Chronic back pain  Chronic systolic congestive heart failure  COPD  Depressive disorder  Myocardial infarction  Nicotine dependence   Hyperlipidemia  Polysubstance abuse  Tobacco abuse  Anemia  Hypertension    Past Surgical History:    PCI    Physical Exam   Patient Vitals for the past 24 hrs:   BP Temp Temp src Pulse Resp SpO2 Weight   09/09/23 1619 -- -- -- -- -- 96 % --   09/09/23 1613 -- -- -- -- -- 95 % --   09/09/23 1449 (!) 172/97 97.6  F (36.4  C) Temporal 73 20 98 % 67.6 kg (149 lb)        Physical Exam  Eye:  Pupils are equal, round, and reactive.  Extraocular movements intact.    ENT:  No rhinorrhea.  Moist mucus membranes.  Normal tongue and tonsil.    Cardiac:  Regular rate and rhythm.  No murmurs, gallops, or rubs.    Pulmonary:  Severely diminished breath sounds throughout. No increased work of breathing on room air.    Abdomen:  Positive bowel sounds.  Abdomen is soft and non-distended, without focal  tenderness.    Musculoskeletal:  Normal movement of all extremities without evidence for deficit.    Skin:  Warm and dry without rashes.    Neurologic:  Non-focal exam without asymmetric weakness or numbness.     Psychiatric:  Normal affect with appropriate interaction with examiner.     Emergency Department Course     Laboratory:  Labs Ordered and Resulted from Time of ED Arrival to Time of ED Departure   COMPREHENSIVE METABOLIC PANEL - Abnormal       Result Value    Sodium 139      Potassium 3.2 (*)     Chloride 97 (*)     Carbon Dioxide (CO2) 31 (*)     Anion Gap 11      Urea Nitrogen 8.4      Creatinine 0.72      Calcium 8.7 (*)     Glucose 140 (*)     Alkaline Phosphatase 96      AST 16      ALT 20      Protein Total 6.6      Albumin 4.1      Bilirubin Total 0.2      GFR Estimate >90     CBC WITH PLATELETS AND DIFFERENTIAL - Abnormal    WBC Count 13.1 (*)     RBC Count 3.91 (*)     Hemoglobin 11.8 (*)     Hematocrit 37.5 (*)     MCV 96      MCH 30.2      MCHC 31.5      RDW 14.8      Platelet Count 185      % Neutrophils 66      % Lymphocytes 22      % Monocytes 8      % Eosinophils 1      % Basophils 0      % Immature Granulocytes 3      NRBCs per 100 WBC 0      Absolute Neutrophils 8.6 (*)     Absolute Lymphocytes 2.9      Absolute Monocytes 1.1      Absolute Eosinophils 0.1      Absolute Basophils 0.0      Absolute Immature Granulocytes 0.4      Absolute NRBCs 0.0     ISTAT GASES LACTATE VENOUS POCT - Abnormal    Lactic Acid POCT 1.3      Bicarbonate Venous POCT 34 (*)     O2 Sat, Venous POCT 58 (*)     pCO2 Venous POCT 65 (*)     pH Venous POCT 7.33      pO2 Venous POCT 34     NT PROBNP INPATIENT - Normal    N terminal Pro BNP Inpatient 425       Emergency Department Course & Assessments:       Interventions:  Medications   fluticasone (FLONASE) 50 MCG/ACT spray 2 spray (2 sprays Both Nostrils $Given 9/9/23 1640)   sodium chloride (OCEAN) 0.65 % nasal spray 1 spray (has no administration in time range)         Assessments:  1524 I entered the patient's room and obtained history.  1600 I rechecked the patient and explained findings.   1710 I rechecked the patient. I believe that they are safe for discharge at this time.    Independent Interpretation (X-rays, CTs, rhythm strip):  None    Consultations/Discussion of Management or Tests:  None        Social Determinants of Health affecting care:   Education/Literacy    Disposition:  The patient was discharged to home.     Impression & Plan    CMS Diagnoses: None    Medical Decision Making:  This pleasant 63-year-old man with frequent admissions to the hospital secondary to known severe COPD presents to us because of nasal congestion and shortness of breath.  The patient was just discharged from the hospital less than a week ago and is still on his prednisone burst and taper.  While the patient notes that his lungs do not feel any worse than typical, he described feeling as though his nose was very congested and he was having a hard time bringing air through his nose.  Because of this congestion, he became panicked and called EMS for transport.  They gave him a nebulizer treatment, and he notes that the fine mist that flowed through his nose seem to loosen things up and that he is now feeling much improved.    Laboratory investigation was pursued, showing a compensated respiratory acidosis.  The rest of his labs are reassuring.  We observed him for a period of 2 hours on room air where he never showed any signs of desaturation.  I ordered both Flonase and Ocean spray saline rinses for him and he noted that these seem to help quite a bit.  With this, he feels comfortable returning home.  We are in agreement that this is highly unlikely to be representative of a COPD exacerbation or lung related issue but rather congestion.  He does note that he has been using his oxygen when he is exerting himself and this often dries him out.  There is also been a significant change in the  barometric pressure and weather outside and I have to wonder if this is also causing some response to his nasal kaya.      Plan will be for discharge home.  If he is doing well with the Flonase, his primary may consider continuing to prescribe this.  He was advised to use the saline as much as he needs to and to otherwise clear out his sinuses.  He will follow-up with pulmonology as scheduled with immediate return to us for any worsening of condition or other emergent concerns.      Diagnosis:    ICD-10-CM    1. Nasal congestion  R09.81       2. History of COPD  Z87.09            Discharge Medications:  New Prescriptions    FLUTICASONE (FLONASE) 50 MCG/ACT NASAL SPRAY    Spray 1 spray into both nostrils daily     Scribe Disclosure:  Jerald DE LA CRUZ Hired, am serving as a scribe at 3:10 PM on 9/9/2023 to document services personally performed by Trierweiler, Chad A, MD based on my observations and the provider's statements to me.   9/9/2023   Trierweiler, Chad A, MD Trierweiler, Chad A, MD  09/10/23 2006

## 2023-09-09 NOTE — ED TRIAGE NOTES
Pt presents by EMS for complaints of sudden onset sob/nasal congestion around 12pm. Pt has hx of COPD and has had 2 recent admissions for resp failure and hypercapnea. Pt reports on steroids at that time as well. Pt on 2 liters NC in triage. Pt having audible wheezing/congestion. Pt reports his nose feels stuffed as well    Ems gave pt duoneb   Triage Assessment       Row Name 09/09/23 8482       Triage Assessment (Adult)    Airway WDL WDL       Respiratory WDL    Respiratory WDL X  sob, wheezing, tachypnic       Skin Circulation/Temperature WDL    Skin Circulation/Temperature WDL WDL       Cardiac WDL    Cardiac WDL WDL       Peripheral/Neurovascular WDL    Peripheral Neurovascular WDL WDL       Cognitive/Neuro/Behavioral WDL    Cognitive/Neuro/Behavioral WDL WDL

## 2023-09-11 ENCOUNTER — HOSPITAL ENCOUNTER (INPATIENT)
Facility: CLINIC | Age: 63
LOS: 6 days | Discharge: NURSING, REHAB OR LONG-TERM CARE | DRG: 190 | End: 2023-09-17
Attending: EMERGENCY MEDICINE | Admitting: INTERNAL MEDICINE
Payer: MEDICARE

## 2023-09-11 ENCOUNTER — APPOINTMENT (OUTPATIENT)
Dept: GENERAL RADIOLOGY | Facility: CLINIC | Age: 63
DRG: 190 | End: 2023-09-11
Attending: EMERGENCY MEDICINE
Payer: MEDICARE

## 2023-09-11 DIAGNOSIS — J44.1 CHRONIC OBSTRUCTIVE PULMONARY DISEASE WITH ACUTE EXACERBATION (H): Primary | ICD-10-CM

## 2023-09-11 DIAGNOSIS — J44.1 COPD EXACERBATION (H): ICD-10-CM

## 2023-09-11 DIAGNOSIS — R06.89 HYPERCAPNIA: ICD-10-CM

## 2023-09-11 DIAGNOSIS — K59.01 SLOW TRANSIT CONSTIPATION: ICD-10-CM

## 2023-09-11 DIAGNOSIS — I10 HYPERTENSION, UNSPECIFIED TYPE: ICD-10-CM

## 2023-09-11 LAB
ALBUMIN SERPL BCG-MCNC: 4.3 G/DL (ref 3.5–5.2)
ALP SERPL-CCNC: 101 U/L (ref 40–129)
ALT SERPL W P-5'-P-CCNC: 68 U/L (ref 0–70)
ANION GAP SERPL CALCULATED.3IONS-SCNC: 8 MMOL/L (ref 7–15)
AST SERPL W P-5'-P-CCNC: 106 U/L (ref 0–45)
ATRIAL RATE - MUSE: 97 BPM
BASOPHILS # BLD AUTO: 0 10E3/UL (ref 0–0.2)
BASOPHILS NFR BLD AUTO: 0 %
BILIRUB SERPL-MCNC: 0.3 MG/DL
BUN SERPL-MCNC: 9.7 MG/DL (ref 8–23)
CALCIUM SERPL-MCNC: 8.7 MG/DL (ref 8.8–10.2)
CHLORIDE SERPL-SCNC: 101 MMOL/L (ref 98–107)
CREAT SERPL-MCNC: 0.9 MG/DL (ref 0.67–1.17)
DEPRECATED HCO3 PLAS-SCNC: 32 MMOL/L (ref 22–29)
DIASTOLIC BLOOD PRESSURE - MUSE: NORMAL MMHG
EGFRCR SERPLBLD CKD-EPI 2021: >90 ML/MIN/1.73M2
EOSINOPHIL # BLD AUTO: 0 10E3/UL (ref 0–0.7)
EOSINOPHIL NFR BLD AUTO: 0 %
ERYTHROCYTE [DISTWIDTH] IN BLOOD BY AUTOMATED COUNT: 14.8 % (ref 10–15)
GLUCOSE SERPL-MCNC: 129 MG/DL (ref 70–99)
HCO3 BLDV-SCNC: 34 MMOL/L (ref 21–28)
HCO3 BLDV-SCNC: 37 MMOL/L (ref 21–28)
HCT VFR BLD AUTO: 37.4 % (ref 40–53)
HGB BLD-MCNC: 11.5 G/DL (ref 13.3–17.7)
IMM GRANULOCYTES # BLD: 0.3 10E3/UL
IMM GRANULOCYTES NFR BLD: 2 %
INTERPRETATION ECG - MUSE: NORMAL
LACTATE BLD-SCNC: 0.4 MMOL/L
LACTATE BLD-SCNC: 1.1 MMOL/L
LYMPHOCYTES # BLD AUTO: 2.4 10E3/UL (ref 0.8–5.3)
LYMPHOCYTES NFR BLD AUTO: 16 %
MCH RBC QN AUTO: 29.9 PG (ref 26.5–33)
MCHC RBC AUTO-ENTMCNC: 30.7 G/DL (ref 31.5–36.5)
MCV RBC AUTO: 97 FL (ref 78–100)
MONOCYTES # BLD AUTO: 1 10E3/UL (ref 0–1.3)
MONOCYTES NFR BLD AUTO: 7 %
NEUTROPHILS # BLD AUTO: 11.2 10E3/UL (ref 1.6–8.3)
NEUTROPHILS NFR BLD AUTO: 75 %
NRBC # BLD AUTO: 0 10E3/UL
NRBC BLD AUTO-RTO: 0 /100
NT-PROBNP SERPL-MCNC: 380 PG/ML (ref 0–900)
P AXIS - MUSE: 76 DEGREES
PCO2 BLDV: 78 MM HG (ref 40–50)
PCO2 BLDV: 94 MM HG (ref 40–50)
PH BLDV: 7.17 [PH] (ref 7.32–7.43)
PH BLDV: 7.28 [PH] (ref 7.32–7.43)
PLATELET # BLD AUTO: 192 10E3/UL (ref 150–450)
PO2 BLDV: 43 MM HG (ref 25–47)
PO2 BLDV: 49 MM HG (ref 25–47)
POTASSIUM SERPL-SCNC: 4.4 MMOL/L (ref 3.4–5.3)
PR INTERVAL - MUSE: 120 MS
PROCALCITONIN SERPL IA-MCNC: 0.03 NG/ML
PROT SERPL-MCNC: 7 G/DL (ref 6.4–8.3)
QRS DURATION - MUSE: 80 MS
QT - MUSE: 336 MS
QTC - MUSE: 426 MS
R AXIS - MUSE: 70 DEGREES
RBC # BLD AUTO: 3.84 10E6/UL (ref 4.4–5.9)
SAO2 % BLDV: 70 % (ref 94–100)
SAO2 % BLDV: 71 % (ref 94–100)
SARS-COV-2 RNA RESP QL NAA+PROBE: NEGATIVE
SODIUM SERPL-SCNC: 141 MMOL/L (ref 136–145)
SYSTOLIC BLOOD PRESSURE - MUSE: NORMAL MMHG
T AXIS - MUSE: 79 DEGREES
TROPONIN T SERPL HS-MCNC: 8 NG/L
VENTRICULAR RATE- MUSE: 97 BPM
WBC # BLD AUTO: 15 10E3/UL (ref 4–11)

## 2023-09-11 PROCEDURE — 80053 COMPREHEN METABOLIC PANEL: CPT | Performed by: EMERGENCY MEDICINE

## 2023-09-11 PROCEDURE — 96365 THER/PROPH/DIAG IV INF INIT: CPT | Mod: 59

## 2023-09-11 PROCEDURE — 250N000011 HC RX IP 250 OP 636: Mod: JZ | Performed by: EMERGENCY MEDICINE

## 2023-09-11 PROCEDURE — 999N000157 HC STATISTIC RCP TIME EA 10 MIN

## 2023-09-11 PROCEDURE — 36415 COLL VENOUS BLD VENIPUNCTURE: CPT | Performed by: EMERGENCY MEDICINE

## 2023-09-11 PROCEDURE — 258N000003 HC RX IP 258 OP 636: Performed by: EMERGENCY MEDICINE

## 2023-09-11 PROCEDURE — 84145 PROCALCITONIN (PCT): CPT | Performed by: INTERNAL MEDICINE

## 2023-09-11 PROCEDURE — 94660 CPAP INITIATION&MGMT: CPT

## 2023-09-11 PROCEDURE — 99291 CRITICAL CARE FIRST HOUR: CPT | Mod: 25

## 2023-09-11 PROCEDURE — 120N000013 HC R&B IMCU

## 2023-09-11 PROCEDURE — 93005 ELECTROCARDIOGRAM TRACING: CPT

## 2023-09-11 PROCEDURE — 87040 BLOOD CULTURE FOR BACTERIA: CPT | Performed by: EMERGENCY MEDICINE

## 2023-09-11 PROCEDURE — 87635 SARS-COV-2 COVID-19 AMP PRB: CPT | Performed by: EMERGENCY MEDICINE

## 2023-09-11 PROCEDURE — 80307 DRUG TEST PRSMV CHEM ANLYZR: CPT | Performed by: EMERGENCY MEDICINE

## 2023-09-11 PROCEDURE — 5A09357 ASSISTANCE WITH RESPIRATORY VENTILATION, LESS THAN 24 CONSECUTIVE HOURS, CONTINUOUS POSITIVE AIRWAY PRESSURE: ICD-10-PCS | Performed by: INTERNAL MEDICINE

## 2023-09-11 PROCEDURE — 71045 X-RAY EXAM CHEST 1 VIEW: CPT

## 2023-09-11 PROCEDURE — 82803 BLOOD GASES ANY COMBINATION: CPT

## 2023-09-11 PROCEDURE — 94640 AIRWAY INHALATION TREATMENT: CPT

## 2023-09-11 PROCEDURE — 96375 TX/PRO/DX INJ NEW DRUG ADDON: CPT

## 2023-09-11 PROCEDURE — 250N000009 HC RX 250: Performed by: EMERGENCY MEDICINE

## 2023-09-11 PROCEDURE — 85025 COMPLETE CBC W/AUTO DIFF WBC: CPT | Performed by: EMERGENCY MEDICINE

## 2023-09-11 PROCEDURE — 99223 1ST HOSP IP/OBS HIGH 75: CPT | Mod: AI | Performed by: INTERNAL MEDICINE

## 2023-09-11 PROCEDURE — 84484 ASSAY OF TROPONIN QUANT: CPT | Performed by: EMERGENCY MEDICINE

## 2023-09-11 PROCEDURE — 83880 ASSAY OF NATRIURETIC PEPTIDE: CPT | Performed by: EMERGENCY MEDICINE

## 2023-09-11 RX ORDER — METHYLPREDNISOLONE SODIUM SUCCINATE 125 MG/2ML
125 INJECTION, POWDER, LYOPHILIZED, FOR SOLUTION INTRAMUSCULAR; INTRAVENOUS ONCE
Status: COMPLETED | OUTPATIENT
Start: 2023-09-11 | End: 2023-09-11

## 2023-09-11 RX ORDER — FERROUS SULFATE 325(65) MG
325 TABLET, DELAYED RELEASE (ENTERIC COATED) ORAL DAILY
COMMUNITY

## 2023-09-11 RX ORDER — IPRATROPIUM BROMIDE AND ALBUTEROL SULFATE 2.5; .5 MG/3ML; MG/3ML
3 SOLUTION RESPIRATORY (INHALATION)
Status: DISCONTINUED | OUTPATIENT
Start: 2023-09-11 | End: 2023-09-17 | Stop reason: HOSPADM

## 2023-09-11 RX ORDER — QUETIAPINE FUMARATE 25 MG/1
12.5 TABLET, FILM COATED ORAL 3 TIMES DAILY
COMMUNITY

## 2023-09-11 RX ORDER — BUPROPION HYDROCHLORIDE 300 MG/1
300 TABLET ORAL EVERY MORNING
COMMUNITY
Start: 2023-05-17

## 2023-09-11 RX ORDER — LORAZEPAM 2 MG/ML
1 INJECTION INTRAMUSCULAR ONCE
Status: COMPLETED | OUTPATIENT
Start: 2023-09-11 | End: 2023-09-11

## 2023-09-11 RX ORDER — PIPERACILLIN SODIUM, TAZOBACTAM SODIUM 3; .375 G/15ML; G/15ML
3.38 INJECTION, POWDER, LYOPHILIZED, FOR SOLUTION INTRAVENOUS ONCE
Status: COMPLETED | OUTPATIENT
Start: 2023-09-11 | End: 2023-09-11

## 2023-09-11 RX ORDER — MAGNESIUM SULFATE HEPTAHYDRATE 40 MG/ML
2 INJECTION, SOLUTION INTRAVENOUS ONCE
Status: COMPLETED | OUTPATIENT
Start: 2023-09-11 | End: 2023-09-11

## 2023-09-11 RX ADMIN — MAGNESIUM SULFATE HEPTAHYDRATE 2 G: 40 INJECTION, SOLUTION INTRAVENOUS at 21:01

## 2023-09-11 RX ADMIN — VANCOMYCIN HYDROCHLORIDE 1500 MG: 10 INJECTION, POWDER, LYOPHILIZED, FOR SOLUTION INTRAVENOUS at 22:40

## 2023-09-11 RX ADMIN — PIPERACILLIN AND TAZOBACTAM 3.38 G: 3; .375 INJECTION, POWDER, FOR SOLUTION INTRAVENOUS at 21:37

## 2023-09-11 RX ADMIN — EPINEPHRINE 0.3 MG: 1 INJECTION, SOLUTION, CONCENTRATE INTRAVENOUS at 20:51

## 2023-09-11 RX ADMIN — LORAZEPAM 1 MG: 2 INJECTION INTRAMUSCULAR; INTRAVENOUS at 20:51

## 2023-09-11 RX ADMIN — SODIUM CHLORIDE 1000 ML: 9 INJECTION, SOLUTION INTRAVENOUS at 21:04

## 2023-09-11 RX ADMIN — METHYLPREDNISOLONE SODIUM SUCCINATE 125 MG: 125 INJECTION, POWDER, FOR SOLUTION INTRAMUSCULAR; INTRAVENOUS at 20:51

## 2023-09-11 RX ADMIN — IPRATROPIUM BROMIDE AND ALBUTEROL SULFATE 3 ML: .5; 3 SOLUTION RESPIRATORY (INHALATION) at 20:51

## 2023-09-11 ASSESSMENT — ACTIVITIES OF DAILY LIVING (ADL)
ADLS_ACUITY_SCORE: 35
ADLS_ACUITY_SCORE: 35

## 2023-09-12 ENCOUNTER — APPOINTMENT (OUTPATIENT)
Dept: CT IMAGING | Facility: CLINIC | Age: 63
DRG: 190 | End: 2023-09-12
Attending: HOSPITALIST
Payer: MEDICARE

## 2023-09-12 LAB
AMPHETAMINES UR QL SCN: NORMAL
ANION GAP SERPL CALCULATED.3IONS-SCNC: 11 MMOL/L (ref 7–15)
BARBITURATES UR QL SCN: NORMAL
BASE EXCESS BLDV CALC-SCNC: 5.6 MMOL/L (ref -7.7–1.9)
BENZODIAZ UR QL SCN: NORMAL
BUN SERPL-MCNC: 10.9 MG/DL (ref 8–23)
BZE UR QL SCN: NORMAL
CALCIUM SERPL-MCNC: 8.1 MG/DL (ref 8.8–10.2)
CANNABINOIDS UR QL SCN: NORMAL
CHLORIDE SERPL-SCNC: 101 MMOL/L (ref 98–107)
CREAT SERPL-MCNC: 0.68 MG/DL (ref 0.67–1.17)
DEPRECATED HCO3 PLAS-SCNC: 26 MMOL/L (ref 22–29)
EGFRCR SERPLBLD CKD-EPI 2021: >90 ML/MIN/1.73M2
ERYTHROCYTE [DISTWIDTH] IN BLOOD BY AUTOMATED COUNT: 14.7 % (ref 10–15)
GLUCOSE BLDC GLUCOMTR-MCNC: 136 MG/DL (ref 70–99)
GLUCOSE SERPL-MCNC: 168 MG/DL (ref 70–99)
HCO3 BLDV-SCNC: 33 MMOL/L (ref 21–28)
HCT VFR BLD AUTO: 33 % (ref 40–53)
HGB BLD-MCNC: 10.5 G/DL (ref 13.3–17.7)
LACTATE SERPL-SCNC: 0.9 MMOL/L (ref 0.7–2)
LACTATE SERPL-SCNC: 2.5 MMOL/L (ref 0.7–2)
MCH RBC QN AUTO: 30.4 PG (ref 26.5–33)
MCHC RBC AUTO-ENTMCNC: 31.8 G/DL (ref 31.5–36.5)
MCV RBC AUTO: 96 FL (ref 78–100)
O2/TOTAL GAS SETTING VFR VENT: 30 %
OPIATES UR QL SCN: NORMAL
OXYHGB MFR BLDV: 46 % (ref 70–75)
PCO2 BLDV: 59 MM HG (ref 40–50)
PCP QUAL URINE (ROCHE): NORMAL
PH BLDV: 7.35 [PH] (ref 7.32–7.43)
PLATELET # BLD AUTO: 130 10E3/UL (ref 150–450)
PO2 BLDV: 26 MM HG (ref 25–47)
POTASSIUM SERPL-SCNC: 4.4 MMOL/L (ref 3.4–5.3)
RBC # BLD AUTO: 3.45 10E6/UL (ref 4.4–5.9)
SODIUM SERPL-SCNC: 138 MMOL/L (ref 136–145)
WBC # BLD AUTO: 15.6 10E3/UL (ref 4–11)

## 2023-09-12 PROCEDURE — 94640 AIRWAY INHALATION TREATMENT: CPT | Mod: 76

## 2023-09-12 PROCEDURE — 82805 BLOOD GASES W/O2 SATURATION: CPT | Performed by: INTERNAL MEDICINE

## 2023-09-12 PROCEDURE — 94660 CPAP INITIATION&MGMT: CPT

## 2023-09-12 PROCEDURE — 999N000033 HC STATISTIC CHRONIC PULMONARY DISEASE SPECIALIST

## 2023-09-12 PROCEDURE — G0463 HOSPITAL OUTPT CLINIC VISIT: HCPCS

## 2023-09-12 PROCEDURE — 83605 ASSAY OF LACTIC ACID: CPT | Performed by: HOSPITALIST

## 2023-09-12 PROCEDURE — 999N000157 HC STATISTIC RCP TIME EA 10 MIN

## 2023-09-12 PROCEDURE — 36415 COLL VENOUS BLD VENIPUNCTURE: CPT | Performed by: HOSPITALIST

## 2023-09-12 PROCEDURE — 80048 BASIC METABOLIC PNL TOTAL CA: CPT | Performed by: INTERNAL MEDICINE

## 2023-09-12 PROCEDURE — 120N000013 HC R&B IMCU

## 2023-09-12 PROCEDURE — G1010 CDSM STANSON: HCPCS

## 2023-09-12 PROCEDURE — 74177 CT ABD & PELVIS W/CONTRAST: CPT | Mod: MG

## 2023-09-12 PROCEDURE — 250N000011 HC RX IP 250 OP 636: Performed by: HOSPITALIST

## 2023-09-12 PROCEDURE — 250N000009 HC RX 250: Performed by: HOSPITALIST

## 2023-09-12 PROCEDURE — 250N000013 HC RX MED GY IP 250 OP 250 PS 637: Performed by: INTERNAL MEDICINE

## 2023-09-12 PROCEDURE — 85027 COMPLETE CBC AUTOMATED: CPT | Performed by: INTERNAL MEDICINE

## 2023-09-12 PROCEDURE — 250N000009 HC RX 250: Performed by: INTERNAL MEDICINE

## 2023-09-12 PROCEDURE — 250N000013 HC RX MED GY IP 250 OP 250 PS 637: Performed by: HOSPITALIST

## 2023-09-12 PROCEDURE — 36415 COLL VENOUS BLD VENIPUNCTURE: CPT | Performed by: INTERNAL MEDICINE

## 2023-09-12 PROCEDURE — 999N000032 HC STATISTIC CHRONIC DISEASE SPECIALIST RT CONSULT

## 2023-09-12 PROCEDURE — 258N000003 HC RX IP 258 OP 636: Performed by: HOSPITALIST

## 2023-09-12 PROCEDURE — 250N000011 HC RX IP 250 OP 636: Mod: JZ | Performed by: INTERNAL MEDICINE

## 2023-09-12 PROCEDURE — 272N000202 HC AEROBIKA WITH MANOMETER

## 2023-09-12 PROCEDURE — 258N000003 HC RX IP 258 OP 636: Performed by: INTERNAL MEDICINE

## 2023-09-12 PROCEDURE — 99232 SBSQ HOSP IP/OBS MODERATE 35: CPT | Performed by: HOSPITALIST

## 2023-09-12 PROCEDURE — 94640 AIRWAY INHALATION TREATMENT: CPT

## 2023-09-12 RX ORDER — ASPIRIN 81 MG/1
81 TABLET ORAL DAILY
Status: DISCONTINUED | OUTPATIENT
Start: 2023-09-12 | End: 2023-09-17 | Stop reason: HOSPADM

## 2023-09-12 RX ORDER — ACETAMINOPHEN 325 MG/1
650 TABLET ORAL EVERY 6 HOURS PRN
Status: DISCONTINUED | OUTPATIENT
Start: 2023-09-12 | End: 2023-09-17 | Stop reason: HOSPADM

## 2023-09-12 RX ORDER — SODIUM CHLORIDE 9 MG/ML
INJECTION, SOLUTION INTRAVENOUS CONTINUOUS
Status: DISCONTINUED | OUTPATIENT
Start: 2023-09-12 | End: 2023-09-12

## 2023-09-12 RX ORDER — MULTIPLE VITAMINS W/ MINERALS TAB 9MG-400MCG
1 TAB ORAL DAILY
Status: DISCONTINUED | OUTPATIENT
Start: 2023-09-12 | End: 2023-09-17 | Stop reason: HOSPADM

## 2023-09-12 RX ORDER — LIDOCAINE 40 MG/G
CREAM TOPICAL
Status: DISCONTINUED | OUTPATIENT
Start: 2023-09-12 | End: 2023-09-12

## 2023-09-12 RX ORDER — PANTOPRAZOLE SODIUM 40 MG/1
40 TABLET, DELAYED RELEASE ORAL DAILY
Status: DISCONTINUED | OUTPATIENT
Start: 2023-09-12 | End: 2023-09-17 | Stop reason: HOSPADM

## 2023-09-12 RX ORDER — MIRTAZAPINE 15 MG/1
45 TABLET, FILM COATED ORAL AT BEDTIME
Status: DISCONTINUED | OUTPATIENT
Start: 2023-09-12 | End: 2023-09-17 | Stop reason: HOSPADM

## 2023-09-12 RX ORDER — BENZONATATE 100 MG/1
100 CAPSULE ORAL 3 TIMES DAILY PRN
Status: DISCONTINUED | OUTPATIENT
Start: 2023-09-12 | End: 2023-09-17 | Stop reason: HOSPADM

## 2023-09-12 RX ORDER — NICOTINE 21 MG/24HR
1 PATCH, TRANSDERMAL 24 HOURS TRANSDERMAL DAILY
Status: DISCONTINUED | OUTPATIENT
Start: 2023-09-12 | End: 2023-09-17 | Stop reason: HOSPADM

## 2023-09-12 RX ORDER — FERROUS SULFATE 325(65) MG
325 TABLET ORAL EVERY OTHER DAY
Status: DISCONTINUED | OUTPATIENT
Start: 2023-09-13 | End: 2023-09-17 | Stop reason: HOSPADM

## 2023-09-12 RX ORDER — LIDOCAINE 40 MG/G
CREAM TOPICAL
Status: DISCONTINUED | OUTPATIENT
Start: 2023-09-12 | End: 2023-09-17 | Stop reason: HOSPADM

## 2023-09-12 RX ORDER — GABAPENTIN 300 MG/1
300 CAPSULE ORAL EVERY MORNING
Status: DISCONTINUED | OUTPATIENT
Start: 2023-09-12 | End: 2023-09-17 | Stop reason: HOSPADM

## 2023-09-12 RX ORDER — ACETAMINOPHEN 650 MG/1
650 SUPPOSITORY RECTAL EVERY 6 HOURS PRN
Status: DISCONTINUED | OUTPATIENT
Start: 2023-09-12 | End: 2023-09-17 | Stop reason: HOSPADM

## 2023-09-12 RX ORDER — BUPROPION HYDROCHLORIDE 150 MG/1
300 TABLET ORAL EVERY MORNING
Status: DISCONTINUED | OUTPATIENT
Start: 2023-09-12 | End: 2023-09-17 | Stop reason: HOSPADM

## 2023-09-12 RX ORDER — AMOXICILLIN 250 MG
2 CAPSULE ORAL 2 TIMES DAILY PRN
Status: DISCONTINUED | OUTPATIENT
Start: 2023-09-12 | End: 2023-09-17 | Stop reason: HOSPADM

## 2023-09-12 RX ORDER — CARVEDILOL 25 MG/1
25 TABLET ORAL 2 TIMES DAILY
Status: DISCONTINUED | OUTPATIENT
Start: 2023-09-12 | End: 2023-09-17 | Stop reason: HOSPADM

## 2023-09-12 RX ORDER — DOCUSATE SODIUM 100 MG/1
100 CAPSULE, LIQUID FILLED ORAL 2 TIMES DAILY
Status: DISCONTINUED | OUTPATIENT
Start: 2023-09-12 | End: 2023-09-17 | Stop reason: HOSPADM

## 2023-09-12 RX ORDER — GABAPENTIN 300 MG/1
600 CAPSULE ORAL AT BEDTIME
Status: DISCONTINUED | OUTPATIENT
Start: 2023-09-12 | End: 2023-09-17 | Stop reason: HOSPADM

## 2023-09-12 RX ORDER — LABETALOL HYDROCHLORIDE 5 MG/ML
10 INJECTION, SOLUTION INTRAVENOUS
Status: DISCONTINUED | OUTPATIENT
Start: 2023-09-12 | End: 2023-09-17 | Stop reason: HOSPADM

## 2023-09-12 RX ORDER — LISINOPRIL 40 MG/1
40 TABLET ORAL DAILY
Status: DISCONTINUED | OUTPATIENT
Start: 2023-09-12 | End: 2023-09-17 | Stop reason: HOSPADM

## 2023-09-12 RX ORDER — CARBOXYMETHYLCELLULOSE SODIUM 5 MG/ML
1 SOLUTION/ DROPS OPHTHALMIC
Status: DISCONTINUED | OUTPATIENT
Start: 2023-09-12 | End: 2023-09-12

## 2023-09-12 RX ORDER — POLYETHYLENE GLYCOL 3350 17 G/17G
17 POWDER, FOR SOLUTION ORAL DAILY PRN
Status: DISCONTINUED | OUTPATIENT
Start: 2023-09-12 | End: 2023-09-17 | Stop reason: HOSPADM

## 2023-09-12 RX ORDER — PIPERACILLIN SODIUM, TAZOBACTAM SODIUM 3; .375 G/15ML; G/15ML
3.38 INJECTION, POWDER, LYOPHILIZED, FOR SOLUTION INTRAVENOUS EVERY 6 HOURS
Status: DISCONTINUED | OUTPATIENT
Start: 2023-09-12 | End: 2023-09-13

## 2023-09-12 RX ORDER — FLUTICASONE FUROATE AND VILANTEROL 200; 25 UG/1; UG/1
1 POWDER RESPIRATORY (INHALATION) DAILY
Status: DISCONTINUED | OUTPATIENT
Start: 2023-09-12 | End: 2023-09-17 | Stop reason: HOSPADM

## 2023-09-12 RX ORDER — GUAIFENESIN 600 MG/1
1200 TABLET, EXTENDED RELEASE ORAL 2 TIMES DAILY
Status: DISCONTINUED | OUTPATIENT
Start: 2023-09-12 | End: 2023-09-17 | Stop reason: HOSPADM

## 2023-09-12 RX ORDER — IOPAMIDOL 755 MG/ML
75 INJECTION, SOLUTION INTRAVASCULAR ONCE
Status: COMPLETED | OUTPATIENT
Start: 2023-09-12 | End: 2023-09-12

## 2023-09-12 RX ORDER — BUPRENORPHINE AND NALOXONE 8; 2 MG/1; MG/1
1 FILM, SOLUBLE BUCCAL; SUBLINGUAL 2 TIMES DAILY
Status: DISCONTINUED | OUTPATIENT
Start: 2023-09-12 | End: 2023-09-17 | Stop reason: HOSPADM

## 2023-09-12 RX ORDER — NITROGLYCERIN 0.4 MG/1
0.4 TABLET SUBLINGUAL EVERY 5 MIN PRN
Status: DISCONTINUED | OUTPATIENT
Start: 2023-09-12 | End: 2023-09-12

## 2023-09-12 RX ORDER — METHYLPREDNISOLONE SODIUM SUCCINATE 125 MG/2ML
60 INJECTION, POWDER, LYOPHILIZED, FOR SOLUTION INTRAMUSCULAR; INTRAVENOUS EVERY 24 HOURS
Status: DISCONTINUED | OUTPATIENT
Start: 2023-09-12 | End: 2023-09-13

## 2023-09-12 RX ORDER — HYDRALAZINE HYDROCHLORIDE 25 MG/1
25 TABLET, FILM COATED ORAL 3 TIMES DAILY
Status: DISCONTINUED | OUTPATIENT
Start: 2023-09-12 | End: 2023-09-14

## 2023-09-12 RX ORDER — ONDANSETRON 2 MG/ML
4 INJECTION INTRAMUSCULAR; INTRAVENOUS EVERY 6 HOURS PRN
Status: DISCONTINUED | OUTPATIENT
Start: 2023-09-12 | End: 2023-09-17 | Stop reason: HOSPADM

## 2023-09-12 RX ORDER — IPRATROPIUM BROMIDE AND ALBUTEROL SULFATE 2.5; .5 MG/3ML; MG/3ML
3 SOLUTION RESPIRATORY (INHALATION)
Status: DISCONTINUED | OUTPATIENT
Start: 2023-09-12 | End: 2023-09-17 | Stop reason: HOSPADM

## 2023-09-12 RX ORDER — HYDRALAZINE HYDROCHLORIDE 20 MG/ML
10 INJECTION INTRAMUSCULAR; INTRAVENOUS EVERY 4 HOURS PRN
Status: DISCONTINUED | OUTPATIENT
Start: 2023-09-12 | End: 2023-09-17 | Stop reason: HOSPADM

## 2023-09-12 RX ORDER — ALBUTEROL SULFATE 0.83 MG/ML
2.5 SOLUTION RESPIRATORY (INHALATION)
Status: DISCONTINUED | OUTPATIENT
Start: 2023-09-12 | End: 2023-09-17 | Stop reason: HOSPADM

## 2023-09-12 RX ORDER — ATORVASTATIN CALCIUM 40 MG/1
40 TABLET, FILM COATED ORAL DAILY
Status: DISCONTINUED | OUTPATIENT
Start: 2023-09-12 | End: 2023-09-17 | Stop reason: HOSPADM

## 2023-09-12 RX ORDER — ONDANSETRON 4 MG/1
4 TABLET, ORALLY DISINTEGRATING ORAL EVERY 6 HOURS PRN
Status: DISCONTINUED | OUTPATIENT
Start: 2023-09-12 | End: 2023-09-17 | Stop reason: HOSPADM

## 2023-09-12 RX ORDER — AMOXICILLIN 250 MG
1 CAPSULE ORAL 2 TIMES DAILY PRN
Status: DISCONTINUED | OUTPATIENT
Start: 2023-09-12 | End: 2023-09-17 | Stop reason: HOSPADM

## 2023-09-12 RX ADMIN — QUETIAPINE 12.5 MG: 25 TABLET, FILM COATED ORAL at 22:00

## 2023-09-12 RX ADMIN — BENZONATATE 100 MG: 100 CAPSULE ORAL at 20:18

## 2023-09-12 RX ADMIN — GUAIFENESIN 1200 MG: 600 TABLET, EXTENDED RELEASE ORAL at 20:18

## 2023-09-12 RX ADMIN — SODIUM CHLORIDE 250 ML: 9 INJECTION, SOLUTION INTRAVENOUS at 18:36

## 2023-09-12 RX ADMIN — CARVEDILOL 25 MG: 25 TABLET, FILM COATED ORAL at 20:18

## 2023-09-12 RX ADMIN — SODIUM CHLORIDE 63 ML: 9 INJECTION, SOLUTION INTRAVENOUS at 22:25

## 2023-09-12 RX ADMIN — ACETAMINOPHEN 650 MG: 325 TABLET, FILM COATED ORAL at 05:33

## 2023-09-12 RX ADMIN — MIRTAZAPINE 45 MG: 15 TABLET, FILM COATED ORAL at 21:59

## 2023-09-12 RX ADMIN — LISINOPRIL 40 MG: 40 TABLET ORAL at 13:05

## 2023-09-12 RX ADMIN — IPRATROPIUM BROMIDE AND ALBUTEROL SULFATE 3 ML: .5; 3 SOLUTION RESPIRATORY (INHALATION) at 01:26

## 2023-09-12 RX ADMIN — PIPERACILLIN AND TAZOBACTAM 3.38 G: 3; .375 INJECTION, POWDER, FOR SOLUTION INTRAVENOUS at 09:23

## 2023-09-12 RX ADMIN — BUPRENORPHINE AND NALOXONE 1 FILM: 8; 2 FILM, SOLUBLE BUCCAL; SUBLINGUAL at 20:18

## 2023-09-12 RX ADMIN — UMECLIDINIUM 1 PUFF: 62.5 AEROSOL, POWDER ORAL at 13:20

## 2023-09-12 RX ADMIN — PANTOPRAZOLE SODIUM 40 MG: 40 TABLET, DELAYED RELEASE ORAL at 13:05

## 2023-09-12 RX ADMIN — ATORVASTATIN CALCIUM 40 MG: 40 TABLET, FILM COATED ORAL at 13:05

## 2023-09-12 RX ADMIN — FLUTICASONE FUROATE AND VILANTEROL TRIFENATATE 1 PUFF: 200; 25 POWDER RESPIRATORY (INHALATION) at 13:20

## 2023-09-12 RX ADMIN — METHYLPREDNISOLONE SODIUM SUCCINATE 62.5 MG: 125 INJECTION, POWDER, FOR SOLUTION INTRAMUSCULAR; INTRAVENOUS at 13:06

## 2023-09-12 RX ADMIN — ASPIRIN 81 MG: 81 TABLET, COATED ORAL at 13:05

## 2023-09-12 RX ADMIN — PIPERACILLIN AND TAZOBACTAM 3.38 G: 3; .375 INJECTION, POWDER, FOR SOLUTION INTRAVENOUS at 22:35

## 2023-09-12 RX ADMIN — DOCUSATE SODIUM 100 MG: 100 CAPSULE, LIQUID FILLED ORAL at 20:18

## 2023-09-12 RX ADMIN — SODIUM CHLORIDE: 9 INJECTION, SOLUTION INTRAVENOUS at 00:54

## 2023-09-12 RX ADMIN — CARVEDILOL 25 MG: 25 TABLET, FILM COATED ORAL at 13:06

## 2023-09-12 RX ADMIN — BUPROPION HYDROCHLORIDE 300 MG: 150 TABLET, FILM COATED, EXTENDED RELEASE ORAL at 13:05

## 2023-09-12 RX ADMIN — BUPRENORPHINE AND NALOXONE 1 FILM: 8; 2 FILM, SOLUBLE BUCCAL; SUBLINGUAL at 13:06

## 2023-09-12 RX ADMIN — IPRATROPIUM BROMIDE AND ALBUTEROL SULFATE 3 ML: .5; 3 SOLUTION RESPIRATORY (INHALATION) at 15:36

## 2023-09-12 RX ADMIN — DOCUSATE SODIUM 100 MG: 100 CAPSULE, LIQUID FILLED ORAL at 13:05

## 2023-09-12 RX ADMIN — QUETIAPINE 12.5 MG: 25 TABLET, FILM COATED ORAL at 13:06

## 2023-09-12 RX ADMIN — GABAPENTIN 600 MG: 300 CAPSULE ORAL at 21:59

## 2023-09-12 RX ADMIN — IOPAMIDOL 75 ML: 755 INJECTION, SOLUTION INTRAVENOUS at 22:24

## 2023-09-12 RX ADMIN — PIPERACILLIN AND TAZOBACTAM 3.38 G: 3; .375 INJECTION, POWDER, FOR SOLUTION INTRAVENOUS at 04:10

## 2023-09-12 RX ADMIN — PIPERACILLIN AND TAZOBACTAM 3.38 G: 3; .375 INJECTION, POWDER, FOR SOLUTION INTRAVENOUS at 17:48

## 2023-09-12 RX ADMIN — GABAPENTIN 300 MG: 300 CAPSULE ORAL at 13:06

## 2023-09-12 RX ADMIN — IPRATROPIUM BROMIDE AND ALBUTEROL SULFATE 3 ML: .5; 3 SOLUTION RESPIRATORY (INHALATION) at 11:25

## 2023-09-12 RX ADMIN — HYDRALAZINE HYDROCHLORIDE 25 MG: 25 TABLET ORAL at 21:59

## 2023-09-12 RX ADMIN — IPRATROPIUM BROMIDE AND ALBUTEROL SULFATE 3 ML: .5; 3 SOLUTION RESPIRATORY (INHALATION) at 19:59

## 2023-09-12 RX ADMIN — HYDRALAZINE HYDROCHLORIDE 25 MG: 25 TABLET ORAL at 13:05

## 2023-09-12 RX ADMIN — IPRATROPIUM BROMIDE AND ALBUTEROL SULFATE 3 ML: .5; 3 SOLUTION RESPIRATORY (INHALATION) at 07:32

## 2023-09-12 RX ADMIN — MULTIPLE VITAMINS W/ MINERALS TAB 1 TABLET: TAB at 13:05

## 2023-09-12 RX ADMIN — IPRATROPIUM BROMIDE AND ALBUTEROL SULFATE 3 ML: .5; 3 SOLUTION RESPIRATORY (INHALATION) at 05:04

## 2023-09-12 ASSESSMENT — ACTIVITIES OF DAILY LIVING (ADL)
ADLS_ACUITY_SCORE: 39
ADLS_ACUITY_SCORE: 37
ADLS_ACUITY_SCORE: 37
ADLS_ACUITY_SCORE: 39
ADLS_ACUITY_SCORE: 37
ADLS_ACUITY_SCORE: 39
ADLS_ACUITY_SCORE: 39
ADLS_ACUITY_SCORE: 37
DEPENDENT_IADLS:: CLEANING;COOKING;LAUNDRY;MEAL PREPARATION;MEDICATION MANAGEMENT;TRANSPORTATION
ADLS_ACUITY_SCORE: 39

## 2023-09-12 NOTE — PLAN OF CARE
A&Ox4. VSS ex HTN (PTA antiHTN meds restarted today). BP improved. C/o R medial abdomen pain. Pt states this is a chronic issue and declined any intervention, MD notified. Transitioned from BIPAP to 2 LPM nasal cannula this afternoon. On IV abx and steroids. Tolerating regular diet. IVF discontinued. Up w/ SBA, uses urinal at bedside. Noted unequal pupils L>R. Pt reports he has cataracts and this is not a new finding. IMC discontinued this afternoon. LS diminished w/ expiratory wheezes. Congested cough - scheduled mucinex ordered. R PIV SL. BC pending.     Plan to leave BIPAP off tonight and repeat ABG in AM to determine if pt qualifies for home BIPAP/CPAP.                1830  Addendum: Pt lactic acid resulted 2.5. MD notified and ordered 250 ml NS bolus. Bolus initiated.

## 2023-09-12 NOTE — PROGRESS NOTES
Remained on BIPAP overnight on setting 14/6,  RR 14,  FiO2 30% with SpO2 mid 90's. Lung sound diminished expiratory wheezes. Skin integrity: clean dry intact. Skin protection liqucell applied. Rt will continue to monitor.    Elvia Choudhury, RT

## 2023-09-12 NOTE — PROGRESS NOTES
Orientations: A&O x4    Vitals/Pain: VSS ex SBP 150s-170s, On BIPAP FiO2 30%, 14LPM/ c/o generalized pain in the head. Managed with scheduled tylenol.    Tele: SR w/occ PVC    Lines/Drains: PIV x1 infusing NS @100mL/hr, Int abx.    Skin/Wounds: Umbilical bulge, scattered bruising    Pulm:diminished lung sounds w/expiratory wheezes    GI/: voids spontaneously w/AUOP, No BM this shift, +bowel sounds    Labs: Abnormal/Trends, Electrolyte Replacement- NA    Ambulation/Assist: x1-SBA    Sleep Quality: Fair    Plan: Continue to monitor o2 saturations
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
## 2023-09-12 NOTE — PHARMACY-ADMISSION MEDICATION HISTORY
Pharmacist Admission Medication History    Admission medication history is complete. The information provided in this note is only as accurate as the sources available at the time of the update.    Medication reconciliation/reorder completed by provider prior to medication history? No    Information Source(s):  Rn for group [place].  [facility], phone [phone]. [phone]  via phone    Pertinent Information: Per RN, pt buys Nyquil and takes for relaxation. Pt currently lethargic and unable to be interviewed    Changes made to PTA medication list:  Added: iron  Deleted: None  Changed: apap, albuterol, buproprion, seroquel, nicotine    Medication Affordability:       Allergies reviewed with patient and updates made in EHR: no    Medication History Completed By: Nael Ewing RPH 9/11/2023 10:33 PM    Prior to Admission medications    Medication Sig Last Dose Taking? Auth Provider Long Term End Date   acetaminophen (TYLENOL) 325 MG tablet Take 325 mg by mouth every 6 hours as needed for mild pain Unknown Yes Unknown, Entered By History     albuterol (PROAIR HFA/PROVENTIL HFA/VENTOLIN HFA) 108 (90 Base) MCG/ACT inhaler Inhale 2 puffs into the lungs every 4 hours as needed for shortness of breath, wheezing or cough Unknown Yes Unknown, Entered By History Yes    aspirin (ASA) 81 MG chewable tablet Take 81 mg by mouth daily 9/11/2023 Yes Unknown, Entered By History     atorvastatin (LIPITOR) 40 MG tablet Take 40 mg by mouth daily  9/11/2023 Yes Unknown, Entered By History Yes    budeson-glycopyrrol-formoterol (BREZTRI AEROSPHERE) 160-9-4.8 MCG/ACT AERO inhaler Inhale 2 puffs into the lungs 2 times daily 9/11/2023 at x2 Yes Iwona Bhatti MD     buprenorphine HCl-naloxone HCl (SUBOXONE) 8-2 MG per film Place 1 Film under the tongue 2 times daily AM and early evening  (around 5-6 pm) 9/11/2023 at x2 Yes Unknown, Entered By History     buPROPion (WELLBUTRIN XL) 300 MG 24 hr tablet Take 300 mg by  mouth every morning 9/11/2023 Yes Unknown, Entered By History Yes    carvedilol (COREG) 25 MG tablet Take 25 mg by mouth 2 times daily 9/11/2023 at x2 Yes Unknown, Entered By History No    diclofenac (VOLTAREN) 1 % topical gel Apply 2-4 g topically 4 times daily as needed for moderate pain Unknown Yes Unknown, Entered By History     docusate sodium (COLACE) 100 MG capsule Take 1 capsule (100 mg) by mouth 2 times daily 9/11/2023 at x2 Yes Iwona Bhatti MD     ferrous sulfate (FE TABS) 325 (65 Fe) MG EC tablet Take 325 mg by mouth daily 9/11/2023 Yes Unknown, Entered By History     fluticasone (FLONASE) 50 MCG/ACT nasal spray Spray 1 spray into both nostrils daily 9/11/2023 at x2 Yes Trierweiler, Chad A, MD     gabapentin (NEURONTIN) 300 MG capsule Take 300 mg by mouth every morning 9/11/2023 Yes Unknown, Entered By History Yes    gabapentin (NEURONTIN) 300 MG capsule Take 600 mg by mouth At Bedtime 9/11/2023 Yes Unknown, Entered By History Yes    hydrALAZINE (APRESOLINE) 25 MG tablet Take 25 mg by mouth 3 times daily 9/11/2023 at x3 Yes Unknown, Entered By History Yes    ipratropium - albuterol 0.5 mg/2.5 mg/3 mL (DUONEB) 0.5-2.5 (3) MG/3ML neb solution Take 1 vial (3 mLs) by nebulization 2 times daily 9/11/2023 at x2 Yes Iwona Bhatti MD Yes    ipratropium - albuterol 0.5 mg/2.5 mg/3 mL (DUONEB) 0.5-2.5 (3) MG/3ML neb solution Take 1 vial (3 mLs) by nebulization every 6 hours as needed for shortness of breath, wheezing or cough Unknown Yes Iwona Bhatti MD Yes    lisinopril (ZESTRIL) 40 MG tablet Take 1 tablet (40 mg) by mouth daily 9/11/2023 Yes Iwona Bhatti MD Yes    melatonin 5 MG tablet Take 5 mg by mouth nightly as needed for sleep Unknown Yes Unknown, Entered By History     mirtazapine (REMERON) 45 MG tablet Take 45 mg by mouth At Bedtime  9/11/2023? Yes Unknown, Entered By History Yes    multivitamin w/minerals (THERA-VIT-M) tablet Take 1 tablet by mouth daily 9/11/2023 Yes Unknown,  Entered By History     naloxone (NARCAN) 4 MG/0.1ML nasal spray Spray 4 mg into one nostril alternating nostrils once as needed for opioid reversal every 2-3 minutes until assistance arrives Unknown Yes Unknown, Entered By History     pantoprazole (PROTONIX) 40 MG EC tablet Take 40 mg by mouth daily 9/11/2023 Yes Unknown, Entered By History     polyethylene glycol (MIRALAX) 17 g packet Take 1 packet by mouth daily as needed for constipation Unknown Yes Unknown, Entered By History     predniSONE (DELTASONE) 10 MG tablet Take 4 tablets (40mg) for two days, then 3 tablets (30mg) for four days then 2 tablets (20mg) for four days then one tablet (10mg) for four days then stop 9/11/2023 Yes Tristan Foster MD No    QUEtiapine (SEROQUEL) 25 MG tablet Take 12.5 mg by mouth 3 times daily 9/11/2023 at x3 Yes Unknown, Entered By History No    nicotine (NICODERM CQ) 7 MG/24HR 24 hr patch Place 1 patch onto the skin every 24 hours  Patient not taking: Reported on 9/11/2023 Not Taking  Unknown, Entered By History

## 2023-09-12 NOTE — PROGRESS NOTES
Chronic Pulmonary Disease Specialist Consult   COPD Initial Interview    2023    Patient: Gerson Pearson      :  1960                    MRN:[id number]      Reason for Consult: Consulted to provide COPD education and optimize treatment regimen. Patient with severe COPD being followed by COPD Readmission Reduction Program    History of Present Illness: Gerson Pearson is a 63 year old male with a complex medical history including COPD, asthma, HTN, hyperlipidemia, CHF, and CAD who presents to the ED via EMS from a group home with shortness of breath starting suddenly about 1 hour prior to arrival. EMS reports that the patient did a nebulizer at his group home with no relief. Upon EMS arrival he was hypoxic in the 70s. He was also diaphoretic and tachycardic. He was started on BiPAP by EMS and his sats increased to 100%. No steroids or epi were given en route. Of note, patient was seen in the ED for similar symptoms two days ago and was admitted to the hospital for acute respiratory distress the week before.      Smoking Hx:   Current smoker, patient declines patches/lozenges and NRT.                   Pulmonologist/Last office visit: Patient sees Dr. Vela at [facility]. Last visit noted 2023.        Most recent  PFT/interpretation on: 2021               FVC 1.68 (44%)   FEV1 .72 (24%)   FEV1/FVC   (Ratio) 43%    DLCO None    Interpretation Very severe obstruction      Sleep Studies:   Sleep study referral on file but patient has yet to schedule sleep study (2023). To qualify for bipap, patient would require sleep oximetry based on RAD guidelines.     Sleep oximetry study demonstrates oxygen saturation ? 88% for ? 5 cumulative minutes of nocturnal recording time (minimum recording time of 2 hours), done while breathing  oxygen at 2 LPM or the beneficiary s usual FI02 (whichever is higher)      Home Oxygen Use:   Patient reports using 2 to 3lpm continuously.        Pulmonary Rehab History:   "Patient reports attending rehab sessions at [facility]. Per note, pulmonary rehab was scheduled 8/1, 8/3, 8/8, 8/10 but patient states that he was not able to complete full series of sessions.       Assessment:       On initial assessment, patient on bipap 14/6 14 30%. Patient requesting to be removed from bipap, pt placed on 3lpm NC. SpO2-96%, /82, HR 77. Breath sounds coarse with scattered wheezes throughout.   Writer interviewed patient regarding recurrent visits. Patient states the episode started from \"anxiety\" and \"chest pain\".  Patient states that he has been utilizing all of his home inhalers/nebs and thinks the breathing issues stem from anxiety.   Patient does feel that the bipap helps with his breathing but is hesitant to use at home due to mask being \"too difficult to take off\". Writer explained to patient that if patient qualifies for bipap, [facility] can/will assist in providing masks that are user friendly. Patient willing and accepting to try bipap.                     Please see entry in recommendations.        Action:         Evaluated patients inspiratory flow using In-Check device:     Low resistance setting:   Patient able to generate 60 LPM, which is sufficient inspiratory flow for an Albuterol rescue inhaler.   Albuterol inhalers require a slow inhalation of 20-60 LPM for optimal drug deposition with 5-10 second breath hold.       Evaluated patients' coordination and technique with inhaler:   Patient demonstrated good technique with all of his inhalers.  Educated patient on proper inspiratory flows needed for all his inhalers.   Provided and instructed patient on proper use of Aerochamber spacer with inhaler.  Reiterated that spacer should always be used with his rescue  inhaler.       Aerobika: Patient able to generate a pressure of 10 cm H2O on Aerobika OPEP device for 3 seconds generating good strong productive/non-productive cough.   The goal for each breath, for a total of 3 sets of 10 " breaths, is to exhale at a of pressure 10-20 for 3-4 seconds without fatigue.   Educated patient to perform 2 to 3 'montiel coughs', clear airway after each set.   Shared that consistent use of this device will help open smaller airways, improve mucus clearance, decrease cough frequency and improve exercise tolerance.     Recommendations:    Inpatient:  Continue with current inpatient respiratory medication schedule.   Inpatient pulmonary consult for further recommendations and coordination of care    Will assess patient for NIV for home use to reduce WOB, support respiratory mechanics, and to manage hypoventilation  In order to qualify patient for bipap per RAD, patient must have overnight oximetry OFF bipap on home O2 settings. Then, AM ABG to be drawn off bipap that shows PaCO2 > 52mmHg. Orders placed by MD.  RNCC and bedside RT notified.      Use Aerobika Oscillating PEP Device for 3 sets of 10 breaths two times daily as directed above  7/14/21 mg Nicotine Patch to help reduce symptoms of withdrawal and cravings (Patient not interested in quitting)  Palliative consult to discuss symptom management: degree of air hunger/pain/coping with illness/anxiety and depression/advanced care planning/ goals of care and comfort.  PT/OT consult to perform cognitive evaluation, assess patients functional abilities, limitations, home care needs, and make recommendations for safe transition to home or TCU.    For Discharge:  OP Medication Therapy Management Referral to reinforce patient on the proper use of inhalers, nebulizer's, and other home medications  OP Sleep Medicine Referral to assess for sleep disordered breathing, REYNA, nocturnal hypoxia, and hypoventilation  Will need follow up appointment with Pulmonologist and complete PFT's.   Home Oxygen Assessment Testing 24-48 hours prior to discharge to determine if O2 needs at rest and with exertion/activity have changed. If the patient requires oxygen at rest, the walk test must  "be performed using the new baseline O2 to determine if patient needs more oxygen with activity.   In the event patient qualifies for oxygen, at rest or with activity, please use the following verbiage in oxygen order: \"Please provide portable oxygen concentrator AND please test for oxygen conserving device using ____LPM to maintain sats between ____)  Patient is a good candidate for COPD Action Plan due to high readmission risk  At discharge continue with patient's home regimen of respiratory medications    Will continue to follow and support patient as needed. Will follow up with phone call 48 hours after discharge.     I spent 40 minutes with the patient.      Juan Will RT on 9/12/2023 at 4:40 PM       "

## 2023-09-12 NOTE — H&P
"[facility]    History and Physical - Hospitalist Service       Date of Admission:  9/11/2023    Assessment & Plan      Gerson Pearson is a 63 year old male admitted on 9/11/2023. He presents with SOB    Note: history somewhat limited by BiPAP and somnolence    COPD exacerbation  Underlying chronic, severe COPD with recurrent exacerbations  Acute on chronic hypoxic respiratory failure, 2L O2 with activity  [Needs rec- albuterol prn, Breztri 2 puffs BID, duonebs BID,   *follows with pulmonary through [facility] Dr. Vela   *hospitalized 8/31-9/2 with COPD exacerbation (also 7/15 and 7/28/2023 admissions). Returned to ED 9/9 and was treated and discharged. On prednisone taper at discharge 9/2.   *Had sudden onset of SOB 1 hour prior to EMS, was hypoxic to 70%s range. Was diaphoretic and tachycardic. Placed on BiPAP with improvement. In ED VSS on BiPAP, temp 99.4. initial VBG 7.17/94/49->7.28/78/43. , troponin 8. WBC 15.0. CXR clear. EKG NSR.  - IMC status  - BiPAP, wean as able  - solumedrol 60 mg IV daily  - continue empiric zosyn for now  - q4 duonebs  - prn albuterol q2 hours  - blood cultures pending  - resume inhalers when off BiPAP    Abdominal distension  Pt states he has abdominal distension \"for months\". Occasional nausea, no vomiting. Having nl BM. Abd is distended on exam, not tender  - consider imaging once improves    HTN  CAD with hx BM stent RCA 2020  [Needs rec- ASA 81 mg daily, atorvastatin 40 mg daily, carvedilol 25 mg BID, hydralazine 25 mg TID, lisinopril 40 mg daily]  *echo 12/2021 with EF 60-65%, nl LV size and function.   - resume ASA 81 mg daily  - resume other meds with rec  - prn hydralazine, labetalol SBP>180    MDD  Anxiety  [Needs rec- quetiapine 12.5 mg TID, mirtazapine 45 mg at HS, bupropion 300 mg qam]  Follows outpatient with psychiatry.   - resume meds with rec    Opiate dependence  Hx cocaine use disorder  [Needs rec- buprenorphine 8-2 film BID, " gabapentin 300 am/ 600 at HS]  Follows with pain clinic. 11/2021 was stopping suboxone so he could snort heroin. States isn't doing this, compliant with suboxone therapy.   - resume suboxone 8-2 BID  - resume other meds with rec    Tobacco use disorder  States smokes 1-2 cigs/ day  - declines NRT    Chronic anemia  Hgb 11.5 on presentation. Baseline 11-12.   - monitor    GERD  - resume pantoprazole 40 mg daily with rec       Diet:  NPO while on BiPAP  DVT Prophylaxis: Pneumatic Compression Devices  Post Catheter: Not present  Lines: None     Cardiac Monitoring: None  Code Status:  Full    Clinically Significant Risk Factors Present on Admission                # Drug Induced Platelet Defect: home medication list includes an antiplatelet medication   # Hypertension: Home medication list includes antihypertensive(s)   # Non-Invasive mechanical ventilation: current O2 Device: BiPAP/CPAP  # Acute hypoxic respiratory failure: continue supplemental O2 as needed                Disposition Plan      Expected Discharge Date: 09/13/2023                  Ron Elizabeth MD  Hospitalist Service  [facility]  Securely message with [employer] (more info)  Text page via [employer] Paging/Directory     ______________________________________________________________________    Chief Complaint   Shortness of breath    History is obtained from the patient, electronic health record, and emergency department physician    History of Present Illness   Gerson Pearson is a 63 year old male who presents with shortness of breath.  Patient has a history of longstanding severe COPD.  He has been hospitalized twice in July and a week and a half ago just this year.  Previous notes he did not have significant bronchodilator response with his prior PFTs.  He was in the hospital from August 31 to September 2.  He was treated with steroids and nebs and was discharged on antibiotics and inhalers.  He returned to the emergency  department on the ninth and was treated and discharged.  This evening per EMS as well as the patient he was eating dinner then he had onset of shortness of breath and chest tightness.  EMS was called and they found him to have O2 sats in the 70%'s range.  He was placed on BiPAP immediately with response.  He reports that he was taking his steroids but he was unsure if he completed his course.  He states he was also using his inhalers.  He reports he smokes 1 to 2 cigarettes a day still.  He is complaining of abdominal distention but this has been going on for several months.  Further history is limited by clinical condition.      Past Medical History    Past Medical History:   Diagnosis Date    CAD (coronary artery disease)     s/p bare metal stent to RCA in 6/2020    Chronic back pain     Chronic systolic congestive heart failure (H)     EF 45% has recovered to 60-65% on echo 12/2021    COPD (chronic obstructive pulmonary disease) (H)     Depressive disorder     Myocardial infarction (H)     Nicotine dependence        Past Surgical History   No past surgical history on file.    Prior to Admission Medications   Prior to Admission Medications   Prescriptions Last Dose Informant Patient Reported? Taking?   QUEtiapine (SEROQUEL) 25 MG tablet  Nursing Home Yes No   Sig: Take 25 mg by mouth every morning   QUEtiapine (SEROQUEL) 25 MG tablet  Nursing Home Yes No   Sig: Take 12.5 mg by mouth nightly as needed   acetaminophen (TYLENOL) 325 MG tablet  Nursing Home Yes No   Sig: Take 325 mg by mouth every 4 hours as needed for mild pain   albuterol (PROAIR HFA/PROVENTIL HFA/VENTOLIN HFA) 108 (90 Base) MCG/ACT inhaler  Nursing Home Yes No   Sig: Inhale 2 puffs into the lungs every 6 hours as needed for shortness of breath, wheezing or cough   aspirin (ASA) 81 MG chewable tablet  Nursing Home Yes No   Sig: Take 81 mg by mouth daily   atorvastatin (LIPITOR) 40 MG tablet  Nursing Home Yes No   Sig: Take 40 mg by mouth daily     buPROPion (WELLBUTRIN SR) 150 MG 12 hr tablet  Nursing Home No No   Sig: Take 2 tablets (300 mg) by mouth daily   budeson-glycopyrrol-formoterol (BREZTRI AEROSPHERE) 160-9-4.8 MCG/ACT AERO inhaler  Nursing Home No No   Sig: Inhale 2 puffs into the lungs 2 times daily   buprenorphine HCl-naloxone HCl (SUBOXONE) 8-2 MG per film  Nursing Home Yes No   Sig: Place 1 Film under the tongue 2 times daily AM and early evening  (around 5-6 pm)   carvedilol (COREG) 25 MG tablet  Nursing Home Yes No   Sig: Take 25 mg by mouth 2 times daily   diclofenac (VOLTAREN) 1 % topical gel  Nursing Home Yes No   Sig: Apply 2-4 g topically 4 times daily as needed for moderate pain   docusate sodium (COLACE) 100 MG capsule  Nursing Home No No   Sig: Take 1 capsule (100 mg) by mouth 2 times daily   fluticasone (FLONASE) 50 MCG/ACT nasal spray   No No   Sig: Spray 1 spray into both nostrils daily   gabapentin (NEURONTIN) 300 MG capsule  Nursing Home Yes No   Sig: Take 300 mg by mouth every morning   gabapentin (NEURONTIN) 300 MG capsule  Nursing Home Yes No   Sig: Take 600 mg by mouth At Bedtime   hydrALAZINE (APRESOLINE) 25 MG tablet  Nursing Home Yes No   Sig: Take 25 mg by mouth 3 times daily   ipratropium - albuterol 0.5 mg/2.5 mg/3 mL (DUONEB) 0.5-2.5 (3) MG/3ML neb solution  Nursing Home No No   Sig: Take 1 vial (3 mLs) by nebulization 2 times daily   ipratropium - albuterol 0.5 mg/2.5 mg/3 mL (DUONEB) 0.5-2.5 (3) MG/3ML neb solution  Nursing Home No No   Sig: Take 1 vial (3 mLs) by nebulization every 6 hours as needed for shortness of breath, wheezing or cough   lisinopril (ZESTRIL) 40 MG tablet  Nursing Home No No   Sig: Take 1 tablet (40 mg) by mouth daily   melatonin 5 MG tablet  Nursing Home Yes No   Sig: Take 5 mg by mouth nightly as needed for sleep   mirtazapine (REMERON) 45 MG tablet  Nursing Home Yes No   Sig: Take 45 mg by mouth At Bedtime    multivitamin w/minerals (THERA-VIT-M) tablet  Nursing Home Yes No   Sig: Take 1  tablet by mouth daily   naloxone (NARCAN) 4 MG/0.1ML nasal spray  Nursing Home Yes No   Sig: Spray 4 mg into one nostril alternating nostrils once as needed for opioid reversal every 2-3 minutes until assistance arrives   nicotine (NICODERM CQ) 7 MG/24HR 24 hr patch Not Taking Nursing Home Yes No   Sig: Place 1 patch onto the skin every 24 hours   Patient not taking: Reported on 9/11/2023   pantoprazole (PROTONIX) 40 MG EC tablet  Nursing Home Yes No   Sig: Take 40 mg by mouth daily   polyethylene glycol (MIRALAX) 17 g packet  jail Yes No   Sig: Take 1 packet by mouth daily as needed for constipation   predniSONE (DELTASONE) 10 MG tablet   No No   Sig: Take 4 tablets (40mg) for two days, then 3 tablets (30mg) for four days then 2 tablets (20mg) for four days then one tablet (10mg) for four days then stop      Facility-Administered Medications: None        Review of Systems    Review of systems not obtained due to patient factors - critical condition and sedation    Social History   I have reviewed this patient's social history and updated it with pertinent information if needed.  Social History     Tobacco Use    Smoking status: Every Day     Packs/day: 0.25     Types: Cigarettes    Smokeless tobacco: Never   Substance Use Topics    Alcohol use: No     Comment: QUIT 3 YEARS AGO    Drug use: Yes     Types: Opiates, Cocaine     Comment: denies recent heroin or cocaine use        Physical Exam   Vital Signs: Temp: 99.4  F (37.4  C) Temp src: Temporal BP: 121/74 Pulse: 84   Resp: 14 SpO2: 100 % O2 Device: BiPAP/CPAP    Weight: 0 lbs 0 oz    General Appearance: Alert, on bipap, minimal distress  Respiratory: diminished bilateral breath sounds with diffuse wheezing  Cardiovascular: RRR, no murmur, no edema  GI: distended, nontender  Skin: no rashes or lesions grossly    Other: CN grossly intact, CRENSHAW      Medical Decision Making       80 MINUTES SPENT BY ME on the date of service doing chart review, history, exam,  documentation & further activities per the note.      Data     I have personally reviewed the following data over the past 24 hrs:    15.0 (H)  \   11.5 (L)   / 192     141 101 9.7 /  129 (H)   4.4 32 (H) 0.90 \     ALT: 68 AST: 106 (H) AP: 101 TBILI: 0.3   ALB: 4.3 TOT PROTEIN: 7.0 LIPASE: N/A     Trop: 8 BNP: 380     Procal: 0.03 CRP: N/A Lactic Acid: 0.4         Imaging results reviewed over the past 24 hrs:   Recent Results (from the past 24 hour(s))   XR Chest Port 1 View    Narrative    EXAM: XR CHEST PORT 1 VIEW  LOCATION: [facility]  DATE: 9/11/2023    INDICATION: SOB  COMPARISON: 07/28/2023.      Impression    IMPRESSION: Negative chest.

## 2023-09-12 NOTE — CONSULTS
Care Management Initial Consult    General Information  Assessment completed with: Care Team MemberPratima  Type of CM/SW Visit: Initial Assessment    Primary Care Provider verified and updated as needed: Yes   Readmission within the last 30 days: previous discharge plan unsuccessful   Return Category: Exacerbation of disease  Reason for Consult: discharge planning  Advance Care Planning:            Communication Assessment  Patient's communication style: spoken language (English or Bilingual)             Cognitive  Cognitive/Neuro/Behavioral: WDL  Level of Consciousness: alert  Arousal Level: opens eyes spontaneously  Orientation: oriented x 4        Speech: clear, spontaneous, logical    Living Environment:   People in home: facility resident     Current living Arrangements: group home      Able to return to prior arrangements: yes       Family/Social Support:  Care provided by:  (Group Home staff)  Provides care for: no one  Marital Status:   Facility resident(s)/Staff          Description of Support System: Supportive    Support Assessment: Lacks adequate emotional support    Current Resources:   Patient receiving home care services: No     Community Resources: [facility]  Equipment currently used at home:    Supplies currently used at home: Nebulizer tubing, Oxygen Tubing/Supplies    Employment/Financial:  Employment Status: disabled        Financial Concerns:             Does the patient's insurance plan have a 3 day qualifying hospital stay waiver?  No    Lifestyle & Psychosocial Needs:  Social Determinants of Health     Tobacco Use: High Risk (7/15/2023)    Patient History     Smoking Tobacco Use: Every Day     Smokeless Tobacco Use: Never     Passive Exposure: Not on file   Alcohol Use: Not on file   Financial Resource Strain: Not on file   Food Insecurity: Not on file   Transportation Needs: Not on file   Physical Activity: Not on file   Stress: Not on file   Social Connections: Not on file    Intimate Partner Violence: Not on file   Depression: Not on file   Housing Stability: Not on file       Functional Status:  Prior to admission patient needed assistance:      Dependent IADLs:: Cleaning, Cooking, Laundry, Meal Preparation, Medication Management, Transportation  Assesssment of Functional Status: Not at baseline with mobility, Not at baseline with ADL Functioning    Mental Health Status:  Mental Health Status: No Current Concerns       Chemical Dependency Status:  Chemical Dependency Status: Current Concern  Chemical Dependency Management: Other (see comment) (Suboxone)        Values/Beliefs:  Spiritual, Cultural Beliefs, Synagogue Practices, Values that affect care: no               Additional Information:  Spoke with patient and Group Home RN   [phone]   regarding plan of care and discharge plans.  Patient resides at   [facility], phone [phone]   Patient at baseline is independent with mobility. Uses oxygen with  activity and was prescribed to be used continuous at home.   Group home sets up showers and occasional assist if needed as patient gets short of breath . Patient occasionally uses cane, used to have a walker)   Patient is dependent with Cleaning, Cooking, Laundry, Shopping, Meal Preparation, Medication Management, Money Management,   RN Mickey states patient gets anxious easily and has an appointment tomorrow with the psychiatrist which has to be rescheduled at discharge.   Noted Suboxone is via [facility] [phone] weekly supplies sent  to the group home and Mickey stated they may have enough when patient is discharged.   New Rx meds at discharge, fill at [facility]    pharmacy   Received a call from COPD team who is reviewing patient for BiPap at home.   Care coordination will continue to follow patient for discharge needs.    Emi Garcia RN [phone]

## 2023-09-12 NOTE — PROGRESS NOTES
"[facility]    Medicine Progress Note - Hospitalist Service    Date of Admission:  9/11/2023    Assessment & Plan   Gerson Pearson is a 63 year old male admitted on 9/11/2023. He presents with SOB     COPD exacerbation  Underlying chronic, severe COPD with recurrent exacerbations  Acute on chronic hypoxic respiratory failure, 2L O2 with activity  [Needs rec- albuterol prn, Breztri 2 puffs BID, duonebs BID,   *follows with pulmonary through [facility]   *hospitalized 8/31-9/2 with COPD exacerbation (also 7/15 and 7/28/2023 admissions). Returned to ED 9/9 and was treated and discharged. On prednisone taper at discharge 9/2.   *Had sudden onset of SOB 1 hour prior to EMS, was hypoxic to 70%s range. Was diaphoretic and tachycardic. Placed on BiPAP with improvement. In ED VSS on BiPAP, temp 99.4. initial VBG 7.17/94/49->7.28/78/43. , troponin 8. WBC 15.0. CXR clear. EKG NSR.  - weaned off bipap around lunchtime on 9/12  - 2LPM NC  - overnight oximetry  - morning ABG 9/13  - solumedrol 60 mg IV daily  - continue empiric zosyn for now  - q4 duonebs  - mucinex 1200mg BID  - prn albuterol q2 hours  - blood cultures pending  - resume inhalers  - pulm consult  - check CT Chest  - appreciate COPD specialist consult  - can consider palliative consult after eval by pulm     Abdominal distension  Pt states he has abdominal distension \"for months\". Occasional nausea, no vomiting. Having nl BM. Abd is distended on exam, not tender. Reports history of ulcer  - check CT AP given ongoing GI symptoms.   - continue PPI  - consider GI consult while inpatient     HTN  CAD with hx BM stent RCA 2020  [ASA 81 mg daily, atorvastatin 40 mg daily, carvedilol 25 mg BID, hydralazine 25 mg TID, lisinopril 40 mg daily]  *echo 12/2021 with EF 60-65%, nl LV size and function.   - resumed all PTA meds, hold parameters in place  - prn hydralazine, labetalol SBP>180     MDD  Anxiety  [quetiapine 12.5 mg TID, " mirtazapine 45 mg at HS, bupropion 300 mg qam]  Follows outpatient with psychiatry.   - resumed meds     Opiate dependence  Hx cocaine use disorder  [buprenorphine 8-2 film BID, gabapentin 300 am/ 600 at HS]  Follows with pain clinic. 11/2021 was stopping suboxone so he could snort heroin. States isn't doing this, compliant with suboxone therapy.   - resume suboxone 8-2 BID, gabapentin     Tobacco use disorder  States smokes 1-2 cigs/ day  - declines NRT     Chronic anemia  Hgb 11.5 on presentation. Baseline 11-12.   - monitor    Mild thrombocytopenia  Monitor     GERD  - resume pantoprazole 40 mg daily  - consideration for GI consult as above       Diet: Regular Diet Adult    DVT Prophylaxis: Pneumatic Compression Devices  Post Catheter: Not present  Lines: None     Cardiac Monitoring: None  Code Status: Full Code      Clinically Significant Risk Factors Present on Admission          # Hypocalcemia: Lowest Ca = 8.1 mg/dL in last 2 days, will monitor and replace as appropriate         # Drug Induced Platelet Defect: home medication list includes an antiplatelet medication     # Hypertension: Home medication list includes antihypertensive(s)   # Acute Respiratory Failure: based on blood gas results.  Continue supplemental oxygen as needed                Disposition Plan      Expected Discharge Date: 09/14/2023      Destination: group home            Candice Pearson DO  Hospitalist Service  [facility]  Securely message with [employer] (more info)  Text page via [employer] Paging/Directory   ______________________________________________________________________    Interval History   Patient seen and examined. Bipap helped his breathing a lot. Weaned off bipap around lunch and moved to supplemental O2 via NC and tolerated well. Reports his SOB came on suddenly, which is unlike the times he has had SOB prior. He also reports abdominal pain. He has had this for a while, it is located in his LUQ. Has been  taking nyquil to treat his abd pain symptoms. Discussed patient with COPD specialist and RN    Physical Exam   Vital Signs: Temp: 98.3  F (36.8  C) Temp src: Oral BP: (!) 147/93 Pulse: 94   Resp: 20 SpO2: 97 % O2 Device: Nasal cannula Oxygen Delivery: 2 LPM  Weight: 0 lbs 0 oz    Constitutional: Awake, alert, cooperative, no apparent distress on bipap  Respiratory: decreased overall but with occasional wheeze  Cardiovascular: Regular rate and rhythm, normal S1 and S2, and no murmur noted  GI: Normal bowel sounds, soft, little distended, non-tender  Skin/Integumen: No rashes, no cyanosis, no edema  Other:      Medical Decision Making       35 MINUTES SPENT BY ME on the date of service doing chart review, history, exam, documentation & further activities per the note.      Data     I have personally reviewed the following data over the past 24 hrs:    15.6 (H)  \   10.5 (L)   / 130 (L)     138 101 10.9 /  168 (H)   4.4 26 0.68 \     ALT: 68 AST: 106 (H) AP: 101 TBILI: 0.3   ALB: 4.3 TOT PROTEIN: 7.0 LIPASE: N/A     Trop: 8 BNP: 380     Procal: 0.03 CRP: N/A Lactic Acid: 0.4       Imaging results reviewed over the past 24 hrs:   Recent Results (from the past 24 hour(s))   XR Chest Port 1 View    Narrative    EXAM: XR CHEST PORT 1 VIEW  LOCATION: [facility]  DATE: 9/11/2023    INDICATION: SOB  COMPARISON: 07/28/2023.      Impression    IMPRESSION: Negative chest.      Universal Safety Interventions

## 2023-09-13 ENCOUNTER — DOCUMENTATION ONLY (OUTPATIENT)
Dept: RESPIRATORY THERAPY | Facility: CLINIC | Age: 63
End: 2023-09-13
Payer: MEDICARE

## 2023-09-13 LAB
ALLEN'S TEST: NO
ANION GAP SERPL CALCULATED.3IONS-SCNC: 8 MMOL/L (ref 7–15)
BASE EXCESS BLDA CALC-SCNC: 6.5 MMOL/L (ref -9–1.8)
BUN SERPL-MCNC: 10.7 MG/DL (ref 8–23)
CALCIUM SERPL-MCNC: 8.7 MG/DL (ref 8.8–10.2)
CHLORIDE SERPL-SCNC: 102 MMOL/L (ref 98–107)
CREAT SERPL-MCNC: 0.68 MG/DL (ref 0.67–1.17)
DEPRECATED HCO3 PLAS-SCNC: 27 MMOL/L (ref 22–29)
EGFRCR SERPLBLD CKD-EPI 2021: >90 ML/MIN/1.73M2
ERYTHROCYTE [DISTWIDTH] IN BLOOD BY AUTOMATED COUNT: 15.2 % (ref 10–15)
GLUCOSE SERPL-MCNC: 159 MG/DL (ref 70–99)
HCO3 BLD-SCNC: 33 MMOL/L (ref 21–28)
HCT VFR BLD AUTO: 32.5 % (ref 40–53)
HGB BLD-MCNC: 10.6 G/DL (ref 13.3–17.7)
MAGNESIUM SERPL-MCNC: 2.3 MG/DL (ref 1.7–2.3)
MCH RBC QN AUTO: 30.7 PG (ref 26.5–33)
MCHC RBC AUTO-ENTMCNC: 32.6 G/DL (ref 31.5–36.5)
MCV RBC AUTO: 94 FL (ref 78–100)
O2/TOTAL GAS SETTING VFR VENT: 2 %
PCO2 BLD: 53 MM HG (ref 35–45)
PH BLD: 7.4 [PH] (ref 7.35–7.45)
PHOSPHATE SERPL-MCNC: 2.9 MG/DL (ref 2.5–4.5)
PLATELET # BLD AUTO: 142 10E3/UL (ref 150–450)
PO2 BLD: 92 MM HG (ref 80–105)
POTASSIUM SERPL-SCNC: 4 MMOL/L (ref 3.4–5.3)
RBC # BLD AUTO: 3.45 10E6/UL (ref 4.4–5.9)
SODIUM SERPL-SCNC: 137 MMOL/L (ref 136–145)
WBC # BLD AUTO: 17.3 10E3/UL (ref 4–11)

## 2023-09-13 PROCEDURE — 83735 ASSAY OF MAGNESIUM: CPT | Performed by: HOSPITALIST

## 2023-09-13 PROCEDURE — 250N000011 HC RX IP 250 OP 636: Mod: JZ | Performed by: INTERNAL MEDICINE

## 2023-09-13 PROCEDURE — 250N000013 HC RX MED GY IP 250 OP 250 PS 637: Performed by: HOSPITALIST

## 2023-09-13 PROCEDURE — 250N000011 HC RX IP 250 OP 636: Mod: JZ | Performed by: STUDENT IN AN ORGANIZED HEALTH CARE EDUCATION/TRAINING PROGRAM

## 2023-09-13 PROCEDURE — 999N000157 HC STATISTIC RCP TIME EA 10 MIN

## 2023-09-13 PROCEDURE — 250N000013 HC RX MED GY IP 250 OP 250 PS 637: Performed by: INTERNAL MEDICINE

## 2023-09-13 PROCEDURE — 94640 AIRWAY INHALATION TREATMENT: CPT

## 2023-09-13 PROCEDURE — 250N000009 HC RX 250: Performed by: INTERNAL MEDICINE

## 2023-09-13 PROCEDURE — 85027 COMPLETE CBC AUTOMATED: CPT | Performed by: HOSPITALIST

## 2023-09-13 PROCEDURE — 99222 1ST HOSP IP/OBS MODERATE 55: CPT | Performed by: STUDENT IN AN ORGANIZED HEALTH CARE EDUCATION/TRAINING PROGRAM

## 2023-09-13 PROCEDURE — 99232 SBSQ HOSP IP/OBS MODERATE 35: CPT | Performed by: HOSPITALIST

## 2023-09-13 PROCEDURE — 82310 ASSAY OF CALCIUM: CPT | Performed by: HOSPITALIST

## 2023-09-13 PROCEDURE — 94640 AIRWAY INHALATION TREATMENT: CPT | Mod: 76

## 2023-09-13 PROCEDURE — 36600 WITHDRAWAL OF ARTERIAL BLOOD: CPT

## 2023-09-13 PROCEDURE — 82803 BLOOD GASES ANY COMBINATION: CPT | Performed by: HOSPITALIST

## 2023-09-13 PROCEDURE — 120N000013 HC R&B IMCU

## 2023-09-13 PROCEDURE — 36415 COLL VENOUS BLD VENIPUNCTURE: CPT | Performed by: HOSPITALIST

## 2023-09-13 PROCEDURE — 84100 ASSAY OF PHOSPHORUS: CPT | Performed by: HOSPITALIST

## 2023-09-13 RX ORDER — PREDNISONE 5 MG/1
10 TABLET ORAL DAILY
Status: DISCONTINUED | OUTPATIENT
Start: 2023-09-25 | End: 2023-09-17 | Stop reason: HOSPADM

## 2023-09-13 RX ORDER — METHYLPREDNISOLONE SODIUM SUCCINATE 125 MG/2ML
60 INJECTION, POWDER, LYOPHILIZED, FOR SOLUTION INTRAMUSCULAR; INTRAVENOUS EVERY 24 HOURS
Status: COMPLETED | OUTPATIENT
Start: 2023-09-13 | End: 2023-09-13

## 2023-09-13 RX ORDER — POLYETHYLENE GLYCOL 3350 17 G/17G
17 POWDER, FOR SOLUTION ORAL DAILY
Status: DISCONTINUED | OUTPATIENT
Start: 2023-09-13 | End: 2023-09-17 | Stop reason: HOSPADM

## 2023-09-13 RX ORDER — AZITHROMYCIN 250 MG/1
250 TABLET, FILM COATED ORAL DAILY
Status: COMPLETED | OUTPATIENT
Start: 2023-09-14 | End: 2023-09-17

## 2023-09-13 RX ORDER — AMOXICILLIN 250 MG
1 CAPSULE ORAL 2 TIMES DAILY
Status: DISCONTINUED | OUTPATIENT
Start: 2023-09-13 | End: 2023-09-17 | Stop reason: HOSPADM

## 2023-09-13 RX ORDER — PREDNISONE 20 MG/1
40 TABLET ORAL DAILY
Status: DISCONTINUED | OUTPATIENT
Start: 2023-09-14 | End: 2023-09-17 | Stop reason: HOSPADM

## 2023-09-13 RX ORDER — BISACODYL 10 MG
10 SUPPOSITORY, RECTAL RECTAL DAILY PRN
Status: DISCONTINUED | OUTPATIENT
Start: 2023-09-13 | End: 2023-09-17 | Stop reason: HOSPADM

## 2023-09-13 RX ORDER — AZITHROMYCIN 250 MG/1
500 TABLET, FILM COATED ORAL ONCE
Status: COMPLETED | OUTPATIENT
Start: 2023-09-13 | End: 2023-09-13

## 2023-09-13 RX ORDER — PREDNISONE 20 MG/1
20 TABLET ORAL DAILY
Status: DISCONTINUED | OUTPATIENT
Start: 2023-09-22 | End: 2023-09-17 | Stop reason: HOSPADM

## 2023-09-13 RX ADMIN — QUETIAPINE 12.5 MG: 25 TABLET, FILM COATED ORAL at 21:24

## 2023-09-13 RX ADMIN — FLUTICASONE FUROATE AND VILANTEROL TRIFENATATE 1 PUFF: 200; 25 POWDER RESPIRATORY (INHALATION) at 09:57

## 2023-09-13 RX ADMIN — BUPRENORPHINE AND NALOXONE 1 FILM: 8; 2 FILM, SOLUBLE BUCCAL; SUBLINGUAL at 10:00

## 2023-09-13 RX ADMIN — GABAPENTIN 300 MG: 300 CAPSULE ORAL at 09:58

## 2023-09-13 RX ADMIN — MIRTAZAPINE 45 MG: 15 TABLET, FILM COATED ORAL at 23:48

## 2023-09-13 RX ADMIN — POLYETHYLENE GLYCOL 3350 17 G: 17 POWDER, FOR SOLUTION ORAL at 10:00

## 2023-09-13 RX ADMIN — PIPERACILLIN AND TAZOBACTAM 3.38 G: 3; .375 INJECTION, POWDER, FOR SOLUTION INTRAVENOUS at 09:58

## 2023-09-13 RX ADMIN — MAGNESIUM HYDROXIDE 30 ML: 400 SUSPENSION ORAL at 10:00

## 2023-09-13 RX ADMIN — ATORVASTATIN CALCIUM 40 MG: 40 TABLET, FILM COATED ORAL at 09:58

## 2023-09-13 RX ADMIN — SENNOSIDES AND DOCUSATE SODIUM 1 TABLET: 50; 8.6 TABLET ORAL at 09:59

## 2023-09-13 RX ADMIN — IPRATROPIUM BROMIDE AND ALBUTEROL SULFATE 3 ML: .5; 3 SOLUTION RESPIRATORY (INHALATION) at 10:44

## 2023-09-13 RX ADMIN — IPRATROPIUM BROMIDE AND ALBUTEROL SULFATE 3 ML: .5; 3 SOLUTION RESPIRATORY (INHALATION) at 07:17

## 2023-09-13 RX ADMIN — PANTOPRAZOLE SODIUM 40 MG: 40 TABLET, DELAYED RELEASE ORAL at 09:58

## 2023-09-13 RX ADMIN — QUETIAPINE 12.5 MG: 25 TABLET, FILM COATED ORAL at 16:23

## 2023-09-13 RX ADMIN — BENZONATATE 100 MG: 100 CAPSULE ORAL at 02:42

## 2023-09-13 RX ADMIN — SENNOSIDES AND DOCUSATE SODIUM 1 TABLET: 50; 8.6 TABLET ORAL at 21:23

## 2023-09-13 RX ADMIN — BUPRENORPHINE AND NALOXONE 1 FILM: 8; 2 FILM, SOLUBLE BUCCAL; SUBLINGUAL at 21:55

## 2023-09-13 RX ADMIN — METHYLPREDNISOLONE SODIUM SUCCINATE 62.5 MG: 125 INJECTION, POWDER, FOR SOLUTION INTRAMUSCULAR; INTRAVENOUS at 12:23

## 2023-09-13 RX ADMIN — QUETIAPINE 12.5 MG: 25 TABLET, FILM COATED ORAL at 09:59

## 2023-09-13 RX ADMIN — FERROUS SULFATE TAB 325 MG (65 MG ELEMENTAL FE) 325 MG: 325 (65 FE) TAB at 09:58

## 2023-09-13 RX ADMIN — CARVEDILOL 25 MG: 25 TABLET, FILM COATED ORAL at 21:24

## 2023-09-13 RX ADMIN — ASPIRIN 81 MG: 81 TABLET, COATED ORAL at 09:58

## 2023-09-13 RX ADMIN — MULTIPLE VITAMINS W/ MINERALS TAB 1 TABLET: TAB at 09:58

## 2023-09-13 RX ADMIN — IPRATROPIUM BROMIDE AND ALBUTEROL SULFATE 3 ML: .5; 3 SOLUTION RESPIRATORY (INHALATION) at 14:38

## 2023-09-13 RX ADMIN — CARVEDILOL 25 MG: 25 TABLET, FILM COATED ORAL at 10:00

## 2023-09-13 RX ADMIN — UMECLIDINIUM 1 PUFF: 62.5 AEROSOL, POWDER ORAL at 09:57

## 2023-09-13 RX ADMIN — GUAIFENESIN 1200 MG: 600 TABLET, EXTENDED RELEASE ORAL at 09:58

## 2023-09-13 RX ADMIN — AZITHROMYCIN 500 MG: 250 TABLET, FILM COATED ORAL at 16:23

## 2023-09-13 RX ADMIN — BUPROPION HYDROCHLORIDE 300 MG: 150 TABLET, FILM COATED, EXTENDED RELEASE ORAL at 10:13

## 2023-09-13 RX ADMIN — PIPERACILLIN AND TAZOBACTAM 3.38 G: 3; .375 INJECTION, POWDER, FOR SOLUTION INTRAVENOUS at 04:09

## 2023-09-13 RX ADMIN — GABAPENTIN 600 MG: 300 CAPSULE ORAL at 21:23

## 2023-09-13 RX ADMIN — LISINOPRIL 40 MG: 40 TABLET ORAL at 09:58

## 2023-09-13 RX ADMIN — IPRATROPIUM BROMIDE AND ALBUTEROL SULFATE 3 ML: .5; 3 SOLUTION RESPIRATORY (INHALATION) at 20:02

## 2023-09-13 RX ADMIN — HYDRALAZINE HYDROCHLORIDE 25 MG: 25 TABLET ORAL at 21:24

## 2023-09-13 RX ADMIN — DOCUSATE SODIUM 100 MG: 100 CAPSULE, LIQUID FILLED ORAL at 21:24

## 2023-09-13 RX ADMIN — HYDRALAZINE HYDROCHLORIDE 25 MG: 25 TABLET ORAL at 16:23

## 2023-09-13 RX ADMIN — DOCUSATE SODIUM 100 MG: 100 CAPSULE, LIQUID FILLED ORAL at 09:58

## 2023-09-13 RX ADMIN — HYDRALAZINE HYDROCHLORIDE 25 MG: 25 TABLET ORAL at 09:58

## 2023-09-13 RX ADMIN — GUAIFENESIN 1200 MG: 600 TABLET, EXTENDED RELEASE ORAL at 21:24

## 2023-09-13 ASSESSMENT — ACTIVITIES OF DAILY LIVING (ADL)
ADLS_ACUITY_SCORE: 43
ADLS_ACUITY_SCORE: 43
ADLS_ACUITY_SCORE: 37
ADLS_ACUITY_SCORE: 37
ADLS_ACUITY_SCORE: 43
ADLS_ACUITY_SCORE: 37
ADLS_ACUITY_SCORE: 43
ADLS_ACUITY_SCORE: 43

## 2023-09-13 NOTE — PROGRESS NOTES
SPOKE TO GELY AT [facility], AND NELLY JOINED IN...THEY WANTED TO VERIFY WHAT WAS NEEDED FOR THIS PT TO GET A BIPAP S WITH THE DIAGNOSIS  OF COPD. I EXPLAINED THAT THE ABG WE HAVE QUALIFIES HIM, WE NEED AN ELOISE (SATS 88% OR LESS FOR AT LEAST 5 MIN) AND A LETTER OF NECESSITY THAT STATES CPAP TRIED AND RULED OUT AND REYNA IS NOT A CONTRIBUTING FACTOR. NELLY THEN ASKED ABOUT HOW TO QUALIFY THE PT FOR AN NIV, AND I EXPLAINED A QUALIFYING DIAGNOSIS, AT LEAST 2 ADMISSIONS FOR THE SAME RESPIRATORY ISSUES WTHIN THE PAST YEAR, AND IT'S HELPFUL TO HAVE AT LEAST SOME TEST RESULT THAT SUPPORTS THE DIAGNOSIS (IN THIS CASE THE ABG WOULD QUALIFY), AND A MEDICAL NECESSITY. WE QUICKLY TALKED ABOUT ONE ISSUE THAT MAY COME UP FOR THE PT IF ORDERED AN NIV IS ANY COST THAT MIGHT NOT BE COVERED BY INSURANCE.

## 2023-09-13 NOTE — PLAN OF CARE
A&Ox4. VSS ex HTN. O2 needs between RA and 2 L NC. Denies pain. Bowel regimen started this AM. + BM. Up w/ SBA. PIV SL. Regular diet. LS diminished w/ wheezes. On IV steroids - plan to switch to PO tomorrow. IV zosyn discontinued, transitioned to PO azithromycin per Pulmonary. Plan to redo overnight oximetry study tonight.     Pt will need to remain on room air overnight per Chronic Pulmonary Disease Specialist.

## 2023-09-13 NOTE — CARE PLAN
Orientations: A&O X4.   Vitals/Pain: VSS. On 2L NC. Dyspneic. Denies pain. Completed oxyimeter study.   Lines/Drains: PIV SL.  Skin/Wounds:   GI/: Adequate urine output, urinal @ bedside. No BM. Pt complains of abdominal discomfort.   Labs: Abnormal/Trends, Electrolyte Replacement- WDL   Ambulation/Assist: SBA   Sleep Quality: Good

## 2023-09-13 NOTE — PROGRESS NOTES
"Chronic Pulmonary Disease Specialist  Progress Note     Follow-up Note:  Will assess patient for NIV for home use to reduce WOB, support respiratory mechanics, and to manage hypoventilation.  In order to qualify patient for bipap per RAD, patient must have overnight oximetry OFF bipap on home O2 settings. Then, AM ABG to be drawn off bipap that shows PaCO2 > 52mmHg. Orders placed by MD.  RNCC and bedside RT notified.    9/13/2023 AM ABG performed. Night oximetry completed, but data unable to be found on device. Will redo oximetry study for tonight on room air per renan SOSA order.      Will continue to follow and provide support.       [place] \"CJ\" Suman   Chronic Pulmonary Disease Specialist   Office: [phone]  Cell: [phone]     "

## 2023-09-13 NOTE — CONSULTS
Pulmonary Consult Note    Date of Service: 09/13/23    Assessment and Recommendations:  63M CAD, COPD admitted 9/11 w/ acute on chronic hypoxemic hypercapnic respiratory failure. He has had recurrent exacerbations requiring hospitalization. I think that given his recurrent hypercapnia he would benefit from nightly BiPAP. He would require nighttime oximetry to qualify for this. Additionally, cardiac dysfunction is often a reason for recurrent hospitalizations in COPD and I would like to repeat TTE.     - TTE (ordered)   - nighttime oximetry (ordered)  - prednisone taper starting tomorrow: 40mg x 5d, 30mg x 3d, 20mg x 3d, 10mg x 3d (ordered)  - continue ICS-LABA (Breo) and LAMA (Incruse)  - continue prn albuterol/duonebs  - given recurrent hospitalizations, pt may be a candidate for roflumilast or chronic azithromycin, will defer to outpatient pulmonologist     Chief Complaint   Patient presents with    Shortness of Breath       Summary:  63M CAD, COPD admitted 9/11 w/ dyspnea. He was hypoxemic w/ EMS to 70s. Initial CO2 94. Placed on BiPAP. Started on methylprednisolone and empiric pip-tazo. He was able to wean off BiPAP HD1. Currently on 2L. For inhaler therapy, he is on ICS-LABA (Breo), LAMA (Incruse), and scheduled duonebs. He reports worsening dyspnea 1hr prior to presentation to the hospital. Both at rest and w/ exertion. Chest tightness. Unsure if he heard wheezing. Worsening cough w/ white sputum. No F/C. No sick contacts. Was using his home inhalers, which are helpful. Wearing 3L O2 at home inconsistently, has been wearing more at night recently.     He has had multiple recent hospitalizations for COPD and hypoxemic hypercapnic respiratory failure; 7/15-7/16, 7/28-8/1, 8/31-9/2, and ED visit 9/9. He follows in clinic w/ Dr. Vela at [place]. He was last seen 5/8/23. FEV1 22%. He is on 2L exertional O2. He is prescribed [id number] (Breztri), prn albuterol, scheduled and prn duonebs.     10 point review  of systems negative, aside from that mentioned above    BP (!) 144/78 (BP Location: Left arm, Patient Position: Sitting)   Pulse 79   Temp 97.4  F (36.3  C) (Oral)   Resp 24   SpO2 91%   Gen: NAD  HEENT: anicteric, OP clear, Mallampati IV  Card: RRR  Pulm: diminished b/l, no wheezing  Abd: soft, NTND  MSK: no LE edema, no acute joint abnormalities  Skin: no obvious rash  Psych: normal affect  Neuro: answering questions appropriately     Labs: personally reviewed    Imaging: personally reviewed    Past Medical History:   Diagnosis Date    CAD (coronary artery disease)     s/p bare metal stent to RCA in 6/2020    Chronic back pain     Chronic systolic congestive heart failure (H)     EF 45% has recovered to 60-65% on echo 12/2021    COPD (chronic obstructive pulmonary disease) (H)     Depressive disorder     Myocardial infarction (H)     Nicotine dependence        PSH: reviewed and non-contributory    Family History   Problem Relation Age of Onset    Diabetes Mother     Coronary Artery Disease Mother        Social History     Socioeconomic History    Marital status: Legally      Spouse name: Not on file    Number of children: Not on file    Years of education: Not on file    Highest education level: Not on file   Occupational History    Not on file   Tobacco Use    Smoking status: Every Day     Packs/day: 0.25     Types: Cigarettes    Smokeless tobacco: Never   Substance and Sexual Activity    Alcohol use: No     Comment: QUIT 3 YEARS AGO    Drug use: Yes     Types: Opiates, Cocaine     Comment: denies recent heroin or cocaine use    Sexual activity: Yes     Partners: Female   Other Topics Concern    Parent/sibling w/ CABG, MI or angioplasty before 65F 55M? No   Social History Narrative    Not on file     Social Determinants of Health     Financial Resource Strain: Not on file   Food Insecurity: Not on file   Transportation Needs: Not on file   Physical Activity: Not on file   Stress: Not on file   Social  Connections: Not on file   Intimate Partner Violence: Not on file   Housing Stability: Not on file       Alex Us MD  Pulmonary and Critical Care Medicine  [facility]

## 2023-09-13 NOTE — PROGRESS NOTES
[facility]    Medicine Progress Note - Hospitalist Service    Date of Admission:  9/11/2023    Assessment & Plan   Gerson Pearson is a 63 year old male admitted on 9/11/2023 from ;  He presents with SOB     COPD exacerbation  Underlying chronic, severe COPD with recurrent exacerbations  Acute on chronic hypoxic respiratory failure, 2L O2 with activity  [Needs rec- albuterol prn, Breztri 2 puffs BID, duonebs BID,   *follows with pulmonary through [facility] Vela   *hospitalized 8/31-9/2 with COPD exacerbation (also 7/15 and 7/28/2023 admissions). Returned to ED 9/9 and was treated and discharged. On prednisone taper at discharge 9/2.   *Had sudden onset of SOB 1 hour prior to EMS, was hypoxic to 70%s range. Was diaphoretic and tachycardic. Placed on BiPAP with improvement. In ED VSS on BiPAP, temp 99.4. initial VBG 7.17/94/49->7.28/78/43. , troponin 8. WBC 15.0. CXR clear. EKG NSR.  - weaned off bipap around lunchtime on 9/12/23  - 2LPM NC, states at home uses 3 L NC, 9/13/2023 no dyspnea, speaks in full sentences, ambulatory to toilet.  - overnight oximetry  - morning ABG 9/13  - solumedrol 60 mg IV daily  - continue empiric zosyn for now  - q4 duonebs  - mucinex 1200mg BID  - prn albuterol q2 hours  - blood cultures pending  - resume inhalers  - pulm consult, see Dr. Mandel's note 9/13/2023: with recommendationsTTE (ordered);- nighttime oximetry (ordered);- prednisone taper starting tomorrow: 40mg x 5d, 30mg x 3d, 20mg x 3d, 10mg x 3d (ordered)  - continue ICS-LABA (Breo) and LAMA (Incruse)  - continue prn albuterol/duonebs  - given recurrent hospitalizations, pt may be a candidate for roflumilast or chronic azithromycin, will defer to outpatient pulmonologist.  Unclear if occasional respiratory distress related to his use of chronic opiates and hypoventilation and of course continued nicotine addiction, continues on nicotine patch now at 2 cigarettes/day.  -defer to Pulmonary  "re: ongoing need for Zosyn; [Chest CT NEG for pnx.]  -  CT Chest: Central emphysema without acute pulmonary infiltrates, large rectosigmoid fecal burden, notation of atherosclerotic disease/chronic occlusion left common iliac artery  -      Abdominal distension  Pt states he has abdominal distension \"for months\". Occasional nausea, no vomiting. Having nl BM. Abd is distended on exam, not tender. Reports history of ulcer  - check CT AP given ongoing GI symptoms:  large rectosigmoid fecal burden, notation of atherosclerotic disease/chronic occlusion left common iliac artery  - continue PPI  -Discussed bowel program with patient, this morning senna S, MiraLAX, MOM and instruct patient if no bowel movement after lunch Dulcolax suppository-per Nursing had large BM: We will continue on senna S and MiraLAX given chronic use of opiates for opiate dependence      HTN  CAD with hx BM stent RCA 2020  [ASA 81 mg daily, atorvastatin 40 mg daily, carvedilol 25 mg BID, hydralazine 25 mg TID, lisinopril 40 mg daily]  *echo 12/2021 with EF 60-65%, nl LV size and function.   - resumed all PTA meds, hold parameters in place  - prn hydralazine, labetalol SBP>180  -Tobacco cessation     MDD  Anxiety  [quetiapine 12.5 mg TID, mirtazapine 45 mg at HS, bupropion 300 mg qam]  Follows outpatient with psychiatry.   - resumed meds  -Monitor     Opiate dependence  Hx cocaine use disorder  [buprenorphine 8-2 film BID, gabapentin 300 am/ 600 at HS]  Follows with pain clinic. 11/2021 was stopping suboxone so he could snort heroin. States isn't doing this, compliant with suboxone therapy.   - resume suboxone 8-2 BID, gabapentin     Tobacco use disorder  States smokes 1-2 cigs/ day  -Nicotine substitute, addressed his habit of smoking.     Chronic anemia  Hgb 11.5 on presentation. Baseline 11-12.   - monitor    Mild thrombocytopenia  Monitor     GERD  - resume pantoprazole 40 mg daily  -       Diet: Regular Diet Adult    DVT Prophylaxis: Pneumatic " Compression Devices  Post Catheter: Not present  Lines: None     Cardiac Monitoring: None  Code Status: Full Code      Clinically Significant Risk Factors          # Hypocalcemia: Lowest Ca = 8.1 mg/dL in last 2 days, will monitor and replace as appropriate                               Disposition Plan      Expected Discharge Date: 09/14/2023      Destination: group home            Rashaad Valero MD  Hospitalist Service  [facility]  Securely message with [employer] (more info)  Text page via [employer] Paging[employer]     Total time 50 minutes for today 9/13/2023 :   time consisted of the following, assuming Patient care with review of records including labs, imaging results, medications, interdisciplinary notes; examination of Patient;  and completing documentation and orders.  Care Management included counseling/discussion with Patient  regarding current condition including COPD, smoking and constipation, and Coordination of Care time with Nursing re: constipation and Specialists, Pulmonary regarding care plan, management and surveillance; as above. .   ______________________________________________________________________    Interval History   In [facility]/ assumed care. Off BiPAP, appears comfortable on 2 L O2 per NC, speaks in full sentences, ambulatory to toilet.  No significant cough, or sputum.  Discussed his habit of smoking now at 2 cigarettes and methods to DC.  Lives in group home.    Physical Exam   Vital Signs: Temp: 98.2  F (36.8  C) Temp src: Oral BP: (!) 156/90 Pulse: 66   Resp: 20 SpO2: 97 % O2 Device: None (Room air) Oxygen Delivery: 1 LPM  Weight: 0 lbs 0 oz    General/Constitutional:   NAD, alert, calm, cooperative    Chest/Respiratory: Respirations nonlabored on O2 per NC at 2 L  Cardiovascular:   no murmur appreciated.  LE edema none  Gastrointestinal/Abdomen:  soft, nontender, no rebound, guarding or other peritoneal signs.  Neuro.  Gross motor tested, nonfocal,  Psych  oriented, affect calm           Data     I have personally reviewed the following data over the past 24 hrs:    17.3 (H)  \   10.6 (L)   / 142 (L)     137 102 10.7 /  159 (H)   4.0 27 0.68 \     Procal: N/A CRP: N/A Lactic Acid: 0.9       Imaging results reviewed over the past 24 hrs:   Recent Results (from the past 24 hour(s))   CT Chest/Abdomen/Pelvis w Contrast    Narrative    EXAM: CT CHEST/ABDOMEN/PELVIS W CONTRAST  LOCATION: [facility]  DATE: 9/12/2023    INDICATION: recurrent COPD exacerbation, abdominal pain  hx ulcer  COMPARISON: CT abdomen pelvis dated 01/07/2018  TECHNIQUE: CT scan of the chest, abdomen, and pelvis was performed following injection of IV contrast. Multiplanar reformats were obtained. Dose reduction techniques were used.   CONTRAST: 75 mL Isovue 370    FINDINGS:   LUNGS AND PLEURA: 2 to 3 mm left lower lobe pulmonary nodule, image 76 series 6, unchanged, benign. Moderate centrilobular emphysema. Mild basilar scarring/atelectasis.    MEDIASTINUM/AXILLAE: No significant mediastinal or hilar adenopathy.    CORONARY ARTERY CALCIFICATION: Severe.    HEPATOBILIARY: No biliary dilatation    PANCREAS: Unremarkable    SPLEEN: Unremarkable    ADRENAL GLANDS: Unremarkable    KIDNEYS/BLADDER: No hydronephrosis.    BOWEL: Large fecal burden in the rectosigmoid. No small bowel obstruction. Normal caliber appendix.    LYMPH NODES: No significant retroperitoneal adenopathy    VASCULATURE: Advanced atherosclerotic calcification. Chronic left common iliac artery occlusion. Multifocal stenoses in the common femoral arteries bilaterally. Probable high-grade stenoses at the origin of the superficial femoral arteries bilaterally.    PELVIC ORGANS: No free fluid.    MUSCULOSKELETAL: Small fat-containing umbilical hernia. Mild rectus diastases. No acute bony abnormalities.      Impression    IMPRESSION:  1.  Centrilobular emphysema without acute pulmonary infiltrates.  2.  Large  rectosigmoid fecal burden.  3.  Advanced atherosclerotic disease including chronic occlusion of left common iliac artery. Please see above discussion.

## 2023-09-13 NOTE — PROGRESS NOTES
Orientations: A&O x4    Vitals/Pain: VSS ex SBP 150s, on 2L NC (Oximetry study started)    Tele: SR w/occ PVC    Lines/Drains: Int Abx    Skin/Wounds: Umbilical bulge, scattered bruising    Pulm:diminished lung sounds w/expiratory wheezes    GI/: voids spontaneously w/AUOP, No BM this shift, +bowel sounds    Labs: Abnormal/Trends, Electrolyte Replacement- NA    Ambulation/Assist: x1-SBA    Sleep Quality:     Plan: Continue to monitor o2 saturations

## 2023-09-14 ENCOUNTER — APPOINTMENT (OUTPATIENT)
Dept: CARDIOLOGY | Facility: CLINIC | Age: 63
DRG: 190 | End: 2023-09-14
Attending: STUDENT IN AN ORGANIZED HEALTH CARE EDUCATION/TRAINING PROGRAM
Payer: MEDICARE

## 2023-09-14 LAB
ERYTHROCYTE [DISTWIDTH] IN BLOOD BY AUTOMATED COUNT: 15 % (ref 10–15)
HCT VFR BLD AUTO: 34.8 % (ref 40–53)
HGB BLD-MCNC: 11.2 G/DL (ref 13.3–17.7)
LVEF ECHO: NORMAL
MCH RBC QN AUTO: 30.5 PG (ref 26.5–33)
MCHC RBC AUTO-ENTMCNC: 32.2 G/DL (ref 31.5–36.5)
MCV RBC AUTO: 95 FL (ref 78–100)
PLATELET # BLD AUTO: 145 10E3/UL (ref 150–450)
RBC # BLD AUTO: 3.67 10E6/UL (ref 4.4–5.9)
WBC # BLD AUTO: 18.8 10E3/UL (ref 4–11)

## 2023-09-14 PROCEDURE — 94640 AIRWAY INHALATION TREATMENT: CPT | Mod: 76

## 2023-09-14 PROCEDURE — 93306 TTE W/DOPPLER COMPLETE: CPT | Mod: 26 | Performed by: INTERNAL MEDICINE

## 2023-09-14 PROCEDURE — 120N000013 HC R&B IMCU

## 2023-09-14 PROCEDURE — 999N000208 ECHOCARDIOGRAM COMPLETE

## 2023-09-14 PROCEDURE — 94640 AIRWAY INHALATION TREATMENT: CPT

## 2023-09-14 PROCEDURE — 999N000147 HC STATISTIC PT IP EVAL DEFER

## 2023-09-14 PROCEDURE — 99232 SBSQ HOSP IP/OBS MODERATE 35: CPT | Performed by: HOSPITALIST

## 2023-09-14 PROCEDURE — 36415 COLL VENOUS BLD VENIPUNCTURE: CPT | Performed by: HOSPITALIST

## 2023-09-14 PROCEDURE — 250N000013 HC RX MED GY IP 250 OP 250 PS 637: Performed by: HOSPITALIST

## 2023-09-14 PROCEDURE — 99232 SBSQ HOSP IP/OBS MODERATE 35: CPT | Performed by: INTERNAL MEDICINE

## 2023-09-14 PROCEDURE — 255N000002 HC RX 255 OP 636: Performed by: HOSPITALIST

## 2023-09-14 PROCEDURE — 250N000013 HC RX MED GY IP 250 OP 250 PS 637: Performed by: INTERNAL MEDICINE

## 2023-09-14 PROCEDURE — 250N000012 HC RX MED GY IP 250 OP 636 PS 637: Performed by: STUDENT IN AN ORGANIZED HEALTH CARE EDUCATION/TRAINING PROGRAM

## 2023-09-14 PROCEDURE — 94762 N-INVAS EAR/PLS OXIMTRY CONT: CPT

## 2023-09-14 PROCEDURE — 999N000157 HC STATISTIC RCP TIME EA 10 MIN

## 2023-09-14 PROCEDURE — 250N000011 HC RX IP 250 OP 636: Mod: JZ | Performed by: INTERNAL MEDICINE

## 2023-09-14 PROCEDURE — 250N000009 HC RX 250: Performed by: INTERNAL MEDICINE

## 2023-09-14 PROCEDURE — 85027 COMPLETE CBC AUTOMATED: CPT | Performed by: HOSPITALIST

## 2023-09-14 RX ORDER — HYDRALAZINE HYDROCHLORIDE 50 MG/1
50 TABLET, FILM COATED ORAL 3 TIMES DAILY
Status: DISCONTINUED | OUTPATIENT
Start: 2023-09-14 | End: 2023-09-16

## 2023-09-14 RX ADMIN — GABAPENTIN 300 MG: 300 CAPSULE ORAL at 08:06

## 2023-09-14 RX ADMIN — ASPIRIN 81 MG: 81 TABLET, COATED ORAL at 08:07

## 2023-09-14 RX ADMIN — LABETALOL HYDROCHLORIDE 10 MG: 5 INJECTION, SOLUTION INTRAVENOUS at 07:48

## 2023-09-14 RX ADMIN — GUAIFENESIN 1200 MG: 600 TABLET, EXTENDED RELEASE ORAL at 08:06

## 2023-09-14 RX ADMIN — DOCUSATE SODIUM 100 MG: 100 CAPSULE, LIQUID FILLED ORAL at 20:53

## 2023-09-14 RX ADMIN — GUAIFENESIN 1200 MG: 600 TABLET, EXTENDED RELEASE ORAL at 20:53

## 2023-09-14 RX ADMIN — LISINOPRIL 40 MG: 40 TABLET ORAL at 08:06

## 2023-09-14 RX ADMIN — FLUTICASONE FUROATE AND VILANTEROL TRIFENATATE 1 PUFF: 200; 25 POWDER RESPIRATORY (INHALATION) at 11:19

## 2023-09-14 RX ADMIN — IPRATROPIUM BROMIDE AND ALBUTEROL SULFATE 3 ML: .5; 3 SOLUTION RESPIRATORY (INHALATION) at 23:28

## 2023-09-14 RX ADMIN — QUETIAPINE 12.5 MG: 25 TABLET, FILM COATED ORAL at 21:16

## 2023-09-14 RX ADMIN — HUMAN ALBUMIN MICROSPHERES AND PERFLUTREN 3 ML: 10; .22 INJECTION, SOLUTION INTRAVENOUS at 10:22

## 2023-09-14 RX ADMIN — CARVEDILOL 25 MG: 25 TABLET, FILM COATED ORAL at 08:06

## 2023-09-14 RX ADMIN — AZITHROMYCIN 250 MG: 250 TABLET, FILM COATED ORAL at 08:07

## 2023-09-14 RX ADMIN — PANTOPRAZOLE SODIUM 40 MG: 40 TABLET, DELAYED RELEASE ORAL at 08:07

## 2023-09-14 RX ADMIN — GABAPENTIN 600 MG: 300 CAPSULE ORAL at 21:16

## 2023-09-14 RX ADMIN — HYDRALAZINE HYDROCHLORIDE 50 MG: 50 TABLET, FILM COATED ORAL at 21:16

## 2023-09-14 RX ADMIN — IPRATROPIUM BROMIDE AND ALBUTEROL SULFATE 3 ML: .5; 3 SOLUTION RESPIRATORY (INHALATION) at 08:17

## 2023-09-14 RX ADMIN — IPRATROPIUM BROMIDE AND ALBUTEROL SULFATE 3 ML: .5; 3 SOLUTION RESPIRATORY (INHALATION) at 19:41

## 2023-09-14 RX ADMIN — IPRATROPIUM BROMIDE AND ALBUTEROL SULFATE 3 ML: .5; 3 SOLUTION RESPIRATORY (INHALATION) at 15:37

## 2023-09-14 RX ADMIN — BUPRENORPHINE AND NALOXONE 1 FILM: 8; 2 FILM, SOLUBLE BUCCAL; SUBLINGUAL at 20:53

## 2023-09-14 RX ADMIN — MIRTAZAPINE 45 MG: 15 TABLET, FILM COATED ORAL at 21:16

## 2023-09-14 RX ADMIN — IPRATROPIUM BROMIDE AND ALBUTEROL SULFATE 3 ML: .5; 3 SOLUTION RESPIRATORY (INHALATION) at 11:54

## 2023-09-14 RX ADMIN — CARVEDILOL 25 MG: 25 TABLET, FILM COATED ORAL at 20:53

## 2023-09-14 RX ADMIN — HYDRALAZINE HYDROCHLORIDE 25 MG: 25 TABLET ORAL at 08:07

## 2023-09-14 RX ADMIN — BUPROPION HYDROCHLORIDE 300 MG: 150 TABLET, FILM COATED, EXTENDED RELEASE ORAL at 08:06

## 2023-09-14 RX ADMIN — POLYETHYLENE GLYCOL 3350 17 G: 17 POWDER, FOR SOLUTION ORAL at 08:04

## 2023-09-14 RX ADMIN — PREDNISONE 40 MG: 20 TABLET ORAL at 08:06

## 2023-09-14 RX ADMIN — MULTIPLE VITAMINS W/ MINERALS TAB 1 TABLET: TAB at 08:06

## 2023-09-14 RX ADMIN — QUETIAPINE 12.5 MG: 25 TABLET, FILM COATED ORAL at 08:07

## 2023-09-14 RX ADMIN — DOCUSATE SODIUM 100 MG: 100 CAPSULE, LIQUID FILLED ORAL at 08:07

## 2023-09-14 RX ADMIN — ATORVASTATIN CALCIUM 40 MG: 40 TABLET, FILM COATED ORAL at 08:07

## 2023-09-14 RX ADMIN — UMECLIDINIUM 1 PUFF: 62.5 AEROSOL, POWDER ORAL at 11:20

## 2023-09-14 RX ADMIN — SENNOSIDES AND DOCUSATE SODIUM 1 TABLET: 50; 8.6 TABLET ORAL at 08:07

## 2023-09-14 RX ADMIN — BISACODYL 10 MG: 10 SUPPOSITORY RECTAL at 20:53

## 2023-09-14 RX ADMIN — MAGNESIUM HYDROXIDE 30 ML: 400 SUSPENSION ORAL at 14:33

## 2023-09-14 RX ADMIN — SENNOSIDES AND DOCUSATE SODIUM 1 TABLET: 50; 8.6 TABLET ORAL at 20:53

## 2023-09-14 RX ADMIN — BUPRENORPHINE AND NALOXONE 1 FILM: 8; 2 FILM, SOLUBLE BUCCAL; SUBLINGUAL at 08:11

## 2023-09-14 RX ADMIN — QUETIAPINE 12.5 MG: 25 TABLET, FILM COATED ORAL at 16:17

## 2023-09-14 RX ADMIN — HYDRALAZINE HYDROCHLORIDE 50 MG: 50 TABLET, FILM COATED ORAL at 16:17

## 2023-09-14 ASSESSMENT — ACTIVITIES OF DAILY LIVING (ADL)
ADLS_ACUITY_SCORE: 43
ADLS_ACUITY_SCORE: 44
ADLS_ACUITY_SCORE: 44
ADLS_ACUITY_SCORE: 43
ADLS_ACUITY_SCORE: 44
ADLS_ACUITY_SCORE: 44
ADLS_ACUITY_SCORE: 43
ADLS_ACUITY_SCORE: 44
ADLS_ACUITY_SCORE: 43
ADLS_ACUITY_SCORE: 41

## 2023-09-14 NOTE — PROGRESS NOTES
[facility]    Medicine Progress Note - Hospitalist Service    Date of Admission:  9/11/2023    Assessment & Plan   Gerson Pearson is a 63 year old male admitted on 9/11/2023 from ;  He presents with SOB     COPD exacerbation  Underlying chronic, severe COPD with recurrent exacerbations  Acute on chronic hypoxic respiratory failure, 2L O2 with activity  [Needs rec- albuterol prn, Breztri 2 puffs BID, duonebs BID,   *[facility]   *hospitalized 8/31-9/2 with COPD exacerbation (also 7/15 and 7/28/2023 admissions). Returned to ED 9/9 and was treated and discharged. On prednisone taper at discharge 9/2.   *Had sudden onset of SOB 1 hour prior to EMS, was hypoxic to 70%s range. Was diaphoretic and tachycardic. Placed on BiPAP with improvement. In ED VSS on BiPAP, temp 99.4. initial VBG 7.17/94/49->7.28/78/43. , troponin 8. WBC 15.0. CXR clear. EKG NSR.  - weaned off bipap around lunchtime on 9/12/23  - 2LPM NC, states at home uses 3 L NC, 9/13/2023 no dyspnea, speaks in full sentences, ambulatory to toilet.  - pulm consult, see Dr. Mandel's note 9/13/2023 and Dr Urbnia Note 9/14/23: with recommendationsTTE (pending);- nighttime oximetry; no desat;- prednisone taper starting 9/14/23  40mg x 5d, 30mg x 3d, 20mg x 3d, 10mg x 3d (ordered)  - continue ICS-LABA (Breo) and LAMA (Incruse)  - continue prn albuterol/duonebs  - deferromg roflumilast or chronic azithromycin, wto outpatient pulmonologist.  Unclear if occasional respiratory distress related to his use of chronic opiates and hypoventilation and of course continued nicotine addiction, continues on nicotine patch now at 2 cigarettes/day.  -per Pulmonary DC empiric Zosyn, azithromycin for 5 days starting 9/13/2023.  -  CT Chest: Central emphysema without acute pulmonary infiltrates, large rectosigmoid fecal burden, notation of atherosclerotic disease/chronic occlusion left common iliac artery  -     "  Abdominal distension  Pt states he has abdominal distension \"for months\". Occasional nausea, no vomiting. Having nl BM. Abd is distended on exam, not tender. 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  - continue PPI  -Discussed bowel program with patient, senna S, MiraLAX, MOM per nursing had large bowel movement, only small subsequent, repeat MOM, continues on senna S and MiraLAX.   We will continue on senna S and MiraLAX given chronic use of opiates for opiate dependence      HTN  CAD with hx BM stent RCA 2020  [ASA 81 mg daily, atorvastatin 40 mg daily, carvedilol 25 mg BID, hydralazine 25 mg TID, lisinopril 40 mg daily]  *echo 12/2021 with EF 60-65%, nl LV size and function.   - resumed all PTA meds, hold parameters in place  - prn hydralazine, labetalol SBP>180  -Tobacco cessation  -Poorly controlled BP up to SBP 200s requiring as needed IV, increase maintenance hydralazine to 50 3 times daily, lisinopril at max 40 mg daily, continue carvedilol 25 twice daily and as needed's.     MDD  Anxiety  [quetiapine 12.5 mg TID, mirtazapine 45 mg at HS, bupropion 300 mg qam]  Follows outpatient with psychiatry.   - resumed meds  -Monitor     Opiate dependence  Hx cocaine use disorder  [buprenorphine 8-2 film BID, gabapentin 300 am/ 600 at HS]  Follows with pain clinic. 11/2021 was stopping suboxone so he could snort heroin. States isn't doing this, compliant with suboxone therapy.   - resume suboxone 8-2 BID, gabapentin     Tobacco use disorder  States smokes 1-2 cigs/ day  -Declines nicotine substitute.     Chronic anemia  Hgb 11.5 on presentation. Baseline 11-12.   - monitor    Mild thrombocytopenia  Monitor     GERD  - resume pantoprazole 40 mg daily  -       Diet: Regular Diet Adult    DVT Prophylaxis: Pneumatic Compression Devices  Post Catheter: Not " present  Lines: None     Cardiac Monitoring: None  Code Status: Full Code      Clinically Significant Risk Factors                       # Chronic heart failure with preserved ejection fraction: heart failure noted on problem list and last echo with EF >50%                  Disposition Plan     Expected Discharge Date: 09/14/2023      Destination: group home            Rashaad Valero MD  Hospitalist Service  [facility]  Securely message with [employer] (more info)  Text page via [employer] Paging/Directory     I spent 35 minutes total time in management of care today 9/14/2023 reviewing labs, medications, interdisciplinary notes; and completing documentation of encounter and orders with Care Management including counseling/discussion withthe Patient regarding COPD; constipationand Coordinating Care and plan with Nursing and Specialists, Pulmonary regarding COPD management and surveillance   ______________________________________________________________________    Interval History   Denies dyspnea, sputum, comfortable.  On O2 per NC 1.2 L, speaks in full sentences, ambulatory to toilet, appears comfortable.  Notes abdominal distention and constipation, denies abdominal pain, eating.  Lives in group home.    Physical Exam   Vital Signs: Temp: 98.1  F (36.7  C) Temp src: Oral BP: (!) 153/89 Pulse: 80   Resp: 18 SpO2: 97 % O2 Device: Nasal cannula Oxygen Delivery: 1.5 LPM  Weight: 0 lbs 0 oz    General/Constitutional:    NAD, alert, calm, cooperative    Chest/Respiratory: Respirations nonlabored on O2 per NC at 1.5 L [2-3 at home]  Cardiovascular:   no murmur appreciated.  LE edema none  Gastrointestinal/Abdomen:  soft, distended; nontender, no rebound, guarding or other peritoneal signs.  Neuro.  Gross motor tested, nonfocal,  Psych oriented, affect calm           Data     I have personally reviewed the following data over the past 24 hrs:    18.8 (H)  \   11.2 (L)   / 145 (L)     N/A N/A N/A /   N/A   N/A N/A N/A \     Imaging results reviewed over the past 24 hrs:   Recent Results (from the past 24 hour(s))   Echocardiogram Complete   Result Value    LVEF  60-65%    Located within [facility]    [id number]  [id number]  [id number]^VIKTOR^GERMAN^ERNESTO     [facility]  Echocardiography Laboratory  [address] [facility] [place] [address]     Name: RANI DUARTE  MRN: [id number]  : 1960  Study Date: 2023 10:06 AM  Age: 63 yrs  Gender: Male  Patient Location: [facility]  Reason For Study: Dyspnea  Ordering Physician: GERMAN HOANG  Referring Physician: GERMAN HOANG  Performed By: ROSANA Hernandez     BSA: 1.8 m2  Height: 69 in  Weight: 149 lb  HR: 134  BP: 162/95 mmHg  ______________________________________________________________________________  Procedure  Complete Portable Echo Adult. Optison (NDC #[id number]) given intravenously.  ______________________________________________________________________________  Interpretation Summary     The left ventricle is normal in size.  Left ventricular systolic function is normal.  The visual ejection fraction is 60-65%.  Normal left ventricular wall motion  The right ventricle is normal in structure, function and size.  There is mild (1+) mitral regurgitation.  There is no comparison study available.  ______________________________________________________________________________  Left Ventricle  The left ventricle is normal in size. There is normal left ventricular wall  thickness. Left ventricular systolic function is normal. Grade I or early  diastolic dysfunction. Diastolic Doppler findings (E/E' ratio and/or other  parameters) suggest left ventricular filling pressures are indeterminate. The  visual ejection fraction is 60-65%. Normal left ventricular wall motion.     Right Ventricle  The right ventricle is normal in structure, function and size.     Atria  Normal left atrial size. Right atrial size is normal. There is no atrial  shunt  seen.     Mitral Valve  The mitral valve is normal in structure and function. There is mild (1+)  mitral regurgitation.     Tricuspid Valve  The tricuspid valve is normal in structure and function. There is trace  tricuspid regurgitation. Right ventricular systolic pressure could not be  approximated due to inadequate tricuspid regurgitation. IVC diameter <2.1 cm  collapsing >50% with sniff suggests a normal RA pressure of 3 mmHg.     Aortic Valve  The aortic valve is normal in structure and function. No aortic regurgitation  is present. No aortic stenosis is present.     Pulmonic Valve  The pulmonic valve is not well seen, but is grossly normal. There is trace  pulmonic valvular regurgitation.     Vessels  Normal size aorta. The inferior vena cava was normal in size with preserved  respiratory variability.     Pericardium  There is no pericardial effusion.     Rhythm  Sinus rhythm was noted.  ______________________________________________________________________________  MMode/2D Measurements & Calculations  IVSd: 0.87 cm     LVIDd: 4.0 cm  LVIDs: 3.0 cm  LVPWd: 0.91 cm  FS: 24.9 %  LV mass(C)d: 108.8 grams  LV mass(C)dI: 59.7 grams/m2  Ao root diam: 3.6 cm  LVOT diam: 2.3 cm  LVOT area: 4.3 cm2  Ao root diam index BSA (cm/m2): 2.0  LA Volume (BP): 43.4 ml  LA Volume Index (BP): 23.8 ml/m2  RV Base: 3.4 cm  RWT: 0.45     TAPSE: 2.6 cm     Doppler Measurements & Calculations  MV E max stefan: 94.7 cm/sec  MV A max stefan: 71.1 cm/sec  MV E/A: 1.3  MV dec time: 0.20 sec  PA acc time: 0.08 sec  Pulm Sys Stefan: 56.6 cm/sec  Pulm Martinez Stefan: 44.9 cm/sec  Pulm A Revs Stefan: 35.5 cm/sec  Pulm S/D: 1.3  E/E' avg: 10.7  Lateral E/e': 10.7  Medial E/e': 10.6  RV S Stefan: 14.7 cm/sec     ______________________________________________________________________________  Report approved by: Yanci Gonzalez 09/14/2023 12:47 PM

## 2023-09-14 NOTE — PLAN OF CARE
PT: Consult received. Chart reviewed. Met with pt at bedside. Pt denies any issues with his mobility. Observed pt amb multiple laps in room INDly, without balance concerns. IND with toileting. No formal IP PT needs identified. Anticipate will be able to return back to his group home when medically stable. Will sign off. Pt in agreement. Discussed with RN.

## 2023-09-14 NOTE — PLAN OF CARE
A&O x 4.  otherwise VSS on RA. Tele: SR. Up SBA. Regular diet, tolerating well. PIV in RUE, SL. Coarse lungs, expiratory wheezes. Pt denies pain. Voiding in urinal. -BM, +BS, rounded abdomen, non tender. Continue plan of care.

## 2023-09-14 NOTE — PROGRESS NOTES
Overnight pulse oxymetry study done. Patient was on Room Air throughout the study.    Elvia Choudhury, RT

## 2023-09-14 NOTE — PLAN OF CARE
Goal Outcome Evaluation:    A&O x 4.  Hypertensive BP (s). Labetadol IV given this am, blood pressures have been stable. Tele: SR. Independent in room. Regular diet. PIV in RUE, SL, new dressing, patent. Coarse lungs, expiratory wheezes, RT following. Pt denies pain. Voiding in urinal in adequate amounts. -BM, +BS, distended abdomen, non tender, MOM given. Physical therapy evaluated and will sign off. Inhalers in medicine cabinet in pt's room. ECHO: neg, except +1 mitral regurgitation. WBC: 18.8. Pt comes from a group home, discharge plan unclear.

## 2023-09-14 NOTE — PROGRESS NOTES
"Chronic Pulmonary Disease Specialist  Progress Note       Overnight oximetry completed. [facility] Patient was informed about pursuing NIF/Trilogy as an alternative (due to different criteria) but patient declined interest. Will defer to patient pulmonologist per note.      Miguelito reports an improvement in their symptoms and has not been having to use bipap. On examination, breath sounds remain coarse with scattered wheezes but much improved from last visit. Patient continues to receive Duoneb's Q4 per inhaled regimen.       Will continue to follow and provide support.          [place] \"CJ\" Suman   Chronic Pulmonary Disease Specialist   Office: [phone]  Cell: [phone]     "

## 2023-09-14 NOTE — PROGRESS NOTES
[facility]    Pulmonary, Allergy, Critical Care and Sleep Medicine    Follow-up Note  September 14, 2023         Assessment and Plan:   Gerson Pearson IS a 63 year old male with PMH of COPD, CAD admitted on 9/11/2023.  Acute on chronic hypoxic and hypercapnic respiratory failure.  Acute exacerbation of COPD  Hypertensive urgency.  CAD  Tobacco use disorder  Chronic anemia, thrombocytopenia, GERD    Recommendations:  - Continue supplemental oxygen to keep O2 sats above 90%.  - Patient did not have any desaturation episodes during overnight oximetry.  He would not want any NIPPV at night anyways and would like to further discuss with his outpatient pulmonologist, Dr. Vela.  Will defer this right now.  - Follow-up final echocardiogram results.  Uncontrolled hypertension, diastolic heart failure can also contribute to his respiratory symptoms.  - Prednisone taper already ordered :40mg x 5d, 30mg x 3d, 20mg x 3d, 10mg x 3d.  - Is on Breztri at home.  Can resume that on hospital discharge.  Continue triple inhaler therapy currently.  - Continue as needed albuterol/DuoNebs.  - We will defer adding supplemental therapy to reduce exacerbations including chronic azithromycin/Revlimid last 2 outpatient pulmonologist.         Interval History:     - Feels his shortness of breath is about the same today morning.  - Overnight pulse oximetry did not reveal any significant hypoxia.  - Currently on 2 L supplemental oxygen.  - BP high overnight with up to 202/114 recorded.  - Echo pending.           Review of Systems:   C: negative for fever, chills, change in weight  INTEGUMENTARY/SKIN: no rash or obvious new lesions  ENT/MOUTH: no sore throat, new sinus pain or nasal drainage  RESP: see interval history  CV: negative for chest pain, palpitations or peripheral edema  GI: no nausea, vomiting, change in stools  MUSCULOSKELETAL: no myalgias, arthralgias          Medications:      aspirin  81 mg Oral Daily     atorvastatin  40 mg Oral Daily    azithromycin  250 mg Oral Daily    buprenorphine HCl-naloxone HCl  1 Film Sublingual BID    buPROPion  300 mg Oral QAM    carvedilol  25 mg Oral BID    docusate sodium  100 mg Oral BID    ferrous sulfate  325 mg Oral Every Other Day    fluticasone-vilanterol  1 puff Inhalation Daily    And    umeclidinium  1 puff Inhalation Daily    gabapentin  300 mg Oral QAM    gabapentin  600 mg Oral At Bedtime    guaiFENesin  1,200 mg Oral BID    hydrALAZINE  25 mg Oral TID    ipratropium - albuterol 0.5 mg/2.5 mg/3 mL  3 mL Nebulization Q4H    lisinopril  40 mg Oral Daily    mirtazapine  45 mg Oral At Bedtime    multivitamin w/minerals  1 tablet Oral Daily    nicotine  1 patch Transdermal Daily    nicotine   Transdermal Q8H    pantoprazole  40 mg Oral Daily    polyethylene glycol  17 g Oral Daily    predniSONE  40 mg Oral Daily    Followed by    [START ON 9/19/2023] predniSONE  30 mg Oral Daily    Followed by    [START ON 9/22/2023] predniSONE  20 mg Oral Daily    Followed by    [START ON 9/25/2023] predniSONE  10 mg Oral Daily    QUEtiapine  12.5 mg Oral TID    senna-docusate  1 tablet Oral BID    sodium chloride (PF)  3 mL Intracatheter Q8H     acetaminophen **OR** acetaminophen, albuterol, benzonatate, bisacodyl, hydrALAZINE, ipratropium - albuterol 0.5 mg/2.5 mg/3 mL, labetalol, lidocaine 4%, lidocaine (buffered or not buffered), melatonin, ondansetron **OR** ondansetron, polyethylene glycol, senna-docusate **OR** senna-docusate, sodium chloride (PF)         Physical Exam:   Temp:  [98.1  F (36.7  C)-98.5  F (36.9  C)] 98.1  F (36.7  C)  Pulse:  [] 71  Resp:  [20-24] 20  BP: (147-205)/() 197/114  SpO2:  [91 %-100 %] 99 %    Intake/Output Summary (Last 24 hours) at 9/14/2023 0941  Last data filed at 9/14/2023 0700  Gross per 24 hour   Intake 600 ml   Output 1425 ml   Net -825 ml     Constitutional:   Awake, alert and in no apparent distress     Eyes:   nonicteric     ENT:     oral mucosa moist without lesions     Neck:   Supple without supraclavicular or cervical lymphadenopathy     Lungs:   Good air flow.  No crackles. No rhonchi.  No wheezes.     Cardiovascular:   Normal S1 and S2.  RRR.  No murmur, gallop or rub.     Abdomen:   NABS, soft, nontender, nondistended.  No HSM.     Musculoskeletal:   No edema     Neurologic:   Alert and conversant.     Skin:   Warm, dry.  No rash on limited exam.             Data:   All laboratory and imaging data reviewed.    CMP  Recent Labs   Lab 09/13/23 0522 09/12/23 0523 09/12/23 0029 09/11/23 2056 09/09/23  1503    138  --  141 139   POTASSIUM 4.0 4.4  --  4.4 3.2*   CHLORIDE 102 101  --  101 97*   CO2 27 26  --  32* 31*   ANIONGAP 8 11  --  8 11   * 168* 136* 129* 140*   BUN 10.7 10.9  --  9.7 8.4   CR 0.68 0.68  --  0.90 0.72   GFRESTIMATED >90 >90  --  >90 >90   RL 8.7* 8.1*  --  8.7* 8.7*   MAG 2.3  --   --   --   --    PHOS 2.9  --   --   --   --    PROTTOTAL  --   --   --  7.0 6.6   ALBUMIN  --   --   --  4.3 4.1   BILITOTAL  --   --   --  0.3 0.2   ALKPHOS  --   --   --  101 96   AST  --   --   --  106* 16   ALT  --   --   --  68 20     CBC  Recent Labs   Lab 09/14/23  0531 09/13/23 0522 09/12/23 0523 09/11/23 2056   WBC 18.8* 17.3* 15.6* 15.0*   RBC 3.67* 3.45* 3.45* 3.84*   HGB 11.2* 10.6* 10.5* 11.5*   HCT 34.8* 32.5* 33.0* 37.4*   MCV 95 94 96 97   MCH 30.5 30.7 30.4 29.9   MCHC 32.2 32.6 31.8 30.7*   RDW 15.0 15.2* 14.7 14.8   * 142* 130* 192     INRNo lab results found in last 7 days.  Arterial Blood Gas  Recent Labs   Lab 09/13/23  0616 09/12/23  1209   PH 7.40  --    PCO2 53*  --    PO2 92  --    HCO3 33*  --    O2PER 2 30     Urine Studies    Recent Labs   Lab Test 01/07/18  1638   URINEPH 7.5*   NITRITE Negative   LEUKEST Negative     CMV viral loads  No lab results found.  No results found for: CMQNT, CMVQ  EBV viral loads   No lab results found.  No results found for: EBVDN, EBRES, EBVDN, EBVSP, EBVPC,  EBVPCR  Respiratory Virus Testing    No results found for: RS, FLUAG  Sputum Culture last 3 months:  Specimen Description   Date Value Ref Range Status   06/16/2017 Feces  Final   01/12/2010 Penis  Final    No results found for: CULT     Pulmonology will sign off at this time.     I personally spent 40 minutes on the date of the encounter doing chart review, history and exam, documentation and further activities per the note.      GALILEO Victoria   of Medicine  [facility]  Pulmonary, Allergy, Critical Care and Sleep Medicine  Pager - [phone]

## 2023-09-15 PROCEDURE — 999N000157 HC STATISTIC RCP TIME EA 10 MIN

## 2023-09-15 PROCEDURE — 94640 AIRWAY INHALATION TREATMENT: CPT | Mod: 76

## 2023-09-15 PROCEDURE — 99232 SBSQ HOSP IP/OBS MODERATE 35: CPT | Performed by: HOSPITALIST

## 2023-09-15 PROCEDURE — 250N000013 HC RX MED GY IP 250 OP 250 PS 637: Performed by: INTERNAL MEDICINE

## 2023-09-15 PROCEDURE — 94640 AIRWAY INHALATION TREATMENT: CPT

## 2023-09-15 PROCEDURE — 250N000013 HC RX MED GY IP 250 OP 250 PS 637: Performed by: HOSPITALIST

## 2023-09-15 PROCEDURE — 120N000013 HC R&B IMCU

## 2023-09-15 PROCEDURE — 250N000011 HC RX IP 250 OP 636: Mod: JZ | Performed by: INTERNAL MEDICINE

## 2023-09-15 PROCEDURE — 250N000009 HC RX 250: Performed by: INTERNAL MEDICINE

## 2023-09-15 PROCEDURE — 250N000012 HC RX MED GY IP 250 OP 636 PS 637: Performed by: STUDENT IN AN ORGANIZED HEALTH CARE EDUCATION/TRAINING PROGRAM

## 2023-09-15 RX ADMIN — DOCUSATE SODIUM 100 MG: 100 CAPSULE, LIQUID FILLED ORAL at 09:23

## 2023-09-15 RX ADMIN — ASPIRIN 81 MG: 81 TABLET, COATED ORAL at 09:23

## 2023-09-15 RX ADMIN — AZITHROMYCIN 250 MG: 250 TABLET, FILM COATED ORAL at 09:23

## 2023-09-15 RX ADMIN — POLYETHYLENE GLYCOL 3350 17 G: 17 POWDER, FOR SOLUTION ORAL at 09:25

## 2023-09-15 RX ADMIN — CARVEDILOL 25 MG: 25 TABLET, FILM COATED ORAL at 21:19

## 2023-09-15 RX ADMIN — MULTIPLE VITAMINS W/ MINERALS TAB 1 TABLET: TAB at 09:24

## 2023-09-15 RX ADMIN — GUAIFENESIN 1200 MG: 600 TABLET, EXTENDED RELEASE ORAL at 21:18

## 2023-09-15 RX ADMIN — ACETAMINOPHEN 650 MG: 325 TABLET, FILM COATED ORAL at 21:25

## 2023-09-15 RX ADMIN — GABAPENTIN 300 MG: 300 CAPSULE ORAL at 09:23

## 2023-09-15 RX ADMIN — CARVEDILOL 25 MG: 25 TABLET, FILM COATED ORAL at 09:24

## 2023-09-15 RX ADMIN — IPRATROPIUM BROMIDE AND ALBUTEROL SULFATE 3 ML: .5; 3 SOLUTION RESPIRATORY (INHALATION) at 12:10

## 2023-09-15 RX ADMIN — UMECLIDINIUM 1 PUFF: 62.5 AEROSOL, POWDER ORAL at 09:31

## 2023-09-15 RX ADMIN — BUPROPION HYDROCHLORIDE 300 MG: 150 TABLET, FILM COATED, EXTENDED RELEASE ORAL at 09:24

## 2023-09-15 RX ADMIN — QUETIAPINE 12.5 MG: 25 TABLET, FILM COATED ORAL at 09:23

## 2023-09-15 RX ADMIN — PANTOPRAZOLE SODIUM 40 MG: 40 TABLET, DELAYED RELEASE ORAL at 09:23

## 2023-09-15 RX ADMIN — SENNOSIDES AND DOCUSATE SODIUM 1 TABLET: 50; 8.6 TABLET ORAL at 21:18

## 2023-09-15 RX ADMIN — HYDRALAZINE HYDROCHLORIDE 50 MG: 50 TABLET, FILM COATED ORAL at 09:24

## 2023-09-15 RX ADMIN — IPRATROPIUM BROMIDE AND ALBUTEROL SULFATE 3 ML: .5; 3 SOLUTION RESPIRATORY (INHALATION) at 07:11

## 2023-09-15 RX ADMIN — HYDRALAZINE HYDROCHLORIDE 50 MG: 50 TABLET, FILM COATED ORAL at 16:22

## 2023-09-15 RX ADMIN — QUETIAPINE 12.5 MG: 25 TABLET, FILM COATED ORAL at 16:22

## 2023-09-15 RX ADMIN — FLUTICASONE FUROATE AND VILANTEROL TRIFENATATE 1 PUFF: 200; 25 POWDER RESPIRATORY (INHALATION) at 09:31

## 2023-09-15 RX ADMIN — MIRTAZAPINE 45 MG: 15 TABLET, FILM COATED ORAL at 21:17

## 2023-09-15 RX ADMIN — BUPRENORPHINE AND NALOXONE 1 FILM: 8; 2 FILM, SOLUBLE BUCCAL; SUBLINGUAL at 09:25

## 2023-09-15 RX ADMIN — IPRATROPIUM BROMIDE AND ALBUTEROL SULFATE 3 ML: .5; 3 SOLUTION RESPIRATORY (INHALATION) at 19:43

## 2023-09-15 RX ADMIN — ACETAMINOPHEN 650 MG: 325 TABLET, FILM COATED ORAL at 09:22

## 2023-09-15 RX ADMIN — GUAIFENESIN 1200 MG: 600 TABLET, EXTENDED RELEASE ORAL at 09:23

## 2023-09-15 RX ADMIN — ATORVASTATIN CALCIUM 40 MG: 40 TABLET, FILM COATED ORAL at 09:24

## 2023-09-15 RX ADMIN — SENNOSIDES AND DOCUSATE SODIUM 1 TABLET: 50; 8.6 TABLET ORAL at 09:24

## 2023-09-15 RX ADMIN — LABETALOL HYDROCHLORIDE 10 MG: 5 INJECTION, SOLUTION INTRAVENOUS at 04:40

## 2023-09-15 RX ADMIN — HYDRALAZINE HYDROCHLORIDE 50 MG: 50 TABLET, FILM COATED ORAL at 21:19

## 2023-09-15 RX ADMIN — DOCUSATE SODIUM 100 MG: 100 CAPSULE, LIQUID FILLED ORAL at 21:19

## 2023-09-15 RX ADMIN — HYDRALAZINE HYDROCHLORIDE 10 MG: 20 INJECTION INTRAMUSCULAR; INTRAVENOUS at 04:25

## 2023-09-15 RX ADMIN — QUETIAPINE 12.5 MG: 25 TABLET, FILM COATED ORAL at 21:18

## 2023-09-15 RX ADMIN — LABETALOL HYDROCHLORIDE 10 MG: 5 INJECTION, SOLUTION INTRAVENOUS at 19:53

## 2023-09-15 RX ADMIN — FERROUS SULFATE TAB 325 MG (65 MG ELEMENTAL FE) 325 MG: 325 (65 FE) TAB at 09:23

## 2023-09-15 RX ADMIN — GABAPENTIN 600 MG: 300 CAPSULE ORAL at 21:18

## 2023-09-15 RX ADMIN — LISINOPRIL 40 MG: 40 TABLET ORAL at 09:24

## 2023-09-15 RX ADMIN — ACETAMINOPHEN 650 MG: 325 TABLET, FILM COATED ORAL at 06:21

## 2023-09-15 RX ADMIN — IPRATROPIUM BROMIDE AND ALBUTEROL SULFATE 3 ML: .5; 3 SOLUTION RESPIRATORY (INHALATION) at 23:36

## 2023-09-15 RX ADMIN — IPRATROPIUM BROMIDE AND ALBUTEROL SULFATE 3 ML: .5; 3 SOLUTION RESPIRATORY (INHALATION) at 15:13

## 2023-09-15 RX ADMIN — BUPRENORPHINE AND NALOXONE 1 FILM: 8; 2 FILM, SOLUBLE BUCCAL; SUBLINGUAL at 21:17

## 2023-09-15 RX ADMIN — PREDNISONE 40 MG: 20 TABLET ORAL at 09:23

## 2023-09-15 ASSESSMENT — ACTIVITIES OF DAILY LIVING (ADL)
ADLS_ACUITY_SCORE: 28
WALKING_OR_CLIMBING_STAIRS_DIFFICULTY: NO
ADLS_ACUITY_SCORE: 41
DOING_ERRANDS_INDEPENDENTLY_DIFFICULTY: NO
ADLS_ACUITY_SCORE: 43
ADLS_ACUITY_SCORE: 41
FALL_HISTORY_WITHIN_LAST_SIX_MONTHS: NO
TOILETING_ISSUES: NO
CONCENTRATING,_REMEMBERING_OR_MAKING_DECISIONS_DIFFICULTY: NO
ADLS_ACUITY_SCORE: 28
DIFFICULTY_EATING/SWALLOWING: NO
ADLS_ACUITY_SCORE: 28
VISION_MANAGEMENT: GLASSES
ADLS_ACUITY_SCORE: 43
ADLS_ACUITY_SCORE: 41
CHANGE_IN_FUNCTIONAL_STATUS_SINCE_ONSET_OF_CURRENT_ILLNESS/INJURY: NO
ADLS_ACUITY_SCORE: 28
ADLS_ACUITY_SCORE: 28
WEAR_GLASSES_OR_BLIND: YES
ADLS_ACUITY_SCORE: 41
DRESSING/BATHING_DIFFICULTY: NO
ADLS_ACUITY_SCORE: 28

## 2023-09-15 NOTE — PLAN OF CARE
DATE & TIME: 9/15/2023 [id number]    Cognitive Concerns/ Orientation : A&O x4.    BEHAVIOR & AGGRESSION TOOL COLOR: green   CIWA SCORE: na    ABNL VS/O2: HTN, MD aware. No PRN BP meds needed this shift.   MOBILITY: IND  PAIN MANAGMENT: denied pain.   DIET: regular. Good appetite.   BOWEL/BLADDER: tap water enema given with good result.   ABNL LAB/BG: na   DRAIN/DEVICES: PIV SL.   TELEMETRY RHYTHM: na   SKIN: intact.   TESTS/PROCEDURES: na  D/C DATE: possible in am to group home.   Discharge Barriers: na

## 2023-09-15 NOTE — PROGRESS NOTES
[facility]    Medicine Progress Note - Hospitalist Service    Date of Admission:  9/11/2023    Assessment & Plan   Gerson Pearson is a 63 year old male admitted on 9/11/2023 from ;  He presents with SOB     COPD exacerbation  Underlying chronic, severe COPD with recurrent exacerbations  Acute on chronic hypoxic respiratory failure, 2L O2 with activity  [Needs rec- albuterol prn, Breztri 2 puffs BID, duonebs BID,   *[facility]   *hospitalized 8/31-9/2 with COPD exacerbation (also 7/15 and 7/28/2023 admissions). Returned to ED 9/9 and was treated and discharged. On prednisone taper at discharge 9/2.   *Had sudden onset of SOB 1 hour prior to EMS, was hypoxic to 70%s range. Was diaphoretic and tachycardic. Placed on BiPAP with improvement. In ED VSS on BiPAP, temp 99.4. initial VBG 7.17/94/49->7.28/78/43. , troponin 8. WBC 15.0. CXR clear. EKG NSR.  - weaned off bipap around lunchtime on 9/12/23  - 2LPM NC, states at home uses 3 L NC, 9/13/2023 no dyspnea, speaks in full sentences, ambulatory to toilet.  - pulm consult, see Dr. Mandel's note 9/13/2023 and Dr Urbina Note 9/14/23: with recommendationsTTE (pending);- nighttime oximetry; no desat;- prednisone taper starting 9/14/23  40mg x 5d, 30mg x 3d, 20mg x 3d, 10mg x 3d (ordered)  - continue ICS-LABA (Breo) and LAMA (Incruse)  - continue prn albuterol/duonebs  - deferromg roflumilast or chronic azithromycin, wto outpatient pulmonologist.  Unclear if occasional respiratory distress related to his use of chronic opiates and hypoventilation and of course continued nicotine addiction, continues on nicotine patch now at 2 cigarettes/day.  -per Pulmonary DC empiric Zosyn, azithromycin for 5 days starting 9/13/2023.  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 Occasional nausea, no vomiting. Having nl BM. Abd is distended on exam, not tender. 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  - continue PPI  -Discussed bowel program with patient, senna S, MiraLAX, MOM per nursing had large bowel movement 9/13/23, only small subsequent, repeat MOM, continues on senna S and MiraLAX.   No subsequent BM, per CT large fecal burden, initiate Dulcolax suppository and tapwater enema.  Patient uncomfortable with abdominal distention.  Discussed with patient chronic buprenorphine likely contributory, needs ongoing bowel program after relief of current constipation.      HTN  CAD with hx BM stent RCA 2020  [ASA 81 mg daily, atorvastatin 40 mg daily, carvedilol 25 mg BID, hydralazine 25 mg TID, lisinopril 40 mg daily]  *echo 12/2021 with EF 60-65%, nl LV size and function.   - resumed all PTA meds, hold parameters in place  - prn hydralazine, labetalol SBP>180  -Tobacco cessation  -Poorly controlled BP up to SBP 200s requiring as needed IV, increase maintenance hydralazine to 50 3 times daily, lisinopril at max 40 mg daily, continue carvedilol 25 twice daily and as needed's.  -Improved BP on increase hydralazine 50 mg 3 times daily, further antihypertensive titration per PCP on hospital discharge.     MDD  Anxiety  [quetiapine 12.5 mg TID, mirtazapine 45 mg at HS, bupropion 300 mg qam]  Follows outpatient with psychiatry.   - resumed meds, no reported anxiety or dysphoria.  -Monitor     Opiate dependence  Hx cocaine use disorder  [buprenorphine 8-2 film BID, gabapentin 300 am/ 600 at HS]  Follows with pain clinic. 11/2021 was stopping suboxone so he could snort heroin. States isn't doing this, compliant with suboxone therapy.   - resume " suboxone 8-2 BID, gabapentin     Tobacco use disorder  States smokes 1-2 cigs/ day  -Declines nicotine substitute.     Chronic anemia  Hgb 11.5 on presentation. Baseline 11-12.   - monitor    Mild thrombocytopenia  Monitor     GERD  - resume pantoprazole 40 mg daily  -       Diet: Regular Diet Adult  Room Service    DVT Prophylaxis: Pneumatic Compression Devices  Post Catheter: Not present  Lines: None     Cardiac Monitoring: None  Code Status: Full Code      Clinically Significant Risk Factors                       # Chronic heart failure with preserved ejection fraction: heart failure noted on problem list and last echo with EF >50%                    Disposition Plan      Expected Discharge Date: 09/16/2023,  3:00 PM    Destination: group home  Discharge Comments: from . New Rx meds at discharge, fill at [facility] pharmacy          Rashaad Valero MD  Hospitalist Service  [facility]  Securely message with [employer] (more info)  Text page via [employer] Paging/Directory     I spent 35 minutes total time in management of care today 9/15/2023 reviewing labs, medications, interdisciplinary notes; and completing documentation of encounter and orders with Care Management including counseling/discussion withthe Patient regarding COPD exacerbation, hypertension and obstipation and Coordinating Care and plan with Nursing and Specialists, Pulm regarding COPD management and surveillance   ______________________________________________________________________    Interval History   Reports persistent abdominal distention, no nausea, emesis, no BM over the last 36 hours.  Eating.  No dyspnea, sputum, no O2 desat, on 1.5 L NC.  At home 3 L, lives in group home.      Physical Exam   Vital Signs: Temp: 98.2  F (36.8  C) Temp src: Oral BP: (!) 144/82 Pulse: 100   Resp: 20 SpO2: 96 % O2 Device: Nasal cannula Oxygen Delivery: 1.5 LPM  Weight: 0 lbs 0 oz    General/Constitutional:     NAD, alert, calm,  cooperative    Chest/Respiratory: Respirations nonlabored on O2 per NC at 1.5 L [2-3 at home]  Cardiovascular:   no murmur appreciated.  LE edema none  Gastrointestinal/Abdomen:  soft,  distended; nontender, no rebound, guarding or other peritoneal signs.  Neuro.  Gross motor tested, nonfocal,  Psych oriented, affect calm

## 2023-09-15 NOTE — CARE PLAN
Orientations: A&O X4.   Vitals/Pain: Hypertensive, symptomatic. Hydralazine and labetalol given x1. On 1.5L NC. Dyspneic. Headache managed with prn tylenol. Exp wheezing.   Lines/Drains: PIV SL.  Skin/Wounds: WDL   GI/: Adequate urine output, urinal @ bedside. 2 small loose BM. Prn bisacodyl given. Firm, non tender distended abd.   Labs: Abnormal/Trends, Electrolyte Replacement- WDL   Ambulation/Assist: Ind  Sleep Quality: Good

## 2023-09-16 LAB
BACTERIA BLD CULT: NO GROWTH
BACTERIA BLD CULT: NO GROWTH

## 2023-09-16 PROCEDURE — 250N000012 HC RX MED GY IP 250 OP 636 PS 637: Performed by: STUDENT IN AN ORGANIZED HEALTH CARE EDUCATION/TRAINING PROGRAM

## 2023-09-16 PROCEDURE — 94640 AIRWAY INHALATION TREATMENT: CPT

## 2023-09-16 PROCEDURE — 250N000013 HC RX MED GY IP 250 OP 250 PS 637: Performed by: HOSPITALIST

## 2023-09-16 PROCEDURE — 120N000013 HC R&B IMCU

## 2023-09-16 PROCEDURE — 94640 AIRWAY INHALATION TREATMENT: CPT | Mod: 76

## 2023-09-16 PROCEDURE — 250N000013 HC RX MED GY IP 250 OP 250 PS 637: Performed by: INTERNAL MEDICINE

## 2023-09-16 PROCEDURE — 250N000011 HC RX IP 250 OP 636: Mod: JZ | Performed by: INTERNAL MEDICINE

## 2023-09-16 PROCEDURE — 99232 SBSQ HOSP IP/OBS MODERATE 35: CPT | Performed by: HOSPITALIST

## 2023-09-16 PROCEDURE — 999N000157 HC STATISTIC RCP TIME EA 10 MIN

## 2023-09-16 PROCEDURE — 250N000009 HC RX 250: Performed by: INTERNAL MEDICINE

## 2023-09-16 RX ORDER — HYDRALAZINE HYDROCHLORIDE 50 MG/1
100 TABLET, FILM COATED ORAL 3 TIMES DAILY
Status: DISCONTINUED | OUTPATIENT
Start: 2023-09-16 | End: 2023-09-17 | Stop reason: HOSPADM

## 2023-09-16 RX ADMIN — SENNOSIDES AND DOCUSATE SODIUM 1 TABLET: 50; 8.6 TABLET ORAL at 07:39

## 2023-09-16 RX ADMIN — QUETIAPINE 12.5 MG: 25 TABLET, FILM COATED ORAL at 16:42

## 2023-09-16 RX ADMIN — HYDRALAZINE HYDROCHLORIDE 50 MG: 50 TABLET, FILM COATED ORAL at 07:37

## 2023-09-16 RX ADMIN — HYDRALAZINE HYDROCHLORIDE 100 MG: 50 TABLET, FILM COATED ORAL at 13:26

## 2023-09-16 RX ADMIN — ATORVASTATIN CALCIUM 40 MG: 40 TABLET, FILM COATED ORAL at 07:41

## 2023-09-16 RX ADMIN — IPRATROPIUM BROMIDE AND ALBUTEROL SULFATE 3 ML: .5; 3 SOLUTION RESPIRATORY (INHALATION) at 15:45

## 2023-09-16 RX ADMIN — CARVEDILOL 25 MG: 25 TABLET, FILM COATED ORAL at 20:50

## 2023-09-16 RX ADMIN — HYDRALAZINE HYDROCHLORIDE 100 MG: 50 TABLET, FILM COATED ORAL at 21:58

## 2023-09-16 RX ADMIN — QUETIAPINE 12.5 MG: 25 TABLET, FILM COATED ORAL at 22:10

## 2023-09-16 RX ADMIN — BUPRENORPHINE AND NALOXONE 1 FILM: 8; 2 FILM, SOLUBLE BUCCAL; SUBLINGUAL at 20:50

## 2023-09-16 RX ADMIN — FLUTICASONE FUROATE AND VILANTEROL TRIFENATATE 1 PUFF: 200; 25 POWDER RESPIRATORY (INHALATION) at 07:47

## 2023-09-16 RX ADMIN — AZITHROMYCIN 250 MG: 250 TABLET, FILM COATED ORAL at 07:41

## 2023-09-16 RX ADMIN — IPRATROPIUM BROMIDE AND ALBUTEROL SULFATE 3 ML: .5; 3 SOLUTION RESPIRATORY (INHALATION) at 08:15

## 2023-09-16 RX ADMIN — BUPRENORPHINE AND NALOXONE 1 FILM: 8; 2 FILM, SOLUBLE BUCCAL; SUBLINGUAL at 07:50

## 2023-09-16 RX ADMIN — SENNOSIDES AND DOCUSATE SODIUM 1 TABLET: 50; 8.6 TABLET ORAL at 20:50

## 2023-09-16 RX ADMIN — PANTOPRAZOLE SODIUM 40 MG: 40 TABLET, DELAYED RELEASE ORAL at 07:41

## 2023-09-16 RX ADMIN — ASPIRIN 81 MG: 81 TABLET, COATED ORAL at 07:42

## 2023-09-16 RX ADMIN — GABAPENTIN 300 MG: 300 CAPSULE ORAL at 07:41

## 2023-09-16 RX ADMIN — MIRTAZAPINE 45 MG: 15 TABLET, FILM COATED ORAL at 21:21

## 2023-09-16 RX ADMIN — CARVEDILOL 25 MG: 25 TABLET, FILM COATED ORAL at 07:38

## 2023-09-16 RX ADMIN — PREDNISONE 40 MG: 20 TABLET ORAL at 07:40

## 2023-09-16 RX ADMIN — DOCUSATE SODIUM 100 MG: 100 CAPSULE, LIQUID FILLED ORAL at 07:40

## 2023-09-16 RX ADMIN — IPRATROPIUM BROMIDE AND ALBUTEROL SULFATE 3 ML: .5; 3 SOLUTION RESPIRATORY (INHALATION) at 11:47

## 2023-09-16 RX ADMIN — BUPROPION HYDROCHLORIDE 300 MG: 150 TABLET, FILM COATED, EXTENDED RELEASE ORAL at 07:40

## 2023-09-16 RX ADMIN — HYDRALAZINE HYDROCHLORIDE 10 MG: 20 INJECTION INTRAMUSCULAR; INTRAVENOUS at 20:54

## 2023-09-16 RX ADMIN — GUAIFENESIN 1200 MG: 600 TABLET, EXTENDED RELEASE ORAL at 07:39

## 2023-09-16 RX ADMIN — GABAPENTIN 600 MG: 300 CAPSULE ORAL at 21:21

## 2023-09-16 RX ADMIN — GUAIFENESIN 1200 MG: 600 TABLET, EXTENDED RELEASE ORAL at 20:50

## 2023-09-16 RX ADMIN — IPRATROPIUM BROMIDE AND ALBUTEROL SULFATE 3 ML: .5; 3 SOLUTION RESPIRATORY (INHALATION) at 19:36

## 2023-09-16 RX ADMIN — MULTIPLE VITAMINS W/ MINERALS TAB 1 TABLET: TAB at 07:40

## 2023-09-16 RX ADMIN — LISINOPRIL 40 MG: 40 TABLET ORAL at 07:38

## 2023-09-16 RX ADMIN — POLYETHYLENE GLYCOL 3350 17 G: 17 POWDER, FOR SOLUTION ORAL at 07:43

## 2023-09-16 RX ADMIN — QUETIAPINE 12.5 MG: 25 TABLET, FILM COATED ORAL at 07:41

## 2023-09-16 RX ADMIN — UMECLIDINIUM 1 PUFF: 62.5 AEROSOL, POWDER ORAL at 07:47

## 2023-09-16 ASSESSMENT — ACTIVITIES OF DAILY LIVING (ADL)
ADLS_ACUITY_SCORE: 28
ADLS_ACUITY_SCORE: 28
ADLS_ACUITY_SCORE: 24
ADLS_ACUITY_SCORE: 28
ADLS_ACUITY_SCORE: 24
ADLS_ACUITY_SCORE: 28
ADLS_ACUITY_SCORE: 24
ADLS_ACUITY_SCORE: 28

## 2023-09-16 NOTE — PROGRESS NOTES
[facility]    Medicine Progress Note - Hospitalist Service    Date of Admission:  9/11/2023    Assessment & Plan   Gerson Pearson is a 63 year old male admitted on 9/11/2023 from ;  He presents with SOB     COPD exacerbation  Underlying chronic, severe COPD with recurrent exacerbations  Acute on chronic hypoxic respiratory failure, 2L O2 with activity  [Needs rec- albuterol prn, Breztri 2 puffs BID, duonebs BID,   *[facility]   *hospitalized 8/31-9/2 with COPD exacerbation (also 7/15 and 7/28/2023 admissions). Returned to ED 9/9 and was treated and discharged. On prednisone taper at discharge 9/2.   *Had sudden onset of SOB 1 hour prior to EMS, was hypoxic to 70%s range. Was diaphoretic and tachycardic. Placed on BiPAP with improvement. In ED VSS on BiPAP, temp 99.4. initial VBG 7.17/94/49->7.28/78/43. , troponin 8. WBC 15.0. CXR clear. EKG NSR.  - weaned off bipap around lunchtime on 9/12/23  - 2LPM NC, states at home uses 3 L NC, 9/13/2023 no dyspnea, speaks in full sentences, ambulatory to toilet.  - pulm consult, see Dr. Mandel's note 9/13/2023 and Dr Urbina Note 9/14/23: with recommendationsTTE (pending);- nighttime oximetry; no desat;- prednisone taper starting 9/14/23  40mg x 5d, 30mg x 3d, 20mg x 3d, 10mg x 3d (ordered)  - continue ICS-LABA (Breo) and LAMA (Incruse)  - continue prn albuterol/duonebs  - deferromg roflumilast or chronic azithromycin, wto outpatient pulmonologist.  Unclear if occasional respiratory distress related to his use of chronic opiates and hypoventilation and of course continued nicotine addiction, continues on nicotine patch now at 2 cigarettes/day.  -per Pulmonary DC empiric Zosyn, azithromycin for 5 days starting 9/13/2023 completion 9/19/2023.  -  CT Chest: Central emphysema without acute pulmonary infiltrates, large rectosigmoid fecal burden, notation of atherosclerotic disease/chronic occlusion left common  "iliac artery  -      Abdominal distension  Constipation  Pt states he has abdominal distension \"for months\". Occasional nausea, no vomiting. Having nl BM. Abd is distended on exam, not tender. 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  - continue PPI  -Discussed bowel program with patient, senna S, MiraLAX, MOM per nursing had large bowel movement 9/13/23, only small subsequent, repeat MOM, continues on senna S and MiraLAX.   No subsequent BM, per CT large fecal burden, initiate Dulcolax suppository and tapwater enema.  Patient uncomfortable with abdominal distention.  Discussed with patient chronic buprenorphine likely contributory, needs ongoing bowel program after relief of current constipation.  -Persistent abdominal comfort, per imaging rectosigmoid fecal burden.  Increase bowel program, in addition to senna S, MiraLAX, and MOM 1 time today, if no bowel movement after lunch Dulcolax suppository.  Constipation likely associated with chronic narcotic use.  Encourage mobility/out of bed.  Monitor.      HTN  CAD with hx BM stent RCA 2020  [ASA 81 mg daily, atorvastatin 40 mg daily, carvedilol 25 mg BID, hydralazine 25 mg TID, lisinopril 40 mg daily]  *echo 12/2021 with EF 60-65%, nl LV size and function.   - resumed all PTA meds, hold parameters in place  - prn hydralazine, labetalol SBP>180  -Tobacco cessation  -Poorly controlled BP up to SBP 200s requiring as needed IV, increase maintenance hydralazine to 50 3 times daily, lisinopril at max 40 mg daily, continue carvedilol 25 twice daily and as needed's.  -Improved BP on increase hydralazine 50 mg 3 times daily, further antihypertensive titration per PCP on hospital discharge  -Persistent BP of above goal.  -180, DBP .  Patient on max carvedilol, lisinopril, further increase hydralazine, " unclear why patient is not on amlodipine, if on increased hydralazine persistent hypertension trial CCB.     MDD  Anxiety  [quetiapine 12.5 mg TID, mirtazapine 45 mg at HS, bupropion 300 mg qam]  Follows outpatient with psychiatry.   - resumed meds, no reported anxiety or dysphoria.  -Monitor     Opiate dependence  Hx cocaine use disorder  [buprenorphine 8-2 film BID, gabapentin 300 am/ 600 at HS]  Follows with pain clinic. 11/2021 was stopping suboxone so he could snort heroin. States isn't doing this, compliant with suboxone therapy.   - resume suboxone 8-2 BID, gabapentin     Tobacco use disorder  States smokes 1-2 cigs/ day  -Declines nicotine substitute.     Chronic anemia  Hgb 11.5 on presentation. Baseline 11-12.   - monitor    Mild thrombocytopenia  Monitor     GERD  - resume pantoprazole 40 mg daily  -       Diet: Regular Diet Adult  Room Service    DVT Prophylaxis: Pneumatic Compression Devices  Post Catheter: Not present  Lines: None     Cardiac Monitoring: None  Code Status: Full Code      Clinically Significant Risk Factors                       # Chronic heart failure with preserved ejection fraction: heart failure noted on problem list and last echo with EF >50%                    Disposition Plan      Expected Discharge Date: likely tomorrow to [facility], see below regarding facility Pharmacy.  Destination: group home  Discharge Comments: from . New Rx meds at discharge, fill at [facility] pharmacy          Rashaad Valero MD  Hospitalist Service  [facility]  Securely message with [employer] (more info)  Text page via [employer] Paging/Directory     I spent 35 minutes total time in management of care today 9/16/2023 reviewing labs, medications, interdisciplinary notes; and completing documentation of encounter and orders with Care Management including counseling/discussion withthe Patient regarding abdominal pain, constipation/obstipation, BP and Coordinating Care and plan  with Nursing and Specialists, Pulmonary regarding acute COPDmanagement and surveillance __________________________________________    Interval History   Patient reports persistent abdominal distention however currently denies pain, no nausea, emesis, minimal relief with tapwater enema yesterday.  Eating.  Persistent elevated BP, denies pain, no observed agitation/anxiety, no chest pain.  O2 1.5 Liters NC, baseline at home 3 L..  Lives in group home.    Physical Exam   Vital Signs: Temp: 97.7  F (36.5  C) Temp src: Oral BP: (!) 149/88 Pulse: 84   Resp: 20 SpO2: 97 % O2 Device: Nasal cannula Oxygen Delivery: 3 LPM  Weight: 0 lbs 0 oz    General/Constitutional:     NAD, alert, calm, cooperative  Chest/Respiratory: Respirations nonlabored on O2 per NC at 1.5 L [2-3 at home] speaks in full sentences, ambulatory to bathroom.  Cardiovascular:   no murmur appreciated.  LE edema none  Gastrointestinal/Abdomen: Soft, distended, nontender.  No rebound, guarding or other peritoneal signs.    Neuro.  Gross motor tested, nonfocal,  Psych oriented, affect calm.

## 2023-09-16 NOTE — PROGRESS NOTES
Orientations: A&O X4.   Vitals/Pain: VSS ex BP, On 2-3L NC. Dyspneic. Headache managed with prn tylenol. Exp wheezing.   Lines/Drains: PIV SL.  Skin/Wounds: WDL   GI/: Adequate urine output, urinal @ bedside. No BM this shift, Firm, non tender distended abd.   Labs: Abnormal/Trends, Electrolyte Replacement- WDL   Ambulation/Assist: Ind  Sleep Quality: Good

## 2023-09-16 NOTE — PROGRESS NOTES
Alert and oriented x 4. BP at 1945-2004H were 182/103 and 185/105, PRN Labetalol given, rechecks 155/85 and 160/97, scheduled BP meds given at HS. Prn Tylenol given for headache. Had a small BM this shift, BS normoactive. Dyspnea on exertion, PRN Duoneb given d/t SOB and wheezing ,effective. Currently on O2 at 3lpm/NC.  Possible discharge to  tomorrow.

## 2023-09-17 VITALS
TEMPERATURE: 98 F | OXYGEN SATURATION: 96 % | HEART RATE: 91 BPM | DIASTOLIC BLOOD PRESSURE: 85 MMHG | SYSTOLIC BLOOD PRESSURE: 153 MMHG | RESPIRATION RATE: 18 BRPM

## 2023-09-17 LAB
CREAT SERPL-MCNC: 0.75 MG/DL (ref 0.67–1.17)
EGFRCR SERPLBLD CKD-EPI 2021: >90 ML/MIN/1.73M2

## 2023-09-17 PROCEDURE — 99239 HOSP IP/OBS DSCHRG MGMT >30: CPT | Performed by: HOSPITALIST

## 2023-09-17 PROCEDURE — 94644 CONT INHLJ TX 1ST HOUR: CPT

## 2023-09-17 PROCEDURE — 250N000013 HC RX MED GY IP 250 OP 250 PS 637: Performed by: HOSPITALIST

## 2023-09-17 PROCEDURE — 94642 AEROSOL INHALATION TREATMENT: CPT

## 2023-09-17 PROCEDURE — 82565 ASSAY OF CREATININE: CPT | Performed by: HOSPITALIST

## 2023-09-17 PROCEDURE — 250N000009 HC RX 250: Performed by: INTERNAL MEDICINE

## 2023-09-17 PROCEDURE — 94640 AIRWAY INHALATION TREATMENT: CPT

## 2023-09-17 PROCEDURE — 250N000012 HC RX MED GY IP 250 OP 636 PS 637: Performed by: STUDENT IN AN ORGANIZED HEALTH CARE EDUCATION/TRAINING PROGRAM

## 2023-09-17 PROCEDURE — 250N000013 HC RX MED GY IP 250 OP 250 PS 637: Performed by: INTERNAL MEDICINE

## 2023-09-17 PROCEDURE — 36415 COLL VENOUS BLD VENIPUNCTURE: CPT | Performed by: HOSPITALIST

## 2023-09-17 PROCEDURE — 999N000157 HC STATISTIC RCP TIME EA 10 MIN

## 2023-09-17 PROCEDURE — 94640 AIRWAY INHALATION TREATMENT: CPT | Mod: 76

## 2023-09-17 RX ORDER — PREDNISONE 10 MG/1
10 TABLET ORAL DAILY
Qty: 3 TABLET | Refills: 0 | Status: SHIPPED | OUTPATIENT
Start: 2023-09-25 | End: 2023-09-28

## 2023-09-17 RX ORDER — PREDNISONE 10 MG/1
30 TABLET ORAL DAILY
Qty: 15 TABLET | Refills: 0 | Status: SHIPPED | OUTPATIENT
Start: 2023-09-19 | End: 2023-09-24

## 2023-09-17 RX ORDER — PREDNISONE 20 MG/1
40 TABLET ORAL DAILY
Qty: 2 TABLET | Refills: 0 | Status: SHIPPED | OUTPATIENT
Start: 2023-09-17 | End: 2024-08-21

## 2023-09-17 RX ORDER — PREDNISONE 20 MG/1
20 TABLET ORAL DAILY
Qty: 3 TABLET | Refills: 0 | Status: SHIPPED | OUTPATIENT
Start: 2023-09-22 | End: 2023-09-25

## 2023-09-17 RX ORDER — HYDRALAZINE HYDROCHLORIDE 100 MG/1
100 TABLET, FILM COATED ORAL 3 TIMES DAILY
Qty: 90 TABLET | Refills: 0 | Status: SHIPPED | OUTPATIENT
Start: 2023-09-17

## 2023-09-17 RX ORDER — AMOXICILLIN 250 MG
1 CAPSULE ORAL 2 TIMES DAILY
Qty: 60 TABLET | Refills: 0 | Status: SHIPPED | OUTPATIENT
Start: 2023-09-17

## 2023-09-17 RX ADMIN — FLUTICASONE FUROATE AND VILANTEROL TRIFENATATE 1 PUFF: 200; 25 POWDER RESPIRATORY (INHALATION) at 10:14

## 2023-09-17 RX ADMIN — LISINOPRIL 40 MG: 40 TABLET ORAL at 09:57

## 2023-09-17 RX ADMIN — MULTIPLE VITAMINS W/ MINERALS TAB 1 TABLET: TAB at 09:56

## 2023-09-17 RX ADMIN — AZITHROMYCIN 250 MG: 250 TABLET, FILM COATED ORAL at 09:55

## 2023-09-17 RX ADMIN — DOCUSATE SODIUM 100 MG: 100 CAPSULE, LIQUID FILLED ORAL at 09:55

## 2023-09-17 RX ADMIN — QUETIAPINE 12.5 MG: 25 TABLET, FILM COATED ORAL at 09:53

## 2023-09-17 RX ADMIN — BUPRENORPHINE AND NALOXONE 1 FILM: 8; 2 FILM, SOLUBLE BUCCAL; SUBLINGUAL at 09:53

## 2023-09-17 RX ADMIN — GUAIFENESIN 1200 MG: 600 TABLET, EXTENDED RELEASE ORAL at 09:54

## 2023-09-17 RX ADMIN — IPRATROPIUM BROMIDE AND ALBUTEROL SULFATE 3 ML: .5; 3 SOLUTION RESPIRATORY (INHALATION) at 03:14

## 2023-09-17 RX ADMIN — ASPIRIN 81 MG: 81 TABLET, COATED ORAL at 09:54

## 2023-09-17 RX ADMIN — HYDRALAZINE HYDROCHLORIDE 100 MG: 50 TABLET, FILM COATED ORAL at 09:56

## 2023-09-17 RX ADMIN — IPRATROPIUM BROMIDE AND ALBUTEROL SULFATE 3 ML: .5; 3 SOLUTION RESPIRATORY (INHALATION) at 11:20

## 2023-09-17 RX ADMIN — PREDNISONE 40 MG: 20 TABLET ORAL at 09:57

## 2023-09-17 RX ADMIN — FERROUS SULFATE TAB 325 MG (65 MG ELEMENTAL FE) 325 MG: 325 (65 FE) TAB at 09:54

## 2023-09-17 RX ADMIN — IPRATROPIUM BROMIDE AND ALBUTEROL SULFATE 3 ML: .5; 3 SOLUTION RESPIRATORY (INHALATION) at 08:18

## 2023-09-17 RX ADMIN — GABAPENTIN 300 MG: 300 CAPSULE ORAL at 09:53

## 2023-09-17 RX ADMIN — BUPROPION HYDROCHLORIDE 300 MG: 150 TABLET, FILM COATED, EXTENDED RELEASE ORAL at 10:11

## 2023-09-17 RX ADMIN — SENNOSIDES AND DOCUSATE SODIUM 1 TABLET: 50; 8.6 TABLET ORAL at 09:55

## 2023-09-17 RX ADMIN — PANTOPRAZOLE SODIUM 40 MG: 40 TABLET, DELAYED RELEASE ORAL at 09:55

## 2023-09-17 RX ADMIN — UMECLIDINIUM 1 PUFF: 62.5 AEROSOL, POWDER ORAL at 09:52

## 2023-09-17 RX ADMIN — CARVEDILOL 25 MG: 25 TABLET, FILM COATED ORAL at 09:55

## 2023-09-17 RX ADMIN — ATORVASTATIN CALCIUM 40 MG: 40 TABLET, FILM COATED ORAL at 09:53

## 2023-09-17 RX ADMIN — POLYETHYLENE GLYCOL 3350 17 G: 17 POWDER, FOR SOLUTION ORAL at 09:57

## 2023-09-17 ASSESSMENT — ACTIVITIES OF DAILY LIVING (ADL)
ADLS_ACUITY_SCORE: 24
ADLS_ACUITY_SCORE: 24
ADLS_ACUITY_SCORE: 22
ADLS_ACUITY_SCORE: 22
ADLS_ACUITY_SCORE: 24

## 2023-09-17 NOTE — PROGRESS NOTES
Care Management Discharge Note    Discharge Date: 09/17/2023       Discharge Disposition: Group Home    Discharge Services:     Discharge DME: Oxygen    Discharge Transportation: health plan transportation    Private pay costs discussed: Not applicable    Does the patient's insurance plan have a 3 day qualifying hospital stay waiver?  No    PAS Confirmation Code:  n/a  Patient/family educated on Medicare website which has current facility and service quality ratings: no    Education Provided on the Discharge Plan:    Persons Notified of Discharge Plans: RN, MD, [facility]  Patient/Family in Agreement with the Plan: yes    Handoff Referral Completed: No    Additional Information:  Pt will discharge today back to his group home. Orders faxed to [phone] and Rawi at the group home updated. [facility] WC set with a  window of [phone]. Nursing aware.     LUIS A Neves, LICSW  [phone] Desk phone  [phone] Cell/text (Preferred)  [facility]         Interventional Cardiology  Coronary Angiogram/Angioplasty/Stent/Atherectomy Discharge  Instructions -   Radial (wrist) Approach     The instructions below are to help you understand how to take care of yourself. There is also information about when to call the doctor or emergency services.    **Do not stop your aspirin or platelet inhibitor unless directed by your Cardiologist.  These medications help to prevent platelets in your blood from sticking together and forming a clot.   Examples of these medications are: Ticagrelor (Brilinta), Clopidogrel (Plavix), Prasugrel (Effient)    For 24 hours after procedure:  Do not subject hand/arm to any forceful movements for 24 hours, such as supporting weight when rising from a chair or bed.  Do not drive a car for 24 hours.  The dressing on the puncture site may be removed after 24 hours and left open to air. If minor oozing, you may apply a Band-Aid and remove after 12 hours.   You may shower on the day after your procedure. Do not take a tub bath or wash dishes (no soaking wrist) with the puncture site in water for 3 days after the procedure.    For 48 hours following the procedure:  Do not operate a lawnmower, motorcycle, chainsaw or all-terrain vehicle.  Do not lift anything heavier than 5-10 pounds with affected arm for 5 days.  Avoid excessive bending (flexion/extension) wrist movement.  Do not engage in vigorous exercise (i.e. tennis, golf) using the affected arm for 5 days after discharge.  You may return to work in 72 hours if no complications and no heavy lifting.    If bleeding should occur following discharge:  Sit down and apply firm pressure with your thumb against the puncture site and fingers against back of wrist for 10 minutes.  If the bleeding stops, continue to rest, keeping your wrist still for 2 hours. Notify your doctor as soon as possible.  If bleeding does not stop after 10 minutes or if there is a large amount of bleeding or spurting, call 911  immediately. Do not drive yourself to the hospital.           Contact the [facility] Clinic at [phone] if you develop:  Fever over 100.4, that lasts more than one day  Redness, heat, or pus at the puncture site  Change in color or temperature of the hand or arm    Expect mild tingling of hand and tenderness at the puncture site for up to 3 days. You may take Tylenol or a pain medicine recommended by your doctor.                       Our Cardiac Rehab staff may visit briefly with you while your in the hospital.   If they miss you, someone will contact you after you are home.  You are encouraged to enroll in an Outpatient Cardiac Rehab Program     No elective dental work for 6 weeks after having a stent    Your Procedural Physician was: Dr Guerra  the phone number is: [phone]    [facility]:  [phone]  If you are calling after hours, please listen to the entire voicemail, a live  will answer at the end of the message

## 2023-09-17 NOTE — DISCHARGE SUMMARY
[facility]    Discharge Summary  Hospitalist    Date of Admission:  9/11/2023  Date of Discharge:  9/17/2023  Discharging Provider: Rashaad Valero MD  Date of Service (when I saw the patient): 09/17/23      History of Present Illness   Gersno Pearson is a 63 year old male admitted on 9/11/2023 from ;  He presents with SOB     Hospital Course   Gerson Pearson was admitted on 9/11/2023.  The following problems were addressed during his hospitalization:       COPD exacerbation; resovling  Underlying chronic, severe COPD with recurrent exacerbations  Acute on chronic hypoxic respiratory failure, chronicall on 2L O2 with activity  [PTA: albuterol prn, Breztri 2 puffs BID, duonebs BID,   *[place] pulmonary [place] [facility] [place]   *hospitalized 8/31-9/2 with COPD exacerbation (also 7/15 and 7/28/2023 admissions). Returned to ED 9/9 and was treated and discharged. On prednisone taper at discharge 9/2.   *Had sudden onset of SOB 1 hour prior to EMS, was hypoxic to 70%s range. Was diaphoretic and tachycardic. Placed on BiPAP with improvement. In ED VSS on BiPAP, temp 99.4. initial VBG 7.17/94/49->7.28/78/43. , troponin 8. WBC 15.0. CXR clear. EKG NSR. Easily weaned of BIPAP.   - on 9/13/23: 2LPM NC, states at home uses 3 L NC, 9/13/2023 no dyspnea, speaks in full sentences, ambulatory to toilet.  - pulm consult, see Dr. Mandel's note 9/13/2023 and Dr Urbina Note 9/14/23: with recommendationsTTE normal LV and RV function, EF 60 to 65%.  (- nighttime oximetry; no desat;- prednisone taper starting 9/14/23  40mg x 5d, 30mg x 3d, 20mg x 3d, 10mg x 3d (ordered)  - continue ICS-LABA (Breo) and LAMA (Incruse)  - continue prn albuterol/duonebs  -Pulmonary deferring roflumilast or chronic azithromycin, to outpatient pulmonologist.  Unclear if occasional respiratory distress related to his use of chronic opiates and hypoventilation and of course continued nicotine addiction, continues  "on nicotine patch now at 2 cigarettes/day.  -per Pulmonary DC empiric Zosyn, azithromycin for 5 days starting 9/13/2023 completion 9/19/2023.  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  Continue [facility]TA home medications.  -      Abdominal distension  Constipation  Pt states he has abdominal distension \"for months\". Occasional nausea, no vomiting. Having nl BM. Abd is distended on exam, not tender. 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  - continue PPI  -Bowels moved after few days on bowel program of senna S, MiraLAX, MOM and require Dulcolax suppository and in fact some water enema.  Discharged on maintenance bowel program senna S and as needed.  Follow-up with PCP.  Encourage activities/ambulation as tolerated.        HTN  CAD with hx BM stent RCA 2020  [ASA 81 mg daily, atorvastatin 40 mg daily, carvedilol 25 mg BID, hydralazine 25 mg TID, lisinopril 40 mg daily]  *echo 12/2021 with EF 60-65%, nl LV size and function.   -Poorly controlled BP up to SBP 200s requiring as needed IV, increase maintenance hydralazine to 50 3 times daily, lisinopril at max 40 mg daily, continue   -Persistent BP of above goal.  -180, DBP .  Patient on max carvedilol, lisinopril, further increase hydralazine to 100mg tid;   -Follow-up BP per PCP on hospital discharge.     MDD  Anxiety  [quetiapine 12.5 mg TID, mirtazapine 45 mg at HS, bupropion 300 mg qam]  Follows outpatient with psychiatry.   - resumed meds, no reported anxiety or dysphoria.  -Follow-up PCP on hospital discharge   "   Opiate dependence  Hx cocaine use disorder  [buprenorphine 8-2 film BID, gabapentin 300 am/ 600 at HS]  Follows with pain clinic. 11/2021 was stopping suboxone so he could snort heroin. States isn't doing this, compliant with suboxone therapy.   - resume suboxone 8-2 BID, gabapentin  -Follow-up PCP on hospital discharge     Tobacco use disorder  States smokes 1-2 cigs/ day  -Declines nicotine substitute.     Chronic anemia  Hgb 11.5 on presentation. Baseline 11-12.   - monitor     Mild thrombocytopenia  Monitor     GERD  - resume pantoprazole 40 mg daily         Primary Care Physician   Alex Wynn    Physical Exam   Temp: 98  F (36.7  C) Temp src: Oral BP: (!) 153/85 Pulse: 91   Resp: 18 SpO2: 96 % O2 Device: Nasal cannula Oxygen Delivery: 3 LPM    General/Constitutional:     NAD, alert, calm, cooperative  Chest/Respiratory: Respirations nonlabored on O2 per NC [home O2 at 2-3 L per NC ] speaks in full sentences, ambulatory to toilet  Cardiovascular:   no murmur appreciated.  LE edema none  Gastrointestinal/Abdomen: Soft, distended, nontender.  No rebound, guarding or other peritoneal signs.    Neuro.  Gross motor tested, nonfocal,  Psych oriented, affect calm.    Discharge Disposition   Discharged to home, [facility]  Condition at discharge: Fair    Consultations This Hospital Stay   PHARMACY TO DOSE VANCO  CARE MANAGEMENT / SOCIAL WORK IP CONSULT  IP RESPIRATORY CARE CHRONIC PULMONARY DISEASE SPECIALIST  PULMONARY IP CONSULT  PHYSICAL THERAPY ADULT IP CONSULT    Time Spent on this Encounter   I, Rashaad Valero MD, personally saw the patient today and spent greater than 30 minutes discharging this patient discussing discharge plans with Patient and Nursing, coordinating with Specialties, Pulmonary regarding discharge medications and follow-up; new medications E prescribed to group home pharmacy [facility], completing discharge orders, medication follow-up    Discharge Orders      Reason for your  hospital stay    Presented with acute exacerbation of COPD     Follow-up and recommended labs and tests     Follow up with primary care provider, Alex Wynn, within 7 days for hospital and BP follow- up.  The following labs/tests are recommended: BMP and CBC  .    Follow up with Pulmonary as previously scheduled.     Activity    Your activity upon discharge: activity as tolerated     Discharge Instructions    Resume prior O2 care plan as prior to hospital admission: O2 per NC a 2-3 Liters to maintain O2 > 90%     Diet    Follow this diet upon discharge:    Regular Diet Adult     Discharge Medications   Current Discharge Medication List        START taking these medications    Details   senna-docusate (SENOKOT-S/PERICOLACE) 8.6-50 MG tablet Take 1 tablet by mouth 2 times daily Further mgmt and refills per PCP  Qty: 60 tablet, Refills: 0    Associated Diagnoses: Slow transit constipation           CONTINUE these medications which have CHANGED    Details   hydrALAZINE (APRESOLINE) 100 MG tablet Take 1 tablet (100 mg) by mouth 3 times daily NOTE this is an INCREASE in dose from prior to hospital.  Further mgmt and refills per PCP.  Qty: 90 tablet, Refills: 0    Associated Diagnoses: Hypertension, unspecified type      !! predniSONE (DELTASONE) 10 MG tablet Take 3 tablets (30 mg) by mouth daily for 5 days  Qty: 15 tablet, Refills: 0    Associated Diagnoses: Chronic obstructive pulmonary disease with acute exacerbation (H)      !! predniSONE (DELTASONE) 10 MG tablet Take 1 tablet (10 mg) by mouth daily for 3 days  Qty: 3 tablet, Refills: 0    Associated Diagnoses: Chronic obstructive pulmonary disease with acute exacerbation (H)      !! predniSONE (DELTASONE) 20 MG tablet Take 2 tablets (40 mg) by mouth daily Take last dose of 40mg tomorrow 9/18/23 then continue prednisone taper  Qty: 2 tablet, Refills: 0    Associated Diagnoses: Chronic obstructive pulmonary disease with acute exacerbation (H)      !! predniSONE  (DELTASONE) 20 MG tablet Take 1 tablet (20 mg) by mouth daily for 3 days  Qty: 3 tablet, Refills: 0    Associated Diagnoses: Chronic obstructive pulmonary disease with acute exacerbation (H)       !! - Potential duplicate medications found. Please discuss with provider.        CONTINUE these medications which have NOT CHANGED    Details   acetaminophen (TYLENOL) 325 MG tablet Take 325 mg by mouth every 6 hours as needed for mild pain      albuterol (PROAIR HFA/PROVENTIL HFA/VENTOLIN HFA) 108 (90 Base) MCG/ACT inhaler Inhale 2 puffs into the lungs every 4 hours as needed for shortness of breath, wheezing or cough      aspirin (ASA) 81 MG chewable tablet Take 81 mg by mouth daily      atorvastatin (LIPITOR) 40 MG tablet Take 40 mg by mouth daily       budeson-glycopyrrol-formoterol (BREZTRI AEROSPHERE) 160-9-4.8 MCG/ACT AERO inhaler Inhale 2 puffs into the lungs 2 times daily  Qty: 10.7 g, Refills: 1    Associated Diagnoses: COPD exacerbation (H)      buprenorphine HCl-naloxone HCl (SUBOXONE) 8-2 MG per film Place 1 Film under the tongue 2 times daily AM and early evening  (around 5-6 pm)      buPROPion (WELLBUTRIN XL) 300 MG 24 hr tablet Take 300 mg by mouth every morning      carvedilol (COREG) 25 MG tablet Take 25 mg by mouth 2 times daily      diclofenac (VOLTAREN) 1 % topical gel Apply 2-4 g topically 4 times daily as needed for moderate pain      docusate sodium (COLACE) 100 MG capsule Take 1 capsule (100 mg) by mouth 2 times daily  Qty: 30 capsule, Refills: 3    Associated Diagnoses: COPD exacerbation (H)      ferrous sulfate (FE TABS) 325 (65 Fe) MG EC tablet Take 325 mg by mouth daily      fluticasone (FLONASE) 50 MCG/ACT nasal spray Spray 1 spray into both nostrils daily  Qty: 16 g, Refills: 0      !! gabapentin (NEURONTIN) 300 MG capsule Take 300 mg by mouth every morning      !! gabapentin (NEURONTIN) 300 MG capsule Take 600 mg by mouth At Bedtime      !! ipratropium - albuterol 0.5 mg/2.5 mg/3 mL (DUONEB)  0.5-2.5 (3) MG/3ML neb solution Take 1 vial (3 mLs) by nebulization 2 times daily  Qty: 90 mL, Refills: 1    Associated Diagnoses: COPD exacerbation (H)      !! ipratropium - albuterol 0.5 mg/2.5 mg/3 mL (DUONEB) 0.5-2.5 (3) MG/3ML neb solution Take 1 vial (3 mLs) by nebulization every 6 hours as needed for shortness of breath, wheezing or cough  Qty: 90 mL, Refills: 3    Associated Diagnoses: COPD exacerbation (H)      lisinopril (ZESTRIL) 40 MG tablet Take 1 tablet (40 mg) by mouth daily  Qty: 30 tablet, Refills: 3    Associated Diagnoses: COPD exacerbation (H)      melatonin 5 MG tablet Take 5 mg by mouth nightly as needed for sleep      mirtazapine (REMERON) 45 MG tablet Take 45 mg by mouth At Bedtime       multivitamin w/minerals (THERA-VIT-M) tablet Take 1 tablet by mouth daily      naloxone (NARCAN) 4 MG/0.1ML nasal spray Spray 4 mg into one nostril alternating nostrils once as needed for opioid reversal every 2-3 minutes until assistance arrives      pantoprazole (PROTONIX) 40 MG EC tablet Take 40 mg by mouth daily      polyethylene glycol (MIRALAX) 17 g packet Take 1 packet by mouth daily as needed for constipation      QUEtiapine (SEROQUEL) 25 MG tablet Take 12.5 mg by mouth 3 times daily       !! - Potential duplicate medications found. Please discuss with provider.        STOP taking these medications       nicotine (NICODERM CQ) 7 MG/24HR 24 hr patch Comments:   Reason for Stopping:             Allergies   Allergies   Allergen Reactions    Ibuprofen Nausea and Vomiting     Data   Most Recent 3 CBC's:  Recent Labs   Lab Test 09/14/23  0531 09/13/23  0522 09/12/23  0523   WBC 18.8* 17.3* 15.6*   HGB 11.2* 10.6* 10.5*   MCV 95 94 96   * 142* 130*      Most Recent 3 BMP's:  Recent Labs   Lab Test 09/17/23  0533 09/13/23  0522 09/12/23  0523 09/12/23  0029 09/11/23 2056   NA  --  137 138  --  141   POTASSIUM  --  4.0 4.4  --  4.4   CHLORIDE  --  102 101  --  101   CO2  --  27 26  --  32*   BUN  --   10.7 10.9  --  9.7   CR 0.75 0.68 0.68  --  0.90   ANIONGAP  --  8 11  --  8   RL  --  8.7* 8.1*  --  8.7*   GLC  --  159* 168* 136* 129*     Most Recent 2 LFT's:  Recent Labs   Lab Test 09/11/23 2056 09/09/23  1503   * 16   ALT 68 20   ALKPHOS 101 96   BILITOTAL 0.3 0.2

## 2023-09-17 NOTE — PLAN OF CARE
Goal Outcome Evaluation:  Orientation A&Ox4    Vitals/Tele VSS on 2L NC    IV Access/drains Removed PIV    Diet Regular diet    Mobility Independent    GI/ Continent, last BM last night per pt statement    Wound/Skin Intact    Pt discharged to previous group home living situation. AVS packet gone over and belongings left with the pt.

## 2023-09-17 NOTE — PROGRESS NOTES
Date &Time: 9/16/23 1900-0730  Orientation/Cognitive: A&Ox4  Mobility Level/Assist Equipment: IND  Fall Risk (Y/N): NO  Behavior Concerns: Green  Pain Management: Denies   Tele/VS/O2: VSS on 3L 02 via NC  ABNL Lab/BG: calcium 8.7, WBC 18.8, hgb 11.2, Plts 145.  Diet: Regular  Bowel/Bladder: Continent, 1 BM during the shift  Skin Concerns: None  Drains/Devices: Right PIV  Tests/Procedures for next shift: None  Anticipated DC date & active delays: TBD  PRN hydralazine 10 mg given once for /104 which was improve to 142/89.    BP (!) 142/89 (BP Location: Left arm, Patient Position: Fowlers, Cuff Size: Adult Regular)   Pulse 86   Temp 98.5  F (36.9  C) (Oral)   Resp 18   SpO2 97%

## 2023-09-17 NOTE — PLAN OF CARE
Goal Outcome Evaluation:    Shift: 0700-1930 9/16/2023    Orientation: AO x4  Pain: Patient has reported no pain this shift.  Vitals/Tele: VSS 2-3L NC; Hypertensive; Expiratory wheezing; diminished lung sounds.  IV Access/drains: R PIV SL  Diet: regular Diet  Mobility: Independent in the room.   GI/: Continent of Bladder; No BM this shift; Non Tender distended abdomen;   Wound/Skin: WNL  Consults: RT/PT/Pulmonology   Discharge Plan: TBD; comes from a group home and will discharge there, but no plans yet.       See Flow sheets for assessment

## 2023-11-10 ENCOUNTER — HOSPITAL ENCOUNTER (OUTPATIENT)
Facility: CLINIC | Age: 63
End: 2023-11-10
Attending: INTERNAL MEDICINE | Admitting: INTERNAL MEDICINE
Payer: MEDICARE

## 2024-02-09 ENCOUNTER — ANESTHESIA EVENT (OUTPATIENT)
Dept: GASTROENTEROLOGY | Facility: CLINIC | Age: 64
End: 2024-02-09

## 2024-02-09 ENCOUNTER — ANESTHESIA (OUTPATIENT)
Dept: GASTROENTEROLOGY | Facility: CLINIC | Age: 64
End: 2024-02-09

## 2024-06-22 ENCOUNTER — HEALTH MAINTENANCE LETTER (OUTPATIENT)
Age: 64
End: 2024-06-22

## 2024-08-01 ENCOUNTER — APPOINTMENT (OUTPATIENT)
Dept: GENERAL RADIOLOGY | Facility: CLINIC | Age: 64
End: 2024-08-01
Attending: EMERGENCY MEDICINE
Payer: MEDICARE

## 2024-08-01 ENCOUNTER — HOSPITAL ENCOUNTER (EMERGENCY)
Facility: CLINIC | Age: 64
Discharge: NURSING, REHAB OR LONG-TERM CARE | End: 2024-08-01
Attending: EMERGENCY MEDICINE | Admitting: EMERGENCY MEDICINE
Payer: MEDICARE

## 2024-08-01 VITALS
WEIGHT: 163 LBS | BODY MASS INDEX: 24.14 KG/M2 | HEIGHT: 69 IN | HEART RATE: 72 BPM | TEMPERATURE: 98.7 F | RESPIRATION RATE: 20 BRPM | SYSTOLIC BLOOD PRESSURE: 144 MMHG | OXYGEN SATURATION: 99 % | DIASTOLIC BLOOD PRESSURE: 103 MMHG

## 2024-08-01 DIAGNOSIS — J44.1 COPD EXACERBATION (H): ICD-10-CM

## 2024-08-01 LAB
ALBUMIN SERPL BCG-MCNC: 4.6 G/DL (ref 3.5–5.2)
ALBUMIN UR-MCNC: NEGATIVE MG/DL
ALP SERPL-CCNC: 151 U/L (ref 40–150)
ALT SERPL W P-5'-P-CCNC: 32 U/L (ref 0–70)
ANION GAP SERPL CALCULATED.3IONS-SCNC: 6 MMOL/L (ref 7–15)
APPEARANCE UR: CLEAR
AST SERPL W P-5'-P-CCNC: 29 U/L (ref 0–45)
ATRIAL RATE - MUSE: 66 BPM
BASE EXCESS BLDV CALC-SCNC: 8 MMOL/L (ref -3–3)
BASOPHILS # BLD AUTO: 0.1 10E3/UL (ref 0–0.2)
BASOPHILS NFR BLD AUTO: 1 %
BILIRUB SERPL-MCNC: 0.4 MG/DL
BILIRUB UR QL STRIP: NEGATIVE
BUN SERPL-MCNC: 8.5 MG/DL (ref 8–23)
CALCIUM SERPL-MCNC: 9.8 MG/DL (ref 8.8–10.4)
CHLORIDE SERPL-SCNC: 96 MMOL/L (ref 98–107)
COLOR UR AUTO: NORMAL
CREAT SERPL-MCNC: 0.87 MG/DL (ref 0.67–1.17)
DIASTOLIC BLOOD PRESSURE - MUSE: NORMAL MMHG
EGFRCR SERPLBLD CKD-EPI 2021: >90 ML/MIN/1.73M2
EOSINOPHIL # BLD AUTO: 0.3 10E3/UL (ref 0–0.7)
EOSINOPHIL NFR BLD AUTO: 5 %
ERYTHROCYTE [DISTWIDTH] IN BLOOD BY AUTOMATED COUNT: 12.8 % (ref 10–15)
GLUCOSE SERPL-MCNC: 77 MG/DL (ref 70–99)
GLUCOSE UR STRIP-MCNC: NEGATIVE MG/DL
HCO3 BLDV-SCNC: 36 MMOL/L (ref 21–28)
HCO3 SERPL-SCNC: 35 MMOL/L (ref 22–29)
HCT VFR BLD AUTO: 36.6 % (ref 40–53)
HGB BLD-MCNC: 11.8 G/DL (ref 13.3–17.7)
HGB UR QL STRIP: NEGATIVE
IMM GRANULOCYTES # BLD: 0 10E3/UL
IMM GRANULOCYTES NFR BLD: 0 %
INTERPRETATION ECG - MUSE: NORMAL
KETONES UR STRIP-MCNC: NEGATIVE MG/DL
LACTATE BLD-SCNC: <0.3 MMOL/L
LEUKOCYTE ESTERASE UR QL STRIP: NEGATIVE
LIPASE SERPL-CCNC: 22 U/L (ref 13–60)
LYMPHOCYTES # BLD AUTO: 1.2 10E3/UL (ref 0.8–5.3)
LYMPHOCYTES NFR BLD AUTO: 22 %
MCH RBC QN AUTO: 29.4 PG (ref 26.5–33)
MCHC RBC AUTO-ENTMCNC: 32.2 G/DL (ref 31.5–36.5)
MCV RBC AUTO: 91 FL (ref 78–100)
MONOCYTES # BLD AUTO: 0.6 10E3/UL (ref 0–1.3)
MONOCYTES NFR BLD AUTO: 11 %
NEUTROPHILS # BLD AUTO: 3.4 10E3/UL (ref 1.6–8.3)
NEUTROPHILS NFR BLD AUTO: 62 %
NITRATE UR QL: NEGATIVE
NRBC # BLD AUTO: 0 10E3/UL
NRBC BLD AUTO-RTO: 0 /100
P AXIS - MUSE: 74 DEGREES
PCO2 BLDV: 66 MM HG (ref 40–50)
PH BLDV: 7.35 [PH] (ref 7.32–7.43)
PH UR STRIP: 7 [PH] (ref 5–7)
PLAT MORPH BLD: ABNORMAL
PLATELET # BLD AUTO: ABNORMAL 10*3/UL
PO2 BLDV: 21 MM HG (ref 25–47)
POTASSIUM SERPL-SCNC: 4 MMOL/L (ref 3.4–5.3)
PR INTERVAL - MUSE: 146 MS
PROT SERPL-MCNC: 7.9 G/DL (ref 6.4–8.3)
QRS DURATION - MUSE: 76 MS
QT - MUSE: 396 MS
QTC - MUSE: 415 MS
R AXIS - MUSE: 74 DEGREES
RBC # BLD AUTO: 4.02 10E6/UL (ref 4.4–5.9)
RBC MORPH BLD: ABNORMAL
RBC URINE: 0 /HPF
SAO2 % BLDV: 31 % (ref 70–75)
SODIUM SERPL-SCNC: 137 MMOL/L (ref 135–145)
SP GR UR STRIP: 1.01 (ref 1–1.03)
SYSTOLIC BLOOD PRESSURE - MUSE: NORMAL MMHG
T AXIS - MUSE: 84 DEGREES
TROPONIN T SERPL HS-MCNC: 14 NG/L
UROBILINOGEN UR STRIP-MCNC: NORMAL MG/DL
VENTRICULAR RATE- MUSE: 66 BPM
WBC # BLD AUTO: 5.6 10E3/UL (ref 4–11)
WBC URINE: 0 /HPF

## 2024-08-01 PROCEDURE — 36415 COLL VENOUS BLD VENIPUNCTURE: CPT | Performed by: EMERGENCY MEDICINE

## 2024-08-01 PROCEDURE — 84484 ASSAY OF TROPONIN QUANT: CPT | Performed by: EMERGENCY MEDICINE

## 2024-08-01 PROCEDURE — 83690 ASSAY OF LIPASE: CPT | Performed by: EMERGENCY MEDICINE

## 2024-08-01 PROCEDURE — 99285 EMERGENCY DEPT VISIT HI MDM: CPT | Mod: 25

## 2024-08-01 PROCEDURE — 96374 THER/PROPH/DIAG INJ IV PUSH: CPT

## 2024-08-01 PROCEDURE — 82803 BLOOD GASES ANY COMBINATION: CPT

## 2024-08-01 PROCEDURE — 71046 X-RAY EXAM CHEST 2 VIEWS: CPT

## 2024-08-01 PROCEDURE — 250N000011 HC RX IP 250 OP 636: Performed by: EMERGENCY MEDICINE

## 2024-08-01 PROCEDURE — 81003 URINALYSIS AUTO W/O SCOPE: CPT | Performed by: EMERGENCY MEDICINE

## 2024-08-01 PROCEDURE — 80053 COMPREHEN METABOLIC PANEL: CPT | Performed by: EMERGENCY MEDICINE

## 2024-08-01 PROCEDURE — 85048 AUTOMATED LEUKOCYTE COUNT: CPT | Performed by: EMERGENCY MEDICINE

## 2024-08-01 PROCEDURE — 93005 ELECTROCARDIOGRAM TRACING: CPT

## 2024-08-01 RX ORDER — METHYLPREDNISOLONE SODIUM SUCCINATE 125 MG/2ML
125 INJECTION, POWDER, LYOPHILIZED, FOR SOLUTION INTRAMUSCULAR; INTRAVENOUS ONCE
Status: COMPLETED | OUTPATIENT
Start: 2024-08-01 | End: 2024-08-01

## 2024-08-01 RX ORDER — PREDNISONE 20 MG/1
TABLET ORAL
Qty: 10 TABLET | Refills: 0 | Status: SHIPPED | OUTPATIENT
Start: 2024-08-01 | End: 2024-08-21

## 2024-08-01 RX ADMIN — METHYLPREDNISOLONE SODIUM SUCCINATE 125 MG: 125 INJECTION, POWDER, FOR SOLUTION INTRAMUSCULAR; INTRAVENOUS at 17:20

## 2024-08-01 ASSESSMENT — ACTIVITIES OF DAILY LIVING (ADL)
ADLS_ACUITY_SCORE: 37

## 2024-08-01 ASSESSMENT — COLUMBIA-SUICIDE SEVERITY RATING SCALE - C-SSRS
6. HAVE YOU EVER DONE ANYTHING, STARTED TO DO ANYTHING, OR PREPARED TO DO ANYTHING TO END YOUR LIFE?: NO
2. HAVE YOU ACTUALLY HAD ANY THOUGHTS OF KILLING YOURSELF IN THE PAST MONTH?: NO
1. IN THE PAST MONTH, HAVE YOU WISHED YOU WERE DEAD OR WISHED YOU COULD GO TO SLEEP AND NOT WAKE UP?: NO

## 2024-08-01 NOTE — ED TRIAGE NOTES
Brought in by EMS. C/o SOB. Congestion that started this am that has progressed throughout the day. EMS given duonebs x2. On 3L o2 at baseline       Triage Assessment (Adult)       Row Name 08/01/24 1701          Triage Assessment    Airway WDL WDL        Respiratory WDL    Respiratory WDL X   SOB, o2 at baseline        Cardiac WDL    Cardiac WDL WDL        Peripheral/Neurovascular WDL    Peripheral Neurovascular WDL WDL        Cognitive/Neuro/Behavioral WDL    Cognitive/Neuro/Behavioral WDL WDL

## 2024-08-01 NOTE — ED PROVIDER NOTES
Emergency Department Note      History of Present Illness     Chief Complaint   Shortness of Breath      HPI   Gerson Pearson is a 64 year old male brought in by ambulance for further evaluation of shortness of breath.  He has a history of oxygen dependent COPD, and he apparently became more short of breath today.  He states that he started having a cough and congestion as well as the shortness of breath today.  He also had some abdominal discomfort in the right lower quadrant today, which he states he has been getting in the morning almost every day for the last couple of weeks.  After he had a bowel movement today, he states that his shortness of breath did improve somewhat.  However he did try his own inhaler and nebulizer at home without much relief.  He also has some tightness in his chest, that he indicates is similar to prior COPD flares.  EMS arrived and provided him 2 additional DuoNebs, and he feels almost back to baseline now.  He no longer has the abdominal pain, and he denies having any nausea or vomiting.  He does say that he has some issues with constipation.  He also denies any urinary symptoms such as dysuria, urinary frequency, urgency, or hematuria.  He also denies any fevers.  Of note, he currently resides in a group home.  His oxygen saturation levels were apparently normal for EMS, and he is currently on his 3 L by nasal cannula that he normally is on.    Independent Historian   None    Review of External Notes   I reviewed the patient's [facility]    Past Medical History     Medical History and Problem List   Past Medical History:   Diagnosis Date    CAD (coronary artery disease)     Chronic back pain     Chronic systolic congestive heart failure (H)     COPD (chronic obstructive pulmonary disease) (H)     Depressive disorder     Myocardial infarction (H)     Nicotine dependence        Medications   predniSONE (DELTASONE) 20 MG tablet  acetaminophen (TYLENOL) 325 MG tablet  albuterol (PROAIR  "HFA/PROVENTIL HFA/VENTOLIN HFA) 108 (90 Base) MCG/ACT inhaler  aspirin (ASA) 81 MG chewable tablet  atorvastatin (LIPITOR) 40 MG tablet  budeson-glycopyrrol-formoterol (BREZTRI AEROSPHERE) 160-9-4.8 MCG/ACT AERO inhaler  buprenorphine HCl-naloxone HCl (SUBOXONE) 8-2 MG per film  buPROPion (WELLBUTRIN XL) 300 MG 24 hr tablet  carvedilol (COREG) 25 MG tablet  diclofenac (VOLTAREN) 1 % topical gel  docusate sodium (COLACE) 100 MG capsule  ferrous sulfate (FE TABS) 325 (65 Fe) MG EC tablet  fluticasone (FLONASE) 50 MCG/ACT nasal spray  gabapentin (NEURONTIN) 300 MG capsule  gabapentin (NEURONTIN) 300 MG capsule  hydrALAZINE (APRESOLINE) 100 MG tablet  ipratropium - albuterol 0.5 mg/2.5 mg/3 mL (DUONEB) 0.5-2.5 (3) MG/3ML neb solution  ipratropium - albuterol 0.5 mg/2.5 mg/3 mL (DUONEB) 0.5-2.5 (3) MG/3ML neb solution  lisinopril (ZESTRIL) 40 MG tablet  melatonin 5 MG tablet  mirtazapine (REMERON) 45 MG tablet  multivitamin w/minerals (THERA-VIT-M) tablet  naloxone (NARCAN) 4 MG/0.1ML nasal spray  pantoprazole (PROTONIX) 40 MG EC tablet  polyethylene glycol (MIRALAX) 17 g packet  predniSONE (DELTASONE) 20 MG tablet  QUEtiapine (SEROQUEL) 25 MG tablet  senna-docusate (SENOKOT-S/PERICOLACE) 8.6-50 MG tablet        Surgical History   No past surgical history on file.    Physical Exam     Patient Vitals for the past 24 hrs:   BP Temp Temp src Pulse Resp SpO2 Height Weight   08/01/24 1830 (!) 144/103 -- -- 72 -- 99 % -- --   08/01/24 1800 (!) 147/85 -- -- 69 -- 100 % -- --   08/01/24 1659 (!) 168/91 98.7  F (37.1  C) Temporal 78 20 98 % 1.753 m (5' 9\") 73.9 kg (163 lb)     Physical Exam  Nursing note and vitals reviewed.  Constitutional:  Oriented to person, place, and time. Cooperative.  On oxygen by nasal cannula.  HENT:   Nose:    Nose normal.   Mouth/Throat:   Mucous membranes are normal.   Eyes:    Conjunctivae normal and EOM are normal.      Pupils are equal, round, and reactive to light.   Neck:    Trachea normal. "   Cardiovascular:  Normal rate, regular rhythm, normal heart sounds and normal pulses. No murmur heard.  Pulmonary/Chest:  Diminished breath sounds but otherwise normal breath sounds and normal effort.   Abdominal:   Soft. Normal appearance and bowel sounds are normal.      There is no tenderness.      There is no rebound and no CVA tenderness.   Musculoskeletal:  Extremities atraumatic x 4.   Lymphadenopathy:  No cervical adenopathy.   Neurological:   Alert and oriented to person, place, and time. Normal strength.      No cranial nerve deficit or sensory deficit. GCS eye subscore is 4. GCS verbal subscore is 5. GCS motor subscore is 6.   Skin:    Skin is intact. No rash noted.   Psychiatric:   Normal mood and affect.      Diagnostics     Lab Results   Labs Ordered and Resulted from Time of ED Arrival to Time of ED Departure   COMPREHENSIVE METABOLIC PANEL - Abnormal       Result Value    Sodium 137      Potassium 4.0      Carbon Dioxide (CO2) 35 (*)     Anion Gap 6 (*)     Urea Nitrogen 8.5      Creatinine 0.87      GFR Estimate >90      Calcium 9.8      Chloride 96 (*)     Glucose 77      Alkaline Phosphatase 151 (*)     AST 29      ALT 32      Protein Total 7.9      Albumin 4.6      Bilirubin Total 0.4     CBC WITH PLATELETS AND DIFFERENTIAL - Abnormal    WBC Count 5.6      RBC Count 4.02 (*)     Hemoglobin 11.8 (*)     Hematocrit 36.6 (*)     MCV 91      MCH 29.4      MCHC 32.2      RDW 12.8      Platelet Count        % Neutrophils 62      % Lymphocytes 22      % Monocytes 11      % Eosinophils 5      % Basophils 1      % Immature Granulocytes 0      NRBCs per 100 WBC 0      Absolute Neutrophils 3.4      Absolute Lymphocytes 1.2      Absolute Monocytes 0.6      Absolute Eosinophils 0.3      Absolute Basophils 0.1      Absolute Immature Granulocytes 0.0      Absolute NRBCs 0.0     RBC AND PLATELET MORPHOLOGY - Abnormal    RBC Morphology Confirmed RBC Indices      Platelet Assessment Platelets Clumped (*)    ISTAT  GASES LACTATE VENOUS POCT - Abnormal    Lactic Acid POCT <0.3      Bicarbonate Venous POCT 36 (*)     O2 Sat, Venous POCT 31 (*)     pCO2 Venous POCT 66 (*)     pH Venous POCT 7.35      pO2 Venous POCT 21 (*)     Base Excess/Deficit (+/-) POCT 8.0 (*)    LIPASE - Normal    Lipase 22     TROPONIN T, HIGH SENSITIVITY - Normal    Troponin T, High Sensitivity 14     ROUTINE UA WITH MICROSCOPIC REFLEX TO CULTURE - Normal    Color Urine Light Yellow      Appearance Urine Clear      Glucose Urine Negative      Bilirubin Urine Negative      Ketones Urine Negative      Specific Gravity Urine 1.007      Blood Urine Negative      pH Urine 7.0      Protein Albumin Urine Negative      Urobilinogen Urine Normal      Nitrite Urine Negative      Leukocyte Esterase Urine Negative      RBC Urine 0      WBC Urine 0         Imaging   XR Chest 2 Views   Final Result   IMPRESSION: No change. Lungs hyperinflated consistent with COPD. Minimal linear scarring, no focal pneumonia or pleural effusion evident. Heart size normal.          EKG   ECG results from 08/01/24   EKG 12-lead, tracing only     Value    Systolic Blood Pressure     Diastolic Blood Pressure     Ventricular Rate 66    Atrial Rate 66    AK Interval 146    QRS Duration 76        QTc 415    P Axis 74    R AXIS 74    T Axis 84    Interpretation ECG      Sinus rhythm  Normal ECG  When compared with ECG of 11-Sep-2023 20:53,  Non-specific change in ST segment in Lateral leads  Confirmed by GENERATED REPORT, COMPUTER ([id number]),  Thony Baker ([id number]) on 8/1/2024 5:24:37 PM         Independent Interpretation   I independently interpreted the patient's chest x-ray and agree with the negative reading for infiltrate.    ED Course      Medications Administered   Medications   methylPREDNISolone sodium succinate (solu-MEDROL) injection 125 mg (125 mg Intravenous $Given 8/1/24 7301)       Procedures   Procedures     Discussion of Management   None    ED Course         Additional Documentation  Stress/Adjustment Disorders and Social Connections/Isolation    Medical Decision Making / Diagnosis     CMS Diagnoses: None    MIPS       None    MDM   Gerson Pearson is a 64 year old male brought in by EMS for further evaluation of shortness of breath.  He actually had already improved to his baseline after receiving 2 DuoNeb's from EMS.  He was not in respiratory distress or hypoxic upon arrival here.  I felt it was reasonable nonetheless to proceed with the above workup including EKG, chest x-ray, and blood work.  I wanted to ensure that he did not have any signs of pneumonia, CHF, cardiac ischemia, or sepsis.  His workup here is unremarkable, and he was provided IV Solu-Medrol.  He feels comfortable going back to his group home, and I think that is reasonable and appropriate as well.  I will send him home with a prescription for 5 more days of prednisone which she should start tomorrow.  He should follow-up with primary care if he is not improving and certainly return here or to another ER with any concerns or worsening symptoms.    Disposition   The patient was discharged.     Diagnosis     ICD-10-CM    1. COPD exacerbation (H)  J44.1            Discharge Medications   New Prescriptions    PREDNISONE (DELTASONE) 20 MG TABLET    Take two tablets (= 40mg) each day for 5 (five) days         MD Hank Michele Seth H, MD  08/01/24 [id number]

## 2024-08-21 ENCOUNTER — HOSPITAL ENCOUNTER (EMERGENCY)
Facility: CLINIC | Age: 64
Discharge: HOME | End: 2024-08-21
Attending: EMERGENCY MEDICINE | Admitting: EMERGENCY MEDICINE
Payer: MEDICARE

## 2024-08-21 ENCOUNTER — APPOINTMENT (OUTPATIENT)
Dept: GENERAL RADIOLOGY | Facility: CLINIC | Age: 64
End: 2024-08-21
Attending: EMERGENCY MEDICINE
Payer: MEDICARE

## 2024-08-21 VITALS
WEIGHT: 163 LBS | DIASTOLIC BLOOD PRESSURE: 77 MMHG | RESPIRATION RATE: 22 BRPM | TEMPERATURE: 98.2 F | OXYGEN SATURATION: 98 % | SYSTOLIC BLOOD PRESSURE: 144 MMHG | HEIGHT: 69 IN | BODY MASS INDEX: 24.14 KG/M2 | HEART RATE: 89 BPM

## 2024-08-21 DIAGNOSIS — F41.1 ANXIETY REACTION: ICD-10-CM

## 2024-08-21 DIAGNOSIS — J44.1 COPD EXACERBATION (H): ICD-10-CM

## 2024-08-21 LAB
ALBUMIN SERPL BCG-MCNC: 4.2 G/DL (ref 3.5–5.2)
ALP SERPL-CCNC: 134 U/L (ref 40–150)
ALT SERPL W P-5'-P-CCNC: 27 U/L (ref 0–70)
ANION GAP SERPL CALCULATED.3IONS-SCNC: 5 MMOL/L (ref 7–15)
AST SERPL W P-5'-P-CCNC: 38 U/L (ref 0–45)
ATRIAL RATE - MUSE: 80 BPM
BASE EXCESS BLDV CALC-SCNC: 6 MMOL/L (ref -3–3)
BASOPHILS # BLD AUTO: 0 10E3/UL (ref 0–0.2)
BASOPHILS NFR BLD AUTO: 0 %
BILIRUB SERPL-MCNC: 0.3 MG/DL
BUN SERPL-MCNC: 9.9 MG/DL (ref 8–23)
CALCIUM SERPL-MCNC: 9 MG/DL (ref 8.8–10.4)
CHLORIDE SERPL-SCNC: 99 MMOL/L (ref 98–107)
CREAT SERPL-MCNC: 0.9 MG/DL (ref 0.67–1.17)
DIASTOLIC BLOOD PRESSURE - MUSE: NORMAL MMHG
EGFRCR SERPLBLD CKD-EPI 2021: >90 ML/MIN/1.73M2
EOSINOPHIL # BLD AUTO: 0.3 10E3/UL (ref 0–0.7)
EOSINOPHIL NFR BLD AUTO: 6 %
ERYTHROCYTE [DISTWIDTH] IN BLOOD BY AUTOMATED COUNT: 12.7 % (ref 10–15)
GLUCOSE SERPL-MCNC: 125 MG/DL (ref 70–99)
HCO3 BLDV-SCNC: 33 MMOL/L (ref 21–28)
HCO3 SERPL-SCNC: 32 MMOL/L (ref 22–29)
HCT VFR BLD AUTO: 35.6 % (ref 40–53)
HGB BLD-MCNC: 11.1 G/DL (ref 13.3–17.7)
IMM GRANULOCYTES # BLD: 0 10E3/UL
IMM GRANULOCYTES NFR BLD: 0 %
INTERPRETATION ECG - MUSE: NORMAL
LACTATE BLD-SCNC: 0.4 MMOL/L
LYMPHOCYTES # BLD AUTO: 0.9 10E3/UL (ref 0.8–5.3)
LYMPHOCYTES NFR BLD AUTO: 16 %
MCH RBC QN AUTO: 28.8 PG (ref 26.5–33)
MCHC RBC AUTO-ENTMCNC: 31.2 G/DL (ref 31.5–36.5)
MCV RBC AUTO: 93 FL (ref 78–100)
MONOCYTES # BLD AUTO: 0.7 10E3/UL (ref 0–1.3)
MONOCYTES NFR BLD AUTO: 12 %
NEUTROPHILS # BLD AUTO: 3.5 10E3/UL (ref 1.6–8.3)
NEUTROPHILS NFR BLD AUTO: 66 %
NRBC # BLD AUTO: 0 10E3/UL
NRBC BLD AUTO-RTO: 0 /100
P AXIS - MUSE: 79 DEGREES
PCO2 BLDV: 60 MM HG (ref 40–50)
PH BLDV: 7.36 [PH] (ref 7.32–7.43)
PLATELET # BLD AUTO: 144 10E3/UL (ref 150–450)
PO2 BLDV: 24 MM HG (ref 25–47)
POTASSIUM SERPL-SCNC: 5.1 MMOL/L (ref 3.4–5.3)
PR INTERVAL - MUSE: 126 MS
PROT SERPL-MCNC: 7.7 G/DL (ref 6.4–8.3)
QRS DURATION - MUSE: 78 MS
QT - MUSE: 356 MS
QTC - MUSE: 410 MS
R AXIS - MUSE: 76 DEGREES
RBC # BLD AUTO: 3.85 10E6/UL (ref 4.4–5.9)
SAO2 % BLDV: 38 % (ref 70–75)
SODIUM SERPL-SCNC: 136 MMOL/L (ref 135–145)
SYSTOLIC BLOOD PRESSURE - MUSE: NORMAL MMHG
T AXIS - MUSE: 73 DEGREES
VENTRICULAR RATE- MUSE: 80 BPM
WBC # BLD AUTO: 5.3 10E3/UL (ref 4–11)

## 2024-08-21 PROCEDURE — 82803 BLOOD GASES ANY COMBINATION: CPT

## 2024-08-21 PROCEDURE — 80053 COMPREHEN METABOLIC PANEL: CPT | Performed by: EMERGENCY MEDICINE

## 2024-08-21 PROCEDURE — 250N000012 HC RX MED GY IP 250 OP 636 PS 637: Performed by: EMERGENCY MEDICINE

## 2024-08-21 PROCEDURE — 93005 ELECTROCARDIOGRAM TRACING: CPT

## 2024-08-21 PROCEDURE — 71046 X-RAY EXAM CHEST 2 VIEWS: CPT

## 2024-08-21 PROCEDURE — 99285 EMERGENCY DEPT VISIT HI MDM: CPT | Mod: 25

## 2024-08-21 PROCEDURE — 36415 COLL VENOUS BLD VENIPUNCTURE: CPT | Performed by: EMERGENCY MEDICINE

## 2024-08-21 PROCEDURE — 85025 COMPLETE CBC W/AUTO DIFF WBC: CPT | Performed by: EMERGENCY MEDICINE

## 2024-08-21 RX ORDER — PREDNISONE 20 MG/1
60 TABLET ORAL ONCE
Status: COMPLETED | OUTPATIENT
Start: 2024-08-21 | End: 2024-08-21

## 2024-08-21 RX ORDER — PREDNISONE 20 MG/1
60 TABLET ORAL DAILY
Qty: 9 TABLET | Refills: 0 | Status: SHIPPED | OUTPATIENT
Start: 2024-08-21 | End: 2024-08-24

## 2024-08-21 RX ADMIN — PREDNISONE 60 MG: 20 TABLET ORAL at 05:17

## 2024-08-21 ASSESSMENT — ACTIVITIES OF DAILY LIVING (ADL)
ADLS_ACUITY_SCORE: 37

## 2024-08-21 ASSESSMENT — COLUMBIA-SUICIDE SEVERITY RATING SCALE - C-SSRS
2. HAVE YOU ACTUALLY HAD ANY THOUGHTS OF KILLING YOURSELF IN THE PAST MONTH?: NO
1. IN THE PAST MONTH, HAVE YOU WISHED YOU WERE DEAD OR WISHED YOU COULD GO TO SLEEP AND NOT WAKE UP?: NO
6. HAVE YOU EVER DONE ANYTHING, STARTED TO DO ANYTHING, OR PREPARED TO DO ANYTHING TO END YOUR LIFE?: NO

## 2024-08-21 NOTE — ED PROVIDER NOTES
Emergency Department Note      History of Present Illness     Chief Complaint   Shortness of Breath      HPI   Gerson Pearson is a 64 year old male with a history of COPD, hypertension, and NSTEMI presenting to the ED for evaluation of shortness of breath. The patient reports that he has been having difficulty breathing tonight, and it feels similar to his COPD flare up a few weeks ago. The breathing treatments in the ambulance provided some relief. He used his inhaler and nebulizer tonight when his symptoms started as well. He has not smoked in nine months. The patient denies any other medical concerns at this time.     Independent Historian   None      Past Medical History     Medical History and Problem List   CAD  Chronic back pain  CHF  COPD  Depressive disorder  Myocardial infarction   Vitamin D deficiency   Acute respiratory distress   Hypercapnia   NSTEMI   GI bleed  Alcohol dependence   Hypertension     Medications   Tylenol  Proair  Aspirin  Lipitor  Breztri   Suboxone  Wellbutrin  Coreg  Colace  Neurontin  Apresoline  Zestril   Remeron  Narcan  Protonix  Deltasone  Seroquel   Senokot     Surgical History   PCI     Physical Exam     No data found.    Physical Exam  General: Appears well-developed and well-nourished.   Head: No signs of trauma.   CV: Normal rate and regular rhythm.    Resp: Effort normal and breath sounds normal. No respiratory distress.   GI: Soft. There is no tenderness.  No rebound or guarding.  Normal bowel sounds.  No CVA tenderness.  MSK: Normal range of motion. no edema. No Calf tenderness.  Neuro: The patient is alert and oriented. Sensation normal. Speech normal.  Skin: Skin is warm and dry. No rash noted.   Psych: normal mood and affect. behavior is normal.       Diagnostics     Lab Results   Labs Ordered and Resulted from Time of ED Arrival to Time of ED Departure   COMPREHENSIVE METABOLIC PANEL - Abnormal       Result Value    Sodium 136      Potassium 5.1      Carbon Dioxide  (CO2) 32 (*)     Anion Gap 5 (*)     Urea Nitrogen 9.9      Creatinine 0.90      GFR Estimate >90      Calcium 9.0      Chloride 99      Glucose 125 (*)     Alkaline Phosphatase 134      AST 38      ALT 27      Protein Total 7.7      Albumin 4.2      Bilirubin Total 0.3     CBC WITH PLATELETS AND DIFFERENTIAL - Abnormal    WBC Count 5.3      RBC Count 3.85 (*)     Hemoglobin 11.1 (*)     Hematocrit 35.6 (*)     MCV 93      MCH 28.8      MCHC 31.2 (*)     RDW 12.7      Platelet Count 144 (*)     % Neutrophils 66      % Lymphocytes 16      % Monocytes 12      % Eosinophils 6      % Basophils 0      % Immature Granulocytes 0      NRBCs per 100 WBC 0      Absolute Neutrophils 3.5      Absolute Lymphocytes 0.9      Absolute Monocytes 0.7      Absolute Eosinophils 0.3      Absolute Basophils 0.0      Absolute Immature Granulocytes 0.0      Absolute NRBCs 0.0     ISTAT GASES LACTATE VENOUS POCT - Abnormal    Lactic Acid POCT 0.4      Bicarbonate Venous POCT 33 (*)     O2 Sat, Venous POCT 38 (*)     pCO2 Venous POCT 60 (*)     pH Venous POCT 7.36      pO2 Venous POCT 24 (*)     Base Excess/Deficit (+/-) POCT 6.0 (*)      Imaging   XR Chest 2 Views   Final Result   IMPRESSION: No convincing evidence of active cardiopulmonary disease.          Report per radiology.     EKG   ECG taken at 0503, ECG read at 0510  Normal sinus rhythm   No significant change as compared to prior, dated 8/1/24.  Rate 80 bpm. ME interval 126 ms. QRS duration 78 ms. QT/QTc 356/410 ms. P-R-T axes 79 76 73.    Independent Interpretation   On my independent interpretation of CXR there is no pneumothorax      ED Course      Medications Administered   Medications   predniSONE (DELTASONE) tablet 60 mg (60 mg Oral $Given 8/21/24 0517)       ED Course   ED Course as of 08/24/24 0204   Wed Aug 21, 2024   0511 I obtained history and examined the patient as noted above.       Additional Documentation  None    Medical Decision Making / Diagnosis     CMS  Diagnoses: None    MIPS       None    [facility]   Gerson Pearson is a 64 year old male presents with shortness of breath and anxiety.  He reports a history of COPD and when he becomes short of breath he becomes anxious.  He had called 911 and received breathing treatments en route and he did report some relief with this.  On my evaluation he did not have any respiratory distress and appropriate vital signs and physical exam.  Blood was obtained that was reassuring and chest x-ray did not show any signs of infiltrate.  He was given a dose of prednisone and on repeat evaluation he continued to feel well.  His symptoms do sound consistent with COPD exacerbation.  At this point, I do not feel that he needs hospitalization and felt he is appropriate for discharge home with a short course of steroid.  I stressed the importance of following up with his doctor in clinic.    Disposition   The patient was discharged.     Diagnosis     ICD-10-CM    1. COPD exacerbation (H)  J44.1       2. Anxiety reaction  F41.1            Discharge Medications   Discharge Medication List as of 8/21/2024  7:00 AM        Scribe Disclosure:  I, Jihan Cordero, am serving as a scribe at 5:15 AM on 8/21/2024 to document services personally performed by Manjeet Kwon MD based on my observations and the provider's statements to me.        Manjeet Kwon MD  08/24/24 9626

## 2024-08-21 NOTE — ED TRIAGE NOTES
Patient arrives via EMS from  with COPD exacerbation. Pt had neb @  with no relief. EMS gave neb en route, pt states he feels better. VSS. Baseline O2 2-4L.      Triage Assessment (Adult)       Row Name 08/21/24 0507          Triage Assessment    Airway WDL WDL        Respiratory WDL    Respiratory WDL rhythm/pattern     Rhythm/Pattern, Respiratory shortness of breath;shallow        Skin Circulation/Temperature WDL    Skin Circulation/Temperature WDL WDL        Cardiac WDL    Cardiac WDL WDL        Peripheral/Neurovascular WDL    Peripheral Neurovascular WDL WDL        Cognitive/Neuro/Behavioral WDL    Cognitive/Neuro/Behavioral WDL WDL

## 2024-08-21 NOTE — ED NOTES
Bed: ED13  Expected date:   Expected time:   Means of arrival:   Comments:  545 COPD, SOB, 1 neb,

## 2024-09-21 ENCOUNTER — HOSPITAL ENCOUNTER (EMERGENCY)
Facility: CLINIC | Age: 64
Discharge: NURSING, REHAB OR LONG-TERM CARE | End: 2024-09-22
Attending: EMERGENCY MEDICINE | Admitting: EMERGENCY MEDICINE
Payer: MEDICARE

## 2024-09-21 ENCOUNTER — APPOINTMENT (OUTPATIENT)
Dept: CT IMAGING | Facility: CLINIC | Age: 64
End: 2024-09-21
Attending: EMERGENCY MEDICINE
Payer: MEDICARE

## 2024-09-21 VITALS
HEART RATE: 70 BPM | SYSTOLIC BLOOD PRESSURE: 145 MMHG | BODY MASS INDEX: 24.14 KG/M2 | TEMPERATURE: 98.3 F | DIASTOLIC BLOOD PRESSURE: 90 MMHG | HEIGHT: 69 IN | OXYGEN SATURATION: 99 % | WEIGHT: 163 LBS | RESPIRATION RATE: 18 BRPM

## 2024-09-21 DIAGNOSIS — R10.9 RIGHT FLANK PAIN: ICD-10-CM

## 2024-09-21 DIAGNOSIS — R10.9 ABDOMINAL PAIN, UNSPECIFIED ABDOMINAL LOCATION: ICD-10-CM

## 2024-09-21 LAB
ALBUMIN SERPL BCG-MCNC: 4.4 G/DL (ref 3.5–5.2)
ALBUMIN UR-MCNC: NEGATIVE MG/DL
ALP SERPL-CCNC: 141 U/L (ref 40–150)
ALT SERPL W P-5'-P-CCNC: 17 U/L (ref 0–70)
ANION GAP SERPL CALCULATED.3IONS-SCNC: 6 MMOL/L (ref 7–15)
APPEARANCE UR: CLEAR
AST SERPL W P-5'-P-CCNC: 19 U/L (ref 0–45)
BASOPHILS # BLD AUTO: 0 10E3/UL (ref 0–0.2)
BASOPHILS NFR BLD AUTO: 1 %
BILIRUB SERPL-MCNC: 0.3 MG/DL
BILIRUB UR QL STRIP: NEGATIVE
BUN SERPL-MCNC: 14.4 MG/DL (ref 8–23)
CALCIUM SERPL-MCNC: 9.1 MG/DL (ref 8.8–10.4)
CHLORIDE SERPL-SCNC: 97 MMOL/L (ref 98–107)
COLOR UR AUTO: NORMAL
CREAT SERPL-MCNC: 0.83 MG/DL (ref 0.67–1.17)
EGFRCR SERPLBLD CKD-EPI 2021: >90 ML/MIN/1.73M2
EOSINOPHIL # BLD AUTO: 0.2 10E3/UL (ref 0–0.7)
EOSINOPHIL NFR BLD AUTO: 4 %
ERYTHROCYTE [DISTWIDTH] IN BLOOD BY AUTOMATED COUNT: 12.7 % (ref 10–15)
GLUCOSE SERPL-MCNC: 98 MG/DL (ref 70–99)
GLUCOSE UR STRIP-MCNC: NEGATIVE MG/DL
HCO3 SERPL-SCNC: 33 MMOL/L (ref 22–29)
HCT VFR BLD AUTO: 34.6 % (ref 40–53)
HGB BLD-MCNC: 10.8 G/DL (ref 13.3–17.7)
HGB UR QL STRIP: NEGATIVE
IMM GRANULOCYTES # BLD: 0 10E3/UL
IMM GRANULOCYTES NFR BLD: 0 %
KETONES UR STRIP-MCNC: NEGATIVE MG/DL
LEUKOCYTE ESTERASE UR QL STRIP: NEGATIVE
LIPASE SERPL-CCNC: 23 U/L (ref 13–60)
LYMPHOCYTES # BLD AUTO: 1.1 10E3/UL (ref 0.8–5.3)
LYMPHOCYTES NFR BLD AUTO: 19 %
MCH RBC QN AUTO: 28.6 PG (ref 26.5–33)
MCHC RBC AUTO-ENTMCNC: 31.2 G/DL (ref 31.5–36.5)
MCV RBC AUTO: 92 FL (ref 78–100)
MONOCYTES # BLD AUTO: 0.6 10E3/UL (ref 0–1.3)
MONOCYTES NFR BLD AUTO: 11 %
NEUTROPHILS # BLD AUTO: 3.7 10E3/UL (ref 1.6–8.3)
NEUTROPHILS NFR BLD AUTO: 65 %
NITRATE UR QL: NEGATIVE
NRBC # BLD AUTO: 0 10E3/UL
NRBC BLD AUTO-RTO: 0 /100
PH UR STRIP: 7 [PH] (ref 5–7)
PLATELET # BLD AUTO: 147 10E3/UL (ref 150–450)
POTASSIUM SERPL-SCNC: 4.3 MMOL/L (ref 3.4–5.3)
PROT SERPL-MCNC: 7.3 G/DL (ref 6.4–8.3)
RBC # BLD AUTO: 3.77 10E6/UL (ref 4.4–5.9)
RBC URINE: 0 /HPF
SODIUM SERPL-SCNC: 136 MMOL/L (ref 135–145)
SP GR UR STRIP: 1 (ref 1–1.03)
UROBILINOGEN UR STRIP-MCNC: NORMAL MG/DL
WBC # BLD AUTO: 5.6 10E3/UL (ref 4–11)
WBC URINE: 0 /HPF

## 2024-09-21 PROCEDURE — 85048 AUTOMATED LEUKOCYTE COUNT: CPT | Performed by: EMERGENCY MEDICINE

## 2024-09-21 PROCEDURE — 83690 ASSAY OF LIPASE: CPT | Performed by: EMERGENCY MEDICINE

## 2024-09-21 PROCEDURE — 250N000011 HC RX IP 250 OP 636: Performed by: EMERGENCY MEDICINE

## 2024-09-21 PROCEDURE — 250N000009 HC RX 250: Performed by: EMERGENCY MEDICINE

## 2024-09-21 PROCEDURE — 99285 EMERGENCY DEPT VISIT HI MDM: CPT | Mod: 25

## 2024-09-21 PROCEDURE — 82040 ASSAY OF SERUM ALBUMIN: CPT | Performed by: EMERGENCY MEDICINE

## 2024-09-21 PROCEDURE — 36415 COLL VENOUS BLD VENIPUNCTURE: CPT | Performed by: EMERGENCY MEDICINE

## 2024-09-21 PROCEDURE — 81003 URINALYSIS AUTO W/O SCOPE: CPT | Performed by: EMERGENCY MEDICINE

## 2024-09-21 PROCEDURE — 74177 CT ABD & PELVIS W/CONTRAST: CPT | Mod: MA

## 2024-09-21 PROCEDURE — 93005 ELECTROCARDIOGRAM TRACING: CPT

## 2024-09-21 RX ORDER — IOPAMIDOL 755 MG/ML
82 INJECTION, SOLUTION INTRAVASCULAR ONCE
Status: COMPLETED | OUTPATIENT
Start: 2024-09-21 | End: 2024-09-21

## 2024-09-21 RX ADMIN — IOPAMIDOL 82 ML: 755 INJECTION, SOLUTION INTRAVENOUS at 22:49

## 2024-09-21 RX ADMIN — SODIUM CHLORIDE 63 ML: 9 INJECTION, SOLUTION INTRAVENOUS at 22:49

## 2024-09-21 ASSESSMENT — ACTIVITIES OF DAILY LIVING (ADL)
ADLS_ACUITY_SCORE: 37

## 2024-09-22 LAB
ATRIAL RATE - MUSE: 75 BPM
DIASTOLIC BLOOD PRESSURE - MUSE: NORMAL MMHG
INTERPRETATION ECG - MUSE: NORMAL
P AXIS - MUSE: 73 DEGREES
PR INTERVAL - MUSE: 136 MS
QRS DURATION - MUSE: 78 MS
QT - MUSE: 368 MS
QTC - MUSE: 410 MS
R AXIS - MUSE: 70 DEGREES
SYSTOLIC BLOOD PRESSURE - MUSE: NORMAL MMHG
T AXIS - MUSE: 70 DEGREES
VENTRICULAR RATE- MUSE: 75 BPM

## 2024-09-22 ASSESSMENT — ACTIVITIES OF DAILY LIVING (ADL)
ADLS_ACUITY_SCORE: 37
ADLS_ACUITY_SCORE: 37

## 2024-09-22 NOTE — ED PROVIDER NOTES
"  Emergency Department Note      History of Present Illness     Chief Complaint   Flank Pain      HPI   Gerson Pearson is a 64 year old male with a history of COPD, hypertension, and NSTEMI presenting to the ED for evaluation of right flank pain. The patient reports that he has been having right lower quadrant pain that radiates around to his flank. His pain began radiating this morning, but the right lower quadrant pain has been present for two months. He is on 4L oxygen at baseline for COPD. No nausea, vomiting, or urinary symptoms. He adds that his bowel movements have been abnormal, but did not specify how.  He states that his breathing is at its baseline.    Independent Historian   None    Review of External Notes   I reviewed the [facility] clinic note from 6/18/2024 which discusses his lower abdominal pain.    Past Medical History     Medical History and Problem List   CAD  Chronic back pain  CHF  COPD  Depressive disorder  Myocardial infarction   Vitamin D deficiency   Acute respiratory distress   Hypercapnia   NSTEMI   GI bleed  Alcohol dependence   Hypertension     Medications   Tylenol  Proair  Aspirin  Lipitor  Breztri   Suboxone  Wellbutrin  Coreg  Colace  Neurontin  Apresoline  Zestril   Remeron  Narcan  Protonix  Deltasone  Seroquel   Senokot     Surgical History   PCI    Physical Exam     Patient Vitals for the past 24 hrs:   BP Temp Pulse Resp SpO2 Height Weight   09/21/24 2041 (!) 145/90 98.3  F (36.8  C) 70 18 99 % 1.753 m (5' 9\") 73.9 kg (163 lb)     Physical Exam  Nursing note and vitals reviewed.  Constitutional:  Oriented to person, place, and time. Cooperative.  On oxygen by nasal cannula.  HENT:   Nose:    Nose normal.   Mouth/Throat:   Mucous membranes are normal.   Eyes:    Conjunctivae normal and EOM are normal.      Pupils are equal, round, and reactive to light.   Neck:    Trachea normal.   Cardiovascular:  Normal rate, regular rhythm, normal heart sounds and normal pulses. No " murmur heard.  Pulmonary/Chest:  Diminished breath sounds throughout but normal effort.   Abdominal:   Soft. Normal appearance and bowel sounds are normal.      There is some tenderness to the right side but no right CVA tenderness to palpation.      There is no rebound.   Musculoskeletal:  Extremities atraumatic x 4.   Lymphadenopathy:  No cervical adenopathy.   Neurological:   Alert and oriented to person, place, and time. Normal strength.      No cranial nerve deficit or sensory deficit. GCS eye subscore is 4. GCS verbal subscore is 5. GCS motor subscore is 6.   Skin:    Skin is intact. No rash noted.   Psychiatric:   Normal mood and affect.      Diagnostics     Lab Results   Labs Ordered and Resulted from Time of ED Arrival to Time of ED Departure   COMPREHENSIVE METABOLIC PANEL - Abnormal       Result Value    Sodium 136      Potassium 4.3      Carbon Dioxide (CO2) 33 (*)     Anion Gap 6 (*)     Urea Nitrogen 14.4      Creatinine 0.83      GFR Estimate >90      Calcium 9.1      Chloride 97 (*)     Glucose 98      Alkaline Phosphatase 141      AST 19      ALT 17      Protein Total 7.3      Albumin 4.4      Bilirubin Total 0.3     CBC WITH PLATELETS AND DIFFERENTIAL - Abnormal    WBC Count 5.6      RBC Count 3.77 (*)     Hemoglobin 10.8 (*)     Hematocrit 34.6 (*)     MCV 92      MCH 28.6      MCHC 31.2 (*)     RDW 12.7      Platelet Count 147 (*)     % Neutrophils 65      % Lymphocytes 19      % Monocytes 11      % Eosinophils 4      % Basophils 1      % Immature Granulocytes 0      NRBCs per 100 WBC 0      Absolute Neutrophils 3.7      Absolute Lymphocytes 1.1      Absolute Monocytes 0.6      Absolute Eosinophils 0.2      Absolute Basophils 0.0      Absolute Immature Granulocytes 0.0      Absolute NRBCs 0.0     LIPASE - Normal    Lipase 23     ROUTINE UA WITH MICROSCOPIC REFLEX TO CULTURE - Normal    Color Urine Straw      Appearance Urine Clear      Glucose Urine Negative      Bilirubin Urine Negative       Ketones Urine Negative      Specific Gravity Urine 1.003      Blood Urine Negative      pH Urine 7.0      Protein Albumin Urine Negative      Urobilinogen Urine Normal      Nitrite Urine Negative      Leukocyte Esterase Urine Negative      RBC Urine 0      WBC Urine 0         Imaging   CT Abdomen Pelvis w Contrast   Final Result   IMPRESSION:    1.  No bowel obstruction or abscess.   2.  The appendix is prominent but there is no right lower quadrant inflammatory change. If there is clinical concern for evolving appendicitis consider follow-up.   3.  The stomach is underdistended reducing evaluation for gastritis.   4.  Fold at the fundus of the gallbladder. Underlying gallbladder polyp not excluded. Consider correlation with ultrasound.      Report per radiology.     EKG  ECG taken at 2107, ECG read at 2117  Normal sinus rhythm    No significant change as compared to prior, dated 8/21/24.  Rate 75 bpm. MD interval 136 ms. QRS duration 78 ms. QT/QTc 368/410 ms. P-R-T axes 73 70 70.     Independent Interpretation   None    ED Course      Medications Administered   Medications   iopamidol (ISOVUE-370) solution 82 mL (82 mLs Intravenous $Given 9/21/24 2249)   sodium chloride 0.9 % CT scan flush use (63 mLs Intravenous $Given 9/21/24 2249)       Discussion of Management   None    ED Course   ED Course as of 09/22/24 0044   Sat Sep 21, 2024   2100 I obtained history and examined the patient as noted above.       Additional Documentation  None    Medical Decision Making / Diagnosis     CMS Diagnoses: None    MIPS       None    [facility]   Gerson Pearson is a 64 year old male who came in for further evaluation of right sided abdominal pain and right flank pain.  He is concerned he might have a kidney stone or kidney infection as well as possibly gallbladder related causes such as biliary colic, biliary obstruction, cholecystitis, or possibly pancreatitis.  I also considered the possibility of a bowel obstruction, constipation,  diverticulitis, or colitis.  I proceeded with the above workup including the blood work, urine, and CT scan.  His workup here does not show anything worrisome, and even though the CT scan was read as a prominent appendix, there is no surrounding inflammation and his WBC is normal.  He also has been having this pain for quite a few months, as he was seen in his family practice clinic on 6/18/2024 with lower abdominal pain at that time.  Therefore at this point I feel that he is safe for discharge and outpatient management.  I recommended close outpatient follow-up inserted returning with any concerns or worsening symptoms.    Disposition   The patient was discharged.     Diagnosis     ICD-10-CM    1. Right flank pain  R10.9       2. Abdominal pain, unspecified abdominal location  R10.9            Discharge Medications   New Prescriptions    No medications on file     Scribe Disclosure:  I, Jihan Consuelo, am serving as a scribe at 9:01 PM on 9/21/2024 to document services personally performed by Hans Mckinney MD based on my observations and the provider's statements to me.        Hans Mckinney MD  09/22/24 0046

## 2024-09-22 NOTE — ED TRIAGE NOTES
Pt comes in from  via EMS with complaints of R flank pain. Pt states he has been experiencing abd pain intermittently but the flank pain started today around 1200., describes it as achey. Denies pain radiating anywhere. Pt at baseline wears 4L O2 for COPD. VSS. Afebrile.      Triage Assessment (Adult)       Row Name 09/21/24 2043          Triage Assessment    Airway WDL WDL        Respiratory WDL    Respiratory WDL WDL        Skin Circulation/Temperature WDL    Skin Circulation/Temperature WDL WDL        Cardiac WDL    Cardiac WDL WDL        Peripheral/Neurovascular WDL    Peripheral Neurovascular WDL WDL        Cognitive/Neuro/Behavioral WDL    Cognitive/Neuro/Behavioral WDL WDL

## 2024-09-22 NOTE — ED NOTES
Bed: ED24  Expected date: 9/21/24  Expected time: 8:30 PM  Means of arrival: Ambulance  Comments:  Neal 540 64M side pain

## 2025-01-12 ENCOUNTER — HOSPITAL ENCOUNTER (OUTPATIENT)
Facility: CLINIC | Age: 65
Setting detail: OBSERVATION
Discharge: NURSING, REHAB OR LONG-TERM CARE | End: 2025-01-13
Attending: EMERGENCY MEDICINE | Admitting: HOSPITALIST
Payer: MEDICARE

## 2025-01-12 ENCOUNTER — APPOINTMENT (OUTPATIENT)
Dept: GENERAL RADIOLOGY | Facility: CLINIC | Age: 65
End: 2025-01-12
Attending: EMERGENCY MEDICINE
Payer: MEDICARE

## 2025-01-12 ENCOUNTER — APPOINTMENT (OUTPATIENT)
Dept: CT IMAGING | Facility: CLINIC | Age: 65
End: 2025-01-12
Attending: EMERGENCY MEDICINE
Payer: MEDICARE

## 2025-01-12 DIAGNOSIS — E87.1 HYPONATREMIA: ICD-10-CM

## 2025-01-12 DIAGNOSIS — J44.1 COPD EXACERBATION (H): ICD-10-CM

## 2025-01-12 DIAGNOSIS — R11.10 VOMITING, UNSPECIFIED VOMITING TYPE, UNSPECIFIED WHETHER NAUSEA PRESENT: ICD-10-CM

## 2025-01-12 LAB
ALBUMIN SERPL BCG-MCNC: 4.2 G/DL (ref 3.5–5.2)
ALP SERPL-CCNC: 120 U/L (ref 40–150)
ALT SERPL W P-5'-P-CCNC: 20 U/L (ref 0–70)
ANION GAP SERPL CALCULATED.3IONS-SCNC: 8 MMOL/L (ref 7–15)
AST SERPL W P-5'-P-CCNC: 21 U/L (ref 0–45)
BASOPHILS # BLD AUTO: 0 10E3/UL (ref 0–0.2)
BASOPHILS NFR BLD AUTO: 1 %
BILIRUB SERPL-MCNC: 0.6 MG/DL
BUN SERPL-MCNC: 9 MG/DL (ref 8–23)
CALCIUM SERPL-MCNC: 8.3 MG/DL (ref 8.8–10.4)
CHLORIDE SERPL-SCNC: 88 MMOL/L (ref 98–107)
CREAT SERPL-MCNC: 0.84 MG/DL (ref 0.67–1.17)
EGFRCR SERPLBLD CKD-EPI 2021: >90 ML/MIN/1.73M2
EOSINOPHIL # BLD AUTO: 0.2 10E3/UL (ref 0–0.7)
EOSINOPHIL NFR BLD AUTO: 2 %
ERYTHROCYTE [DISTWIDTH] IN BLOOD BY AUTOMATED COUNT: 12.4 % (ref 10–15)
FLUAV RNA SPEC QL NAA+PROBE: NEGATIVE
FLUBV RNA RESP QL NAA+PROBE: NEGATIVE
GLUCOSE SERPL-MCNC: 118 MG/DL (ref 70–99)
HCO3 SERPL-SCNC: 29 MMOL/L (ref 22–29)
HCT VFR BLD AUTO: 31.3 % (ref 40–53)
HGB BLD-MCNC: 10.2 G/DL (ref 13.3–17.7)
HOLD SPECIMEN: NORMAL
IMM GRANULOCYTES # BLD: 0 10E3/UL
IMM GRANULOCYTES NFR BLD: 0 %
LYMPHOCYTES # BLD AUTO: 0.8 10E3/UL (ref 0.8–5.3)
LYMPHOCYTES NFR BLD AUTO: 9 %
MCH RBC QN AUTO: 29.5 PG (ref 26.5–33)
MCHC RBC AUTO-ENTMCNC: 32.6 G/DL (ref 31.5–36.5)
MCV RBC AUTO: 91 FL (ref 78–100)
MONOCYTES # BLD AUTO: 0.7 10E3/UL (ref 0–1.3)
MONOCYTES NFR BLD AUTO: 8 %
NEUTROPHILS # BLD AUTO: 7 10E3/UL (ref 1.6–8.3)
NEUTROPHILS NFR BLD AUTO: 80 %
NRBC # BLD AUTO: 0 10E3/UL
NRBC BLD AUTO-RTO: 0 /100
PLATELET # BLD AUTO: 157 10E3/UL (ref 150–450)
POTASSIUM SERPL-SCNC: 4.2 MMOL/L (ref 3.4–5.3)
PROT SERPL-MCNC: 6.9 G/DL (ref 6.4–8.3)
RBC # BLD AUTO: 3.46 10E6/UL (ref 4.4–5.9)
RSV RNA SPEC NAA+PROBE: NEGATIVE
SARS-COV-2 RNA RESP QL NAA+PROBE: NEGATIVE
SODIUM SERPL-SCNC: 125 MMOL/L (ref 135–145)
WBC # BLD AUTO: 8.7 10E3/UL (ref 4–11)

## 2025-01-12 PROCEDURE — 96361 HYDRATE IV INFUSION ADD-ON: CPT

## 2025-01-12 PROCEDURE — 250N000011 HC RX IP 250 OP 636: Performed by: EMERGENCY MEDICINE

## 2025-01-12 PROCEDURE — 85025 COMPLETE CBC W/AUTO DIFF WBC: CPT | Performed by: EMERGENCY MEDICINE

## 2025-01-12 PROCEDURE — 94640 AIRWAY INHALATION TREATMENT: CPT

## 2025-01-12 PROCEDURE — 85018 HEMOGLOBIN: CPT | Performed by: EMERGENCY MEDICINE

## 2025-01-12 PROCEDURE — 82040 ASSAY OF SERUM ALBUMIN: CPT | Performed by: EMERGENCY MEDICINE

## 2025-01-12 PROCEDURE — 258N000003 HC RX IP 258 OP 636: Performed by: EMERGENCY MEDICINE

## 2025-01-12 PROCEDURE — 71046 X-RAY EXAM CHEST 2 VIEWS: CPT

## 2025-01-12 PROCEDURE — 74177 CT ABD & PELVIS W/CONTRAST: CPT

## 2025-01-12 PROCEDURE — 80053 COMPREHEN METABOLIC PANEL: CPT | Performed by: EMERGENCY MEDICINE

## 2025-01-12 PROCEDURE — 99222 1ST HOSP IP/OBS MODERATE 55: CPT | Mod: AI | Performed by: HOSPITALIST

## 2025-01-12 PROCEDURE — 250N000009 HC RX 250: Performed by: EMERGENCY MEDICINE

## 2025-01-12 PROCEDURE — 36415 COLL VENOUS BLD VENIPUNCTURE: CPT | Performed by: EMERGENCY MEDICINE

## 2025-01-12 PROCEDURE — 96374 THER/PROPH/DIAG INJ IV PUSH: CPT | Mod: 59

## 2025-01-12 PROCEDURE — 87637 SARSCOV2&INF A&B&RSV AMP PRB: CPT | Performed by: EMERGENCY MEDICINE

## 2025-01-12 PROCEDURE — 99285 EMERGENCY DEPT VISIT HI MDM: CPT | Mod: 25

## 2025-01-12 RX ORDER — ONDANSETRON 2 MG/ML
4 INJECTION INTRAMUSCULAR; INTRAVENOUS EVERY 30 MIN PRN
Status: DISCONTINUED | OUTPATIENT
Start: 2025-01-12 | End: 2025-01-13 | Stop reason: ALTCHOICE

## 2025-01-12 RX ORDER — DEXAMETHASONE SODIUM PHOSPHATE 10 MG/ML
10 INJECTION, SOLUTION INTRAMUSCULAR; INTRAVENOUS ONCE
Status: COMPLETED | OUTPATIENT
Start: 2025-01-12 | End: 2025-01-12

## 2025-01-12 RX ORDER — IPRATROPIUM BROMIDE AND ALBUTEROL SULFATE 2.5; .5 MG/3ML; MG/3ML
3 SOLUTION RESPIRATORY (INHALATION) ONCE
Status: COMPLETED | OUTPATIENT
Start: 2025-01-12 | End: 2025-01-12

## 2025-01-12 RX ORDER — IOPAMIDOL 755 MG/ML
85 INJECTION, SOLUTION INTRAVASCULAR ONCE
Status: COMPLETED | OUTPATIENT
Start: 2025-01-12 | End: 2025-01-12

## 2025-01-12 RX ADMIN — SODIUM CHLORIDE 63 ML: 9 INJECTION, SOLUTION INTRAVENOUS at 22:44

## 2025-01-12 RX ADMIN — IOPAMIDOL 85 ML: 755 INJECTION, SOLUTION INTRAVENOUS at 22:44

## 2025-01-12 RX ADMIN — DEXAMETHASONE SODIUM PHOSPHATE 10 MG: 10 INJECTION, SOLUTION INTRAMUSCULAR; INTRAVENOUS at 21:46

## 2025-01-12 RX ADMIN — IPRATROPIUM BROMIDE AND ALBUTEROL SULFATE 3 ML: .5; 3 SOLUTION RESPIRATORY (INHALATION) at 21:47

## 2025-01-12 RX ADMIN — SODIUM CHLORIDE 500 ML: 9 INJECTION, SOLUTION INTRAVENOUS at 23:29

## 2025-01-12 ASSESSMENT — ACTIVITIES OF DAILY LIVING (ADL)
ADLS_ACUITY_SCORE: 58
ADLS_ACUITY_SCORE: 58

## 2025-01-13 VITALS
OXYGEN SATURATION: 99 % | DIASTOLIC BLOOD PRESSURE: 82 MMHG | RESPIRATION RATE: 18 BRPM | SYSTOLIC BLOOD PRESSURE: 129 MMHG | WEIGHT: 158 LBS | BODY MASS INDEX: 23.33 KG/M2 | HEART RATE: 79 BPM | TEMPERATURE: 97.6 F

## 2025-01-13 LAB
ANION GAP SERPL CALCULATED.3IONS-SCNC: 10 MMOL/L (ref 7–15)
BASE EXCESS BLDV CALC-SCNC: 2.6 MMOL/L (ref -3–3)
BASOPHILS # BLD AUTO: 0 10E3/UL (ref 0–0.2)
BASOPHILS NFR BLD AUTO: 0 %
BUN SERPL-MCNC: 9.8 MG/DL (ref 8–23)
CALCIUM SERPL-MCNC: 8.7 MG/DL (ref 8.8–10.4)
CHLORIDE SERPL-SCNC: 99 MMOL/L (ref 98–107)
CREAT SERPL-MCNC: 0.75 MG/DL (ref 0.67–1.17)
EGFRCR SERPLBLD CKD-EPI 2021: >90 ML/MIN/1.73M2
EOSINOPHIL # BLD AUTO: 0 10E3/UL (ref 0–0.7)
EOSINOPHIL NFR BLD AUTO: 0 %
ERYTHROCYTE [DISTWIDTH] IN BLOOD BY AUTOMATED COUNT: 12.5 % (ref 10–15)
GLUCOSE SERPL-MCNC: 146 MG/DL (ref 70–99)
HCO3 BLDV-SCNC: 29 MMOL/L (ref 21–28)
HCO3 SERPL-SCNC: 25 MMOL/L (ref 22–29)
HCT VFR BLD AUTO: 35 % (ref 40–53)
HGB BLD-MCNC: 11.4 G/DL (ref 13.3–17.7)
IMM GRANULOCYTES # BLD: 0 10E3/UL
IMM GRANULOCYTES NFR BLD: 0 %
LYMPHOCYTES # BLD AUTO: 0.6 10E3/UL (ref 0.8–5.3)
LYMPHOCYTES NFR BLD AUTO: 10 %
MCH RBC QN AUTO: 29.2 PG (ref 26.5–33)
MCHC RBC AUTO-ENTMCNC: 32.6 G/DL (ref 31.5–36.5)
MCV RBC AUTO: 90 FL (ref 78–100)
MONOCYTES # BLD AUTO: 0.1 10E3/UL (ref 0–1.3)
MONOCYTES NFR BLD AUTO: 1 %
NEUTROPHILS # BLD AUTO: 5.7 10E3/UL (ref 1.6–8.3)
NEUTROPHILS NFR BLD AUTO: 89 %
NRBC # BLD AUTO: 0 10E3/UL
NRBC BLD AUTO-RTO: 0 /100
O2/TOTAL GAS SETTING VFR VENT: 4 %
OXYHGB MFR BLDV: 92 % (ref 70–75)
PCO2 BLDV: 53 MM HG (ref 40–50)
PH BLDV: 7.35 [PH] (ref 7.32–7.43)
PLATELET # BLD AUTO: 137 10E3/UL (ref 150–450)
PO2 BLDV: 66 MM HG (ref 25–47)
POTASSIUM SERPL-SCNC: 4.4 MMOL/L (ref 3.4–5.3)
RBC # BLD AUTO: 3.91 10E6/UL (ref 4.4–5.9)
SAO2 % BLDV: 93.3 % (ref 70–75)
SODIUM SERPL-SCNC: 134 MMOL/L (ref 135–145)
WBC # BLD AUTO: 6.4 10E3/UL (ref 4–11)

## 2025-01-13 PROCEDURE — 250N000012 HC RX MED GY IP 250 OP 636 PS 637: Performed by: HOSPITALIST

## 2025-01-13 PROCEDURE — 85025 COMPLETE CBC W/AUTO DIFF WBC: CPT | Performed by: HOSPITALIST

## 2025-01-13 PROCEDURE — 258N000003 HC RX IP 258 OP 636: Performed by: HOSPITALIST

## 2025-01-13 PROCEDURE — 250N000013 HC RX MED GY IP 250 OP 250 PS 637: Performed by: HOSPITALIST

## 2025-01-13 PROCEDURE — 99239 HOSP IP/OBS DSCHRG MGMT >30: CPT | Performed by: HOSPITALIST

## 2025-01-13 PROCEDURE — G0378 HOSPITAL OBSERVATION PER HR: HCPCS

## 2025-01-13 PROCEDURE — 82310 ASSAY OF CALCIUM: CPT | Performed by: HOSPITALIST

## 2025-01-13 PROCEDURE — 36415 COLL VENOUS BLD VENIPUNCTURE: CPT | Performed by: HOSPITALIST

## 2025-01-13 PROCEDURE — 87081 CULTURE SCREEN ONLY: CPT | Performed by: INTERNAL MEDICINE

## 2025-01-13 PROCEDURE — 82805 BLOOD GASES W/O2 SATURATION: CPT | Performed by: HOSPITALIST

## 2025-01-13 PROCEDURE — 80048 BASIC METABOLIC PNL TOTAL CA: CPT | Performed by: HOSPITALIST

## 2025-01-13 RX ORDER — QUETIAPINE FUMARATE 25 MG/1
12.5 TABLET, FILM COATED ORAL 2 TIMES DAILY PRN
COMMUNITY

## 2025-01-13 RX ORDER — BUPROPION HYDROCHLORIDE 150 MG/1
300 TABLET ORAL EVERY MORNING
Status: DISCONTINUED | OUTPATIENT
Start: 2025-01-13 | End: 2025-01-13 | Stop reason: HOSPADM

## 2025-01-13 RX ORDER — ACETAMINOPHEN 650 MG/1
650 SUPPOSITORY RECTAL EVERY 4 HOURS PRN
Status: DISCONTINUED | OUTPATIENT
Start: 2025-01-13 | End: 2025-01-13 | Stop reason: HOSPADM

## 2025-01-13 RX ORDER — GABAPENTIN 300 MG/1
600 CAPSULE ORAL AT BEDTIME
Status: DISCONTINUED | OUTPATIENT
Start: 2025-01-13 | End: 2025-01-13 | Stop reason: HOSPADM

## 2025-01-13 RX ORDER — IPRATROPIUM BROMIDE AND ALBUTEROL SULFATE 2.5; .5 MG/3ML; MG/3ML
1 SOLUTION RESPIRATORY (INHALATION) EVERY 6 HOURS PRN
Status: DISCONTINUED | OUTPATIENT
Start: 2025-01-13 | End: 2025-01-13 | Stop reason: HOSPADM

## 2025-01-13 RX ORDER — SENNOSIDES 8.6 MG
1 TABLET ORAL 2 TIMES DAILY PRN
COMMUNITY

## 2025-01-13 RX ORDER — NALOXONE HYDROCHLORIDE 0.4 MG/ML
0.2 INJECTION, SOLUTION INTRAMUSCULAR; INTRAVENOUS; SUBCUTANEOUS
Status: DISCONTINUED | OUTPATIENT
Start: 2025-01-13 | End: 2025-01-13 | Stop reason: HOSPADM

## 2025-01-13 RX ORDER — NALOXONE HYDROCHLORIDE 0.4 MG/ML
0.4 INJECTION, SOLUTION INTRAMUSCULAR; INTRAVENOUS; SUBCUTANEOUS
Status: DISCONTINUED | OUTPATIENT
Start: 2025-01-13 | End: 2025-01-13 | Stop reason: HOSPADM

## 2025-01-13 RX ORDER — HYDRALAZINE HYDROCHLORIDE 50 MG/1
100 TABLET, FILM COATED ORAL 3 TIMES DAILY
Status: DISCONTINUED | OUTPATIENT
Start: 2025-01-13 | End: 2025-01-13 | Stop reason: HOSPADM

## 2025-01-13 RX ORDER — FLUTICASONE FUROATE AND VILANTEROL 200; 25 UG/1; UG/1
1 POWDER RESPIRATORY (INHALATION) DAILY
Status: DISCONTINUED | OUTPATIENT
Start: 2025-01-13 | End: 2025-01-13 | Stop reason: HOSPADM

## 2025-01-13 RX ORDER — BUPRENORPHINE HYDROCHLORIDE AND NALOXONE HYDROCHLORIDE DIHYDRATE 8; 2 MG/1; MG/1
1 TABLET SUBLINGUAL DAILY
Status: DISCONTINUED | OUTPATIENT
Start: 2025-01-13 | End: 2025-01-13

## 2025-01-13 RX ORDER — SENNOSIDES 8.6 MG
1 TABLET ORAL 2 TIMES DAILY PRN
Status: DISCONTINUED | OUTPATIENT
Start: 2025-01-13 | End: 2025-01-13 | Stop reason: ALTCHOICE

## 2025-01-13 RX ORDER — PANTOPRAZOLE SODIUM 40 MG/1
40 TABLET, DELAYED RELEASE ORAL DAILY
Status: DISCONTINUED | OUTPATIENT
Start: 2025-01-13 | End: 2025-01-13 | Stop reason: HOSPADM

## 2025-01-13 RX ORDER — MIRTAZAPINE 15 MG/1
45 TABLET, FILM COATED ORAL AT BEDTIME
Status: DISCONTINUED | OUTPATIENT
Start: 2025-01-13 | End: 2025-01-13 | Stop reason: HOSPADM

## 2025-01-13 RX ORDER — QUETIAPINE FUMARATE 25 MG/1
25 TABLET, FILM COATED ORAL AT BEDTIME
Status: DISCONTINUED | OUTPATIENT
Start: 2025-01-13 | End: 2025-01-13 | Stop reason: HOSPADM

## 2025-01-13 RX ORDER — BUPRENORPHINE AND NALOXONE 8; 2 MG/1; MG/1
1 FILM, SOLUBLE BUCCAL; SUBLINGUAL DAILY
Status: DISCONTINUED | OUTPATIENT
Start: 2025-01-13 | End: 2025-01-13 | Stop reason: HOSPADM

## 2025-01-13 RX ORDER — LANOLIN ALCOHOL/MO/W.PET/CERES
CREAM (GRAM) TOPICAL 2 TIMES DAILY
COMMUNITY

## 2025-01-13 RX ORDER — ASPIRIN 81 MG/1
81 TABLET, CHEWABLE ORAL DAILY
Status: DISCONTINUED | OUTPATIENT
Start: 2025-01-13 | End: 2025-01-13 | Stop reason: HOSPADM

## 2025-01-13 RX ORDER — IPRATROPIUM BROMIDE AND ALBUTEROL SULFATE 2.5; .5 MG/3ML; MG/3ML
1 SOLUTION RESPIRATORY (INHALATION) 2 TIMES DAILY
Status: DISCONTINUED | OUTPATIENT
Start: 2025-01-13 | End: 2025-01-13 | Stop reason: HOSPADM

## 2025-01-13 RX ORDER — SODIUM CHLORIDE 9 MG/ML
INJECTION, SOLUTION INTRAVENOUS CONTINUOUS
Status: DISCONTINUED | OUTPATIENT
Start: 2025-01-13 | End: 2025-01-13

## 2025-01-13 RX ORDER — LOPERAMIDE HYDROCHLORIDE 2 MG/1
2 CAPSULE ORAL 4 TIMES DAILY PRN
Status: DISCONTINUED | OUTPATIENT
Start: 2025-01-13 | End: 2025-01-13 | Stop reason: HOSPADM

## 2025-01-13 RX ORDER — AZITHROMYCIN 250 MG/1
250 TABLET, FILM COATED ORAL DAILY
COMMUNITY

## 2025-01-13 RX ORDER — FERROUS SULFATE 325(65) MG
325 TABLET ORAL DAILY
Status: DISCONTINUED | OUTPATIENT
Start: 2025-01-13 | End: 2025-01-13 | Stop reason: HOSPADM

## 2025-01-13 RX ORDER — IPRATROPIUM BROMIDE AND ALBUTEROL SULFATE 2.5; .5 MG/3ML; MG/3ML
3 SOLUTION RESPIRATORY (INHALATION)
Status: DISCONTINUED | OUTPATIENT
Start: 2025-01-13 | End: 2025-01-13

## 2025-01-13 RX ORDER — ATORVASTATIN CALCIUM 40 MG/1
40 TABLET, FILM COATED ORAL AT BEDTIME
Status: DISCONTINUED | OUTPATIENT
Start: 2025-01-13 | End: 2025-01-13 | Stop reason: HOSPADM

## 2025-01-13 RX ORDER — MULTIPLE VITAMINS W/ MINERALS TAB 9MG-400MCG
1 TAB ORAL DAILY
Status: DISCONTINUED | OUTPATIENT
Start: 2025-01-13 | End: 2025-01-13 | Stop reason: HOSPADM

## 2025-01-13 RX ORDER — POLYETHYLENE GLYCOL 3350 17 G/17G
17 POWDER, FOR SOLUTION ORAL 2 TIMES DAILY PRN
Status: DISCONTINUED | OUTPATIENT
Start: 2025-01-13 | End: 2025-01-13 | Stop reason: HOSPADM

## 2025-01-13 RX ORDER — ACETAMINOPHEN 325 MG/1
650 TABLET ORAL EVERY 4 HOURS PRN
Status: DISCONTINUED | OUTPATIENT
Start: 2025-01-13 | End: 2025-01-13 | Stop reason: ALTCHOICE

## 2025-01-13 RX ORDER — LISINOPRIL 40 MG/1
40 TABLET ORAL DAILY
Status: DISCONTINUED | OUTPATIENT
Start: 2025-01-13 | End: 2025-01-13 | Stop reason: HOSPADM

## 2025-01-13 RX ORDER — ALBUTEROL SULFATE 0.83 MG/ML
2.5 SOLUTION RESPIRATORY (INHALATION)
Status: DISCONTINUED | OUTPATIENT
Start: 2025-01-13 | End: 2025-01-13 | Stop reason: HOSPADM

## 2025-01-13 RX ORDER — GUAIFENESIN/DEXTROMETHORPHAN 100-10MG/5
10 SYRUP ORAL EVERY 4 HOURS PRN
Status: DISCONTINUED | OUTPATIENT
Start: 2025-01-13 | End: 2025-01-13 | Stop reason: HOSPADM

## 2025-01-13 RX ORDER — PROCHLORPERAZINE MALEATE 10 MG
10 TABLET ORAL EVERY 6 HOURS PRN
Status: DISCONTINUED | OUTPATIENT
Start: 2025-01-13 | End: 2025-01-13 | Stop reason: HOSPADM

## 2025-01-13 RX ORDER — ONDANSETRON 4 MG/1
4 TABLET, ORALLY DISINTEGRATING ORAL EVERY 6 HOURS PRN
Status: DISCONTINUED | OUTPATIENT
Start: 2025-01-13 | End: 2025-01-13 | Stop reason: HOSPADM

## 2025-01-13 RX ORDER — CLOTRIMAZOLE 1 %
CREAM (GRAM) TOPICAL 2 TIMES DAILY
COMMUNITY

## 2025-01-13 RX ORDER — CARVEDILOL 25 MG/1
25 TABLET ORAL 2 TIMES DAILY
Status: DISCONTINUED | OUTPATIENT
Start: 2025-01-13 | End: 2025-01-13 | Stop reason: HOSPADM

## 2025-01-13 RX ORDER — ACETAMINOPHEN 325 MG/1
650 TABLET ORAL EVERY 4 HOURS PRN
Status: DISCONTINUED | OUTPATIENT
Start: 2025-01-13 | End: 2025-01-13 | Stop reason: HOSPADM

## 2025-01-13 RX ORDER — AZITHROMYCIN 250 MG/1
250 TABLET, FILM COATED ORAL DAILY
Status: DISCONTINUED | OUTPATIENT
Start: 2025-01-13 | End: 2025-01-13 | Stop reason: HOSPADM

## 2025-01-13 RX ORDER — ONDANSETRON 2 MG/ML
4 INJECTION INTRAMUSCULAR; INTRAVENOUS EVERY 6 HOURS PRN
Status: DISCONTINUED | OUTPATIENT
Start: 2025-01-13 | End: 2025-01-13 | Stop reason: HOSPADM

## 2025-01-13 RX ORDER — GABAPENTIN 300 MG/1
300 CAPSULE ORAL EVERY MORNING
Status: DISCONTINUED | OUTPATIENT
Start: 2025-01-13 | End: 2025-01-13 | Stop reason: HOSPADM

## 2025-01-13 RX ORDER — AMOXICILLIN 250 MG
1 CAPSULE ORAL 2 TIMES DAILY PRN
Status: DISCONTINUED | OUTPATIENT
Start: 2025-01-13 | End: 2025-01-13 | Stop reason: HOSPADM

## 2025-01-13 RX ORDER — AMOXICILLIN 250 MG
2 CAPSULE ORAL 2 TIMES DAILY PRN
Status: DISCONTINUED | OUTPATIENT
Start: 2025-01-13 | End: 2025-01-13 | Stop reason: HOSPADM

## 2025-01-13 RX ORDER — ALBUTEROL SULFATE 90 UG/1
2 INHALANT RESPIRATORY (INHALATION) EVERY 4 HOURS PRN
Status: DISCONTINUED | OUTPATIENT
Start: 2025-01-13 | End: 2025-01-13 | Stop reason: HOSPADM

## 2025-01-13 RX ADMIN — ASPIRIN 81 MG CHEWABLE TABLET 81 MG: 81 TABLET CHEWABLE at 13:04

## 2025-01-13 RX ADMIN — BUPRENORPHINE AND NALOXONE 1 FILM: 8; 2 FILM, SOLUBLE BUCCAL; SUBLINGUAL at 14:02

## 2025-01-13 RX ADMIN — HYDRALAZINE HYDROCHLORIDE 100 MG: 50 TABLET ORAL at 15:07

## 2025-01-13 RX ADMIN — CARVEDILOL 25 MG: 25 TABLET, FILM COATED ORAL at 13:04

## 2025-01-13 RX ADMIN — GABAPENTIN 300 MG: 300 CAPSULE ORAL at 13:13

## 2025-01-13 RX ADMIN — FERROUS SULFATE TAB 325 MG (65 MG ELEMENTAL FE) 325 MG: 325 (65 FE) TAB at 13:04

## 2025-01-13 RX ADMIN — Medication 1 TABLET: at 13:04

## 2025-01-13 RX ADMIN — LISINOPRIL 40 MG: 40 TABLET ORAL at 13:04

## 2025-01-13 RX ADMIN — SODIUM CHLORIDE: 9 INJECTION, SOLUTION INTRAVENOUS at 02:15

## 2025-01-13 RX ADMIN — BUPROPION HYDROCHLORIDE 300 MG: 150 TABLET, EXTENDED RELEASE ORAL at 13:13

## 2025-01-13 RX ADMIN — AZITHROMYCIN DIHYDRATE 250 MG: 250 TABLET ORAL at 13:04

## 2025-01-13 RX ADMIN — SODIUM CHLORIDE: 9 INJECTION, SOLUTION INTRAVENOUS at 01:58

## 2025-01-13 RX ADMIN — PANTOPRAZOLE SODIUM 40 MG: 40 TABLET, DELAYED RELEASE ORAL at 13:04

## 2025-01-13 ASSESSMENT — ACTIVITIES OF DAILY LIVING (ADL)
ADLS_ACUITY_SCORE: 59
ADLS_ACUITY_SCORE: 53
ADLS_ACUITY_SCORE: 59
ADLS_ACUITY_SCORE: 58
ADLS_ACUITY_SCORE: 58
ADLS_ACUITY_SCORE: 53
ADLS_ACUITY_SCORE: 59
ADLS_ACUITY_SCORE: 58
ADLS_ACUITY_SCORE: 59
ADLS_ACUITY_SCORE: 53
ADLS_ACUITY_SCORE: 53

## 2025-01-13 NOTE — DISCHARGE SUMMARY
[facility]  Hospitalist Discharge Summary      Date of Admission:  1/12/2025  Date of Discharge:  1/13/2025  Discharging Provider: Ron Rahman DO  Discharge Service: Hospitalist Service    Discharge Diagnoses   Nausea, vomiting  hyponatremia    Clinically Significant Risk Factors          Follow-ups Needed After Discharge   Follow-up Appointments       Hospital Follow-up with Existing Primary Care Provider (PCP)      Please see details below         Schedule Primary Care visit within: 7 Days               Unresulted Labs Ordered in the Past 30 Days of this Admission       Date and Time Order Name Status Description    1/13/2025  9:07 AM MRSA Culture In process         These results will be followed up by PCP    Discharge Disposition   Discharged to home  Condition at discharge: Stable    Hospital Course   64 year old gentleman former smoker with past medical history that is most notable for chronic respiratory failure due to severe COPD, as well as CAD, chronic systolic CHF with recovered EF, and chronic polysubstance use disorder in remission, among multiple others; who presents with acute nausea, vomiting, and fever, and is found to have acute hypochloremic hyponatremia. His sodium was 125 on admission, CT abdomen negative for acute abnormality. Labs were otherwise stable. He was placed on IV saline, and follow-up sodium was 134. He tolerated dietary advancement without nausea or vomiting and requested discharge home so he could celebrate his birthday tomorrow    Consultations This Hospital Stay   CARE MANAGEMENT / SOCIAL WORK IP CONSULT  CARE MANAGEMENT / SOCIAL WORK IP CONSULT    Code Status   Full Code    Time Spent on this Encounter   I, Ron Rahman DO, personally saw the patient today and spent greater than 30 minutes discharging this patient.       DO DANICA Garcia [facility] GENERAL SURGERY  [address] [facility]  "[id number]  Phone: [phone]  Fax: [phone]  ______________________________________________________________________    Physical Exam   Vital Signs: Temp: 97.6  F (36.4  C) Temp src: Oral BP: 129/82 Pulse: 79   Resp: 18 SpO2: 99 % O2 Device: Nasal cannula Oxygen Delivery: 4 LPM  Weight: 158 lbs 0 oz  Constitutional: awake and alert in no distress  Respiratory: lungs clear to auscultation bilaterally  Cardiovascular: regular S1 S2  GI: abdomen soft non tender non distended bowel sounds positive  Musculoskeletal: no edema       Primary Care Physician   Alex Wynn    Discharge Orders      Reason for your hospital stay    Nausea  Low sodium     Activity    Your activity upon discharge: activity as tolerated and no driving for today     Diet    Follow this diet upon discharge: Current Diet:Orders Placed This Encounter      Advance Diet as Tolerated: Regular Diet Adult; Regular Diet Adult     Hospital Follow-up with Existing Primary Care Provider (PCP)    Please see details below            Significant Results and Procedures   Most Recent 3 CBC's:  Recent Labs   Lab Test 01/13/25  0757 01/12/25 2126 09/21/24  2131   WBC 6.4 8.7 5.6   HGB 11.4* 10.2* 10.8*   MCV 90 91 92   * 157 147*     Most Recent 3 BMP's:  Recent Labs   Lab Test 01/13/25  0757 01/12/25 2126 09/21/24  2131   * 125* 136   POTASSIUM 4.4 4.2 4.3   CHLORIDE 99 88* 97*   CO2 25 29 33*   BUN 9.8 9.0 14.4   CR 0.75 0.84 0.83   ANIONGAP 10 8 6*   RL 8.7* 8.3* 9.1   * 118* 98     Most Recent 2 LFT's:  Recent Labs   Lab Test 01/12/25 2126 09/21/24  2131   AST 21 19   ALT 20 17   ALKPHOS 120 141   BILITOTAL 0.6 0.3   ,   Results for orders placed or performed during the hospital encounter of 01/12/25   CT Abdomen Pelvis w Contrast    Narrative    EXAM: CT ABDOMEN PELVIS W CONTRAST  LOCATION: [facility]  DATE: 1/12/2025    INDICATION: Vomiting, constipation, \"tight abdomen\"  COMPARISON: " 9/21/2024  TECHNIQUE: CT scan of the abdomen and pelvis was performed following injection of IV contrast. Multiplanar reformats were obtained. Dose reduction techniques were used.  CONTRAST: 85 mL Isovue 370    FINDINGS:   LOWER CHEST: Scattered mild atelectasis in the lung bases. No consolidation or pleural effusion.    HEPATOBILIARY: Normal liver. Fundal adenomyomatosis redemonstrated. Gallbladder is otherwise normal.    PANCREAS: Normal.    SPLEEN: Normal.    ADRENAL GLANDS: Normal.    KIDNEYS/BLADDER: Normal.    BOWEL: No inflammatory bowel thickening or bowel obstruction. Appendix is normal. No free fluid or free air. No significant bowel distention.    LYMPH NODES: Normal.    VASCULATURE: Advanced aortoiliofemoral atherosclerotic calcifications. No abdominal aortic aneurysm.    PELVIC ORGANS: Stable mild prostate enlargement.    MUSCULOSKELETAL: A small fat-containing umbilical hernia is unchanged. Mild degenerative disc space narrowing from L4 to S1 is stable. No suspicious osseous lesions or acute fractures.        Impression    IMPRESSION:   1.  No acute findings in the abdomen and pelvis.    2.  Redemonstration of gallbladder fundal adenomyomatosis.   Chest XR,  PA & LAT    Narrative    EXAM: XR CHEST 2 VIEWS  LOCATION: [facility]  DATE: 1/12/2025    INDICATION: baseline shortness of breath, tachypnea  COMPARISON: 8/21/2024      Impression    IMPRESSION: Mild linear atelectasis in the left mid and left lower lung. Mild scarring in the right infrahilar region is unchanged. No focal airspace opacity, pleural effusion or pneumothorax. Heart size and pulmonary vascularity are normal.       Discharge Medications   Current Discharge Medication List        CONTINUE these medications which have NOT CHANGED    Details   acetaminophen (TYLENOL) 325 MG tablet Take 650 mg by mouth every 4 hours as needed for mild pain.      albuterol (PROAIR HFA/PROVENTIL HFA/VENTOLIN HFA) 108 (90 Base)  MCG/ACT inhaler Inhale 2 puffs into the lungs every 4 hours as needed for shortness of breath, wheezing or cough      aspirin (ASA) 81 MG chewable tablet Take 81 mg by mouth daily      atorvastatin (LIPITOR) 40 MG tablet Take 40 mg by mouth daily       azithromycin (ZITHROMAX) 250 MG tablet Take 250 mg by mouth daily.      buprenorphine-naloxone (SUBOXONE) 8-2 MG SUBL sublingual tablet Place 1 tablet under the tongue daily.      buPROPion (WELLBUTRIN XL) 300 MG 24 hr tablet Take 300 mg by mouth every morning      carvedilol (COREG) 25 MG tablet Take 25 mg by mouth 2 times daily      cholecalciferol (VITAMIN D3) 25 mcg (1000 units) capsule Take 2 capsules by mouth daily.      clotrimazole (LOTRIMIN) 1 % external cream Apply topically 2 times daily. To feet - can use between toes      diclofenac (VOLTAREN) 1 % topical gel Apply 2-4 g topically 4 times daily as needed for moderate pain      ferrous sulfate (FE TABS) 325 (65 Fe) MG EC tablet Take 325 mg by mouth daily      Fluticasone-Umeclidin-Vilanterol (TRELEGY ELLIPTA) 200-62.5-25 MCG/ACT oral inhaler Inhale 1 puff into the lungs daily.      !! gabapentin (NEURONTIN) 300 MG capsule Take 300 mg by mouth every morning      !! gabapentin (NEURONTIN) 300 MG capsule Take 600 mg by mouth At Bedtime      hydrALAZINE (APRESOLINE) 100 MG tablet Take 1 tablet (100 mg) by mouth 3 times daily NOTE this is an INCREASE in dose from prior to hospital.  Further mgmt and refills per PCP.  Qty: 90 tablet, Refills: 0    Associated Diagnoses: Hypertension, unspecified type      !! ipratropium - albuterol 0.5 mg/2.5 mg/3 mL (DUONEB) 0.5-2.5 (3) MG/3ML neb solution Take 1 vial (3 mLs) by nebulization 2 times daily  Qty: 90 mL, Refills: 1    Associated Diagnoses: COPD exacerbation (H)      !! ipratropium - albuterol 0.5 mg/2.5 mg/3 mL (DUONEB) 0.5-2.5 (3) MG/3ML neb solution Take 1 vial (3 mLs) by nebulization every 6 hours as needed for shortness of breath, wheezing or cough  Qty: 90 mL,  Refills: 3    Associated Diagnoses: COPD exacerbation (H)      lisinopril (ZESTRIL) 40 MG tablet Take 1 tablet (40 mg) by mouth daily  Qty: 30 tablet, Refills: 3    Associated Diagnoses: COPD exacerbation (H)      melatonin 5 MG tablet Take 5 mg by mouth nightly as needed for sleep      mineral oil-white petrolatum (EUCERIN/MINERIN) cream Apply topically 2 times daily. To feet; apply after clotrimazole. Do not use between toes. Cover feet with socks at night      mirtazapine (REMERON) 45 MG tablet Take 45 mg by mouth At Bedtime       multivitamin w/minerals (THERA-VIT-M) tablet Take 1 tablet by mouth daily      naloxone (NARCAN) 4 MG/0.1ML nasal spray Spray 4 mg into one nostril alternating nostrils once as needed for opioid reversal every 2-3 minutes until assistance arrives      pantoprazole (PROTONIX) 40 MG EC tablet Take 40 mg by mouth daily      polyethylene glycol (MIRALAX) 17 g packet Take 1 packet by mouth 2 times daily as needed for constipation.      !! QUEtiapine (SEROQUEL) 25 MG tablet Take 12.5 mg by mouth 2 times daily as needed (panic).      !! QUEtiapine (SEROQUEL) 25 MG tablet Take 25 mg by mouth at bedtime.      sennosides (SENOKOT) 8.6 MG tablet Take 1 tablet by mouth 2 times daily as needed for constipation.       !! - Potential duplicate medications found. Please discuss with provider.        Allergies   Allergies   Allergen Reactions    Ibuprofen Nausea and Vomiting

## 2025-01-13 NOTE — PROGRESS NOTES
RECEIVING UNIT ED HANDOFF REVIEW    ED Nurse Handoff Report was reviewed by: Crystal Luis RN on January 13, 2025 at 1:09 AM

## 2025-01-13 NOTE — PROGRESS NOTES
Infection Prevention Progress Note  1/13/2025      Patient Name: Gerson Pearson [id number]  Admit Date: 1/12/2025    Infection Status as of 1/13/2025 9:07 AM: MRSA  Isolation Status as of 1/13/2025 9:07 AM: Contact     MDRO Discontinuation  Infection Prevention has reviewed this patient's chart per the MDRO D/C Policy and have taken the following action:    The patient does not qualify for discontinuation as patient does not have the required negative results. When non-qualifying reason(s) no longer apply, please contact Infection Prevention for re-evaluation.      Patient requires one consecutive negative cultures from nares prior to continuing discontinuation evaluation. Surveillance culture orders placed, date: 1/13/2025    Contact Precautions ordered for the following MDRO(s): MRSA    If you have any questions, please contact Infection Prevention.    Valdez Combs, Infection Prevention

## 2025-01-13 NOTE — ED PROVIDER NOTES
Emergency Department Note      History of Present Illness     Chief Complaint   Constipation and Nausea & Vomiting    HPI   Gerson Pearson is a 64 year old male who presents to the ER for evaluation of nausea and vomiting x 3 today as well as some constipation and tight sensation in his abdomen.  He feels that his breathing is at baseline and he is chronically on 4L per nasal cannula of O2 for COPD.  He states he lives in a group home for help with anxiety and COPD.  He denies fever or new cough or throat tightness or chest discomfort.    Past Medical History     Medical History and Problem List   Past Medical History:   Diagnosis Date    CAD (coronary artery disease)     Chronic back pain     Chronic systolic congestive heart failure (H)     COPD (chronic obstructive pulmonary disease) (H)     Depressive disorder     Myocardial infarction (H)     Nicotine dependence        Medications   acetaminophen (TYLENOL) 325 MG tablet  albuterol (PROAIR HFA/PROVENTIL HFA/VENTOLIN HFA) 108 (90 Base) MCG/ACT inhaler  aspirin (ASA) 81 MG chewable tablet  atorvastatin (LIPITOR) 40 MG tablet  budeson-glycopyrrol-formoterol (BREZTRI AEROSPHERE) 160-9-4.8 MCG/ACT AERO inhaler  buprenorphine HCl-naloxone HCl (SUBOXONE) 8-2 MG per film  buPROPion (WELLBUTRIN XL) 300 MG 24 hr tablet  carvedilol (COREG) 25 MG tablet  diclofenac (VOLTAREN) 1 % topical gel  docusate sodium (COLACE) 100 MG capsule  ferrous sulfate (FE TABS) 325 (65 Fe) MG EC tablet  fluticasone (FLONASE) 50 MCG/ACT nasal spray  gabapentin (NEURONTIN) 300 MG capsule  gabapentin (NEURONTIN) 300 MG capsule  hydrALAZINE (APRESOLINE) 100 MG tablet  ipratropium - albuterol 0.5 mg/2.5 mg/3 mL (DUONEB) 0.5-2.5 (3) MG/3ML neb solution  ipratropium - albuterol 0.5 mg/2.5 mg/3 mL (DUONEB) 0.5-2.5 (3) MG/3ML neb solution  lisinopril (ZESTRIL) 40 MG tablet  melatonin 5 MG tablet  mirtazapine (REMERON) 45 MG tablet  multivitamin w/minerals (THERA-VIT-M) tablet  naloxone (NARCAN) 4  MG/0.1ML nasal spray  pantoprazole (PROTONIX) 40 MG EC tablet  polyethylene glycol (MIRALAX) 17 g packet  QUEtiapine (SEROQUEL) 25 MG tablet  senna-docusate (SENOKOT-S/PERICOLACE) 8.6-50 MG tablet      Surgical History   No past surgical history on file.    Physical Exam     Patient Vitals for the past 24 hrs:   BP Temp Temp src Pulse Resp SpO2   01/12/25 2235 -- -- -- -- -- 100 %   01/12/25 2222 -- 99  F (37.2  C) Oral -- -- --   01/12/25 2125 -- -- -- -- 16 --   01/12/25 2111 -- -- -- -- -- 100 %   01/12/25 2109 (!) 147/82 -- -- 66 -- --     Physical Exam  VS: Reviewed per above  HENT: Mucous membranes moist  EYES: sclera anicteric  CV: Rate as noted,  regular rhythm.   RESP: mild tachypnea (baseline per pt). Breath sounds are normal bilaterally.  GI: no focal tenderness/rebound/guarding, not distended.  NEURO: Alert, moving all extremities  MSK: No deformity of the extremities  SKIN: Warm and dry    Diagnostics     Lab Results   Labs Ordered and Resulted from Time of ED Arrival to Time of ED Departure   COMPREHENSIVE METABOLIC PANEL - Abnormal       Result Value    Sodium 125 (*)     Potassium 4.2      Carbon Dioxide (CO2) 29      Anion Gap 8      Urea Nitrogen 9.0      Creatinine 0.84      GFR Estimate >90      Calcium 8.3 (*)     Chloride 88 (*)     Glucose 118 (*)     Alkaline Phosphatase 120      AST 21      ALT 20      Protein Total 6.9      Albumin 4.2      Bilirubin Total 0.6     CBC WITH PLATELETS AND DIFFERENTIAL - Abnormal    WBC Count 8.7      RBC Count 3.46 (*)     Hemoglobin 10.2 (*)     Hematocrit 31.3 (*)     MCV 91      MCH 29.5      MCHC 32.6      RDW 12.4      Platelet Count 157      % Neutrophils 80      % Lymphocytes 9      % Monocytes 8      % Eosinophils 2      % Basophils 1      % Immature Granulocytes 0      NRBCs per 100 WBC 0      Absolute Neutrophils 7.0      Absolute Lymphocytes 0.8      Absolute Monocytes 0.7      Absolute Eosinophils 0.2      Absolute Basophils 0.0      Absolute  Immature Granulocytes 0.0      Absolute NRBCs 0.0     INFLUENZA A/B, RSV AND SARS-COV2 PCR - Normal    Influenza A PCR Negative      Influenza B PCR Negative      RSV PCR Negative      SARS CoV2 PCR Negative         Imaging   CT Abdomen Pelvis w Contrast   Final Result   IMPRESSION:    1.  No acute findings in the abdomen and pelvis.      2.  Redemonstration of gallbladder fundal adenomyomatosis.      Chest XR,  PA & LAT   Final Result   IMPRESSION: Mild linear atelectasis in the left mid and left lower lung. Mild scarring in the right infrahilar region is unchanged. No focal airspace opacity, pleural effusion or pneumothorax. Heart size and pulmonary vascularity are normal.                ED Course      Medications Administered   Medications   ondansetron (ZOFRAN) injection 4 mg (has no administration in time range)   ipratropium - albuterol 0.5 mg/2.5 mg/3 mL (DUONEB) neb solution 3 mL (3 mLs Nebulization $Given 1/12/25 5725)   dexAMETHasone PF (DECADRON) injection 10 mg (10 mg Intravenous $Given 1/12/25 0142)   iopamidol (ISOVUE-370) solution 85 mL (85 mLs Intravenous $Given 1/12/25 3581)   sodium chloride 0.9 % bag 500mL for CT scan flush use (63 mLs Intravenous $Given 1/12/25 9341)   sodium chloride 0.9% BOLUS 500 mL (500 mLs Intravenous $New Bag 1/12/25 5582)     Procedures   Procedures     Medical Decision Making / Diagnosis     MDM   Gerson Pearson is a 64 year old male who presents to the ER for evaluation of 3 days of nausea and vomiting, abdominal bloating and constipation and possibly mild increased shortness of breath from baseline.  Vital signs reassuring on 4 L per nasal cannula (this is his baseline).  On exam he has mild tachypnea but patient feels like this is his normal breathing pattern although does think he might be slightly more short of breath than usual.  He was given DuoNebs and steroids with improvement in his shortness of breath.  Suspect underlying COPD flare.  Chest x-ray is clear.   Abdominal CT is negative for obstructive other acute pathology.  Despite antiemetics and some IV fluids, patient did not feel well enough to tolerate p.o. and go home.  His labs do show new hyponatremia of 125.  He was admitted for further cares.    Disposition   [facility]     Diagnosis     ICD-10-CM    1. Vomiting, unspecified vomiting type, unspecified whether nausea present  R11.10       2. Hyponatremia  E87.1       3. COPD exacerbation (H)  J44.1            Discharge Medications   New Prescriptions    No medications on file        Akhil Mack MD  01/13/25 0030

## 2025-01-13 NOTE — ED NOTES
[facility]  ED Nurse Handoff Report    ED Chief complaint: Constipation and Nausea & Vomiting      ED Diagnosis:   Final diagnoses:   Vomiting, unspecified vomiting type, unspecified whether nausea present   Hyponatremia   COPD exacerbation (H)       Code Status: Full Code    Allergies:   Allergies   Allergen Reactions    Ibuprofen Nausea and Vomiting       Patient Story: pt presents from  for complaints of constipation along with nausea and vomiting. No vomiting present from patient during ED stay. Pt states he has been constipated for x1 day.   Focused Assessment:  sodium 124 replacement fluids given. Pt states he does not want to go home due to not feeling cared for enough at home to make sure he can increase his sodium     Treatments and/or interventions provided: fluids imaing blood work   Patient's response to treatments and/or interventions: denies pain     To be done/followed up on inpatient unit:  follow up care     Does this patient have any cognitive concerns?:  none     Activity level - Baseline/Home:  Unknown  Activity Level - Current:   Stand with Assist    Patient's Preferred language: English   Needed?: No    Isolation: None  Infection: Not Applicable  Patient tested for COVID 19 prior to admission: YES  Bariatric?: No    Vital Signs:   Vitals:    01/12/25 2111 01/12/25 2125 01/12/25 2222 01/12/25 2235   BP:       Pulse:       Resp:  16     Temp:   99  F (37.2  C)    TempSrc:   Oral    SpO2: 100%   100%       Cardiac Rhythm:     Was the PSS-3 completed:   Yes  What interventions are required if any?               Family Comments: none present   OBS brochure/video discussed/provided to patient/family: No              Name of person given brochure if not patient:              Relationship to patient: none present     For the majority of the shift this patient's behavior was Green.   Behavioral interventions performed were none .    ED NURSE PHONE NUMBER: ED RN

## 2025-01-13 NOTE — CONSULTS
Care Management Initial Consult    General Information  Assessment completed with: Patient, Care Team Member,  (Tanner Farley)  Type of CM/SW Visit: Initial Assessment    Primary Care Provider verified and updated as needed: Yes   Readmission within the last 30 days: no previous admission in last 30 days      Reason for Consult: discharge planning  Advance Care Planning:            Communication Assessment  Patient's communication style: spoken language (English or Bilingual)    Hearing Difficulty or Deaf: no        Cognitive  Cognitive/Neuro/Behavioral: WDL                      Living Environment:   People in home: facility resident     Current living Arrangements: group home, assisted living  [facility] Facility:  ([facility])   Able to return to prior arrangements: yes       Family/Social Support:  Care provided by: other (see comments) ([facility])  Provides care for: no one  Marital Status:   Support system: Facility resident(s)/Staff          Description of Support System:           Current Resources:   Patient receiving home care services: No        Community Resources: County Programs, County Worker  Equipment currently used at home:    Supplies currently used at home: Oxygen Tubing/Supplies, Nebulizer tubing    Employment/Financial:  Employment Status: disabled        Financial Concerns:     Referral to Financial Worker: No       Does the patient's insurance plan have a 3 day qualifying hospital stay waiver?  No    Lifestyle & Psychosocial Needs:  Social Drivers of Health     Food Insecurity: Low Risk  (1/13/2025)    Food Insecurity     Within the past 12 months, did you worry that your food would run out before you got money to buy more?: No     Within the past 12 months, did the food you bought just not last and you didn t have money to get more?: No   Depression: Not at risk (10/1/2024)    Received from [facility]    PHQ-2     PHQ-2 Subtotal: 2   Housing Stability: Low Risk  (1/13/2025)     Housing Stability     Do you have housing? : Yes     Are you worried about losing your housing?: No   Tobacco Use: Medium Risk (1/13/2025)    Patient History     Smoking Tobacco Use: Former     Smokeless Tobacco Use: Never     Passive Exposure: Not on file   Financial Resource Strain: Low Risk  (1/13/2025)    Financial Resource Strain     Within the past 12 months, have you or your family members you live with been unable to get utilities (heat, electricity) when it was really needed?: No   Alcohol Use: Not on file   Transportation Needs: Low Risk  (1/13/2025)    Transportation Needs     Within the past 12 months, has lack of transportation kept you from medical appointments, getting your medicines, non-medical meetings or appointments, work, or from getting things that you need?: No   Physical Activity: Not on file   Interpersonal Safety: Not on file   Stress: Not on file   Social Connections: Not on file   Health Literacy: Not on file       Functional Status:  Prior to admission patient needed assistance:   Dependent ADLs:: Ambulation-cane  Dependent IADLs:: Cleaning, Cooking, Medication Management, Transportation, Shopping       Mental Health Status:  Mental Health Status: No Current Concerns       Chemical Dependency Status:  Chemical Dependency Status: No Current Concerns             Values/Beliefs:  Spiritual, Cultural Beliefs, Voodoo Practices, Values that affect care:                 Discussed  Partnership in Safe Discharge Planning  document with patient/family: Yes:     Additional Information:  Consult for discharge planning.     64 year old gentleman former smoker with past medical history that is most notable for chronic respiratory failure due to severe COPD, as well as CAD, chronic systolic CHF with recovered EF, and chronic polysubstance use disorder in remission, among multiple others; who presents with acute nausea, vomiting, and fever, and is found to have acute hypochloremic hyponatremia.      Writer reviewed chart and recommendations at discharge. Writer met with patient at bedside and introduced self and role. Writer confirmed patient's primary doctor and home address as accurate. Patient resides in a group home [facility] [place] (writer spoke to Jeovany ) and they assist with ADL 's, med management, meals, transport and patient has home oxygen. As long as there are no new meds, patient is able to return.   Orders can be faxed to    Jeovany will bring a portable oxygen to assist with transport home and will pick patient up from door six at 1630     Next Steps: No further care management intervention anticipated at this time.  Please re-consult if further needs arise.  Care management signing off.       SONI Kimbrough

## 2025-01-13 NOTE — PLAN OF CARE
Goal Outcome Evaluation:      Plan of Care Reviewed With: patient    Overall Patient Progress: improvingOverall Patient Progress: improving    Date & Time: 1/13/2025 from 0700- around 1630  Summary: Here with N/V & Constipation  Behavior & Aggression: Green  Fall Risk: Yes  Orientation: A&Ox4  ABNL VS/O2: VSS on 4 L NC (baseline)  ABNL Labs: See results  Pain Management: Denies pain  Bowel/Bladder: Voiding well. BS audible- passing gas/ no stools  Wounds/incisions: N/A  Diet: Regular  Activity Level: SBA   Tests/Procedures: N/A  Anticipated  DC Date: Today around 1630  Significant Information:  Discharge instructions reviewed with patient & questions answered. All belongings sent with patient back to group home.

## 2025-01-13 NOTE — PHARMACY-ADMISSION MEDICATION HISTORY
Pharmacist Admission Medication History    Admission medication history is complete. The information provided in this note is only as accurate as the sources available at the time of the update.    Information Source(s): Facility (Group Home - [facility]) medication list sent with pt via N/A; also confirmed Suboxone via .    Pertinent Information: Just medication list sent (called phone number [phone], but straight to voicemail) - unable to verify last doses.    Changes made to PTA medication list:  Added: azithromycin, clotrimazole cream, eucerin, Trelegy, vitamin D, senna  Deleted: Breztri inhaler, docusate, Flonase, senna-S  Changed:   Suboxone 1 tablet BID -> 1 tablet daily  Quetiapine 12.5 mg TID -> 25 mg HS + 12.5 mg BID prn  Acetaminophen 325 mg q6h prn -> 650 mg q4h prn  Miralax daily -> BID prn    Allergies reviewed with patient and updates made in EHR: no    Medication History Completed By: Teresita Stringer [facility] 1/13/2025 10:22 AM    PTA Med List   Medication Sig Last Dose/Taking    acetaminophen (TYLENOL) 325 MG tablet Take 650 mg by mouth every 4 hours as needed for mild pain. Taking As Needed    albuterol (PROAIR HFA/PROVENTIL HFA/VENTOLIN HFA) 108 (90 Base) MCG/ACT inhaler Inhale 2 puffs into the lungs every 4 hours as needed for shortness of breath, wheezing or cough Taking As Needed    aspirin (ASA) 81 MG chewable tablet Take 81 mg by mouth daily Morning    atorvastatin (LIPITOR) 40 MG tablet Take 40 mg by mouth daily  Bedtime    azithromycin (ZITHROMAX) 250 MG tablet Take 250 mg by mouth daily. Morning    buprenorphine-naloxone (SUBOXONE) 8-2 MG SUBL sublingual tablet Place 1 tablet under the tongue daily. Morning    buPROPion (WELLBUTRIN XL) 300 MG 24 hr tablet Take 300 mg by mouth every morning Morning    carvedilol (COREG) 25 MG tablet Take 25 mg by mouth 2 times daily Taking    cholecalciferol (VITAMIN D3) 25 mcg (1000 units) capsule Take 2 capsules by mouth daily. Morning     clotrimazole (LOTRIMIN) 1 % external cream Apply topically 2 times daily. To feet - can use between toes Taking    diclofenac (VOLTAREN) 1 % topical gel Apply 2-4 g topically 4 times daily as needed for moderate pain Taking As Needed    ferrous sulfate (FE TABS) 325 (65 Fe) MG EC tablet Take 325 mg by mouth daily Morning    Fluticasone-Umeclidin-Vilanterol (TRELEGY ELLIPTA) 200-62.5-25 MCG/ACT oral inhaler Inhale 1 puff into the lungs daily. Morning    gabapentin (NEURONTIN) 300 MG capsule Take 300 mg by mouth every morning Morning    gabapentin (NEURONTIN) 300 MG capsule Take 600 mg by mouth At Bedtime Bedtime    hydrALAZINE (APRESOLINE) 100 MG tablet Take 1 tablet (100 mg) by mouth 3 times daily NOTE this is an INCREASE in dose from prior to hospital.  Further mgmt and refills per PCP. Taking    ipratropium - albuterol 0.5 mg/2.5 mg/3 mL (DUONEB) 0.5-2.5 (3) MG/3ML neb solution Take 1 vial (3 mLs) by nebulization 2 times daily Taking    ipratropium - albuterol 0.5 mg/2.5 mg/3 mL (DUONEB) 0.5-2.5 (3) MG/3ML neb solution Take 1 vial (3 mLs) by nebulization every 6 hours as needed for shortness of breath, wheezing or cough Taking As Needed    lisinopril (ZESTRIL) 40 MG tablet Take 1 tablet (40 mg) by mouth daily Morning    melatonin 5 MG tablet Take 5 mg by mouth nightly as needed for sleep Taking As Needed    mineral oil-white petrolatum (EUCERIN/MINERIN) cream Apply topically 2 times daily. To feet; apply after clotrimazole. Do not use between toes. Cover feet with socks at night Taking    mirtazapine (REMERON) 45 MG tablet Take 45 mg by mouth At Bedtime  Bedtime    multivitamin w/minerals (THERA-VIT-M) tablet Take 1 tablet by mouth daily Morning    naloxone (NARCAN) 4 MG/0.1ML nasal spray Spray 4 mg into one nostril alternating nostrils once as needed for opioid reversal every 2-3 minutes until assistance arrives Taking As Needed    pantoprazole (PROTONIX) 40 MG EC tablet Take 40 mg by mouth daily Morning     polyethylene glycol (MIRALAX) 17 g packet Take 1 packet by mouth 2 times daily as needed for constipation. Taking As Needed    QUEtiapine (SEROQUEL) 25 MG tablet Take 12.5 mg by mouth 2 times daily as needed (panic). Taking As Needed    QUEtiapine (SEROQUEL) 25 MG tablet Take 25 mg by mouth at bedtime. Bedtime    sennosides (SENOKOT) 8.6 MG tablet Take 1 tablet by mouth 2 times daily as needed for constipation. Taking As Needed

## 2025-01-13 NOTE — H&P
[facility]    History and Physical  Hospitalist       Date of Admission:  1/12/2025  Date of Service (when I saw the patient): 01/12/25    ASSESSMENT  Gerson Pearson is a markedly pleasant 64 year old gentleman former smoker with past medical history that is most notable for chronic respiratory failure due to severe COPD, as well as CAD, chronic systolic CHF with recovered EF, and chronic polysubstance use disorder in remission, among multiple others; who presents with acute nausea, vomiting, and fever, and is found to have acute hypochloremic hyponatremia.    PLAN     Acute hypochloremic hyponatremia: Of note, Mr. Pearson has chronic respiratory failure due to severe COPD, requiring 4.5 L O2 by nasal cannula. Prior PFT's in showed FEV1 0.67 L. At his most recent Pulmonologist visit at [facility] in 12/2024 it was noted his COPD had improved with smoking cessation and use of Azithromycin and anxiety amelioration. He also has chronic systolic CHF, with most recent TTE 9/2023 showing EF 60-65% with mild MR.    Now, 01/12/25 he presents for evaluation of acute nausea, vomiting, and fever. In the ED, he is afebrile, without tachycardia, hypoxia on baseline 4.5 L O2, or hypotension. WBC is normal. He has mild acute hyponatremia to 125. COVID, Influenza, and RSV tests are negative. CXR shows mild linear atelectasis in the left mid and left lower lung, mild scarring in the right infrahilar region unchanged from previous, and no focal airspace opacity, pleural effusion or pneumothorax. CTof abdomen and pelvis shows re-demonstration of previously seen gallbladder fundal adenomyomatosis, without any acute intra-abdominal processes.    Overall, his symptoms seem consistent with uncomplicated acute viral gastroenteritis. He reports now that his nausea has resolved.       -- Observation. ADAT. Prn anti-emetics and Imodium ordered. IV  ml/hour IVF ordered for 10 hours with a stop date for clinical  reassessment    -- Repeat CBC and BMP 1/13/25 . SW consulted for disposition planning; he might be able to discharge this morning if feeling better after IVF overnight, Na has improved and his mental status is at baseline.     Acute metabolic encephalopathy: Noting reported prior history of opioid and cocaine use disorder as well as chronic back pain. He is reportedly prescribed Suboxone. Currently he is somnolent, but oriented times 3. It is early in the AM. He denies any recent illicit drug use. Some notes document history of REYNA.      -- Monitor closely overnight. VBG ordered. Repeat clinical assessment in AM. Resume Suboxone when stable.    Chronic respiratory failure due to COPD: Not wheezing currently.    -- Duonebs scheduled q4 hours, Albuterol nebs every 2 hours as needed. Resume extensive home inhaler regimen when verified. Monitor oxygenation. Aggressive IS. Robitussin ordered as needed for cough.     CAD and Hyperlipidemia: Status post stenting in 2020 reportedly.    -- Resume home ASA, Lipitor when verified.    Chronic systolic CHF with recovered EF: and Hypertension. Resume home Coreg, Lisinopril, Hydralazine when verified.    Chronic depression and anxiety: Resume home Wellbutrin, Seroquel, and Remeron when verified and he is more awake    GERD: Resume PPI when verified    Chronic anemia: Monitor while hospitalized. Repeat CBC 1/14/25   Recent Labs   Lab Test 01/12/25  2126 09/21/24  2131 08/21/24  0513   HGB 10.2* 10.8* 11.1*     I have spent 55 minutes on the date of service doing chart review, history, examination, documentation, and further activities per the note.    Chief Complaint   Nausea    History is obtained from the patient and the ED physician whom I have spoken with    History of Present Illness   Gerson Pearson is a markedly pleasant 64 year old gentleman who presents from his group home with acute nausea, with multiple episodes of vomiting through the day today, associated with fever.  "He came to the ED for further evaluation. He otherwise denies diarrhea, abdominal pain, or cough or dyspnea, or chest pain, or any other acute complaints. He is noted to be somnolent on my exam. IV Zofran given in the ED has partially improved his symptoms.    In the ED,   01/12 2109 147/82  99.0 66 16 100%     CBC and CMP were notable for HGB 10.2, Na 125, Cl 88, Ca 8.3, Glucose 118, otherwise were within the normal reference range. WBC was 8.7. COVID, Influenza, and RSV tests were negative.    He was also given Decadron, a Duoneb, and 500 ml NS IVF in the ED.    Recent Results (from the past 24 hours)   Chest XR,  PA & LAT    Narrative    EXAM: XR CHEST 2 VIEWS  LOCATION: [facility]  DATE: 1/12/2025    INDICATION: baseline shortness of breath, tachypnea  COMPARISON: 8/21/2024      Impression    IMPRESSION: Mild linear atelectasis in the left mid and left lower lung. Mild scarring in the right infrahilar region is unchanged. No focal airspace opacity, pleural effusion or pneumothorax. Heart size and pulmonary vascularity are normal.   CT Abdomen Pelvis w Contrast    Narrative    EXAM: CT ABDOMEN PELVIS W CONTRAST  LOCATION: [facility]  DATE: 1/12/2025    INDICATION: Vomiting, constipation, \"tight abdomen\"  COMPARISON: 9/21/2024  TECHNIQUE: CT scan of the abdomen and pelvis was performed following injection of IV contrast. Multiplanar reformats were obtained. Dose reduction techniques were used.  CONTRAST: 85 mL Isovue 370    FINDINGS:   LOWER CHEST: Scattered mild atelectasis in the lung bases. No consolidation or pleural effusion.    HEPATOBILIARY: Normal liver. Fundal adenomyomatosis redemonstrated. Gallbladder is otherwise normal.    PANCREAS: Normal.    SPLEEN: Normal.    ADRENAL GLANDS: Normal.    KIDNEYS/BLADDER: Normal.    BOWEL: No inflammatory bowel thickening or bowel obstruction. Appendix is normal. No free fluid or free air. No significant bowel " distention.    LYMPH NODES: Normal.    VASCULATURE: Advanced aortoiliofemoral atherosclerotic calcifications. No abdominal aortic aneurysm.    PELVIC ORGANS: Stable mild prostate enlargement.    MUSCULOSKELETAL: A small fat-containing umbilical hernia is unchanged. Mild degenerative disc space narrowing from L4 to S1 is stable. No suspicious osseous lesions or acute fractures.        Impression    IMPRESSION:   1.  No acute findings in the abdomen and pelvis.    2.  Redemonstration of gallbladder fundal adenomyomatosis.       PHYSICAL EXAM  Blood pressure (!) 141/72, pulse 70, temperature 97.9  F (36.6  C), temperature source Oral, resp. rate 14, weight 71.7 kg (158 lb), SpO2 98%.  Constitutional: Somnolent but arousable; no apparent distress  Respiratory: lungs clear to auscultation bilaterally  Cardiovascular: regular S1 S2  GI: abdomen soft non tender non distended bowel sounds positive  Musculoskeletal: mild bilateral LE edema     DVT Prophylaxis: Pneumatic Compression Devices  Code Status: Full Code    Medically Ready for Discharge: Anticipated Today       Brian Baker MD, MD    Past Medical History    I have reviewed this patient's medical history and updated it with pertinent information if needed.   Past Medical History:   Diagnosis Date    CAD (coronary artery disease)     s/p bare metal stent to RCA in 6/2020    Chronic back pain     Chronic systolic congestive heart failure (H)     EF 45% has recovered to 60-65% on echo 12/2021    Cocaine use disorder (H)     COPD (chronic obstructive pulmonary disease) (H)     Depressive disorder     GERD (gastroesophageal reflux disease)     HTN (hypertension)     Myocardial infarction (H)     Nicotine dependence     Opioid use disorder     REYNA (obstructive sleep apnea)     Stroke (H) 2019    of unknown etiology: Right parieto-occipital lobe       Past Surgical History   I have reviewed this patient's surgical history and updated it with pertinent information  if needed.  Past Surgical History:   Procedure Laterality Date    CORONARY STENT PLACEMENT  2020       Prior to Admission Medications   Prior to Admission Medications   Prescriptions Last Dose Informant Patient Reported? Taking?   QUEtiapine (SEROQUEL) 25 MG tablet   Yes No   Sig: Take 12.5 mg by mouth 3 times daily   acetaminophen (TYLENOL) 325 MG tablet  Nursing Home Yes No   Sig: Take 325 mg by mouth every 6 hours as needed for mild pain   albuterol (PROAIR HFA/PROVENTIL HFA/VENTOLIN HFA) 108 (90 Base) MCG/ACT inhaler  Nursing Home Yes No   Sig: Inhale 2 puffs into the lungs every 4 hours as needed for shortness of breath, wheezing or cough   aspirin (ASA) 81 MG chewable tablet  Nursing Home Yes No   Sig: Take 81 mg by mouth daily   atorvastatin (LIPITOR) 40 MG tablet  Nursing Home Yes No   Sig: Take 40 mg by mouth daily    buPROPion (WELLBUTRIN XL) 300 MG 24 hr tablet   Yes No   Sig: Take 300 mg by mouth every morning   budeson-glycopyrrol-formoterol (BREZTRI AEROSPHERE) 160-9-4.8 MCG/ACT AERO inhaler  Nursing Home No No   Sig: Inhale 2 puffs into the lungs 2 times daily   buprenorphine HCl-naloxone HCl (SUBOXONE) 8-2 MG per film  Nursing Home Yes No   Sig: Place 1 Film under the tongue 2 times daily AM and early evening  (around 5-6 pm)   carvedilol (COREG) 25 MG tablet  Nursing Home Yes No   Sig: Take 25 mg by mouth 2 times daily   diclofenac (VOLTAREN) 1 % topical gel  Nursing Home Yes No   Sig: Apply 2-4 g topically 4 times daily as needed for moderate pain   docusate sodium (COLACE) 100 MG capsule  Nursing Home No No   Sig: Take 1 capsule (100 mg) by mouth 2 times daily   ferrous sulfate (FE TABS) 325 (65 Fe) MG EC tablet   Yes No   Sig: Take 325 mg by mouth daily   fluticasone (FLONASE) 50 MCG/ACT nasal spray   No No   Sig: Spray 1 spray into both nostrils daily   gabapentin (NEURONTIN) 300 MG capsule  Nursing Home Yes No   Sig: Take 300 mg by mouth every morning   gabapentin (NEURONTIN) 300 MG capsule   Nursing Home Yes No   Sig: Take 600 mg by mouth At Bedtime   hydrALAZINE (APRESOLINE) 100 MG tablet   No No   Sig: Take 1 tablet (100 mg) by mouth 3 times daily NOTE this is an INCREASE in dose from prior to hospital.  Further mgmt and refills per PCP.   ipratropium - albuterol 0.5 mg/2.5 mg/3 mL (DUONEB) 0.5-2.5 (3) MG/3ML neb solution  Nursing Home No No   Sig: Take 1 vial (3 mLs) by nebulization 2 times daily   ipratropium - albuterol 0.5 mg/2.5 mg/3 mL (DUONEB) 0.5-2.5 (3) MG/3ML neb solution  Nursing Home No No   Sig: Take 1 vial (3 mLs) by nebulization every 6 hours as needed for shortness of breath, wheezing or cough   lisinopril (ZESTRIL) 40 MG tablet  Nursing Home No No   Sig: Take 1 tablet (40 mg) by mouth daily   melatonin 5 MG tablet  Nursing Home Yes No   Sig: Take 5 mg by mouth nightly as needed for sleep   mirtazapine (REMERON) 45 MG tablet  Nursing Home Yes No   Sig: Take 45 mg by mouth At Bedtime    multivitamin w/minerals (THERA-VIT-M) tablet  Nursing Home Yes No   Sig: Take 1 tablet by mouth daily   naloxone (NARCAN) 4 MG/0.1ML nasal spray  Nursing Home Yes No   Sig: Spray 4 mg into one nostril alternating nostrils once as needed for opioid reversal every 2-3 minutes until assistance arrives   pantoprazole (PROTONIX) 40 MG EC tablet  Nursing Home Yes No   Sig: Take 40 mg by mouth daily   polyethylene glycol (MIRALAX) 17 g packet  halfway Yes No   Sig: Take 1 packet by mouth daily as needed for constipation   senna-docusate (SENOKOT-S/PERICOLACE) 8.6-50 MG tablet   No No   Sig: Take 1 tablet by mouth 2 times daily Further mgmt and refills per PCP      Facility-Administered Medications: None     Allergies   Allergies   Allergen Reactions    Ibuprofen Nausea and Vomiting       Social History   I have reviewed this patient's social history and updated it with pertinent information if needed. Gerson Pearson  reports that he has quit smoking. His smoking use included cigarettes. He has never used  smokeless tobacco. He reports current drug use. Drugs: Opiates and Cocaine. He reports that he does not drink alcohol.    Family History   Family history assessed and, except as above, is non-contributory.    Family History   Problem Relation Age of Onset    Diabetes Mother     Coronary Artery Disease Mother        Review of Systems   The 10 point Review of Systems is negative other than noted in the HPI or here.     Primary Care Physician   Alex Wynn    Data   Labs Ordered and Resulted from Time of ED Arrival to Time of ED Departure   COMPREHENSIVE METABOLIC PANEL - Abnormal       Result Value    Sodium 125 (*)     Potassium 4.2      Carbon Dioxide (CO2) 29      Anion Gap 8      Urea Nitrogen 9.0      Creatinine 0.84      GFR Estimate >90      Calcium 8.3 (*)     Chloride 88 (*)     Glucose 118 (*)     Alkaline Phosphatase 120      AST 21      ALT 20      Protein Total 6.9      Albumin 4.2      Bilirubin Total 0.6     CBC WITH PLATELETS AND DIFFERENTIAL - Abnormal    WBC Count 8.7      RBC Count 3.46 (*)     Hemoglobin 10.2 (*)     Hematocrit 31.3 (*)     MCV 91      MCH 29.5      MCHC 32.6      RDW 12.4      Platelet Count 157      % Neutrophils 80      % Lymphocytes 9      % Monocytes 8      % Eosinophils 2      % Basophils 1      % Immature Granulocytes 0      NRBCs per 100 WBC 0      Absolute Neutrophils 7.0      Absolute Lymphocytes 0.8      Absolute Monocytes 0.7      Absolute Eosinophils 0.2      Absolute Basophils 0.0      Absolute Immature Granulocytes 0.0      Absolute NRBCs 0.0     INFLUENZA A/B, RSV AND SARS-COV2 PCR - Normal    Influenza A PCR Negative      Influenza B PCR Negative      RSV PCR Negative      SARS CoV2 PCR Negative         Data reviewed today:  I personally reviewed the chest x-ray image(s) showing no acute pathology and the abdominal CT image(s) showing no acute pathology .

## 2025-01-13 NOTE — ED TRIAGE NOTES
Pt present via EMS, per EMS pt is from  and has been having vomiting today and states constipation has not had a BM today and feels he needs to have one today     On 4.5 L nc at baseline due to COPD

## 2025-01-13 NOTE — PROGRESS NOTES
-diagnostic tests and consults completed and resulted SW consulted.   -vital signs normal or at patient baseline MET  -tolerating oral intake to maintain hydration Not MET  -returns to baseline functional status Not Met  -safe disposition plan has been identified Not Met.

## 2025-01-15 LAB — BACTERIA SPEC CULT: NORMAL

## 2025-01-15 NOTE — PROGRESS NOTES
Infection Prevention Progress Note  1/15/2025      Patient Name: Gerson Pearson [id number]  Admit Date: 1/12/2025    Infection Status as of 1/15/2025 9:54 AM: No active infections  Isolation Status as of 1/15/2025 9:54 AM: No active isolations     MDRO Discontinuation  Infection Prevention has reviewed this patient's chart per the MDRO D/C Policy and have taken the following action:    Patient meets all the criteria for discontinuation and Infection Prevention will resolve the MRSA infection status.    Contact Precautions discontinued for the following MDRO(s): MRSA    If you have any questions, please contact Infection Prevention.    Valdez Combs, Infection Prevention

## 2025-03-13 NOTE — H&P
[facility]    History and Physical - Hospitalist Service       Date of Admission:  7/28/2023    Assessment & Plan      Gerson Pearson is a 63 year old male admitted on 7/28/2023. He presents to the emergency department via EMS after becoming obtunded with respiratory difficulty.  Found to be hypoxic in the 40% range with first responders, placed on CPAP by EMS and oxygenation improved and patient became more responsive.  PCO2 in the 90s range initially on arrival, and has improved on BiPAP.    Acute hypoxic hypercarbic respiratory failure:  Severe chronic obstructive pulmonary disease: FEV1 of 22% without significant bronchodilator response by prior PFTs.  Utilizes 2 L nasal cannula oxygen with exertion, goal oxygen saturation of 88 to 92%.  COVID-19 negative.  Known history of severe COPD exacerbations with rapid resolution after being placed on BiPAP.  Recently hospitalized 7/15 - 7/16 with a 10-day prednisone taper.  -Continue BiPAP, wean as able  -Recheck VBG ordered for a.m.  If patient is able to wean off of BiPAP here in the emergency department overnight, recheck VBG at 2 hours to ensure no worsening hypercarbia; could consider capnography monitoring given significant somnolence described with hypercarbic respiratory failure prior to initiation on CPAP by EMS.  -Encourage complete tobacco cessation  -Note pulmonary rehab appointments pending through [facility] August 1, August 3, August 8, August 10.  -[place] pulmonology consulted; his primary pulmonologist, Dr. Vela at [facility], noted that he could potentially be a candidate for advanced COPD therapies at the [facility].  -Note that I admitted Mr. Pearson twice in approximately 24 hours in November 2021 with presumed acute COPD exacerbations which abruptly improved despite minimal bronchodilator response on PFTs.  At that time, he was actually discontinuing his Suboxone to snort heroin resulting in  Pat & wife informed of positive FIT test- GI docs name & #'s given - voiced understanding.   both presentations.  He currently reports that he has not used heroin since that time, and has been adherent to his Suboxone therapy through his pain team.  -Prednisone 40 mg daily starting in a.m.  Received Solu-Medrol in the emergency department.  Anticipate ability to wean BiPAP by a.m.,  But if not, will change back to Solu-Medrol.  -completing 5 d course of azithromycin.  -Every 2 hours as needed albuterol nebulizer treatments; again, typically minimally responsive to bronchodilators  -Resume prior to admission Incruse Ellipta, Breo Ellipta,    Depression with anxiety:  -Continue as needed hydroxyzine  -Resume prior to admission Remeron  -Resume prior to admission Seroquel  -Resume prior to admission Wellbutrin    Hypertension:  Coronary artery disease with history of bare-metal stent placement:  -Resume prior to admission aspirin 81 mg daily  -Resume prior to admission atorvastatin 40 mg daily  -Resume prior to admission carvedilol, hydralazine    Coronary artery disease: History of bare-metal stenting to right coronary artery in 2020    Opiate dependence: History of heroin and cocaine abuse, though reports sobriety for the past year and a half.   -Continue prior to admission Suboxone  -Continue with follow-up in pain clinic  -I am not ordering opiates at this time given history of snorting heroin and being followed by pain clinic tox screens [facility]  -Resume prior to admission gabapentin    Tobacco use disorder with nicotine dependence: Tells me he is now only smoking 1 to 2 cigarettes/day, only 3 puffs of cigarette today prior to admission.  -Continue nicotine patch 7 mg with panel ordered.  -Discussed importance of continued cessation attempts.  He has cut back from 1/2 pack/day as of a year and a half ago.    GERD:  -Resume prior to admission pantoprazole       Diet:   N.p.o. while on BiPAP  DVT Prophylaxis: Pneumatic Compression Devices  Post Catheter: Not present  Lines: None     Cardiac  Monitoring: None  Code Status:  Full code.  Confirmed with patient on admission    Clinically Significant Risk Factors Present on Admission          # Hypocalcemia: Lowest Ca = 8.4 mg/dL in last 2 days, will monitor and replace as appropriate       # Drug Induced Platelet Defect: home medication list includes an antiplatelet medication   # Hypertension: Home medication list includes antihypertensive(s)   # Non-Invasive mechanical ventilation: current O2 Device: BiPAP/CPAP  # Acute hypoxic respiratory failure: continue supplemental O2 as needed              Disposition Plan    anticipated discharge 7/30/2023 pending resolution of hypoxia and hypercarbia       Ruslan Herring MD  Hospitalist Service  [facility]  Securely message with [employer] (more info)  Text page via [email or web address] Paging/Directory     ______________________________________________________________________    Chief Complaint   Shortness of breath    History is obtained from the patient, chart review, discussion with Dr. Olivares in the emergency department.    History of Present Illness   Gerson Pearson is a 63 year old male who presented via EMS to the emergency department after he became obtunded with respiratory difficulty and 911 was called.  Found to have oxygen saturations in the 40% range and somnolent, placed on CPAP by EMS and mental status as well as oxygenation improved.    Patient has a history of known severe COPD with exacerbations requiring BiPAP administration, but generally recovers quite rapidly.  Most recently hospitalized 715-7/16/2023 and discharged on prednisone for 10-day taper.    Patient still uses tobacco, but has been cutting back over the past years.  Now is smoking 1 or 2 cigarettes/day.  Had less than 1 cigarette today prior to exacerbation.    When I met patient in November 2021, he had a similar acute exacerbation of COPD requiring BiPAP administration.  In discussion with patient at that time, he had  actually discontinued his Suboxone to use nasal heroin, and this resulted in his acute COPD exacerbation and subsequent rapid recovery.  He was discharged from the emergency department at that time, only to return within 24 hours with a similar presentation.  At that time, he admitted that he had gone back and used the last of his heroin.  Discussed this history with patient on admission tonight.  He tells me that he has not used heroin in the last year and a half, potentially the last time I met with him, and he has been adherent to his Suboxone therapy.    Reviewed recent primary pulmonary notes through [facility].  There was a thought that patient might benefit from advanced COPD clinic at the [facility] at some point.  He [facility] not yet [facility]    Patient much more alert, conversant.  Requests that his BiPAP be removed so he can eat and drink.  Anticipate this will be possible overnight.    Need to ensure that we are not over correcting his oxygenation.  Goal oxygen saturation 88 to 90% with his history of hypercarbia.    No fevers or chills.  Has sputum production with change.  Chest x-ray without infiltrate.        Past Medical History    Past Medical History:   Diagnosis Date    CAD (coronary artery disease)     s/p bare metal stent to RCA in 6/2020    Chronic back pain     Chronic systolic congestive heart failure (H)     EF 45% has recovered to 60-65% on echo 12/2021    COPD (chronic obstructive pulmonary disease) (H)     Depressive disorder     Myocardial infarction (H)     Nicotine dependence        Past Surgical History   No past surgical history on file.    Prior to Admission Medications   Prior to Admission Medications   Prescriptions Last Dose Informant Patient Reported? Taking?   QUEtiapine (SEROQUEL) 25 MG tablet 7/28/2023 at AM Nursing Home Yes Yes   Sig: Take 25 mg by mouth every morning   QUEtiapine (SEROQUEL) 25 MG tablet  at [facility]  Nursing Home Yes No   Sig: Take 12.5 mg by mouth nightly as needed   acetaminophen (TYLENOL) 325 MG tablet  at [facility] Nursing Home Yes Yes   Sig: Take 325 mg by mouth every 4 hours as needed for mild pain   albuterol (PROAIR HFA/PROVENTIL HFA/VENTOLIN HFA) 108 (90 Base) MCG/ACT inhaler  at [facility] Nursing Home Yes Yes   Sig: Inhale 2 puffs into the lungs every 6 hours as needed for shortness of breath, wheezing or cough   aspirin (ASA) 81 MG chewable tablet 7/28/2023 at AM Nursing Home Yes Yes   Sig: Take 81 mg by mouth daily   atorvastatin (LIPITOR) 40 MG tablet 7/28/2023 at AM Nursing Home Yes Yes   Sig: Take 40 mg by mouth daily    buPROPion (WELLBUTRIN SR) 150 MG 12 hr tablet 7/28/2023 at AM Nursing Home Yes Yes   Sig: Take 150 mg by mouth 2 times daily   buprenorphine-naloxone (SUBOXONE) 8-2 MG SUBL sublingual tablet 7/28/2023 Nursing Home Yes Yes   Sig: Place 1 tablet under the tongue 2 times daily AM and early evening  (around 5-6 pm)   carvedilol (COREG) 25 MG tablet 7/28/2023 at AM Nursing Home Yes Yes   Sig: Take 25 mg by mouth 2 times daily   diclofenac (VOLTAREN) 1 % topical gel  at [facility] Nursing Home Yes Yes   Sig: Apply 2-4 g topically 4 times daily as needed for moderate pain   fluticasone-vilanterol (BREO ELLIPTA) 200-25 MCG/INH inhaler 7/28/2023 at AM Nursing Home Yes Yes   Sig: Inhale 1 puff into the lungs daily   gabapentin (NEURONTIN) 300 MG capsule 7/28/2023 at AM Nursing Home Yes Yes   Sig: Take 300 mg by mouth every morning   gabapentin (NEURONTIN) 300 MG capsule 7/27/2023 at  Nursing Home Yes Yes   Sig: Take 600 mg by mouth At Bedtime   hydrALAZINE (APRESOLINE) 25 MG tablet 7/28/2023 Nursing Home Yes Yes   Sig: Take 25 mg by mouth 3 times daily   ipratropium - albuterol 0.5 mg/2.5 mg/3 mL (DUONEB) 0.5-2.5 (3) MG/3ML neb solution  at [facility] Nursing Home Yes Yes   Sig: Take 1 vial by nebulization every 4 hours as needed for shortness of breath, wheezing or cough   lisinopril (ZESTRIL) 30 MG tablet  2023 at AM Nursing Home Yes Yes   Sig: Take 30 mg by mouth daily   melatonin 5 MG tablet  at [facility] Nursing [facility] Yes Yes   Sig: Take 5 mg by mouth nightly as needed for sleep   mirtazapine (REMERON) 45 MG tablet 2023 at  Nursing Home Yes Yes   Sig: Take 45 mg by mouth At Bedtime    multivitamin w/minerals (THERA-VIT-M) tablet 2023 at AM Nursing Home Yes Yes   Sig: Take 1 tablet by mouth daily   naloxone (NARCAN) 4 MG/0.1ML nasal spray  at [facility] Nursing [facility] Yes Yes   Sig: Spray 4 mg into one nostril alternating nostrils once as needed for opioid reversal every 2-3 minutes until assistance arrives   nicotine (NICODERM CQ) 7 MG/24HR 24 hr patch 2023 at AM Nursing Home Yes Yes   Sig: Place 1 patch onto the skin every 24 hours   pantoprazole (PROTONIX) 40 MG EC tablet 2023 at AM Nursing Home Yes Yes   Sig: Take 40 mg by mouth daily   polyethylene glycol (MIRALAX) 17 g packet  at [facility] Nursing Home Yes Yes   Sig: Take 1 packet by mouth daily as needed for constipation   predniSONE (DELTASONE) 10 MG tablet  at See note Nursing Home No No   Si tabs daily for 4 days, then 3 tabs daily for 2 days, then 2 tabs daily for 2 days, then 1 tab daily for 2 days, then stop   tiotropium (SPIRIVA RESPIMAT) 2.5 MCG/ACT inhaler 2023 at AM Nursing Home Yes Yes   Sig: Inhale 2 puffs into the lungs daily      Facility-Administered Medications: None           Physical Exam   Vital Signs: Temp: 98.4  F (36.9  C) Temp src: Temporal BP: 115/83 Pulse: 91   Resp: 20 SpO2: 99 % O2 Device: BiPAP/CPAP      General Appearance: Nontoxic appearing black male on BiPAP  Eyes: No scleral icterus or injection.  Anisocoria with left pupil dilated from prior trauma.  HEENT: Normocephalic and atraumatic  Respiratory: Air movement throughout lung fields without wheezing, though exam on BiPAP.  No crackles or rhonchi.  Does have some mild tachypnea  Cardiovascular: Heart sounds distant, regular rate and rhythm  GI: Abdomen soft,  "nontender to palpation.  Has some abdominal obesity which she attributes to \"Pasta\" served at group home  Lymph/Hematologic: No lower extremity edema  Genitourinary: Not examined  Musculoskeletal: Muscular tone and bulk intact in all extremities and appropriate for age  Neurologic: Alert, conversant, appropriate in conversation.  Mental status grossly intact.  Psychiatric: Pleasant, normal affect    Medical Decision Making       75 MINUTES SPENT BY ME on the date of service doing chart review, history, exam, documentation & further activities per the note.      Data     I have personally reviewed the following data over the past 24 hrs:    8.7  \   11.2 (L)   / 164     141 101 11.7 /  156 (H)   4.4 31 (H) 1.01 \     ALT: 26 AST: 22 AP: 115 TBILI: 0.4   ALB: 4.3 TOT PROTEIN: 7.0 LIPASE: N/A     Trop: 9 BNP: 282     Procal: N/A CRP: N/A Lactic Acid: 0.9         Imaging results reviewed over the past 24 hrs:   Recent Results (from the past 24 hour(s))   XR Chest Port 1 View    Narrative    EXAM: XR CHEST PORT 1 VIEW  LOCATION: [facility] [place]  DATE: 7/28/2023    INDICATION: sob, copd  COMPARISON: Chest x-ray 07/15/2023      Impression    IMPRESSION: Mild interstitial edema. No pneumonic consolidation or pleural effusion. Normal heart size. Stable central vascular prominence.     "

## 2025-06-14 ENCOUNTER — HOSPITAL ENCOUNTER (INPATIENT)
Facility: CLINIC | Age: 65
LOS: 2 days | Discharge: HOME | End: 2025-06-16
Attending: EMERGENCY MEDICINE | Admitting: HOSPITALIST
Payer: MEDICARE

## 2025-06-14 ENCOUNTER — APPOINTMENT (OUTPATIENT)
Dept: GENERAL RADIOLOGY | Facility: CLINIC | Age: 65
End: 2025-06-14
Attending: EMERGENCY MEDICINE
Payer: MEDICARE

## 2025-06-14 DIAGNOSIS — R09.02 HYPOXIA: ICD-10-CM

## 2025-06-14 DIAGNOSIS — J44.1 COPD EXACERBATION (H): ICD-10-CM

## 2025-06-14 LAB
ALBUMIN SERPL BCG-MCNC: 4.1 G/DL (ref 3.5–5.2)
ALP SERPL-CCNC: 113 U/L (ref 40–150)
ALT SERPL W P-5'-P-CCNC: 17 U/L (ref 0–70)
ANION GAP SERPL CALCULATED.3IONS-SCNC: 6 MMOL/L (ref 7–15)
AST SERPL W P-5'-P-CCNC: 16 U/L (ref 0–45)
ATRIAL RATE - MUSE: 78 BPM
BASE EXCESS BLDV CALC-SCNC: 4 MMOL/L (ref -3–3)
BASE EXCESS BLDV CALC-SCNC: 4 MMOL/L (ref -3–3)
BASE EXCESS BLDV CALC-SCNC: 5 MMOL/L (ref -3–3)
BASOPHILS # BLD AUTO: 0 10E3/UL (ref 0–0.2)
BASOPHILS NFR BLD AUTO: 1 %
BILIRUB SERPL-MCNC: 0.2 MG/DL
BUN SERPL-MCNC: 14.3 MG/DL (ref 8–23)
CALCIUM SERPL-MCNC: 8.7 MG/DL (ref 8.8–10.4)
CHLORIDE SERPL-SCNC: 100 MMOL/L (ref 98–107)
CREAT SERPL-MCNC: 0.98 MG/DL (ref 0.67–1.17)
DIASTOLIC BLOOD PRESSURE - MUSE: NORMAL MMHG
EGFRCR SERPLBLD CKD-EPI 2021: 86 ML/MIN/1.73M2
EOSINOPHIL # BLD AUTO: 0.3 10E3/UL (ref 0–0.7)
EOSINOPHIL NFR BLD AUTO: 4 %
ERYTHROCYTE [DISTWIDTH] IN BLOOD BY AUTOMATED COUNT: 13.2 % (ref 10–15)
FLUAV RNA SPEC QL NAA+PROBE: NEGATIVE
FLUBV RNA RESP QL NAA+PROBE: NEGATIVE
GLUCOSE SERPL-MCNC: 144 MG/DL (ref 70–99)
HCO3 BLDV-SCNC: 31 MMOL/L (ref 21–28)
HCO3 BLDV-SCNC: 31 MMOL/L (ref 21–28)
HCO3 BLDV-SCNC: 33 MMOL/L (ref 21–28)
HCO3 SERPL-SCNC: 31 MMOL/L (ref 22–29)
HCT VFR BLD AUTO: 33 % (ref 40–53)
HGB BLD-MCNC: 10.4 G/DL (ref 13.3–17.7)
HOLD SPECIMEN: NORMAL
HOLD SPECIMEN: NORMAL
IMM GRANULOCYTES # BLD: 0 10E3/UL
IMM GRANULOCYTES NFR BLD: 0 %
INTERPRETATION ECG - MUSE: NORMAL
LACTATE BLD-SCNC: 0.4 MMOL/L (ref 0.7–2)
LACTATE BLD-SCNC: <0.3 MMOL/L (ref 0.7–2)
LYMPHOCYTES # BLD AUTO: 1.5 10E3/UL (ref 0.8–5.3)
LYMPHOCYTES NFR BLD AUTO: 24 %
MCH RBC QN AUTO: 29.5 PG (ref 26.5–33)
MCHC RBC AUTO-ENTMCNC: 31.5 G/DL (ref 31.5–36.5)
MCV RBC AUTO: 94 FL (ref 78–100)
MONOCYTES # BLD AUTO: 0.7 10E3/UL (ref 0–1.3)
MONOCYTES NFR BLD AUTO: 12 %
NEUTROPHILS # BLD AUTO: 3.7 10E3/UL (ref 1.6–8.3)
NEUTROPHILS NFR BLD AUTO: 59 %
NRBC # BLD AUTO: 0 10E3/UL
NRBC BLD AUTO-RTO: 0 /100
NT-PROBNP SERPL-MCNC: 197 PG/ML (ref 0–177)
O2/TOTAL GAS SETTING VFR VENT: 30 %
OXYHGB MFR BLDV: 57 % (ref 70–75)
P AXIS - MUSE: 76 DEGREES
PCO2 BLDV: 57 MM HG (ref 40–50)
PCO2 BLDV: 62 MM HG (ref 40–50)
PCO2 BLDV: 66 MM HG (ref 40–50)
PH BLDV: 7.31 [PH] (ref 7.32–7.43)
PH BLDV: 7.31 [PH] (ref 7.32–7.43)
PH BLDV: 7.34 [PH] (ref 7.32–7.43)
PLATELET # BLD AUTO: 147 10E3/UL (ref 150–450)
PO2 BLDV: 21 MM HG (ref 25–47)
PO2 BLDV: 33 MM HG (ref 25–47)
PO2 BLDV: 41 MM HG (ref 25–47)
POTASSIUM SERPL-SCNC: 4.1 MMOL/L (ref 3.4–5.3)
PR INTERVAL - MUSE: 128 MS
PROT SERPL-MCNC: 6.8 G/DL (ref 6.4–8.3)
QRS DURATION - MUSE: 74 MS
QT - MUSE: 366 MS
QTC - MUSE: 417 MS
R AXIS - MUSE: 72 DEGREES
RBC # BLD AUTO: 3.52 10E6/UL (ref 4.4–5.9)
RSV RNA SPEC NAA+PROBE: NEGATIVE
SAO2 % BLDV: 28 % (ref 70–75)
SAO2 % BLDV: 57.8 % (ref 70–75)
SAO2 % BLDV: 69 % (ref 70–75)
SARS-COV-2 RNA RESP QL NAA+PROBE: NEGATIVE
SODIUM SERPL-SCNC: 137 MMOL/L (ref 135–145)
SYSTOLIC BLOOD PRESSURE - MUSE: NORMAL MMHG
T AXIS - MUSE: 80 DEGREES
TROPONIN T SERPL HS-MCNC: 11 NG/L
TROPONIN T SERPL HS-MCNC: 12 NG/L
VENTRICULAR RATE- MUSE: 78 BPM
WBC # BLD AUTO: 6.4 10E3/UL (ref 4–11)

## 2025-06-14 PROCEDURE — 82803 BLOOD GASES ANY COMBINATION: CPT

## 2025-06-14 PROCEDURE — 36415 COLL VENOUS BLD VENIPUNCTURE: CPT | Performed by: HOSPITALIST

## 2025-06-14 PROCEDURE — 99207 PR NO CHARGE LOS: CPT | Performed by: HOSPITALIST

## 2025-06-14 PROCEDURE — 120N000013 HC R&B IMCU

## 2025-06-14 PROCEDURE — 999N000185 HC STATISTIC TRANSPORT TIME EA 15 MIN

## 2025-06-14 PROCEDURE — 82310 ASSAY OF CALCIUM: CPT | Performed by: EMERGENCY MEDICINE

## 2025-06-14 PROCEDURE — 76604 US EXAM CHEST: CPT

## 2025-06-14 PROCEDURE — 250N000012 HC RX MED GY IP 250 OP 636 PS 637: Performed by: EMERGENCY MEDICINE

## 2025-06-14 PROCEDURE — 94640 AIRWAY INHALATION TREATMENT: CPT

## 2025-06-14 PROCEDURE — 96374 THER/PROPH/DIAG INJ IV PUSH: CPT

## 2025-06-14 PROCEDURE — 85025 COMPLETE CBC W/AUTO DIFF WBC: CPT | Performed by: EMERGENCY MEDICINE

## 2025-06-14 PROCEDURE — 94660 CPAP INITIATION&MGMT: CPT

## 2025-06-14 PROCEDURE — 250N000011 HC RX IP 250 OP 636: Performed by: EMERGENCY MEDICINE

## 2025-06-14 PROCEDURE — 99223 1ST HOSP IP/OBS HIGH 75: CPT | Mod: AI | Performed by: HOSPITALIST

## 2025-06-14 PROCEDURE — 250N000012 HC RX MED GY IP 250 OP 636 PS 637: Performed by: HOSPITALIST

## 2025-06-14 PROCEDURE — 83880 ASSAY OF NATRIURETIC PEPTIDE: CPT | Performed by: EMERGENCY MEDICINE

## 2025-06-14 PROCEDURE — 93005 ELECTROCARDIOGRAM TRACING: CPT

## 2025-06-14 PROCEDURE — 36415 COLL VENOUS BLD VENIPUNCTURE: CPT | Performed by: EMERGENCY MEDICINE

## 2025-06-14 PROCEDURE — 250N000009 HC RX 250: Performed by: HOSPITALIST

## 2025-06-14 PROCEDURE — 999N000157 HC STATISTIC RCP TIME EA 10 MIN

## 2025-06-14 PROCEDURE — 82805 BLOOD GASES W/O2 SATURATION: CPT | Performed by: HOSPITALIST

## 2025-06-14 PROCEDURE — 250N000013 HC RX MED GY IP 250 OP 250 PS 637: Performed by: HOSPITALIST

## 2025-06-14 PROCEDURE — 5A09357 ASSISTANCE WITH RESPIRATORY VENTILATION, LESS THAN 24 CONSECUTIVE HOURS, CONTINUOUS POSITIVE AIRWAY PRESSURE: ICD-10-PCS | Performed by: INTERNAL MEDICINE

## 2025-06-14 PROCEDURE — 250N000009 HC RX 250: Performed by: EMERGENCY MEDICINE

## 2025-06-14 PROCEDURE — 94640 AIRWAY INHALATION TREATMENT: CPT | Mod: 76

## 2025-06-14 PROCEDURE — 87637 SARSCOV2&INF A&B&RSV AMP PRB: CPT | Performed by: EMERGENCY MEDICINE

## 2025-06-14 PROCEDURE — 71045 X-RAY EXAM CHEST 1 VIEW: CPT

## 2025-06-14 PROCEDURE — 84484 ASSAY OF TROPONIN QUANT: CPT | Performed by: EMERGENCY MEDICINE

## 2025-06-14 PROCEDURE — 99285 EMERGENCY DEPT VISIT HI MDM: CPT | Mod: 25

## 2025-06-14 PROCEDURE — 250N000011 HC RX IP 250 OP 636: Mod: JW | Performed by: HOSPITALIST

## 2025-06-14 RX ORDER — GABAPENTIN 300 MG/1
300 CAPSULE ORAL EVERY MORNING
Status: DISCONTINUED | OUTPATIENT
Start: 2025-06-15 | End: 2025-06-16 | Stop reason: HOSPADM

## 2025-06-14 RX ORDER — LIDOCAINE 40 MG/G
CREAM TOPICAL
Status: DISCONTINUED | OUTPATIENT
Start: 2025-06-14 | End: 2025-06-16 | Stop reason: HOSPADM

## 2025-06-14 RX ORDER — PANTOPRAZOLE SODIUM 40 MG/1
40 TABLET, DELAYED RELEASE ORAL DAILY
Status: DISCONTINUED | OUTPATIENT
Start: 2025-06-14 | End: 2025-06-16 | Stop reason: HOSPADM

## 2025-06-14 RX ORDER — FLUTICASONE PROPIONATE 50 MCG
2 SPRAY, SUSPENSION (ML) NASAL DAILY
Status: DISCONTINUED | OUTPATIENT
Start: 2025-06-14 | End: 2025-06-16 | Stop reason: HOSPADM

## 2025-06-14 RX ORDER — AMOXICILLIN 250 MG
1 CAPSULE ORAL 2 TIMES DAILY PRN
Status: DISCONTINUED | OUTPATIENT
Start: 2025-06-14 | End: 2025-06-16 | Stop reason: HOSPADM

## 2025-06-14 RX ORDER — ONDANSETRON 2 MG/ML
4 INJECTION INTRAMUSCULAR; INTRAVENOUS EVERY 6 HOURS PRN
Status: DISCONTINUED | OUTPATIENT
Start: 2025-06-14 | End: 2025-06-16 | Stop reason: HOSPADM

## 2025-06-14 RX ORDER — ACETAMINOPHEN 650 MG/1
650 SUPPOSITORY RECTAL EVERY 4 HOURS PRN
Status: DISCONTINUED | OUTPATIENT
Start: 2025-06-14 | End: 2025-06-16 | Stop reason: HOSPADM

## 2025-06-14 RX ORDER — PREDNISONE 20 MG/1
60 TABLET ORAL ONCE
Status: COMPLETED | OUTPATIENT
Start: 2025-06-14 | End: 2025-06-14

## 2025-06-14 RX ORDER — GABAPENTIN 300 MG/1
600 CAPSULE ORAL AT BEDTIME
Status: DISCONTINUED | OUTPATIENT
Start: 2025-06-14 | End: 2025-06-16 | Stop reason: HOSPADM

## 2025-06-14 RX ORDER — IPRATROPIUM BROMIDE AND ALBUTEROL SULFATE 2.5; .5 MG/3ML; MG/3ML
3 SOLUTION RESPIRATORY (INHALATION) ONCE
Status: COMPLETED | OUTPATIENT
Start: 2025-06-14 | End: 2025-06-14

## 2025-06-14 RX ORDER — FERROUS SULFATE 325(65) MG
325 TABLET ORAL DAILY
Status: DISCONTINUED | OUTPATIENT
Start: 2025-06-14 | End: 2025-06-16 | Stop reason: HOSPADM

## 2025-06-14 RX ORDER — HYDROMORPHONE HCL IN WATER/PF 6 MG/30 ML
0.4 PATIENT CONTROLLED ANALGESIA SYRINGE INTRAVENOUS
Status: DISCONTINUED | OUTPATIENT
Start: 2025-06-14 | End: 2025-06-16 | Stop reason: HOSPADM

## 2025-06-14 RX ORDER — ACETAMINOPHEN 325 MG/1
650 TABLET ORAL EVERY 4 HOURS PRN
Status: DISCONTINUED | OUTPATIENT
Start: 2025-06-14 | End: 2025-06-16 | Stop reason: HOSPADM

## 2025-06-14 RX ORDER — CARBOXYMETHYLCELLULOSE SODIUM 5 MG/ML
1 SOLUTION/ DROPS OPHTHALMIC
Status: DISCONTINUED | OUTPATIENT
Start: 2025-06-14 | End: 2025-06-15

## 2025-06-14 RX ORDER — FLUTICASONE PROPIONATE 50 MCG
2 SPRAY, SUSPENSION (ML) NASAL DAILY
COMMUNITY

## 2025-06-14 RX ORDER — HYDRALAZINE HYDROCHLORIDE 20 MG/ML
10 INJECTION INTRAMUSCULAR; INTRAVENOUS EVERY 4 HOURS PRN
Status: DISCONTINUED | OUTPATIENT
Start: 2025-06-14 | End: 2025-06-16 | Stop reason: HOSPADM

## 2025-06-14 RX ORDER — FLUTICASONE FUROATE AND VILANTEROL 200; 25 UG/1; UG/1
1 POWDER RESPIRATORY (INHALATION) DAILY
Status: DISCONTINUED | OUTPATIENT
Start: 2025-06-14 | End: 2025-06-16 | Stop reason: HOSPADM

## 2025-06-14 RX ORDER — ALBUTEROL SULFATE 0.83 MG/ML
2.5 SOLUTION RESPIRATORY (INHALATION)
Status: DISCONTINUED | OUTPATIENT
Start: 2025-06-14 | End: 2025-06-16 | Stop reason: HOSPADM

## 2025-06-14 RX ORDER — METHYLPREDNISOLONE SODIUM SUCCINATE 125 MG/2ML
60 INJECTION INTRAMUSCULAR; INTRAVENOUS EVERY 12 HOURS
Status: DISCONTINUED | OUTPATIENT
Start: 2025-06-14 | End: 2025-06-15

## 2025-06-14 RX ORDER — NALOXONE HYDROCHLORIDE 0.4 MG/ML
0.4 INJECTION, SOLUTION INTRAMUSCULAR; INTRAVENOUS; SUBCUTANEOUS
Status: DISCONTINUED | OUTPATIENT
Start: 2025-06-14 | End: 2025-06-16 | Stop reason: HOSPADM

## 2025-06-14 RX ORDER — CALCIUM CARBONATE 500 MG/1
1000 TABLET, CHEWABLE ORAL 4 TIMES DAILY PRN
Status: DISCONTINUED | OUTPATIENT
Start: 2025-06-14 | End: 2025-06-16 | Stop reason: HOSPADM

## 2025-06-14 RX ORDER — HYDRALAZINE HYDROCHLORIDE 50 MG/1
100 TABLET, FILM COATED ORAL 3 TIMES DAILY
Status: DISCONTINUED | OUTPATIENT
Start: 2025-06-14 | End: 2025-06-16 | Stop reason: HOSPADM

## 2025-06-14 RX ORDER — ONDANSETRON 4 MG/1
4 TABLET, ORALLY DISINTEGRATING ORAL EVERY 6 HOURS PRN
Status: DISCONTINUED | OUTPATIENT
Start: 2025-06-14 | End: 2025-06-16 | Stop reason: HOSPADM

## 2025-06-14 RX ORDER — CHOLECALCIFEROL (VITAMIN D3) 50 MCG
50 TABLET ORAL DAILY
Status: DISCONTINUED | OUTPATIENT
Start: 2025-06-14 | End: 2025-06-16 | Stop reason: HOSPADM

## 2025-06-14 RX ORDER — POLYETHYLENE GLYCOL 3350 17 G/17G
17 POWDER, FOR SOLUTION ORAL 2 TIMES DAILY PRN
Status: DISCONTINUED | OUTPATIENT
Start: 2025-06-14 | End: 2025-06-16 | Stop reason: HOSPADM

## 2025-06-14 RX ORDER — AZITHROMYCIN 250 MG/1
250 TABLET, FILM COATED ORAL DAILY
Status: DISCONTINUED | OUTPATIENT
Start: 2025-06-14 | End: 2025-06-16 | Stop reason: HOSPADM

## 2025-06-14 RX ORDER — AMOXICILLIN 250 MG
2 CAPSULE ORAL 2 TIMES DAILY PRN
Status: DISCONTINUED | OUTPATIENT
Start: 2025-06-14 | End: 2025-06-16 | Stop reason: HOSPADM

## 2025-06-14 RX ORDER — NALOXONE HYDROCHLORIDE 0.4 MG/ML
0.2 INJECTION, SOLUTION INTRAMUSCULAR; INTRAVENOUS; SUBCUTANEOUS
Status: DISCONTINUED | OUTPATIENT
Start: 2025-06-14 | End: 2025-06-16 | Stop reason: HOSPADM

## 2025-06-14 RX ORDER — HYDROMORPHONE HCL IN WATER/PF 6 MG/30 ML
0.2 PATIENT CONTROLLED ANALGESIA SYRINGE INTRAVENOUS
Status: DISCONTINUED | OUTPATIENT
Start: 2025-06-14 | End: 2025-06-16 | Stop reason: HOSPADM

## 2025-06-14 RX ORDER — ASPIRIN 81 MG/1
81 TABLET, CHEWABLE ORAL DAILY
Status: DISCONTINUED | OUTPATIENT
Start: 2025-06-14 | End: 2025-06-16 | Stop reason: HOSPADM

## 2025-06-14 RX ORDER — POLYETHYLENE GLYCOL 3350 17 G/17G
17 POWDER, FOR SOLUTION ORAL DAILY
Status: DISCONTINUED | OUTPATIENT
Start: 2025-06-14 | End: 2025-06-16 | Stop reason: HOSPADM

## 2025-06-14 RX ORDER — BUPROPION HYDROCHLORIDE 150 MG/1
300 TABLET ORAL EVERY MORNING
Status: DISCONTINUED | OUTPATIENT
Start: 2025-06-15 | End: 2025-06-16 | Stop reason: HOSPADM

## 2025-06-14 RX ORDER — PREDNISONE 20 MG/1
40 TABLET ORAL DAILY
Status: DISCONTINUED | OUTPATIENT
Start: 2025-06-14 | End: 2025-06-14

## 2025-06-14 RX ORDER — AZITHROMYCIN 250 MG/1
250 TABLET, FILM COATED ORAL DAILY
Status: ON HOLD | COMMUNITY
End: 2025-06-16

## 2025-06-14 RX ORDER — IPRATROPIUM BROMIDE AND ALBUTEROL SULFATE 2.5; .5 MG/3ML; MG/3ML
3 SOLUTION RESPIRATORY (INHALATION)
Status: DISCONTINUED | OUTPATIENT
Start: 2025-06-14 | End: 2025-06-16 | Stop reason: HOSPADM

## 2025-06-14 RX ORDER — BUPRENORPHINE AND NALOXONE 8; 2 MG/1; MG/1
1 FILM, SOLUBLE BUCCAL; SUBLINGUAL DAILY
Status: DISCONTINUED | OUTPATIENT
Start: 2025-06-14 | End: 2025-06-16 | Stop reason: HOSPADM

## 2025-06-14 RX ORDER — LABETALOL HYDROCHLORIDE 5 MG/ML
10 INJECTION, SOLUTION INTRAVENOUS
Status: DISCONTINUED | OUTPATIENT
Start: 2025-06-14 | End: 2025-06-16 | Stop reason: HOSPADM

## 2025-06-14 RX ORDER — MULTIPLE VITAMINS W/ MINERALS TAB 9MG-400MCG
1 TAB ORAL DAILY
Status: DISCONTINUED | OUTPATIENT
Start: 2025-06-14 | End: 2025-06-16 | Stop reason: HOSPADM

## 2025-06-14 RX ORDER — ATORVASTATIN CALCIUM 40 MG/1
40 TABLET, FILM COATED ORAL DAILY
Status: DISCONTINUED | OUTPATIENT
Start: 2025-06-14 | End: 2025-06-16 | Stop reason: HOSPADM

## 2025-06-14 RX ORDER — BUPRENORPHINE HYDROCHLORIDE AND NALOXONE HYDROCHLORIDE DIHYDRATE 8; 2 MG/1; MG/1
1 TABLET SUBLINGUAL DAILY
Status: DISCONTINUED | OUTPATIENT
Start: 2025-06-14 | End: 2025-06-14

## 2025-06-14 RX ORDER — IPRATROPIUM BROMIDE AND ALBUTEROL SULFATE 2.5; .5 MG/3ML; MG/3ML
SOLUTION RESPIRATORY (INHALATION)
Status: DISCONTINUED
Start: 2025-06-14 | End: 2025-06-14 | Stop reason: HOSPADM

## 2025-06-14 RX ORDER — MIRTAZAPINE 15 MG/1
45 TABLET, FILM COATED ORAL AT BEDTIME
Status: DISCONTINUED | OUTPATIENT
Start: 2025-06-14 | End: 2025-06-16 | Stop reason: HOSPADM

## 2025-06-14 RX ORDER — CARVEDILOL 25 MG/1
25 TABLET ORAL 2 TIMES DAILY
Status: DISCONTINUED | OUTPATIENT
Start: 2025-06-14 | End: 2025-06-16 | Stop reason: HOSPADM

## 2025-06-14 RX ORDER — QUETIAPINE FUMARATE 25 MG/1
25 TABLET, FILM COATED ORAL AT BEDTIME
Status: DISCONTINUED | OUTPATIENT
Start: 2025-06-14 | End: 2025-06-16 | Stop reason: HOSPADM

## 2025-06-14 RX ORDER — LISINOPRIL 40 MG/1
40 TABLET ORAL DAILY
Status: DISCONTINUED | OUTPATIENT
Start: 2025-06-14 | End: 2025-06-16 | Stop reason: HOSPADM

## 2025-06-14 RX ORDER — LORAZEPAM 2 MG/ML
0.5 INJECTION INTRAMUSCULAR ONCE
Status: COMPLETED | OUTPATIENT
Start: 2025-06-14 | End: 2025-06-14

## 2025-06-14 RX ADMIN — UMECLIDINIUM 1 PUFF: 62.5 AEROSOL, POWDER ORAL at 18:23

## 2025-06-14 RX ADMIN — GABAPENTIN 600 MG: 300 CAPSULE ORAL at 21:29

## 2025-06-14 RX ADMIN — FERROUS SULFATE TAB 325 MG (65 MG ELEMENTAL FE) 325 MG: 325 (65 FE) TAB at 18:12

## 2025-06-14 RX ADMIN — AZITHROMYCIN DIHYDRATE 250 MG: 250 TABLET ORAL at 18:12

## 2025-06-14 RX ADMIN — QUETIAPINE FUMARATE 25 MG: 25 TABLET ORAL at 21:29

## 2025-06-14 RX ADMIN — IPRATROPIUM BROMIDE AND ALBUTEROL SULFATE 3 ML: .5; 3 SOLUTION RESPIRATORY (INHALATION) at 03:27

## 2025-06-14 RX ADMIN — ATORVASTATIN CALCIUM 40 MG: 40 TABLET, FILM COATED ORAL at 20:08

## 2025-06-14 RX ADMIN — HYDRALAZINE HYDROCHLORIDE 100 MG: 50 TABLET ORAL at 21:30

## 2025-06-14 RX ADMIN — IPRATROPIUM BROMIDE AND ALBUTEROL SULFATE 3 ML: .5; 3 SOLUTION RESPIRATORY (INHALATION) at 19:56

## 2025-06-14 RX ADMIN — LISINOPRIL 40 MG: 40 TABLET ORAL at 18:12

## 2025-06-14 RX ADMIN — Medication 50 MCG: at 18:12

## 2025-06-14 RX ADMIN — METHYLPREDNISOLONE SODIUM SUCCINATE 62.5 MG: 125 INJECTION, POWDER, FOR SOLUTION INTRAMUSCULAR; INTRAVENOUS at 08:51

## 2025-06-14 RX ADMIN — PREDNISONE 60 MG: 20 TABLET ORAL at 03:03

## 2025-06-14 RX ADMIN — MIRTAZAPINE 45 MG: 15 TABLET, FILM COATED ORAL at 21:29

## 2025-06-14 RX ADMIN — IPRATROPIUM BROMIDE AND ALBUTEROL SULFATE 3 ML: .5; 3 SOLUTION RESPIRATORY (INHALATION) at 08:51

## 2025-06-14 RX ADMIN — BUPRENORPHINE AND NALOXONE 1 FILM: 8; 2 FILM BUCCAL; SUBLINGUAL at 20:05

## 2025-06-14 RX ADMIN — LORAZEPAM 0.5 MG: 2 INJECTION INTRAMUSCULAR; INTRAVENOUS at 04:10

## 2025-06-14 RX ADMIN — CARVEDILOL 25 MG: 25 TABLET, FILM COATED ORAL at 20:08

## 2025-06-14 RX ADMIN — CARBOXYMETHYLCELLULOSE SODIUM 1 DROP: 5 SOLUTION/ DROPS OPHTHALMIC at 21:37

## 2025-06-14 RX ADMIN — FLUTICASONE FUROATE AND VILANTEROL TRIFENATATE 1 PUFF: 200; 25 POWDER RESPIRATORY (INHALATION) at 18:23

## 2025-06-14 RX ADMIN — METHYLPREDNISOLONE SODIUM SUCCINATE 62.5 MG: 125 INJECTION, POWDER, FOR SOLUTION INTRAMUSCULAR; INTRAVENOUS at 20:06

## 2025-06-14 RX ADMIN — ASPIRIN 81 MG CHEWABLE TABLET 81 MG: 81 TABLET CHEWABLE at 18:12

## 2025-06-14 RX ADMIN — ACETAMINOPHEN 650 MG: 325 TABLET, FILM COATED ORAL at 21:30

## 2025-06-14 RX ADMIN — IPRATROPIUM BROMIDE AND ALBUTEROL SULFATE 3 ML: .5; 3 SOLUTION RESPIRATORY (INHALATION) at 03:02

## 2025-06-14 RX ADMIN — PANTOPRAZOLE SODIUM 40 MG: 40 TABLET, DELAYED RELEASE ORAL at 18:11

## 2025-06-14 RX ADMIN — HYDRALAZINE HYDROCHLORIDE 100 MG: 50 TABLET ORAL at 18:16

## 2025-06-14 RX ADMIN — Medication 1 TABLET: at 18:12

## 2025-06-14 ASSESSMENT — ACTIVITIES OF DAILY LIVING (ADL)
ADLS_ACUITY_SCORE: 59
ADLS_ACUITY_SCORE: 39
ADLS_ACUITY_SCORE: 41
ADLS_ACUITY_SCORE: 41
ADLS_ACUITY_SCORE: 59
ADLS_ACUITY_SCORE: 41
ADLS_ACUITY_SCORE: 41
ADLS_ACUITY_SCORE: 44
ADLS_ACUITY_SCORE: 59
ADLS_ACUITY_SCORE: 41
ADLS_ACUITY_SCORE: 41
ADLS_ACUITY_SCORE: 59
ADLS_ACUITY_SCORE: 41
ADLS_ACUITY_SCORE: 59
ADLS_ACUITY_SCORE: 41
ADLS_ACUITY_SCORE: 59
ADLS_ACUITY_SCORE: 59
ADLS_ACUITY_SCORE: 41
ADLS_ACUITY_SCORE: 59
ADLS_ACUITY_SCORE: 41
ADLS_ACUITY_SCORE: 41

## 2025-06-14 ASSESSMENT — COLUMBIA-SUICIDE SEVERITY RATING SCALE - C-SSRS
6. HAVE YOU EVER DONE ANYTHING, STARTED TO DO ANYTHING, OR PREPARED TO DO ANYTHING TO END YOUR LIFE?: NO
1. IN THE PAST MONTH, HAVE YOU WISHED YOU WERE DEAD OR WISHED YOU COULD GO TO SLEEP AND NOT WAKE UP?: NO
2. HAVE YOU ACTUALLY HAD ANY THOUGHTS OF KILLING YOURSELF IN THE PAST MONTH?: NO

## 2025-06-14 NOTE — ED PROVIDER NOTES
Emergency Department Note      History of Present Illness     Chief Complaint   Shortness of Breath      HPI   Gerson Pearson is a 65 year old male with past medical history significant for NSTEMI, COPD, stroke, polysubstance abuse, opioid abuse, and hypertension who presents via EMS from home with chief complant of shortness of breath. Per EMS, 4 hours PTA patient experienced onset of chest pain and shortness of breath. Initially the patient rated his shortness of breath as 8/10. At present it is 5/10. EMS states he is on 4L O2. En route, his blood pressure was 150/90, blood glucose in 160, heart rate in the 80s, and O2 in the 90s. The patient states he did not have chest pain and that it was chest tightness instead. He states his chest tightness and shortness of breath have improved at the ED. He reports gradual onset of symptoms. He denies recent drug use stating he has been sober for the past 2-3 years. He has taken his daily medications. He states he was otherwise fine this afternoon.     Independent Historian   EMS as detailed above.    Review of External Notes   Discharge summary 1/12/25 for vomiting    Past Medical History     Medical History and Problem List   Cataract  Anxiety disorder  Obstructive sleep apnea  Squamous blepharitis   COPD  Mural thrombus of right atrium  Myopia of right eye  Hypertension  Ischemic cardiomyopathy  Atherosclerosis  Anemia  Acute gastric ulcer with hemorrhage  Esophageal candidiasis  Anisocoria  Hyperlipidemia  Stroke  Polysubstance abuse  Opioid use disorder  Major depressive disorder  Insomnia  Posttraumatic chorioretinal scar of left eye  NSTEMI  Alcohol dependence  Adjustment disorder  Hypercapnia  Hyponatremia  Pneumonia    Medications   Bupropion  Pantoprazole  Aspirin, 81 mg  Atorvastatin  Gabapentin  Hydralazine  Azithromycin  Guaifenesin  Carvedilol  Lisinopril  Quetiapine  Mirtazapine    Surgical History   Coronary stent placement  Physical Exam     Patient  Vitals for the past 24 hrs:   BP Temp Temp src Pulse Resp SpO2 Weight   06/14/25 0800 (!) 152/90 -- -- 61 12 100 % --   06/14/25 0730 111/67 -- -- 63 14 97 % --   06/14/25 0700 114/70 -- -- 69 14 97 % --   06/14/25 0628 119/69 -- -- 68 14 96 % --   06/14/25 0609 115/73 -- -- 69 12 92 % --   06/14/25 0608 -- -- -- 73 12 (!) 88 % --   06/14/25 0606 -- -- -- 74 12 (!) 89 % --   06/14/25 0604 -- -- -- 73 12 (!) 90 % --   06/14/25 0549 -- -- -- 68 13 92 % --   06/14/25 0526 114/72 -- -- 66 14 95 % --   06/14/25 0456 93/60 -- -- 71 14 96 % --   06/14/25 0428 122/74 -- -- 70 13 97 % --   06/14/25 0400 139/85 -- -- 64 13 100 % --   06/14/25 0330 133/82 -- -- 74 20 100 % --   06/14/25 0329 -- -- -- 73 17 100 % --   06/14/25 0300 (!) 153/91 97.9  F (36.6  C) Axillary 84 -- 100 % 68.8 kg (151 lb 10.8 oz)   06/14/25 0255 (!) 159/97 -- -- 85 -- 100 % --     Physical Exam  General: Does not appear in acute distress  Head: No signs of trauma.   CV: Normal rate and regular rhythm.    Resp: Increased work of breathing, tachypnea, poor air movement.  GI: Soft. There is no tenderness.  No rebound or guarding.  Normal bowel sounds.    MSK: Normal range of motion. no edema. No Calf tenderness.  Neuro: The patient is alert and oriented. Speech normal.  Skin: Skin is warm and dry. No rash noted.   Psych: normal mood and affect. behavior is normal.      Diagnostics     Lab Results   Labs Ordered and Resulted from Time of ED Arrival to Time of ED Departure   COMPREHENSIVE METABOLIC PANEL - Abnormal       Result Value    Sodium 137      Potassium 4.1      Carbon Dioxide (CO2) 31 (*)     Anion Gap 6 (*)     Urea Nitrogen 14.3      Creatinine 0.98      GFR Estimate 86      Calcium 8.7 (*)     Chloride 100      Glucose 144 (*)     Alkaline Phosphatase 113      AST 16      ALT 17      Protein Total 6.8      Albumin 4.1      Bilirubin Total 0.2     NT-PROBNP - Abnormal    NT-proBNP 197 (*)    CBC WITH PLATELETS AND DIFFERENTIAL - Abnormal    WBC  Count 6.4      RBC Count 3.52 (*)     Hemoglobin 10.4 (*)     Hematocrit 33.0 (*)     MCV 94      MCH 29.5      MCHC 31.5      RDW 13.2      Platelet Count 147 (*)     % Neutrophils 59      % Lymphocytes 24      % Monocytes 12      % Eosinophils 4      % Basophils 1      % Immature Granulocytes 0      NRBCs per 100 WBC 0      Absolute Neutrophils 3.7      Absolute Lymphocytes 1.5      Absolute Monocytes 0.7      Absolute Eosinophils 0.3      Absolute Basophils 0.0      Absolute Immature Granulocytes 0.0      Absolute NRBCs 0.0     ISTAT GASES LACTATE VENOUS POCT - Abnormal    Lactic Acid POCT 0.4 (*)     Bicarbonate Venous POCT 31 (*)     O2 Sat, Venous POCT 69 (*)     pCO2 Venous POCT 62 (*)     pH Venous POCT 7.31 (*)     pO2 Venous POCT 41      Base Excess/Deficit (+/-) POCT 4.0 (*)    ISTAT GASES LACTATE VENOUS POCT - Abnormal    Lactic Acid POCT <0.3 (*)     Bicarbonate Venous POCT 33 (*)     O2 Sat, Venous POCT 28 (*)     pCO2 Venous POCT 66 (*)     pH Venous POCT 7.31 (*)     pO2 Venous POCT 21 (*)     Base Excess/Deficit (+/-) POCT 5.0 (*)    TROPONIN T, HIGH SENSITIVITY - Normal    Troponin T, High Sensitivity 12     INFLUENZA A/B, RSV AND SARS-COV2 PCR - Normal    Influenza A PCR Negative      Influenza B PCR Negative      RSV PCR Negative      SARS CoV2 PCR Negative     TROPONIN T, HIGH SENSITIVITY - Normal    Troponin T, High Sensitivity 11     BLOOD GAS ARTERIAL   GLUCOSE MONITOR NURSING POCT       Imaging   XR Chest Port 1 View   Final Result   IMPRESSION: Cardiomediastinal silhouette within normal limits. No focal consolidation or pleural effusion.      POC US CHEST B-SCAN   Final Result   [facility] Procedure Note        Limited Bedside ED Ultrasound of Thorax:      PROCEDURE: PERFORMED BY: Dr. Manjeet Kwon MD   INDICATIONS/SYMPTOM:  dyspnea   PROBE: High frequency linear probe   BODY LOCATION: Chest   FINDINGS:   Images of both lung hemithoracies taken in 2D in multiple rib spaces           Right side:  Lung sliding artifact  Present      Comet tail artifacts  Absent    Left side:  Lung sliding artifact  Present      Comet tail artifacts  Absent      INTERPRETATION: The exam was normal. There was no free fluid identified in the chest cavity. No evidence of pneumothorax, hemothorax or pleural effusion.   IMAGE DOCUMENTATION: Images were archived to PACs system.                   EKG   ECG results from 06/14/25   EKG 12-lead, tracing only     Value    Systolic Blood Pressure     Diastolic Blood Pressure     Ventricular Rate 78    Atrial Rate 78    DE Interval 128    QRS Duration 74        QTc 417    P Axis 76    R AXIS 72    T Axis 80    Interpretation ECG      Sinus rhythm  Low voltage QRS  Borderline ECG  I reviewed at 0301 06/14/25          Independent Interpretation   On my independent interpretation of CXR there is no pneumothorax      ED Course      Medications Administered   Medications   lidocaine 1 % 0.1-1 mL (has no administration in time range)   lidocaine (LMX4) cream (has no administration in time range)   sodium chloride (PF) 0.9% PF flush 3 mL (has no administration in time range)   sodium chloride (PF) 0.9% PF flush 3 mL (has no administration in time range)   acetaminophen (TYLENOL) tablet 650 mg (has no administration in time range)     Or   acetaminophen (TYLENOL) Suppository 650 mg (has no administration in time range)   HYDROmorphone (DILAUDID) injection 0.4 mg (has no administration in time range)   melatonin tablet 5 mg (has no administration in time range)   senna-docusate (SENOKOT-S/PERICOLACE) 8.6-50 MG per tablet 1 tablet (has no administration in time range)     Or   senna-docusate (SENOKOT-S/PERICOLACE) 8.6-50 MG per tablet 2 tablet (has no administration in time range)   ondansetron (ZOFRAN ODT) ODT tab 4 mg (has no administration in time range)     Or   ondansetron (ZOFRAN) injection 4 mg (has no administration in time range)   calcium carbonate (TUMS)  chewable tablet 1,000 mg (has no administration in time range)   ipratropium - albuterol 0.5 mg/2.5 mg (3mg)/3 mL (DUONEB) neb solution 3 mL (has no administration in time range)   No lozenges or gum should be given while patient on BIPAP/AVAPS/AVAPS AE (has no administration in time range)   carboxymethylcellulose PF (REFRESH PLUS) 0.5 % ophthalmic solution 1 drop (has no administration in time range)   HYDROmorphone (DILAUDID) injection 0.2 mg (has no administration in time range)   predniSONE (DELTASONE) tablet 40 mg ( Oral Automatically Held 6/17/25 0800)   albuterol (PROVENTIL) neb solution 2.5 mg (has no administration in time range)   Patient may continue current oral medications (has no administration in time range)   lidocaine 1 % 0.1-1 mL (has no administration in time range)   lidocaine (LMX4) cream (has no administration in time range)   sodium chloride (PF) 0.9% PF flush 3 mL (has no administration in time range)   sodium chloride (PF) 0.9% PF flush 3 mL (has no administration in time range)   methylPREDNISolone Na Suc (solu-MEDROL) injection 62.5 mg (has no administration in time range)   ipratropium - albuterol 0.5 mg/2.5 mg (3mg)/3 mL (DUONEB) neb solution 3 mL (3 mLs Nebulization $Given 6/14/25 0302)   predniSONE (DELTASONE) tablet 60 mg (60 mg Oral $Given 6/14/25 0303)   ipratropium - albuterol 0.5 mg/2.5 mg (3mg)/3 mL (DUONEB) neb solution 3 mL (3 mLs Nebulization $Given 6/14/25 0327)   LORazepam (ATIVAN) injection 0.5 mg (0.5 mg Intravenous $Given 6/14/25 0410)       Procedures   Procedures     Discussion of Management   Dr. Cadena, hospitalist, for admission    ED Course   ED Course as of 06/14/25 0812   Sat Jun 14, 2025   0252 EMS arrival.   0253 I obtained history and examined the patient as noted above.    0253 BiPaP mask placed   0258 Performed chest ultrasound.   0302 Nebulizer started   0304 Chest X-ray performed   0324 Per RN, prednisone given. Duoneb finished. Chest pain returning.         Additional Documentation  None    Medical Decision Making / Diagnosis     CMS Diagnoses: None    MIPS   None               MDM   Gerson Pearson is a 65 year old male presents due to difficulty breathing.  Him symptoms started tonight and became worse.  Patient was noted to be hypoxic and had some respiratory distress with EMS and they started him on CPAP.  On presentation to the ER patient was tachypneic with some respiratory distress and poor airway movement.  I did a bedside ultrasound which did not show significant fluid overload.  Given patient's history, I had concern for COPD and was given breathing treatments along with prednisone.  Chest x-ray did not show significant fluid overload.  Blood work was obtained was overall reassuring.  Patient continued to need BiPAP for respiratory support and was admitted to the hospitalist service.    Disposition   The patient was admitted to the hospital.     Diagnosis     ICD-10-CM    1. COPD exacerbation (H)  J44.1       2. Hypoxia  R09.02              Scribe Disclosure:  I, Jose Bone, am serving as a scribe at 3:54 AM on 6/14/2025 to document services personally performed by Manjeet Kwon MD based on my observations and the provider's statements to me.        Manjeet Kwon MD  06/14/25 0874     Attending

## 2025-06-14 NOTE — PLAN OF CARE
Goal Outcome Evaluation:    Plan of Care Reviewed With: patient    Overall Patient Progress: improving    Orientation: A&O x4  Aggression Stop Light: Green  Activity: Ax1 w/ GB  Diet/BS Checks: Regular  Tele: NSR  IV Access/Drains: 2 PIV SL  Pain Management: denies pain.  Abnormal VS/Results: VSS on 4L o2 NC, except HTN at times.   Bowel/Bladder: Continent of B/B. 1 BM, using urinal at times.  Skin/Wounds: peeling bilateral feet  Consults: RT  D/C Disposition: pending  Other Info: weaned of BiPAP to baseline o2 tolerating fine. Updated [facility] today.

## 2025-06-14 NOTE — PHARMACY-ADMISSION MEDICATION HISTORY
Pharmacist Admission Medication History    Admission medication history is complete. The information provided in this note is only as accurate as the sources available at the time of the update.    Information Source(s): Caregiver via phone  [facility], [place] [phone]    Pertinent Information:     Changes made to PTA medication list:  Added: None  Deleted: None  Changed: None    Allergies reviewed with patient and updates made in EHR:     Medication History Completed By: Radha Ashley [facility] 6/14/2025 5:23 PM    PTA Med List   Medication Sig Last Dose/Taking    acetaminophen (TYLENOL) 325 MG tablet Take 650 mg by mouth every 4 hours as needed for mild pain. Taking As Needed    albuterol (PROAIR HFA/PROVENTIL HFA/VENTOLIN HFA) 108 (90 Base) MCG/ACT inhaler Inhale 2 puffs into the lungs every 4 hours as needed for shortness of breath, wheezing or cough Taking As Needed    aspirin (ASA) 81 MG chewable tablet Take 81 mg by mouth daily 6/13/2025    atorvastatin (LIPITOR) 40 MG tablet Take 40 mg by mouth daily  6/13/2025    azithromycin (ZITHROMAX) 250 MG tablet Take 250 mg by mouth daily. 6/13/2025    buprenorphine-naloxone (SUBOXONE) 8-2 MG SUBL sublingual tablet Place 1 tablet under the tongue daily. 6/13/2025    carvedilol (COREG) 25 MG tablet Take 25 mg by mouth 2 times daily 6/13/2025    cholecalciferol (VITAMIN D3) 25 mcg (1000 units) capsule Take 2 capsules by mouth daily. 6/13/2025    clotrimazole (LOTRIMIN) 1 % external cream Apply topically 2 times daily. To feet - can use between toes 6/13/2025    diclofenac (VOLTAREN) 1 % topical gel Apply 2-4 g topically 4 times daily as needed for moderate pain Taking As Needed    ferrous sulfate (FE TABS) 325 (65 Fe) MG EC tablet Take 325 mg by mouth daily 6/13/2025    fluticasone (FLONASE) 50 MCG/ACT nasal spray Spray 2 sprays into both nostrils daily. 6/13/2025    Fluticasone-Umeclidin-Vilanterol (TRELEGY ELLIPTA) 200-62.5-25 MCG/ACT oral inhaler Inhale 1 puff  into the lungs daily. 6/13/2025    gabapentin (NEURONTIN) 300 MG capsule Take 300 mg by mouth every morning 6/13/2025    gabapentin (NEURONTIN) 300 MG capsule Take 600 mg by mouth At Bedtime 6/13/2025    hydrALAZINE (APRESOLINE) 100 MG tablet Take 1 tablet (100 mg) by mouth 3 times daily NOTE this is an INCREASE in dose from prior to hospital.  Further mgmt and refills per PCP. 6/13/2025    ipratropium - albuterol 0.5 mg/2.5 mg/3 mL (DUONEB) 0.5-2.5 (3) MG/3ML neb solution Take 1 vial (3 mLs) by nebulization 2 times daily 6/13/2025    ipratropium - albuterol 0.5 mg/2.5 mg/3 mL (DUONEB) 0.5-2.5 (3) MG/3ML neb solution Take 1 vial (3 mLs) by nebulization every 6 hours as needed for shortness of breath, wheezing or cough Taking As Needed    lisinopril (ZESTRIL) 40 MG tablet Take 1 tablet (40 mg) by mouth daily 6/13/2025    melatonin 5 MG tablet Take 5 mg by mouth nightly as needed for sleep Taking As Needed    mineral oil-white petrolatum (EUCERIN/MINERIN) cream Apply topically 2 times daily. To feet; apply after clotrimazole. Do not use between toes. Cover feet with socks at night Taking    mirtazapine (REMERON) 45 MG tablet Take 45 mg by mouth At Bedtime  6/13/2025    multivitamin w/minerals (THERA-VIT-M) tablet Take 1 tablet by mouth daily 6/13/2025    naloxone (NARCAN) 4 MG/0.1ML nasal spray Spray 4 mg into one nostril alternating nostrils once as needed for opioid reversal every 2-3 minutes until assistance arrives Taking As Needed    pantoprazole (PROTONIX) 40 MG EC tablet Take 40 mg by mouth daily 6/13/2025    polyethylene glycol (MIRALAX) 17 g packet Take 1 packet by mouth 2 times daily as needed for constipation. Taking As Needed    QUEtiapine (SEROQUEL) 25 MG tablet Take 12.5 mg by mouth 2 times daily as needed (panic). Taking As Needed    QUEtiapine (SEROQUEL) 25 MG tablet Take 25 mg by mouth at bedtime. 6/13/2025    sennosides (SENOKOT) 8.6 MG tablet Take 1 tablet by mouth 2 times daily as needed for  constipation. Taking As Needed

## 2025-06-14 NOTE — ED NOTES
Patient was on room air with sats in low 90s for 30 mins. Sats dropped to 88% when patient fell asleep. Turned back to Bipap and O2 sats improved to high 96%

## 2025-06-14 NOTE — ED TRIAGE NOTES
Patient BIBA from home with labored breathing and shortness of breath.  Symptoms started 4 hours ago and progressively worsened.  He was tripoding on scene and hypertensive with O2 sats of 90% on home O2.  He told EMS he has hx of CHF and COPD.  EMS gave 324 mg of Aspirin, 4 sublingual nitroglycerin, and started him on BiPAP.      Triage Assessment (Adult)       Row Name 06/14/25 0255          Triage Assessment    Airway WDL WDL        Respiratory WDL    Respiratory WDL X;all      Rhythm/Pattern, Respiratory shortness of breath;labored        Skin Circulation/Temperature WDL    Skin Circulation/Temperature WDL WDL        Cardiac WDL    Cardiac WDL WDL        Peripheral/Neurovascular WDL    Peripheral Neurovascular WDL WDL        Cognitive/Neuro/Behavioral WDL    Cognitive/Neuro/Behavioral WDL WDL

## 2025-06-14 NOTE — ED NOTES
Pt is A & O X 4, was allowed a trial off bipap at which pt complained of increased SOB and chest tightness within 3 minutes, bipap placed back on and pt in bed texting on cell phone. Pt able to let needs be known, vital signs rmain stable

## 2025-06-14 NOTE — ED NOTES
Patient requested provider to remove BiPAP mask. Provider ok patient to be off BIPAP and monitor for breathing and sats , to resume BiPAP if work of breathing worsens

## 2025-06-14 NOTE — H&P
[facility]  Hospitalist History and Physical  Date of Admission:  6/14/2025    Primary Care Physician   Alex Wynn    Chief Complaint  Shortness of Breath    History obtained from the patient. History is limited due to the patient being on bipap.     History of Present Illness   Gerson Pearson is a 65 year old male with a past medical history of a past medical history of systolic heart failure with recovered EF, coronary artery disease status post PCI, chronic anemia, depression, anxiety, GERD, severe COPD presents to hospital with shortness of breath. The patient reports that his symptoms started on Friday with shortness of breath. He denies any fevers, chills or cough. He reports that his medications at home were not helping his symptoms. Chest pain was reported via ems, but the patient reports that it is a chest tightness rather than pain. He provides no other complaints and reports that he has been in his usual state of health.     Past Medical History    I have reviewed this patient's medical history and updated it with pertinent information if needed.   Past Medical History:   Diagnosis Date    CAD (coronary artery disease)     s/p bare metal stent to RCA in 6/2020    Chronic back pain     Chronic systolic congestive heart failure (H)     EF 45% has recovered to 60-65% on echo 12/2021    Cocaine use disorder (H)     COPD (chronic obstructive pulmonary disease) (H)     Depressive disorder     GERD (gastroesophageal reflux disease)     HTN (hypertension)     Myocardial infarction (H)     Nicotine dependence     Opioid use disorder     REYNA (obstructive sleep apnea)     Stroke (H) 2019    of unknown etiology: Right parieto-occipital lobe       Past Surgical History   I have reviewed this patient's surgical history and updated it with pertinent information if needed.  Past Surgical History:   Procedure Laterality Date    CORONARY STENT PLACEMENT  2020       Allergies   Allergies    Allergen Reactions    Ibuprofen Nausea and Vomiting       Social History   I have reviewed this patient's social history and updated it with pertinent information if needed. Gerson Pearson  reports that he has quit smoking. His smoking use included cigarettes. He has never used smokeless tobacco. He reports current drug use. Drugs: Opiates and Cocaine. He reports that he does not drink alcohol.    Family History   I have reviewed this patient's family history and updated it with pertinent information if needed.   Family History   Problem Relation Age of Onset    Diabetes Mother     Coronary Artery Disease Mother        Physical Exam   Temp: 97.9  F (36.6  C) Temp src: Axillary BP: 122/74 Pulse: 70   Resp: 13 SpO2: 97 %      Vital Signs with Ranges  Temp:  [97.9  F (36.6  C)] 97.9  F (36.6  C)  Pulse:  [64-85] 70  Resp:  [13-20] 13  BP: (122-159)/(74-97) 122/74  SpO2:  [97 %-100 %] 97 %  151 lbs 10.82 oz  Physical Exam  Vitals reviewed.   Constitutional:       Appearance: Normal appearance.      Comments: Patient seen sleeping in the er on bipap. He woke up to chest rub.    HENT:      Head: Normocephalic and atraumatic.   Cardiovascular:      Rate and Rhythm: Normal rate and regular rhythm.   Pulmonary:      Breath sounds: Wheezing present.      Comments: Diminished breath sounds  Abdominal:      General: Abdomen is flat. Bowel sounds are normal.      Palpations: Abdomen is soft.   Musculoskeletal:         General: No swelling.   Neurological:      General: No focal deficit present.      Mental Status: He is alert and oriented to person, place, and time. Mental status is at baseline.         Assessment & Plan   Gerson Pearson is a 65 year old male with a past medical history of a past medical history of systolic heart failure with recovered EF, coronary artery disease status post PCI, chronic anemia, depression, anxiety, GERD, severe COPD presents to hospital with a COPD exacerbation.    Acute on chronic  hypercapnic respiratory failure  COPD exacerbation  Patient presenting with a one day history of shortness of breath and chest tightness. Noted to be wheezing on exam with poor air movement. Improved with steroids, nebulizers and bipap in the er.   Reports using 4L O2 at baseline.   -admitting to [facility], vitals q2, continuous pulse ox  - DuoNebs scheduled  - Albuterol as needed  - Prednisone 40 daily  - Supplemental O2  - BiPAP as needed    Chronic normocytic anemia  Chronic thrombocytopenia  Stable.  Outpatient follow-up    CAD status post PCI  systolic heart failure with recovered EF  Dyslipidemia  Patient reports some chest tightness associated with his COPD exacerbation.  EKG is negative for acute ischemic changes.  Initial troponin within normal limits  -Follow-up repeat troponin  -monitor on tele    History of polysubstance abuse in remission  Reports being sober for the last 3 years.    Chronic stable medical conditions:  Depression  Anxiety  GERD    DVT ppx: SCDs  Code Status: Full code  Medically Ready for Discharge: Anticipated in 2-4 Days    Medical Decision Making       75 MINUTES SPENT BY ME on the date of service doing chart review, history, exam, documentation & further activities per the note.      Jimenez Cadena MD  Hospitalist Medicine Service  Pager# [phone]

## 2025-06-14 NOTE — PROGRESS NOTES
Hospitalist brief update note:    Chart reviewed and patient was seen this morning while still in ER.  Was somnolent after receiving lorazepam but waking up now, remains on BiPAP, reports dyspnea has improved.  Denies cough, no nausea vomiting.  Subsequently weaned off BiPAP on arrival to floor.  VBG also shows improved hypercarbia.    - Continue care plan as outlined on admission including IV steroid, scheduled and as needed nebs.  No need for antibiotic, CXR clear.  Full liquid diet for now that he is weaned off BiPAP and advance.  -PTA medications are being reviewed by pharmacy, resume once verified    -[facility] status for now/tonight.    Discussed with BIJU Morrison MD  Hospitalist

## 2025-06-14 NOTE — ED NOTES
Repeat cg4 istat completed and results reviewed with Dr. Kwon. Plan to move pt to core room and remain on bipap, order for ativan IV obtained and administered. Pt remains A & O X 4, agreeable to plan of care

## 2025-06-14 NOTE — ED NOTES
[facility]  ED Nurse Handoff Report    ED Chief complaint: Shortness of Breath      ED Diagnosis:   Final diagnoses:   None       Code Status: Provider to discuss    Allergies:   Allergies   Allergen Reactions    Ibuprofen Nausea and Vomiting       Patient Story: Patient BIBA from home with labored breathing and shortness of breath.  Symptoms started 4 hours ago and progressively worsened.  He was tripoding on scene and hypertensive with O2 sats of 90% on home O2.  He told EMS he has hx of CHF and COPD.  EMS gave 324 mg of Aspirin, 4 sublingual nitroglycerin, and started him on BiPAP.   Focused Assessment:  Patient is Aox4, VSS except labored breathing. Was able to be off Bipap for 30 mins but sats dropped when he fell asleep and turned back to BiPAP with FiO2 of 30%. Generalized weakness. Provider ok to wean off BiPAP as tolerated    Treatments and/or interventions provided:   Results for orders placed or performed during the hospital encounter of 06/14/25   XR Chest Port 1 View     Status: None    Narrative    EXAM: XR CHEST PORT 1 VIEW  LOCATION: [facility]  DATE: 6/14/2025    INDICATION: SOB  COMPARISON: 112      Impression    IMPRESSION: Cardiomediastinal silhouette within normal limits. No focal consolidation or pleural effusion.   Comprehensive metabolic panel     Status: Abnormal   Result Value Ref Range    Sodium 137 135 - 145 mmol/L    Potassium 4.1 3.4 - 5.3 mmol/L    Carbon Dioxide (CO2) 31 (H) 22 - 29 mmol/L    Anion Gap 6 (L) 7 - 15 mmol/L    Urea Nitrogen 14.3 8.0 - 23.0 mg/dL    Creatinine 0.98 0.67 - 1.17 mg/dL    GFR Estimate 86 >60 mL/min/1.73m2    Calcium 8.7 (L) 8.8 - 10.4 mg/dL    Chloride 100 98 - 107 mmol/L    Glucose 144 (H) 70 - 99 mg/dL    Alkaline Phosphatase 113 40 - 150 U/L    AST 16 0 - 45 U/L    ALT 17 0 - 70 U/L    Protein Total 6.8 6.4 - 8.3 g/dL    Albumin 4.1 3.5 - 5.2 g/dL    Bilirubin Total 0.2 <=1.2 mg/dL   Troponin T, High Sensitivity      Status: Normal   Result Value Ref Range    Troponin T, High Sensitivity 12 <=22 ng/L   NT-proBNP     Status: Abnormal   Result Value Ref Range    NT-proBNP 197 (H) 0 - 177 pg/mL   CBC with platelets and differential     Status: Abnormal   Result Value Ref Range    WBC Count 6.4 4.0 - 11.0 10e3/uL    RBC Count 3.52 (L) 4.40 - 5.90 10e6/uL    Hemoglobin 10.4 (L) 13.3 - 17.7 g/dL    Hematocrit 33.0 (L) 40.0 - 53.0 %    MCV 94 78 - 100 fL    MCH 29.5 26.5 - 33.0 pg    MCHC 31.5 31.5 - 36.5 g/dL    RDW 13.2 10.0 - 15.0 %    Platelet Count 147 (L) 150 - 450 10e3/uL    % Neutrophils 59 %    % Lymphocytes 24 %    % Monocytes 12 %    % Eosinophils 4 %    % Basophils 1 %    % Immature Granulocytes 0 %    NRBCs per 100 WBC 0 <1 /100    Absolute Neutrophils 3.7 1.6 - 8.3 10e3/uL    Absolute Lymphocytes 1.5 0.8 - 5.3 10e3/uL    Absolute Monocytes 0.7 0.0 - 1.3 10e3/uL    Absolute Eosinophils 0.3 0.0 - 0.7 10e3/uL    Absolute Basophils 0.0 0.0 - 0.2 10e3/uL    Absolute Immature Granulocytes 0.0 <=0.4 10e3/uL    Absolute NRBCs 0.0 10e3/uL   [place] Draw     Status: None    Narrative    The following orders were created for panel order [place] Draw.  Procedure                               Abnormality         Status                     ---------                               -----------         ------                     Extra Blue Top Tube[[id number]]                             Final result               Extra Red Top Tube[[id number]]                              Final result                 Please view results for these tests on the individual orders.   Influenza A/B, RSV and SARS-CoV2 PCR (COVID-19) Nasopharyngeal     Status: Normal    Specimen: Nasopharyngeal; Swab   Result Value Ref Range    Influenza A PCR Negative Negative    Influenza B PCR Negative Negative    RSV PCR Negative Negative    SARS CoV2 PCR Negative Negative    Narrative    Testing was performed using the Xpert Xpress CoV2/Flu/RSV Assay on the [employer]  GeneXpert Instrument. This test should be ordered for the detection of SARS-CoV2, influenza, and RSV viruses in individuals with signs and symptoms of respiratory tract infection. This test is for in vitro diagnostic use under the [place] FDA for laboratories certified under CLIA to perform high or moderate complexity testing. This test has been [place] FDA cleared. A negative result does not rule out the presence of PCR inhibitors in the specimen or target RNA in concentration below the limit of detection for the assay. If only one viral target is positive but coinfection with multiple targets is suspected, the sample should be re-tested with another FDA cleared, approved, or authorized test, if coninfection would change clinical management. This test was validated by the [facility]. These laboratories are certified under the Clinical Laboratory Improvement Amendments of 1988 (CLIA-88) as qualified to perfom high complexity laboratory testing.   Extra Blue Top Tube     Status: None   Result Value Ref Range    Hold Specimen JIC    Extra Red Top Tube     Status: None   Result Value Ref Range    Hold Specimen JIC    iStat Gases (lactate) venous, POCT     Status: Abnormal   Result Value Ref Range    Lactic Acid POCT 0.4 (L) 0.7 - 2.0 mmol/L    Bicarbonate Venous POCT 31 (H) 21 - 28 mmol/L    O2 Sat, Venous POCT 69 (L) 70 - 75 %    pCO2 Venous POCT 62 (H) 40 - 50 mm Hg    pH Venous POCT 7.31 (L) 7.32 - 7.43    pO2 Venous POCT 41 25 - 47 mm Hg    Base Excess/Deficit (+/-) POCT 4.0 (H) -3.0 - 3.0 mmol/L   Troponin T, High Sensitivity     Status: Normal   Result Value Ref Range    Troponin T, High Sensitivity 11 <=22 ng/L   iStat Gases (lactate) venous, POCT     Status: Abnormal   Result Value Ref Range    Lactic Acid POCT <0.3 (L) 0.7 - 2.0 mmol/L    Bicarbonate Venous POCT 33 (H) 21 - 28 mmol/L    O2 Sat, Venous POCT 28 (L) 70 - 75 %    pCO2 Venous POCT 66 (H) 40 - 50 mm Hg    pH Venous POCT 7.31 (L) 7.32 -  7.43    pO2 Venous POCT 21 (L) 25 - 47 mm Hg    Base Excess/Deficit (+/-) POCT 5.0 (H) -3.0 - 3.0 mmol/L   EKG 12-lead, tracing only     Status: None (Preliminary result)   Result Value Ref Range    Systolic Blood Pressure  mmHg    Diastolic Blood Pressure  mmHg    Ventricular Rate 78 BPM    Atrial Rate 78 BPM    WV Interval 128 ms    QRS Duration 74 ms     ms    QTc 417 ms    P Axis 76 degrees    R AXIS 72 degrees    T Axis 80 degrees    Interpretation ECG       Sinus rhythm  Low voltage QRS  Borderline ECG  When compared with ECG of 21-Sep-2024 21:07,  No significant change was found     CBC with platelets differential     Status: Abnormal    Narrative    The following orders were created for panel order CBC with platelets differential.  Procedure                               Abnormality         Status                     ---------                               -----------         ------                     CBC with platelets and ...[[id number]]  Abnormal            Final result                 Please view results for these tests on the individual orders.       Patient's response to treatments and/or interventions: Tolerated    To be done/followed up on inpatient unit:  Per provider orders    Does this patient have any cognitive concerns?: None    Activity level - Baseline/Home:  Independent  Activity Level - Current:   Stand with assist x2    Patient's Preferred language: English   Needed?: No    Isolation: None  Infection: Not Applicable  Sepsis treatment initiated: No  Patient tested for COVID 19 prior to admission: YES  Bariatric?: No    Vital Signs:   Vitals:    06/14/25 0606 06/14/25 0608 06/14/25 0609 06/14/25 0628   BP:   115/73 119/69   Pulse: 74 73 69 68   Resp: 12 12 12 14   Temp:       TempSrc:       SpO2: (!) 89% (!) 88% 92% 96%   Weight:           Cardiac Rhythm:     Was the PSS-3 completed:   Yes  What interventions are required if any?               Family Comments: None  OBS  brochure/video discussed/provided to patient/family: No              Name of person given brochure if not patient:               Relationship to patient:     For the majority of the shift this patient's behavior was Green.   Behavioral interventions performed were None.    ED NURSE PHONE NUMBER: *[phone]

## 2025-06-15 LAB
ANION GAP SERPL CALCULATED.3IONS-SCNC: 10 MMOL/L (ref 7–15)
BUN SERPL-MCNC: 14.3 MG/DL (ref 8–23)
CALCIUM SERPL-MCNC: 9 MG/DL (ref 8.8–10.4)
CHLORIDE SERPL-SCNC: 97 MMOL/L (ref 98–107)
CREAT SERPL-MCNC: 0.79 MG/DL (ref 0.67–1.17)
EGFRCR SERPLBLD CKD-EPI 2021: >90 ML/MIN/1.73M2
ERYTHROCYTE [DISTWIDTH] IN BLOOD BY AUTOMATED COUNT: 13.2 % (ref 10–15)
EST. AVERAGE GLUCOSE BLD GHB EST-MCNC: 117 MG/DL
GLUCOSE BLDC GLUCOMTR-MCNC: 169 MG/DL (ref 70–99)
GLUCOSE BLDC GLUCOMTR-MCNC: 180 MG/DL (ref 70–99)
GLUCOSE BLDC GLUCOMTR-MCNC: 199 MG/DL (ref 70–99)
GLUCOSE SERPL-MCNC: 257 MG/DL (ref 70–99)
HBA1C MFR BLD: 5.7 %
HCO3 SERPL-SCNC: 28 MMOL/L (ref 22–29)
HCT VFR BLD AUTO: 31.3 % (ref 40–53)
HGB BLD-MCNC: 10 G/DL (ref 13.3–17.7)
MCH RBC QN AUTO: 29.3 PG (ref 26.5–33)
MCHC RBC AUTO-ENTMCNC: 31.9 G/DL (ref 31.5–36.5)
MCV RBC AUTO: 92 FL (ref 78–100)
PLATELET # BLD AUTO: 128 10E3/UL (ref 150–450)
POTASSIUM SERPL-SCNC: 4.3 MMOL/L (ref 3.4–5.3)
RBC # BLD AUTO: 3.41 10E6/UL (ref 4.4–5.9)
SODIUM SERPL-SCNC: 135 MMOL/L (ref 135–145)
WBC # BLD AUTO: 8.9 10E3/UL (ref 4–11)

## 2025-06-15 PROCEDURE — 250N000011 HC RX IP 250 OP 636: Mod: JW | Performed by: HOSPITALIST

## 2025-06-15 PROCEDURE — 99232 SBSQ HOSP IP/OBS MODERATE 35: CPT | Performed by: HOSPITALIST

## 2025-06-15 PROCEDURE — 94640 AIRWAY INHALATION TREATMENT: CPT | Mod: 76

## 2025-06-15 PROCEDURE — 80048 BASIC METABOLIC PNL TOTAL CA: CPT | Performed by: HOSPITALIST

## 2025-06-15 PROCEDURE — 250N000012 HC RX MED GY IP 250 OP 636 PS 637: Performed by: HOSPITALIST

## 2025-06-15 PROCEDURE — 120N000001 HC R&B MED SURG/OB

## 2025-06-15 PROCEDURE — 36415 COLL VENOUS BLD VENIPUNCTURE: CPT | Performed by: HOSPITALIST

## 2025-06-15 PROCEDURE — 999N000157 HC STATISTIC RCP TIME EA 10 MIN

## 2025-06-15 PROCEDURE — 250N000013 HC RX MED GY IP 250 OP 250 PS 637: Performed by: HOSPITALIST

## 2025-06-15 PROCEDURE — 83036 HEMOGLOBIN GLYCOSYLATED A1C: CPT | Performed by: HOSPITALIST

## 2025-06-15 PROCEDURE — 94640 AIRWAY INHALATION TREATMENT: CPT

## 2025-06-15 PROCEDURE — 250N000009 HC RX 250: Performed by: HOSPITALIST

## 2025-06-15 PROCEDURE — 85014 HEMATOCRIT: CPT | Performed by: HOSPITALIST

## 2025-06-15 RX ORDER — PREDNISONE 20 MG/1
60 TABLET ORAL DAILY
Status: DISCONTINUED | OUTPATIENT
Start: 2025-06-15 | End: 2025-06-16 | Stop reason: HOSPADM

## 2025-06-15 RX ORDER — NICOTINE POLACRILEX 4 MG
15-30 LOZENGE BUCCAL
Status: DISCONTINUED | OUTPATIENT
Start: 2025-06-15 | End: 2025-06-16 | Stop reason: HOSPADM

## 2025-06-15 RX ORDER — DEXTROSE MONOHYDRATE 25 G/50ML
25-50 INJECTION, SOLUTION INTRAVENOUS
Status: DISCONTINUED | OUTPATIENT
Start: 2025-06-15 | End: 2025-06-16 | Stop reason: HOSPADM

## 2025-06-15 RX ADMIN — METHYLPREDNISOLONE SODIUM SUCCINATE 62.5 MG: 125 INJECTION, POWDER, FOR SOLUTION INTRAMUSCULAR; INTRAVENOUS at 08:18

## 2025-06-15 RX ADMIN — AZITHROMYCIN DIHYDRATE 250 MG: 250 TABLET ORAL at 08:20

## 2025-06-15 RX ADMIN — BUPROPION HYDROCHLORIDE 300 MG: 150 TABLET, EXTENDED RELEASE ORAL at 08:20

## 2025-06-15 RX ADMIN — Medication 1 TABLET: at 08:20

## 2025-06-15 RX ADMIN — UMECLIDINIUM 1 PUFF: 62.5 AEROSOL, POWDER ORAL at 08:21

## 2025-06-15 RX ADMIN — ACETAMINOPHEN 650 MG: 325 TABLET, FILM COATED ORAL at 01:19

## 2025-06-15 RX ADMIN — GABAPENTIN 300 MG: 300 CAPSULE ORAL at 08:20

## 2025-06-15 RX ADMIN — PANTOPRAZOLE SODIUM 40 MG: 40 TABLET, DELAYED RELEASE ORAL at 08:20

## 2025-06-15 RX ADMIN — IPRATROPIUM BROMIDE AND ALBUTEROL SULFATE 3 ML: .5; 3 SOLUTION RESPIRATORY (INHALATION) at 20:23

## 2025-06-15 RX ADMIN — INSULIN ASPART 2 UNITS: 100 INJECTION, SOLUTION INTRAVENOUS; SUBCUTANEOUS at 17:57

## 2025-06-15 RX ADMIN — FERROUS SULFATE TAB 325 MG (65 MG ELEMENTAL FE) 325 MG: 325 (65 FE) TAB at 08:20

## 2025-06-15 RX ADMIN — IPRATROPIUM BROMIDE AND ALBUTEROL SULFATE 3 ML: .5; 3 SOLUTION RESPIRATORY (INHALATION) at 01:25

## 2025-06-15 RX ADMIN — FLUTICASONE PROPIONATE 2 SPRAY: 50 SPRAY, METERED NASAL at 08:21

## 2025-06-15 RX ADMIN — IPRATROPIUM BROMIDE AND ALBUTEROL SULFATE 3 ML: .5; 3 SOLUTION RESPIRATORY (INHALATION) at 14:04

## 2025-06-15 RX ADMIN — MIRTAZAPINE 45 MG: 15 TABLET, FILM COATED ORAL at 22:07

## 2025-06-15 RX ADMIN — Medication 50 MCG: at 08:20

## 2025-06-15 RX ADMIN — CARVEDILOL 25 MG: 25 TABLET, FILM COATED ORAL at 08:20

## 2025-06-15 RX ADMIN — ASPIRIN 81 MG CHEWABLE TABLET 81 MG: 81 TABLET CHEWABLE at 08:19

## 2025-06-15 RX ADMIN — IPRATROPIUM BROMIDE AND ALBUTEROL SULFATE 3 ML: .5; 3 SOLUTION RESPIRATORY (INHALATION) at 10:33

## 2025-06-15 RX ADMIN — FLUTICASONE FUROATE AND VILANTEROL TRIFENATATE 1 PUFF: 200; 25 POWDER RESPIRATORY (INHALATION) at 08:21

## 2025-06-15 RX ADMIN — PREDNISONE 60 MG: 20 TABLET ORAL at 15:23

## 2025-06-15 RX ADMIN — ATORVASTATIN CALCIUM 40 MG: 40 TABLET, FILM COATED ORAL at 20:53

## 2025-06-15 RX ADMIN — GABAPENTIN 600 MG: 300 CAPSULE ORAL at 22:07

## 2025-06-15 RX ADMIN — QUETIAPINE FUMARATE 25 MG: 25 TABLET ORAL at 22:08

## 2025-06-15 RX ADMIN — INSULIN ASPART 3 UNITS: 100 INJECTION, SOLUTION INTRAVENOUS; SUBCUTANEOUS at 15:24

## 2025-06-15 RX ADMIN — HYDRALAZINE HYDROCHLORIDE 100 MG: 50 TABLET ORAL at 22:08

## 2025-06-15 RX ADMIN — LISINOPRIL 40 MG: 40 TABLET ORAL at 08:20

## 2025-06-15 RX ADMIN — HYDRALAZINE HYDROCHLORIDE 100 MG: 50 TABLET ORAL at 08:20

## 2025-06-15 RX ADMIN — CARVEDILOL 25 MG: 25 TABLET, FILM COATED ORAL at 20:53

## 2025-06-15 RX ADMIN — BUPRENORPHINE AND NALOXONE 1 FILM: 8; 2 FILM BUCCAL; SUBLINGUAL at 08:18

## 2025-06-15 RX ADMIN — HYDRALAZINE HYDROCHLORIDE 100 MG: 50 TABLET ORAL at 15:28

## 2025-06-15 ASSESSMENT — ACTIVITIES OF DAILY LIVING (ADL)
ADLS_ACUITY_SCORE: 41
ADLS_ACUITY_SCORE: 44
ADLS_ACUITY_SCORE: 41
ADLS_ACUITY_SCORE: 41
ADLS_ACUITY_SCORE: 44
ADLS_ACUITY_SCORE: 41
DEPENDENT_IADLS:: CLEANING;COOKING;LAUNDRY;SHOPPING;MEAL PREPARATION;MEDICATION MANAGEMENT;MONEY MANAGEMENT;TRANSPORTATION
ADLS_ACUITY_SCORE: 44
ADLS_ACUITY_SCORE: 41
ADLS_ACUITY_SCORE: 44
ADLS_ACUITY_SCORE: 44
ADLS_ACUITY_SCORE: 41
ADLS_ACUITY_SCORE: 44
ADLS_ACUITY_SCORE: 41
ADLS_ACUITY_SCORE: 44

## 2025-06-15 NOTE — CONSULTS
Care Management Initial Consult    General Information  Assessment completed with: Patient, Spouse or significant other, Caroline  Type of CM/SW Visit: Initial Assessment    Primary Care Provider verified and updated as needed: Yes   Readmission within the last 30 days: no previous admission in last 30 days      Reason for Consult: discharge planning  Advance Care Planning: Advance Care Planning Reviewed: no concerns identified          Communication Assessment  Patient's communication style: spoken language (English or Bilingual)    Hearing Difficulty or Deaf: no   Wear Glasses or Blind: yes    Cognitive  Cognitive/Neuro/Behavioral: WDL                      Living Environment:   People in home: facility resident     Current living Arrangements: group home      Able to return to prior arrangements: yes       Family/Social Support:  Care provided by: self, staff at group home/[facility]  Provides care for: no one  Marital Status:   Support system:  staff at , wife/spouse Chris          Description of Support System:  supportive, at bedside         Current Resources:   Patient receiving home care services: No        Community Resources: [employer] [facility], [facility] [employer]  Equipment currently used at home: cane, straight  Supplies currently used at home: Oxygen Tubing/Supplies    Employment/Financial:  Employment Status: disabled        Financial Concerns: none           Does the patient's insurance plan have a 3 day qualifying hospital stay waiver?  No    Lifestyle & Psychosocial Needs:  Social Drivers of Health     Food Insecurity: Low Risk  (6/14/2025)    Food Insecurity     Within the past 12 months, did you worry that your food would run out before you got money to buy more?: No     Within the past 12 months, did the food you bought just not last and you didn t have money to get more?: No   Depression: Not at risk (4/1/2025)    Received from [facility]    PHQ-2     PHQ-2 Subtotal: 2   Housing  Stability: Low Risk  (6/14/2025)    Housing Stability     Do you have housing? : Yes     Are you worried about losing your housing?: No   Tobacco Use: Medium Risk (5/29/2025)    Received from [facility]    Patient History     Smoking Tobacco Use: Former     Smokeless Tobacco Use: Never     Passive Exposure: Current   Financial Resource Strain: Low Risk  (6/14/2025)    Financial Resource Strain     Within the past 12 months, have you or your family members you live with been unable to get utilities (heat, electricity) when it was really needed?: No   Alcohol Use: Not on file   Transportation Needs: Low Risk  (6/14/2025)    Transportation Needs     Within the past 12 months, has lack of transportation kept you from medical appointments, getting your medicines, non-medical meetings or appointments, work, or from getting things that you need?: No   Physical Activity: Not on file   Interpersonal Safety: Low Risk  (6/14/2025)    Interpersonal Safety     Do you feel physically and emotionally safe where you currently live?: Yes     Within the past 12 months, have you been hit, slapped, kicked or otherwise physically hurt by someone?: No     Within the past 12 months, have you been humiliated or emotionally abused in other ways by your partner or ex-partner?: No   Stress: Not on file   Social Connections: Not on file   Health Literacy: Not on file       Functional Status:  Prior to admission patient needed assistance:   Dependent ADLs:: Ambulation-cane  Dependent IADLs:: Cleaning, Cooking, Laundry, Shopping, Meal Preparation, Medication Management, Money Management, Transportation       Mental Health Status:  Mental Health Status: No Current Concerns       Chemical Dependency Status:      Unknown status          Values/Beliefs:  Spiritual, Cultural Beliefs, Jainism Practices, Values that affect care: no               Discussed  Partnership in Safe Discharge Planning  document with patient/family: Yes: verbally  with patient    Additional Information:  Per H&P, patient was admitted for severe COPD, Patient was on BiPAP for COPD exacerbation. Patient on IV Steroids, scheduled Nebulizers   -Writer met with patient and wife, Chris. They confirm patient still lives at [facility] in [place]. Address is correct on Facesheet. He says Mickey usually coordinates patient back to the Group home. He does not know the Oxygen company but says his is locked up in his room at the . Per patient and wife, they have discussed with MD and patient will discharge tomorrow. He thought the group home would comes and picks him or that the hospital sometimes sets up a ride(wheelchair with Oxygen/baseline O2 2-3L)  -Writer called Mickey at [phone]. He asks that the hospital give them some notice for discharge so they can come and pick him up. Writer let them know patient's Oxygen is in his room locked up. Mickey is aware of this.   Their fax number is [phone] at [facility]. All new Medications go to [facility]. This was selected in [facility] to facilitate discharge orders to the correct pharmacy.  Patient is mainly independent with support for medication management,  staff and spouse drive patient to appointments   Suboxone is via [facility] [phone] weekly supplies sent  to the group home.    Next Steps: follow for discharge planning    Asia Castillo RN

## 2025-06-15 NOTE — PROGRESS NOTES
[id number]  Orientations: A/Ox4  Vitals/Pain: VSS and on 4L NC. C/o headache and managed w/ PRN tylenol.  Tele: SR  Lines/Drains: 2x PIV SL  Skin/Wounds: Dry feet, applied lotion  GI/: Voids w/o difficult in urinal. Last BM on 6/14.  Ambulation/Assist: 1 PA/GB  Plan: Anticipate discharge back to gp home.

## 2025-06-15 NOTE — PLAN OF CARE
Goal Outcome Evaluation:    Plan of Care Reviewed With: patient    Overall Patient Progress: improving    Orientation: A&O x4  Aggression Stop Light: Green  Activity: Ax1 w/ GB  Diet/BS Checks: Regular  Tele: NSR  IV Access/Drains: 2 PIV SL  Pain Management: denies pain.  Abnormal VS/Results: VSS on 2L o2 NC, increased o2 needs when walking in halls.   Bowel/Bladder: Continent of B/B. 1 BM, using urinal at times.  Skin/Wounds: peeling bilateral feet  Consults: RT  D/C Disposition: pending back to group home possibly tomorrow.   Other Info: transitioned to oral steroids today. IMC discontinued today.

## 2025-06-15 NOTE — PROGRESS NOTES
[facility]    Medicine Progress Note - Hospitalist Service    Date of Admission:  6/14/2025    Assessment & Plan     Gerson Pearson is a 65 year old male with a past medical history of a past medical history of systolic heart failure with recovered EF, coronary artery disease status post PCI, chronic anemia, depression, anxiety, GERD, severe COPD presents to hospital with a COPD exacerbation.     Acute on chronic hypoxic and hypercapnic respiratory failure  Acute COPD exacerbation  Patient presenting with a one day history of shortness of breath and chest tightness. Noted to be wheezing on exam with poor air movement in ER. Improved with steroids, nebulizers and bipap in the ER. Reports using 4L O2 at baseline.  No fever or purulent sputum, no chest pain.  -Needed BiPAP on admission, weaned off soon after admission yesterday.  SPO2  on 4 LPM so titrate down further to keep SpO2 just above 89%.  -DC IMC, transfer to floor  -Change IV steroid to p.o. prednisone.  Continue scheduled DuoNeb  - Resumed PTA inhaler with substituted Breo Ellipta and Incruse Ellipta  -Up and ambulate, PT eval, anticipate discharge in next 24 hours if respiratory status is stable on p.o. prednisone.  -Continue PTA daily azithromycin     Chronic normocytic anemia  Chronic thrombocytopenia  Hb fairly stable, platelet count has fluctuated and slightly lower likely due to acute illness  - Continue outpatient follow-up     CAD status post PCI  Chronic CHFr EF  Dyslipidemia  Hypertension  Patient reports some chest tightness associated with his COPD exacerbation.  EKG is negative for acute ischemic changes.  Initial troponin within normal limits.  No sign of volume overload/acute CHF.  - Troponin x 3 negative  - Telemetry as well unremarkable  -PTA ASA, Lipitor, Coreg, hydralazine, lisinopril continued.     Hyperglycemia  Related to steroid  -Check HbA1c  -High ISS ordered  -Hypoglycemia protocol    History of  polysubstance abuse in remission  Reports being sober for the last 3 years.  On Suboxone     Chronic stable medical conditions: PTA meds resumed  Depression  Anxiety  GERD          Diet: Regular Diet Adult    DVT Prophylaxis: Enoxaparin (Lovenox) SQ  Post Catheter: Not present  Lines: None     Cardiac Monitoring: ACTIVE order. Indication: imc  Code Status: Full Code      Clinically Significant Risk Factors              Disposition Plan     Medically Ready for Discharge: Anticipated Tomorrow if respiratory status is stable.  DC IMC, transfer to floor and monitor overnight.  PT dov.             Diane Morrison MD  Hospitalist Service  [facility]  Securely message with [employer] (more info)  Text page via [facility] Paging/Directory   ______________________________________________________________________    Interval History   Chart reviewed and patient was seen this morning.  Off BiPAP and on 4 LPM NC O2 which is his baseline O2 need.  While in the room, noted he is SpO2 was up from .  Discussed with patient and nursing staff and weaned down to 3 LPM.  Discussed with patient/nursing staff that goal SpO2 is 89-93 for him.  - Patient denies chest tightness today.  Feels breathing is much better.  No nausea.  No cough.  - Noted blood sugars running up to 200s.    Physical Exam   Vital Signs: Temp: 97.6  F (36.4  C) Temp src: Oral BP: 111/73 Pulse: 75   Resp: 18 SpO2: 99 % O2 Device: Nasal cannula Oxygen Delivery: 4 LPM  Weight: 151 lbs 10.82 oz    General: AAOx3, very pleasant, appears comfortable.  HEENT: PERRLA EOMI. Mucosa moist.   Lungs: Bilateral equal air entry.  Very diminished breath sounds with poor airflow but no wheezing or crackles, normal work of breathing.   CVS: S1S2 regular, no tachycardia or murmur.   Abdomen: Soft, NT, ND. BS heard.  MSK: No edema or deformities.  Neuro: AAOX3. CN 2-12 normal. Strength symmetrical.  Skin: No rash.       Medical Decision Making       45 MINUTES  SPENT BY ME on the date of service doing chart review, history, exam, documentation & further activities per the note.      Data     I have personally reviewed the following data over the past 24 hrs:    8.9  \   10.0 (L)   / 128 (L)     135 97 (L) 14.3 /  199 (H)   4.3 28 0.79 \     TSH: N/A T4: N/A A1C: 5.7 (H)       Imaging results reviewed over the past 24 hrs:   No results found for this or any previous visit (from the past 24 hours).

## 2025-06-15 NOTE — PROGRESS NOTES
Orientation: A/O x4    Vitals/Tele: VSS on 4L O2 NC  Tele-SR    IV Access/drains: PIV SL    Diet: regular    Mobility: A1GB    GI/: up to bathroom, uses urinal independently, sm bm overnight    Wound/Skin: peeling b/l feet    Pain: tylenol for headache effective    Discharge Plan: anticipate back to group home      See Flow sheets for assessment

## 2025-06-16 ENCOUNTER — HOSPITAL ENCOUNTER (INPATIENT)
Facility: CLINIC | Age: 65
End: 2025-06-16
Attending: EMERGENCY MEDICINE | Admitting: INTERNAL MEDICINE
Payer: MEDICARE

## 2025-06-16 ENCOUNTER — APPOINTMENT (OUTPATIENT)
Dept: PHYSICAL THERAPY | Facility: CLINIC | Age: 65
DRG: 190 | End: 2025-06-16
Attending: HOSPITALIST
Payer: MEDICARE

## 2025-06-16 VITALS
WEIGHT: 151.68 LBS | SYSTOLIC BLOOD PRESSURE: 133 MMHG | BODY MASS INDEX: 22.4 KG/M2 | HEART RATE: 84 BPM | TEMPERATURE: 98.5 F | DIASTOLIC BLOOD PRESSURE: 82 MMHG | RESPIRATION RATE: 18 BRPM | OXYGEN SATURATION: 98 %

## 2025-06-16 DIAGNOSIS — J44.1 COPD EXACERBATION (H): ICD-10-CM

## 2025-06-16 LAB
GLUCOSE BLDC GLUCOMTR-MCNC: 120 MG/DL (ref 70–99)
GLUCOSE BLDC GLUCOMTR-MCNC: 130 MG/DL (ref 70–99)

## 2025-06-16 PROCEDURE — 99285 EMERGENCY DEPT VISIT HI MDM: CPT | Mod: 25

## 2025-06-16 PROCEDURE — 250N000012 HC RX MED GY IP 250 OP 636 PS 637: Performed by: HOSPITALIST

## 2025-06-16 PROCEDURE — 94640 AIRWAY INHALATION TREATMENT: CPT

## 2025-06-16 PROCEDURE — 99239 HOSP IP/OBS DSCHRG MGMT >30: CPT | Performed by: INTERNAL MEDICINE

## 2025-06-16 PROCEDURE — 97161 PT EVAL LOW COMPLEX 20 MIN: CPT | Mod: GP

## 2025-06-16 PROCEDURE — 93005 ELECTROCARDIOGRAM TRACING: CPT

## 2025-06-16 PROCEDURE — 94640 AIRWAY INHALATION TREATMENT: CPT | Mod: 76

## 2025-06-16 PROCEDURE — 250N000013 HC RX MED GY IP 250 OP 250 PS 637: Performed by: HOSPITALIST

## 2025-06-16 PROCEDURE — 250N000009 HC RX 250: Performed by: HOSPITALIST

## 2025-06-16 RX ORDER — AZITHROMYCIN 250 MG/1
250 TABLET, FILM COATED ORAL DAILY
Qty: 2 TABLET | Refills: 0 | Status: ON HOLD | OUTPATIENT
Start: 2025-06-16 | End: 2025-06-21

## 2025-06-16 RX ORDER — PREDNISONE 10 MG/1
TABLET ORAL
Qty: 30 TABLET | Refills: 0 | Status: ON HOLD | OUTPATIENT
Start: 2025-06-16 | End: 2025-06-21

## 2025-06-16 RX ORDER — METHYLPREDNISOLONE SODIUM SUCCINATE 125 MG/2ML
125 INJECTION INTRAMUSCULAR; INTRAVENOUS ONCE
Status: COMPLETED | OUTPATIENT
Start: 2025-06-17 | End: 2025-06-17

## 2025-06-16 RX ORDER — MAGNESIUM SULFATE HEPTAHYDRATE 40 MG/ML
2 INJECTION, SOLUTION INTRAVENOUS ONCE
Status: COMPLETED | OUTPATIENT
Start: 2025-06-17 | End: 2025-06-17

## 2025-06-16 RX ORDER — IPRATROPIUM BROMIDE AND ALBUTEROL SULFATE 2.5; .5 MG/3ML; MG/3ML
6 SOLUTION RESPIRATORY (INHALATION) ONCE
Status: COMPLETED | OUTPATIENT
Start: 2025-06-17 | End: 2025-06-17

## 2025-06-16 RX ADMIN — HYDRALAZINE HYDROCHLORIDE 100 MG: 50 TABLET ORAL at 15:58

## 2025-06-16 RX ADMIN — BUPROPION HYDROCHLORIDE 300 MG: 150 TABLET, EXTENDED RELEASE ORAL at 09:30

## 2025-06-16 RX ADMIN — ASPIRIN 81 MG CHEWABLE TABLET 81 MG: 81 TABLET CHEWABLE at 09:32

## 2025-06-16 RX ADMIN — FLUTICASONE FUROATE AND VILANTEROL TRIFENATATE 1 PUFF: 200; 25 POWDER RESPIRATORY (INHALATION) at 09:54

## 2025-06-16 RX ADMIN — HYDRALAZINE HYDROCHLORIDE 100 MG: 50 TABLET ORAL at 09:30

## 2025-06-16 RX ADMIN — FLUTICASONE PROPIONATE 2 SPRAY: 50 SPRAY, METERED NASAL at 09:54

## 2025-06-16 RX ADMIN — IPRATROPIUM BROMIDE AND ALBUTEROL SULFATE 3 ML: .5; 3 SOLUTION RESPIRATORY (INHALATION) at 12:34

## 2025-06-16 RX ADMIN — Medication 50 MCG: at 09:34

## 2025-06-16 RX ADMIN — PANTOPRAZOLE SODIUM 40 MG: 40 TABLET, DELAYED RELEASE ORAL at 09:32

## 2025-06-16 RX ADMIN — CARVEDILOL 25 MG: 25 TABLET, FILM COATED ORAL at 09:32

## 2025-06-16 RX ADMIN — FERROUS SULFATE TAB 325 MG (65 MG ELEMENTAL FE) 325 MG: 325 (65 FE) TAB at 09:30

## 2025-06-16 RX ADMIN — Medication 1 TABLET: at 09:32

## 2025-06-16 RX ADMIN — LISINOPRIL 40 MG: 40 TABLET ORAL at 09:32

## 2025-06-16 RX ADMIN — AZITHROMYCIN DIHYDRATE 250 MG: 250 TABLET ORAL at 09:30

## 2025-06-16 RX ADMIN — BUPRENORPHINE AND NALOXONE 1 FILM: 8; 2 FILM BUCCAL; SUBLINGUAL at 09:36

## 2025-06-16 RX ADMIN — PREDNISONE 60 MG: 20 TABLET ORAL at 09:34

## 2025-06-16 RX ADMIN — IPRATROPIUM BROMIDE AND ALBUTEROL SULFATE 3 ML: .5; 3 SOLUTION RESPIRATORY (INHALATION) at 07:55

## 2025-06-16 RX ADMIN — UMECLIDINIUM 1 PUFF: 62.5 AEROSOL, POWDER ORAL at 09:54

## 2025-06-16 RX ADMIN — GABAPENTIN 300 MG: 300 CAPSULE ORAL at 09:31

## 2025-06-16 ASSESSMENT — ACTIVITIES OF DAILY LIVING (ADL)
ADLS_ACUITY_SCORE: 41
ADLS_ACUITY_SCORE: 40
ADLS_ACUITY_SCORE: 41
ADLS_ACUITY_SCORE: 40
ADLS_ACUITY_SCORE: 41

## 2025-06-16 NOTE — PROGRESS NOTES
"   06/16/25 0923   Appointment Info   Signing Clinician's Name / Credentials (PT) Mouna Drummond PT, DPT   Living Environment   People in Home facility resident   Current Living Arrangements group home   Home Accessibility no concerns   Self-Care   Usual Activity Tolerance moderate   Current Activity Tolerance moderate   Equipment Currently Used at Home cane, straight;grab bar, toilet;grab bar, tub/shower;shower chair   Fall history within last six months no   Activity/Exercise/Self-Care Comment Pt IND for mobility at baseline. Owns SEC but does not usually use. Has assist from  staff for meds, meals, rides. Mostly IND for ADLs but can have assist from ADL staff as needed. Wears supplemental O2 intermittently at home, up to 4 L at times   General Information   Onset of Illness/Injury or Date of Surgery 06/14/25   Referring Physician Diane Morrison MD   Patient/Family Therapy Goals Statement (PT) to return to    Pertinent History of Current Problem (include personal factors and/or comorbidities that impact the POC) \"Gerson Pearson is a 65 year old male with a past medical history of a past medical history of systolic heart failure with recovered EF, coronary artery disease status post PCI, chronic anemia, depression, anxiety, GERD, severe COPD presents to hospital with a COPD exacerbation.     \"   Existing Precautions/Restrictions oxygen therapy device and L/min  (2 L via NC)   Cognition   Affect/Mental Status (Cognition) WFL   Orientation Status (Cognition) oriented x 3   Follows Commands (Cognition) WFL   Pain Assessment   Patient Currently in Pain No   Integumentary/Edema   Integumentary/Edema no deficits were identifed   Posture    Posture Forward head position   Range of Motion (ROM)   Range of Motion ROM is WFL   Strength (Manual Muscle Testing)   Strength (Manual Muscle Testing) strength is WFL   Bed Mobility   Comment, (Bed Mobility) Mundo sup>sit   Transfers   Comment, (Transfers) SBA sit>stand with " no Ad   Gait/Stairs (Locomotion)   Comment, (Gait/Stairs) SBA for gait with no AD, mild path veering but no LOB, slow pace. 300' total, with standing rest break correction through to look out window. SpO2 96% with gait on 2 L via NC, HR up to 102 bpm.   Balance   Balance Comments steady for gait with no AD   Sensory Examination   Sensory Perception patient reports no sensory changes   Clinical Impression   Criteria for Skilled Therapeutic Intervention Evaluation only   Clinical Presentation (PT Evaluation Complexity) stable   Clinical Presentation Rationale based on current presentation, PMH, social support   Clinical Decision Making (Complexity) low complexity   Risk & Benefits of therapy have been explained evaluation/treatment results reviewed;care plan/treatment goals reviewed;risks/benefits reviewed;current/potential barriers reviewed;participants voiced agreement with care plan;participants included;patient   PT Total Evaluation Time   PT Eval, Low Complexity Minutes (02590) 15   PT Discharge Planning   PT Plan discharge, no IP PT needs   PT Discharge Recommendation (DC Rec) home with assist   PT Rationale for DC Rec Pt likely near to his baseline for mobility. Lives at  where he has staff assist for meds, meals, driving, otherwise has help for ADLs as needed. IND for mobility with no AD at baseline. Tolerates up to 300' of gait with SBA and no AD today, spO2 stable on 2 L. Pt appropriate to return to  when medically stable.   PT Brief overview of current status Goals of therapy will be to address safe mobility and make recs for d/c to next level of care. Pt and RN will continue to follow all falls risk precautions as documented by RN staff while hospitalized.   PT Total Distance Amb During Session (feet) 300   Physical Therapy Time and Intention   Total Session Time (sum of timed and untimed services) 15

## 2025-06-16 NOTE — PLAN OF CARE
Orientations: AxO  Vitals/Pain: VSS ex on 2-4LO2 ( 4L baseline)  Tele: NSR  Lines/Drains: PIV removed  Skin/wounds: peeling heels/ feet  GI/: + void, +BM  Ambulation/Assist: independent   Sleep Quality: good  Plan: discharge instructions given & questions answered. Pt discharge back home with prednisone & abx prescription.

## 2025-06-16 NOTE — PROGRESS NOTES
Care Management Discharge Note    Discharge Date: 06/16/2025       Discharge Disposition: [facility]    Discharge Services:  NA    Discharge DME: Oxygen (has)    Discharge Transportation:  could if has available staff and his Oxygen    Private pay costs discussed: Not applicable    Does the patient's insurance plan have a 3 day qualifying hospital stay waiver?  No    PAS Confirmation Code:  NA  Patient/family educated on Medicare website which has current facility and service quality ratings: no    Education Provided on the Discharge Plan: Yes  Persons Notified of Discharge Plans: Patient, Mickey from the , Nsg  Patient/Family in Agreement with the Plan: yes    Handoff Referral Completed: Yes, non-[facility] PCP: External handoff communication completed    Additional Information:  Patient is discharging back to his  today.  CC has talked to [facility] at [facility] and they are accepting patient back.  New medications are being filled here instead of [facility].  PT has seen patient and is not recommending further PT.  [facility] is arranging personnel from the  to pick patient up and planning 6:00 PM.  Patient will be picked up at [address].  Have left a message with [facility] concerning making sure patient's transport has oxygen.  Patient reports his room is locked and he has te key.  He is calling to have someone come from the  to get his keys t be able to get his oxygen.  Patient reports the home is close to here.  RN caring for patient ws updated.  Orders with Discharge Summary and PT notes have been faxed to the  at ([phone]).    Sue Riggs RN  Inpatient Care Management  [phone]

## 2025-06-16 NOTE — PLAN OF CARE
Patient Name: Cindy  MRN: [id number]  Date of Admission: 6/14/2025  Reason for Admission: COPD exacerbation  Level of Care: Med-Surg  Shift: 1900 - 0700.    Vitals:   BP (!) 163/86 (BP Location: Left arm)   Pulse 72   Temp 98  F (36.7  C) (Oral)   Resp 16   Wt 68.8 kg (151 lb 10.8 oz)   SpO2 97%   BMI 22.40 kg/m         Pain: Denies pain    CV Surgery Patient: No    Assessment    Resp: On 2 -4 L oxygen NC. LS diminished.   Telemetry: SR  Neuro: Alert and oriented x4.  GI/: Voiding adequately using urinal. No BM this shift  Skin/Wounds: Peeling feet  Lines/Drains: PIV SL  Activity: Assist of x1 with walker and gaitbelt.  Sleep: Fair. Rested between cares.  Abnormal Labs: Pending am lab draws.    Aggression Stop Light: Green          Patient Care Plan: Possible discharge back to group home today. Continue with plan of care.

## 2025-06-16 NOTE — PROGRESS NOTES
[facility]  Hospitalist Progress Note  Jairo Rendon MD  06/16/2025    Assessment & Plan   Gerson Pearson is a 65 year old male with a past medical history of a past medical history of systolic heart failure with recovered EF, coronary artery disease status post PCI, chronic anemia, depression, anxiety, GERD, severe COPD presents to hospital with a COPD exacerbation.     Acute on chronic hypoxic and hypercapnic respiratory failure  Acute COPD exacerbation  Patient presenting with a one day history of shortness of breath and chest tightness. Noted to be wheezing on exam with poor air movement in ER. Improved with steroids, nebulizers and bipap in the ER. Reports using 4L O2 at baseline.  No fever or purulent sputum, no chest pain.  -Needed BiPAP on admission, weaned off soon after admission yesterday.  SPO2  on 3 LPM so titrate down further to keep SpO2 just above 89%.  - Continue po prednisone.  Continue scheduled DuoNeb  - Resumed PTA inhaler with substituted Breo Ellipta and Incruse Ellipta  -Up and ambulate, PT eval, anticipate discharge in next 24 hours if respiratory status is stable on p.o. prednisone.  -Continue PTA daily azithromycin     Chronic normocytic anemia  Chronic thrombocytopenia  Hb fairly stable, platelet count has fluctuated and slightly lower likely due to acute illness  - Continue outpatient follow-up     CAD status post PCI  Chronic CHFr EF  Dyslipidemia  Hypertension  Patient reports some chest tightness associated with his COPD exacerbation.  EKG is negative for acute ischemic changes.  Initial troponin within normal limits.  No sign of volume overload/acute CHF.  - Troponin x 3 negative  - Telemetry as well unremarkable  -PTA ASA, Lipitor, Coreg, hydralazine, lisinopril continued.     Hyperglycemia  Related to steroid  -Check HbA1c is 5.7  -High ISS ordered  -Hypoglycemia protocol     History of polysubstance abuse in remission  Reports being sober for  the last 3 years.  On Suboxone     Chronic stable medical conditions: PTA meds resumed  Depression  Anxiety  GERD        Diet: Regular Diet Adult    DVT Prophylaxis: Enoxaparin (Lovenox) SQ  Post Catheter: Not present  Lines: None     Cardiac Monitoring: ACTIVE order. Indication: c  Code Status: Full Code       Clinically Significant Risk Factors    Disposition Plan  -- back to group home     Medically Ready for Discharge:   -- on 6/17    Disposition:     Interval History   --  chart reviewed  -- discussed with RN  -- breathing back to baseline      -Data reviewed today: I reviewed all new labs and imaging over the last 24 hours. I personally reviewed no images or EKG's today.    Physical Exam    , Blood pressure 133/82, pulse 84, temperature 98.5  F (36.9  C), temperature source Oral, resp. rate 18, weight 68.8 kg (151 lb 10.8 oz), SpO2 98%.  Vitals:    06/14/25 0300   Weight: 68.8 kg (151 lb 10.8 oz)     Vital Signs with Ranges  Temp:  [98  F (36.7  C)-98.7  F (37.1  C)] 98.5  F (36.9  C)  Pulse:  [69-87] 84  Resp:  [16-18] 18  BP: (128-163)/(74-99) 133/82  SpO2:  [96 %-100 %] 98 %  I/O's Last 24 hours  I/O last 3 completed shifts:  In: -   Out: 1695 [Urine:1695]    Constitutional: Awake, alert, cooperative, no apparent distress  Respiratory: Clear to auscultation bilaterally, no crackles or wheezing  Cardiovascular: Regular rate and rhythm, normal S1 and S2, and no murmur noted  GI: Normal bowel sounds, soft, non-distended, non-tender  Skin/Integumen: No rashes, no cyanosis, no edema  Other:      Medications   All medications were reviewed.  Current Facility-Administered Medications   Medication Dose Route Frequency Provider Last Rate Last Admin     Current Facility-Administered Medications   Medication Dose Route Frequency Provider Last Rate Last Admin    aspirin (ASA) chewable tablet 81 mg  81 mg Oral Daily Diane Morrison MD   81 mg at 06/16/25 0932    atorvastatin (LIPITOR) tablet 40 mg  40 mg Oral Daily  Diane Morrison MD   40 mg at 06/15/25 2053    azithromycin (ZITHROMAX) tablet 250 mg  250 mg Oral Daily Diane Morrison MD   250 mg at 06/16/25 0930    buprenorphine HCl-naloxone HCl (SUBOXONE) 8-2 MG per film 1 Film  1 Film Sublingual Daily Diane Morrison MD   1 Film at 06/16/25 0936    buPROPion (WELLBUTRIN XL) 24 hr tablet 300 mg  300 mg Oral QAM Diane Morrison MD   300 mg at 06/16/25 0930    carvedilol (COREG) tablet 25 mg  25 mg Oral BID Diane Morrison MD   25 mg at 06/16/25 0932    ferrous sulfate (FEROSUL) tablet 325 mg  325 mg Oral Daily Diane Morrison MD   325 mg at 06/16/25 0930    fluticasone (FLONASE) 50 MCG/ACT spray 2 spray  2 spray Both Nostrils Daily Diane Morrison MD   2 spray at 06/16/25 0954    fluticasone-vilanterol (BREO ELLIPTA) 200-25 MCG/ACT inhaler 1 puff  1 puff Inhalation Daily Diane Morrison MD   1 puff at 06/16/25 0954    And    umeclidinium (INCRUSE ELLIPTA) 62.5 MCG/ACT inhaler 1 puff  1 puff Inhalation Daily Diane Morrison MD   1 puff at 06/16/25 0954    gabapentin (NEURONTIN) capsule 300 mg  300 mg Oral KATHERINEM Diane Morrison MD   300 mg at 06/16/25 0931    gabapentin (NEURONTIN) capsule 600 mg  600 mg Oral At Bedtime Diane Morrison MD   600 mg at 06/15/25 2207    hydrALAZINE (APRESOLINE) tablet 100 mg  100 mg Oral TID Diane Morrison MD   100 mg at 06/16/25 0930    insulin aspart (NovoLOG) injection (RAPID ACTING)  1-10 Units Subcutaneous TID AC Diane Morrison MD   2 Units at 06/15/25 1757    insulin aspart (NovoLOG) injection (RAPID ACTING)  1-7 Units Subcutaneous At Bedtime Diane Morrison MD        ipratropium - albuterol 0.5 mg/2.5 mg (3mg)/3 mL (DUONEB) neb solution 3 mL  3 mL Nebulization Q6H Jimenez Cadena MD   3 mL at 06/16/25 1234    lisinopril (ZESTRIL) tablet 40 mg  40 mg Oral Daily Diane Morrison MD   40 mg at 06/16/25 0932    mirtazapine (REMERON) tablet 45 mg  45 mg Oral At Bedtime Diane Morrison MD   45 mg at 06/15/25  2207    multivitamin w/minerals (THERA-VIT-M) tablet 1 tablet  1 tablet Oral Daily Diane Morrison MD   1 tablet at 06/16/25 0932    pantoprazole (PROTONIX) EC tablet 40 mg  40 mg Oral Daily Diane Morrison MD   40 mg at 06/16/25 0932    polyethylene glycol (MIRALAX) Packet 17 g  17 g Oral Daily Diane Morrison MD        predniSONE (DELTASONE) tablet 60 mg  60 mg Oral Daily Diane Morrison MD   60 mg at 06/16/25 0934    QUEtiapine (SEROquel) tablet 25 mg  25 mg Oral At Bedtime Diane Morrison MD   25 mg at 06/15/25 2208    sodium chloride (PF) 0.9% PF flush 3 mL  3 mL Intracatheter Q8H Jimenez Cadena MD   3 mL at 06/16/25 0550    sodium chloride (PF) 0.9% PF flush 3 mL  3 mL Intracatheter Q8H [facility] Jimenez Cadena MD   3 mL at 06/16/25 0548    vitamin D3 (CHOLECALCIFEROL) tablet 50 mcg  50 mcg Oral Daily Diane Morrison MD   50 mcg at 06/16/25 0934        Data   Recent Labs   Lab 06/16/25  1152 06/16/25  0818 06/15/25  2127 06/15/25  1341 06/15/25  0545 06/14/25  0259   WBC  --   --   --   --  8.9 6.4   HGB  --   --   --   --  10.0* 10.4*   MCV  --   --   --   --  92 94   PLT  --   --   --   --  128* 147*   NA  --   --   --   --  135 137   POTASSIUM  --   --   --   --  4.3 4.1   CHLORIDE  --   --   --   --  97* 100   CO2  --   --   --   --  28 31*   BUN  --   --   --   --  14.3 14.3   CR  --   --   --   --  0.79 0.98   ANIONGAP  --   --   --   --  10 6*   RL  --   --   --   --  9.0 8.7*   * 120* 169*   < > 257* 144*   ALBUMIN  --   --   --   --   --  4.1   PROTTOTAL  --   --   --   --   --  6.8   BILITOTAL  --   --   --   --   --  0.2   ALKPHOS  --   --   --   --   --  113   ALT  --   --   --   --   --  17   AST  --   --   --   --   --  16    < > = values in this interval not displayed.       No results found for this or any previous visit (from the past 24 hours).    Jairo Rendon MD  Text Page  (7am to 6pm)

## 2025-06-16 NOTE — DISCHARGE SUMMARY
[facility]  Hospitalist Discharge Summary      Date of Admission:  6/14/2025  Date of Discharge:  6/16/2025  Discharging Provider: Jairo Rendon MD  Discharge Service: Hospitalist Service    Discharge Diagnoses      Acute on chronic hypoxic and hypercapnic respiratory failure  Acute COPD exacerbation        Chronic normocytic anemia  Chronic thrombocytopenia       CAD status post PCI  Chronic CHFr EF  Dyslipidemia  Hypertension       Hyperglycemia       History of polysubstance abuse in remission         Depression  Anxiety  GERD        Clinically Significant Risk Factors     Disposition Plan  -- back to group home     Medically Ready for Discharge:   -- on 6/16    Clinically Significant Risk Factors          Follow-ups Needed After Discharge   {Additional follow-up instructions/to-do's for PCP     Unresulted Labs Ordered in the Past 30 Days of this Admission       No orders found from 5/15/2025 to 6/15/2025.        These results will be followed up by PCP    Discharge Disposition   Discharged to home  Condition at discharge: Stable    Hospital Course   Gerson Pearson is a 65 year old male with a past medical history of a past medical history of systolic heart failure with recovered EF, coronary artery disease status post PCI, chronic anemia, depression, anxiety, GERD, severe COPD presents to hospital with a COPD exacerbation.     Acute on chronic hypoxic and hypercapnic respiratory failure  Acute COPD exacerbation  Patient presenting with a one day history of shortness of breath and chest tightness. Noted to be wheezing on exam with poor air movement in ER. Improved with steroids, nebulizers and bipap in the ER. Reports using 4L O2 at baseline.  No fever or purulent sputum, no chest pain.  -Needed BiPAP on admission, weaned off soon after admission yesterday.  SPO2  on 3 LPM so titrate down further to keep SpO2 just above 89%.  - Continue po prednisone.  Continue scheduled  DuoNeb  - Resumed PTA inhaler with substituted Breo Ellipta and Incruse Ellipta  -Up and ambulate, PT eval, anticipate discharge in next 24 hours if respiratory status is stable on p.o. prednisone.  -Continue PTA daily azithromycin to complete 5 days  - 2 week prednisone taper     Chronic normocytic anemia  Chronic thrombocytopenia  Hb fairly stable, platelet count has fluctuated and slightly lower likely due to acute illness  - Continue outpatient follow-up     CAD status post PCI  Chronic CHFr EF  Dyslipidemia  Hypertension  Patient reports some chest tightness associated with his COPD exacerbation.  EKG is negative for acute ischemic changes.  Initial troponin within normal limits.  No sign of volume overload/acute CHF.  - Troponin x 3 negative  - Telemetry as well unremarkable  -PTA ASA, Lipitor, Coreg, hydralazine, lisinopril continued.     Hyperglycemia  Related to steroid  -Check HbA1c is 5.7  -High ISS ordered  -Hypoglycemia protocol     History of polysubstance abuse in remission  Reports being sober for the last 3 years.  On Suboxone     Chronic stable medical conditions: PTA meds resumed  Depression  Anxiety  GERD        Diet: Regular Diet Adult    DVT Prophylaxis: Enoxaparin (Lovenox) SQ  Post Catheter: Not present  Lines: None     Cardiac Monitoring: ACTIVE order. Indication: imc  Code Status: Full Code       Clinically Significant Risk Factors     Disposition Plan  -- back to group home     Medically Ready for Discharge:   -- on 6/16    Consultations This Hospital Stay   RESPIRATORY CARE IP CONSULT  CARE MANAGEMENT / SOCIAL WORK IP CONSULT  CARE MANAGEMENT / SOCIAL WORK IP CONSULT  PHYSICAL THERAPY ADULT IP CONSULT  SMOKING CESSATION PROGRAM IP CONSULT    Code Status   Full Code    Time Spent on this Encounter   I, Jairo Rendon MD, personally saw the patient today and spent greater than 30 minutes discharging this patient.       Jairo eRndon MD  [facility]  [address] [address] [address] [address]  Phone: [phone]  ______________________________________________________________________    Physical Exam   Vital Signs: Temp: 98.5  F (36.9  C) Temp src: Oral BP: 133/82 Pulse: 84   Resp: 18 SpO2: 98 % O2 Device: Nasal cannula Oxygen Delivery: 2 LPM  Weight: 151 lbs 10.82 oz         Primary Care Physician   Alex Wynn    Discharge Orders      Reason for your hospital stay    You were admitted for COPD exacerbation     Activity    Your activity upon discharge: activity as tolerated     NO Follow-up Care Needed    Follow up with PCP in 2 weeks     Diet    Follow this diet upon discharge: Current Diet:Orders Placed This Encounter      Regular Diet Adult       Significant Results and Procedures   Most Recent 3 CBC's:  Recent Labs   Lab Test 06/15/25  0545 06/14/25 0259 01/13/25  0757   WBC 8.9 6.4 6.4   HGB 10.0* 10.4* 11.4*   MCV 92 94 90   * 147* 137*     Most Recent 3 BMP's:  Recent Labs   Lab Test 06/16/25  1152 06/16/25  0818 06/15/25  2127 06/15/25  1341 06/15/25  0545 06/14/25  0259 01/13/25  0757   NA  --   --   --   --  135 137 134*   POTASSIUM  --   --   --   --  4.3 4.1 4.4   CHLORIDE  --   --   --   --  97* 100 99   CO2  --   --   --   --  28 31* 25   BUN  --   --   --   --  14.3 14.3 9.8   CR  --   --   --   --  0.79 0.98 0.75   ANIONGAP  --   --   --   --  10 6* 10   RL  --   --   --   --  9.0 8.7* 8.7*   * 120* 169*   < > 257* 144* 146*    < > = values in this interval not displayed.   ,   Results for orders placed or performed during the hospital encounter of 06/14/25   POC US CHEST B-SCAN    Impression    [facility] Procedure Note      Limited Bedside ED Ultrasound of Thorax:    PROCEDURE: PERFORMED BY: Dr. Manjeet Kwon MD  INDICATIONS/SYMPTOM:  dyspnea  PROBE: High frequency linear probe  BODY LOCATION: Chest  FINDINGS:  Images of both lung hemithoracies taken in 2D in multiple rib spaces        Right side:  Lung sliding  artifact  Present     Comet tail artifacts  Absent   Left side:  Lung sliding artifact  Present     Comet tail artifacts  Absent    INTERPRETATION: The exam was normal. There was no free fluid identified in the chest cavity. No evidence of pneumothorax, hemothorax or pleural effusion.  IMAGE DOCUMENTATION: Images were archived to PACs system.         XR Chest Port 1 View    Narrative    EXAM: XR CHEST PORT 1 VIEW  LOCATION: [facility]  DATE: 6/14/2025    INDICATION: SOB  COMPARISON: 112      Impression    IMPRESSION: Cardiomediastinal silhouette within normal limits. No focal consolidation or pleural effusion.       Discharge Medications      Review of your medicines        START taking        Dose / Directions   predniSONE 10 MG tablet  Commonly known as: DELTASONE  Used for: COPD exacerbation (H)      4 tabs daily for 3 days, then 3 tabs daily for 3 days, then 2 tabs daily for 3 days, then 1 tab daily for 3 days, then stop  Quantity: 30 tablet  Refills: 0            CONTINUE these medicines which have NOT CHANGED        Dose / Directions   acetaminophen 325 MG tablet  Commonly known as: TYLENOL      Dose: 650 mg  Take 650 mg by mouth every 4 hours as needed for mild pain.  Refills: 0     albuterol 108 (90 Base) MCG/ACT inhaler  Commonly known as: PROAIR HFA/PROVENTIL HFA/VENTOLIN HFA      Dose: 2 puff  Inhale 2 puffs into the lungs every 4 hours as needed for shortness of breath, wheezing or cough  Refills: 0     aspirin 81 MG chewable tablet  Commonly known as: ASA      Dose: 81 mg  Take 81 mg by mouth daily  Refills: 0     atorvastatin 40 MG tablet  Commonly known as: LIPITOR      Dose: 40 mg  Take 40 mg by mouth daily  Refills: 0     azithromycin 250 MG tablet  Commonly known as: ZITHROMAX  Used for: COPD exacerbation (H)      Dose: 250 mg  Take 1 tablet (250 mg) by mouth daily for 2 days.  Quantity: 2 tablet  Refills: 0     buprenorphine-naloxone 8-2 MG Subl sublingual  tablet  Commonly known as: SUBOXONE      Dose: 1 tablet  Place 1 tablet under the tongue daily.  Refills: 0     buPROPion 300 MG 24 hr tablet  Commonly known as: WELLBUTRIN XL      Dose: 300 mg  Take 300 mg by mouth every morning  Refills: 0     carvedilol 25 MG tablet  Commonly known as: COREG      Dose: 25 mg  Take 25 mg by mouth 2 times daily  Refills: 0     cholecalciferol 25 mcg (1000 units) capsule  Commonly known as: VITAMIN D3      Dose: 2 capsule  Take 2 capsules by mouth daily.  Refills: 0     clotrimazole 1 % external cream  Commonly known as: LOTRIMIN      Apply topically 2 times daily. To feet - can use between toes  Refills: 0     diclofenac 1 % topical gel  Commonly known as: VOLTAREN      Dose: 2-4 g  Apply 2-4 g topically 4 times daily as needed for moderate pain  Refills: 0     ferrous sulfate 325 (65 Fe) MG EC tablet  Commonly known as: FE TABS      Dose: 325 mg  Take 325 mg by mouth daily  Refills: 0     fluticasone 50 MCG/ACT nasal spray  Commonly known as: FLONASE      Dose: 2 spray  Spray 2 sprays into both nostrils daily.  Refills: 0     Fluticasone-Umeclidin-Vilanterol 200-62.5-25 MCG/ACT oral inhaler  Commonly known as: TRELEGY ELLIPTA      Dose: 1 puff  Inhale 1 puff into the lungs daily.  Refills: 0     * gabapentin 300 MG capsule  Commonly known as: NEURONTIN      Dose: 300 mg  Take 300 mg by mouth every morning  Refills: 0     * gabapentin 300 MG capsule  Commonly known as: NEURONTIN      Dose: 600 mg  Take 600 mg by mouth At Bedtime  Refills: 0     hydrALAZINE 100 MG tablet  Commonly known as: APRESOLINE  Used for: Hypertension, unspecified type      Dose: 100 mg  Take 1 tablet (100 mg) by mouth 3 times daily NOTE this is an INCREASE in dose from prior to hospital.  Further mgmt and refills per PCP.  Quantity: 90 tablet  Refills: 0     * ipratropium - albuterol 0.5 mg/2.5 mg (3mg)/3 mL 0.5-2.5 (3) MG/3ML neb solution  Commonly known as: DUONEB  Used for: COPD exacerbation (H)       Dose: 1 vial  Take 1 vial (3 mLs) by nebulization 2 times daily  Quantity: 90 mL  Refills: 1     * ipratropium - albuterol 0.5 mg/2.5 mg (3mg)/3 mL 0.5-2.5 (3) MG/3ML neb solution  Commonly known as: DUONEB  Used for: COPD exacerbation (H)      Dose: 1 vial  Take 1 vial (3 mLs) by nebulization every 6 hours as needed for shortness of breath, wheezing or cough  Quantity: 90 mL  Refills: 3     lisinopril 40 MG tablet  Commonly known as: ZESTRIL  Used for: COPD exacerbation (H)      Dose: 40 mg  Take 1 tablet (40 mg) by mouth daily  Quantity: 30 tablet  Refills: 3     melatonin 5 MG tablet      Dose: 5 mg  Take 5 mg by mouth nightly as needed for sleep  Refills: 0     mineral oil-white petrolatum cream      Apply topically 2 times daily. To feet; apply after clotrimazole. Do not use between toes. Cover feet with socks at night  Refills: 0     mirtazapine 45 MG tablet  Commonly known as: REMERON      Dose: 45 mg  Take 45 mg by mouth At Bedtime  Refills: 0     multivitamin w/minerals tablet      Dose: 1 tablet  Take 1 tablet by mouth daily  Refills: 0     naloxone 4 MG/0.1ML nasal spray  Commonly known as: NARCAN      Dose: 4 mg  Spray 4 mg into one nostril alternating nostrils once as needed for opioid reversal every 2-3 minutes until assistance arrives  Refills: 0     pantoprazole 40 MG EC tablet  Commonly known as: PROTONIX      Dose: 40 mg  Take 40 mg by mouth daily  Refills: 0     polyethylene glycol 17 g packet  Commonly known as: MIRALAX      Dose: 1 packet  Take 1 packet by mouth 2 times daily as needed for constipation.  Refills: 0     * QUEtiapine 25 MG tablet  Commonly known as: SEROquel      Dose: 25 mg  Take 25 mg by mouth at bedtime.  Refills: 0     * QUEtiapine 25 MG tablet  Commonly known as: SEROquel      Dose: 12.5 mg  Take 12.5 mg by mouth 2 times daily as needed (panic).  Refills: 0     sennosides 8.6 MG tablet  Commonly known as: SENOKOT      Dose: 1 tablet  Take 1 tablet by mouth 2 times daily as  needed for constipation.  Refills: 0           * This list has 6 medication(s) that are the same as other medications prescribed for you. Read the directions carefully, and ask your doctor or other care provider to review them with you.                   Where to get your medicines        These medications were sent to [facility] [place], [place] - [address] [address]  [address] [address] [address]      Phone: [phone]   azithromycin 250 MG tablet  predniSONE 10 MG tablet       Allergies   Allergies   Allergen Reactions    Ibuprofen Nausea and Vomiting

## 2025-06-17 ENCOUNTER — APPOINTMENT (OUTPATIENT)
Dept: ULTRASOUND IMAGING | Facility: CLINIC | Age: 65
End: 2025-06-17
Attending: STUDENT IN AN ORGANIZED HEALTH CARE EDUCATION/TRAINING PROGRAM
Payer: MEDICARE

## 2025-06-17 ENCOUNTER — APPOINTMENT (OUTPATIENT)
Dept: GENERAL RADIOLOGY | Facility: CLINIC | Age: 65
End: 2025-06-17
Attending: EMERGENCY MEDICINE
Payer: MEDICARE

## 2025-06-17 LAB
ALBUMIN SERPL BCG-MCNC: 3.8 G/DL (ref 3.5–5.2)
ALP SERPL-CCNC: 93 U/L (ref 40–150)
ALT SERPL W P-5'-P-CCNC: 18 U/L (ref 0–70)
ANION GAP SERPL CALCULATED.3IONS-SCNC: 8 MMOL/L (ref 7–15)
ANION GAP SERPL CALCULATED.3IONS-SCNC: 9 MMOL/L (ref 7–15)
AST SERPL W P-5'-P-CCNC: 15 U/L (ref 0–45)
ATRIAL RATE - MUSE: 73 BPM
BASE EXCESS BLDV CALC-SCNC: 5 MMOL/L (ref -3–3)
BASOPHILS # BLD AUTO: 0 10E3/UL (ref 0–0.2)
BASOPHILS NFR BLD AUTO: 0 %
BILIRUB SERPL-MCNC: 0.3 MG/DL
BUN SERPL-MCNC: 18.3 MG/DL (ref 8–23)
BUN SERPL-MCNC: 18.8 MG/DL (ref 8–23)
CALCIUM SERPL-MCNC: 8.8 MG/DL (ref 8.8–10.4)
CALCIUM SERPL-MCNC: 9.1 MG/DL (ref 8.8–10.4)
CHLORIDE SERPL-SCNC: 94 MMOL/L (ref 98–107)
CHLORIDE SERPL-SCNC: 97 MMOL/L (ref 98–107)
CREAT SERPL-MCNC: 0.75 MG/DL (ref 0.67–1.17)
CREAT SERPL-MCNC: 0.81 MG/DL (ref 0.67–1.17)
D DIMER PPP FEU-MCNC: <0.27 UG/ML FEU (ref 0–0.5)
DIASTOLIC BLOOD PRESSURE - MUSE: NORMAL MMHG
EGFRCR SERPLBLD CKD-EPI 2021: >90 ML/MIN/1.73M2
EGFRCR SERPLBLD CKD-EPI 2021: >90 ML/MIN/1.73M2
EOSINOPHIL # BLD AUTO: 0 10E3/UL (ref 0–0.7)
EOSINOPHIL NFR BLD AUTO: 0 %
ERYTHROCYTE [DISTWIDTH] IN BLOOD BY AUTOMATED COUNT: 13.4 % (ref 10–15)
ERYTHROCYTE [DISTWIDTH] IN BLOOD BY AUTOMATED COUNT: 13.5 % (ref 10–15)
FERRITIN SERPL-MCNC: 84 NG/ML (ref 31–409)
GLUCOSE BLDC GLUCOMTR-MCNC: 141 MG/DL (ref 70–99)
GLUCOSE BLDC GLUCOMTR-MCNC: 147 MG/DL (ref 70–99)
GLUCOSE BLDC GLUCOMTR-MCNC: 167 MG/DL (ref 70–99)
GLUCOSE BLDC GLUCOMTR-MCNC: 173 MG/DL (ref 70–99)
GLUCOSE BLDC GLUCOMTR-MCNC: 220 MG/DL (ref 70–99)
GLUCOSE SERPL-MCNC: 145 MG/DL (ref 70–99)
GLUCOSE SERPL-MCNC: 154 MG/DL (ref 70–99)
HCO3 BLDV-SCNC: 32 MMOL/L (ref 21–28)
HCO3 SERPL-SCNC: 29 MMOL/L (ref 22–29)
HCO3 SERPL-SCNC: 31 MMOL/L (ref 22–29)
HCT VFR BLD AUTO: 33.5 % (ref 40–53)
HCT VFR BLD AUTO: 33.7 % (ref 40–53)
HGB BLD-MCNC: 10.8 G/DL (ref 13.3–17.7)
HGB BLD-MCNC: 11.2 G/DL (ref 13.3–17.7)
HOLD SPECIMEN: NORMAL
HOLD SPECIMEN: NORMAL
IMM GRANULOCYTES # BLD: 0.1 10E3/UL
IMM GRANULOCYTES NFR BLD: 1 %
INTERPRETATION ECG - MUSE: NORMAL
IRON BINDING CAPACITY (ROCHE): 247 UG/DL (ref 240–430)
IRON SATN MFR SERPL: 32 % (ref 15–46)
IRON SERPL-MCNC: 79 UG/DL (ref 61–157)
LACTATE BLD-SCNC: 0.8 MMOL/L (ref 0.7–2)
LYMPHOCYTES # BLD AUTO: 1.2 10E3/UL (ref 0.8–5.3)
LYMPHOCYTES NFR BLD AUTO: 8 %
MCH RBC QN AUTO: 29 PG (ref 26.5–33)
MCH RBC QN AUTO: 29.9 PG (ref 26.5–33)
MCHC RBC AUTO-ENTMCNC: 32 G/DL (ref 31.5–36.5)
MCHC RBC AUTO-ENTMCNC: 33.4 G/DL (ref 31.5–36.5)
MCV RBC AUTO: 89 FL (ref 78–100)
MCV RBC AUTO: 91 FL (ref 78–100)
MONOCYTES # BLD AUTO: 1 10E3/UL (ref 0–1.3)
MONOCYTES NFR BLD AUTO: 6 %
NEUTROPHILS # BLD AUTO: 13.8 10E3/UL (ref 1.6–8.3)
NEUTROPHILS NFR BLD AUTO: 85 %
NRBC # BLD AUTO: 0 10E3/UL
NRBC BLD AUTO-RTO: 0 /100
NT-PROBNP SERPL-MCNC: 294 PG/ML (ref 0–177)
P AXIS - MUSE: 82 DEGREES
PCO2 BLDV: 53 MM HG (ref 40–50)
PH BLDV: 7.39 [PH] (ref 7.32–7.43)
PLATELET # BLD AUTO: 132 10E3/UL (ref 150–450)
PLATELET # BLD AUTO: 169 10E3/UL (ref 150–450)
PO2 BLDV: 34 MM HG (ref 25–47)
POTASSIUM SERPL-SCNC: 4 MMOL/L (ref 3.4–5.3)
POTASSIUM SERPL-SCNC: 4.2 MMOL/L (ref 3.4–5.3)
PR INTERVAL - MUSE: 116 MS
PROT SERPL-MCNC: 6.3 G/DL (ref 6.4–8.3)
QRS DURATION - MUSE: 70 MS
QT - MUSE: 354 MS
QTC - MUSE: 389 MS
R AXIS - MUSE: 72 DEGREES
RBC # BLD AUTO: 3.72 10E6/UL (ref 4.4–5.9)
RBC # BLD AUTO: 3.75 10E6/UL (ref 4.4–5.9)
RETICS # AUTO: 0.1 10E6/UL (ref 0.03–0.1)
RETICS/RBC NFR AUTO: 2.5 % (ref 0.5–2)
SAO2 % BLDV: 63 % (ref 70–75)
SODIUM SERPL-SCNC: 132 MMOL/L (ref 135–145)
SODIUM SERPL-SCNC: 136 MMOL/L (ref 135–145)
SYSTOLIC BLOOD PRESSURE - MUSE: NORMAL MMHG
T AXIS - MUSE: 60 DEGREES
TROPONIN T SERPL HS-MCNC: 10 NG/L
TROPONIN T SERPL HS-MCNC: 9 NG/L
VENTRICULAR RATE- MUSE: 73 BPM
WBC # BLD AUTO: 11.5 10E3/UL (ref 4–11)
WBC # BLD AUTO: 16.2 10E3/UL (ref 4–11)

## 2025-06-17 PROCEDURE — G0378 HOSPITAL OBSERVATION PER HR: HCPCS

## 2025-06-17 PROCEDURE — 96375 TX/PRO/DX INJ NEW DRUG ADDON: CPT

## 2025-06-17 PROCEDURE — 5A09357 ASSISTANCE WITH RESPIRATORY VENTILATION, LESS THAN 24 CONSECUTIVE HOURS, CONTINUOUS POSITIVE AIRWAY PRESSURE: ICD-10-PCS | Performed by: INTERNAL MEDICINE

## 2025-06-17 PROCEDURE — 84484 ASSAY OF TROPONIN QUANT: CPT | Performed by: EMERGENCY MEDICINE

## 2025-06-17 PROCEDURE — 76700 US EXAM ABDOM COMPLETE: CPT

## 2025-06-17 PROCEDURE — 250N000012 HC RX MED GY IP 250 OP 636 PS 637: Performed by: STUDENT IN AN ORGANIZED HEALTH CARE EDUCATION/TRAINING PROGRAM

## 2025-06-17 PROCEDURE — 85045 AUTOMATED RETICULOCYTE COUNT: CPT | Performed by: STUDENT IN AN ORGANIZED HEALTH CARE EDUCATION/TRAINING PROGRAM

## 2025-06-17 PROCEDURE — 85379 FIBRIN DEGRADATION QUANT: CPT | Performed by: STUDENT IN AN ORGANIZED HEALTH CARE EDUCATION/TRAINING PROGRAM

## 2025-06-17 PROCEDURE — 83880 ASSAY OF NATRIURETIC PEPTIDE: CPT | Performed by: EMERGENCY MEDICINE

## 2025-06-17 PROCEDURE — 250N000011 HC RX IP 250 OP 636: Mod: JZ | Performed by: STUDENT IN AN ORGANIZED HEALTH CARE EDUCATION/TRAINING PROGRAM

## 2025-06-17 PROCEDURE — 250N000013 HC RX MED GY IP 250 OP 250 PS 637: Performed by: STUDENT IN AN ORGANIZED HEALTH CARE EDUCATION/TRAINING PROGRAM

## 2025-06-17 PROCEDURE — 96367 TX/PROPH/DG ADDL SEQ IV INF: CPT

## 2025-06-17 PROCEDURE — 99223 1ST HOSP IP/OBS HIGH 75: CPT | Performed by: STUDENT IN AN ORGANIZED HEALTH CARE EDUCATION/TRAINING PROGRAM

## 2025-06-17 PROCEDURE — 82962 GLUCOSE BLOOD TEST: CPT

## 2025-06-17 PROCEDURE — 96365 THER/PROPH/DIAG IV INF INIT: CPT

## 2025-06-17 PROCEDURE — 82728 ASSAY OF FERRITIN: CPT | Performed by: STUDENT IN AN ORGANIZED HEALTH CARE EDUCATION/TRAINING PROGRAM

## 2025-06-17 PROCEDURE — 999N000157 HC STATISTIC RCP TIME EA 10 MIN

## 2025-06-17 PROCEDURE — 36415 COLL VENOUS BLD VENIPUNCTURE: CPT | Performed by: STUDENT IN AN ORGANIZED HEALTH CARE EDUCATION/TRAINING PROGRAM

## 2025-06-17 PROCEDURE — 250N000009 HC RX 250: Performed by: STUDENT IN AN ORGANIZED HEALTH CARE EDUCATION/TRAINING PROGRAM

## 2025-06-17 PROCEDURE — 85025 COMPLETE CBC W/AUTO DIFF WBC: CPT | Performed by: EMERGENCY MEDICINE

## 2025-06-17 PROCEDURE — 99207 PR APP CREDIT; MD BILLING SHARED VISIT: CPT | Performed by: INTERNAL MEDICINE

## 2025-06-17 PROCEDURE — 80048 BASIC METABOLIC PNL TOTAL CA: CPT | Performed by: EMERGENCY MEDICINE

## 2025-06-17 PROCEDURE — 82803 BLOOD GASES ANY COMBINATION: CPT

## 2025-06-17 PROCEDURE — 120N000001 HC R&B MED SURG/OB

## 2025-06-17 PROCEDURE — 93005 ELECTROCARDIOGRAM TRACING: CPT

## 2025-06-17 PROCEDURE — 250N000009 HC RX 250: Performed by: EMERGENCY MEDICINE

## 2025-06-17 PROCEDURE — 71045 X-RAY EXAM CHEST 1 VIEW: CPT

## 2025-06-17 PROCEDURE — 250N000011 HC RX IP 250 OP 636: Mod: JZ | Performed by: EMERGENCY MEDICINE

## 2025-06-17 PROCEDURE — 83550 IRON BINDING TEST: CPT | Performed by: STUDENT IN AN ORGANIZED HEALTH CARE EDUCATION/TRAINING PROGRAM

## 2025-06-17 PROCEDURE — 82310 ASSAY OF CALCIUM: CPT | Performed by: STUDENT IN AN ORGANIZED HEALTH CARE EDUCATION/TRAINING PROGRAM

## 2025-06-17 PROCEDURE — 94640 AIRWAY INHALATION TREATMENT: CPT

## 2025-06-17 PROCEDURE — 85014 HEMATOCRIT: CPT | Performed by: STUDENT IN AN ORGANIZED HEALTH CARE EDUCATION/TRAINING PROGRAM

## 2025-06-17 PROCEDURE — 36415 COLL VENOUS BLD VENIPUNCTURE: CPT | Performed by: EMERGENCY MEDICINE

## 2025-06-17 RX ORDER — BUPRENORPHINE HYDROCHLORIDE AND NALOXONE HYDROCHLORIDE DIHYDRATE 2; .5 MG/1; MG/1
1 TABLET SUBLINGUAL DAILY
COMMUNITY

## 2025-06-17 RX ORDER — CARBOXYMETHYLCELLULOSE SODIUM 5 MG/ML
1 SOLUTION/ DROPS OPHTHALMIC 4 TIMES DAILY
COMMUNITY

## 2025-06-17 RX ORDER — CEFEPIME HYDROCHLORIDE 2 G/1
2 INJECTION, POWDER, FOR SOLUTION INTRAVENOUS EVERY 8 HOURS
Status: DISCONTINUED | OUTPATIENT
Start: 2025-06-17 | End: 2025-06-19

## 2025-06-17 RX ORDER — BUPRENORPHINE HYDROCHLORIDE AND NALOXONE HYDROCHLORIDE DIHYDRATE 2; .5 MG/1; MG/1
3 TABLET SUBLINGUAL DAILY
Status: DISCONTINUED | OUTPATIENT
Start: 2025-06-18 | End: 2025-06-21 | Stop reason: HOSPADM

## 2025-06-17 RX ORDER — GUAIFENESIN 200 MG/10ML
200 LIQUID ORAL EVERY 4 HOURS PRN
COMMUNITY

## 2025-06-17 RX ORDER — BUPRENORPHINE HYDROCHLORIDE AND NALOXONE HYDROCHLORIDE DIHYDRATE 8; 2 MG/1; MG/1
1 TABLET SUBLINGUAL DAILY
Status: DISCONTINUED | OUTPATIENT
Start: 2025-06-17 | End: 2025-06-17

## 2025-06-17 RX ORDER — IPRATROPIUM BROMIDE AND ALBUTEROL SULFATE 2.5; .5 MG/3ML; MG/3ML
3 SOLUTION RESPIRATORY (INHALATION)
Status: DISCONTINUED | OUTPATIENT
Start: 2025-06-17 | End: 2025-06-20

## 2025-06-17 RX ORDER — PANTOPRAZOLE SODIUM 40 MG/1
40 TABLET, DELAYED RELEASE ORAL DAILY
Status: DISCONTINUED | OUTPATIENT
Start: 2025-06-17 | End: 2025-06-21 | Stop reason: HOSPADM

## 2025-06-17 RX ORDER — DEXTROSE MONOHYDRATE 25 G/50ML
25-50 INJECTION, SOLUTION INTRAVENOUS
Status: DISCONTINUED | OUTPATIENT
Start: 2025-06-17 | End: 2025-06-21 | Stop reason: HOSPADM

## 2025-06-17 RX ORDER — POLYETHYLENE GLYCOL 3350 17 G/17G
17 POWDER, FOR SOLUTION ORAL EVERY 24 HOURS
Status: DISCONTINUED | OUTPATIENT
Start: 2025-06-17 | End: 2025-06-21 | Stop reason: HOSPADM

## 2025-06-17 RX ORDER — AMOXICILLIN 250 MG
1 CAPSULE ORAL 2 TIMES DAILY PRN
Status: DISCONTINUED | OUTPATIENT
Start: 2025-06-17 | End: 2025-06-21 | Stop reason: HOSPADM

## 2025-06-17 RX ORDER — HYDRALAZINE HYDROCHLORIDE 50 MG/1
100 TABLET, FILM COATED ORAL 3 TIMES DAILY
Status: DISCONTINUED | OUTPATIENT
Start: 2025-06-17 | End: 2025-06-21 | Stop reason: HOSPADM

## 2025-06-17 RX ORDER — PROCHLORPERAZINE MALEATE 10 MG
10 TABLET ORAL EVERY 6 HOURS PRN
Status: DISCONTINUED | OUTPATIENT
Start: 2025-06-17 | End: 2025-06-21 | Stop reason: HOSPADM

## 2025-06-17 RX ORDER — ACETAMINOPHEN 325 MG/1
650 TABLET ORAL EVERY 4 HOURS PRN
Status: DISCONTINUED | OUTPATIENT
Start: 2025-06-17 | End: 2025-06-21 | Stop reason: HOSPADM

## 2025-06-17 RX ORDER — GABAPENTIN 300 MG/1
300 CAPSULE ORAL EVERY MORNING
Status: DISCONTINUED | OUTPATIENT
Start: 2025-06-17 | End: 2025-06-21 | Stop reason: HOSPADM

## 2025-06-17 RX ORDER — ONDANSETRON 2 MG/ML
4 INJECTION INTRAMUSCULAR; INTRAVENOUS EVERY 6 HOURS PRN
Status: DISCONTINUED | OUTPATIENT
Start: 2025-06-17 | End: 2025-06-21 | Stop reason: HOSPADM

## 2025-06-17 RX ORDER — NITROGLYCERIN 0.4 MG/1
0.4 TABLET SUBLINGUAL EVERY 5 MIN PRN
Status: DISCONTINUED | OUTPATIENT
Start: 2025-06-17 | End: 2025-06-21 | Stop reason: HOSPADM

## 2025-06-17 RX ORDER — ASPIRIN 81 MG/1
81 TABLET, CHEWABLE ORAL DAILY
Status: DISCONTINUED | OUTPATIENT
Start: 2025-06-17 | End: 2025-06-21 | Stop reason: HOSPADM

## 2025-06-17 RX ORDER — GABAPENTIN 300 MG/1
600 CAPSULE ORAL AT BEDTIME
Status: DISCONTINUED | OUTPATIENT
Start: 2025-06-17 | End: 2025-06-21 | Stop reason: HOSPADM

## 2025-06-17 RX ORDER — HYDRALAZINE HYDROCHLORIDE 20 MG/ML
10 INJECTION INTRAMUSCULAR; INTRAVENOUS EVERY 4 HOURS PRN
Status: DISCONTINUED | OUTPATIENT
Start: 2025-06-17 | End: 2025-06-21 | Stop reason: HOSPADM

## 2025-06-17 RX ORDER — ACETAMINOPHEN 650 MG/1
650 SUPPOSITORY RECTAL EVERY 4 HOURS PRN
Status: DISCONTINUED | OUTPATIENT
Start: 2025-06-17 | End: 2025-06-21 | Stop reason: HOSPADM

## 2025-06-17 RX ORDER — CARVEDILOL 25 MG/1
25 TABLET ORAL 2 TIMES DAILY
Status: DISCONTINUED | OUTPATIENT
Start: 2025-06-17 | End: 2025-06-21 | Stop reason: HOSPADM

## 2025-06-17 RX ORDER — QUETIAPINE FUMARATE 25 MG/1
25 TABLET, FILM COATED ORAL AT BEDTIME
Status: DISCONTINUED | OUTPATIENT
Start: 2025-06-17 | End: 2025-06-21 | Stop reason: HOSPADM

## 2025-06-17 RX ORDER — PREDNISONE 20 MG/1
40 TABLET ORAL DAILY
Status: DISCONTINUED | OUTPATIENT
Start: 2025-06-17 | End: 2025-06-20

## 2025-06-17 RX ORDER — FERROUS SULFATE 325(65) MG
325 TABLET ORAL DAILY
Status: DISCONTINUED | OUTPATIENT
Start: 2025-06-17 | End: 2025-06-21 | Stop reason: HOSPADM

## 2025-06-17 RX ORDER — NICOTINE POLACRILEX 4 MG
15-30 LOZENGE BUCCAL
Status: DISCONTINUED | OUTPATIENT
Start: 2025-06-17 | End: 2025-06-21 | Stop reason: HOSPADM

## 2025-06-17 RX ORDER — IPRATROPIUM BROMIDE AND ALBUTEROL SULFATE 2.5; .5 MG/3ML; MG/3ML
3 SOLUTION RESPIRATORY (INHALATION) EVERY 4 HOURS PRN
Status: DISCONTINUED | OUTPATIENT
Start: 2025-06-17 | End: 2025-06-21 | Stop reason: HOSPADM

## 2025-06-17 RX ORDER — ATORVASTATIN CALCIUM 40 MG/1
40 TABLET, FILM COATED ORAL DAILY
Status: DISCONTINUED | OUTPATIENT
Start: 2025-06-17 | End: 2025-06-21 | Stop reason: HOSPADM

## 2025-06-17 RX ORDER — LISINOPRIL 40 MG/1
40 TABLET ORAL DAILY
Status: DISCONTINUED | OUTPATIENT
Start: 2025-06-17 | End: 2025-06-21 | Stop reason: HOSPADM

## 2025-06-17 RX ORDER — AMOXICILLIN 250 MG
2 CAPSULE ORAL 2 TIMES DAILY PRN
Status: DISCONTINUED | OUTPATIENT
Start: 2025-06-17 | End: 2025-06-21 | Stop reason: HOSPADM

## 2025-06-17 RX ORDER — ONDANSETRON 4 MG/1
4 TABLET, ORALLY DISINTEGRATING ORAL EVERY 6 HOURS PRN
Status: DISCONTINUED | OUTPATIENT
Start: 2025-06-17 | End: 2025-06-21 | Stop reason: HOSPADM

## 2025-06-17 RX ORDER — HYDRALAZINE HYDROCHLORIDE 10 MG/1
10 TABLET, FILM COATED ORAL EVERY 4 HOURS PRN
Status: DISCONTINUED | OUTPATIENT
Start: 2025-06-17 | End: 2025-06-21 | Stop reason: HOSPADM

## 2025-06-17 RX ORDER — MIRTAZAPINE 15 MG/1
45 TABLET, FILM COATED ORAL AT BEDTIME
Status: DISCONTINUED | OUTPATIENT
Start: 2025-06-17 | End: 2025-06-21 | Stop reason: HOSPADM

## 2025-06-17 RX ORDER — BUPROPION HYDROCHLORIDE 300 MG/1
300 TABLET ORAL EVERY MORNING
Status: DISCONTINUED | OUTPATIENT
Start: 2025-06-17 | End: 2025-06-21 | Stop reason: HOSPADM

## 2025-06-17 RX ADMIN — BUPROPION HYDROCHLORIDE 300 MG: 300 TABLET, EXTENDED RELEASE ORAL at 09:52

## 2025-06-17 RX ADMIN — PANTOPRAZOLE SODIUM 40 MG: 40 TABLET, DELAYED RELEASE ORAL at 08:13

## 2025-06-17 RX ADMIN — IPRATROPIUM BROMIDE AND ALBUTEROL SULFATE 3 ML: .5; 3 SOLUTION RESPIRATORY (INHALATION) at 14:18

## 2025-06-17 RX ADMIN — CEFEPIME 2 G: 2 INJECTION, POWDER, FOR SOLUTION INTRAVENOUS at 12:30

## 2025-06-17 RX ADMIN — CEFEPIME 2 G: 2 INJECTION, POWDER, FOR SOLUTION INTRAVENOUS at 04:42

## 2025-06-17 RX ADMIN — HYDRALAZINE HYDROCHLORIDE 100 MG: 50 TABLET ORAL at 14:15

## 2025-06-17 RX ADMIN — MAGNESIUM SULFATE HEPTAHYDRATE 2 G: 40 INJECTION, SOLUTION INTRAVENOUS at 00:08

## 2025-06-17 RX ADMIN — MIRTAZAPINE 45 MG: 15 TABLET, FILM COATED ORAL at 21:56

## 2025-06-17 RX ADMIN — QUETIAPINE FUMARATE 25 MG: 25 TABLET ORAL at 21:56

## 2025-06-17 RX ADMIN — IPRATROPIUM BROMIDE AND ALBUTEROL SULFATE 3 ML: .5; 3 SOLUTION RESPIRATORY (INHALATION) at 08:13

## 2025-06-17 RX ADMIN — ATORVASTATIN CALCIUM 40 MG: 40 TABLET, FILM COATED ORAL at 08:13

## 2025-06-17 RX ADMIN — METHYLPREDNISOLONE SODIUM SUCCINATE 125 MG: 125 INJECTION, POWDER, FOR SOLUTION INTRAMUSCULAR; INTRAVENOUS at 00:08

## 2025-06-17 RX ADMIN — CARVEDILOL 25 MG: 25 TABLET, FILM COATED ORAL at 20:42

## 2025-06-17 RX ADMIN — CEFEPIME 2 G: 2 INJECTION, POWDER, FOR SOLUTION INTRAVENOUS at 20:42

## 2025-06-17 RX ADMIN — CARVEDILOL 25 MG: 25 TABLET, FILM COATED ORAL at 08:13

## 2025-06-17 RX ADMIN — IPRATROPIUM BROMIDE AND ALBUTEROL SULFATE 3 ML: .5; 3 SOLUTION RESPIRATORY (INHALATION) at 19:20

## 2025-06-17 RX ADMIN — INSULIN ASPART 1 UNITS: 100 INJECTION, SOLUTION INTRAVENOUS; SUBCUTANEOUS at 13:25

## 2025-06-17 RX ADMIN — HYDRALAZINE HYDROCHLORIDE 100 MG: 50 TABLET ORAL at 09:52

## 2025-06-17 RX ADMIN — HYDRALAZINE HYDROCHLORIDE 100 MG: 50 TABLET ORAL at 20:42

## 2025-06-17 RX ADMIN — FERROUS SULFATE TAB 325 MG (65 MG ELEMENTAL FE) 325 MG: 325 (65 FE) TAB at 09:52

## 2025-06-17 RX ADMIN — PREDNISONE 40 MG: 20 TABLET ORAL at 08:13

## 2025-06-17 RX ADMIN — IPRATROPIUM BROMIDE AND ALBUTEROL SULFATE 3 ML: .5; 3 SOLUTION RESPIRATORY (INHALATION) at 06:08

## 2025-06-17 RX ADMIN — GABAPENTIN 300 MG: 300 CAPSULE ORAL at 08:13

## 2025-06-17 RX ADMIN — LISINOPRIL 40 MG: 10 TABLET ORAL at 08:13

## 2025-06-17 RX ADMIN — IPRATROPIUM BROMIDE AND ALBUTEROL SULFATE 6 ML: .5; 3 SOLUTION RESPIRATORY (INHALATION) at 00:08

## 2025-06-17 RX ADMIN — INSULIN ASPART 1 UNITS: 100 INJECTION, SOLUTION INTRAVENOUS; SUBCUTANEOUS at 16:11

## 2025-06-17 RX ADMIN — ASPIRIN 81 MG CHEWABLE TABLET 81 MG: 81 TABLET CHEWABLE at 08:13

## 2025-06-17 RX ADMIN — GABAPENTIN 600 MG: 300 CAPSULE ORAL at 21:56

## 2025-06-17 ASSESSMENT — ACTIVITIES OF DAILY LIVING (ADL)
ADLS_ACUITY_SCORE: 58
DEPENDENT_IADLS:: CLEANING;COOKING;LAUNDRY;SHOPPING;MEAL PREPARATION;MEDICATION MANAGEMENT;MONEY MANAGEMENT;TRANSPORTATION
ADLS_ACUITY_SCORE: 58

## 2025-06-17 NOTE — H&P
[facility]    History and Physical - Hospitalist Service       Date of Admission:  6/16/2025    Assessment & Plan      Gerson Pearson is a 65 year old male admitted on 6/16/2025.    Acute Hypoxic respiratory failure 2/2 COPD exacerbation  Leukocytosis, likely 2/2 steroid use  EMS gave 324 aspirin, 3 sl  nitroglycerin.   , Dimer negative  CXR read as wnl  EKG NSR w/ hyperacute T waves in the anterior leads  --standing and prn duonebs  --oximetry, oxygen  --resume trelelgy on discharge  --outpatient Pulm follow-up during the month of June is highly recommended  --cefepime given recurrent use of IV abx and steroid use  --PRN nitroglycerin     CAD status post PCI  Chronic CHF, recovered EF  Dyslipidemia  Hypertension  Patient reports some chest tightness associated with his COPD exacerbation.  EKG is negative for acute ischemic changes.  Initial troponin within normal limits.  No sign of volume overload/acute CHF.  --PTA ASA, Lipitor, Coreg, hydralazine, lisinopril continued    Progressive abdominal distension  Hx Fundal adenomyomatosis   --miralax  --US abdomen    Chronic stable conditions, resuming PTA meds  Depression  Anxiety  GERD  Polysubstance use in remission  PreDM  Chronic anemia  Hyponatremia       Diet:  general  DVT Prophylaxis: scds  Post Catheter: Not present  Lines: None     Cardiac Monitoring: None  Code Status:  Full    Clinically Significant Risk Factors Present on Admission         # Hyponatremia: Lowest Na = 132 mmol/L in last 2 days, will monitor as appropriate  # Hypochloremia: Lowest Cl = 94 mmol/L in last 2 days, will monitor as appropriate        # Drug Induced Platelet Defect: home medication list includes an antiplatelet medication   # Hypertension: Home medication list includes antihypertensive(s)  # Chronic heart failure with preserved ejection fraction: heart failure noted on problem list and last echo with EF >50%              #  Financial/Environmental Concerns:           Disposition Plan     Medically Ready for Discharge: Anticipated Tomorrow           Debbie Barnett MD  Hospitalist Service  [facility]  Securely message with [employer] (more info)  Text page via [employer] Paging/Directory     ______________________________________________________________________        History of Present Illness   Gerson Pearson is a 65 year old male who presents 8hrs after discharge from [facility] for a COPD exacerbation. Patient reports that when he got home, when going to bed began having dyspnea that improved w/ nebulizer treatment, then was hypertensive to the 190s, and began having chest tightness (comparable to prior COPD exaceberations). At [facility], all symptoms resolved. No recent smoking.       Past Medical History    Past Medical History:   Diagnosis Date    CAD (coronary artery disease)     s/p bare metal stent to RCA in 6/2020    Chronic back pain     Chronic systolic congestive heart failure (H)     EF 45% has recovered to 60-65% on echo 12/2021    Cocaine use disorder (H)     COPD (chronic obstructive pulmonary disease) (H)     Depressive disorder     GERD (gastroesophageal reflux disease)     HTN (hypertension)     Myocardial infarction (H)     Nicotine dependence     Opioid use disorder     REYNA (obstructive sleep apnea)     Stroke (H) 2019    of unknown etiology: Right parieto-occipital lobe       Past Surgical History   Past Surgical History:   Procedure Laterality Date    CORONARY STENT PLACEMENT  2020       Prior to Admission Medications   Prior to Admission Medications   Prescriptions Last Dose Informant Patient Reported? Taking?   Fluticasone-Umeclidin-Vilanterol (TRELEGY ELLIPTA) 200-62.5-25 MCG/ACT oral inhaler  Nursing Home Yes No   Sig: Inhale 1 puff into the lungs daily.   QUEtiapine (SEROQUEL) 25 MG tablet  Nursing Home Yes No   Sig: Take 25 mg by mouth at bedtime.   QUEtiapine (SEROQUEL) 25 MG tablet  Nursing Home  Yes No   Sig: Take 12.5 mg by mouth 2 times daily as needed (panic).   acetaminophen (TYLENOL) 325 MG tablet  Nursing Home Yes No   Sig: Take 650 mg by mouth every 4 hours as needed for mild pain.   albuterol (PROAIR HFA/PROVENTIL HFA/VENTOLIN HFA) 108 (90 Base) MCG/ACT inhaler  Nursing Home Yes No   Sig: Inhale 2 puffs into the lungs every 4 hours as needed for shortness of breath, wheezing or cough   aspirin (ASA) 81 MG chewable tablet  Nursing Home Yes No   Sig: Take 81 mg by mouth daily   atorvastatin (LIPITOR) 40 MG tablet  Nursing Home Yes No   Sig: Take 40 mg by mouth daily    azithromycin (ZITHROMAX) 250 MG tablet   No No   Sig: Take 1 tablet (250 mg) by mouth daily for 2 days.   buPROPion (WELLBUTRIN XL) 300 MG 24 hr tablet  Nursing Home Yes No   Sig: Take 300 mg by mouth every morning   buprenorphine-naloxone (SUBOXONE) 8-2 MG SUBL sublingual tablet  Nursing Home Yes No   Sig: Place 1 tablet under the tongue daily.   carvedilol (COREG) 25 MG tablet  Nursing Home Yes No   Sig: Take 25 mg by mouth 2 times daily   cholecalciferol (VITAMIN D3) 25 mcg (1000 units) capsule  Nursing Home Yes No   Sig: Take 2 capsules by mouth daily.   clotrimazole (LOTRIMIN) 1 % external cream  Nursing Home Yes No   Sig: Apply topically 2 times daily. To feet - can use between toes   diclofenac (VOLTAREN) 1 % topical gel  Nursing Home Yes No   Sig: Apply 2-4 g topically 4 times daily as needed for moderate pain   ferrous sulfate (FE TABS) 325 (65 Fe) MG EC tablet  Nursing Home Yes No   Sig: Take 325 mg by mouth daily   fluticasone (FLONASE) 50 MCG/ACT nasal spray   Yes No   Sig: Spray 2 sprays into both nostrils daily.   gabapentin (NEURONTIN) 300 MG capsule  Nursing Home Yes No   Sig: Take 300 mg by mouth every morning   gabapentin (NEURONTIN) 300 MG capsule  Nursing Home Yes No   Sig: Take 600 mg by mouth At Bedtime   hydrALAZINE (APRESOLINE) 100 MG tablet  Nursing Home No No   Sig: Take 1 tablet (100 mg) by mouth 3 times daily  NOTE this is an INCREASE in dose from prior to hospital.  Further mgmt and refills per PCP.   ipratropium - albuterol 0.5 mg/2.5 mg/3 mL (DUONEB) 0.5-2.5 (3) MG/3ML neb solution  Nursing Home No No   Sig: Take 1 vial (3 mLs) by nebulization 2 times daily   ipratropium - albuterol 0.5 mg/2.5 mg/3 mL (DUONEB) 0.5-2.5 (3) MG/3ML neb solution  Nursing Home No No   Sig: Take 1 vial (3 mLs) by nebulization every 6 hours as needed for shortness of breath, wheezing or cough   lisinopril (ZESTRIL) 40 MG tablet  Nursing Home No No   Sig: Take 1 tablet (40 mg) by mouth daily   melatonin 5 MG tablet  Nursing Home Yes No   Sig: Take 5 mg by mouth nightly as needed for sleep   mineral oil-white petrolatum (EUCERIN/MINERIN) cream  Nursing Home Yes No   Sig: Apply topically 2 times daily. To feet; apply after clotrimazole. Do not use between toes. Cover feet with socks at night   mirtazapine (REMERON) 45 MG tablet  Nursing Home Yes No   Sig: Take 45 mg by mouth At Bedtime    multivitamin w/minerals (THERA-VIT-M) tablet  Nursing Home Yes No   Sig: Take 1 tablet by mouth daily   naloxone (NARCAN) 4 MG/0.1ML nasal spray  Nursing Home Yes No   Sig: Spray 4 mg into one nostril alternating nostrils once as needed for opioid reversal every 2-3 minutes until assistance arrives   pantoprazole (PROTONIX) 40 MG EC tablet  Nursing Home Yes No   Sig: Take 40 mg by mouth daily   polyethylene glycol (MIRALAX) 17 g packet  [facility] Yes No   Sig: Take 1 packet by mouth 2 times daily as needed for constipation.   predniSONE (DELTASONE) 10 MG tablet   No No   Si tabs daily for 3 days, then 3 tabs daily for 3 days, then 2 tabs daily for 3 days, then 1 tab daily for 3 days, then stop   sennosides (SENOKOT) 8.6 MG tablet  Nursing Home Yes No   Sig: Take 1 tablet by mouth 2 times daily as needed for constipation.      Facility-Administered Medications: None           Physical Exam   Vital Signs: Temp: 98.5  F (36.9  C) Temp src: Oral BP: (!)  147/108 Pulse: 64   Resp: 21 SpO2: 99 % O2 Device: Nasal cannula Oxygen Delivery: 2 LPM  Weight: 150 lbs 0 oz    Constitutional: Alert and oriented to person, place and time; no apparent distress.   HEENT: Normocephalic, no scleral icterus  Abdomen: , no tenderness/guarding, R sided abdominal distension/fullness  Lungs:Decreased L anterior breath sounds  Heart: Regular s1s2, no evidence of murmurs  Neuro:No focal strength/sensation deficits  Skin/Extremities: No obvious signs of skin breakdown  Psychiatric: appropriate affect, insight and judgment  Back: No midline tenderness, no CVA tenderness      Medical Decision Making       87 MINUTES SPENT BY ME on the date of service doing chart review, history, exam, documentation & further activities per the note.      Data

## 2025-06-17 NOTE — PROGRESS NOTES
RECEIVING UNIT ED HANDOFF REVIEW    ED Nurse Handoff Report was reviewed by: Ron Egan RN on June 17, 2025 at 2:37 PM

## 2025-06-17 NOTE — PLAN OF CARE
Goal Outcome Evaluation:      Plan of Care Reviewed With: patient    Overall Patient Progress: no changeOverall Patient Progress: no change    Outcome Evaluation: Pt will dischare to  when medically ready

## 2025-06-17 NOTE — ED TRIAGE NOTES
Patient biba from group home for SOB and chest pain. HX COPD, CHF. EMS gave 324 aspirin, 3 sl  nitroglycerin.      Triage Assessment (Adult)       Row Name 06/17/25 0001          Triage Assessment    Airway WDL WDL        Respiratory WDL    Respiratory WDL X;rhythm/pattern     Rhythm/Pattern, Respiratory shortness of breath        Cardiac WDL    Cardiac WDL X;chest pain        Chest Pain Assessment    Character tightness        Cognitive/Neuro/Behavioral WDL    Cognitive/Neuro/Behavioral WDL WDL

## 2025-06-17 NOTE — ED NOTES
[facility]  ED Nurse Handoff Report    ED Chief complaint: Shortness of Breath and Chest Pain      ED Diagnosis:   Final diagnoses:   None       Code Status: to be addressed     Allergies:   Allergies   Allergen Reactions    Ibuprofen Nausea and Vomiting       Patient Story: Patient presents to the ED via EMS from [email or web address] for sob and midsternal chest tightness. Patient was discharged from this hospital on 06/16. Patient placed on BiPAP on arrival, removed following cxr and nebs  Focused Assessment:  Aox4, SOB, midsternal chest tightness    Treatments and/or interventions provided: PIV, labs, imaging, meds  Patient's response to treatments and/or interventions: improved   Labs Ordered and Resulted from Time of ED Arrival to Time of ED Departure   BASIC METABOLIC PANEL - Abnormal       Result Value    Sodium 132 (*)     Potassium 4.0      Chloride 94 (*)     Carbon Dioxide (CO2) 29      Anion Gap 9      Urea Nitrogen 18.3      Creatinine 0.81      GFR Estimate >90      Calcium 9.1      Glucose 145 (*)    NT-PROBNP - Abnormal    NT-proBNP 294 (*)    CBC WITH PLATELETS AND DIFFERENTIAL - Abnormal    WBC Count 16.2 (*)     RBC Count 3.75 (*)     Hemoglobin 11.2 (*)     Hematocrit 33.5 (*)     MCV 89      MCH 29.9      MCHC 33.4      RDW 13.5      Platelet Count 169      % Neutrophils 85      % Lymphocytes 8      % Monocytes 6      % Eosinophils 0      % Basophils 0      % Immature Granulocytes 1      NRBCs per 100 WBC 0      Absolute Neutrophils 13.8 (*)     Absolute Lymphocytes 1.2      Absolute Monocytes 1.0      Absolute Eosinophils 0.0      Absolute Basophils 0.0      Absolute Immature Granulocytes 0.1      Absolute NRBCs 0.0     ISTAT GASES LACTATE VENOUS POCT - Abnormal    Lactic Acid POCT 0.8      Bicarbonate Venous POCT 32 (*)     O2 Sat, Venous POCT 63 (*)     pCO2 Venous POCT 53 (*)     pH Venous POCT 7.39      pO2 Venous POCT 34      Base Excess/Deficit (+/-) POCT 5.0 (*)    TROPONIN T,  "HIGH SENSITIVITY - Normal    Troponin T, High Sensitivity 10     TROPONIN T, HIGH SENSITIVITY     XR Chest Port 1 View   Final Result   IMPRESSION: No acute abnormality.          To be done/followed up on inpatient unit:  na    Does this patient have any cognitive concerns?: none    Activity level - Baseline/Home:  Independent  Activity Level - Current:   Stand with Assist    Patient's Preferred language: English   Needed?: No    Isolation: None  Infection: Not Applicable  Sepsis treatment initiated: No  Patient tested for COVID 19 prior to admission: NO  Bariatric?: No    Vital Signs:   Vitals:    06/17/25 0000 06/17/25 0004 06/17/25 0029 06/17/25 0045   BP: 139/80  (!) 147/89 (!) 147/108   Pulse: 73  68 64   Resp: 17  24 21   Temp:  98.5  F (36.9  C)     TempSrc:  Oral     SpO2: 97%  100% 99%   Weight:  68 kg (150 lb)     Height:  1.753 m (5' 9\")         Cardiac Rhythm:     Was the PSS-3 completed:   Yes  What interventions are required if any?               Family Comments: Patient able to update independently, none present in ED       For the majority of the shift this patient's behavior was Green.   Behavioral interventions performed were n/a.    ED NURSE PHONE NUMBER: *[phone]         "

## 2025-06-17 NOTE — PROGRESS NOTES
Non billing PN    Patient discharge by writer yesterday after admission for COPD exacerbation.  Patient states he had trouble breathing, his blood pressure became elevated and then had some chest discomfort.    Will check Echo   Check procal level  Repeat labs    Jairo Rendon MD

## 2025-06-17 NOTE — PLAN OF CARE
DATE & SHIFT: 06/17/25  PRIMARY Concern: COPD exacerbation   SAFETY RISK Concerns (fall risk, behaviors, etc.): None       Aggression Tool Color: Green  Isolation/Type: None   Tests/Procedures for NEXT shift: Echo and morning lab draws  Consults? (Pending/following, signed-off?) None   Where is patient from? (Home, TCU, etc.): [facility]   Other Important info for NEXT shift: Per SW note patient is on 2-3LPM at baseline, currently on 1LPM satting 95-96%   Anticipated DC date & active delays: TBD  _____________________________________________________________________________  SUMMARY NOTE:   Orientation/Cognitive: AOx4  Observation Goals (Met/ Not Met): INP  Mobility Level/Assist Equipment: SBA/IND  Antibiotics & Plan (IV/po, length of tx left): Cefepime 2g q8h  Pain Management: Patient denies pain  Complete Pain Reassessment: Y/N Y Due next: Next shift   Tele/VS/O2: VSS, monitoring BP due to HTN in ER. 1LPM via nasal canula, baseline 2-3 LPM  ABNL Lab/BG: , 220, 173, 141   Diet: Regular   Bowel/Bladder: Continent. Uses urinal at bedside. Ambulated to bathroom safely. LBM 6/17/25  Skin Concerns: Dry, flaky feet. Refused full skin assessment.   Drains/Devices: PIV SL, continuous pulse ox.   Patient Stated Goal for Today: Rest.

## 2025-06-17 NOTE — ED PROVIDER NOTES
Emergency Department Note      History of Present Illness     Chief Complaint   Shortness of Breath and Chest Pain      HPI   Gerson Pearson is a 65 year old male who presents to the ER for evaluation of chest tightness and shortness of breath that started about 5 or 6 hours ago.  No fever or cough or vomiting or diarrhea or leg swelling.  He just left the hospital where he was treated for COPD exacerbation.  Paramedics gave him 3 sublingual nitroglycerin and 324 mg of aspirin.  This seemed to help somewhat.  He still feels short of breath however.  He denies smoking.    Independent Historian   None    Review of External Notes   I reviewed the discharge summary from today after admission with COPD exacerbation.  Patient apparently uses 4 L O2 per nasal cannula at baseline.    Past Medical History     Medical History and Problem List   Past Medical History:   Diagnosis Date    CAD (coronary artery disease)     Chronic back pain     Chronic systolic congestive heart failure (H)     Cocaine use disorder (H)     COPD (chronic obstructive pulmonary disease) (H)     Depressive disorder     GERD (gastroesophageal reflux disease)     HTN (hypertension)     Myocardial infarction (H)     Nicotine dependence     Opioid use disorder     REYNA (obstructive sleep apnea)     Stroke (H) 2019       Medications   acetaminophen (TYLENOL) 325 MG tablet  albuterol (PROAIR HFA/PROVENTIL HFA/VENTOLIN HFA) 108 (90 Base) MCG/ACT inhaler  aspirin (ASA) 81 MG chewable tablet  atorvastatin (LIPITOR) 40 MG tablet  azithromycin (ZITHROMAX) 250 MG tablet  buprenorphine-naloxone (SUBOXONE) 8-2 MG SUBL sublingual tablet  buPROPion (WELLBUTRIN XL) 300 MG 24 hr tablet  carvedilol (COREG) 25 MG tablet  cholecalciferol (VITAMIN D3) 25 mcg (1000 units) capsule  clotrimazole (LOTRIMIN) 1 % external cream  diclofenac (VOLTAREN) 1 % topical gel  ferrous sulfate (FE TABS) 325 (65 Fe) MG EC tablet  fluticasone (FLONASE) 50 MCG/ACT nasal  "spray  Fluticasone-Umeclidin-Vilanterol (TRELEGY ELLIPTA) 200-62.5-25 MCG/ACT oral inhaler  gabapentin (NEURONTIN) 300 MG capsule  gabapentin (NEURONTIN) 300 MG capsule  hydrALAZINE (APRESOLINE) 100 MG tablet  ipratropium - albuterol 0.5 mg/2.5 mg/3 mL (DUONEB) 0.5-2.5 (3) MG/3ML neb solution  ipratropium - albuterol 0.5 mg/2.5 mg/3 mL (DUONEB) 0.5-2.5 (3) MG/3ML neb solution  lisinopril (ZESTRIL) 40 MG tablet  melatonin 5 MG tablet  mineral oil-white petrolatum (EUCERIN/MINERIN) cream  mirtazapine (REMERON) 45 MG tablet  multivitamin w/minerals (THERA-VIT-M) tablet  naloxone (NARCAN) 4 MG/0.1ML nasal spray  pantoprazole (PROTONIX) 40 MG EC tablet  polyethylene glycol (MIRALAX) 17 g packet  predniSONE (DELTASONE) 10 MG tablet  QUEtiapine (SEROQUEL) 25 MG tablet  QUEtiapine (SEROQUEL) 25 MG tablet  sennosides (SENOKOT) 8.6 MG tablet        Surgical History   Past Surgical History:   Procedure Laterality Date    CORONARY STENT PLACEMENT  2020       Physical Exam     Patient Vitals for the past 24 hrs:   BP Temp Temp src Pulse Resp SpO2 Height Weight   06/17/25 0145 130/78 -- -- 69 10 95 % -- --   06/17/25 0129 126/73 -- -- 70 10 95 % -- --   06/17/25 0114 127/78 -- -- 71 10 96 % -- --   06/17/25 0059 137/74 -- -- 68 -- 98 % -- --   06/17/25 0045 (!) 147/108 -- -- 64 21 99 % -- --   06/17/25 0029 (!) 147/89 -- -- 68 24 100 % -- --   06/17/25 0004 -- 98.5  F (36.9  C) Oral -- -- -- 1.753 m (5' 9\") 68 kg (150 lb)   06/17/25 0000 139/80 -- -- 73 17 97 % -- --   06/16/25 2355 115/73 -- -- 83 25 95 % -- --     Physical Exam  VS: Reviewed per above  HENT: Mucous membranes moist  EYES: sclera anicteric  CV: Rate as noted,  regular rhythm.   RESP: mild tachypnea, decreased breath sounds bilaterally.  GI: no tenderness/rebound/guarding, not distended.  NEURO: Alert, moving all extremities  MSK: No deformity of the extremities  SKIN: Warm and dry      Diagnostics     Lab Results   Labs Ordered and Resulted from Time of ED " Arrival to Time of ED Departure   BASIC METABOLIC PANEL - Abnormal       Result Value    Sodium 132 (*)     Potassium 4.0      Chloride 94 (*)     Carbon Dioxide (CO2) 29      Anion Gap 9      Urea Nitrogen 18.3      Creatinine 0.81      GFR Estimate >90      Calcium 9.1      Glucose 145 (*)    NT-PROBNP - Abnormal    NT-proBNP 294 (*)    CBC WITH PLATELETS AND DIFFERENTIAL - Abnormal    WBC Count 16.2 (*)     RBC Count 3.75 (*)     Hemoglobin 11.2 (*)     Hematocrit 33.5 (*)     MCV 89      MCH 29.9      MCHC 33.4      RDW 13.5      Platelet Count 169      % Neutrophils 85      % Lymphocytes 8      % Monocytes 6      % Eosinophils 0      % Basophils 0      % Immature Granulocytes 1      NRBCs per 100 WBC 0      Absolute Neutrophils 13.8 (*)     Absolute Lymphocytes 1.2      Absolute Monocytes 1.0      Absolute Eosinophils 0.0      Absolute Basophils 0.0      Absolute Immature Granulocytes 0.1      Absolute NRBCs 0.0     ISTAT GASES LACTATE VENOUS POCT - Abnormal    Lactic Acid POCT 0.8      Bicarbonate Venous POCT 32 (*)     O2 Sat, Venous POCT 63 (*)     pCO2 Venous POCT 53 (*)     pH Venous POCT 7.39      pO2 Venous POCT 34      Base Excess/Deficit (+/-) POCT 5.0 (*)    RETICULOCYTE COUNT - Abnormal    % Reticulocyte 2.5 (*)     Absolute Reticulocyte 0.095     TROPONIN T, HIGH SENSITIVITY - Normal    Troponin T, High Sensitivity 10     IRON AND IRON BINDING CAPACITY - Normal    Iron 79      Iron Binding Capacity 247      Iron Sat Index 32     D DIMER QUANTITATIVE - Normal    D-Dimer Quantitative <0.27     TROPONIN T, HIGH SENSITIVITY   FERRITIN       Imaging   XR Chest Port 1 View   Final Result   IMPRESSION: No acute abnormality.          EKG   ECG results from 06/16/25   EKG 12-lead, tracing only     Value    Systolic Blood Pressure     Diastolic Blood Pressure     Ventricular Rate 73    Atrial Rate 73    OH Interval 116    QRS Duration 70        QTc 389    P Axis 82    R AXIS 72    T Axis 60     Interpretation ECG      Sinus rhythm  Normal ECG  When compared with ECG of 14-Jun-2025 02:58,  No significant change was found  Confirmed by GENERATED REPORT, COMPUTER ([id number]),  Ahmet Salgado ([id number]) on 6/17/2025 12:03:19 AM           Independent Interpretation   None    ED Course      Medications Administered   Medications   ipratropium - albuterol 0.5 mg/2.5 mg (3mg)/3 mL (DUONEB) neb solution 6 mL (6 mLs Nebulization $Given 6/17/25 0008)   magnesium sulfate 2 g in 50 mL sterile water intermittent infusion (0 g Intravenous Stopped 6/17/25 0110)   methylPREDNISolone Na Suc (solu-MEDROL) injection 125 mg (125 mg Intravenous $Given 6/17/25 0008)       Procedures   Procedures     Discussion of Management   None    ED Course   ED Course as of 06/17/25 0218   Tue Jun 17, 2025   0025 I rechecked pt. He is feeling a bit better on bipap and after medications. Improved air movement in BL lung fields.    0037 Plan to trial off bipap   0124 I spoke with Dr Marlow who will admit patient.        Additional Documentation  None    Medical Decision Making / Diagnosis     CMS Diagnoses: None    MIPS   None               MDM   Gerson Pearson is a 65 year old male who presents to the ER for evaluation of worsening shortness of breath the setting of discharged today from the hospital after treatment for COPD exacerbation.  On arrival he is tachypneic with decreased breath sounds.  He was given DuoNebs and IV magnesium and IV Solu-Medrol placed on BiPAP.  Symptoms improved significantly.  Evaluation does not show clear signs of ACS.  Lower suspicion for occult PE.  On recheck, he was weaned from BiPAP and his symptoms were much improved.  He did not feel well enough for discharge however and he was admitted for further cares.    Disposition   The patient was admitted to the hospital.     Diagnosis     ICD-10-CM    1. COPD exacerbation (H)  J44.1            Discharge Medications   New Prescriptions    No medications on file         Akhil Mack MD  06/17/25 [id number]

## 2025-06-17 NOTE — ED NOTES
Bed: ED25  Expected date:   Expected time:   Means of arrival:   Comments:  A541 65M chest pain ETA 2340

## 2025-06-17 NOTE — CONSULTS
Care Management Initial Consult    General Information  Assessment completed with: Patient, VM-chart review,    Type of CM/SW Visit: Initial Assessment    Primary Care Provider verified and updated as needed: No   Readmission within the last 30 days: no previous admission in last 30 days      Reason for Consult: discharge planning  Advance Care Planning: Advance Care Planning Reviewed: no concerns identified          Communication Assessment  Patient's communication style: spoken language (English or Bilingual)             Cognitive  Cognitive/Neuro/Behavioral: WDL                      Living Environment:   People in home: facility resident     Current living Arrangements: group home      Able to return to prior arrangements: yes       Family/Social Support:  Care provided by: other (see comments), self  Provides care for: no one  Marital Status:   Support system: Significant Other, Facility resident(s)/Staff       Chris  Description of Support System: Involved, Supportive    Support Assessment: Adequate family and caregiver support    Current Resources:   Patient receiving home care services: No        Community Resources: County Programs, County Worker  Equipment currently used at home: cane, straight, grab bar, toilet, grab bar, tub/shower, shower chair  Supplies currently used at home: Oxygen Tubing/Supplies    Employment/Financial:  Employment Status: disabled        Financial Concerns: none   Referral to Financial Worker: No       Does the patient's insurance plan have a 3 day qualifying hospital stay waiver?  No    Lifestyle & Psychosocial Needs:  Social Drivers of Health     Food Insecurity: Low Risk  (6/14/2025)    Food Insecurity     Within the past 12 months, did you worry that your food would run out before you got money to buy more?: No     Within the past 12 months, did the food you bought just not last and you didn t have money to get more?: No   Depression: Not at risk (4/1/2025)     Received from [facility]    PHQ-2     PHQ-2 Subtotal: 2   Housing Stability: Low Risk  (6/14/2025)    Housing Stability     Do you have housing? : Yes     Are you worried about losing your housing?: No   Tobacco Use: Medium Risk (5/29/2025)    Received from [facility]    Patient History     Smoking Tobacco Use: Former     Smokeless Tobacco Use: Never     Passive Exposure: Current   Financial Resource Strain: Low Risk  (6/14/2025)    Financial Resource Strain     Within the past 12 months, have you or your family members you live with been unable to get utilities (heat, electricity) when it was really needed?: No   Alcohol Use: Not on file   Transportation Needs: Low Risk  (6/14/2025)    Transportation Needs     Within the past 12 months, has lack of transportation kept you from medical appointments, getting your medicines, non-medical meetings or appointments, work, or from getting things that you need?: No   Physical Activity: Not on file   Interpersonal Safety: Low Risk  (6/14/2025)    Interpersonal Safety     Do you feel physically and emotionally safe where you currently live?: Yes     Within the past 12 months, have you been hit, slapped, kicked or otherwise physically hurt by someone?: No     Within the past 12 months, have you been humiliated or emotionally abused in other ways by your partner or ex-partner?: No   Stress: Not on file   Social Connections: Not on file   Health Literacy: Not on file       Functional Status:  Prior to admission patient needed assistance:   Dependent ADLs:: Ambulation-cane  Dependent IADLs:: Cleaning, Cooking, Laundry, Shopping, Meal Preparation, Medication Management, Money Management, Transportation  Assesssment of Functional Status: Not at  functional baseline    Mental Health Status:          Chemical Dependency Status:                Values/Beliefs:  Spiritual, Cultural Beliefs, Worship Practices, Values that affect care: no               Discussed  " Partnership in Safe Discharge Planning  document with patient/family: No    Additional Information:  Due to care management consult, chart was reviewed. H&P states pt is a \"65 year old male who presents to the ER for evaluation of chest tightness and shortness of breath.\"    ADAMS met with pt at bedside and introduced self and role. Pt confirmed nothing has changed since his last visit here.     Pt lives at [facility]. At baseline, pt is independent in ADLs, and gets assistance from group home staff for IADLs. Pt has 2-3L of O2 at baseline.   ADAMS called Mickey from the  at [phone]. Mickey confirmed pt can come back when he is medically ready. Mickey confirmed they use [facility] and new meds can go there. Fax number is [phone]. Mickey asked to be called when pt is ready to go.   Suboxone is via [facility] [phone] weekly supplies sent  to the group home.     Next Steps: follow for discharge planning.     SONI Lundy  [facility]  Inpatient Care Management      "

## 2025-06-17 NOTE — PHARMACY-ADMISSION MEDICATION HISTORY
Pharmacist Admission Medication History    Admission medication history is complete. The information provided in this note is only as accurate as the sources available at the time of the update.    Information Source(s): Facility (TCU/NH/) medication list/MAR via phone ([facility], [phone])    Pertinent Information: Pt was discharged from the hospital yesterday, so received am meds @ the hospital and PM meds @ group [place], but was then transferred back to the hospital. Some meds were scheduled to start today (zithromax, prednisone) and pt hasn't taken them yet.    Changes made to PTA medication list:  Added: additional strength of suboxone, artificial tears, guaifenesin  Deleted: None  Changed: None    Allergies reviewed with patient and updates made in EHR: yes    Medication History Completed By: Tory Flores RPH 6/17/2025 11:30 AM    PTA Med List   Medication Sig Note Last Dose/Taking    acetaminophen (TYLENOL) 325 MG tablet Take 650 mg by mouth every 4 hours as needed for mild pain.  Taking As Needed    albuterol (PROAIR HFA/PROVENTIL HFA/VENTOLIN HFA) 108 (90 Base) MCG/ACT inhaler Inhale 2 puffs into the lungs every 4 hours as needed for shortness of breath, wheezing or cough  Taking As Needed    aspirin (ASA) 81 MG chewable tablet Take 81 mg by mouth daily  6/16/2025 Morning    atorvastatin (LIPITOR) 40 MG tablet Take 40 mg by mouth daily   6/16/2025 Bedtime    azithromycin (ZITHROMAX) 250 MG tablet Take 1 tablet (250 mg) by mouth daily for 2 days. 6/17/2025: Needs doses for 6/17 & 6/18 6/16/2025 Morning    buprenorphine-naloxone (SUBOXONE) 2-0.5 MG SUBL sublingual tablet Place 1 tablet under the tongue daily. In addition to 1/2 tab of 8-2 strength  6/16/2025    buprenorphine-naloxone (SUBOXONE) 8-2 MG SUBL sublingual tablet Place 0.5 tablets under the tongue daily. In addition to 1 tab of 2-0.5 strength  6/16/2025 Morning    buPROPion (WELLBUTRIN XL) 300 MG 24 hr tablet Take 300 mg by mouth  every morning  6/16/2025 Morning    carboxymethylcellulose PF (REFRESH PLUS) 0.5 % ophthalmic solution Place 1 drop into both eyes 4 times daily.  6/16/2025    carvedilol (COREG) 25 MG tablet Take 25 mg by mouth 2 times daily  6/16/2025 Evening    cholecalciferol (VITAMIN D3) 25 mcg (1000 units) capsule Take 2 capsules by mouth daily.  6/16/2025 Morning    clotrimazole (LOTRIMIN) 1 % external cream Apply topically 2 times daily. To feet - can use between toes  6/16/2025 Evening    diclofenac (VOLTAREN) 1 % topical gel Apply 2-4 g topically 4 times daily as needed for moderate pain  Taking As Needed    ferrous sulfate (FE TABS) 325 (65 Fe) MG EC tablet Take 325 mg by mouth daily  6/16/2025 Morning    fluticasone (FLONASE) 50 MCG/ACT nasal spray Spray 2 sprays into both nostrils daily.  6/16/2025 Morning    Fluticasone-Umeclidin-Vilanterol (TRELEGY ELLIPTA) 200-62.5-25 MCG/ACT oral inhaler Inhale 1 puff into the lungs daily.  6/16/2025 Morning    gabapentin (NEURONTIN) 300 MG capsule Take 300 mg by mouth every morning  6/16/2025 Morning    gabapentin (NEURONTIN) 300 MG capsule Take 600 mg by mouth At Bedtime  6/16/2025 Evening    guaiFENesin (ROBITUSSIN) 20 mg/mL liquid Take 200 mg by mouth every 4 hours as needed for cough.  Taking As Needed    hydrALAZINE (APRESOLINE) 100 MG tablet Take 1 tablet (100 mg) by mouth 3 times daily NOTE this is an INCREASE in dose from prior to hospital.  Further mgmt and refills per PCP.  6/16/2025 Evening    ipratropium - albuterol 0.5 mg/2.5 mg/3 mL (DUONEB) 0.5-2.5 (3) MG/3ML neb solution Take 1 vial (3 mLs) by nebulization 2 times daily  6/16/2025 Morning    ipratropium - albuterol 0.5 mg/2.5 mg/3 mL (DUONEB) 0.5-2.5 (3) MG/3ML neb solution Take 1 vial (3 mLs) by nebulization every 6 hours as needed for shortness of breath, wheezing or cough  Taking As Needed    lisinopril (ZESTRIL) 40 MG tablet Take 1 tablet (40 mg) by mouth daily  6/16/2025 Morning    melatonin 5 MG tablet Take 5  mg by mouth nightly as needed for sleep  Taking As Needed    mirtazapine (REMERON) 45 MG tablet Take 45 mg by mouth At Bedtime   6/16/2025 Evening    multivitamin w/minerals (THERA-VIT-M) tablet Take 1 tablet by mouth daily  6/16/2025 Morning    naloxone (NARCAN) 4 MG/0.1ML nasal spray Spray 4 mg into one nostril alternating nostrils once as needed for opioid reversal every 2-3 minutes until assistance arrives  Taking As Needed    pantoprazole (PROTONIX) 40 MG EC tablet Take 40 mg by mouth daily  6/16/2025 Morning    polyethylene glycol (MIRALAX) 17 g packet Take 1 packet by mouth 2 times daily as needed for constipation.  Taking As Needed    predniSONE (DELTASONE) 10 MG tablet 4 tabs daily for 3 days, then 3 tabs daily for 3 days, then 2 tabs daily for 3 days, then 1 tab daily for 3 days, then stop 6/17/2025: Taper is scheduled to start today, 6/17/25 6/16/2025 Morning    QUEtiapine (SEROQUEL) 25 MG tablet Take 12.5 mg by mouth 2 times daily as needed (panic).  Taking As Needed    QUEtiapine (SEROQUEL) 25 MG tablet Take 25 mg by mouth at bedtime.  6/16/2025 Evening    sennosides (SENOKOT) 8.6 MG tablet Take 1 tablet by mouth 2 times daily as needed for constipation.  Taking As Needed

## 2025-06-18 ENCOUNTER — APPOINTMENT (OUTPATIENT)
Dept: CARDIOLOGY | Facility: CLINIC | Age: 65
End: 2025-06-18
Attending: INTERNAL MEDICINE
Payer: MEDICARE

## 2025-06-18 LAB
ANION GAP SERPL CALCULATED.3IONS-SCNC: 10 MMOL/L (ref 7–15)
BASE EXCESS BLDV CALC-SCNC: 8.1 MMOL/L (ref -3–3)
BUN SERPL-MCNC: 21.9 MG/DL (ref 8–23)
CALCIUM SERPL-MCNC: 8.6 MG/DL (ref 8.8–10.4)
CHLORIDE SERPL-SCNC: 98 MMOL/L (ref 98–107)
CREAT SERPL-MCNC: 0.85 MG/DL (ref 0.67–1.17)
EGFRCR SERPLBLD CKD-EPI 2021: >90 ML/MIN/1.73M2
ERYTHROCYTE [DISTWIDTH] IN BLOOD BY AUTOMATED COUNT: 13.7 % (ref 10–15)
GLUCOSE BLDC GLUCOMTR-MCNC: 125 MG/DL (ref 70–99)
GLUCOSE BLDC GLUCOMTR-MCNC: 139 MG/DL (ref 70–99)
GLUCOSE BLDC GLUCOMTR-MCNC: 164 MG/DL (ref 70–99)
GLUCOSE BLDC GLUCOMTR-MCNC: 89 MG/DL (ref 70–99)
GLUCOSE BLDC GLUCOMTR-MCNC: 91 MG/DL (ref 70–99)
GLUCOSE SERPL-MCNC: 88 MG/DL (ref 70–99)
HCO3 BLDV-SCNC: 34 MMOL/L (ref 21–28)
HCO3 SERPL-SCNC: 27 MMOL/L (ref 22–29)
HCT VFR BLD AUTO: 33 % (ref 40–53)
HGB BLD-MCNC: 10.9 G/DL (ref 13.3–17.7)
LVEF ECHO: NORMAL
MCH RBC QN AUTO: 29.6 PG (ref 26.5–33)
MCHC RBC AUTO-ENTMCNC: 33 G/DL (ref 31.5–36.5)
MCV RBC AUTO: 90 FL (ref 78–100)
O2/TOTAL GAS SETTING VFR VENT: 2 %
OXYHGB MFR BLDV: 92 % (ref 70–75)
PCO2 BLDV: 53 MM HG (ref 40–50)
PH BLDV: 7.42 [PH] (ref 7.32–7.43)
PLATELET # BLD AUTO: 128 10E3/UL (ref 150–450)
PO2 BLDV: 70 MM HG (ref 25–47)
POTASSIUM SERPL-SCNC: 4 MMOL/L (ref 3.4–5.3)
PROCALCITONIN SERPL IA-MCNC: 0.04 NG/ML
RBC # BLD AUTO: 3.68 10E6/UL (ref 4.4–5.9)
SAO2 % BLDV: 94 % (ref 70–75)
SODIUM SERPL-SCNC: 135 MMOL/L (ref 135–145)
WBC # BLD AUTO: 11.3 10E3/UL (ref 4–11)

## 2025-06-18 PROCEDURE — 84145 PROCALCITONIN (PCT): CPT | Performed by: INTERNAL MEDICINE

## 2025-06-18 PROCEDURE — 255N000002 HC RX 255 OP 636: Performed by: INTERNAL MEDICINE

## 2025-06-18 PROCEDURE — 250N000013 HC RX MED GY IP 250 OP 250 PS 637: Performed by: STUDENT IN AN ORGANIZED HEALTH CARE EDUCATION/TRAINING PROGRAM

## 2025-06-18 PROCEDURE — 250N000013 HC RX MED GY IP 250 OP 250 PS 637: Performed by: INTERNAL MEDICINE

## 2025-06-18 PROCEDURE — 93306 TTE W/DOPPLER COMPLETE: CPT | Mod: 26 | Performed by: INTERNAL MEDICINE

## 2025-06-18 PROCEDURE — 36415 COLL VENOUS BLD VENIPUNCTURE: CPT | Performed by: INTERNAL MEDICINE

## 2025-06-18 PROCEDURE — 250N000012 HC RX MED GY IP 250 OP 636 PS 637: Performed by: STUDENT IN AN ORGANIZED HEALTH CARE EDUCATION/TRAINING PROGRAM

## 2025-06-18 PROCEDURE — 999N000157 HC STATISTIC RCP TIME EA 10 MIN

## 2025-06-18 PROCEDURE — 99232 SBSQ HOSP IP/OBS MODERATE 35: CPT | Performed by: INTERNAL MEDICINE

## 2025-06-18 PROCEDURE — 80048 BASIC METABOLIC PNL TOTAL CA: CPT | Performed by: INTERNAL MEDICINE

## 2025-06-18 PROCEDURE — 82805 BLOOD GASES W/O2 SATURATION: CPT | Performed by: INTERNAL MEDICINE

## 2025-06-18 PROCEDURE — 999N000208 ECHOCARDIOGRAM COMPLETE

## 2025-06-18 PROCEDURE — 94640 AIRWAY INHALATION TREATMENT: CPT | Mod: 76

## 2025-06-18 PROCEDURE — 250N000009 HC RX 250: Performed by: STUDENT IN AN ORGANIZED HEALTH CARE EDUCATION/TRAINING PROGRAM

## 2025-06-18 PROCEDURE — 120N000001 HC R&B MED SURG/OB

## 2025-06-18 PROCEDURE — 250N000011 HC RX IP 250 OP 636: Performed by: STUDENT IN AN ORGANIZED HEALTH CARE EDUCATION/TRAINING PROGRAM

## 2025-06-18 PROCEDURE — 85027 COMPLETE CBC AUTOMATED: CPT | Performed by: INTERNAL MEDICINE

## 2025-06-18 RX ORDER — FLUTICASONE PROPIONATE 50 MCG
1 SPRAY, SUSPENSION (ML) NASAL DAILY
Status: DISCONTINUED | OUTPATIENT
Start: 2025-06-18 | End: 2025-06-21 | Stop reason: HOSPADM

## 2025-06-18 RX ORDER — GUAIFENESIN 600 MG/1
600 TABLET, EXTENDED RELEASE ORAL 2 TIMES DAILY
Status: DISCONTINUED | OUTPATIENT
Start: 2025-06-18 | End: 2025-06-21 | Stop reason: HOSPADM

## 2025-06-18 RX ADMIN — ATORVASTATIN CALCIUM 40 MG: 40 TABLET, FILM COATED ORAL at 08:49

## 2025-06-18 RX ADMIN — BUPROPION HYDROCHLORIDE 300 MG: 300 TABLET, EXTENDED RELEASE ORAL at 08:48

## 2025-06-18 RX ADMIN — CEFEPIME 2 G: 2 INJECTION, POWDER, FOR SOLUTION INTRAVENOUS at 13:00

## 2025-06-18 RX ADMIN — PERFLUTREN 2 ML (DILUTED): 6.52 INJECTION, SUSPENSION INTRAVENOUS at 12:37

## 2025-06-18 RX ADMIN — HYDRALAZINE HYDROCHLORIDE 100 MG: 50 TABLET ORAL at 20:05

## 2025-06-18 RX ADMIN — QUETIAPINE FUMARATE 25 MG: 25 TABLET ORAL at 22:55

## 2025-06-18 RX ADMIN — IPRATROPIUM BROMIDE AND ALBUTEROL SULFATE 3 ML: .5; 3 SOLUTION RESPIRATORY (INHALATION) at 07:21

## 2025-06-18 RX ADMIN — FERROUS SULFATE TAB 325 MG (65 MG ELEMENTAL FE) 325 MG: 325 (65 FE) TAB at 08:48

## 2025-06-18 RX ADMIN — MIRTAZAPINE 45 MG: 15 TABLET, FILM COATED ORAL at 22:55

## 2025-06-18 RX ADMIN — HYDRALAZINE HYDROCHLORIDE 100 MG: 50 TABLET ORAL at 08:48

## 2025-06-18 RX ADMIN — CARVEDILOL 25 MG: 25 TABLET, FILM COATED ORAL at 20:05

## 2025-06-18 RX ADMIN — PREDNISONE 40 MG: 20 TABLET ORAL at 08:48

## 2025-06-18 RX ADMIN — BUPRENORPHINE AND NALOXONE 3 TABLET: 2; .5 TABLET SUBLINGUAL at 09:14

## 2025-06-18 RX ADMIN — IPRATROPIUM BROMIDE AND ALBUTEROL SULFATE 3 ML: .5; 3 SOLUTION RESPIRATORY (INHALATION) at 14:50

## 2025-06-18 RX ADMIN — GUAIFENESIN 600 MG: 600 TABLET, EXTENDED RELEASE ORAL at 20:05

## 2025-06-18 RX ADMIN — IPRATROPIUM BROMIDE AND ALBUTEROL SULFATE 3 ML: .5; 3 SOLUTION RESPIRATORY (INHALATION) at 02:36

## 2025-06-18 RX ADMIN — CARVEDILOL 25 MG: 25 TABLET, FILM COATED ORAL at 08:49

## 2025-06-18 RX ADMIN — ASPIRIN 81 MG CHEWABLE TABLET 81 MG: 81 TABLET CHEWABLE at 08:48

## 2025-06-18 RX ADMIN — GABAPENTIN 600 MG: 300 CAPSULE ORAL at 22:55

## 2025-06-18 RX ADMIN — FLUTICASONE PROPIONATE 1 SPRAY: 50 SPRAY, METERED NASAL at 21:09

## 2025-06-18 RX ADMIN — LISINOPRIL 40 MG: 10 TABLET ORAL at 08:48

## 2025-06-18 RX ADMIN — CEFEPIME 2 G: 2 INJECTION, POWDER, FOR SOLUTION INTRAVENOUS at 20:06

## 2025-06-18 RX ADMIN — PANTOPRAZOLE SODIUM 40 MG: 40 TABLET, DELAYED RELEASE ORAL at 08:48

## 2025-06-18 RX ADMIN — GABAPENTIN 300 MG: 300 CAPSULE ORAL at 08:48

## 2025-06-18 RX ADMIN — IPRATROPIUM BROMIDE AND ALBUTEROL SULFATE 3 ML: .5; 3 SOLUTION RESPIRATORY (INHALATION) at 20:32

## 2025-06-18 RX ADMIN — HYDRALAZINE HYDROCHLORIDE 100 MG: 50 TABLET ORAL at 15:19

## 2025-06-18 RX ADMIN — CEFEPIME 2 G: 2 INJECTION, POWDER, FOR SOLUTION INTRAVENOUS at 04:50

## 2025-06-18 ASSESSMENT — ACTIVITIES OF DAILY LIVING (ADL)
ADLS_ACUITY_SCORE: 58

## 2025-06-18 NOTE — PLAN OF CARE
PRIMARY Concern: COPD exacerbation   SAFETY RISK Concerns (fall risk, behaviors, etc.): None       Aggression Tool Color: Green  Isolation/Type: None   Tests/Procedures for NEXT shift: Echo resulted; stress test ordered for 6/19/25  Consults? (Pending/following, signed-off?) None   Where is patient from? (Home, TCU, etc.): [facility]   Other Important info for NEXT shift: Per SW note patient is on 2-3LPM at baseline, currently on 2LPM satting 95-96%   Anticipated DC date & active delays: TBD  _____________________________________________________________________________  SUMMARY NOTE:   Orientation/Cognitive: AOx4  Observation Goals (Met/ Not Met): INP  Mobility Level/Assist Equipment: SBA/IND  Antibiotics & Plan (IV/po, length of tx left): Cefepime 2g q8h  Pain Management: Patient denies pain  Tele/VS/O2: VSS, monitoring BP due to HTN in ER. 2 LPM via nasal canula, baseline 2-3 LPM  ABNL Lab/BG: , 89, 91, 125. WBC 11.3, hmg- 10.9, EKG- NSR  Diet: Regular   Bowel/Bladder: Continent. Uses urinal at bedside/ Ambulates to bathroom safely. LBM 6/17/25  Skin Concerns: Dry, flaky feet. Refused full skin assessment.   Drains/Devices: PIV SL, continuous pulse ox.   Patient Stated Goal for Today: Rest.

## 2025-06-18 NOTE — PLAN OF CARE
Goal Outcome Evaluation:       DATE & SHIFT: 06/18/25, [id number]  PRIMARY Concern: COPD exacerbation   SAFETY RISK Concerns (fall risk, behaviors, etc.): None       Aggression Tool Color: Green  Isolation/Type: None   Tests/Procedures for NEXT shift: Echo and morning lab draws  Consults? (Pending/following, signed-off?) None   Where is patient from? (Home, TCU, etc.): [facility]   Other Important info for NEXT shift: Per SW note patient is on 2-3LPM at baseline, currently on 2LPM satting 95-96%   Anticipated DC date & active delays: TBD  _____________________________________________________________________________  SUMMARY NOTE:   Orientation/Cognitive: AOx4  Observation Goals (Met/ Not Met): INP  Mobility Level/Assist Equipment: SBA/IND  Antibiotics & Plan (IV/po, length of tx left): Cefepime 2g q8h  Pain Management: Patient denies pain  Tele/VS/O2: VSS, monitoring BP due to HTN in ER. 2 LPM via nasal canula, baseline 2-3 LPM  ABNL Lab/BG: , WBC 11.5, hmg- 10.8, EKG- NSR  Diet: Regular   Bowel/Bladder: Continent. Uses urinal at bedside/ Ambulates to bathroom safely. LBM 6/17/25  Skin Concerns: Dry, flaky feet. Refused full skin assessment.   Drains/Devices: PIV SL, continuous pulse ox.   Patient Stated Goal for Today: Rest.  Other: refusing bed alarm, pretty stable gait. Denies nausea. Denies SOB. Got 1 duoneb overnight. On IV cefepime.Plan to get echo today.

## 2025-06-18 NOTE — PROGRESS NOTES
DATE & SHIFT: 06/17/25 [id number]  PRIMARY Concern: COPD exacerbation, discharged from hospital just yesterday for the same  SAFETY RISK Concerns (fall risk, behaviors, etc.): None       Aggression Tool Color: Green  Isolation/Type: None   Tests/Procedures for NEXT shift: Echo and morning lab draws  Consults? (Pending/following, signed-off?) None   Where is patient from? (Home, TCU, etc.): [facility]   Other Important info for NEXT shift: Per SW note patient is on 2-3LPM at baseline, currently on 1LPM satting 95-96%   Anticipated DC date & active delays: TBD  _____________________________________________________________________________  SUMMARY NOTE:   Orientation/Cognitive: AOx4  Observation Goals (Met/ Not Met): INP  Mobility Level/Assist Equipment: Ind, declining bed alarms, education given, steady gait  Antibiotics & Plan (IV/po, length of tx left): Cefepime 2g q8h  Pain Management: Patient denies pain  Complete Pain Reassessment: Y/N Y Due next: Next shift   Tele/VS/O2: VSS, monitoring BP due to HTN in ER. 1LPM via nasal canula, baseline 2-3 LPM  ABNL Lab/BG: , WBC 11.5, Hgh 10.8,   Diet: Regular   Bowel/Bladder: Continent. Uses urinal at bedside. Ambulated to bathroom safely. LBM 6/17/25  Skin Concerns: Dry, flaky feet.  Drains/Devices: PIV SL, continuous pulse ox.   Patient Stated Goal for Today: Rest.

## 2025-06-18 NOTE — PROGRESS NOTES
[facility]  Hospitalist Progress Note  Jairo Rendon MD  06/18/2025    Assessment & Plan   Gerson Pearson is a 65 year old male admitted on 6/16/2025 after being admitted for COPD exacerbation from 6/14/25 - 6/16/25.  He was discharged with medications for COPD but returned less than 24 hours with chest pain.  Serial troponin are negative.     New onset of chest pain  CAD status post PCI  Chronic CHF, recovered EF  Dyslipidemia  Hypertension  Patient reports some chest tightness associated with his COPD exacerbation.  EKG is negative for acute ischemic changes.  Initial troponin within normal limits.  No sign of volume overload/acute CHF.  --PTA ASA, Lipitor, Coreg, hydralazine, lisinopril continued  --TTE shows normal LVEF of 55-60% but he has new WMA including basal and mid inferolateral and lateral hypokinesis, image quality is technically difficult even with contrast.  -- plan for Lexiscan study on 6/19     Acute Hypoxic respiratory failure 2/2 COPD exacerbation  Leukocytosis, likely 2/2 steroid use  EMS gave 324 aspirin, 3 sl  nitroglycerin.   , Dimer negative  CXR read as wnl  EKG NSR w/ hyperacute T waves in the anterior leads  --standing and prn duonebs  --oximetry, oxygen  --resume trelelgy on discharge  --outpatient Pulm follow-up during the month of June is highly recommended  --cefepime given recurrent use of IV abx and steroid use but will discontinue after 3 doses.  --asking for mucolytic, will order mucinex 600 mg BID     Progressive abdominal distension  Hx Fundal adenomyomatosis   --miralax  --US abdomen     Chronic stable conditions, resuming PTA meds  Depression  Anxiety  GERD  Polysubstance use in remission  PreDM  Chronic anemia  Hyponatremia     Diet:  general  DVT Prophylaxis: scds  Post Catheter: Not present  Lines: None     Cardiac Monitoring: None  Code Status:  Full     Clinically Significant Risk Factors Present on Admission         #  "Hyponatremia: Lowest Na = 132 mmol/L in last 2 days, will monitor as appropriate  # Hypochloremia: Lowest Cl = 94 mmol/L in last 2 days, will monitor as appropriate        # Drug Induced Platelet Defect: home medication list includes an antiplatelet medication   # Hypertension: Home medication list includes antihypertensive(s)  # Chronic heart failure with preserved ejection fraction: heart failure noted on problem list and last echo with EF >50%        # Financial/Environmental Concerns:            Disposition Plan     Medically Ready for Discharge:  -- anticipate on 6/19 vs 6/20    Disposition:   -- anticipate home    Interval History   -- no acute overnight issues  -- no chest pain  -- Echo reviewed    -Data reviewed today: I reviewed all new labs and imaging over the last 24 hours. I personally reviewed no images or EKG's today.    Physical Exam    , Blood pressure 125/79, pulse 76, temperature 98.9  F (37.2  C), temperature source Oral, resp. rate 18, height 1.753 m (5' 9\"), weight 68 kg (150 lb), SpO2 97%.  Vitals:    06/17/25 0004   Weight: 68 kg (150 lb)     Vital Signs with Ranges  Temp:  [97.9  F (36.6  C)-99.1  F (37.3  C)] 98.9  F (37.2  C)  Pulse:  [66-92] 76  Resp:  [18] 18  BP: (112-171)/(59-99) 125/79  SpO2:  [94 %-100 %] 97 %  I/O's Last 24 hours  I/O last 3 completed shifts:  In: 240 [P.O.:240]  Out: 700 [Urine:700]    Constitutional: Awake, alert, cooperative, no apparent distress  Respiratory: Clear to auscultation bilaterally, no crackles or wheezing  Cardiovascular: Regular rate and rhythm, normal S1 and S2, and no murmur noted  GI: Normal bowel sounds, soft, non-distended, non-tender  Skin/Integumen: No rashes, no cyanosis, no edema  Other:      Medications   All medications were reviewed.  Current Facility-Administered Medications   Medication Dose Route Frequency Provider Last Rate Last Admin     Current Facility-Administered Medications   Medication Dose Route Frequency Provider Last Rate " Last Admin    aspirin (ASA) chewable tablet 81 mg  81 mg Oral Daily Debbie Barnett MD   81 mg at 06/18/25 0848    atorvastatin (LIPITOR) tablet 40 mg  40 mg Oral Daily Debbie Barnett MD   40 mg at 06/18/25 0849    buprenorphine-naloxone (SUBOXONE) 2-0.5 MG sublingual tablet 3 tablet  3 tablet Sublingual Daily Jairo Rendon MD   3 tablet at 06/18/25 0914    buPROPion (WELLBUTRIN XL) 24 hr tablet 300 mg  300 mg Oral QAM Debbie Barnett MD   300 mg at 06/18/25 0848    carvedilol (COREG) tablet 25 mg  25 mg Oral BID Debbie Barnett MD   25 mg at 06/18/25 0849    ceFEPIme (MAXIPIME) 2 g vial to attach to  mL bag for ADULTS or NS 50 mL bag for PEDS  2 g Intravenous Q8H Debbie Barnett MD 0 mL/hr at 06/17/25 0515 2 g at 06/18/25 1300    ferrous sulfate (FEROSUL) tablet 325 mg  325 mg Oral Daily Debbie Barnett MD   325 mg at 06/18/25 0848    gabapentin (NEURONTIN) capsule 300 mg  300 mg Oral KATHERINEM Debbie Barnett MD   300 mg at 06/18/25 0848    gabapentin (NEURONTIN) capsule 600 mg  600 mg Oral At Bedtime Debbie Barnett MD   600 mg at 06/17/25 2156    hydrALAZINE (APRESOLINE) tablet 100 mg  100 mg Oral TID Debbie Barnett MD   100 mg at 06/18/25 0848    insulin aspart (NovoLOG) injection (RAPID ACTING)  1-7 Units Subcutaneous TID AC Debbie Barnett MD   1 Units at 06/17/25 1611    insulin aspart (NovoLOG) injection (RAPID ACTING)  1-5 Units Subcutaneous At Bedtime Debbie Barnett MD        insulin aspart (NovoLOG) injection (RAPID ACTING)   Subcutaneous TID w/meals Debbie Barnett MD   5 Units at 06/18/25 1300    ipratropium - albuterol 0.5 mg/2.5 mg (3mg)/3 mL (DUONEB) neb solution 3 mL  3 mL Nebulization Q6H Debbie Barnett MD   3 mL at 06/18/25 0721    lisinopril (ZESTRIL) tablet 40 mg  40 mg Oral Daily Debbie Barnett MD   40 mg at 06/18/25 0848    mirtazapine (REMERON) tablet 45 mg  45 mg Oral At Bedtime Debbie Barnett MD   45 mg at 06/17/25 9466     pantoprazole (PROTONIX) EC tablet 40 mg  40 mg Oral Daily Debbie Barnett MD   40 mg at 25 0848    polyethylene glycol (MIRALAX) Packet 17 g  17 g Oral Q24H Debbie Barnett MD        predniSONE (DELTASONE) tablet 40 mg  40 mg Oral Daily Debbie Barnett MD   40 mg at 25 0848    QUEtiapine (SEROquel) tablet 25 mg  25 mg Oral At Bedtime Debbie Barnett MD   25 mg at 25 2716        Data   Recent Labs   Lab 25  1137 25  0803 25  0642 25  0811 25  0624 25  0003 06/15/25  0545 25  0259   WBC  --   --  11.3*  --  11.5* 16.2*   < > 6.4   HGB  --   --  10.9*  --  10.8* 11.2*   < > 10.4*   MCV  --   --  90  --  91 89   < > 94   PLT  --   --  128*  --  132* 169   < > 147*   NA  --   --  135  --  136 132*   < > 137   POTASSIUM  --   --  4.0  --  4.2 4.0   < > 4.1   CHLORIDE  --   --  98  --  97* 94*   < > 100   CO2  --   --  27  --  31* 29   < > 31*   BUN  --   --  21.9  --  18.8 18.3   < > 14.3   CR  --   --  0.85  --  0.75 0.81   < > 0.98   ANIONGAP  --   --  10  --  8 9   < > 6*   RL  --   --  8.6*  --  8.8 9.1   < > 8.7*   GLC 91 89 88   < > 154* 145*   < > 144*   ALBUMIN  --   --   --   --  3.8  --   --  4.1   PROTTOTAL  --   --   --   --  6.3*  --   --  6.8   BILITOTAL  --   --   --   --  0.3  --   --  0.2   ALKPHOS  --   --   --   --  93  --   --  113   ALT  --   --   --   --  18  --   --  17   AST  --   --   --   --  15  --   --  16    < > = values in this interval not displayed.       Recent Results (from the past 24 hours)   Echocardiogram Complete   Result Value    LVEF  55-60%    Narrative    [id number]  [id number]  [id number]  [id number]^DOLLY^ZABRINA^CHANELLE     [facility]  Echocardiography Laboratory  [address] [place], [place] [id number]     Name: RANI DUARTE  MRN: [id number]  : 1960  Study Date: 2025 12:07 PM  Age: 65 yrs  Gender: Male  Patient Location: [facility]  Reason For Study: Chest Pain, Chest Pain  Ordering  Physician: ZABRINA ALBERTO  Referring Physician: Alex Wynn PA-C  Performed By: Judy Orosco     BSA: 1.8 m2  Height: 69 in  Weight: 150 lb  HR: 75  BP: 148/76 mmHg  ______________________________________________________________________________  Procedure  Echocardiogram with two-dimensional, color and spectral Doppler. Definity (NDC  #[id number]) given intravenously.  ______________________________________________________________________________  Interpretation Summary     Basal and mid inferolateral and lateral hypokinesis suspected.  Left ventricular systolic function is normal.  The visual ejection fraction is 55-60%.  The right ventricle is normal in structure, function and size.     Compared to the study from 2023, inferolateral and lateral hypokinesis is a  new finding. Image quality is technically difficult, even with contrast.  ______________________________________________________________________________  Left Ventricle  The left ventricle is normal in size. There is normal left ventricular wall  thickness. Left ventricular systolic function is normal. The visual ejection  fraction is 55-60%. Diastolic Doppler findings (E/E' ratio and/or other  parameters) suggest left ventricular filling pressures are indeterminate.  Basal and mid inferolateral and lateral hypokinesis suspected.     Right Ventricle  The right ventricle is normal in structure, function and size.     Atria  Normal left atrial size. Right atrial size is normal. There is no atrial shunt  seen.     Mitral Valve  The mitral valve is normal in structure and function. There is trace mitral  regurgitation.     Tricuspid Valve  The tricuspid valve is normal in structure and function. There is trace  tricuspid regurgitation. IVC diameter <2.1 cm collapsing >50% with sniff  suggests a normal RA pressure of 3 mmHg. Right ventricular systolic pressure  could not be approximated due to inadequate tricuspid regurgitation.     Aortic Valve  The  aortic valve is normal in structure and function. No aortic regurgitation  is present. No aortic stenosis is present.     Pulmonic Valve  The pulmonic valve is not well seen, but is grossly normal. There is trace  pulmonic valvular regurgitation.     Vessels  Normal size aorta. The inferior vena cava was normal in size with preserved  respiratory variability.     Pericardium  There is no pericardial effusion.     Rhythm  Sinus rhythm was noted.  ______________________________________________________________________________  MMode/2D Measurements & Calculations  IVSd: 0.93 cm     LVIDd: 4.7 cm  LVIDs: 3.1 cm  LVPWd: 0.89 cm  FS: 34.3 %  LV mass(C)d: 142.9 grams  LV mass(C)dI: 78.2 grams/m2  Ao root diam: 3.7 cm  Ao root diam index Ht(cm/m): 2.1  Ao root diam index BSA (cm/m2): 2.0  LA Volume (BP): 37.5 ml  LA Volume Index (BP): 20.5 ml/m2  RV Base: 2.8 cm  RWT: 0.38     TAPSE: 2.0 cm     Doppler Measurements & Calculations  MV E max stefan: 85.0 cm/sec  MV A max stefan: 77.5 cm/sec  MV E/A: 1.1  MV dec time: 0.20 sec  PA acc time: 0.09 sec  E/E' av.3  Lateral E/e': 6.6  Medial E/e': 12.0  RV S Stefan: 13.5 cm/sec     ______________________________________________________________________________  Report approved by: Ron Orozco MD on 2025 01:33 PM             Jairo Rendon MD  Text Page  (7am to 6pm)

## 2025-06-19 ENCOUNTER — APPOINTMENT (OUTPATIENT)
Dept: NUCLEAR MEDICINE | Facility: CLINIC | Age: 65
End: 2025-06-19
Attending: INTERNAL MEDICINE
Payer: MEDICARE

## 2025-06-19 VITALS
HEIGHT: 69 IN | HEART RATE: 84 BPM | BODY MASS INDEX: 22.22 KG/M2 | OXYGEN SATURATION: 97 % | WEIGHT: 150 LBS | RESPIRATION RATE: 16 BRPM | DIASTOLIC BLOOD PRESSURE: 84 MMHG | TEMPERATURE: 98.1 F | SYSTOLIC BLOOD PRESSURE: 135 MMHG

## 2025-06-19 LAB
CV BLOOD PRESSURE: 59 MMHG
CV STRESS MAX HR HE: 99
GLUCOSE BLDC GLUCOMTR-MCNC: 130 MG/DL (ref 70–99)
GLUCOSE BLDC GLUCOMTR-MCNC: 133 MG/DL (ref 70–99)
GLUCOSE BLDC GLUCOMTR-MCNC: 147 MG/DL (ref 70–99)
GLUCOSE BLDC GLUCOMTR-MCNC: 82 MG/DL (ref 70–99)
GLUCOSE BLDC GLUCOMTR-MCNC: 82 MG/DL (ref 70–99)
RATE PRESSURE PRODUCT: NORMAL
STRESS ECHO BASELINE DIASTOLIC HE: 83
STRESS ECHO BASELINE HR: 65 BPM
STRESS ECHO BASELINE SYSTOLIC BP: 152
STRESS ECHO CALCULATED PERCENT HR: 63 %
STRESS ECHO LAST STRESS DIASTOLIC BP: 75
STRESS ECHO LAST STRESS SYSTOLIC BP: 115
STRESS ECHO TARGET HR: 156

## 2025-06-19 PROCEDURE — 343N000001 HC RX 343 MED OP 636: Performed by: INTERNAL MEDICINE

## 2025-06-19 PROCEDURE — 78452 HT MUSCLE IMAGE SPECT MULT: CPT | Mod: 26 | Performed by: INTERNAL MEDICINE

## 2025-06-19 PROCEDURE — 78452 HT MUSCLE IMAGE SPECT MULT: CPT

## 2025-06-19 PROCEDURE — 120N000001 HC R&B MED SURG/OB

## 2025-06-19 PROCEDURE — 250N000011 HC RX IP 250 OP 636: Performed by: STUDENT IN AN ORGANIZED HEALTH CARE EDUCATION/TRAINING PROGRAM

## 2025-06-19 PROCEDURE — 99222 1ST HOSP IP/OBS MODERATE 55: CPT | Mod: 25 | Performed by: INTERNAL MEDICINE

## 2025-06-19 PROCEDURE — 99232 SBSQ HOSP IP/OBS MODERATE 35: CPT | Performed by: INTERNAL MEDICINE

## 2025-06-19 PROCEDURE — 250N000013 HC RX MED GY IP 250 OP 250 PS 637: Performed by: INTERNAL MEDICINE

## 2025-06-19 PROCEDURE — A9500 TC99M SESTAMIBI: HCPCS | Performed by: INTERNAL MEDICINE

## 2025-06-19 PROCEDURE — 999N000157 HC STATISTIC RCP TIME EA 10 MIN

## 2025-06-19 PROCEDURE — 250N000011 HC RX IP 250 OP 636: Performed by: INTERNAL MEDICINE

## 2025-06-19 PROCEDURE — 93016 CV STRESS TEST SUPVJ ONLY: CPT | Performed by: INTERNAL MEDICINE

## 2025-06-19 PROCEDURE — 93017 CV STRESS TEST TRACING ONLY: CPT

## 2025-06-19 PROCEDURE — 250N000012 HC RX MED GY IP 250 OP 636 PS 637: Performed by: STUDENT IN AN ORGANIZED HEALTH CARE EDUCATION/TRAINING PROGRAM

## 2025-06-19 PROCEDURE — 999N000049 HC STATISTIC ECHO STRESS OR NM NPI

## 2025-06-19 PROCEDURE — 250N000013 HC RX MED GY IP 250 OP 250 PS 637: Performed by: STUDENT IN AN ORGANIZED HEALTH CARE EDUCATION/TRAINING PROGRAM

## 2025-06-19 PROCEDURE — 93018 CV STRESS TEST I&R ONLY: CPT | Performed by: INTERNAL MEDICINE

## 2025-06-19 PROCEDURE — 250N000009 HC RX 250: Performed by: STUDENT IN AN ORGANIZED HEALTH CARE EDUCATION/TRAINING PROGRAM

## 2025-06-19 PROCEDURE — 94640 AIRWAY INHALATION TREATMENT: CPT | Mod: 76

## 2025-06-19 RX ORDER — NITROGLYCERIN 0.4 MG/1
0.4 TABLET SUBLINGUAL EVERY 5 MIN PRN
Status: DISCONTINUED | OUTPATIENT
Start: 2025-06-19 | End: 2025-06-19

## 2025-06-19 RX ORDER — CAFFEINE CITRATE 20 MG/ML
60 SOLUTION INTRAVENOUS
Status: DISCONTINUED | OUTPATIENT
Start: 2025-06-19 | End: 2025-06-19

## 2025-06-19 RX ORDER — ALBUTEROL SULFATE 90 UG/1
2 INHALANT RESPIRATORY (INHALATION) EVERY 5 MIN PRN
Status: DISCONTINUED | OUTPATIENT
Start: 2025-06-19 | End: 2025-06-19

## 2025-06-19 RX ORDER — AMINOPHYLLINE 25 MG/ML
50-100 INJECTION, SOLUTION INTRAVENOUS
Status: DISCONTINUED | OUTPATIENT
Start: 2025-06-19 | End: 2025-06-19

## 2025-06-19 RX ORDER — CAFFEINE 200 MG
200 TABLET ORAL
Status: DISCONTINUED | OUTPATIENT
Start: 2025-06-19 | End: 2025-06-19

## 2025-06-19 RX ORDER — LIDOCAINE 40 MG/G
CREAM TOPICAL
Status: DISCONTINUED | OUTPATIENT
Start: 2025-06-19 | End: 2025-06-19

## 2025-06-19 RX ORDER — SODIUM CHLORIDE 9 MG/ML
INJECTION, SOLUTION INTRAVENOUS CONTINUOUS PRN
Status: DISCONTINUED | OUTPATIENT
Start: 2025-06-19 | End: 2025-06-19

## 2025-06-19 RX ORDER — REGADENOSON 0.08 MG/ML
0.4 INJECTION, SOLUTION INTRAVENOUS ONCE
Status: COMPLETED | OUTPATIENT
Start: 2025-06-19 | End: 2025-06-19

## 2025-06-19 RX ADMIN — LISINOPRIL 40 MG: 10 TABLET ORAL at 08:35

## 2025-06-19 RX ADMIN — ASPIRIN 81 MG CHEWABLE TABLET 81 MG: 81 TABLET CHEWABLE at 08:36

## 2025-06-19 RX ADMIN — TECHNETIUM TC 99M SESTAMIBI 10.6 MILLICURIE: 1 INJECTION INTRAVENOUS at 08:03

## 2025-06-19 RX ADMIN — QUETIAPINE FUMARATE 25 MG: 25 TABLET ORAL at 22:36

## 2025-06-19 RX ADMIN — GUAIFENESIN 600 MG: 600 TABLET, EXTENDED RELEASE ORAL at 08:35

## 2025-06-19 RX ADMIN — IPRATROPIUM BROMIDE AND ALBUTEROL SULFATE 3 ML: .5; 3 SOLUTION RESPIRATORY (INHALATION) at 08:34

## 2025-06-19 RX ADMIN — GABAPENTIN 300 MG: 300 CAPSULE ORAL at 08:35

## 2025-06-19 RX ADMIN — IPRATROPIUM BROMIDE AND ALBUTEROL SULFATE 3 ML: .5; 3 SOLUTION RESPIRATORY (INHALATION) at 21:08

## 2025-06-19 RX ADMIN — FLUTICASONE PROPIONATE 1 SPRAY: 50 SPRAY, METERED NASAL at 08:34

## 2025-06-19 RX ADMIN — POLYETHYLENE GLYCOL 3350 17 G: 17 POWDER, FOR SOLUTION ORAL at 13:05

## 2025-06-19 RX ADMIN — GABAPENTIN 600 MG: 300 CAPSULE ORAL at 22:36

## 2025-06-19 RX ADMIN — CARVEDILOL 25 MG: 25 TABLET, FILM COATED ORAL at 08:35

## 2025-06-19 RX ADMIN — HYDRALAZINE HYDROCHLORIDE 100 MG: 50 TABLET ORAL at 13:03

## 2025-06-19 RX ADMIN — BUPROPION HYDROCHLORIDE 300 MG: 300 TABLET, EXTENDED RELEASE ORAL at 08:35

## 2025-06-19 RX ADMIN — GUAIFENESIN 600 MG: 600 TABLET, EXTENDED RELEASE ORAL at 20:41

## 2025-06-19 RX ADMIN — MIRTAZAPINE 45 MG: 15 TABLET, FILM COATED ORAL at 22:36

## 2025-06-19 RX ADMIN — BUPRENORPHINE AND NALOXONE 3 TABLET: 2; .5 TABLET SUBLINGUAL at 08:53

## 2025-06-19 RX ADMIN — ATORVASTATIN CALCIUM 40 MG: 40 TABLET, FILM COATED ORAL at 08:34

## 2025-06-19 RX ADMIN — IPRATROPIUM BROMIDE AND ALBUTEROL SULFATE 3 ML: .5; 3 SOLUTION RESPIRATORY (INHALATION) at 14:12

## 2025-06-19 RX ADMIN — CARVEDILOL 25 MG: 25 TABLET, FILM COATED ORAL at 20:41

## 2025-06-19 RX ADMIN — FERROUS SULFATE TAB 325 MG (65 MG ELEMENTAL FE) 325 MG: 325 (65 FE) TAB at 08:35

## 2025-06-19 RX ADMIN — HYDRALAZINE HYDROCHLORIDE 100 MG: 50 TABLET ORAL at 20:41

## 2025-06-19 RX ADMIN — CEFEPIME 2 G: 2 INJECTION, POWDER, FOR SOLUTION INTRAVENOUS at 04:05

## 2025-06-19 RX ADMIN — HYDRALAZINE HYDROCHLORIDE 100 MG: 50 TABLET ORAL at 08:36

## 2025-06-19 RX ADMIN — PANTOPRAZOLE SODIUM 40 MG: 40 TABLET, DELAYED RELEASE ORAL at 08:34

## 2025-06-19 RX ADMIN — TECHNETIUM TC 99M SESTAMIBI 34 MILLICURIE: 1 INJECTION INTRAVENOUS at 10:31

## 2025-06-19 RX ADMIN — PREDNISONE 40 MG: 20 TABLET ORAL at 08:35

## 2025-06-19 RX ADMIN — REGADENOSON 0.4 MG: 0.08 INJECTION, SOLUTION INTRAVENOUS at 10:26

## 2025-06-19 ASSESSMENT — ACTIVITIES OF DAILY LIVING (ADL)
ADLS_ACUITY_SCORE: 58

## 2025-06-19 NOTE — PLAN OF CARE
Goal Outcome Evaluation:    Summary: 6/18/2025 [id number]   PRIMARY Concern: COPD exacerbation   SAFETY RISK Concerns (fall risk, behaviors, etc.): None       Aggression Tool Color: Green  Isolation/Type: None   Tests/Procedures for NEXT shift: Stress test today  Consults? (Pending/following, signed-off?) None   Where is patient from? (Home, TCU, etc.): [facility]   Other Important info for NEXT shift: Per SW note patient is on 2-3LPM at baseline, currently on 2LPM satting 95-96%   Anticipated DC date & active delays: TBD  _____________________________________________________________________________  SUMMARY NOTE:  Orientation/Cognitive: A&O x4  Observation Goals (Met/ Not Met): INP  Mobility Level/Assist Equipment: SBA/IND  Antibiotics & Plan (IV/po, length of tx left): Cefepime 2g q8h  Pain Management: Patient denies pain  Tele/VS/O2: VSS, monitoring BP due to HTN in ER, stable this shift. 2 LPM via nasal canula, baseline 2-3 LPM  ABNL Lab/BG: , 147  Diet: Regular   Bowel/Bladder: Continent. Uses urinal at bedside/ambulates to bathroom safely. LBM 6/18/25  Skin Concerns: Dry, flaky feet. Refused full skin assessment.   Drains/Devices: PIV SL, continuous pulse ox.   Patient Stated Goal for Today: Rest.

## 2025-06-19 NOTE — PROGRESS NOTES
Pt here in EKG dept for lexiscan stress test. Procedure explained, denies any pain now or CP or SOB.  IV patent.  Lungs diminished, no wheezing noted.  VSS    Lexiscan stress test complete.  Tolerated fairly well.  Reports SOB, CP, tightness and headache after injection.  Also reports chest felling of numbness, appears anxious.  All symptoms resolved post, slight headache remains.  VSS  Transported back to room in stable condition via [email or web address].    1015--Attempted to call report, message left via [employer] for HVenkata Bonner RN

## 2025-06-19 NOTE — CONSULTS
HOSPITAL CARDIOLOGY CONSULTATION NOTE      Gerson Pearson MRN# [id number]   YOB: 1960 Age: 65 year old   Date of Admission: 6/16/2025     Reason for consult: Abnormal stress test           Assessment and Plan:     This patient is a 65 year old male with pertinent past medical history pertinent for severe COPD (FEV1 0.67), coronary artery disease with NSTEMI in June 2020 treated with bare-metal stent to mid RCA, chronic normocytic anemia, depression/anxiety, and multiple other noncardiac comorbidities who was admitted 6/16/2025 for worsening dyspnea secondary to COPD exacerbation.  Cardiac troponins have been negative this hospitalization.  EKG has been benign.`    Cardiology consultation has been placed for abnormal stress test.  He underwent an echocardiogram which demonstrated LVEF 55 to 60% with inferior/inferolateral hypokinesis.  I reviewed the previous echocardiogram from [facility] in 2021; this also demonstrated inferior/inferolateral hypokinesis.  Notably he does have a history of NSTEMI involving the RCA territory treated with metal stent in 2020.  Hence the inferior/inferolateral region wall motion abnormality is old and related to prior MI.  He underwent a Lexiscan myocardial perfusion exam today; there was no evidence of myocardial ischemia; small fixed inferolateral defect consistent with known prior myocardial infarction.    Dyspnea, atypical chest pain in the context of COPD exacerbation:   A.  He denies chest pain per se; has had dyspnea with associated chest tightness in the context of COPD exacerbation.  B.  Has been ruled out for ACS with negative cardiac troponins and benign EKG.  C.  Echocardiogram demonstrates normal EF with old inferior/inferolateral hypokinesis (also noted on echocardiogram in 2021; consistent with history of known inferior MI in 2020 that was treated with BMS).  Lexiscan myocardial perfusion exam is negative for myocardial ischemia; evidence of  small prior inferolateral infarction which is known.  Reassuringly no evidence of myocardial ischemia.  D.  No further cardiac testing recommended.    Coronary artery disease:   A.  Stable and asymptomatic.  Continue aspirin, statin, and antihypertensive medications.    Hypertension:   A.  Continue prior to admission antihypertensive medications.  This includes carvedilol, lisinopril, and hydralazine.    Dyslipidemia:   A.  Continue statin.    COPD exacerbation:   A.  Severe COPD with FEV1 0.67 L.  Management per medicine service.    No further cardiology recommendations.  Cardiology service will sign off.  Please call if questions or concerns.               Chief Complaint:   Shortness of Breath and Chest Pain             History of Present Illness:   This patient is a 65 year old male with pertinent past medical history pertinent for severe COPD (FEV1 0.67), coronary artery disease with NSTEMI in June 2020 treated with bare-metal stent to mid RCA, chronic normocytic anemia, depression/anxiety, and multiple other noncardiac comorbidities who was admitted 6/16/2025 for worsening dyspnea secondary to COPD exacerbation.  In the context of COPD exacerbation he described dyspnea with associated chest tightness at presentation.  He denies chest pain per se.  His symptoms seem consistent with COPD.    Cardiology consultation has been placed for abnormal stress test.  He underwent an echocardiogram which demonstrated LVEF 55 to 60% with inferior/inferolateral hypokinesis.  I reviewed the previous echocardiogram from [facility] in 2021; this also demonstrated inferior/inferolateral hypokinesis.  Notably he does have a history of NSTEMI involving the RCA territory treated with metal stent in 2020.  Hence the inferior/inferolateral region wall motion abnormality is old and related to prior MI.  He underwent a Lexiscan myocardial perfusion exam today; there was no evidence of myocardial ischemia; small fixed  inferolateral defect consistent with known prior myocardial infarction.    His COPD is quite significant; PFTs a few years ago demonstrated FEV1 of 0.67.  His cardiac medications prior to admission consisted of aspirin 81 mg daily, atorvastatin 40 mg daily, carvedilol 25 mg twice daily, lisinopril 40 mg daily, and hydralazine 100 mg 3 times daily.  He has normal kidney function with normal CBC and BMP.  Cardiac troponins have been negative this hospitalization.  EKG has been benign.`         Review of Systems:     The 10 point Review of Systems is negative other than noted in the HPI                Past Medical History:   I have reviewed this patient's past medical history  Past Medical History:   Diagnosis Date    CAD (coronary artery disease)     s/p bare metal stent to RCA in 6/2020    Chronic back pain     Chronic systolic congestive heart failure (H)     EF 45% has recovered to 60-65% on echo 12/2021    Cocaine use disorder (H)     COPD (chronic obstructive pulmonary disease) (H)     Depressive disorder     GERD (gastroesophageal reflux disease)     HTN (hypertension)     Myocardial infarction (H)     Nicotine dependence     Opioid use disorder     REYNA (obstructive sleep apnea)     Stroke (H) 2019    of unknown etiology: Right parieto-occipital lobe               Past Surgical History:   I have reviewed this patient's past surgical history  Past Surgical History:   Procedure Laterality Date    CORONARY STENT PLACEMENT  2020                 Social History:   I have reviewed this patient's social history  Social History     Tobacco Use    Smoking status: Former     Current packs/day: 0.25     Types: Cigarettes    Smokeless tobacco: Never   Substance Use Topics    Alcohol use: No     Comment: QUIT 3 YEARS AGO               Family History:   I have reviewed this patient's family history  Family History   Problem Relation Age of Onset    Diabetes Mother     Coronary Artery Disease Mother                Medications:    I have reviewed this patient's current medications  Medications Prior to Admission   Medication Sig Dispense Refill Last Dose/Taking    acetaminophen (TYLENOL) 325 MG tablet Take 650 mg by mouth every 4 hours as needed for mild pain.   Taking As Needed    albuterol (PROAIR HFA/PROVENTIL HFA/VENTOLIN HFA) 108 (90 Base) MCG/ACT inhaler Inhale 2 puffs into the lungs every 4 hours as needed for shortness of breath, wheezing or cough   Taking As Needed    aspirin (ASA) 81 MG chewable tablet Take 81 mg by mouth daily   2025 Morning    atorvastatin (LIPITOR) 40 MG tablet Take 40 mg by mouth daily    2025 Bedtime    [] azithromycin (ZITHROMAX) 250 MG tablet Take 1 tablet (250 mg) by mouth daily for 2 days. 2 tablet 0 2025 Morning    buprenorphine-naloxone (SUBOXONE) 2-0.5 MG SUBL sublingual tablet Place 1 tablet under the tongue daily. In addition to 1/2 tab of 8-2 strength   2025    buprenorphine-naloxone (SUBOXONE) 8-2 MG SUBL sublingual tablet Place 0.5 tablets under the tongue daily. In addition to 1 tab of 2-0.5 strength   2025 Morning    buPROPion (WELLBUTRIN XL) 300 MG 24 hr tablet Take 300 mg by mouth every morning   2025 Morning    carboxymethylcellulose PF (REFRESH PLUS) 0.5 % ophthalmic solution Place 1 drop into both eyes 4 times daily.   2025    carvedilol (COREG) 25 MG tablet Take 25 mg by mouth 2 times daily   2025 Evening    cholecalciferol (VITAMIN D3) 25 mcg (1000 units) capsule Take 2 capsules by mouth daily.   2025 Morning    clotrimazole (LOTRIMIN) 1 % external cream Apply topically 2 times daily. To feet - can use between toes   2025 Evening    diclofenac (VOLTAREN) 1 % topical gel Apply 2-4 g topically 4 times daily as needed for moderate pain   Taking As Needed    ferrous sulfate (FE TABS) 325 (65 Fe) MG EC tablet Take 325 mg by mouth daily   2025 Morning    fluticasone (FLONASE) 50 MCG/ACT nasal spray Spray 2 sprays into both nostrils  daily.   6/16/2025 Morning    Fluticasone-Umeclidin-Vilanterol (TRELEGY ELLIPTA) 200-62.5-25 MCG/ACT oral inhaler Inhale 1 puff into the lungs daily.   6/16/2025 Morning    gabapentin (NEURONTIN) 300 MG capsule Take 300 mg by mouth every morning   6/16/2025 Morning    gabapentin (NEURONTIN) 300 MG capsule Take 600 mg by mouth At Bedtime   6/16/2025 Evening    guaiFENesin (ROBITUSSIN) 20 mg/mL liquid Take 200 mg by mouth every 4 hours as needed for cough.   Taking As Needed    hydrALAZINE (APRESOLINE) 100 MG tablet Take 1 tablet (100 mg) by mouth 3 times daily NOTE this is an INCREASE in dose from prior to hospital.  Further mgmt and refills per PCP. 90 tablet 0 6/16/2025 Evening    ipratropium - albuterol 0.5 mg/2.5 mg/3 mL (DUONEB) 0.5-2.5 (3) MG/3ML neb solution Take 1 vial (3 mLs) by nebulization 2 times daily 90 mL 1 6/16/2025 Morning    ipratropium - albuterol 0.5 mg/2.5 mg/3 mL (DUONEB) 0.5-2.5 (3) MG/3ML neb solution Take 1 vial (3 mLs) by nebulization every 6 hours as needed for shortness of breath, wheezing or cough 90 mL 3 Taking As Needed    lisinopril (ZESTRIL) 40 MG tablet Take 1 tablet (40 mg) by mouth daily 30 tablet 3 6/16/2025 Morning    melatonin 5 MG tablet Take 5 mg by mouth nightly as needed for sleep   Taking As Needed    mirtazapine (REMERON) 45 MG tablet Take 45 mg by mouth At Bedtime    6/16/2025 Evening    multivitamin w/minerals (THERA-VIT-M) tablet Take 1 tablet by mouth daily   6/16/2025 Morning    naloxone (NARCAN) 4 MG/0.1ML nasal spray Spray 4 mg into one nostril alternating nostrils once as needed for opioid reversal every 2-3 minutes until assistance arrives   Taking As Needed    pantoprazole (PROTONIX) 40 MG EC tablet Take 40 mg by mouth daily   6/16/2025 Morning    polyethylene glycol (MIRALAX) 17 g packet Take 1 packet by mouth 2 times daily as needed for constipation.   Taking As Needed    predniSONE (DELTASONE) 10 MG tablet 4 tabs daily for 3 days, then 3 tabs daily for 3 days,  then 2 tabs daily for 3 days, then 1 tab daily for 3 days, then stop 30 tablet 0 6/16/2025 Morning    QUEtiapine (SEROQUEL) 25 MG tablet Take 12.5 mg by mouth 2 times daily as needed (panic).   Taking As Needed    QUEtiapine (SEROQUEL) 25 MG tablet Take 25 mg by mouth at bedtime.   6/16/2025 Evening    sennosides (SENOKOT) 8.6 MG tablet Take 1 tablet by mouth 2 times daily as needed for constipation.   Taking As Needed    mineral oil-white petrolatum (EUCERIN/MINERIN) cream Apply topically 2 times daily. To feet; apply after clotrimazole. Do not use between toes. Cover feet with socks at night (Patient not taking: Reported on 6/17/2025)   Not Taking     Current Facility-Administered Medications   Medication Dose Route Frequency Provider Last Rate Last Admin    acetaminophen (TYLENOL) tablet 650 mg  650 mg Oral Q4H PRN Debbie Barnett MD        Or    acetaminophen (TYLENOL) Suppository 650 mg  650 mg Rectal Q4H PRN Debbie Barnett MD        aspirin (ASA) chewable tablet 81 mg  81 mg Oral Daily Debbie Barnett MD   81 mg at 06/19/25 0836    atorvastatin (LIPITOR) tablet 40 mg  40 mg Oral Daily Debbie Barnett MD   40 mg at 06/19/25 0834    buprenorphine-naloxone (SUBOXONE) 2-0.5 MG sublingual tablet 3 tablet  3 tablet Sublingual Daily Jairo Rendon MD   3 tablet at 06/19/25 0853    buPROPion (WELLBUTRIN XL) 24 hr tablet 300 mg  300 mg Oral QAM Debbie Barnett MD   300 mg at 06/19/25 0835    carvedilol (COREG) tablet 25 mg  25 mg Oral BID Debbie Barnett MD   25 mg at 06/19/25 0835    glucose gel 15-30 g  15-30 g Oral Q15 Min PRN Debbie Barnett MD        Or    dextrose 50 % injection 25-50 mL  25-50 mL Intravenous Q15 Min PRN Debbie Barnett MD        Or    glucagon injection 1 mg  1 mg Subcutaneous Q15 Min PRN Debbie Barnett MD        ferrous sulfate (FEROSUL) tablet 325 mg  325 mg Oral Daily Debbie Barnett MD   325 mg at 06/19/25 0835    fluticasone (FLONASE) 50 MCG/ACT spray 1  spray  1 spray Both Nostrils Daily Jairo Rendon MD   1 spray at 06/19/25 0834    gabapentin (NEURONTIN) capsule 300 mg  300 mg Oral QAM Debbie Barnett MD   300 mg at 06/19/25 0835    gabapentin (NEURONTIN) capsule 600 mg  600 mg Oral At Bedtime Debbie Barnett MD   600 mg at 06/18/25 2255    guaiFENesin (MUCINEX) 12 hr tablet 600 mg  600 mg Oral BID Jairo Rendon MD   600 mg at 06/19/25 0835    HOLD: Caffeine containing medications 12 hours prior to the procedure   Does not apply HOLD Jairo Rendon MD        HOLD: dipyridamole (PERSANTINE) or aspirin/dipyridamole (AGGRENOX) 48 hours prior to the procedure   Does not apply HOLD Jairo Rendon MD        HOLD: theophylline or aminophylline 12 hours prior to the procedure   Does not apply HOLD Jairo Rendon MD        hydrALAZINE (APRESOLINE) tablet 10 mg  10 mg Oral Q4H PRN Debbie Barnett MD        Or    hydrALAZINE (APRESOLINE) injection 10 mg  10 mg Intravenous Q4H PRN Debbie Barnett MD        hydrALAZINE (APRESOLINE) tablet 100 mg  100 mg Oral TID Debbie Barnett MD   100 mg at 06/19/25 1303    IF patient diabetic - HOLD: ALL ORAL HYPOGLYCEMICS and include: glipizide, glyburide, glimepiride, gliclazide, metformin, any metformin containing medication, on day of the procedure   Does not apply HOLD Jairo Rendon MD        insulin aspart (NovoLOG) injection (RAPID ACTING)  1-7 Units Subcutaneous TID AC Debbie Barnett MD   1 Units at 06/17/25 1611    insulin aspart (NovoLOG) injection (RAPID ACTING)  1-5 Units Subcutaneous At Bedtime Debbie Barnett MD        insulin aspart (NovoLOG) injection (RAPID ACTING)   Subcutaneous TID w/meals Debbie Barnett MD   15 Units at 06/19/25 1304    ipratropium - albuterol 0.5 mg/2.5 mg (3mg)/3 mL (DUONEB) neb solution 3 mL  3 mL Nebulization Q4H PRN Debbie Barnett MD   3 mL at 06/18/25 0236    ipratropium - albuterol 0.5 mg/2.5 mg (3mg)/3 mL (DUONEB) neb solution 3 mL   "3 mL Nebulization Q6H Debbie Barnett MD   3 mL at 06/19/25 1412    lisinopril (ZESTRIL) tablet 40 mg  40 mg Oral Daily Debbie Barnett MD   40 mg at 06/19/25 0835    mirtazapine (REMERON) tablet 45 mg  45 mg Oral At Bedtime Debbie Barnett MD   45 mg at 06/18/25 2255    naloxone (NARCAN) nasal spray 4 mg  4 mg Alternating Nostrils Once PRN Debbie Barnett MD        nitroGLYcerin (NITROSTAT) sublingual tablet 0.4 mg  0.4 mg Sublingual Q5 Min PRN Debbie Barnett MD        ondansetron (ZOFRAN ODT) ODT tab 4 mg  4 mg Oral Q6H PRN Debbie Barnett MD        Or    ondansetron (ZOFRAN) injection 4 mg  4 mg Intravenous Q6H PRN Debbie Barnett MD        pantoprazole (PROTONIX) EC tablet 40 mg  40 mg Oral Daily Debbie Barnett MD   40 mg at 06/19/25 0834    polyethylene glycol (MIRALAX) Packet 17 g  17 g Oral Q24H Debbie Barnett MD   17 g at 06/19/25 1305    predniSONE (DELTASONE) tablet 40 mg  40 mg Oral Daily Debbie Barnett MD   40 mg at 06/19/25 0835    prochlorperazine (COMPAZINE) injection 10 mg  10 mg Intravenous Q6H PRN Debbie Barnett MD        Or    prochlorperazine (COMPAZINE) tablet 10 mg  10 mg Oral Q6H PRN Debbie Barnett MD        QUEtiapine (SEROquel) tablet 25 mg  25 mg Oral At Bedtime Debbie Barnett MD   25 mg at 06/18/25 2255    senna-docusate (SENOKOT-S/PERICOLACE) 8.6-50 MG per tablet 1 tablet  1 tablet Oral BID PRN Debbie Barnett MD        Or    senna-docusate (SENOKOT-S/PERICOLACE) 8.6-50 MG per tablet 2 tablet  2 tablet Oral BID PRN Debbie Barnett MD        sodium chloride (PF) 0.9% PF flush 1-10 mL  1-10 mL Intravenous Q10 Min PRN Jairo Rendon MD                   Allergies:     Allergies   Allergen Reactions    Ibuprofen Nausea and Vomiting                 Physical Exam:   Vitals were reviewed  Blood pressure (!) 131/95, pulse 72, temperature 98.3  F (36.8  C), temperature source Oral, resp. rate 16, height 1.753 m (5' 9\"), weight 68 kg (150 " lb), SpO2 99%.  Temperatures:  Current - Temp: 98.3  F (36.8  C); Max - Temp  Av.7  F (37.1  C)  Min: 98.3  F (36.8  C)  Max: 99.5  F (37.5  C)  Respiration range: Resp  Av.2  Min: 16  Max: 18  Pulse range: Pulse  Av.4  Min: 69  Max: 90  Blood pressure range: Systolic (24hrs), Av , Min:124 , Max:135   ; Diastolic (24hrs), Av, Min:68, Max:95    Pulse oximetry range: SpO2  Av.3 %  Min: 94 %  Max: 100 %    Intake/Output Summary (Last 24 hours) at 2025 1430  Last data filed at 2025 0756  Gross per 24 hour   Intake --   Output 1100 ml   Net -1100 ml     Constitutional:   Awake, alert, cooperative, no apparent distress, and appears stated age     Eyes:   Lids and lashes normal, extra ocular muscles intact, sclera clear, conjunctiva normal     Neck:   Supple, symmetrical, trachea midline, no JVD     Back:   Symmetric     Lungs:   No increased work of breathing, good air exchange, decreased air entry bilaterally.       Cardiovascular:   Regular rate and rhythm, normal S1 and S2, no S3 or S4, and no murmur noted.      Abdomen:   Non-tender     Musculoskeletal:   Grossly normal motor strength in all extremities bilaterally     Neurologic:   Grossly nonfocal     Skin:   no bruising or bleeding     Additional findings:     No edema                 Data:   All laboratory data reviewed  Results for orders placed or performed during the hospital encounter of 25 (from the past 24 hours)   Glucose by meter   Result Value Ref Range    GLUCOSE BY METER POCT 125 (H) 70 - 99 mg/dL   Glucose by meter   Result Value Ref Range    GLUCOSE BY METER POCT 164 (H) 70 - 99 mg/dL   Glucose by meter   Result Value Ref Range    GLUCOSE BY METER POCT 147 (H) 70 - 99 mg/dL   Glucose by meter   Result Value Ref Range    GLUCOSE BY METER POCT 82 70 - 99 mg/dL   Glucose by meter   Result Value Ref Range    GLUCOSE BY METER POCT 130 (H) 70 - 99 mg/dL   NM Lexiscan stress test (nuc card)   Result Value Ref  Range    Target      Baseline Systolic      Baseline Diastolic BP 83     Last Stress Systolic      Last Stress Diastolic BP 75     Baseline HR 65 bpm    Max HR  99     Max Predicted HR  63 %    Rate Pressure Product 11,385.0     BP 59 mmHg    Narrative      The nuclear stress test is abnormal.    There is a small area of transmural infarction in the mid to basal   inferolateral segment(s) of the left ventricle.  No residual ischemia is   identified.    Left ventricular function is normal. The left ventricular ejection   fraction at rest is 59%.    There is no prior study for comparison.         CARDIAC ENZYMES:  No lab results found in last 7 days.  Recent Labs   Lab 06/17/25  0003 06/14/25  0259   NTBNP 294* 197*       GENERAL:  Recent Labs   Lab 06/19/25  1121 06/19/25  0755 06/19/25  0209 06/18/25  0803 06/18/25  0642 06/17/25  0811 06/17/25  0624 06/17/25  0003 06/15/25  0545 06/14/25  0259   WBC  --   --   --   --  11.3*  --  11.5* 16.2*   < > 6.4   HGB  --   --   --   --  10.9*  --  10.8* 11.2*   < > 10.4*   MCV  --   --   --   --  90  --  91 89   < > 94   PLT  --   --   --   --  128*  --  132* 169   < > 147*   NA  --   --   --   --  135  --  136 132*   < > 137   POTASSIUM  --   --   --   --  4.0  --  4.2 4.0   < > 4.1   CHLORIDE  --   --   --   --  98  --  97* 94*   < > 100   CO2  --   --   --   --  27  --  31* 29   < > 31*   BUN  --   --   --   --  21.9  --  18.8 18.3   < > 14.3   CR  --   --   --   --  0.85  --  0.75 0.81   < > 0.98   GFRESTIMATED  --   --   --   --  >90  --  >90 >90   < > 86   ANIONGAP  --   --   --   --  10  --  8 9   < > 6*   RL  --   --   --   --  8.6*  --  8.8 9.1   < > 8.7*   * 82 147*   < > 88   < > 154* 145*   < > 144*   ALBUMIN  --   --   --   --   --   --  3.8  --   --  4.1   PROTTOTAL  --   --   --   --   --   --  6.3*  --   --  6.8   BILITOTAL  --   --   --   --   --   --  0.3  --   --  0.2   ALKPHOS  --   --   --   --   --   --  93  --   --  113   ALT   "--   --   --   --   --   --  18  --   --  17   AST  --   --   --   --   --   --  15  --   --  16    < > = values in this interval not displayed.       No results for input(s): \"SED\", \"CRP\" in the last 168 hours.    Recent Labs   Lab 06/17/25  0004   DD <0.27       No results for input(s): \"TSH\" in the last 168 hours.    LIPIDS:  No results for input(s): \"CHOL\", \"HDL\", \"LDL\", \"TRIG\", \"CHOLHDLRATIO\" in the last 25410 hours.    BLOOD GASES:  Recent Labs   Lab 06/18/25  0642 06/17/25  0001 06/14/25  0854   PHV 7.42 7.39 7.34   PO2V 70* 34 33   PCO2V 53* 53* 57*   HCO3V 34* 32* 31*       EKG results:  6/17/2025: Normal sinus rhythm.  No ST or T wave abnormality.    Chest X-ray (personally reviewed) 6/16/2025:  No acute abnormality.     Echocardiogram 6/17/2025:  Basal and mid inferolateral and lateral hypokinesis suspected.  Left ventricular systolic function is normal.  The visual ejection fraction is 55-60%.  The right ventricle is normal in structure, function and size.     Compared to the study from 2023, inferolateral and lateral hypokinesis is a  new finding. Image quality is technically difficult, even with contrast.    Echocardiogram 5/27/2021 ([facility]):  Normal left ventricular size and wall thickness.   Borderline reduced estimated left ventricular ejection fraction at 50-55%.   Regional wall motion abnormality-inferolateral, hypokinetic.   Normal left ventricular diastolic function.   Normal right ventricular size and function.   Mitral valve insufficiency mild.     The estimated pulmonary artery systolic pressure is 27mmHg + RA pressure.   Based on IVC geometry, the right atrial pressure is normal.     Stress Test 6/18/2025:   The nuclear stress test is abnormal.    There is a small area of transmural infarction in the mid to basal inferolateral segment(s) of the left ventricle.  No residual ischemia is identified.    Left ventricular function is normal. The left ventricular ejection fraction at " rest is 59%.    There is no prior study for comparison.    Coronary angiogram 2020 ([facility] [place]):  Conclusions    1. Severe mid RCA disease.  PCI with bare-metal stent.     Recommendations    * Dual antiplatelet therapy for at least 4 to 6 weeks.       Diagnostic Summary     * Left main has no significant coronary artery disease.LAD has no   significant coronary artery disease.Circumflex has minimal disease in the main   vessel.  Small OM with 50 to 60% disease.RCA has severe mid vessel coronary   artery disease.       Interventional Summary     * Balloon angioplasty followed by stenting of the mid RCA with a bare-metal   stent.       Imaging (past 48 hours):  Recent Results (from the past 48 hours)   Echocardiogram Complete   Result Value    LVEF  55-60%    Narrative    [id number]  [id number]  [id number]  [id number]^DOLLY^ZABRINA^CHANELLE     [facility]  Echocardiography Laboratory  [address] [place] [address]     Name: RANI DUARTE  MRN: [id number]  : 1960  Study Date: 2025 12:07 PM  Age: 65 yrs  Gender: Male  Patient Location: [facility]  Reason For Study: Chest Pain, Chest Pain  Ordering Physician: ZABRINA ALBERTO  Referring Physician: Alex Wynn PA-C  Performed By: Judy Orosco     BSA: 1.8 m2  Height: 69 in  Weight: 150 lb  HR: 75  BP: 148/76 mmHg  ______________________________________________________________________________  Procedure  Echocardiogram with two-dimensional, color and spectral Doppler. Definity (NDC  #85775-667) given intravenously.  ______________________________________________________________________________  Interpretation Summary     Basal and mid inferolateral and lateral hypokinesis suspected.  Left ventricular systolic function is normal.  The visual ejection fraction is 55-60%.  The right ventricle is normal in structure, function and size.     Compared to the study from , inferolateral and lateral hypokinesis is a  new  finding. Image quality is technically difficult, even with contrast.  ______________________________________________________________________________  Left Ventricle  The left ventricle is normal in size. There is normal left ventricular wall  thickness. Left ventricular systolic function is normal. The visual ejection  fraction is 55-60%. Diastolic Doppler findings (E/E' ratio and/or other  parameters) suggest left ventricular filling pressures are indeterminate.  Basal and mid inferolateral and lateral hypokinesis suspected.     Right Ventricle  The right ventricle is normal in structure, function and size.     Atria  Normal left atrial size. Right atrial size is normal. There is no atrial shunt  seen.     Mitral Valve  The mitral valve is normal in structure and function. There is trace mitral  regurgitation.     Tricuspid Valve  The tricuspid valve is normal in structure and function. There is trace  tricuspid regurgitation. IVC diameter <2.1 cm collapsing >50% with sniff  suggests a normal RA pressure of 3 mmHg. Right ventricular systolic pressure  could not be approximated due to inadequate tricuspid regurgitation.     Aortic Valve  The aortic valve is normal in structure and function. No aortic regurgitation  is present. No aortic stenosis is present.     Pulmonic Valve  The pulmonic valve is not well seen, but is grossly normal. There is trace  pulmonic valvular regurgitation.     Vessels  Normal size aorta. The inferior vena cava was normal in size with preserved  respiratory variability.     Pericardium  There is no pericardial effusion.     Rhythm  Sinus rhythm was noted.  ______________________________________________________________________________  MMode/2D Measurements & Calculations  IVSd: 0.93 cm     LVIDd: 4.7 cm  LVIDs: 3.1 cm  LVPWd: 0.89 cm  FS: 34.3 %  LV mass(C)d: 142.9 grams  LV mass(C)dI: 78.2 grams/m2  Ao root diam: 3.7 cm  Ao root diam index Ht(cm/m): 2.1  Ao root diam index BSA (cm/m2):  2.0  LA Volume (BP): 37.5 ml  LA Volume Index (BP): 20.5 ml/m2  RV Base: 2.8 cm  RWT: 0.38     TAPSE: 2.0 cm     Doppler Measurements & Calculations  MV E max stefan: 85.0 cm/sec  MV A max stefan: 77.5 cm/sec  MV E/A: 1.1  MV dec time: 0.20 sec  PA acc time: 0.09 sec  E/E' av.3  Lateral E/e': 6.6  Medial E/e': 12.0  RV S Stefan: 13.5 cm/sec     ______________________________________________________________________________  Report approved by: Ron Orozco MD on 2025 01:33 PM         NM Lexiscan stress test (nuc card)   Result Value    Target     Baseline Systolic     Baseline Diastolic BP 83    Last Stress Systolic     Last Stress Diastolic BP 75    Baseline HR 65    Max HR  99    Max Predicted HR  63    Rate Pressure Product 11,385.0    BP 59    Narrative      The nuclear stress test is abnormal.    There is a small area of transmural infarction in the mid to basal   inferolateral segment(s) of the left ventricle.  No residual ischemia is   identified.    Left ventricular function is normal. The left ventricular ejection   fraction at rest is 59%.    There is no prior study for comparison.         Clinically Significant Risk Factors                 # Thrombocytopenia: Lowest platelets = 128 in last 2 days, will monitor for bleeding    # Chronic heart failure with preserved ejection fraction: heart failure noted on problem list and last echo with EF >50%               # Financial/Environmental Concerns: none

## 2025-06-19 NOTE — PROGRESS NOTES
[facility]  Hospitalist Progress Note  Jairo Rendon MD  06/19/2025    Assessment & Plan   Gerson Pearson is a 65 year old male admitted on 6/16/2025 after being admitted for COPD exacerbation from 6/14/25 - 6/16/25.  He was discharged with medications for COPD but returned less than 24 hours with chest pain.  Serial troponin are negative.     New onset of chest pain  CAD status post PCI and BMS in RCA 6/2020  Chronic CHF, recovered EF  Dyslipidemia  Hypertension  Patient reports some chest tightness associated with his COPD exacerbation.  EKG is negative for acute ischemic changes.  Initial troponin within normal limits.  No sign of volume overload/acute CHF.  --PTA ASA, Lipitor, Coreg, hydralazine, lisinopril continued  --TTE shows normal LVEF of 55-60% but he has new WMA including basal and mid inferolateral and lateral hypokinesis, image quality is technically difficult even with contrast.  --  Lexiscan study on 6/19 shows some abnormality with small area of transmural infarction in the mid to basal inferolateral segments of the left ventricle.  Will consult cardiology.       Acute Hypoxic respiratory failure 2/2 COPD exacerbation  Leukocytosis, likely 2/2 steroid use  EMS gave 324 aspirin, 3 sl  nitroglycerin.   , Dimer negative  CXR read as wnl  EKG NSR w/ hyperacute T waves in the anterior leads  --standing and prn duonebs  --oximetry, oxygen  --resume trelelgy on discharge  --outpatient Pulm follow-up during the month of June is highly recommended  --cefepime given recurrent use of IV abx and steroid use but will discontinue after 3 doses.  --asking for mucolytic, will order mucinex 600 mg BID     Progressive abdominal distension  Hx Fundal adenomyomatosis   --miralax  --US abdomen shows no acute findings     Chronic stable conditions, resuming PTA meds  Depression  Anxiety  GERD  Polysubstance use in remission  PreDM  Chronic anemia  Hyponatremia     Diet:   "general  DVT Prophylaxis: scds  Post Catheter: Not present  Lines: None     Cardiac Monitoring: None  Code Status:  Full     Clinically Significant Risk Factors Present on Admission         # Hyponatremia: Lowest Na = 132 mmol/L in last 2 days, will monitor as appropriate  # Hypochloremia: Lowest Cl = 94 mmol/L in last 2 days, will monitor as appropriate        # Drug Induced Platelet Defect: home medication list includes an antiplatelet medication   # Hypertension: Home medication list includes antihypertensive(s)  # Chronic heart failure with preserved ejection fraction: heart failure noted on problem list and last echo with EF >50%        # Financial/Environmental Concerns:            Disposition Plan     Medically Ready for Discharge:  -- anticipate on 6/19 vs 6/20    Disposition:   -- anticipate home    Interval History   -- no acute overnight issues  -- no chest pain  -- Echo reviewed    -Data reviewed today: I reviewed all new labs and imaging over the last 24 hours. I personally reviewed no images or EKG's today.    Physical Exam    , Blood pressure (!) 131/95, pulse 72, temperature 98.3  F (36.8  C), temperature source Oral, resp. rate 16, height 1.753 m (5' 9\"), weight 68 kg (150 lb), SpO2 99%.  Vitals:    06/17/25 0004   Weight: 68 kg (150 lb)     Vital Signs with Ranges  Temp:  [98.3  F (36.8  C)-99.5  F (37.5  C)] 98.3  F (36.8  C)  Pulse:  [69-90] 72  Resp:  [16-18] 16  BP: (124-135)/(68-95) 131/95  SpO2:  [94 %-100 %] 99 %  I/O's Last 24 hours  I/O last 3 completed shifts:  In: 200 [P.O.:200]  Out: 700 [Urine:700]    Constitutional: Awake, alert, cooperative, no apparent distress  Respiratory: Clear to auscultation bilaterally, no crackles or wheezing  Cardiovascular: Regular rate and rhythm, normal S1 and S2   GI: Normal bowel sounds, soft, non-distended, non-tender  Skin/Integumen: No rashes, no cyanosis, no edema  Other:      Medications   All medications were reviewed.  Current " Facility-Administered Medications   Medication Dose Route Frequency Provider Last Rate Last Admin     Current Facility-Administered Medications   Medication Dose Route Frequency Provider Last Rate Last Admin    aspirin (ASA) chewable tablet 81 mg  81 mg Oral Daily Debbie Barnett MD   81 mg at 06/19/25 0836    atorvastatin (LIPITOR) tablet 40 mg  40 mg Oral Daily Debbie Barnett MD   40 mg at 06/19/25 0834    buprenorphine-naloxone (SUBOXONE) 2-0.5 MG sublingual tablet 3 tablet  3 tablet Sublingual Daily Jairo Rendon MD   3 tablet at 06/19/25 0853    buPROPion (WELLBUTRIN XL) 24 hr tablet 300 mg  300 mg Oral KATHERINEM Debbie Barnett MD   300 mg at 06/19/25 0835    carvedilol (COREG) tablet 25 mg  25 mg Oral BID Debbie Barnett MD   25 mg at 06/19/25 0835    ferrous sulfate (FEROSUL) tablet 325 mg  325 mg Oral Daily Debbie Barnett MD   325 mg at 06/19/25 0835    fluticasone (FLONASE) 50 MCG/ACT spray 1 spray  1 spray Both Nostrils Daily Jairo Rendon MD   1 spray at 06/19/25 0834    gabapentin (NEURONTIN) capsule 300 mg  300 mg Oral QAM Debbie Barnett MD   300 mg at 06/19/25 0835    gabapentin (NEURONTIN) capsule 600 mg  600 mg Oral At Bedtime Debbie Barnett MD   600 mg at 06/18/25 2255    guaiFENesin (MUCINEX) 12 hr tablet 600 mg  600 mg Oral BID Jairo Rendon MD   600 mg at 06/19/25 0835    hydrALAZINE (APRESOLINE) tablet 100 mg  100 mg Oral TID Debbie Barnett MD   100 mg at 06/19/25 1303    insulin aspart (NovoLOG) injection (RAPID ACTING)  1-7 Units Subcutaneous TID AC Debbie Barnett MD   1 Units at 06/17/25 1611    insulin aspart (NovoLOG) injection (RAPID ACTING)  1-5 Units Subcutaneous At Bedtime Debbie Barnett MD        insulin aspart (NovoLOG) injection (RAPID ACTING)   Subcutaneous TID w/meals Debbie Barnett MD   15 Units at 06/19/25 1304    ipratropium - albuterol 0.5 mg/2.5 mg (3mg)/3 mL (DUONEB) neb solution 3 mL  3 mL Nebulization Q6H Anibal,  MD Debbie   3 mL at 06/19/25 1412    lisinopril (ZESTRIL) tablet 40 mg  40 mg Oral Daily Debbie Barnett MD   40 mg at 06/19/25 0835    mirtazapine (REMERON) tablet 45 mg  45 mg Oral At Bedtime Debbie Barnett MD   45 mg at 06/18/25 2255    pantoprazole (PROTONIX) EC tablet 40 mg  40 mg Oral Daily Debbie Barnett MD   40 mg at 06/19/25 0834    polyethylene glycol (MIRALAX) Packet 17 g  17 g Oral Q24H Debbie Barnett MD   17 g at 06/19/25 1305    predniSONE (DELTASONE) tablet 40 mg  40 mg Oral Daily Debbie Barnett MD   40 mg at 06/19/25 0835    QUEtiapine (SEROquel) tablet 25 mg  25 mg Oral At Bedtime Debbie Barnett MD   25 mg at 06/18/25 2255        Data   Recent Labs   Lab 06/19/25  1121 06/19/25  0755 06/19/25  0209 06/18/25  0803 06/18/25  0642 06/17/25  0811 06/17/25  0624 06/17/25  0003 06/15/25  0545 06/14/25  0259   WBC  --   --   --   --  11.3*  --  11.5* 16.2*   < > 6.4   HGB  --   --   --   --  10.9*  --  10.8* 11.2*   < > 10.4*   MCV  --   --   --   --  90  --  91 89   < > 94   PLT  --   --   --   --  128*  --  132* 169   < > 147*   NA  --   --   --   --  135  --  136 132*   < > 137   POTASSIUM  --   --   --   --  4.0  --  4.2 4.0   < > 4.1   CHLORIDE  --   --   --   --  98  --  97* 94*   < > 100   CO2  --   --   --   --  27  --  31* 29   < > 31*   BUN  --   --   --   --  21.9  --  18.8 18.3   < > 14.3   CR  --   --   --   --  0.85  --  0.75 0.81   < > 0.98   ANIONGAP  --   --   --   --  10  --  8 9   < > 6*   RL  --   --   --   --  8.6*  --  8.8 9.1   < > 8.7*   * 82 147*   < > 88   < > 154* 145*   < > 144*   ALBUMIN  --   --   --   --   --   --  3.8  --   --  4.1   PROTTOTAL  --   --   --   --   --   --  6.3*  --   --  6.8   BILITOTAL  --   --   --   --   --   --  0.3  --   --  0.2   ALKPHOS  --   --   --   --   --   --  93  --   --  113   ALT  --   --   --   --   --   --  18  --   --  17   AST  --   --   --   --   --   --  15  --   --  16    < > = values in this interval  not displayed.       Recent Results (from the past 24 hours)   NM Lexiscan stress test (nuc card)   Result Value    Target     Baseline Systolic     Baseline Diastolic BP 83    Last Stress Systolic     Last Stress Diastolic BP 75    Baseline HR 65    Max HR  99    Max Predicted HR  63    Rate Pressure Product 11,385.0    BP 59    Narrative      The nuclear stress test is abnormal.    There is a small area of transmural infarction in the mid to basal   inferolateral segment(s) of the left ventricle.  No residual ischemia is   identified.    Left ventricular function is normal. The left ventricular ejection   fraction at rest is 59%.    There is no prior study for comparison.         Jairo Rendon MD  Text Page  (7am to 6pm)

## 2025-06-20 LAB
GLUCOSE BLDC GLUCOMTR-MCNC: 124 MG/DL (ref 70–99)
GLUCOSE BLDC GLUCOMTR-MCNC: 70 MG/DL (ref 70–99)
GLUCOSE BLDC GLUCOMTR-MCNC: 89 MG/DL (ref 70–99)
GLUCOSE BLDC GLUCOMTR-MCNC: 90 MG/DL (ref 70–99)
GLUCOSE BLDC GLUCOMTR-MCNC: 91 MG/DL (ref 70–99)

## 2025-06-20 PROCEDURE — 250N000013 HC RX MED GY IP 250 OP 250 PS 637: Performed by: INTERNAL MEDICINE

## 2025-06-20 PROCEDURE — 250N000013 HC RX MED GY IP 250 OP 250 PS 637: Performed by: STUDENT IN AN ORGANIZED HEALTH CARE EDUCATION/TRAINING PROGRAM

## 2025-06-20 PROCEDURE — 94640 AIRWAY INHALATION TREATMENT: CPT | Mod: 76

## 2025-06-20 PROCEDURE — 250N000009 HC RX 250: Performed by: STUDENT IN AN ORGANIZED HEALTH CARE EDUCATION/TRAINING PROGRAM

## 2025-06-20 PROCEDURE — 120N000001 HC R&B MED SURG/OB

## 2025-06-20 PROCEDURE — 94640 AIRWAY INHALATION TREATMENT: CPT

## 2025-06-20 PROCEDURE — 250N000012 HC RX MED GY IP 250 OP 636 PS 637: Performed by: STUDENT IN AN ORGANIZED HEALTH CARE EDUCATION/TRAINING PROGRAM

## 2025-06-20 PROCEDURE — 999N000157 HC STATISTIC RCP TIME EA 10 MIN

## 2025-06-20 PROCEDURE — 99232 SBSQ HOSP IP/OBS MODERATE 35: CPT | Performed by: INTERNAL MEDICINE

## 2025-06-20 RX ORDER — IPRATROPIUM BROMIDE AND ALBUTEROL SULFATE 2.5; .5 MG/3ML; MG/3ML
3 SOLUTION RESPIRATORY (INHALATION) EVERY 4 HOURS PRN
Status: DISCONTINUED | OUTPATIENT
Start: 2025-06-20 | End: 2025-06-20

## 2025-06-20 RX ADMIN — HYDRALAZINE HYDROCHLORIDE 100 MG: 50 TABLET ORAL at 20:03

## 2025-06-20 RX ADMIN — MIRTAZAPINE 45 MG: 15 TABLET, FILM COATED ORAL at 21:59

## 2025-06-20 RX ADMIN — IPRATROPIUM BROMIDE AND ALBUTEROL SULFATE 3 ML: .5; 3 SOLUTION RESPIRATORY (INHALATION) at 14:23

## 2025-06-20 RX ADMIN — FLUTICASONE PROPIONATE 1 SPRAY: 50 SPRAY, METERED NASAL at 08:58

## 2025-06-20 RX ADMIN — LISINOPRIL 40 MG: 10 TABLET ORAL at 08:56

## 2025-06-20 RX ADMIN — FERROUS SULFATE TAB 325 MG (65 MG ELEMENTAL FE) 325 MG: 325 (65 FE) TAB at 08:56

## 2025-06-20 RX ADMIN — POLYETHYLENE GLYCOL 3350 17 G: 17 POWDER, FOR SOLUTION ORAL at 08:57

## 2025-06-20 RX ADMIN — ASPIRIN 81 MG CHEWABLE TABLET 81 MG: 81 TABLET CHEWABLE at 08:55

## 2025-06-20 RX ADMIN — PANTOPRAZOLE SODIUM 40 MG: 40 TABLET, DELAYED RELEASE ORAL at 08:57

## 2025-06-20 RX ADMIN — GUAIFENESIN 600 MG: 600 TABLET, EXTENDED RELEASE ORAL at 08:56

## 2025-06-20 RX ADMIN — GUAIFENESIN 600 MG: 600 TABLET, EXTENDED RELEASE ORAL at 20:03

## 2025-06-20 RX ADMIN — BUPRENORPHINE AND NALOXONE 3 TABLET: 2; .5 TABLET SUBLINGUAL at 10:22

## 2025-06-20 RX ADMIN — PREDNISONE 40 MG: 20 TABLET ORAL at 08:55

## 2025-06-20 RX ADMIN — IPRATROPIUM BROMIDE AND ALBUTEROL SULFATE 3 ML: .5; 3 SOLUTION RESPIRATORY (INHALATION) at 00:52

## 2025-06-20 RX ADMIN — IPRATROPIUM BROMIDE AND ALBUTEROL SULFATE 3 ML: .5; 3 SOLUTION RESPIRATORY (INHALATION) at 06:51

## 2025-06-20 RX ADMIN — GABAPENTIN 600 MG: 300 CAPSULE ORAL at 21:59

## 2025-06-20 RX ADMIN — GABAPENTIN 300 MG: 300 CAPSULE ORAL at 08:55

## 2025-06-20 RX ADMIN — BUPROPION HYDROCHLORIDE 300 MG: 300 TABLET, EXTENDED RELEASE ORAL at 08:57

## 2025-06-20 RX ADMIN — CARVEDILOL 25 MG: 25 TABLET, FILM COATED ORAL at 20:03

## 2025-06-20 RX ADMIN — CARVEDILOL 25 MG: 25 TABLET, FILM COATED ORAL at 08:55

## 2025-06-20 RX ADMIN — QUETIAPINE FUMARATE 25 MG: 25 TABLET ORAL at 21:59

## 2025-06-20 RX ADMIN — ATORVASTATIN CALCIUM 40 MG: 40 TABLET, FILM COATED ORAL at 08:57

## 2025-06-20 RX ADMIN — HYDRALAZINE HYDROCHLORIDE 100 MG: 50 TABLET ORAL at 08:56

## 2025-06-20 ASSESSMENT — ACTIVITIES OF DAILY LIVING (ADL)
ADLS_ACUITY_SCORE: 58

## 2025-06-20 NOTE — PROGRESS NOTES
[facility]  Hospitalist Progress Note  Jairo Rendon MD  06/20/2025    Assessment & Plan   Gerson Pearson is a 65 year old male admitted on 6/16/2025 after being admitted for COPD exacerbation from 6/14/25 - 6/16/25.  He was discharged with medications for COPD but returned less than 24 hours with chest pain.  Serial troponin are negative.     New onset of chest pain  CAD status post PCI and BMS in RCA 6/2020  Chronic CHF, recovered EF  Dyslipidemia  Hypertension  Patient reports some chest tightness associated with his COPD exacerbation.  EKG is negative for acute ischemic changes.  Initial troponin within normal limits.  No sign of volume overload/acute CHF.  --PTA ASA, Lipitor, Coreg, hydralazine, lisinopril continued  --TTE shows normal LVEF of 55-60% but he has new WMA including basal and mid inferolateral and lateral hypokinesis, image quality is technically difficult even with contrast.  --  Lexiscan study on 6/19 shows some abnormality with small area of transmural infarction in the mid to basal inferolateral segments of the left ventricle.  Will consult cardiology.       Acute Hypoxic respiratory failure 2/2 COPD exacerbation  Leukocytosis, likely 2/2 steroid use  EMS gave 324 aspirin, 3 sl  nitroglycerin.   , Dimer negative  CXR read as wnl  EKG NSR w/ hyperacute T waves in the anterior leads  --standing and prn duonebs  --oximetry, oxygen  --resume trelelgy on discharge  --outpatient Pulm follow-up during the month of June is highly recommended  --cefepime given recurrent use of IV abx and steroid use but will discontinue after 3 doses.  --asking for mucolytic, will order mucinex 600 mg BID     Progressive abdominal distension  Hx Fundal adenomyomatosis   --miralax  --US abdomen shows no acute findings     Chronic stable conditions, resuming PTA meds  Depression  Anxiety  GERD  Polysubstance use in remission  PreDM  Chronic anemia  Hyponatremia     Diet:   "general  DVT Prophylaxis: scds  Post Catheter: Not present  Lines: None     Cardiac Monitoring: None  Code Status:  Full     Clinically Significant Risk Factors Present on Admission         # Hyponatremia: Lowest Na = 132 mmol/L in last 2 days, will monitor as appropriate  # Hypochloremia: Lowest Cl = 94 mmol/L in last 2 days, will monitor as appropriate        # Drug Induced Platelet Defect: home medication list includes an antiplatelet medication   # Hypertension: Home medication list includes antihypertensive(s)  # Chronic heart failure with preserved ejection fraction: heart failure noted on problem list and last echo with EF >50%        # Financial/Environmental Concerns:            Disposition Plan     Medically Ready for Discharge:  -- anticipate 6/21    Disposition:   -- anticipate back to group home    Interval History   -- no acute overnight issues  -- cardiology consult noted  -- feeling a little jittery  -- discussed with RN    -Data reviewed today: I reviewed all new labs and imaging over the last 24 hours. I personally reviewed no images or EKG's today.    Physical Exam    , Blood pressure 105/62, pulse 82, temperature 98.2  F (36.8  C), temperature source Oral, resp. rate 18, height 1.753 m (5' 9\"), weight 68 kg (150 lb), SpO2 95%.  Vitals:    06/17/25 0004   Weight: 68 kg (150 lb)     Vital Signs with Ranges  Temp:  [98.1  F (36.7  C)-99  F (37.2  C)] 98.2  F (36.8  C)  Pulse:  [68-91] 82  Resp:  [16-18] 18  BP: (105-147)/() 105/62  SpO2:  [95 %-97 %] 95 %  I/O's Last 24 hours  I/O last 3 completed shifts:  In: 716 [P.O.:716]  Out: 2400 [Urine:2400]    Constitutional: Awake, alert, cooperative, no apparent distress  Respiratory: Clear to auscultation bilaterally, no crackles or wheezing  Cardiovascular: Regular rate and rhythm, normal S1 and S2   GI: Normal bowel sounds, soft, non-distended, non-tender  Skin/Integumen: No rashes, no cyanosis, no edema  Other:      Medications   All medications " were reviewed.  Current Facility-Administered Medications   Medication Dose Route Frequency Provider Last Rate Last Admin     Current Facility-Administered Medications   Medication Dose Route Frequency Provider Last Rate Last Admin    aspirin (ASA) chewable tablet 81 mg  81 mg Oral Daily Debbie aBrnett MD   81 mg at 06/20/25 0855    atorvastatin (LIPITOR) tablet 40 mg  40 mg Oral Daily Debbie Barnett MD   40 mg at 06/20/25 0857    buprenorphine-naloxone (SUBOXONE) 2-0.5 MG sublingual tablet 3 tablet  3 tablet Sublingual Daily Jairo Rendon MD   3 tablet at 06/20/25 1022    buPROPion (WELLBUTRIN XL) 24 hr tablet 300 mg  300 mg Oral QAM Debbie Barnett MD   300 mg at 06/20/25 0857    carvedilol (COREG) tablet 25 mg  25 mg Oral BID Debbie Barnett MD   25 mg at 06/20/25 0855    ferrous sulfate (FEROSUL) tablet 325 mg  325 mg Oral Daily Debbie Barnett MD   325 mg at 06/20/25 0856    fluticasone (FLONASE) 50 MCG/ACT spray 1 spray  1 spray Both Nostrils Daily Jairo Rendon MD   1 spray at 06/20/25 0858    gabapentin (NEURONTIN) capsule 300 mg  300 mg Oral QAM Debbie Barnett MD   300 mg at 06/20/25 0855    gabapentin (NEURONTIN) capsule 600 mg  600 mg Oral At Bedtime Debbie Barnett MD   600 mg at 06/19/25 2236    guaiFENesin (MUCINEX) 12 hr tablet 600 mg  600 mg Oral BID Jairo Rendon MD   600 mg at 06/20/25 0856    hydrALAZINE (APRESOLINE) tablet 100 mg  100 mg Oral TID Debbie Barnett MD   100 mg at 06/20/25 0856    insulin aspart (NovoLOG) injection (RAPID ACTING)  1-7 Units Subcutaneous TID AC Debbie Barnett MD   1 Units at 06/17/25 1611    insulin aspart (NovoLOG) injection (RAPID ACTING)  1-5 Units Subcutaneous At Bedtime Debbie Barnett MD        insulin aspart (NovoLOG) injection (RAPID ACTING)   Subcutaneous TID w/meals Debbie Barnett MD   9 Units at 06/20/25 1404    lisinopril (ZESTRIL) tablet 40 mg  40 mg Oral Daily Debbie Barnett MD   40 mg at  06/20/25 0856    mirtazapine (REMERON) tablet 45 mg  45 mg Oral At Bedtime Debbie Barnett MD   45 mg at 06/19/25 2236    pantoprazole (PROTONIX) EC tablet 40 mg  40 mg Oral Daily Debbie Barnett MD   40 mg at 06/20/25 0857    polyethylene glycol (MIRALAX) Packet 17 g  17 g Oral Q24H Debbie Barnett MD   17 g at 06/20/25 0857    [START ON 6/21/2025] predniSONE (DELTASONE) tablet 30 mg  30 mg Oral Daily Jairo Rendon MD        QUEtiapine (SEROquel) tablet 25 mg  25 mg Oral At Bedtime Debbie Barnett MD   25 mg at 06/19/25 2236        Data   Recent Labs   Lab 06/20/25  1249 06/20/25  0801 06/20/25  0156 06/18/25  0803 06/18/25  0642 06/17/25  0811 06/17/25  0624 06/17/25  0003 06/15/25  0545 06/14/25  0259   WBC  --   --   --   --  11.3*  --  11.5* 16.2*   < > 6.4   HGB  --   --   --   --  10.9*  --  10.8* 11.2*   < > 10.4*   MCV  --   --   --   --  90  --  91 89   < > 94   PLT  --   --   --   --  128*  --  132* 169   < > 147*   NA  --   --   --   --  135  --  136 132*   < > 137   POTASSIUM  --   --   --   --  4.0  --  4.2 4.0   < > 4.1   CHLORIDE  --   --   --   --  98  --  97* 94*   < > 100   CO2  --   --   --   --  27  --  31* 29   < > 31*   BUN  --   --   --   --  21.9  --  18.8 18.3   < > 14.3   CR  --   --   --   --  0.85  --  0.75 0.81   < > 0.98   ANIONGAP  --   --   --   --  10  --  8 9   < > 6*   RL  --   --   --   --  8.6*  --  8.8 9.1   < > 8.7*   GLC 91 89 90   < > 88   < > 154* 145*   < > 144*   ALBUMIN  --   --   --   --   --   --  3.8  --   --  4.1   PROTTOTAL  --   --   --   --   --   --  6.3*  --   --  6.8   BILITOTAL  --   --   --   --   --   --  0.3  --   --  0.2   ALKPHOS  --   --   --   --   --   --  93  --   --  113   ALT  --   --   --   --   --   --  18  --   --  17   AST  --   --   --   --   --   --  15  --   --  16    < > = values in this interval not displayed.       No results found for this or any previous visit (from the past 24 hours).      Jairo Rendon MD  Text Page   (7am to 6pm)

## 2025-06-20 NOTE — PLAN OF CARE
Goal Outcome Evaluation:       Summary: 6/19/2025 [id number]  PRIMARY Concern: COPD exacerbation   SAFETY RISK Concerns (fall risk, behaviors, etc.): None       Aggression Tool Color: Green  Isolation/Type: None   Tests/Procedures for NEXT shift:  Consults? (Pending/following, signed-off?) Cards consult complete; CM/SW following  Where is patient from? (Home, TCU, etc.): [facility]   Other Important info for NEXT shift: Baseline 2-3 L O2; Nuc Med Stress test complete: Cardiology consulted: signed off; BG checks: sliding scale insulin+carb count  Anticipated DC date & active delays: 6/20/25 to group home pending  _____________________________________________________________________________  SUMMARY NOTE:  Orientation/Cognitive: A&O x4  Inpatient Status  Mobility Level/Assist Equipment: SBA/IND  Antibiotics & Plan (IV/po, length of tx left): no  Pain Management: Patient denies pain  Tele/VS/O2: VSS 2L NC  ABNL Lab/BG: BG checks  Diet: Regular   Bowel/Bladder: Continent  Skin Concerns: Dry, flaky feet.  Drains/Devices: PIV SL, continuous pulse ox.   Patient Stated Goal for Today: complete lexiscan

## 2025-06-20 NOTE — PLAN OF CARE
"Goal Outcome Evaluation:    Summary: 6/20/25: [id number]  PRIMARY Concern: COPD exacerbation   SAFETY RISK Concerns (fall risk, behaviors, etc.): None    Aggression Tool Color: Green  Isolation/Type: None   Tests/Procedures for NEXT shift: N/A  Consults? (Pending/following, signed-off?) Cards consult complete; CM/SW following  Where is patient from? (Home, TCU, etc.): [facility]   Other Important info for NEXT shift:Cardiology consulted/signed off; BG checks: sliding scale insulin+carb count  Anticipated DC date & active delays: 6/21/25 to group home pending  _____________________________________________________________________________  SUMMARY NOTE:  Orientation/Cognitive: A&O x4  Inpatient Status  Mobility Level/Assist Equipment: SBA in hallway but Independent in room  Antibiotics & Plan (IV/po, length of tx left): no  Pain Management: Patient denies pain  Tele/VS/O2: VSS, 2L NC ( baseline)   ABNL Lab/BG: See results  Diet: Regular/ tolerating   Bowel/Bladder: Continent. Voiding adequately. Had BM today.   Skin Concerns: Dry, flaky feet.  Drains/Devices: PIV SL, continuous pulse ox.   Other info: pt reports feeling a little \"jittery\"; otherwise breathing is improving. Soft BP this afternoon but no c/o dizziness or lightheadedness.         "

## 2025-06-20 NOTE — PLAN OF CARE
Goal Outcome Evaluation:      Summary: 6/19-20/2025 [id number]  PRIMARY Concern: COPD exacerbation   SAFETY RISK Concerns (fall risk, behaviors, etc.): None       Aggression Tool Color: Green  Isolation/Type: None   Tests/Procedures for NEXT shift:  Consults? (Pending/following, signed-off?) Cards consult complete; CM/SW following  Where is patient from? (Home, TCU, etc.): [facility]   Other Important info for NEXT shift: Baseline 2-3 L O2; Nuc Med Stress test complete: Cardiology consulted: signed off; BG checks: sliding scale insulin+carb count  Anticipated DC date & active delays: 6/20/25 to group home pending  _____________________________________________________________________________  SUMMARY NOTE:  Orientation/Cognitive: A&O x4  Inpatient Status  Mobility Level/Assist Equipment: SBA/IND  Antibiotics & Plan (IV/po, length of tx left): no  Pain Management: Patient denies pain  Tele/VS/O2: VSS 2L NC  ABNL Lab/BG: BG 82,90  Diet: Regular   Bowel/Bladder: Continent  Skin Concerns: Dry, flaky feet.  Drains/Devices: PIV SL, continuous pulse ox.   Patient Stated Goal for Today: complete lexiscan

## 2025-06-21 VITALS
RESPIRATION RATE: 18 BRPM | DIASTOLIC BLOOD PRESSURE: 70 MMHG | SYSTOLIC BLOOD PRESSURE: 128 MMHG | BODY MASS INDEX: 22.22 KG/M2 | TEMPERATURE: 98.9 F | HEIGHT: 69 IN | HEART RATE: 91 BPM | WEIGHT: 150 LBS | OXYGEN SATURATION: 99 %

## 2025-06-21 LAB
GLUCOSE BLDC GLUCOMTR-MCNC: 130 MG/DL (ref 70–99)
GLUCOSE BLDC GLUCOMTR-MCNC: 139 MG/DL (ref 70–99)
GLUCOSE BLDC GLUCOMTR-MCNC: 89 MG/DL (ref 70–99)

## 2025-06-21 PROCEDURE — 250N000013 HC RX MED GY IP 250 OP 250 PS 637: Performed by: INTERNAL MEDICINE

## 2025-06-21 PROCEDURE — 99239 HOSP IP/OBS DSCHRG MGMT >30: CPT | Performed by: INTERNAL MEDICINE

## 2025-06-21 PROCEDURE — 94640 AIRWAY INHALATION TREATMENT: CPT

## 2025-06-21 PROCEDURE — 999N000157 HC STATISTIC RCP TIME EA 10 MIN

## 2025-06-21 PROCEDURE — 250N000009 HC RX 250: Performed by: STUDENT IN AN ORGANIZED HEALTH CARE EDUCATION/TRAINING PROGRAM

## 2025-06-21 PROCEDURE — 250N000013 HC RX MED GY IP 250 OP 250 PS 637: Performed by: STUDENT IN AN ORGANIZED HEALTH CARE EDUCATION/TRAINING PROGRAM

## 2025-06-21 PROCEDURE — 250N000012 HC RX MED GY IP 250 OP 636 PS 637: Performed by: INTERNAL MEDICINE

## 2025-06-21 RX ORDER — PREDNISONE 10 MG/1
TABLET ORAL
Status: SHIPPED
Start: 2025-06-21

## 2025-06-21 RX ADMIN — ASPIRIN 81 MG CHEWABLE TABLET 81 MG: 81 TABLET CHEWABLE at 08:20

## 2025-06-21 RX ADMIN — CARVEDILOL 25 MG: 25 TABLET, FILM COATED ORAL at 08:21

## 2025-06-21 RX ADMIN — HYDRALAZINE HYDROCHLORIDE 100 MG: 50 TABLET ORAL at 08:20

## 2025-06-21 RX ADMIN — PREDNISONE 30 MG: 20 TABLET ORAL at 08:20

## 2025-06-21 RX ADMIN — ATORVASTATIN CALCIUM 40 MG: 40 TABLET, FILM COATED ORAL at 08:20

## 2025-06-21 RX ADMIN — HYDRALAZINE HYDROCHLORIDE 100 MG: 50 TABLET ORAL at 14:04

## 2025-06-21 RX ADMIN — IPRATROPIUM BROMIDE AND ALBUTEROL SULFATE 3 ML: .5; 3 SOLUTION RESPIRATORY (INHALATION) at 12:37

## 2025-06-21 RX ADMIN — ACETAMINOPHEN 650 MG: 325 TABLET, FILM COATED ORAL at 15:57

## 2025-06-21 RX ADMIN — FLUTICASONE PROPIONATE 1 SPRAY: 50 SPRAY, METERED NASAL at 09:43

## 2025-06-21 RX ADMIN — POLYETHYLENE GLYCOL 3350 17 G: 17 POWDER, FOR SOLUTION ORAL at 08:19

## 2025-06-21 RX ADMIN — GABAPENTIN 300 MG: 300 CAPSULE ORAL at 08:20

## 2025-06-21 RX ADMIN — BUPROPION HYDROCHLORIDE 300 MG: 300 TABLET, EXTENDED RELEASE ORAL at 08:20

## 2025-06-21 RX ADMIN — PANTOPRAZOLE SODIUM 40 MG: 40 TABLET, DELAYED RELEASE ORAL at 08:21

## 2025-06-21 RX ADMIN — LISINOPRIL 40 MG: 10 TABLET ORAL at 08:20

## 2025-06-21 RX ADMIN — FERROUS SULFATE TAB 325 MG (65 MG ELEMENTAL FE) 325 MG: 325 (65 FE) TAB at 08:21

## 2025-06-21 RX ADMIN — GUAIFENESIN 600 MG: 600 TABLET, EXTENDED RELEASE ORAL at 08:21

## 2025-06-21 RX ADMIN — BUPRENORPHINE AND NALOXONE 3 TABLET: 2; .5 TABLET SUBLINGUAL at 10:16

## 2025-06-21 ASSESSMENT — ACTIVITIES OF DAILY LIVING (ADL)
ADLS_ACUITY_SCORE: 58

## 2025-06-21 NOTE — PROGRESS NOTES
[facility]  Hospitalist Progress Note  Jairo Rendon MD  06/21/2025    Assessment & Plan   Gerson Pearson is a 65 year old male admitted on 6/16/2025 after being admitted for COPD exacerbation from 6/14/25 - 6/16/25.  He was discharged with medications for COPD but returned less than 24 hours with chest pain.  Serial troponin are negative.     New onset of chest pain  CAD status post PCI and BMS in RCA 6/2020  Chronic CHF, recovered EF  Dyslipidemia  Hypertension  Patient reports some chest tightness associated with his COPD exacerbation.  EKG is negative for acute ischemic changes.  Initial troponin within normal limits.  No sign of volume overload/acute CHF.  --PTA ASA, Lipitor, Coreg, hydralazine, lisinopril continued  --TTE shows normal LVEF of 55-60% but he has new WMA including basal and mid inferolateral and lateral hypokinesis, image quality is technically difficult even with contrast.  --  Lexiscan study on 6/19 shows some abnormality with small area of transmural infarction in the mid to basal inferolateral segments of the left ventricle.  Will consult cardiology.       Acute Hypoxic respiratory failure 2/2 COPD exacerbation  Leukocytosis, likely 2/2 steroid use  EMS gave 324 aspirin, 3 sl  nitroglycerin.   , Dimer negative  CXR read as wnl  EKG NSR w/ hyperacute T waves in the anterior leads  --standing and prn duonebs  --oximetry, oxygen  --resume trelelgy on discharge  --outpatient Pulm follow-up during the month of June is highly recommended  --cefepime given recurrent use of IV abx and steroid use but will discontinue after 3 doses.  -- mucolytic, mucinex 600 mg BID     Progressive abdominal distension  Hx Fundal adenomyomatosis   --miralax  --US abdomen shows no acute findings     Chronic stable conditions, resuming PTA meds  Depression  Anxiety  GERD  Polysubstance use in remission  PreDM  Chronic anemia  Hyponatremia     Diet:  general  DVT Prophylaxis:  "scds  Post Catheter: Not present  Lines: None     Cardiac Monitoring: None  Code Status:  Full     Clinically Significant Risk Factors Present on Admission         # Hyponatremia: Lowest Na = 132 mmol/L in last 2 days, will monitor as appropriate  # Hypochloremia: Lowest Cl = 94 mmol/L in last 2 days, will monitor as appropriate        # Drug Induced Platelet Defect: home medication list includes an antiplatelet medication   # Hypertension: Home medication list includes antihypertensive(s)  # Chronic heart failure with preserved ejection fraction: heart failure noted on problem list and last echo with EF >50%        # Financial/Environmental Concerns:            Disposition Plan     Medically Ready for Discharge:  -- anticipate 6/21    Disposition:   -- anticipate back to group home    Interval History   -- no acute overnight issues  -- discussed with RN and social work    -Data reviewed today: I reviewed all new labs and imaging over the last 24 hours. I personally reviewed no images or EKG's today.    Physical Exam    , Blood pressure 92/58, pulse 88, temperature 98.9  F (37.2  C), temperature source Oral, resp. rate 18, height 1.753 m (5' 9\"), weight 68 kg (150 lb), SpO2 100%.  Vitals:    06/17/25 0004   Weight: 68 kg (150 lb)     Vital Signs with Ranges  Temp:  [98.2  F (36.8  C)-99.4  F (37.4  C)] 98.9  F (37.2  C)  Pulse:  [75-88] 88  Resp:  [18] 18  BP: ()/() 92/58  SpO2:  [94 %-100 %] 100 %  I/O's Last 24 hours  I/O last 3 completed shifts:  In: 716 [P.O.:716]  Out: 1550 [Urine:1550]    Constitutional: Awake, alert, cooperative, no apparent distress  Respiratory: Clear to auscultation bilaterally, no crackles or wheezing  Cardiovascular: Regular rate and rhythm, normal S1 and S2   GI: Normal bowel sounds, soft, non-distended, non-tender  Skin/Integumen: No rashes, no cyanosis, no edema  Other:      Medications   All medications were reviewed.  Current Facility-Administered Medications "   Medication Dose Route Frequency Provider Last Rate Last Admin     Current Facility-Administered Medications   Medication Dose Route Frequency Provider Last Rate Last Admin    aspirin (ASA) chewable tablet 81 mg  81 mg Oral Daily Debbie Barnett MD   81 mg at 06/21/25 0820    atorvastatin (LIPITOR) tablet 40 mg  40 mg Oral Daily Debbie Barnett MD   40 mg at 06/21/25 0820    buprenorphine-naloxone (SUBOXONE) 2-0.5 MG sublingual tablet 3 tablet  3 tablet Sublingual Daily Jairo Rendon MD   3 tablet at 06/21/25 1016    buPROPion (WELLBUTRIN XL) 24 hr tablet 300 mg  300 mg Oral QAM Debbie Barnett MD   300 mg at 06/21/25 0820    carvedilol (COREG) tablet 25 mg  25 mg Oral BID Debbie Barnett MD   25 mg at 06/21/25 0821    ferrous sulfate (FEROSUL) tablet 325 mg  325 mg Oral Daily Debbie Barnett MD   325 mg at 06/21/25 0821    fluticasone (FLONASE) 50 MCG/ACT spray 1 spray  1 spray Both Nostrils Daily Jairo Rendon MD   1 spray at 06/21/25 0943    gabapentin (NEURONTIN) capsule 300 mg  300 mg Oral QAM Debbie Barnett MD   300 mg at 06/21/25 0820    gabapentin (NEURONTIN) capsule 600 mg  600 mg Oral At Bedtime Debbie Barnett MD   600 mg at 06/20/25 2159    guaiFENesin (MUCINEX) 12 hr tablet 600 mg  600 mg Oral BID Jairo Rendon MD   600 mg at 06/21/25 0821    hydrALAZINE (APRESOLINE) tablet 100 mg  100 mg Oral TID Debbie Barnett MD   100 mg at 06/21/25 0820    insulin aspart (NovoLOG) injection (RAPID ACTING)  1-7 Units Subcutaneous TID AC Debbie Barnett MD   1 Units at 06/17/25 1611    insulin aspart (NovoLOG) injection (RAPID ACTING)  1-5 Units Subcutaneous At Bedtime Debbie Barnett MD        insulin aspart (NovoLOG) injection (RAPID ACTING)   Subcutaneous TID w/meals Debbie Barnett MD   10 Units at 06/21/25 0944    lisinopril (ZESTRIL) tablet 40 mg  40 mg Oral Daily Debbie Barnett MD   40 mg at 06/21/25 0820    mirtazapine (REMERON) tablet 45 mg  45 mg Oral  At Bedtime Debbie Barnett MD   45 mg at 06/20/25 2159    pantoprazole (PROTONIX) EC tablet 40 mg  40 mg Oral Daily Debbie Barnett MD   40 mg at 06/21/25 0821    polyethylene glycol (MIRALAX) Packet 17 g  17 g Oral Q24H Debbie Barnett MD   17 g at 06/21/25 0819    predniSONE (DELTASONE) tablet 30 mg  30 mg Oral Daily Jairo Rendon MD   30 mg at 06/21/25 0820    QUEtiapine (SEROquel) tablet 25 mg  25 mg Oral At Bedtime Debbie Barnett MD   25 mg at 06/20/25 2159        Data   Recent Labs   Lab 06/21/25  0827 06/21/25  0206 06/20/25  2120 06/18/25  0803 06/18/25  0642 06/17/25  0811 06/17/25  0624 06/17/25  0003   WBC  --   --   --   --  11.3*  --  11.5* 16.2*   HGB  --   --   --   --  10.9*  --  10.8* 11.2*   MCV  --   --   --   --  90  --  91 89   PLT  --   --   --   --  128*  --  132* 169   NA  --   --   --   --  135  --  136 132*   POTASSIUM  --   --   --   --  4.0  --  4.2 4.0   CHLORIDE  --   --   --   --  98  --  97* 94*   CO2  --   --   --   --  27  --  31* 29   BUN  --   --   --   --  21.9  --  18.8 18.3   CR  --   --   --   --  0.85  --  0.75 0.81   ANIONGAP  --   --   --   --  10  --  8 9   RL  --   --   --   --  8.6*  --  8.8 9.1   GLC 89 130* 124*   < > 88   < > 154* 145*   ALBUMIN  --   --   --   --   --   --  3.8  --    PROTTOTAL  --   --   --   --   --   --  6.3*  --    BILITOTAL  --   --   --   --   --   --  0.3  --    ALKPHOS  --   --   --   --   --   --  93  --    ALT  --   --   --   --   --   --  18  --    AST  --   --   --   --   --   --  15  --     < > = values in this interval not displayed.       No results found for this or any previous visit (from the past 24 hours).      Jairo Rendon MD  Text Page  (7am to 6pm)

## 2025-06-21 NOTE — PLAN OF CARE
June 21, 2025  Shift:[id number]  Gerson Pearson  65 year old  YOB: 1960    Reason for admission:COPD exacerbation (H) [J44.1]    Cognition/Mentation:A&Ox4  VS:VSS on RA  GI:WDL  :WDL  Pain:Denies pain  Drains/Lines:PIV SL   Activity:Independent  Diet:Regular with BG checks  Discharge:Back to group home. Ride will be at door 6 at 3 pm. All belongings are with the patient.

## 2025-06-21 NOTE — PROGRESS NOTES
Care Management Follow Up    Length of Stay (days): 4    Expected Discharge Date: 06/21/2025     Concerns to be Addressed: discharge planning     Patient plan of care discussed at interdisciplinary rounds: Yes    Anticipated Discharge Disposition: Group Home              Anticipated Discharge Services: None  Anticipated Discharge DME: Oxygen    Patient/family educated on Medicare website which has current facility and service quality ratings: no  Education Provided on the Discharge Plan: No  Patient/Family in Agreement with the Plan: yes    Referrals Placed by CM/SW:    Private pay costs discussed: Not applicable    Discussed  Partnership in Safe Discharge Planning  document with patient/family: No     Handoff Completed: No, handoff not indicated or clinically appropriate    Additional Information:  Following for discharge planning back to .  Per notes may be ready today.   CC called [facility] from the  at [phone]. Left message to discuss discharge and transportation  Per previous notes:   They use [facility] and new meds can go there. Fax number is [phone].     Next Steps: Await call back from . Charge RN updated.     Nakia Ragsdale RN      Increase fluids.  Continue home medication. Monitor blood pressure and keep record. Outpatient cardiac stress test.  Follow up with PCP Monday. Return to ED if condition does not improve or worsens

## 2025-06-21 NOTE — PROGRESS NOTES
Care Management Discharge Note    Discharge Date: 06/21/2025       Discharge Disposition: Group Home    Discharge Services: None    Discharge DME: Oxygen    Discharge Transportation: agency, other (see comments)    Private pay costs discussed: Not applicable    Does the patient's insurance plan have a 3 day qualifying hospital stay waiver?  No    PAS Confirmation Code:    Patient/family educated on Medicare website which has current facility and service quality ratings: no    Education Provided on the Discharge Plan: No  Persons Notified of Discharge Plans: , Bedside RN  Patient/Family in Agreement with the Plan: yes    Handoff Referral Completed: No, handoff not indicated or clinically appropriate    Additional Information:  Pt medically ready for discharge Was able to reach [facility] ([phone]).  They can  Pt.  They will bring o2 with. They have all the medications they need.    Page to MD.  Orders are in.  Orders faxed to [phone] via [facility]  Bedside RN aware and will review AVS and send packet with Pt    Pt should be at door 6 at 3pm for [facility] to .     Nakia Ragsdale, RN

## 2025-06-21 NOTE — PLAN OF CARE
Summary: 6/21/2025 [id number]     PRIMARY Concern: COPD exacerbation   SAFETY RISK Concerns (fall risk, behaviors, etc.): None       Aggression Tool Color: Green  Isolation/Type: None   Tests/Procedures for NEXT shift:  Consults? (Pending/following, signed-off?) Cards consult complete; CM/SW following  Where is patient from? (Home, TCU, etc.): [facility]   Other Important info for NEXT shift: Baseline 2-3 L O2; Nuc Med Stress test complete: Cardiology consulted: signed off; BG checks: sliding scale insulin+carb count  Anticipated DC date & active delays: Plan for 6/21/25  _____________________________________________________________________________  SUMMARY NOTE:  Orientation/Cognitive: A&Ox4  Inpatient Status  Mobility Level/Assist Equipment: IND in room  Antibiotics & Plan (IV/po, length of tx left): no  Pain Management: Denies  Tele/VS/O2: VSS 2L NC- 3L when ambulating  ABNL Lab/BG: See Chart  Diet: Regular   Bowel/Bladder: Continent- uses urinal at bedside  Skin Concerns: WNL  Drains/Devices: PIV SL, continuous pulse ox.   Patient Stated Goal for Today: to go home

## 2025-06-21 NOTE — PROGRESS NOTES
Summary: 6/20/2025 [id number]  PRIMARY Concern: COPD exacerbation   SAFETY RISK Concerns (fall risk, behaviors, etc.): None       Aggression Tool Color: Green  Isolation/Type: None   Tests/Procedures for NEXT shift:  Consults? (Pending/following, signed-off?) Cards consult complete; CM/SW following  Where is patient from? (Home, TCU, etc.): [facility]   Other Important info for NEXT shift: Baseline 2-3 L O2; Nuc Med Stress test complete: Cardiology consulted: signed off; BG checks: sliding scale insulin+carb count  Anticipated DC date & active delays: Plan for 6/21/25  _____________________________________________________________________________  SUMMARY NOTE:  Orientation/Cognitive: A&Ox4  Inpatient Status  Mobility Level/Assist Equipment: IND in room  Antibiotics & Plan (IV/po, length of tx left): no  Pain Management: Denies  Tele/VS/O2: VSS 2L NC- 3L when ambulating  ABNL Lab/BG: BG- 70 , 124  Diet: Regular   Bowel/Bladder: Continent- uses urinal at bedside  Skin Concerns: WNL  Drains/Devices: PIV SL, continuous pulse ox.   Patient Stated Goal for Today: Home tomorrow

## 2025-06-21 NOTE — DISCHARGE SUMMARY
[facility]  Hospitalist Discharge Summary      Date of Admission:  6/16/2025  Date of Discharge:  6/21/2025  Discharging Provider: Jairo Rendon MD  Discharge Service: Hospitalist Service    Discharge Diagnoses      New onset of chest pain  CAD status post PCI and BMS in RCA 6/2020  Chronic CHF, recovered EF  Dyslipidemia  Hypertension        Acute Hypoxic respiratory failure 2/2 COPD exacerbation  Leukocytosis, likely 2/2 steroid use       Progressive abdominal distension  Hx Fundal adenomyomatosis        Chronic stable conditions, resuming PTA meds    Disposition Plan     Medically Ready for Discharge:  -- anticipate 6/21     Disposition:   -- anticipate back to group home    Clinically Significant Risk Factors          Follow-ups Needed After Discharge   {Additional follow-up instructions/to-do's for PCP     Unresulted Labs Ordered in the Past 30 Days of this Admission       No orders found from 5/17/2025 to 6/17/2025.        These results will be followed up by PCP    Discharge Disposition   Discharged to home  Condition at discharge: Stable    Hospital Course   Gerson Pearson is a 65 year old male admitted on 6/16/2025 after being admitted for COPD exacerbation from 6/14/25 - 6/16/25.  He was discharged with medications for COPD but returned less than 24 hours with chest pain.  Serial troponin are negative.     New onset of chest pain  CAD status post PCI and BMS in RCA 6/2020  Chronic CHF, recovered EF  Dyslipidemia  Hypertension  Patient reports some chest tightness associated with his COPD exacerbation.  EKG is negative for acute ischemic changes.  Initial troponin within normal limits.  No sign of volume overload/acute CHF.  --PTA ASA, Lipitor, Coreg, hydralazine, lisinopril continued  --TTE shows normal LVEF of 55-60% but he has new WMA including basal and mid inferolateral and lateral hypokinesis, image quality is technically difficult even with contrast.  --  Lexiscan  study on 6/19 shows some abnormality with small area of transmural infarction in the mid to basal inferolateral segments of the left ventricle.    -- cardiology consulted and showed no ischemia but transmural infarction, no further cardiac testing recommended     Acute Hypoxic respiratory failure 2/2 COPD exacerbation  Leukocytosis, likely 2/2 steroid use  EMS gave 324 aspirin, 3 sl  nitroglycerin.   , Dimer negative  CXR read as wnl  EKG NSR w/ hyperacute T waves in the anterior leads  --standing and prn duonebs  --oximetry, oxygen  --resume trelelgy on discharge  --outpatient Pulm follow-up during the month of June is highly recommended  --cefepime given recurrent use of IV abx and steroid use but will discontinue after 3 doses.  -- mucolytic prn     Progressive abdominal distension  Hx Fundal adenomyomatosis   --miralax  --US abdomen shows no acute findings     Chronic stable conditions, resuming PTA meds  Depression  Anxiety  GERD  Polysubstance use in remission  PreDM  Chronic anemia  Hyponatremia     Diet:  regular  DVT Prophylaxis: scds  Post Catheter: Not present  Lines: None     Cardiac Monitoring: None  Code Status:  Full     Clinically Significant Risk Factors Present on Admission         # Hyponatremia: Lowest Na = 132 mmol/L in last 2 days, will monitor as appropriate  # Hypochloremia: Lowest Cl = 94 mmol/L in last 2 days, will monitor as appropriate        # Drug Induced Platelet Defect: home medication list includes an antiplatelet medication   # Hypertension: Home medication list includes antihypertensive(s)  # Chronic heart failure with preserved ejection fraction: heart failure noted on problem list and last echo with EF >50%        # Financial/Environmental Concerns:            Disposition Plan     Medically Ready for Discharge:  -- anticipate 6/21    Disposition:   -- anticipate back to group home    Consultations This Hospital Stay   CARE MANAGEMENT / SOCIAL WORK IP  CONSULT  CARDIOLOGY IP CONSULT    Code Status   Full Code    Time Spent on this Encounter   I, Jairo Rendon MD, personally saw the patient today and spent greater than 30 minutes discharging this patient.       Jairo Rendon MD  [facility] EXTENDED RECOVERY AND SHORT STAY  [address] [address]  Phone: [phone]  ______________________________________________________________________    Physical Exam   Vital Signs: Temp: 98.9  F (37.2  C) Temp src: Oral BP: 92/58 Pulse: 88   Resp: 18 SpO2: 99 % O2 Device: Nasal cannula with humidification Oxygen Delivery: 2 LPM  Weight: 150 lbs 0 oz         Primary Care Physician   Alex Wynn    Discharge Orders      Reason for your hospital stay    Admit with chest pain and had negative cardiac work up     Activity    Your activity upon discharge: activity as tolerated     Reason for your hospital stay    Admit with COPD     Activity    Your activity upon discharge: activity as tolerated     NO Follow-up Care Needed    NO follow-up care needed for this patient.     Diet    Follow this diet upon discharge: Current Diet:Orders Placed This Encounter      Regular Diet Adult       Significant Results and Procedures   Most Recent 3 CBC's:  Recent Labs   Lab Test 06/18/25  0642 06/17/25  0624 06/17/25  0003   WBC 11.3* 11.5* 16.2*   HGB 10.9* 10.8* 11.2*   MCV 90 91 89   * 132* 169     Most Recent 3 BMP's:  Recent Labs   Lab Test 06/21/25  1239 06/21/25  0827 06/21/25  0206 06/18/25  0803 06/18/25  0642 06/17/25  0811 06/17/25  0624 06/17/25  0003   NA  --   --   --   --  135  --  136 132*   POTASSIUM  --   --   --   --  4.0  --  4.2 4.0   CHLORIDE  --   --   --   --  98  --  97* 94*   CO2  --   --   --   --  27  --  31* 29   BUN  --   --   --   --  21.9  --  18.8 18.3   CR  --   --   --   --  0.85  --  0.75 0.81   ANIONGAP  --   --   --   --  10  --  8 9   RL  --   --   --   --  8.6*  --  8.8 9.1   * 89 130*   < >  88   < > 154* 145*    < > = values in this interval not displayed.   ,   Results for orders placed or performed during the hospital encounter of 06/16/25   XR Chest Port 1 View    Narrative    EXAM: XR CHEST PORT 1 VIEW  LOCATION: [facility]  DATE: 6/17/2025    INDICATION: sob, chest tighthness  COMPARISON: 6/14/2025.    FINDINGS: The heart size is normal. Mild scarring at the lung bases. The lungs are otherwise clear. No pneumothorax.      Impression    IMPRESSION: No acute abnormality.   US Abdomen Complete    Narrative    EXAM: US ABDOMEN COMPLETE  LOCATION: [facility]  DATE: 6/17/2025    INDICATION: progressive abdmonial distension, hx fundal adenomyomatosis  COMPARISON: Abdomen and pelvis CT from 01/12/2025.  TECHNIQUE: Complete abdominal ultrasound.    FINDINGS:    GALLBLADDER: No cholelithiasis, significant gallbladder distention, wall thickening, pericholecystic fluid, or sonographic Estrada sign. Known gallbladder fundal adenomyomatosis is better appreciated on prior CT. This is a benign condition of limited   clinical implications.    BILE DUCTS: No biliary dilatation. The common duct measures 3 mm.    LIVER: Normal parenchyma with smooth contour. No focal mass. The portal vein is patent with flow in the normal direction.    RIGHT KIDNEY: Roughly 9 x 5 x 5 cm. Normal echogenicity with no hydronephrosis or mass.     LEFT KIDNEY: Roughly 9 x 5 x 4 cm. Normal echogenicity with no hydronephrosis or mass.     SPLEEN: Normal.    PANCREAS: The pancreas is largely obscured by overlying gas.    AORTA: Normal in caliber.     IVC: Normal where visualized.    No ascites.      Impression    IMPRESSION:  1.  No acute findings.  2.  No cholelithiasis or cholecystitis.   Echocardiogram Complete     Value    LVEF  55-60%    Narrative    [id number]  [id number]  [id number]  [id number]^DOLLY^ZABRINA^CHANELLE     [facility]  Echocardiography Laboratory  [address] [place] [address]     Name: RANI DUARTE  MRN: [id number]  : 1960  Study Date: 2025 12:07 PM  Age: 65 yrs  Gender: Male  Patient Location: [facility]  Reason For Study: Chest Pain, Chest Pain  Ordering Physician: ZABRINA ALBERTO  Referring Physician: Alex Wynn PA-C  Performed By: Judy Orosco     BSA: 1.8 m2  Height: 69 in  Weight: 150 lb  HR: 75  BP: 148/76 mmHg  ______________________________________________________________________________  Procedure  Echocardiogram with two-dimensional, color and spectral Doppler. Definity (NDC  #[id number]) given intravenously.  ______________________________________________________________________________  Interpretation Summary     Basal and mid inferolateral and lateral hypokinesis suspected.  Left ventricular systolic function is normal.  The visual ejection fraction is 55-60%.  The right ventricle is normal in structure, function and size.     Compared to the study from , inferolateral and lateral hypokinesis is a  new finding. Image quality is technically difficult, even with contrast.  ______________________________________________________________________________  Left Ventricle  The left ventricle is normal in size. There is normal left ventricular wall  thickness. Left ventricular systolic function is normal. The visual ejection  fraction is 55-60%. Diastolic Doppler findings (E/E' ratio and/or other  parameters) suggest left ventricular filling pressures are indeterminate.  Basal and mid inferolateral and lateral hypokinesis suspected.     Right Ventricle  The right ventricle is normal in structure, function and size.     Atria  Normal left atrial size. Right atrial size is normal. There is no atrial shunt  seen.     Mitral Valve  The mitral valve is normal in structure and function. There is trace mitral  regurgitation.     Tricuspid Valve  The tricuspid valve is normal in structure and function. There is trace  tricuspid  regurgitation. IVC diameter <2.1 cm collapsing >50% with sniff  suggests a normal RA pressure of 3 mmHg. Right ventricular systolic pressure  could not be approximated due to inadequate tricuspid regurgitation.     Aortic Valve  The aortic valve is normal in structure and function. No aortic regurgitation  is present. No aortic stenosis is present.     Pulmonic Valve  The pulmonic valve is not well seen, but is grossly normal. There is trace  pulmonic valvular regurgitation.     Vessels  Normal size aorta. The inferior vena cava was normal in size with preserved  respiratory variability.     Pericardium  There is no pericardial effusion.     Rhythm  Sinus rhythm was noted.  ______________________________________________________________________________  MMode/2D Measurements & Calculations  IVSd: 0.93 cm     LVIDd: 4.7 cm  LVIDs: 3.1 cm  LVPWd: 0.89 cm  FS: 34.3 %  LV mass(C)d: 142.9 grams  LV mass(C)dI: 78.2 grams/m2  Ao root diam: 3.7 cm  Ao root diam index Ht(cm/m): 2.1  Ao root diam index BSA (cm/m2): 2.0  LA Volume (BP): 37.5 ml  LA Volume Index (BP): 20.5 ml/m2  RV Base: 2.8 cm  RWT: 0.38     TAPSE: 2.0 cm     Doppler Measurements & Calculations  MV E max stefan: 85.0 cm/sec  MV A max stefan: 77.5 cm/sec  MV E/A: 1.1  MV dec time: 0.20 sec  PA acc time: 0.09 sec  E/E' av.3  Lateral E/e': 6.6  Medial E/e': 12.0  RV S Stefan: 13.5 cm/sec     ______________________________________________________________________________  Report approved by: Ron Orozco MD on 2025 01:33 PM         NM Lexiscan stress test (nuc card)     Value    Target     Baseline Systolic     Baseline Diastolic BP 83    Last Stress Systolic     Last Stress Diastolic BP 75    Baseline HR 65    Max HR  99    Max Predicted HR  63    Rate Pressure Product 11,385.0    BP 59    Narrative      The nuclear stress test is abnormal.    There is a small area of transmural infarction in the mid to basal   inferolateral segment(s) of the  left ventricle.  No residual ischemia is   identified.    Left ventricular function is normal. The left ventricular ejection   fraction at rest is 59%.    There is no prior study for comparison.         Discharge Medications      Review of your medicines        CONTINUE these medicines which have NOT CHANGED        Dose / Directions   acetaminophen 325 MG tablet  Commonly known as: TYLENOL      Dose: 650 mg  Take 650 mg by mouth every 4 hours as needed for mild pain.  Refills: 0     albuterol 108 (90 Base) MCG/ACT inhaler  Commonly known as: PROAIR HFA/PROVENTIL HFA/VENTOLIN HFA      Dose: 2 puff  Inhale 2 puffs into the lungs every 4 hours as needed for shortness of breath, wheezing or cough  Refills: 0     aspirin 81 MG chewable tablet  Commonly known as: ASA      Dose: 81 mg  Take 81 mg by mouth daily  Refills: 0     atorvastatin 40 MG tablet  Commonly known as: LIPITOR      Dose: 40 mg  Take 40 mg by mouth daily  Refills: 0     * buprenorphine-naloxone 2-0.5 MG Subl sublingual tablet  Commonly known as: SUBOXONE      Dose: 1 tablet  Place 1 tablet under the tongue daily. In addition to 1/2 tab of 8-2 strength  Refills: 0     * buprenorphine-naloxone 8-2 MG Subl sublingual tablet  Commonly known as: SUBOXONE      Dose: 0.5 tablet  Place 0.5 tablets under the tongue daily. In addition to 1 tab of 2-0.5 strength  Refills: 0     buPROPion 300 MG 24 hr tablet  Commonly known as: WELLBUTRIN XL      Dose: 300 mg  Take 300 mg by mouth every morning  Refills: 0     carboxymethylcellulose PF 0.5 % ophthalmic solution  Commonly known as: REFRESH PLUS      Dose: 1 drop  Place 1 drop into both eyes 4 times daily.  Refills: 0     carvedilol 25 MG tablet  Commonly known as: COREG      Dose: 25 mg  Take 25 mg by mouth 2 times daily  Refills: 0     cholecalciferol 25 mcg (1000 units) capsule  Commonly known as: VITAMIN D3      Dose: 2 capsule  Take 2 capsules by mouth daily.  Refills: 0     clotrimazole 1 % external  cream  Commonly known as: LOTRIMIN      Apply topically 2 times daily. To feet - can use between toes  Refills: 0     diclofenac 1 % topical gel  Commonly known as: VOLTAREN      Dose: 2-4 g  Apply 2-4 g topically 4 times daily as needed for moderate pain  Refills: 0     ferrous sulfate 325 (65 Fe) MG EC tablet  Commonly known as: FE TABS      Dose: 325 mg  Take 325 mg by mouth daily  Refills: 0     fluticasone 50 MCG/ACT nasal spray  Commonly known as: FLONASE      Dose: 2 spray  Spray 2 sprays into both nostrils daily.  Refills: 0     Fluticasone-Umeclidin-Vilanterol 200-62.5-25 MCG/ACT oral inhaler  Commonly known as: TRELEGY ELLIPTA      Dose: 1 puff  Inhale 1 puff into the lungs daily.  Refills: 0     * gabapentin 300 MG capsule  Commonly known as: NEURONTIN      Dose: 300 mg  Take 300 mg by mouth every morning  Refills: 0     * gabapentin 300 MG capsule  Commonly known as: NEURONTIN      Dose: 600 mg  Take 600 mg by mouth At Bedtime  Refills: 0     guaiFENesin 20 mg/mL liquid  Commonly known as: ROBITUSSIN      Dose: 200 mg  Take 200 mg by mouth every 4 hours as needed for cough.  Refills: 0     hydrALAZINE 100 MG tablet  Commonly known as: APRESOLINE  Used for: Hypertension, unspecified type      Dose: 100 mg  Take 1 tablet (100 mg) by mouth 3 times daily NOTE this is an INCREASE in dose from prior to hospital.  Further mgmt and refills per PCP.  Quantity: 90 tablet  Refills: 0     * ipratropium - albuterol 0.5 mg/2.5 mg (3mg)/3 mL 0.5-2.5 (3) MG/3ML neb solution  Commonly known as: DUONEB  Used for: COPD exacerbation (H)      Dose: 1 vial  Take 1 vial (3 mLs) by nebulization 2 times daily  Quantity: 90 mL  Refills: 1     * ipratropium - albuterol 0.5 mg/2.5 mg (3mg)/3 mL 0.5-2.5 (3) MG/3ML neb solution  Commonly known as: DUONEB  Used for: COPD exacerbation (H)      Dose: 1 vial  Take 1 vial (3 mLs) by nebulization every 6 hours as needed for shortness of breath, wheezing or cough  Quantity: 90 mL  Refills:  3     lisinopril 40 MG tablet  Commonly known as: ZESTRIL  Used for: COPD exacerbation (H)      Dose: 40 mg  Take 1 tablet (40 mg) by mouth daily  Quantity: 30 tablet  Refills: 3     melatonin 5 MG tablet      Dose: 5 mg  Take 5 mg by mouth nightly as needed for sleep  Refills: 0     mineral oil-white petrolatum cream      Apply topically 2 times daily. To feet; apply after clotrimazole. Do not use between toes. Cover feet with socks at night  Refills: 0     mirtazapine 45 MG tablet  Commonly known as: REMERON      Dose: 45 mg  Take 45 mg by mouth At Bedtime  Refills: 0     multivitamin w/minerals tablet      Dose: 1 tablet  Take 1 tablet by mouth daily  Refills: 0     naloxone 4 MG/0.1ML nasal spray  Commonly known as: NARCAN      Dose: 4 mg  Spray 4 mg into one nostril alternating nostrils once as needed for opioid reversal every 2-3 minutes until assistance arrives  Refills: 0     pantoprazole 40 MG EC tablet  Commonly known as: PROTONIX      Dose: 40 mg  Take 40 mg by mouth daily  Refills: 0     polyethylene glycol 17 g packet  Commonly known as: MIRALAX      Dose: 1 packet  Take 1 packet by mouth 2 times daily as needed for constipation.  Refills: 0     predniSONE 10 MG tablet  Commonly known as: DELTASONE  Used for: COPD exacerbation (H)      4 tabs daily for 3 days, then 3 tabs daily for 3 days, then 2 tabs daily for 3 days, then 1 tab daily for 3 days, then stop  Refills: 0     * QUEtiapine 25 MG tablet  Commonly known as: SEROquel      Dose: 25 mg  Take 25 mg by mouth at bedtime.  Refills: 0     * QUEtiapine 25 MG tablet  Commonly known as: SEROquel      Dose: 12.5 mg  Take 12.5 mg by mouth 2 times daily as needed (panic).  Refills: 0     sennosides 8.6 MG tablet  Commonly known as: SENOKOT      Dose: 1 tablet  Take 1 tablet by mouth 2 times daily as needed for constipation.  Refills: 0           * This list has 8 medication(s) that are the same as other medications prescribed for you. Read the directions  carefully, and ask your doctor or other care provider to review them with you.                STOP taking      azithromycin 250 MG tablet  Commonly known as: ZITHROMAX               Allergies   Allergies   Allergen Reactions    Ibuprofen Nausea and Vomiting

## 2025-06-24 ENCOUNTER — HOSPITAL ENCOUNTER (EMERGENCY)
Facility: CLINIC | Age: 65
Discharge: HOME | End: 2025-06-24
Attending: EMERGENCY MEDICINE
Payer: MEDICARE

## 2025-06-24 ENCOUNTER — APPOINTMENT (OUTPATIENT)
Dept: GENERAL RADIOLOGY | Facility: CLINIC | Age: 65
End: 2025-06-24
Attending: EMERGENCY MEDICINE
Payer: MEDICARE

## 2025-06-24 VITALS
SYSTOLIC BLOOD PRESSURE: 109 MMHG | RESPIRATION RATE: 22 BRPM | HEIGHT: 69 IN | DIASTOLIC BLOOD PRESSURE: 72 MMHG | TEMPERATURE: 98.1 F | HEART RATE: 78 BPM | OXYGEN SATURATION: 96 % | BODY MASS INDEX: 22.15 KG/M2

## 2025-06-24 DIAGNOSIS — J44.1 COPD WITH ACUTE EXACERBATION (H): ICD-10-CM

## 2025-06-24 LAB
ALBUMIN SERPL BCG-MCNC: 4 G/DL (ref 3.5–5.2)
ALP SERPL-CCNC: 94 U/L (ref 40–150)
ALT SERPL W P-5'-P-CCNC: 21 U/L (ref 0–70)
ANION GAP SERPL CALCULATED.3IONS-SCNC: 11 MMOL/L (ref 7–15)
AST SERPL W P-5'-P-CCNC: 24 U/L (ref 0–45)
ATRIAL RATE - MUSE: 73 BPM
BASOPHILS # BLD AUTO: 0 10E3/UL (ref 0–0.2)
BASOPHILS NFR BLD AUTO: 0 %
BILIRUB SERPL-MCNC: 0.6 MG/DL
BUN SERPL-MCNC: 19.2 MG/DL (ref 8–23)
CALCIUM SERPL-MCNC: 8.6 MG/DL (ref 8.8–10.4)
CHLORIDE SERPL-SCNC: 95 MMOL/L (ref 98–107)
CREAT SERPL-MCNC: 0.99 MG/DL (ref 0.67–1.17)
DIASTOLIC BLOOD PRESSURE - MUSE: NORMAL MMHG
EGFRCR SERPLBLD CKD-EPI 2021: 85 ML/MIN/1.73M2
EOSINOPHIL # BLD AUTO: 0.4 10E3/UL (ref 0–0.7)
EOSINOPHIL NFR BLD AUTO: 4 %
ERYTHROCYTE [DISTWIDTH] IN BLOOD BY AUTOMATED COUNT: 13.6 % (ref 10–15)
FLUAV RNA SPEC QL NAA+PROBE: NEGATIVE
FLUBV RNA RESP QL NAA+PROBE: NEGATIVE
GLUCOSE SERPL-MCNC: 132 MG/DL (ref 70–99)
HCO3 SERPL-SCNC: 25 MMOL/L (ref 22–29)
HCT VFR BLD AUTO: 34.4 % (ref 40–53)
HGB BLD-MCNC: 10.7 G/DL (ref 13.3–17.7)
IMM GRANULOCYTES # BLD: 0 10E3/UL
IMM GRANULOCYTES NFR BLD: 0 %
INTERPRETATION ECG - MUSE: NORMAL
LYMPHOCYTES # BLD AUTO: 1.3 10E3/UL (ref 0.8–5.3)
LYMPHOCYTES NFR BLD AUTO: 14 %
MCH RBC QN AUTO: 29.2 PG (ref 26.5–33)
MCHC RBC AUTO-ENTMCNC: 31.1 G/DL (ref 31.5–36.5)
MCV RBC AUTO: 94 FL (ref 78–100)
MONOCYTES # BLD AUTO: 0.7 10E3/UL (ref 0–1.3)
MONOCYTES NFR BLD AUTO: 8 %
NEUTROPHILS # BLD AUTO: 7 10E3/UL (ref 1.6–8.3)
NEUTROPHILS NFR BLD AUTO: 75 %
NRBC # BLD AUTO: 0 10E3/UL
NRBC BLD AUTO-RTO: 0 /100
NT-PROBNP SERPL-MCNC: 91 PG/ML (ref 0–177)
P AXIS - MUSE: 80 DEGREES
PLATELET # BLD AUTO: 125 10E3/UL (ref 150–450)
POTASSIUM SERPL-SCNC: 4.6 MMOL/L (ref 3.4–5.3)
PR INTERVAL - MUSE: 120 MS
PROT SERPL-MCNC: 6.5 G/DL (ref 6.4–8.3)
QRS DURATION - MUSE: 74 MS
QT - MUSE: 362 MS
QTC - MUSE: 398 MS
R AXIS - MUSE: 79 DEGREES
RBC # BLD AUTO: 3.67 10E6/UL (ref 4.4–5.9)
RSV RNA SPEC NAA+PROBE: NEGATIVE
SARS-COV-2 RNA RESP QL NAA+PROBE: NEGATIVE
SODIUM SERPL-SCNC: 131 MMOL/L (ref 135–145)
SYSTOLIC BLOOD PRESSURE - MUSE: NORMAL MMHG
T AXIS - MUSE: 87 DEGREES
TROPONIN T SERPL HS-MCNC: 14 NG/L
TROPONIN T SERPL HS-MCNC: 14 NG/L
VENTRICULAR RATE- MUSE: 73 BPM
WBC # BLD AUTO: 9.5 10E3/UL (ref 4–11)

## 2025-06-24 PROCEDURE — 250N000009 HC RX 250: Performed by: EMERGENCY MEDICINE

## 2025-06-24 PROCEDURE — 71046 X-RAY EXAM CHEST 2 VIEWS: CPT

## 2025-06-24 PROCEDURE — 87637 SARSCOV2&INF A&B&RSV AMP PRB: CPT | Performed by: EMERGENCY MEDICINE

## 2025-06-24 PROCEDURE — 82310 ASSAY OF CALCIUM: CPT | Performed by: EMERGENCY MEDICINE

## 2025-06-24 PROCEDURE — 96374 THER/PROPH/DIAG INJ IV PUSH: CPT

## 2025-06-24 PROCEDURE — 99285 EMERGENCY DEPT VISIT HI MDM: CPT | Mod: 25

## 2025-06-24 PROCEDURE — 94640 AIRWAY INHALATION TREATMENT: CPT

## 2025-06-24 PROCEDURE — 36415 COLL VENOUS BLD VENIPUNCTURE: CPT | Performed by: EMERGENCY MEDICINE

## 2025-06-24 PROCEDURE — 83880 ASSAY OF NATRIURETIC PEPTIDE: CPT | Performed by: EMERGENCY MEDICINE

## 2025-06-24 PROCEDURE — 250N000011 HC RX IP 250 OP 636: Mod: JZ | Performed by: EMERGENCY MEDICINE

## 2025-06-24 PROCEDURE — 85025 COMPLETE CBC W/AUTO DIFF WBC: CPT | Performed by: EMERGENCY MEDICINE

## 2025-06-24 PROCEDURE — 84484 ASSAY OF TROPONIN QUANT: CPT | Performed by: EMERGENCY MEDICINE

## 2025-06-24 PROCEDURE — 93005 ELECTROCARDIOGRAM TRACING: CPT

## 2025-06-24 RX ORDER — IPRATROPIUM BROMIDE AND ALBUTEROL SULFATE 2.5; .5 MG/3ML; MG/3ML
3 SOLUTION RESPIRATORY (INHALATION) ONCE
Status: COMPLETED | OUTPATIENT
Start: 2025-06-24 | End: 2025-06-24

## 2025-06-24 RX ORDER — DOXYCYCLINE 100 MG/1
100 CAPSULE ORAL 2 TIMES DAILY
Qty: 14 CAPSULE | Refills: 0 | Status: SHIPPED | OUTPATIENT
Start: 2025-06-24 | End: 2025-06-24

## 2025-06-24 RX ORDER — PROPARACAINE HYDROCHLORIDE 5 MG/ML
1 SOLUTION/ DROPS OPHTHALMIC ONCE
Status: DISCONTINUED | OUTPATIENT
Start: 2025-06-24 | End: 2025-06-24 | Stop reason: HOSPADM

## 2025-06-24 RX ORDER — DOXYCYCLINE 100 MG/1
100 CAPSULE ORAL 2 TIMES DAILY
Qty: 14 CAPSULE | Refills: 0 | Status: SHIPPED | OUTPATIENT
Start: 2025-06-24

## 2025-06-24 RX ORDER — METHYLPREDNISOLONE SODIUM SUCCINATE 125 MG/2ML
125 INJECTION INTRAMUSCULAR; INTRAVENOUS ONCE
Status: COMPLETED | OUTPATIENT
Start: 2025-06-24 | End: 2025-06-24

## 2025-06-24 RX ADMIN — METHYLPREDNISOLONE SODIUM SUCCINATE 125 MG: 125 INJECTION, POWDER, FOR SOLUTION INTRAMUSCULAR; INTRAVENOUS at 01:02

## 2025-06-24 RX ADMIN — IPRATROPIUM BROMIDE AND ALBUTEROL SULFATE 3 ML: .5; 3 SOLUTION RESPIRATORY (INHALATION) at 01:18

## 2025-06-24 ASSESSMENT — ACTIVITIES OF DAILY LIVING (ADL)
ADLS_ACUITY_SCORE: 58

## 2025-06-24 NOTE — ED NOTES
Bed: ED04  Expected date:   Expected time:   Means of arrival:   Comments:  A55/ 64M/ Increased SOB/ hx COPD /Asthma/ Duoneb given

## 2025-06-24 NOTE — ED TRIAGE NOTES
BIBA from [facility]. Patient called EMS after having increasing difficult in breathing. He early took his home Nebs treatment at 1800hrs, with no improvement. EMS gave x1 Duoneb. Per patient, he was seen here  a week ago with the same symptoms  Patient is baseline on 3L home oxygen with his oxygen concentrator.

## 2025-06-24 NOTE — ED PROVIDER NOTES
Emergency Department Note      History of Present Illness     Chief Complaint   Shortness of Breath      HPI   Gerson Pearson is a 65 year old male with a history of COPD and CHF and recent admission to this hospital and discharged 2 days ago on a prednisone taper who presents with increasing productive cough and shortness of breath.  He is on 3 L nasal cannula at home and has not required anything more than that.  He states he feels quite tired.  He denies any chest pressure but does feel tightness with his breathing.  There is no pleuritic pain.  No history of PE or DVT.  He does have a history of CHF as well and denies being on a diuretic.  He does also have a history of opioid and cocaine use disorders.  He denies any leg swelling or tenderness.  No fevers, chills, sweats, diarrhea or urinary symptoms.    Independent Historian   None    Review of External Notes   I reviewed his discharge summary from the 21st.    Past Medical History     Medical History and Problem List   Past Medical History:   Diagnosis Date    CAD (coronary artery disease)     Chronic back pain     Chronic systolic congestive heart failure (H)     Cocaine use disorder (H)     COPD (chronic obstructive pulmonary disease) (H)     Depressive disorder     GERD (gastroesophageal reflux disease)     HTN (hypertension)     Myocardial infarction (H)     Nicotine dependence     Opioid use disorder     REYNA (obstructive sleep apnea)     Stroke (H) 2019       Medications   doxycycline hyclate (VIBRAMYCIN) 100 MG capsule  acetaminophen (TYLENOL) 325 MG tablet  albuterol (PROAIR HFA/PROVENTIL HFA/VENTOLIN HFA) 108 (90 Base) MCG/ACT inhaler  aspirin (ASA) 81 MG chewable tablet  atorvastatin (LIPITOR) 40 MG tablet  buprenorphine-naloxone (SUBOXONE) 2-0.5 MG SUBL sublingual tablet  buprenorphine-naloxone (SUBOXONE) 8-2 MG SUBL sublingual tablet  buPROPion (WELLBUTRIN XL) 300 MG 24 hr tablet  carboxymethylcellulose PF (REFRESH PLUS) 0.5 % ophthalmic  "solution  carvedilol (COREG) 25 MG tablet  cholecalciferol (VITAMIN D3) 25 mcg (1000 units) capsule  clotrimazole (LOTRIMIN) 1 % external cream  diclofenac (VOLTAREN) 1 % topical gel  ferrous sulfate (FE TABS) 325 (65 Fe) MG EC tablet  fluticasone (FLONASE) 50 MCG/ACT nasal spray  Fluticasone-Umeclidin-Vilanterol (TRELEGY ELLIPTA) 200-62.5-25 MCG/ACT oral inhaler  gabapentin (NEURONTIN) 300 MG capsule  gabapentin (NEURONTIN) 300 MG capsule  guaiFENesin (ROBITUSSIN) 20 mg/mL liquid  hydrALAZINE (APRESOLINE) 100 MG tablet  ipratropium - albuterol 0.5 mg/2.5 mg/3 mL (DUONEB) 0.5-2.5 (3) MG/3ML neb solution  ipratropium - albuterol 0.5 mg/2.5 mg/3 mL (DUONEB) 0.5-2.5 (3) MG/3ML neb solution  lisinopril (ZESTRIL) 40 MG tablet  melatonin 5 MG tablet  mineral oil-white petrolatum (EUCERIN/MINERIN) cream  mirtazapine (REMERON) 45 MG tablet  multivitamin w/minerals (THERA-VIT-M) tablet  naloxone (NARCAN) 4 MG/0.1ML nasal spray  pantoprazole (PROTONIX) 40 MG EC tablet  polyethylene glycol (MIRALAX) 17 g packet  predniSONE (DELTASONE) 10 MG tablet  QUEtiapine (SEROQUEL) 25 MG tablet  QUEtiapine (SEROQUEL) 25 MG tablet  sennosides (SENOKOT) 8.6 MG tablet        Surgical History   Past Surgical History:   Procedure Laterality Date    CORONARY STENT PLACEMENT  2020       Physical Exam     Patient Vitals for the past 24 hrs:   BP Temp Temp src Pulse Resp SpO2 Height   06/24/25 0304 116/62 -- -- -- -- 96 % --   06/24/25 0234 (!) 120/90 -- -- -- -- 96 % --   06/24/25 0154 -- -- -- -- -- 100 % --   06/24/25 0104 107/73 -- -- -- -- -- --   06/24/25 0034 133/67 -- -- -- -- 100 % --   06/24/25 0024 125/77 -- -- 82 -- 100 % --   06/24/25 0021 (!) 151/80 98.1  F (36.7  C) Oral 82 22 93 % 1.753 m (5' 9\")     Physical Exam  Constitutional: Vital signs reviewed as above  General: Alert, tired appearing  HEENT: Moist mucous membranes  Eyes: Conjunctiva normal.   Neck: Normal range of motion  Cardiovascular: Regular rate, Regular rhythm and " normal heart sounds.  No MRG.  No chest wall tenderness to palpation  Pulmonary/Chest: Tachypneic, diminished breath sounds throughout with occasional expiratory wheeze.  Abdominal: Soft. Positive bowel sounds. No MRG.  Musculoskeletal/Extremities: Full ROM.  Endo: No pitting edema  Neurological: Alert, no focal deficits.  Skin: Skin is warm and dry.   Psychiatric: Pleasant     Diagnostics     Lab Results   Labs Ordered and Resulted from Time of ED Arrival to Time of ED Departure   COMPREHENSIVE METABOLIC PANEL - Abnormal       Result Value    Sodium 131 (*)     Potassium 4.6      Carbon Dioxide (CO2) 25      Anion Gap 11      Urea Nitrogen 19.2      Creatinine 0.99      GFR Estimate 85      Calcium 8.6 (*)     Chloride 95 (*)     Glucose 132 (*)     Alkaline Phosphatase 94      AST 24      ALT 21      Protein Total 6.5      Albumin 4.0      Bilirubin Total 0.6     CBC WITH PLATELETS AND DIFFERENTIAL - Abnormal    WBC Count 9.5      RBC Count 3.67 (*)     Hemoglobin 10.7 (*)     Hematocrit 34.4 (*)     MCV 94      MCH 29.2      MCHC 31.1 (*)     RDW 13.6      Platelet Count 125 (*)     % Neutrophils 75      % Lymphocytes 14      % Monocytes 8      % Eosinophils 4      % Basophils 0      % Immature Granulocytes 0      NRBCs per 100 WBC 0      Absolute Neutrophils 7.0      Absolute Lymphocytes 1.3      Absolute Monocytes 0.7      Absolute Eosinophils 0.4      Absolute Basophils 0.0      Absolute Immature Granulocytes 0.0      Absolute NRBCs 0.0     TROPONIN T, HIGH SENSITIVITY - Normal    Troponin T, High Sensitivity 14     NT-PROBNP - Normal    NT-proBNP 91     INFLUENZA A/B, RSV AND SARS-COV2 PCR - Normal    Influenza A PCR Negative      Influenza B PCR Negative      RSV PCR Negative      SARS CoV2 PCR Negative     TROPONIN T, HIGH SENSITIVITY - Normal    Troponin T, High Sensitivity 14         Imaging   XR Chest 2 Views   Final Result   IMPRESSION: No evidence of active cardiopulmonary disease.           EKG   ECG  results from 06/24/25   EKG 12-lead, tracing only     Value    Systolic Blood Pressure     Diastolic Blood Pressure     Ventricular Rate 73    Atrial Rate 73    TX Interval 120    QRS Duration 74        QTc 398    P Axis 80    R AXIS 79    T Axis 87    Interpretation ECG      Sinus rhythm  Normal ECG  When compared with ECG of 17-Jun-2025 00:00,  Nonspecific T wave abnormality now evident in Lateral leads           Independent Interpretation   CXR: No infiltrate, pleural effusion, or mediastinal widening.    ED Course      Medications Administered   Medications   proparacaine (ALCAINE) 0.5 % ophthalmic solution 1 drop (has no administration in time range)   fluorescein (FUL-SABAS) ophthalmic strip 1 strip (has no administration in time range)   ipratropium - albuterol 0.5 mg/2.5 mg (3mg)/3 mL (DUONEB) neb solution 3 mL (3 mLs Nebulization $Given 6/24/25 0118)   methylPREDNISolone Na Suc (solu-MEDROL) injection 125 mg (125 mg Intravenous $Given 6/24/25 0102)       Procedures   Procedures     Discussion of Management   None    ED Course        Additional Documentation  None    Medical Decision Making / Diagnosis     CMS Diagnoses: None    MIPS   None               MDM   Gerson Pearson is a 65 year old male with a history of CHF and COPD as well as opioid use disorder who presents emergency department with shortness of breath and increasing productive cough.  He was just admitted to the hospital and had extensive cardiac and pulmonary workup.  He is on a prednisone taper at home.  Antibiotics were discontinued at time of discharge.  He is maintaining his oxygen saturations in the mid to low 90s on his normal 2 to 3 L nasal cannula.  Chest x-ray not show any acute process.  He did sound tight with an occasional expiratory wheeze and was given a DuoNeb as well as a bump of Solu-Medrol IV.  He was feeling much better.  Viral panel here was negative.  BNP is normal at 91.  Troponins were 14 followed by 14 couple and  this is nondiagnostic EKG we have ruled out acute coronary syndrome.  He is not presenting like a PE so this was felt less likely.  Again no signs of florid heart failure on the x-ray or the BNP.  He does have a chronic anemia and thrombocytopenia both of which are stable.  Sodium here is slightly low at 131 but the rest electrolytes are reassuring.  Viral URI panel was negative.  He has been very sleepy throughout his stay here.  At 1 point he stated that he felt like there was something in his eye.  I did use fluorescein and Woods lamp I did not see evidence of foreign body or corneal abrasion.  He said his eyes no longer hurting.  He is currently on a prednisone taper at home which he will continue.  Given the increased cough for the past few days I will discharge him on doxycycline.  Continue with his nebs to be diligent about that.  Return if oxygen requirements are increasing or symptoms progress    Disposition   The patient was discharged.     Diagnosis     ICD-10-CM    1. COPD with acute exacerbation (H)  J44.1            Discharge Medications   New Prescriptions    DOXYCYCLINE HYCLATE (VIBRAMYCIN) 100 MG CAPSULE    Take 1 capsule (100 mg) by mouth 2 times daily for 7 days.         MD Irma Acevedo Brent Aaron, MD  06/24/25 3073

## 2025-07-12 ENCOUNTER — HEALTH MAINTENANCE LETTER (OUTPATIENT)
Age: 65
End: 2025-07-12

## 2025-07-18 ENCOUNTER — HOSPITAL ENCOUNTER (EMERGENCY)
Facility: CLINIC | Age: 65
Discharge: HOME | End: 2025-07-19
Attending: EMERGENCY MEDICINE | Admitting: EMERGENCY MEDICINE
Payer: MEDICARE

## 2025-07-18 ENCOUNTER — APPOINTMENT (OUTPATIENT)
Dept: GENERAL RADIOLOGY | Facility: CLINIC | Age: 65
End: 2025-07-18
Attending: EMERGENCY MEDICINE
Payer: MEDICARE

## 2025-07-18 DIAGNOSIS — F41.9 ANXIETY: ICD-10-CM

## 2025-07-18 DIAGNOSIS — R06.02 SHORTNESS OF BREATH: ICD-10-CM

## 2025-07-18 LAB
ANION GAP SERPL CALCULATED.3IONS-SCNC: 10 MMOL/L (ref 7–15)
BASOPHILS # BLD AUTO: 0 10E3/UL (ref 0–0.2)
BASOPHILS NFR BLD AUTO: 1 %
BUN SERPL-MCNC: 12.5 MG/DL (ref 8–23)
CALCIUM SERPL-MCNC: 8.7 MG/DL (ref 8.8–10.4)
CHLORIDE SERPL-SCNC: 100 MMOL/L (ref 98–107)
CREAT SERPL-MCNC: 0.93 MG/DL (ref 0.67–1.17)
EGFRCR SERPLBLD CKD-EPI 2021: >90 ML/MIN/1.73M2
EOSINOPHIL # BLD AUTO: 0.1 10E3/UL (ref 0–0.7)
EOSINOPHIL NFR BLD AUTO: 2 %
ERYTHROCYTE [DISTWIDTH] IN BLOOD BY AUTOMATED COUNT: 12.5 % (ref 10–15)
GLUCOSE SERPL-MCNC: 143 MG/DL (ref 70–99)
HCO3 SERPL-SCNC: 27 MMOL/L (ref 22–29)
HCT VFR BLD AUTO: 31.4 % (ref 40–53)
HGB BLD-MCNC: 10 G/DL (ref 13.3–17.7)
IMM GRANULOCYTES # BLD: 0 10E3/UL
IMM GRANULOCYTES NFR BLD: 0 %
LYMPHOCYTES # BLD AUTO: 1 10E3/UL (ref 0.8–5.3)
LYMPHOCYTES NFR BLD AUTO: 18 %
MCH RBC QN AUTO: 29.6 PG (ref 26.5–33)
MCHC RBC AUTO-ENTMCNC: 31.8 G/DL (ref 31.5–36.5)
MCV RBC AUTO: 93 FL (ref 78–100)
MONOCYTES # BLD AUTO: 0.6 10E3/UL (ref 0–1.3)
MONOCYTES NFR BLD AUTO: 11 %
NEUTROPHILS # BLD AUTO: 3.5 10E3/UL (ref 1.6–8.3)
NEUTROPHILS NFR BLD AUTO: 67 %
NRBC # BLD AUTO: 0 10E3/UL
NRBC BLD AUTO-RTO: 0 /100
PLATELET # BLD AUTO: 149 10E3/UL (ref 150–450)
POTASSIUM SERPL-SCNC: 4 MMOL/L (ref 3.4–5.3)
RBC # BLD AUTO: 3.38 10E6/UL (ref 4.4–5.9)
SODIUM SERPL-SCNC: 137 MMOL/L (ref 135–145)
TROPONIN T SERPL HS-MCNC: 13 NG/L
WBC # BLD AUTO: 5.2 10E3/UL (ref 4–11)

## 2025-07-18 PROCEDURE — 250N000013 HC RX MED GY IP 250 OP 250 PS 637: Performed by: EMERGENCY MEDICINE

## 2025-07-18 PROCEDURE — 80048 BASIC METABOLIC PNL TOTAL CA: CPT | Performed by: EMERGENCY MEDICINE

## 2025-07-18 PROCEDURE — 36415 COLL VENOUS BLD VENIPUNCTURE: CPT | Performed by: EMERGENCY MEDICINE

## 2025-07-18 PROCEDURE — 71046 X-RAY EXAM CHEST 2 VIEWS: CPT

## 2025-07-18 PROCEDURE — 99291 CRITICAL CARE FIRST HOUR: CPT | Mod: 25 | Performed by: EMERGENCY MEDICINE

## 2025-07-18 PROCEDURE — 93005 ELECTROCARDIOGRAM TRACING: CPT

## 2025-07-18 PROCEDURE — 99285 EMERGENCY DEPT VISIT HI MDM: CPT | Mod: 25 | Performed by: EMERGENCY MEDICINE

## 2025-07-18 PROCEDURE — 85004 AUTOMATED DIFF WBC COUNT: CPT | Performed by: EMERGENCY MEDICINE

## 2025-07-18 PROCEDURE — 84484 ASSAY OF TROPONIN QUANT: CPT | Performed by: EMERGENCY MEDICINE

## 2025-07-18 RX ORDER — ACETAMINOPHEN 500 MG
1000 TABLET ORAL ONCE
Status: COMPLETED | OUTPATIENT
Start: 2025-07-18 | End: 2025-07-18

## 2025-07-18 RX ADMIN — ACETAMINOPHEN 1000 MG: 500 TABLET ORAL at 23:06

## 2025-07-18 ASSESSMENT — ACTIVITIES OF DAILY LIVING (ADL)
ADLS_ACUITY_SCORE: 58
ADLS_ACUITY_SCORE: 58

## 2025-07-19 VITALS
WEIGHT: 147 LBS | RESPIRATION RATE: 14 BRPM | SYSTOLIC BLOOD PRESSURE: 105 MMHG | OXYGEN SATURATION: 97 % | BODY MASS INDEX: 21.77 KG/M2 | DIASTOLIC BLOOD PRESSURE: 63 MMHG | HEIGHT: 69 IN | HEART RATE: 67 BPM | TEMPERATURE: 99 F

## 2025-07-19 LAB
ATRIAL RATE - MUSE: 67 BPM
BASE EXCESS BLDV CALC-SCNC: 3 MMOL/L (ref -3–3)
DIASTOLIC BLOOD PRESSURE - MUSE: NORMAL MMHG
GLUCOSE BLDC GLUCOMTR-MCNC: 81 MG/DL (ref 70–99)
HCO3 BLDV-SCNC: 30 MMOL/L (ref 21–28)
INTERPRETATION ECG - MUSE: NORMAL
LACTATE BLD-SCNC: 0.4 MMOL/L (ref 0.7–2)
P AXIS - MUSE: 82 DEGREES
PCO2 BLDV: 56 MM HG (ref 40–50)
PH BLDV: 7.34 [PH] (ref 7.32–7.43)
PO2 BLDV: 46 MM HG (ref 25–47)
PR INTERVAL - MUSE: 124 MS
QRS DURATION - MUSE: 76 MS
QT - MUSE: 374 MS
QTC - MUSE: 395 MS
R AXIS - MUSE: 81 DEGREES
SAO2 % BLDV: 78 % (ref 70–75)
SYSTOLIC BLOOD PRESSURE - MUSE: NORMAL MMHG
T AXIS - MUSE: 81 DEGREES
TROPONIN T SERPL HS-MCNC: 12 NG/L
VENTRICULAR RATE- MUSE: 67 BPM

## 2025-07-19 PROCEDURE — 82962 GLUCOSE BLOOD TEST: CPT

## 2025-07-19 PROCEDURE — 84484 ASSAY OF TROPONIN QUANT: CPT | Performed by: EMERGENCY MEDICINE

## 2025-07-19 PROCEDURE — 36415 COLL VENOUS BLD VENIPUNCTURE: CPT | Performed by: EMERGENCY MEDICINE

## 2025-07-19 PROCEDURE — 258N000003 HC RX IP 258 OP 636: Performed by: EMERGENCY MEDICINE

## 2025-07-19 PROCEDURE — 96360 HYDRATION IV INFUSION INIT: CPT

## 2025-07-19 PROCEDURE — 83605 ASSAY OF LACTIC ACID: CPT

## 2025-07-19 RX ADMIN — SODIUM CHLORIDE 1000 ML: 0.9 INJECTION, SOLUTION INTRAVENOUS at 02:15

## 2025-07-19 ASSESSMENT — ACTIVITIES OF DAILY LIVING (ADL)
ADLS_ACUITY_SCORE: 58

## 2025-07-19 NOTE — DISCHARGE INSTRUCTIONS
Continue your medications as prescribed  Follow up with your primary care provider if continuing to have problems with anxiety

## 2025-07-19 NOTE — ED PROVIDER NOTES
Emergency Department Note      History of Present Illness     Chief Complaint   Anxiety    HPI   Gerson Pearson is a 65 year old male with a history of CAD, COPD, anxiety, hypertension, and hyperlipidemia presenting with anxiety. EMS reports the patient called earlier today about having high anxiety. They stated he frequently calls 911 about his anxiety. Today, he called about his colonoscopy scheduled for 3 days from now (07.21.25). The patient confirms he was potentially anxious about the colonoscopy. EMS added his prior colonoscopy was cancelled and may have added to his anxiety. They explained his heart was racing. En route, Miguelito's anxiety began to subside, but a headache formed. Miguelito is on 2L O2 at all times. His O2 was at 98% prior to the EMS turning off the O2. While it was turned off O2 never lowered below 97%. Once turned back on the O2 lópez back to 98%. He denies SOB, vomiting, or coughing.  Denies chest pain.    Independent Historian   EMS as detailed above.    Review of External Notes   Reviewed hospitalization from COPD exacerbation on 06/21/25    Past Medical History     Medical History and Problem List   Anemia  Anisocoria  Anxiety  CAD (coronary artery disease)  Chronic back pain  Chronic systolic congestive heart failure   Cocaine use disorder   COPD (chronic obstructive pulmonary disease)   Depressive disorder  Esophageal candidiasis  GERD (gastroesophageal reflux disease)  Hyperlipidemia   HTN (hypertension)  Insomnia  Ischemic cardiomyopathy  Myocardial infarction   Myopia of right eye  Nicotine dependence  Opioid use disorder  REYNA (obstructive sleep apnea)  Polysubstance abuse  Stroke     Medications   albuterol   aspirin 81 MG   atorvastatin   buprenorphine-naloxone   bupropion   carvedilol   doxycycline hyclate   ferrous sulfate   fluticasone   gabapentin   guaifenesin  hydralazine  lisinopril   melatonin   mirtazapine   pantoprazole   prednisone  quetiapine   sennosides    Surgical  "History   Coronary Stent Placement    Physical Exam     Patient Vitals for the past 24 hrs:   BP Temp Temp src Pulse Resp SpO2 Height Weight   07/19/25 0515 107/63 -- -- 71 13 95 % -- --   07/19/25 0500 104/62 -- -- 71 13 95 % -- --   07/19/25 0445 101/65 -- -- 73 14 94 % -- --   07/19/25 0400 106/68 -- -- 78 13 95 % -- --   07/19/25 0345 114/65 -- -- 78 12 96 % -- --   07/19/25 0330 116/66 -- -- 81 15 96 % -- --   07/19/25 0317 110/62 -- -- 81 14 94 % -- --   07/19/25 0300 101/57 -- -- 75 14 96 % -- --   07/19/25 0245 95/59 -- -- 76 12 96 % -- --   07/19/25 0230 120/64 -- -- 71 20 99 % -- --   07/19/25 0217 122/70 -- -- 70 18 98 % -- --   07/19/25 0200 99/56 -- -- 75 24 98 % -- --   07/19/25 0145 (!) 83/57 -- -- 70 12 97 % -- --   07/19/25 0100 (!) 76/54 -- -- 75 16 96 % -- --   07/19/25 0001 94/71 -- -- 75 12 97 % -- --   07/18/25 2300 123/68 -- -- 80 -- 99 % -- --   07/18/25 2205 (!) 144/75 99  F (37.2  C) Oral 76 18 100 % 1.753 m (5' 9\") 66.7 kg (147 lb)     Physical Exam  General: Sitting up in bed  Eyes:  The pupils are equal and round    Conjunctivae and sclerae are normal  ENT:    Atraumatic face  Neck:  Normal range of motion  CV:  Regular rate, regular rhythm     Skin warm and well perfused   Resp:  Non labored breathing on room air    No tachypnea    No cough heard    Lungs clear bilaterally    On oxygen via nasal cannula  GI:  Abdomen is soft, there is no rigidity    No distension    No rebound tenderness     No abdominal tenderness  MS:  Normal muscular tone  Skin:  No rash or acute skin lesions noted  Neuro:   Awake, alert.      Speech is normal and fluent.    Face is symmetric.     Moves all extremities equally  Psych: Normal affect.  Appropriate interactions.     Diagnostics     Lab Results   Labs Ordered and Resulted from Time of ED Arrival to Time of ED Departure   BASIC METABOLIC PANEL - Abnormal       Result Value    Sodium 137      Potassium 4.0      Chloride 100      Carbon Dioxide (CO2) 27   "    Anion Gap 10      Urea Nitrogen 12.5      Creatinine 0.93      GFR Estimate >90      Calcium 8.7 (*)     Glucose 143 (*)    CBC WITH PLATELETS AND DIFFERENTIAL - Abnormal    WBC Count 5.2      RBC Count 3.38 (*)     Hemoglobin 10.0 (*)     Hematocrit 31.4 (*)     MCV 93      MCH 29.6      MCHC 31.8      RDW 12.5      Platelet Count 149 (*)     % Neutrophils 67      % Lymphocytes 18      % Monocytes 11      % Eosinophils 2      % Basophils 1      % Immature Granulocytes 0      NRBCs per 100 WBC 0      Absolute Neutrophils 3.5      Absolute Lymphocytes 1.0      Absolute Monocytes 0.6      Absolute Eosinophils 0.1      Absolute Basophils 0.0      Absolute Immature Granulocytes 0.0      Absolute NRBCs 0.0     TROPONIN T, HIGH SENSITIVITY - Normal    Troponin T, High Sensitivity 13     TROPONIN T, HIGH SENSITIVITY - Normal    Troponin T, High Sensitivity 12       Imaging   XR Chest 2 Views   Final Result   IMPRESSION: Lungs are mildly hyperinflated which can be seen in COPD. Normal heart size with coronary artery stent material. Left midlung atelectasis or scarring. No visible pneumothorax or pleural effusion.        EKG   ECG results from 07/18/25   EKG 12-lead, tracing only     Value    Systolic Blood Pressure     Diastolic Blood Pressure     Ventricular Rate 67    Atrial Rate 67    AL Interval 124    QRS Duration 76        QTc 395    P Axis 82    R AXIS 81    T Axis 81    Interpretation ECG      Sinus rhythm  Normal ECG  When compared with ECG of 24-Jun-2025 02:20,  No significant change was found  EKG interpreted by me at 2216          Independent Interpretation   CXR: No pneumothorax or pleural effusion.    ED Course      Medications Administered   Medications   acetaminophen (TYLENOL) tablet 1,000 mg (1,000 mg Oral $Given 7/18/25 2304)   sodium chloride 0.9% BOLUS 1,000 mL (0 mLs Intravenous Stopped 7/19/25 0300)     Procedures   Procedures     Discussion of Management   None    ED Course   ED Course as of  07/19/25 0137   Fri Jul 18, 2025 2203 I obtained the history from EMS as noted above.       2230 I obtained the history and examined the patient as noted above.       Sat Jul 19, 2025 0136 I discussed discharge instructions with the patient. Patient agreeable to discharge.         Additional Documentation  None    Medical Decision Making / Diagnosis     MARBELLA Pearson is a 65 year old male who presented to the emergency department with anxiety.  Patient reports being anxious for an upcoming colonoscopy.  Denies any symptoms currently.  No shortness of breath.  No recent illness.  On oxygen via nasal cannula though not wheezing on exam and no increasing work of breathing.  Chest x-ray unremarkable.  History is not consistent with a COPD exacerbation.  Doubt PE or ACS.  Briefly while in the emergency department, had 2 lower blood pressures that I suspect are medication related from medications given at his group home.  Patient given IV fluids and after this, blood pressure stable for many hours.  Doubt infection as cause of symptoms.  Will discharge patient back to his group home.    Disposition   The patient was discharged.     Diagnosis     ICD-10-CM    1. Anxiety  F41.9       2. Shortness of breath  R06.02          Scribe Disclosure:  I, Darlyn Spear, am serving as a scribe at 11:31 PM on 7/18/2025 to document services personally performed by Bessie Saleh MD based on my observations and the provider's statements to me.        Bessie Saleh MD  07/19/25 [id number]       Bessie Saleh MD  07/19/25 [id number]

## 2025-07-19 NOTE — ED TRIAGE NOTES
BIBA from group home due to Anxiety, pt feeling anxious about  his upcoming coloscopy on Monday. On baseline O2 at 2L NC, EMS tried RA w/ sats at 97%. Stated his heart is racing and tingling fingers. HR at 70s.  staff gave PRN meds for anxiety (unknown meds)

## 2025-07-19 NOTE — ED NOTES
Bed: ED03  Expected date:   Expected time:   Means of arrival:   Comments:  [id number] 65M group home, o2 dependant, anxiety ETA [id number]   sutures intact with a drain/soft/nontender.../tender.../nondistended

## (undated) RX ORDER — ALBUTEROL SULFATE 90 UG/1
INHALANT RESPIRATORY (INHALATION)
Status: DISPENSED
Start: 2025-06-19

## (undated) RX ORDER — REGADENOSON 0.08 MG/ML
INJECTION, SOLUTION INTRAVENOUS
Status: DISPENSED
Start: 2025-06-19